# Patient Record
Sex: MALE | Race: OTHER | Employment: OTHER | URBAN - METROPOLITAN AREA
[De-identification: names, ages, dates, MRNs, and addresses within clinical notes are randomized per-mention and may not be internally consistent; named-entity substitution may affect disease eponyms.]

---

## 2017-01-20 DIAGNOSIS — E11.621 TYPE 2 DIABETES MELLITUS WITH FOOT ULCER (HCC): ICD-10-CM

## 2017-01-20 DIAGNOSIS — L97.509 TYPE 2 DIABETES MELLITUS WITH FOOT ULCER (HCC): ICD-10-CM

## 2017-03-04 ENCOUNTER — HOSPITAL ENCOUNTER (EMERGENCY)
Age: 62
Discharge: HOME | End: 2017-03-04
Attending: EMERGENCY MEDICINE
Payer: MEDICARE

## 2017-03-04 VITALS
TEMPERATURE: 97.2 F | DIASTOLIC BLOOD PRESSURE: 74 MMHG | SYSTOLIC BLOOD PRESSURE: 157 MMHG | OXYGEN SATURATION: 100 % | HEART RATE: 73 BPM | RESPIRATION RATE: 17 BRPM

## 2017-03-04 DIAGNOSIS — R04.0 EPISTAXIS: Primary | ICD-10-CM

## 2017-03-04 LAB
ABSOLUTE EOS #: 0.04 K/UL (ref 0–0.4)
ABSOLUTE LYMPH #: 1.07 K/UL (ref 1–4.8)
ABSOLUTE MONO #: 0.45 K/UL (ref 0.1–0.8)
ANION GAP SERPL CALCULATED.3IONS-SCNC: 25 MMOL/L (ref 9–17)
BASOPHILS # BLD: 0 % (ref 0–2)
BASOPHILS ABSOLUTE: 0 K/UL (ref 0–0.2)
BUN BLDV-MCNC: 148 MG/DL (ref 8–23)
BUN/CREAT BLD: ABNORMAL (ref 9–20)
CALCIUM SERPL-MCNC: 8.9 MG/DL (ref 8.6–10.4)
CHLORIDE BLD-SCNC: 94 MMOL/L (ref 98–107)
CO2: 20 MMOL/L (ref 20–31)
CREAT SERPL-MCNC: 11.8 MG/DL (ref 0.7–1.2)
DIFFERENTIAL TYPE: ABNORMAL
EOSINOPHILS RELATIVE PERCENT: 1 % (ref 1–4)
GFR AFRICAN AMERICAN: 5 ML/MIN
GFR NON-AFRICAN AMERICAN: 4 ML/MIN
GFR SERPL CREATININE-BSD FRML MDRD: ABNORMAL ML/MIN/{1.73_M2}
GFR SERPL CREATININE-BSD FRML MDRD: ABNORMAL ML/MIN/{1.73_M2}
GLUCOSE BLD-MCNC: 129 MG/DL (ref 70–99)
HCT VFR BLD CALC: 32.8 % (ref 41–53)
HEMOGLOBIN: 10.8 G/DL (ref 13.5–17.5)
INR BLD: 1.1
LYMPHOCYTES # BLD: 26 % (ref 24–44)
MCH RBC QN AUTO: 28.3 PG (ref 26–34)
MCHC RBC AUTO-ENTMCNC: 33 G/DL (ref 31–37)
MCV RBC AUTO: 85.7 FL (ref 80–100)
MONOCYTES # BLD: 11 % (ref 1–7)
MORPHOLOGY: NORMAL
PARTIAL THROMBOPLASTIN TIME: 29.3 SEC (ref 21.3–31.3)
PDW BLD-RTO: 19 % (ref 12.5–15.4)
PLATELET # BLD: ABNORMAL K/UL (ref 140–450)
PLATELET ESTIMATE: ABNORMAL
PMV BLD AUTO: ABNORMAL FL (ref 6–12)
POTASSIUM SERPL-SCNC: 5.4 MMOL/L (ref 3.7–5.3)
PROTHROMBIN TIME: 11.5 SEC (ref 9.4–12.6)
RBC # BLD: 3.83 M/UL (ref 4.5–5.9)
RBC # BLD: ABNORMAL 10*6/UL
SEG NEUTROPHILS: 62 % (ref 36–66)
SEGMENTED NEUTROPHILS ABSOLUTE COUNT: 2.54 K/UL (ref 1.8–7.7)
SODIUM BLD-SCNC: 139 MMOL/L (ref 135–144)
WBC # BLD: 4.1 K/UL (ref 3.5–11)
WBC # BLD: ABNORMAL 10*3/UL

## 2017-03-04 PROCEDURE — 85025 COMPLETE CBC W/AUTO DIFF WBC: CPT

## 2017-03-04 PROCEDURE — 85610 PROTHROMBIN TIME: CPT

## 2017-03-04 PROCEDURE — 99284 EMERGENCY DEPT VISIT MOD MDM: CPT

## 2017-03-04 PROCEDURE — 85730 THROMBOPLASTIN TIME PARTIAL: CPT

## 2017-03-04 PROCEDURE — 80048 BASIC METABOLIC PNL TOTAL CA: CPT

## 2017-03-04 RX ORDER — LIDOCAINE HYDROCHLORIDE AND EPINEPHRINE 10; 10 MG/ML; UG/ML
20 INJECTION, SOLUTION INFILTRATION; PERINEURAL ONCE
Status: DISCONTINUED | OUTPATIENT
Start: 2017-03-04 | End: 2017-03-05 | Stop reason: HOSPADM

## 2017-03-04 RX ORDER — OXYMETAZOLINE HYDROCHLORIDE 0.05 G/100ML
1 SPRAY NASAL ONCE
Status: DISCONTINUED | OUTPATIENT
Start: 2017-03-04 | End: 2017-03-05 | Stop reason: HOSPADM

## 2017-03-04 ASSESSMENT — ENCOUNTER SYMPTOMS
DIARRHEA: 0
VOMITING: 0
CONSTIPATION: 0
CHEST TIGHTNESS: 0
ABDOMINAL PAIN: 0
SORE THROAT: 0
SHORTNESS OF BREATH: 0
COUGH: 1
NAUSEA: 0

## 2017-03-21 DIAGNOSIS — E11.621 TYPE 2 DIABETES MELLITUS WITH FOOT ULCER (HCC): ICD-10-CM

## 2017-03-21 DIAGNOSIS — L97.509 TYPE 2 DIABETES MELLITUS WITH FOOT ULCER (HCC): ICD-10-CM

## 2018-02-27 ENCOUNTER — TELEPHONE (OUTPATIENT)
Dept: INFUSION THERAPY | Age: 63
End: 2018-02-27

## 2018-10-31 ENCOUNTER — OFFICE VISIT (OUTPATIENT)
Dept: FAMILY MEDICINE CLINIC | Age: 63
End: 2018-10-31
Payer: MEDICARE

## 2018-10-31 VITALS — TEMPERATURE: 97.7 F | SYSTOLIC BLOOD PRESSURE: 118 MMHG | DIASTOLIC BLOOD PRESSURE: 76 MMHG | HEART RATE: 78 BPM

## 2018-10-31 DIAGNOSIS — Z23 NEED FOR PROPHYLACTIC VACCINATION AGAINST STREPTOCOCCUS PNEUMONIAE (PNEUMOCOCCUS): ICD-10-CM

## 2018-10-31 DIAGNOSIS — Z13.220 SCREENING FOR HYPERLIPIDEMIA: ICD-10-CM

## 2018-10-31 DIAGNOSIS — Z79.4 TYPE 2 DIABETES MELLITUS WITH FOOT ULCER, WITH LONG-TERM CURRENT USE OF INSULIN (HCC): Primary | ICD-10-CM

## 2018-10-31 DIAGNOSIS — Z12.11 SCREENING FOR COLON CANCER: ICD-10-CM

## 2018-10-31 DIAGNOSIS — Z99.2 ESRD (END STAGE RENAL DISEASE) ON DIALYSIS (HCC): ICD-10-CM

## 2018-10-31 DIAGNOSIS — E11.621 TYPE 2 DIABETES MELLITUS WITH FOOT ULCER, WITH LONG-TERM CURRENT USE OF INSULIN (HCC): Primary | ICD-10-CM

## 2018-10-31 DIAGNOSIS — L97.509 TYPE 2 DIABETES MELLITUS WITH FOOT ULCER, WITH LONG-TERM CURRENT USE OF INSULIN (HCC): Primary | ICD-10-CM

## 2018-10-31 DIAGNOSIS — N18.6 ESRD (END STAGE RENAL DISEASE) ON DIALYSIS (HCC): ICD-10-CM

## 2018-10-31 PROCEDURE — 99204 OFFICE O/P NEW MOD 45 MIN: CPT | Performed by: STUDENT IN AN ORGANIZED HEALTH CARE EDUCATION/TRAINING PROGRAM

## 2018-10-31 PROCEDURE — 90670 PCV13 VACCINE IM: CPT | Performed by: FAMILY MEDICINE

## 2018-10-31 RX ORDER — BUMETANIDE 1 MG/1
2 TABLET ORAL DAILY
Status: ON HOLD | COMMUNITY
End: 2021-01-01 | Stop reason: HOSPADM

## 2018-10-31 RX ORDER — CINACALCET 90 MG/1
90 TABLET, FILM COATED ORAL DAILY
Status: ON HOLD | COMMUNITY
End: 2021-01-01 | Stop reason: HOSPADM

## 2018-10-31 ASSESSMENT — PATIENT HEALTH QUESTIONNAIRE - PHQ9
2. FEELING DOWN, DEPRESSED OR HOPELESS: 0
1. LITTLE INTEREST OR PLEASURE IN DOING THINGS: 0
SUM OF ALL RESPONSES TO PHQ QUESTIONS 1-9: 0
SUM OF ALL RESPONSES TO PHQ QUESTIONS 1-9: 0
SUM OF ALL RESPONSES TO PHQ9 QUESTIONS 1 & 2: 0

## 2018-10-31 ASSESSMENT — ENCOUNTER SYMPTOMS
TROUBLE SWALLOWING: 0
PHOTOPHOBIA: 0
SHORTNESS OF BREATH: 0
DIARRHEA: 0
ABDOMINAL PAIN: 0
CONSTIPATION: 0
VOMITING: 0
COUGH: 0
CHEST TIGHTNESS: 0
NAUSEA: 0

## 2018-10-31 NOTE — PROGRESS NOTES
Subjective:    Judith Moya is a 59 y.o. male with  has a past medical history of Dialysis care; ESRD (end stage renal disease) on dialysis ([facility] [address] Foot ulcer, left ([facility] [address] GERD (gastroesophageal reflux disease); Hemodialysis patient [facility]); History of sleep apnea; Hyperparathyroidism ([facility] [address] Hypertension; Neuropathy; Pneumonia; and Type II or unspecified type diabetes mellitus without mention of complication, not stated as uncontrolled. Family History   Problem Relation Age of Onset    Heart Disease Father     Diabetes Father     Diabetes Brother        Presented tothe office today for:  Chief Complaint   Patient presents with    Annual Exam       59 y.o. Male, PMHx DM II, CAD, CKD, presents to the office today to establish care. DM II with complications: Checks blood sugars at home: ~110. On insulin. Neuropathy - left foot ulcer resulting in below the knee amputation 5 years ago. Wears prosthetic leg. Sees a podiatrist every 2 months. Does not see an endocrinologist.  Nephropathy leading to CKD. CAD: Bypass with 2 stents 4 years ago. On Lipitor 40mg. CKD: Hemodialysis at [facility] in [address], Sat. On Sevelamer, Calcium, Vitamin D and Vitamin B. Denies headache, visual disturbance, chest pain, SOB, abdo pain, dysuria, constipation/diarrhea, edema. Review of Systems   Constitutional: Negative for chills, diaphoresis and fatigue. HENT: Negative for tinnitus and trouble swallowing. Eyes: Negative for photophobia and visual disturbance. Respiratory: Negative for cough, chest tightness and shortness of breath. Cardiovascular: Negative for chest pain, palpitations and leg swelling. Gastrointestinal: Negative for abdominal pain, constipation, diarrhea, nausea and vomiting. Endocrine: Negative for polydipsia, polyphagia and polyuria. Genitourinary: Negative for difficulty urinating, dysuria, frequency and urgency.    Musculoskeletal: Negative for long-term current use of insulin ([facility] [address]  - Hemoglobin A1C; Future  - Microalbumin, Ur; Future  - [facility] Jaydon Tejeda MD*  -Checks blood sugar at home: ~110s   -On insulin regimen, no refills needed at this time     2. ESRD (end stage renal disease) on dialysis [facility])  - Comprehensive Metabolic Panel; Future  -Dialysis Tues, Thurs, Sat    3. Screening for hyperlipidemia  - Lipid Panel; Future  -On Atorvastatin 40mg. Did not need refill     4. Screening for colon cancer  - POCT FECAL IMMUNOCHEMICAL TEST (FIT); Future    5. Need for prophylactic vaccination against Streptococcus pneumoniae (pneumococcus)  - Pneumococcal conjugate vaccine 13-valent PCV13          Requested Prescriptions      No prescriptions requested or ordered in this encounter       Medications Discontinued During This Encounter   Medication Reason    Heparin Sodium, Porcine, (HEPARIN, PORCINE,) 5000 UNIT/ML injection Therapy completed    insulin lispro (HUMALOG) 100 UNIT/ML injection vial Therapy completed    insulin lispro (HUMALOG) 100 UNIT/ML injection vial Therapy completed    lisinopril (PRINIVIL;ZESTRIL) 2.5 MG tablet Therapy completed    midodrine (PROAMATINE) 5 MG tablet DOSE ADJUSTMENT    carvedilol (COREG) 6.25 MG tablet LIST CLEANUP    docusate sodium (COLACE) 100 MG capsule LIST CLEANUP       Carson received counseling on the following healthy behaviors:nutrition, exercise and medication adherence    Discussed use, benefit, and side effects of prescribed medications. Barriers to medication compliance addressed. All patient questions answered. Pt voicedunderstanding. Return in about 6 weeks (around 12/12/2018) for f/u labs: lipids, A1c, cmp .

## 2018-12-17 ENCOUNTER — CARE COORDINATION (OUTPATIENT)
Dept: CARE COORDINATION | Age: 63
End: 2018-12-17

## 2019-06-02 ENCOUNTER — APPOINTMENT (OUTPATIENT)
Dept: GENERAL RADIOLOGY | Age: 64
DRG: 175 | End: 2019-06-02
Payer: MEDICARE

## 2019-06-02 ENCOUNTER — HOSPITAL ENCOUNTER (INPATIENT)
Age: 64
LOS: 4 days | Discharge: HOME | DRG: 175 | End: 2019-06-06
Attending: EMERGENCY MEDICINE | Admitting: FAMILY MEDICINE
Payer: MEDICARE

## 2019-06-02 DIAGNOSIS — I50.9 ACUTE ON CHRONIC CONGESTIVE HEART FAILURE, UNSPECIFIED HEART FAILURE TYPE (HCC): Primary | ICD-10-CM

## 2019-06-02 DIAGNOSIS — Z99.2 ENCOUNTER FOR DIALYSIS (HCC): ICD-10-CM

## 2019-06-02 DIAGNOSIS — D64.9 ANEMIA, UNSPECIFIED TYPE: ICD-10-CM

## 2019-06-02 DIAGNOSIS — E87.5 HYPERKALEMIA: ICD-10-CM

## 2019-06-02 DIAGNOSIS — N18.9 CHRONIC KIDNEY DISEASE, UNSPECIFIED CKD STAGE: ICD-10-CM

## 2019-06-02 PROBLEM — Z91.158 DIALYSIS PATIENT, NONCOMPLIANT: Status: ACTIVE | Noted: 2019-06-02

## 2019-06-02 PROBLEM — Z91.15 DIALYSIS PATIENT, NONCOMPLIANT (HCC): Status: ACTIVE | Noted: 2019-06-02

## 2019-06-02 LAB
ABSOLUTE EOS #: 0.08 K/UL (ref 0–0.44)
ABSOLUTE IMMATURE GRANULOCYTE: 0.05 K/UL (ref 0–0.3)
ABSOLUTE LYMPH #: 0.73 K/UL (ref 1.1–3.7)
ABSOLUTE MONO #: 0.63 K/UL (ref 0.1–1.2)
ALLEN TEST: ABNORMAL
ANION GAP SERPL CALCULATED.3IONS-SCNC: 22 MMOL/L (ref 9–17)
ANION GAP: 4 MMOL/L (ref 7–16)
BASOPHILS # BLD: 1 % (ref 0–2)
BASOPHILS ABSOLUTE: 0.06 K/UL (ref 0–0.2)
BNP INTERPRETATION: ABNORMAL
BUN BLDV-MCNC: 95 MG/DL (ref 8–23)
BUN/CREAT BLD: ABNORMAL (ref 9–20)
CALCIUM SERPL-MCNC: 8.6 MG/DL (ref 8.6–10.4)
CHLORIDE BLD-SCNC: 99 MMOL/L (ref 98–107)
CO2: 19 MMOL/L (ref 20–31)
CREAT SERPL-MCNC: 9.28 MG/DL (ref 0.7–1.2)
DIFFERENTIAL TYPE: ABNORMAL
EOSINOPHILS RELATIVE PERCENT: 1 % (ref 1–4)
FIO2: ABNORMAL
GFR AFRICAN AMERICAN: 7 ML/MIN
GFR NON-AFRICAN AMERICAN: 6 ML/MIN
GFR NON-AFRICAN AMERICAN: 6 ML/MIN
GFR SERPL CREATININE-BSD FRML MDRD: 7 ML/MIN
GFR SERPL CREATININE-BSD FRML MDRD: ABNORMAL ML/MIN/{1.73_M2}
GLUCOSE BLD-MCNC: 139 MG/DL (ref 70–99)
GLUCOSE BLD-MCNC: 145 MG/DL (ref 74–100)
HCO3 VENOUS: 22.9 MMOL/L (ref 22–29)
HCT VFR BLD CALC: 35 % (ref 40.7–50.3)
HEMOGLOBIN: 10.7 G/DL (ref 13–17)
IMMATURE GRANULOCYTES: 1 %
LYMPHOCYTES # BLD: 7 % (ref 24–43)
MCH RBC QN AUTO: 29.7 PG (ref 25.2–33.5)
MCHC RBC AUTO-ENTMCNC: 30.6 G/DL (ref 28.4–34.8)
MCV RBC AUTO: 97.2 FL (ref 82.6–102.9)
MODE: ABNORMAL
MONOCYTES # BLD: 6 % (ref 3–12)
NEGATIVE BASE EXCESS, VEN: 2 (ref 0–2)
NRBC AUTOMATED: 0 PER 100 WBC
O2 DEVICE/FLOW/%: ABNORMAL
O2 SAT, VEN: 82 % (ref 60–85)
PATIENT TEMP: ABNORMAL
PCO2, VEN: 37.5 MM HG (ref 41–51)
PDW BLD-RTO: 15.5 % (ref 11.8–14.4)
PH VENOUS: 7.39 (ref 7.32–7.43)
PLATELET # BLD: 100 K/UL (ref 138–453)
PLATELET ESTIMATE: ABNORMAL
PMV BLD AUTO: 12.9 FL (ref 8.1–13.5)
PO2, VEN: 46 MM HG (ref 30–50)
POC CHLORIDE: 115 MMOL/L (ref 98–107)
POC CREATININE: 8.84 MG/DL (ref 0.51–1.19)
POC HEMATOCRIT: 37 % (ref 41–53)
POC HEMOGLOBIN: 12.4 G/DL (ref 13.5–17.5)
POC IONIZED CALCIUM: 0.9 MMOL/L (ref 1.15–1.33)
POC LACTIC ACID: 1.44 MMOL/L (ref 0.56–1.39)
POC PCO2 TEMP: ABNORMAL MM HG
POC PH TEMP: ABNORMAL
POC PO2 TEMP: ABNORMAL MM HG
POC POTASSIUM: 7.3 MMOL/L (ref 3.5–4.5)
POC SODIUM: 142 MMOL/L (ref 138–146)
POSITIVE BASE EXCESS, VEN: ABNORMAL (ref 0–3)
POTASSIUM SERPL-SCNC: 7.2 MMOL/L (ref 3.7–5.3)
PRO-BNP: ABNORMAL PG/ML
RBC # BLD: 3.6 M/UL (ref 4.21–5.77)
RBC # BLD: ABNORMAL 10*6/UL
SAMPLE SITE: ABNORMAL
SEG NEUTROPHILS: 84 % (ref 36–65)
SEGMENTED NEUTROPHILS ABSOLUTE COUNT: 9.3 K/UL (ref 1.5–8.1)
SODIUM BLD-SCNC: 140 MMOL/L (ref 135–144)
TOTAL CO2, VENOUS: 24 MMOL/L (ref 23–30)
TROPONIN INTERP: ABNORMAL
TROPONIN INTERP: ABNORMAL
TROPONIN T: ABNORMAL NG/ML
TROPONIN T: ABNORMAL NG/ML
TROPONIN, HIGH SENSITIVITY: 214 NG/L (ref 0–22)
TROPONIN, HIGH SENSITIVITY: 216 NG/L (ref 0–22)
WBC # BLD: 10.9 K/UL (ref 3.5–11.3)
WBC # BLD: ABNORMAL 10*3/UL

## 2019-06-02 PROCEDURE — 93005 ELECTROCARDIOGRAM TRACING: CPT | Performed by: EMERGENCY MEDICINE

## 2019-06-02 PROCEDURE — 96374 THER/PROPH/DIAG INJ IV PUSH: CPT

## 2019-06-02 PROCEDURE — 84132 ASSAY OF SERUM POTASSIUM: CPT

## 2019-06-02 PROCEDURE — 2060000000 HC ICU INTERMEDIATE R&B

## 2019-06-02 PROCEDURE — 84484 ASSAY OF TROPONIN QUANT: CPT

## 2019-06-02 PROCEDURE — 83880 ASSAY OF NATRIURETIC PEPTIDE: CPT

## 2019-06-02 PROCEDURE — 71045 X-RAY EXAM CHEST 1 VIEW: CPT

## 2019-06-02 PROCEDURE — 99285 EMERGENCY DEPT VISIT HI MDM: CPT

## 2019-06-02 PROCEDURE — 84295 ASSAY OF SERUM SODIUM: CPT

## 2019-06-02 PROCEDURE — 6360000002 HC RX W HCPCS: Performed by: EMERGENCY MEDICINE

## 2019-06-02 PROCEDURE — 85014 HEMATOCRIT: CPT

## 2019-06-02 PROCEDURE — 83605 ASSAY OF LACTIC ACID: CPT

## 2019-06-02 PROCEDURE — 82803 BLOOD GASES ANY COMBINATION: CPT

## 2019-06-02 PROCEDURE — 93005 ELECTROCARDIOGRAM TRACING: CPT

## 2019-06-02 PROCEDURE — 6370000000 HC RX 637 (ALT 250 FOR IP): Performed by: STUDENT IN AN ORGANIZED HEALTH CARE EDUCATION/TRAINING PROGRAM

## 2019-06-02 PROCEDURE — 6360000002 HC RX W HCPCS: Performed by: STUDENT IN AN ORGANIZED HEALTH CARE EDUCATION/TRAINING PROGRAM

## 2019-06-02 PROCEDURE — 94660 CPAP INITIATION&MGMT: CPT

## 2019-06-02 PROCEDURE — 82947 ASSAY GLUCOSE BLOOD QUANT: CPT

## 2019-06-02 PROCEDURE — 6370000000 HC RX 637 (ALT 250 FOR IP): Performed by: EMERGENCY MEDICINE

## 2019-06-02 PROCEDURE — 85025 COMPLETE CBC W/AUTO DIFF WBC: CPT

## 2019-06-02 PROCEDURE — 82330 ASSAY OF CALCIUM: CPT

## 2019-06-02 PROCEDURE — 82435 ASSAY OF BLOOD CHLORIDE: CPT

## 2019-06-02 PROCEDURE — 2580000003 HC RX 258: Performed by: STUDENT IN AN ORGANIZED HEALTH CARE EDUCATION/TRAINING PROGRAM

## 2019-06-02 PROCEDURE — 80048 BASIC METABOLIC PNL TOTAL CA: CPT

## 2019-06-02 PROCEDURE — 5A1D70Z PERFORMANCE OF URINARY FILTRATION, INTERMITTENT, LESS THAN 6 HOURS PER DAY: ICD-10-PCS | Performed by: INTERNAL MEDICINE

## 2019-06-02 PROCEDURE — 82565 ASSAY OF CREATININE: CPT

## 2019-06-02 PROCEDURE — 2580000003 HC RX 258: Performed by: EMERGENCY MEDICINE

## 2019-06-02 RX ORDER — DEXTROSE MONOHYDRATE 50 MG/ML
100 INJECTION, SOLUTION INTRAVENOUS PRN
Status: DISCONTINUED | OUTPATIENT
Start: 2019-06-02 | End: 2019-06-06 | Stop reason: HOSPADM

## 2019-06-02 RX ORDER — SODIUM CHLORIDE 0.9 % (FLUSH) 0.9 %
10 SYRINGE (ML) INJECTION EVERY 12 HOURS SCHEDULED
Status: CANCELLED | OUTPATIENT
Start: 2019-06-02

## 2019-06-02 RX ORDER — SODIUM CHLORIDE 0.9 % (FLUSH) 0.9 %
10 SYRINGE (ML) INJECTION EVERY 12 HOURS SCHEDULED
Status: DISCONTINUED | OUTPATIENT
Start: 2019-06-02 | End: 2019-06-06 | Stop reason: HOSPADM

## 2019-06-02 RX ORDER — ACETAMINOPHEN 325 MG/1
650 TABLET ORAL EVERY 4 HOURS PRN
Status: DISCONTINUED | OUTPATIENT
Start: 2019-06-02 | End: 2019-06-06 | Stop reason: HOSPADM

## 2019-06-02 RX ORDER — ATORVASTATIN CALCIUM 40 MG/1
40 TABLET, FILM COATED ORAL NIGHTLY
Status: DISCONTINUED | OUTPATIENT
Start: 2019-06-02 | End: 2019-06-05

## 2019-06-02 RX ORDER — B COMPLEX, C NO.20/FOLIC ACID 1 MG
15 CAPSULE ORAL DAILY
Status: DISCONTINUED | OUTPATIENT
Start: 2019-06-03 | End: 2019-06-02

## 2019-06-02 RX ORDER — NICOTINE POLACRILEX 4 MG
15 LOZENGE BUCCAL PRN
Status: DISCONTINUED | OUTPATIENT
Start: 2019-06-02 | End: 2019-06-06 | Stop reason: HOSPADM

## 2019-06-02 RX ORDER — MIDODRINE HYDROCHLORIDE 5 MG/1
10 TABLET ORAL
Status: DISCONTINUED | OUTPATIENT
Start: 2019-06-03 | End: 2019-06-06 | Stop reason: HOSPADM

## 2019-06-02 RX ORDER — CINACALCET 30 MG/1
90 TABLET, FILM COATED ORAL DAILY
Status: DISCONTINUED | OUTPATIENT
Start: 2019-06-03 | End: 2019-06-06 | Stop reason: HOSPADM

## 2019-06-02 RX ORDER — NITROGLYCERIN 0.4 MG/1
0.4 TABLET SUBLINGUAL EVERY 5 MIN PRN
Status: DISCONTINUED | OUTPATIENT
Start: 2019-06-02 | End: 2019-06-06 | Stop reason: HOSPADM

## 2019-06-02 RX ORDER — ACETAMINOPHEN 325 MG/1
650 TABLET ORAL EVERY 4 HOURS PRN
Status: CANCELLED | OUTPATIENT
Start: 2019-06-02

## 2019-06-02 RX ORDER — ASPIRIN 81 MG/1
324 TABLET, CHEWABLE ORAL ONCE
Status: COMPLETED | OUTPATIENT
Start: 2019-06-02 | End: 2019-06-02

## 2019-06-02 RX ORDER — 0.9 % SODIUM CHLORIDE 0.9 %
150 INTRAVENOUS SOLUTION INTRAVENOUS PRN
Status: DISCONTINUED | OUTPATIENT
Start: 2019-06-02 | End: 2019-06-06 | Stop reason: HOSPADM

## 2019-06-02 RX ORDER — M-VIT,TX,IRON,MINS/CALC/FOLIC 27MG-0.4MG
1 TABLET ORAL DAILY
Status: DISCONTINUED | OUTPATIENT
Start: 2019-06-03 | End: 2019-06-06 | Stop reason: HOSPADM

## 2019-06-02 RX ORDER — ASPIRIN 81 MG/1
81 TABLET ORAL DAILY
Status: DISCONTINUED | OUTPATIENT
Start: 2019-06-03 | End: 2019-06-06 | Stop reason: HOSPADM

## 2019-06-02 RX ORDER — DEXTROSE MONOHYDRATE 25 G/50ML
25 INJECTION, SOLUTION INTRAVENOUS ONCE
Status: COMPLETED | OUTPATIENT
Start: 2019-06-02 | End: 2019-06-02

## 2019-06-02 RX ORDER — HEPARIN SODIUM 5000 [USP'U]/ML
5000 INJECTION, SOLUTION INTRAVENOUS; SUBCUTANEOUS EVERY 8 HOURS SCHEDULED
Status: DISCONTINUED | OUTPATIENT
Start: 2019-06-02 | End: 2019-06-06 | Stop reason: HOSPADM

## 2019-06-02 RX ORDER — BUMETANIDE 1 MG/1
2 TABLET ORAL DAILY
Status: DISCONTINUED | OUTPATIENT
Start: 2019-06-03 | End: 2019-06-06 | Stop reason: HOSPADM

## 2019-06-02 RX ORDER — 0.9 % SODIUM CHLORIDE 0.9 %
250 INTRAVENOUS SOLUTION INTRAVENOUS PRN
Status: DISCONTINUED | OUTPATIENT
Start: 2019-06-02 | End: 2019-06-06 | Stop reason: HOSPADM

## 2019-06-02 RX ORDER — SODIUM CHLORIDE 0.9 % (FLUSH) 0.9 %
10 SYRINGE (ML) INJECTION PRN
Status: CANCELLED | OUTPATIENT
Start: 2019-06-02

## 2019-06-02 RX ORDER — SODIUM CHLORIDE 0.9 % (FLUSH) 0.9 %
10 SYRINGE (ML) INJECTION PRN
Status: DISCONTINUED | OUTPATIENT
Start: 2019-06-02 | End: 2019-06-06 | Stop reason: HOSPADM

## 2019-06-02 RX ORDER — CALCIUM GLUCONATE 94 MG/ML
2 INJECTION, SOLUTION INTRAVENOUS ONCE
Status: COMPLETED | OUTPATIENT
Start: 2019-06-02 | End: 2019-06-02

## 2019-06-02 RX ORDER — DEXTROSE MONOHYDRATE 25 G/50ML
12.5 INJECTION, SOLUTION INTRAVENOUS PRN
Status: DISCONTINUED | OUTPATIENT
Start: 2019-06-02 | End: 2019-06-06 | Stop reason: HOSPADM

## 2019-06-02 RX ORDER — ONDANSETRON 2 MG/ML
4 INJECTION INTRAMUSCULAR; INTRAVENOUS EVERY 6 HOURS PRN
Status: DISCONTINUED | OUTPATIENT
Start: 2019-06-02 | End: 2019-06-06 | Stop reason: HOSPADM

## 2019-06-02 RX ORDER — SEVELAMER CARBONATE 800 MG/1
4000 TABLET, FILM COATED ORAL 3 TIMES DAILY
Status: DISCONTINUED | OUTPATIENT
Start: 2019-06-02 | End: 2019-06-06 | Stop reason: HOSPADM

## 2019-06-02 RX ADMIN — Medication 10 ML: at 23:03

## 2019-06-02 RX ADMIN — INSULIN HUMAN 10 UNITS: 100 INJECTION, SOLUTION PARENTERAL at 19:47

## 2019-06-02 RX ADMIN — NITROGLYCERIN 0.4 MG: 0.4 TABLET SUBLINGUAL at 19:37

## 2019-06-02 RX ADMIN — SEVELAMER CARBONATE 4000 MG: 800 TABLET, FILM COATED ORAL at 23:10

## 2019-06-02 RX ADMIN — DEXTROSE MONOHYDRATE 25 G: 500 INJECTION PARENTERAL at 19:47

## 2019-06-02 RX ADMIN — HEPARIN SODIUM 5000 UNITS: 5000 INJECTION INTRAVENOUS; SUBCUTANEOUS at 23:10

## 2019-06-02 RX ADMIN — DESMOPRESSIN ACETATE 40 MG: 0.2 TABLET ORAL at 23:11

## 2019-06-02 RX ADMIN — ASPIRIN 81 MG 324 MG: 81 TABLET ORAL at 19:36

## 2019-06-02 RX ADMIN — CALCIUM GLUCONATE 2 G: 98 INJECTION, SOLUTION INTRAVENOUS at 19:42

## 2019-06-02 ASSESSMENT — ENCOUNTER SYMPTOMS
VOMITING: 0
ABDOMINAL PAIN: 0
SHORTNESS OF BREATH: 1
COUGH: 0
WHEEZING: 0
RHINORRHEA: 0
SORE THROAT: 0
PHOTOPHOBIA: 0
NAUSEA: 0
EYE PAIN: 0

## 2019-06-02 ASSESSMENT — PAIN DESCRIPTION - LOCATION: LOCATION: BACK

## 2019-06-02 ASSESSMENT — PAIN SCALES - GENERAL
PAINLEVEL_OUTOF10: 0
PAINLEVEL_OUTOF10: 0
PAINLEVEL_OUTOF10: 7

## 2019-06-02 ASSESSMENT — PAIN DESCRIPTION - DESCRIPTORS: DESCRIPTORS: SHOOTING

## 2019-06-02 ASSESSMENT — PAIN DESCRIPTION - PAIN TYPE: TYPE: ACUTE PAIN

## 2019-06-02 NOTE — ED TRIAGE NOTES
Pt presents to ED via alyson, pt is a&o x4. Pt missed dialysis yesterday and is now having sob. Pt was desating on NC at 86%. Pt presents on NRB at 10L. Pt placed on full cardiac monitor.  IV established EkG done

## 2019-06-02 NOTE — ED PROVIDER NOTES
[facility] ED  Emergency Department Encounter  Emergency Medicine Resident     Patient Name: Jb Cerda  MRN: [id number]  [place]: 1955  Date of evaluation: 6/2/19  PCP:  Gloria Madera MD    [address]       Chief Complaint   Patient presents with    Shortness of Breath       HISTORY OF PRESENT ILLNESS  (Location/Symptom, Timing/Onset, Context/Setting, Quality, Duration, Modifying Factors, Severity.)      Jb Cerda is a 62 y.o. male who presents with a chief complaint of SOB that began earlier today, duration is constant since onset, severity is moderate, context is CKD on dialysis. Patient missed 1 dialysis appointment yesterday. Patient has history of coronary bypass surgery. Patient does not know who his nephrologist is. PAST MEDICAL / SURGICAL / SOCIAL / FAMILY HISTORY      has a past medical history of Dialysis care, ESRD (end stage renal disease) on dialysis ([facility] [address] Foot ulcer, left ([facility] [address] GERD (gastroesophageal reflux disease), Hemodialysis patient ([facility] [address] History of sleep apnea, Hyperparathyroidism ([facility] [address] Hypertension, Neuropathy, Pneumonia, and Type II or unspecified type diabetes mellitus without mention of complication, not stated as uncontrolled. has a past surgical history that includes Cataract removal; Dialysis fistula creation (Left, 2014); and Leg amputation, lower tibia/fibula.     Social History     Socioeconomic History    Marital status:      Spouse name: Not on file    Number of children: Not on file    Years of education: Not on file    Highest education level: Not on file   Occupational History    Not on file   Social Needs    Financial resource strain: Not on file    Food insecurity:     Worry: Not on file     Inability: Not on file    Transportation needs:     Medical: Not on file     Non-medical: Not on file   Tobacco Use    Smoking status: Never Smoker    Smokeless tobacco: Never Used   Substance and Sexual Activity    Alcohol use: No    Drug use: No    Sexual activity: Not on file   Lifestyle    Physical activity:     Days per week: Not on file     Minutes per session: Not on file    Stress: Not on file   Relationships    Social connections:     Talks on phone: Not on file     Gets together: Not on file     Attends Yazidi service: Not on file     Active member of club or organization: Not on file     Attends meetings of clubs or organizations: Not on file     Relationship status: Not on file    Intimate partner violence:     Fear of current or ex partner: Not on file     Emotionally abused: Not on file     Physically abused: Not on file     Forced sexual activity: Not on file   Other Topics Concern    Not on file   Social History Narrative    Not on file       Family History   Problem Relation Age of Onset    Heart Disease Father     Diabetes Father     Diabetes Brother      Allergies:  Pcn [penicillins]    Home Medications:  Prior to Admission medications    Medication Sig Start Date End Date Taking? Authorizing Provider   bumetanide (BUMEX) 1 MG tablet Take 2 mg by mouth daily    Historical Provider, MD   cinacalcet (SENSIPAR) 90 MG tablet Take 90 mg by mouth daily    Historical Provider, MD   ONE TOUCH ULTRA TEST strip USE AS DIRECTED DAILY AS NEEDED 3/22/17   Orest Blend, DO   ONE TOUCH ULTRA TEST strip USE AS DIRECTED DAILY AS NEEDED 1/24/17   Orest Blend, DO   Blood Glucose Monitoring Suppl (ONE TOUCH ULTRA 2) W/DEVICE KIT TEST 3 TIMES DAILY 8/26/16   Orest Blend, DO   glucose monitoring kit (FREESTYLE) monitoring kit 1 kit by Does not apply route daily as needed 4/21/16   Orest Blend, DO   Lancets 30G MISC 1 each by Does not apply route 3 times daily 4/21/16   Orest Blend, DO   Misc.  Devices (WALKER GLIDE WHEELS) MISC 1 Device by Does not apply route continuous 4/21/16   Orest Blend, DO   atorvastatin (LIPITOR) 40 MG tablet Take 1 tablet by mouth nightly 3/14/16   Kade Barbosa Candice, DO   midodrine (PROAMATINE) 10 MG tablet Take 1 tablet by mouth 3 times daily (with meals) 3/14/16   Freddy Overall, DO   aspirin 81 MG EC tablet Take 1 tablet by mouth daily 3/14/16   Freddy Overall, DO   Multiple Vitamins-Minerals (THERAPEUTIC MULTIVITAMIN-MINERALS) tablet Take 1 tablet by mouth daily 3/14/16   Freddy Overall, DO   sevelamer (RENVELA) 800 MG tablet Take 5 tablets by mouth 3 times daily     Historical Provider, MD   B Complex-C-Folic Acid (TRIPHROCAPS PO) Take 15 mg by mouth daily    Historical Provider, MD   calcium acetate (PHOSLO) 667 MG capsule Take 1,334 mg by mouth 3 times daily (with meals)     Historical Provider, MD   vitamin D (CHOLECALCIFEROL) 1000 UNIT TABS tablet Take 1,000 Units by mouth daily. Historical Provider, MD       REVIEW OF SYSTEMS    (2-9 systems for level 4, 10 or more for level 5)      Review of Systems   Constitutional: Negative for chills. HENT: Negative for sore throat. Respiratory: Positive for shortness of breath. Cardiovascular: Negative for chest pain. Gastrointestinal: Negative for abdominal pain, nausea and vomiting. Genitourinary: Negative for difficulty urinating. Musculoskeletal: Negative for myalgias. Skin: Negative for wound. Neurological: Negative for syncope. Hematological: Does not bruise/bleed easily. PHYSICAL EXAM   (up to 7 for level 4, 8 or more for level 5)      INITIAL VITALS:   BP (!) 159/93   Pulse 87   Temp 98.1 °F (36.7 °C)   Resp 22   Ht 6' (1.829 m)   Wt 210 lb (95.3 kg)   SpO2 100%   BMI 28.48 kg/m²     Physical Exam   Constitutional: He is oriented to person, place, and time. He appears well-developed and well-nourished. Patient is speaking in full sentences but not paragraphs. Increased work of breathing. HENT:   Head: Normocephalic and atraumatic. Eyes: Right eye exhibits no discharge. Left eye exhibits no discharge. Neck: No tracheal deviation present.    Cardiovascular: Regular rhythm, normal heart sounds and intact distal pulses. Tachycardic. Thrill to fistula in left arm. Well healed midline surgical chest scar   Pulmonary/Chest: No stridor. He is in respiratory distress (mild). He has no wheezes. He has no rales. Diminished symmetrically throughout   Abdominal: Soft. Bowel sounds are normal. He exhibits no distension. There is no tenderness. There is no guarding. Obese abdomen   Musculoskeletal: Normal range of motion. He exhibits no deformity. Neurological: He is alert and oriented to person, place, and time. Skin: Skin is warm and dry. No rash noted. He is not diaphoretic.    Psychiatric: His speech is normal.       WORK-UP     PLAN (LABS / IMAGING /EKG):  Orders Placed This Encounter   Procedures    XR CHEST PORTABLE    CBC Auto Differential    Basic Metabolic Panel w/ Reflex to MG    Troponin    Brain Natriuretic Peptide    Hemoglobin and hematocrit, blood    SODIUM (POC)    POTASSIUM (POC)    CHLORIDE (POC)    CALCIUM, IONIC (POC)    Inpatient consult to Nephrology    Inpatient consult to Internal Medicine    Initiate Oxygen Therapy Protocol    POC Blood Gas    Venous Blood Gas, POC    Creatinine W/GFR Point of Care    Lactic Acid, POC    POCT Glucose    Anion Gap (Calc) POC    EKG 12 Lead    EKG 12 Lead    Saline lock IV    PATIENT STATUS (FROM ED OR OR/PROCEDURAL) Inpatient       MEDICATIONS ORDERED:  Orders Placed This Encounter   Medications    aspirin chewable tablet 324 mg    nitroGLYCERIN (NITROSTAT) SL tablet 0.4 mg    calcium gluconate 10 % injection 2 g    dextrose 50 % solution 25 g    insulin regular (HUMULIN R;NOVOLIN R) injection 10 Units       DIAGNOSTIC RESULTS / EMERGENCY DEPARTMENT COURSE /MDM / DIFFERENTIAL DIAGNOSIS     LABS:  Results for orders placed or performed during the hospital encounter of 06/02/19   CBC Auto Differential   Result Value Ref Range    WBC 10.9 3.5 - 11.3 k/uL    RBC 3.60 (L) 4.21 - 5.77 m/uL - 17.5 g/dL    POC Hematocrit 37 (L) 41 - 53 %   SODIUM (POC)   Result Value Ref Range    POC Sodium 142 138 - 146 mmol/L   POTASSIUM (POC)   Result Value Ref Range    POC Potassium 7.3 (HH) 3.5 - 4.5 mmol/L   CHLORIDE (POC)   Result Value Ref Range    POC Chloride 115 (H) 98 - 107 mmol/L   CALCIUM, IONIC (POC)   Result Value Ref Range    POC Ionized Calcium 0.90 (L) 1.15 - 1.33 mmol/L   Venous Blood Gas, POC   Result Value Ref Range    pH, Poli 7.394 7.320 - 7.430    pCO2, Poli 37.5 (L) 41.0 - 51.0 mm Hg    pO2, Poli 46.0 30.0 - 50.0 mm Hg    HCO3, Venous 22.9 22.0 - 29.0 mmol/L    Total CO2, Venous 24 23.0 - 30.0 mmol/L    Negative Base Excess, Poli 2 0.0 - 2.0    Positive Base Excess, Poli NOT REPORTED 0.0 - 3.0    O2 Sat, Poli 82 60.0 - 85.0 %    O2 Device/Flow/% NOT REPORTED     Nicanor Test NOT REPORTED     Sample Site NOT REPORTED     Mode NOT REPORTED     FIO2 NOT REPORTED     Pt Temp NOT REPORTED     POC pH Temp NOT REPORTED     POC pCO2 Temp NOT REPORTED mm Hg    POC pO2 Temp NOT REPORTED mm Hg   Creatinine W/GFR Point of Care   Result Value Ref Range    POC Creatinine 8.84 (HH) 0.51 - 1.19 mg/dL    GFR Comment 7 (L) >60 mL/min    GFR Non-African American 6 (L) >60 mL/min    GFR Comment         Lactic Acid, POC   Result Value Ref Range    POC Lactic Acid 1.44 (H) 0.56 - 1.39 mmol/L   POCT Glucose   Result Value Ref Range    POC Glucose 145 (H) 74 - 100 mg/dL   Anion Gap (Calc) POC   Result Value Ref Range    Anion Gap 4 (L) 7 - 16 mmol/L       RADIOLOGY:  Xr Chest Portable    Result Date: 6/2/2019  EXAMINATION: ONE XRAY VIEW OF THE CHEST 6/2/2019 7:29 pm COMPARISON: None. HISTORY: ORDERING SYSTEM PROVIDED HISTORY: SOB TECHNOLOGIST PROVIDED HISTORY: SOB Ordering Physician Provided Reason for Exam: sob Acuity: Unknown Type of Exam: Unknown FINDINGS: There is moderate pulmonary vascular congestion with moderate cardiomegaly. No effusions are visualized on the single frontal view.   The patient is status post median sternotomy     Moderate CHF and moderate cardiomegaly. EKG  Rhythm: sinus tachycardia  Rate: tachycardia  Axis: left  Ectopy: none  Conduction: nonspecific interventricular conduction block  ST Segments: elevation in  v1, v2 and v3  T Waves: peaked in  v1, v2, v3, v4, v5, v6, I and aVl  Q Waves: none    Clinical Impression: ischemic vs hyperkalmeia changes compared to prior dated 25-FEB-2016    hospitals,      All EKG's are interpreted by the Emergency Department Physician who either signs or Co-signs this chart in the absence of a cardiologist.    HEART Risk Score for Chest Pain Patients   History and Physical Exam Suspicion Level  (Nausea, Vomiting, Diaphoresis, Radiation, Exertion)   Slightly Suspicious (0 pts)   Moderately Suspicious (1 pt)   Highly Suspicious (2 pts)   EKG Interpretation   Normal (0 pts)   Non-Specific Repolarization Disturbance (1 pt)   Significant ST-Depression (2 pts)   Age of Patient (in years)   = 39 (0 pts)   46-64 (1 pt)   = 65 (2 pts)   Risk Factors   No Risk Factors (0 pts)   1-2 Risk Factors (1 pt)   = 3 Risk Factors (2 pts)   Risk Factors Include:   Hypercholesterolemia   Hypertension   Diabetes Mellitus   Cigarette smoking   Positive family history   Obesity   CAD   (SLE, CKDz, HIV, Cocaine abuse)   Troponin Levels   = Normal Limit (0 pts)   1-3 Times Normal Limit (1 pt)   > 3 Times Normal Limit (2 pts)  TOTAL: 7    Percent Risk for Major Adverse Cardiac Event (MACE)  0-3 pts indicates low risk for MACE   2.5% (DISCHARGE)   4-7 pts indicates moderate risk for MACE  20.3% (OBS)  8-10 pts indicates high risk for MACE  72.7% (EARLY INVASIVE TX)     DIFFERENTIAL DIAGNOSIS:  pulmonary embolism, pneumothorax, ACS/MI,  pneumonia, CHF exacerbation, electrolyte abnormality, pulmonary edema    EMERGENCY DEPARTMENT COURSE & MDM:  62 y.o. male presents with a chief complaint of shortness of breath. Vitals notable for tachycardia and hypertension.   We'll get point of care labs and that portions ofthis note were completed with a voice recognition program.  Efforts were made to edit the dictations but occasionally words are mis-transcribed.)        Martir Mariee,   Resident  06/02/19 2067

## 2019-06-03 ENCOUNTER — APPOINTMENT (OUTPATIENT)
Dept: GENERAL RADIOLOGY | Age: 64
DRG: 175 | End: 2019-06-03
Payer: MEDICARE

## 2019-06-03 LAB
-: NORMAL
ANION GAP SERPL CALCULATED.3IONS-SCNC: 20 MMOL/L (ref 9–17)
BNP INTERPRETATION: ABNORMAL
BUN BLDV-MCNC: 50 MG/DL (ref 8–23)
BUN/CREAT BLD: ABNORMAL (ref 9–20)
CALCIUM SERPL-MCNC: 8.8 MG/DL (ref 8.6–10.4)
CHLORIDE BLD-SCNC: 97 MMOL/L (ref 98–107)
CHOLESTEROL/HDL RATIO: 3.4
CHOLESTEROL: 142 MG/DL
CO2: 28 MMOL/L (ref 20–31)
CREAT SERPL-MCNC: 6.32 MG/DL (ref 0.7–1.2)
EKG ATRIAL RATE: 104 BPM
EKG ATRIAL RATE: 87 BPM
EKG P AXIS: 34 DEGREES
EKG P AXIS: 35 DEGREES
EKG P-R INTERVAL: 176 MS
EKG P-R INTERVAL: 180 MS
EKG Q-T INTERVAL: 378 MS
EKG Q-T INTERVAL: 398 MS
EKG QRS DURATION: 110 MS
EKG QRS DURATION: 134 MS
EKG QTC CALCULATION (BAZETT): 478 MS
EKG QTC CALCULATION (BAZETT): 497 MS
EKG R AXIS: -33 DEGREES
EKG R AXIS: -42 DEGREES
EKG T AXIS: 109 DEGREES
EKG T AXIS: 115 DEGREES
EKG VENTRICULAR RATE: 104 BPM
EKG VENTRICULAR RATE: 87 BPM
ESTIMATED AVERAGE GLUCOSE: 140 MG/DL
GFR AFRICAN AMERICAN: 11 ML/MIN
GFR NON-AFRICAN AMERICAN: 9 ML/MIN
GFR SERPL CREATININE-BSD FRML MDRD: ABNORMAL ML/MIN/{1.73_M2}
GFR SERPL CREATININE-BSD FRML MDRD: ABNORMAL ML/MIN/{1.73_M2}
GLUCOSE BLD-MCNC: 121 MG/DL (ref 75–110)
GLUCOSE BLD-MCNC: 123 MG/DL (ref 75–110)
GLUCOSE BLD-MCNC: 130 MG/DL (ref 75–110)
GLUCOSE BLD-MCNC: 138 MG/DL (ref 75–110)
GLUCOSE BLD-MCNC: 144 MG/DL (ref 75–110)
GLUCOSE BLD-MCNC: 145 MG/DL (ref 70–99)
HBA1C MFR BLD: 6.5 % (ref 4–6)
HCT VFR BLD CALC: 27.1 % (ref 40.7–50.3)
HDLC SERPL-MCNC: 42 MG/DL
HEMOGLOBIN: 8.3 G/DL (ref 13–17)
LDL CHOLESTEROL: 73 MG/DL (ref 0–130)
LV EF: 33 %
LVEF MODALITY: NORMAL
MAGNESIUM: 1.8 MG/DL (ref 1.6–2.6)
MCH RBC QN AUTO: 30 PG (ref 25.2–33.5)
MCHC RBC AUTO-ENTMCNC: 30.6 G/DL (ref 28.4–34.8)
MCV RBC AUTO: 97.8 FL (ref 82.6–102.9)
MYOGLOBIN: 293 NG/ML (ref 28–72)
MYOGLOBIN: 319 NG/ML (ref 28–72)
MYOGLOBIN: 342 NG/ML (ref 28–72)
MYOGLOBIN: 350 NG/ML (ref 28–72)
NRBC AUTOMATED: 4.3 PER 100 WBC
PDW BLD-RTO: 14.5 % (ref 11.8–14.4)
PLATELET # BLD: 218 K/UL (ref 138–453)
PMV BLD AUTO: 9.8 FL (ref 8.1–13.5)
POTASSIUM SERPL-SCNC: 4.6 MMOL/L (ref 3.7–5.3)
PRO-BNP: ABNORMAL PG/ML
RBC # BLD: 2.77 M/UL (ref 4.21–5.77)
REASON FOR REJECTION: NORMAL
SODIUM BLD-SCNC: 145 MMOL/L (ref 135–144)
TRIGL SERPL-MCNC: 136 MG/DL
TROPONIN INTERP: ABNORMAL
TROPONIN T: ABNORMAL NG/ML
TROPONIN, HIGH SENSITIVITY: 241 NG/L (ref 0–22)
TROPONIN, HIGH SENSITIVITY: 259 NG/L (ref 0–22)
TROPONIN, HIGH SENSITIVITY: 283 NG/L (ref 0–22)
TROPONIN, HIGH SENSITIVITY: 292 NG/L (ref 0–22)
TSH SERPL DL<=0.05 MIU/L-ACNC: 1.01 MIU/L (ref 0.3–5)
VLDLC SERPL CALC-MCNC: NORMAL MG/DL (ref 1–30)
WBC # BLD: 13.8 K/UL (ref 3.5–11.3)
ZZ NTE CLEAN UP: ORDERED TEST: NORMAL
ZZ NTE WITH NAME CLEAN UP: SPECIMEN SOURCE: NORMAL

## 2019-06-03 PROCEDURE — 2700000000 HC OXYGEN THERAPY PER DAY

## 2019-06-03 PROCEDURE — 99223 1ST HOSP IP/OBS HIGH 75: CPT | Performed by: FAMILY MEDICINE

## 2019-06-03 PROCEDURE — 83874 ASSAY OF MYOGLOBIN: CPT

## 2019-06-03 PROCEDURE — 71046 X-RAY EXAM CHEST 2 VIEWS: CPT

## 2019-06-03 PROCEDURE — 93306 TTE W/DOPPLER COMPLETE: CPT

## 2019-06-03 PROCEDURE — 83735 ASSAY OF MAGNESIUM: CPT

## 2019-06-03 PROCEDURE — 97530 THERAPEUTIC ACTIVITIES: CPT

## 2019-06-03 PROCEDURE — 85027 COMPLETE CBC AUTOMATED: CPT

## 2019-06-03 PROCEDURE — 80061 LIPID PANEL: CPT

## 2019-06-03 PROCEDURE — 84484 ASSAY OF TROPONIN QUANT: CPT

## 2019-06-03 PROCEDURE — 6360000002 HC RX W HCPCS: Performed by: STUDENT IN AN ORGANIZED HEALTH CARE EDUCATION/TRAINING PROGRAM

## 2019-06-03 PROCEDURE — 2060000000 HC ICU INTERMEDIATE R&B

## 2019-06-03 PROCEDURE — 6370000000 HC RX 637 (ALT 250 FOR IP): Performed by: STUDENT IN AN ORGANIZED HEALTH CARE EDUCATION/TRAINING PROGRAM

## 2019-06-03 PROCEDURE — 2580000003 HC RX 258: Performed by: STUDENT IN AN ORGANIZED HEALTH CARE EDUCATION/TRAINING PROGRAM

## 2019-06-03 PROCEDURE — 97162 PT EVAL MOD COMPLEX 30 MIN: CPT

## 2019-06-03 PROCEDURE — 97165 OT EVAL LOW COMPLEX 30 MIN: CPT

## 2019-06-03 PROCEDURE — 97535 SELF CARE MNGMENT TRAINING: CPT

## 2019-06-03 PROCEDURE — 83036 HEMOGLOBIN GLYCOSYLATED A1C: CPT

## 2019-06-03 PROCEDURE — 82947 ASSAY GLUCOSE BLOOD QUANT: CPT

## 2019-06-03 PROCEDURE — 83880 ASSAY OF NATRIURETIC PEPTIDE: CPT

## 2019-06-03 PROCEDURE — 84443 ASSAY THYROID STIM HORMONE: CPT

## 2019-06-03 PROCEDURE — 80048 BASIC METABOLIC PNL TOTAL CA: CPT

## 2019-06-03 PROCEDURE — 36415 COLL VENOUS BLD VENIPUNCTURE: CPT

## 2019-06-03 PROCEDURE — 94761 N-INVAS EAR/PLS OXIMETRY MLT: CPT

## 2019-06-03 RX ADMIN — SEVELAMER CARBONATE 4000 MG: 800 TABLET, FILM COATED ORAL at 14:00

## 2019-06-03 RX ADMIN — CALCIUM ACETATE 1334 MG: 667 CAPSULE ORAL at 11:56

## 2019-06-03 RX ADMIN — MIDODRINE HYDROCHLORIDE 10 MG: 5 TABLET ORAL at 18:09

## 2019-06-03 RX ADMIN — BUMETANIDE 2 MG: 1 TABLET ORAL at 09:20

## 2019-06-03 RX ADMIN — DESMOPRESSIN ACETATE 40 MG: 0.2 TABLET ORAL at 20:54

## 2019-06-03 RX ADMIN — CALCIUM ACETATE 1334 MG: 667 CAPSULE ORAL at 07:55

## 2019-06-03 RX ADMIN — SEVELAMER CARBONATE 4000 MG: 800 TABLET, FILM COATED ORAL at 20:54

## 2019-06-03 RX ADMIN — Medication 10 ML: at 09:20

## 2019-06-03 RX ADMIN — HEPARIN SODIUM 5000 UNITS: 5000 INJECTION INTRAVENOUS; SUBCUTANEOUS at 05:40

## 2019-06-03 RX ADMIN — HEPARIN SODIUM 5000 UNITS: 5000 INJECTION INTRAVENOUS; SUBCUTANEOUS at 20:54

## 2019-06-03 RX ADMIN — SEVELAMER CARBONATE 4000 MG: 800 TABLET, FILM COATED ORAL at 09:19

## 2019-06-03 RX ADMIN — MIDODRINE HYDROCHLORIDE 10 MG: 5 TABLET ORAL at 11:56

## 2019-06-03 RX ADMIN — HEPARIN SODIUM 5000 UNITS: 5000 INJECTION INTRAVENOUS; SUBCUTANEOUS at 14:00

## 2019-06-03 RX ADMIN — Medication 10 ML: at 21:30

## 2019-06-03 RX ADMIN — CINACALCET HYDROCHLORIDE 90 MG: 30 TABLET, COATED ORAL at 09:20

## 2019-06-03 RX ADMIN — MIDODRINE HYDROCHLORIDE 10 MG: 5 TABLET ORAL at 08:30

## 2019-06-03 RX ADMIN — MULTIPLE VITAMINS W/ MINERALS TAB 1 TABLET: TAB at 09:20

## 2019-06-03 RX ADMIN — CALCIUM ACETATE 1334 MG: 667 CAPSULE ORAL at 18:09

## 2019-06-03 RX ADMIN — ASPIRIN 81 MG: 81 TABLET ORAL at 09:19

## 2019-06-03 RX ADMIN — VITAMIN D, TAB 1000IU (100/BT) 1000 UNITS: 25 TAB at 09:20

## 2019-06-03 ASSESSMENT — PAIN SCALES - GENERAL
PAINLEVEL_OUTOF10: 0

## 2019-06-03 ASSESSMENT — ENCOUNTER SYMPTOMS
VOMITING: 0
NAUSEA: 0
CONSTIPATION: 0
CHEST TIGHTNESS: 0
SHORTNESS OF BREATH: 0
SORE THROAT: 0
ABDOMINAL PAIN: 0
DIARRHEA: 0
COUGH: 0

## 2019-06-03 NOTE — PROGRESS NOTES
Nutrition Education    Type and Reason for Visit: Initial, Consult(CHF ed)    Patient alert upon visit. Writer talked to pt about CHF, including fluid and sodium restriction. Pt actively participated and showed understanding of the materials provided. Pt was thanked writer for the education. · Verbally reviewed following information with patient for CHF education. · Written educational materials provided. · Contact name and number provided. · Refer to Patient Education activity for more details.     Roderick Palomares, Dietetic Intern  Electronically signed by Cecilia Scott RD, LD on 6/3/19 at [facility] Number: [phone]

## 2019-06-03 NOTE — ED NOTES
Critical BUN, Cr, and k+ taken from lab. Dr. Soledad Walker notified.       Sofía Vasquez, RN  06/02/19 2005

## 2019-06-03 NOTE — DISCHARGE INSTR - COC
Z91.15    Acute on chronic congestive heart failure (HCC) I50.9       Isolation/Infection:   Isolation          No Isolation            Nurse Assessment:  Last Vital Signs: /74   Pulse 84   Temp 98.6 °F (37 °C) (Temporal)   Resp 21   Ht 6' (1.829 m)   Wt 238 lb 15.7 oz (108.4 kg)   SpO2 97%   BMI 32.41 kg/m²     Last documented pain score (0-10 scale): Pain Level: 0  Last Weight:   Wt Readings from Last 1 Encounters:   06/03/19 238 lb 15.7 oz (108.4 kg)     Mental Status:  oriented    IV Access:  - None    Nursing Mobility/ADLs:  Walking   Independent  Transfer  Independent  Bathing  Independent  Dressing  Independent  Toileting  Independent  Feeding  Independent  Med Admin  Independent  Med Delivery   none    Wound Care Documentation and Therapy:        Elimination:  Continence:   · Bowel: No  · Bladder: No  Urinary Catheter: None   Colostomy/Ileostomy/Ileal Conduit: {YES / BB:73919}       Date of Last BM: ***    Intake/Output Summary (Last 24 hours) at 6/3/2019 1236  Last data filed at 6/3/2019 0919  Gross per 24 hour   Intake 10 ml   Output --   Net 10 ml     No intake/output data recorded. Safety Concerns:     None    Impairments/Disabilities:      None    Nutrition Therapy:  Current Nutrition Therapy:   - Oral Diet:  General    Routes of Feeding: Oral  Liquids: No Restrictions  Daily Fluid Restriction: no  Last Modified Barium Swallow with Video (Video Swallowing Test): not done    Treatments at the Time of Hospital Discharge:   Respiratory Treatments:     Oxygen Therapy:  is not on home oxygen therapy.   Ventilator:    - No ventilator support    Rehab Therapies:   Weight Bearing Status/Restrictions: No weight bearing restirctions  Other Medical Equipment (for information only, NOT a DME order):  walker  Other Treatments:     Patient's personal belongings (please select all that are sent with patient):  clothes and shoes    RN SIGNATURE:  {Esignature:[id number]}    CASE MANAGEMENT/SOCIAL WORK SECTION    Inpatient Status Date:     Readmission Risk Assessment Score:  Readmission Risk              Risk of Unplanned Readmission:        18           Discharging to Facility/ Agency   · Name:   · Address:  · Phone:  · Fax:    Dialysis Facility (if applicable)   · Name:  · Address:  · Dialysis Schedule:  · Phone:  · Fax:    / signature: {Esignature:[id number]}    PHYSICIAN SECTION    Prognosis: {Prognosis:1628772324}    Condition at Discharge: [address] [place] [address] Patient Condition:[id number]}    Rehab Potential (if transferring to Rehab): {Prognosis:8832498772}    Recommended Labs or Other Treatments After Discharge: ***    Physician Certification: I certify the above information and transfer of Adi Ovalle  is necessary for the continuing treatment of the diagnosis listed and that he requires {Admit to Appropriate Level of Care:41084} for {GREATER/LESS:846636044} 30 days.      Update Admission H&P: {CHP DME Changes in [facility]:202417750}    PHYSICIAN SIGNATURE:  Electronically signed by Vladimir Cohen MD on 6/3/19 at 12:36 PM

## 2019-06-03 NOTE — PROGRESS NOTES
Dialysis Post Treatment Note  Patient tolerated treatment well. Denies complaints at time of discharge. Vitals:    06/03/19 0215   BP: 101/60   Pulse: 84   Resp:    Temp: 98.2 °F (36.8 °C)   SpO2:      Pre-Weight = 110.4kg  Post-weight = Weight: 236 lb 15.9 oz (107.5 kg)  Total Liters Processed = Total Liters Processed (l/min): 69.8 l/min  Rinseback Volume (mL) = Rinseback Volume (ml): 270 ml  Net Removal (mL) =3000 @FLOW([id number]  Length of treatment=180 minutes tolerated well.

## 2019-06-03 NOTE — CONSULTS
Nephrology ESRD Consult Note    Reason for Consult:  Missed hemodialysis and hyperkalemia for patient with ESRD  Requesting Physician:  Dr. Cassidy Damon    History Obtained From:  patient, electronic medical record    HD Unit:       [facility] inpatient    Dry Weight:       not known    Chief Complaint:  Shortness of breath    History of Present Illness: This is a 62 y.o. male with end stage renal disease on hemodialysis [facility] who presents to the hospital for evaluation of shortness of breath. Patient has a history of ESRD on hemodialysis, obstructive sleep apnea, hypertension, congestive heart failure moderately reduced systolic function, grade 1 diastolic dysfunction LVEF 35%, presented to the emergency department with shortness of breath of one days duration. He stated that he missed dialysis on the day prior to admission due to back pain which prevented him from making his appointment. 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 Patient denies any chest pain, headache, lightheadedness, palpitations, fevers/chills or any sick contacts. Upon presentation to the emergency department creatinine was noted to be 9.28 and potassium at 7.2. Patient was given 2 g of calcium gluconate, insulin and dextrose. Nephrology was consulted for dialysis and patient received 1 round of dialysis on the night of 6/2/19 prior to admission on the floor. Per imaging chest x-ray showed moderate cardiomegaly, per labs BNP was elevated at 90 3/5/52, troponins at 216. EKG showed sinus tachycardia with legs axis deviation, peak T waves likely due to hyperkalemia. Patient was placed on BiPAP and shortness of breath improved. Patient was admitted due to fluid overload secondary to missed hemodialysis and congestive heart failure. His outpatient history and dialysis orders, usual dry wt  and out pt dialysis run sheets were reviewed.      Past Medical History: Diagnosis Date    Anemia     CAD (coronary artery disease)     [facility]     Dialysis care     ESRD (end stage renal disease) on dialysis ([facility]     Foot ulcer, left ([facility] 2015    GERD (gastroesophageal reflux disease)     Hemodialysis patient ([facility] 2011    MOM-WED-FRI-ON [address]     History of sleep apnea     none since significant weight loss (was 400#)    Hyperlipidemia     Hyperparathyroidism ([facility]     Hypertension     Neuropathy     Pneumonia     resolved    S/P CABG (coronary artery bypass graft) 06/2016    Type II or unspecified type diabetes mellitus without mention of complication, not stated as uncontrolled     dx-1980       Access:  previous  Left AV graft    Past Surgical History:        Procedure Laterality Date    CATARACT REMOVAL      DIALYSIS FISTULA CREATION Left 2014    LEG AMPUTATION THROUGH LOWER TIBIA AND FIBULA         Outpatient Medications:     Medications Prior to Admission: bumetanide (BUMEX) 1 MG tablet, Take 2 mg by mouth daily  cinacalcet (SENSIPAR) 90 MG tablet, Take 90 mg by mouth daily  ONE TOUCH ULTRA TEST strip, USE AS DIRECTED DAILY AS NEEDED  ONE TOUCH ULTRA TEST strip, USE AS DIRECTED DAILY AS NEEDED  Blood Glucose Monitoring Suppl (ONE TOUCH ULTRA 2) W/DEVICE KIT, TEST 3 TIMES DAILY  glucose monitoring kit (FREESTYLE) monitoring kit, 1 kit by Does not apply route daily as needed  Lancets 30G MISC, 1 each by Does not apply route 3 times daily  Misc.  Devices ([address] [place] [address]) MISC, 1 Device by Does not apply route continuous  atorvastatin (LIPITOR) 40 MG tablet, Take 1 tablet by mouth nightly  midodrine (PROAMATINE) 10 MG tablet, Take 1 tablet by mouth 3 times daily (with meals)  aspirin 81 MG EC tablet, Take 1 tablet by mouth daily  Multiple Vitamins-Minerals (THERAPEUTIC MULTIVITAMIN-MINERALS) tablet, Take 1 tablet by mouth daily  sevelamer (RENVELA) 800 MG tablet, Take 5 tablets by mouth 3 times daily   B Complex-C-Folic Acid (TRIPHROCAPS PO), Take 15 mg by mouth daily  calcium acetate (PHOSLO) 667 MG capsule, Take 1,334 mg by mouth 3 times daily (with meals)   vitamin D (CHOLECALCIFEROL) 1000 UNIT TABS tablet, Take 1,000 Units by mouth daily.     Current Medications:      nitroGLYCERIN (NITROSTAT) SL tablet 0.4 mg Q5 Min PRN   acetaminophen (TYLENOL) tablet 650 mg Q4H PRN   aspirin EC tablet 81 mg Daily   atorvastatin (LIPITOR) tablet 40 mg Nightly   bumetanide (BUMEX) tablet 2 mg Daily   calcium acetate (PHOSLO) capsule 1,334 mg TID WC   cinacalcet (SENSIPAR) tablet 90 mg Daily   midodrine (PROAMATINE) tablet 10 mg TID WC   therapeutic multivitamin-minerals 1 tablet Daily   sevelamer (RENVELA) tablet 4,000 mg TID   vitamin D (CHOLECALCIFEROL) tablet 1,000 Units Daily   sodium chloride flush 0.9 % injection 10 mL 2 times per day   sodium chloride flush 0.9 % injection 10 mL PRN   magnesium hydroxide (MILK OF MAGNESIA) 400 MG/5ML suspension 30 mL Daily PRN   ondansetron (ZOFRAN) injection 4 mg Q6H PRN   heparin (porcine) injection 5,000 Units 3 times per day   0.9 % sodium chloride bolus PRN   0.9 % sodium chloride bolus PRN   glucose (GLUTOSE) 40 % oral gel 15 g PRN   dextrose 50 % solution 12.5 g PRN   glucagon (rDNA) injection 1 mg PRN   dextrose 5 % solution PRN       Allergies:  Pcn [penicillins]    Social History:    Social History     Socioeconomic History    Marital status:      Spouse name: Not on file    Number of children: Not on file    Years of education: Not on file    Highest education level: Not on file   Occupational History    Not on file   Social Needs    Financial resource strain: Not on file    Food insecurity:     Worry: Not on file     Inability: Not on file    Transportation needs:     Medical: Not on file     Non-medical: Not on file   Tobacco Use    Smoking status: Never Smoker    Smokeless tobacco: Never Used   Substance and Sexual Activity    Alcohol use: No    Drug use: No    Sexual activity: Not on file   Lifestyle    Physical activity:     Days per week: Not on file     Minutes per session: Not on file    Stress: Not on file   Relationships    Social connections:     Talks on phone: Not on file     Gets together: Not on file     Attends [employer] service: Not on file     Active member of club or organization: Not on file     Attends meetings of clubs or organizations: Not on file     Relationship status: Not on file    Intimate partner violence:     Fear of current or ex partner: Not on file     Emotionally abused: Not on file     Physically abused: Not on file     Forced sexual activity: Not on file   Other Topics Concern    Not on file   Social History Narrative    Not on file       Family History:   Family History   Problem Relation Age of Onset    Heart Disease Father     Diabetes Father     Diabetes Brother        Review of Systems:    Constitutional: No fever, no chills, no lethargy, no weakness. HEENT:  No headache, otalgia, itchy eyes, nasal discharge or sore throat. Cardiac:  No chest pain, dyspnea, orthopnea or PND. Chest:              No cough, phlegm or wheezing. Abdomen:  No abdominal pain, nausea or vomiting. Neuro:  No focal weakness, abnormal movements orseizure like activity. Skin:   No rashes, no itching. :   No hematuria, no pyuria, no dysuria, no flank pain. Extremities:  No swelling or joint pains. ROS was otherwise negative except as mentioned in the [address]      Objective:  Constitutional:    CURRENT TEMPERATURE:  Temp: 98.2 °F (36.8 °C)  MAXIMUM TEMPERATURE OVER 24HRS:  Temp (24hrs), Av.8 °F (36.6 °C), Min:97.2 °F (36.2 °C), Max:98.2 °F (36.8 °C)    CURRENT RESPIRATORY RATE:  Resp: 23  CURRENT PULSE:  Pulse: 86  CURRENT BLOOD PRESSURE:  BP: 118/68  24HR BLOOD PRESSURE RANGE:  Systolic (82GZL), LPX:393 , Min:99 , NTI:663   ; Diastolic (63NDQ), HST:39, Min:60, Max:99    24HR INTAKE/OUTPUT:  No intake or output data in the 24 hours ending 19 0833      Physical Exam:  AAO x 3,          speaking in full sentences, no accessory muscle use. HEENT: Atraumatic, normocephalic, no throat congestion, moist mucosa. Eyes:   Pupils equal, round and reactive to light, EOMI. Neck:   No JVD, no thyromegaly, no lymphadenopathy. Chest:              Bilateral vesicular breath sounds, no rales or wheezes. Cardiac:  S1 S2 RR, no murmurs, gallops or rubs . Abdomen: Soft, non-tender, no masses or organomegaly, BS audible. :   No suprapubic or flank tenderness. Neuro:  AAO x 3, No FND. SKIN:  No rashes, good skin turgor. Extremities:  No edema, palpable peripheral pulses, no calf tenderness    Labs:  PTH:   Lab Results   Component Value Date    IPTH 516 01/09/2012     CBC: Recent Labs     06/02/19 1923 06/03/19  0041   WBC 10.9 13.8*   RBC 3.60* 2.77*   HGB 10.7* 8.3*   HCT 35.0* 27.1*   MCV 97.2 97.8   MCH 29.7 30.0   MCHC 30.6 30.6   RDW 15.5* 14.5*   * 218   MPV 12.9 9.8      BMP: Recent Labs     06/02/19 1922 06/02/19 1923 06/03/19  0530   NA  --  140 145*   K  --  7.2* 4.6   CL  --  99 97*   CO2  --  19* 28   BUN  --  95* 50*   CREATININE 8.84* 9.28* 6.32*   GLUCOSE  --  139* 145*   CALCIUM  --  8.6 8.8      IRON :IRON:    Lab Results   Component Value Date    IRON 23 03/05/2016     Iron Saturation:  No components found for: PERCENTFE  TIBC:    Lab Results   Component Value Date    TIBC 137 03/05/2016     FERRITIN:    Lab Results   Component Value Date    FERRITIN 2,516 03/05/2016       Phosphorus:  No results for input(s): PHOS in the last 72 hours. Magnesium:   Recent Labs     06/03/19  0530   MG 1.8     Albumin: No results for input(s): LABALBU in the last 72 hours. Assessment:  1. ESRD: On  Hemodialysis. patient's regular HD days are Sapwhko-Dzmnflsb-Ervazypj , Admitted with shortness of breath, hyperkalemia secondary to missed hemodialysis. 2. Hyperkalemia. Resolved. At 7.2 on presentation. Trending down at 4.6 today  3.  Elevated troponin levels in the setting of ESRD and congestive heart failure. Trending down. 4. Congestive heart failure. LVEF 35%, moderately reduced systolic function, grade 1 diastolic dysfunction. ProBNP at 109, 206  5. HTN  6. DM: Last HbA1c at X.1 in 12/2015. HbA1c pending this admission. 7. Secondary hyperparathyroidism  8. Anemia of chronic disease : Likely due to ESRD       Plan:  1. HD as per schedule. Follow renal labs   2. Strict Input and Output, Daily weight   3. Low Potassium, Low phosphorus and low salt diet. Fluids  restricted to 1500ml/day. 4. IV Aranesp/Epogen for anemia of chronic disease with HD weekly. 5. IV Zemplar per protocol for secondary hyperparathyroidism   6. HD am        Thank you for the consultation. Please do not hesitate to call with questions. Sveta Snowden M.D. Internal Medicine Resident , PGY-1  [address] [place] [address]  06/03/19 12:02 PM    Attending Physician Statement  I have discussed the care of Ebb Kosta, including pertinent history and exam findings,  with the Fellow/Residentt. I have reviewed the key elements of all parts of the encounter with the Fellow/ Resident. I agree with the assessment, plan and orders as documented by the resident. Presented with shortness of breath. Investigations revealed hyperkalemia and imaging studies consistent with CHF. Underwent emergent hemodialysis. Marked symptomatically improvement noted. Currently not in volume overload.     Estelita Vanegas MD, MRCP [place]), FACP   6/3/2019 12:08 PM    Nephrology [address] [place] [address]

## 2019-06-03 NOTE — PROGRESS NOTES
Physical Therapy    Facility/Department: [address] STEPDOWN  Initial Assessment    NAME: Joana Lim  : 1955  MRN: [id number]  The patient is a 62 y.o.  male with history of ESRD on dialysis, [place], HTN, CHF and DM2  who presented to the ED with SOB that started earlier today. Pt states that he missed his normal dialysis session yesterday due to back pain which prevented him from making his appointment. Pt denies any CP, HA, lightheadedness, palpitations, n/v, fever or chills.    In the ED, Cr and K were noted to be elevated to 9.28 and 7.2 respectively. Pt was given 2 g Calcium gluconate, Insulin and dextrose. Nephrology was consulted for dialysis and will be completed tonight before the patient gets to the floor. CXR showed moderate CHF and moderate cardiomegaly. BNP was elevated to 93,552. Trop 216 and 214 which are likely due to his ESRD and CHF. EKG showed sinus tachycardia with left axis deviation, interventricular black and peak T waves, likely 2/2 his hyperkalemia. Pt was placed on BiPAP and has noticed improvement in his SOB. Pt is admitted to the hospital for Fluid Overload 2/2 missed dialysis in the setting of ESRD and CHF      Date of Service: 6/3/2019    Discharge Recommendations: No further therapy required at discharge. PT Equipment Recommendations  Equipment Needed: No    Assessment   Body structures, Functions, Activity limitations: Decreased functional mobility ; Decreased endurance;Decreased balance;Decreased strength;Decreased ADL status  Assessment: The pt ambulated 200ft with RW and CGA and would benefit from continued acute PT to progress toward prior level of independence.   Prognosis: Good  Decision Making: Medium Complexity  Patient Education: POC, importance of mobility, safety  REQUIRES PT FOLLOW UP: Yes  Activity Tolerance  Activity Tolerance: Patient limited by endurance       Patient Diagnosis(es): The primary encounter diagnosis was Acute on chronic congestive heart failure, unspecified heart failure type ([facility] Diagnoses of Chronic kidney disease, unspecified CKD stage, Hyperkalemia, Anemia, unspecified type, and Encounter for dialysis [facility]) were also pertinent to this visit. has a past medical history of Acute on chronic congestive heart failure ([facility] Anemia, CAD (coronary artery disease), Cardiomyopathy ([facility] Dialysis care, ESRD (end stage renal disease) on dialysis ([facility] Foot ulcer, left ([facility] GERD (gastroesophageal reflux disease), Hemodialysis patient ([facility] History of sleep apnea, Hyperlipidemia, Hyperparathyroidism ([facility] Hypertension, Neuropathy, Pneumonia, S/P CABG (coronary artery bypass graft), and Type II or unspecified type diabetes mellitus without mention of complication, not stated as uncontrolled. has a past surgical history that includes Cataract removal; Dialysis fistula creation (Left, 2014); and Leg amputation, lower tibia/fibula. Restrictions  Restrictions/Precautions  Restrictions/Precautions: Fall Risk  Required Braces or Orthoses?: No  Position Activity Restriction  Other position/activity restrictions: up with assist, LLE prosthetic  Vision/Hearing  Vision: Within Functional Limits  Hearing: Within functional limits     Subjective  General  Patient assessed for rehabilitation services?: Yes  Response To Previous Treatment: Not applicable  Family / Caregiver Present: No  Follows Commands: Within Functional Limits  Subjective  Subjective: RN and pt agreeable to PT. Pt supine in bed upon arrival, pleasant and cooperative throughout.   Pain Screening  Patient Currently in Pain: Denies  Vital Signs  Patient Currently in Pain: Denies       Orientation  Orientation  Overall Orientation Status: Within Normal Limits  Social/Functional History  Social/Functional History  Lives With: Alone  Type of Home: Apartment(6th floor)  Home Layout: One level  Home Access: Level entry  Bathroom Shower/Tub: Tub/Shower unit  Bathroom Toilet: Standard  Bathroom Equipment: Shower chair, Grab bars in shower  Home Equipment: Delrae Cola, Wheelchair-electric, Rolling walker  ADL Assistance: Independent  Homemaking Assistance: Independent  Homemaking Responsibilities: Yes  Ambulation Assistance: Independent(with AD and prosthetic)  Transfer Assistance: Independent  Active : No  Mode of Transportation: Cab  Occupation: On disability  Leisure & Hobbies: Musician- plays drums  Cognition   Cognition  Overall Cognitive Status: WNL    Objective          Joint Mobility  Spine: WFL  ROM RLE: WFL  ROM LLE: WFL- BKA  ROM RUE: WFL  ROM LUE: WFL  Strength RLE  Strength RLE: WFL  Strength LLE  Strength LLE: WFL  Comment: BKA  Strength RUE  Strength RUE: WFL  Strength LUE  Strength LUE: WFL  Tone RLE  RLE Tone: Normotonic  Tone LLE  LLE Tone: Normotonic  Motor Control  Gross Motor?: WFL  Sensation  Overall Sensation Status: Impaired(pt complains of chronic numbness/tingling to L foot)  Bed mobility  Supine to Sit: Minimal assistance  Sit to Supine: Stand by assistance  Scooting: Stand by assistance  Transfers  Sit to Stand: Contact guard assistance  Stand to sit: Contact guard assistance  Bed to Chair: Contact guard assistance  Ambulation  Ambulation?: Yes  Ambulation 1  Surface: level tile  Device: Rolling Walker  Assistance: Contact guard assistance  Gait Deviations: Decreased step length;Decreased head and trunk rotation; Slow Orin; Shuffles  Distance: 200ft  Stairs/Curb  Stairs?: No     Balance  Posture: Fair  Sitting - Static: Good  Sitting - Dynamic: Good  Standing - Static: Good;-  Standing - Dynamic: Fair;+  Comments: Standing balance assessed with RW        Plan   Plan  Times per week: 5x/wk  Current Treatment Recommendations: Strengthening, Balance Training, Functional Mobility Training, Transfer Training, Endurance Training, Patient/Caregiver Education & Training, Safety Education & Training, Gait Training, ROM  Safety Devices  Type of devices: Left in bed, Gait belt, Call light within reach, Nurse notified      AM-PAC Score  AM-PAC Inpatient Mobility Raw Score : 19  AM-PAC Inpatient T-Scale Score : 45.44  Mobility Inpatient CMS 0-100% Score: 41.77  Mobility Inpatient CMS G-Code Modifier : CK          Goals  Short term goals  Time Frame for Short term goals: 14 visits  Short term goal 1: Mod I for bed mobility and functional transfers  Short term goal 2: Mod I to ambulate 300ft with RW  Short term goal 3: Demo Good dynamic standing balance with AD  Short term goal 4: Participate in 30 minutes of therapy to demo increased endurance       Therapy Time   Individual Concurrent Group Co-treatment   Time In 1410         Time Out 1435         Minutes 25         Timed Code Treatment Minutes: 8 Minutes       Alessandra Liang, PT

## 2019-06-03 NOTE — DISCHARGE SUMMARY
[facility]  Discharge Summary      NAME:  Adi Ovalle  :  1955  MRN:  [id number]    Admit date:  2019  Discharge date:   6/3/2019    Admitting Physician:  Vladimir Cohen MD    Primary Diagnosis on Admission:   Present on Admission:   Dialysis patient, noncompliant ([address]   ESRD (end stage renal disease) on dialysis ([address]   Benign essential HTN   Type 2 diabetes mellitus with foot ulcer ([address]   Acute on chronic congestive heart failure ([address]      Secondary Diagnoses:  does not have any pertinent problems on file. Admission Condition:  poor     Discharged Condition: stable    Hospital Course: The patient was admitted for the management of shortness of breath secondary to fluid overload secondary to missed dialysis treatment. Pt states that he missed his normal dialysis session due to back pain which prevented him from making his appointment. Pt denied any CP, HA, lightheadedness, palpitations, n/v, fever or chills. During hospital stay, patients troponin levels trended upwards concerning for further coronary artery disease. Patient denied having any chest pain or pressure. Cardiology was consulted and recommended a cath procedure resulting stent placement and left circumflex artery. Cardiology further recommends brillinta and beta blocker treatment as outpatient. Nephrology also consulted for end-stage renal disease and recommends continuation of dialysis treatment Tuesday, Thursday and Saturday. Today on day of discharge pt feels better with no further complaints. Vitals and Labs are at pts baseline. All consultants involved during this admission are agreeable to d/c.     Consults:  Nephrology and cardiology    Significant Diagnostic/theraputic interventions: Dialysis      Lab Results   Component Value Date    WBC 7.3 2019    HGB 8.5 (L) 2019    HCT 29.4 (L) 2019    PLT 95 (L) 2019    CHOL 142 06/03/2019    TRIG 136 06/03/2019    HDL 42 06/03/2019    ALT 19 01/09/2012    AST 21 01/09/2012     06/06/2019    K 5.2 06/06/2019    CL 97 (L) 06/06/2019    CREATININE 8.19 (HH) 06/06/2019    BUN 57 (H) 06/06/2019    CO2 25 06/06/2019    TSH 1.01 06/03/2019    INR 1.1 03/04/2017    LABA1C 6.5 (H) 06/03/2019         Disposition:   home    Instructions to Patient: To be compliant with dialysis schedule. To follow-up with PCP within 3 days. Follow up with Eduin Alexandre MD in  3 days    Eduin Alexandre MD  [address]  [phone]    Go on 6/10/2019  Follow up @ 8:30 AM    [facility]  [address] [place] [address]  [phone]  On 6/21/2019  at 3:15 pm       Discharge Medications:     Jo Goldberg   Home Medication Instructions [id number]    Printed on:06/06/19 1215   Medication Information                      aspirin 81 MG EC tablet  Take 1 tablet by mouth daily             atorvastatin (LIPITOR) 40 MG tablet  Take 1 tablet by mouth nightly             B Complex-C-Folic Acid (TRIPHROCAPS PO)  Take 15 mg by mouth daily             Blood Glucose Monitoring Suppl (ONE TOUCH ULTRA 2) W/DEVICE KIT  TEST 3 TIMES DAILY             bumetanide (BUMEX) 1 MG tablet  Take 2 mg by mouth daily             calcium acetate (PHOSLO) 667 MG capsule  Take 1,334 mg by mouth 3 times daily (with meals)              carvedilol (COREG) 3.125 MG tablet  Take 1 tablet by mouth 2 times daily (with meals)             cinacalcet (SENSIPAR) 90 MG tablet  Take 90 mg by mouth daily             glucose monitoring kit (FREESTYLE) monitoring kit  1 kit by Does not apply route daily as needed             Lancets 30G MISC  1 each by Does not apply route 3 times daily             midodrine (PROAMATINE) 10 MG tablet  Take 1 tablet by mouth 3 times daily (with meals)             Misc.  Devices (WALKER GLIDE WHEELS) MISC  1 Device by Does not apply route continuous Multiple Vitamins-Minerals (THERAPEUTIC MULTIVITAMIN-MINERALS) tablet  Take 1 tablet by mouth daily             ONE TOUCH ULTRA TEST strip  USE AS DIRECTED DAILY AS NEEDED             sevelamer (RENVELA) 800 MG tablet  Take 5 tablets by mouth 3 times daily              ticagrelor (BRILINTA) 90 MG TABS tablet  Take 1 tablet by mouth 2 times daily             vitamin D (CHOLECALCIFEROL) 1000 UNIT TABS tablet  Take 1,000 Units by mouth daily. Send Copies to: Jhonny Preston MD, [facility] Level      Note that over 30 minutes was spent in preparing discharge papers, discussing discharge with patient and family, medication review, etc.    Signed:  Dave Boyd MD  Family medicine Resident  6/6/2019 12:15 PM     Attending Physician Statement  I have discussed the care of [facility], including pertinent history and exam findings,  with the resident. I have seen and examined the patient and the key elements of all parts of the encounter have been performed by me. I agree with the assessment, plan and orders as documented by the resident. (Antoine Antoine) - Elton Mendoza M.D  Vitals:    06/06/19 1455   BP: 109/64   Pulse:    Resp:    Temp:    SpO2:      1. Acute on chronic congestive heart failure, unspecified heart failure type ([address]    2. Chronic kidney disease, unspecified CKD stage    3. Hyperkalemia    4. Anemia, unspecified type    5.  Encounter for dialysis [facility])

## 2019-06-03 NOTE — ED NOTES
Pt tolerating NC. Report called to Rn on 4B.  Pt being [facility]     [facility]  06/02/19 0950

## 2019-06-03 NOTE — PROGRESS NOTES
Congestive Heart Failure Education completed and charted. CHF booklet given. Patient was receptive to education. Discussed 2000 mg sodium restricted daily in diet. Patient instructed to limit fluid intake to  1.5 to 2 liters per day. Patient instructed to weigh self at the same time of each day each morning, reinforced teaching to monitor for 3-5 lb weight increase over 1-2 days notify physician if change noted. Signs and symptoms of CHF discussed with patient, such as feeling more tired than normal, feeling short of breath, coughing that increases when you lie down, sudden weight gain, swelling of your feet, legs or belly. Patient verbalized understanding to notify physician office if these symptoms occur. Pt missed HD and became fluid overloaded. Pt has fluid restriction per nephro. Counseling on Na+ intake. Pt actively monitoring this and using only salt substitute.

## 2019-06-03 NOTE — PROGRESS NOTES
[facility]  Progress Note    Date:   6/3/2019  Patient name:  Yesi Pride  Date of admission:  6/2/2019  7:10 PM  MRN:   [id number]  YOB: 1955    SUBJECTIVE/Last 24 hours update:     Day 1 Hospitalization:     Patient seen and examined at bedside this morning. Patient resting comfortably and eating breakfast.  Patient states that shortness of breath has resolved. Patient currently using 2 L of oxygen nasal cannula. Patient denies any chest pain, shortness of breath, abdominal pain, fever or chills. Repeat echo scheduled for today. Probable discharge if repeat echo unchanged. Will continue current treatment and plan. REVIEW OF SYSTEMS:      Review of Systems   Constitutional: Negative for chills, fatigue, fever and unexpected weight change. HENT: Negative for congestion, mouth sores and sore throat. Eyes: Negative for visual disturbance. Respiratory: Negative for cough, chest tightness and shortness of breath. Cardiovascular: Negative for chest pain and leg swelling. Gastrointestinal: Negative for abdominal pain, constipation, diarrhea, nausea and vomiting. Genitourinary: Negative for difficulty urinating. Musculoskeletal: Negative for joint swelling. Skin: Negative for rash. Neurological: Negative for dizziness, weakness and headaches. PAST MEDICAL HISTORY:      has a past medical history of Anemia, CAD (coronary artery disease), Cardiomyopathy ([address] Dialysis care, ESRD (end stage renal disease) on dialysis ([facility] [address] Foot ulcer, left ([address] GERD (gastroesophageal reflux disease), Hemodialysis patient ([address] History of sleep apnea, Hyperlipidemia, Hyperparathyroidism ([address] Hypertension, Neuropathy, Pneumonia, S/P CABG (coronary artery bypass graft), and Type II or unspecified type diabetes mellitus without mention of complication, not stated as uncontrolled.     PAST SURGICAL HISTORY:      has a past Historical Provider, MD   calcium acetate (PHOSLO) 667 MG capsule Take 1,334 mg by mouth 3 times daily (with meals)     Historical Provider, MD   vitamin D (CHOLECALCIFEROL) 1000 UNIT TABS tablet Take 1,000 Units by mouth daily. Historical Provider, MD       ALLERGIES:     Pcn [penicillins]    OBJECTIVE:       Vitals:    06/03/19 0411 06/03/19 0558 06/03/19 0740 06/03/19 0813   BP: 118/68  135/74    Pulse: 86  84    Resp: 23  21    Temp: 98.2 °F (36.8 °C)  98.6 °F (37 °C)    TempSrc: Temporal  Temporal    SpO2: 100%  99% 97%   Weight:  238 lb 15.7 oz (108.4 kg)     Height:           No intake or output data in the 24 hours ending 06/03/19 0857    PHYSICAL EXAM:    Physical Exam   Constitutional: He is oriented to person, place, and time. He appears well-developed. Cardiovascular: Normal rate, regular rhythm, normal heart sounds and intact distal pulses. Exam reveals no gallop and no friction rub. No murmur heard. Pulmonary/Chest: Effort normal and breath sounds normal. No respiratory distress. He has no wheezes. He has no rales. He exhibits no tenderness. Abdominal: Soft. Bowel sounds are normal. He exhibits no distension and no mass. There is no tenderness. There is no guarding. Musculoskeletal: He exhibits no edema. Left BKA   Neurological: He is alert and oriented to person, place, and time. Skin: Capillary refill takes less than 2 seconds. Linear sternal scar   Psychiatric: He has a normal mood and affect. Nursing note and vitals reviewed. ASSESSMENT:      Principal Problem:    Dialysis patient, noncompliant ([address]  Active Problems:    ESRD (end stage renal disease) on dialysis ([address]    Benign essential HTN    Type 2 diabetes mellitus with foot ulcer ([address]  Resolved Problems:    * No resolved hospital problems. *      PLAN:     1.  Fluid Overload 2/2 missed dialysis in the setting of ESRD and CHF  - Dialysis complete   - Dialysis schedule tues/thurs/sat  - Nephrology consulted for

## 2019-06-03 NOTE — PROGRESS NOTES
Cardiac Testing:    TTE 06/02/19: Mild LVH. EF 30-35%. Grade II DD. LA is mildly dilated. Trivial valve disease.

## 2019-06-03 NOTE — PROGRESS NOTES
Occupational Therapy   Occupational Therapy Initial Assessment  Date: 6/3/2019   Patient Name: Yusra Lowe  MRN: [id number]     : 1955    Date of Service: 6/3/2019    Discharge Recommendations:    No therapy recommended at discharge. Assessment   Performance deficits / Impairments: Decreased endurance;Decreased high-level IADLs;Decreased ADL status; Decreased functional mobility   Treatment Diagnosis: CHF   Prognosis: Good  Decision Making: Low Complexity  Patient Education: pt ed on POC, purpose of eval, importance of continued movement, and safety during functional transfers/functional mobility. good return   REQUIRES OT FOLLOW UP: Yes  Activity Tolerance  Activity Tolerance: Patient Tolerated treatment well  Safety Devices  Safety Devices in place: Yes  Type of devices: Gait belt;Patient at risk for falls;Call light within reach; Left in bed  Restraints  Initially in place: No         Patient Diagnosis(es): The primary encounter diagnosis was Acute on chronic congestive heart failure, unspecified heart failure type ([facility] [address] Diagnoses of Chronic kidney disease, unspecified CKD stage, Hyperkalemia, Anemia, unspecified type, and Encounter for dialysis [facility]) were also pertinent to this visit. has a past medical history of Acute on chronic congestive heart failure ([address] Anemia, CAD (coronary artery disease), Cardiomyopathy ([address] Dialysis care, ESRD (end stage renal disease) on dialysis ([address] Foot ulcer, left ([address] GERD (gastroesophageal reflux disease), Hemodialysis patient ([address] History of sleep apnea, Hyperlipidemia, Hyperparathyroidism ([address] Hypertension, Neuropathy, Pneumonia, S/P CABG (coronary artery bypass graft), and Type II or unspecified type diabetes mellitus without mention of complication, not stated as uncontrolled. has a past surgical history that includes Cataract removal; Dialysis fistula creation (Left, ); and Leg amputation, lower tibia/fibula.     Treatment Diagnosis: CHF Restrictions  Restrictions/Precautions  Restrictions/Precautions: Fall Risk  Required Braces or Orthoses?: No  Position Activity Restriction  Other position/activity restrictions: up with assist, LLE prosthetic    Subjective   General  Chart Reviewed: No  Patient assessed for rehabilitation services?: Yes  Family / Caregiver Present: No  Diagnosis: CHF   Subjective  Subjective: co-eval with PT  General Comment  Comments: RN ok'd for therapy this afternoon.  Pt agreeable to participate in session and cooperative/pleasant throughout   Pain Assessment  Pain Assessment: 0-10  Pain Level: 0    Oxygen Therapy  O2 Device: Nasal cannula  O2 Flow Rate (L/min): 1 L/min(O2 removed during mobility and returned at end of session)    Social/Functional History  Social/Functional History  Lives With: Alone  Type of Home: Apartment(6th floor)  Home Layout: One level  Home Access: Level entry  Bathroom Shower/Tub: Tub/Shower unit  Bathroom Toilet: Standard  Bathroom Equipment: Shower chair, Grab bars in shower  Home Equipment: Roderick Furbish, Wheelchair-electric, Rolling walker  ADL Assistance: Independent  Homemaking Assistance: Independent  Homemaking Responsibilities: Yes  Ambulation Assistance: Independent(with AD and prosthetic)  Transfer Assistance: Independent  Active : No  Mode of Transportation: Cab  Occupation: On disability  Leisure & Hobbies: Musician- plays drums     Objective   Vision: Within Functional Limits  Hearing: Within functional limits    Orientation  Overall Orientation Status: Within Functional Limits     Balance  Sitting Balance: Modified independent (~10 minutes on eOB )  Standing Balance: Contact guard assistance(with RW and prosthetic )  Standing Balance  Time: ~6 minutes   Activity: pt completed functional mobility in hallway with PT and within hospital room  Comment: pt with no LOB     ADL  Feeding: Modified independent   Grooming: Modified independent   UE Bathing: Supervision  AMA Bathing: Minimal assistance  UE Dressing: Supervision  LE Dressing: Minimal assistance(pt required min A to donning shoe to R foot, but able to independently don L LE prosthetic while sitting on EOB )  Toileting: Contact guard assistance  Tone RUE  RUE Tone: Normotonic  Tone LUE  LUE Tone: Normotonic  Coordination  Movements Are Fluid And Coordinated: Yes     Bed mobility  Supine to Sit: Minimal assistance(hand held assist )  Sit to Supine: Stand by assistance  Scooting: Stand by assistance  Transfers  Sit to stand: Contact guard assistance  Stand to sit: Contact guard assistance  Transfer Comments: with RW      Cognition  Overall Cognitive Status: WFL     Sensation  Overall Sensation Status: Impaired(pt complains of chronic numbness/tingling to L foot)      LUE AROM : WFL  Left Hand AROM: WFL  RUE AROM : WFL  Right Hand AROM: WFL    LUE Strength  Gross LUE Strength: WFL  L Hand Grasp: 4+/5  LUE Strength Comment: overall muscle strength 4+/5   RUE Strength  Gross RUE Strength: WFL  R Hand Grasp: 4+/5  RUE Strength Comment: overall muscle strength 4+/5       Plan   Plan  Times per week: 3x/wk    AM-PAC Score   AM-[facility] Inpatient Daily Activity Raw Score: 20  AM-PAC Inpatient ADL T-Scale Score : 42.03  ADL Inpatient CMS 0-100% Score: 38.32  ADL Inpatient CMS G-Code Modifier : CJ    Goals  Short term goals  Time Frame for Short term goals: pt will, by discharge  Short term goal 1: dem supervision during functional transfers/functional mobility with LRD  Short term goal 2: complete LB ADLs with SBA, set up and AE,as needed  Short term goal 3: complete UB ADLs and grooming tasks with mod I and set up  Short term goal 4: increase activity tolerance to 25+ minutes in order to participate in daily tasks  Short term goal 5: dem understanding and ind initiate use of 2-3 energy conservation tech      Therapy Time   Individual Concurrent Group Co-treatment   Time In 1411         Time Out 1435         Minutes 24

## 2019-06-03 NOTE — H&P
[facility]  History & Physical Examination Note              Date:   6/2/2019  Patient name:  Toy Acevedo  Date of admission:  6/2/2019  7:10 PM  MRN:   [id number]  YOB: 1955    CHIEF COMPLAINT:     Chief Complaint   Patient presents with    Shortness of Breath     History Obtained From:  Patient and chart review. HPI:     The patient is a 62 y.o.  male with history of ESRD on dialysis, [place], HTN, CHF and DM2  who presented to the ED with SOB that started earlier today. Pt states that he missed his normal dialysis session yesterday due to back pain which prevented him from making his appointment. Pt denies any CP, HA, lightheadedness, palpitations, n/v, fever or chills. In the ED, Cr and K were noted to be elevated to 9.28 and 7.2 respectively. Pt was given 2 g Calcium gluconate, Insulin and dextrose. Nephrology was consulted for dialysis and will be completed tonight before the patient gets to the floor. CXR showed moderate CHF and moderate cardiomegaly. BNP was elevated to 93,552. Trop 216 and 214 which are likely due to his ESRD and CHF. EKG showed sinus tachycardia with left axis deviation, interventricular black and peak T waves, likely 2/2 his hyperkalemia. Pt was placed on BiPAP and has noticed improvement in his SOB. Pt is admitted to the hospital for Fluid Overload 2/2 missed dialysis in the setting of ESRD and CHF    PAST MEDICAL HISTORY:        has a past medical history of Dialysis care, ESRD (end stage renal disease) on dialysis ([address] Foot ulcer, left ([address] GERD (gastroesophageal reflux disease), Hemodialysis patient ([address] History of sleep apnea, Hyperparathyroidism ([address] Hypertension, Neuropathy, Pneumonia, and Type II or unspecified type diabetes mellitus without mention of complication, not stated as uncontrolled.     PAST SURGICAL HISTORY:      has a past surgical history that includes Cataract removal; Dialysis fistula creation (Left, 2014); and Leg amputation, lower tibia/fibula. FAMILY HISTORY:     family history includes Diabetes in his brother and father; Heart Disease in his father. HOME MEDICATIONS:     Prior to Admission medications    Medication Sig Start Date End Date Taking? Authorizing Provider   bumetanide (BUMEX) 1 MG tablet Take 2 mg by mouth daily    Historical Provider, MD   cinacalcet (SENSIPAR) 90 MG tablet Take 90 mg by mouth daily    Historical Provider, MD   ONE TOUCH ULTRA TEST strip USE AS DIRECTED DAILY AS NEEDED 3/22/17   Estella Wong DO   ONE TOUCH ULTRA TEST strip USE AS DIRECTED DAILY AS NEEDED 1/24/17   Estella Wong DO   Blood Glucose Monitoring Suppl (ONE TOUCH ULTRA 2) W/DEVICE KIT TEST 3 TIMES DAILY 8/26/16   Estella Wong DO   glucose monitoring kit (FREESTYLE) monitoring kit 1 kit by Does not apply route daily as needed 4/21/16   Estella Wong DO   Lancets 30G MISC 1 each by Does not apply route 3 times daily 4/21/16   Estella Wong DO   Misc.  Devices (WALKER GLIDE WHEELS) MISC 1 Device by Does not apply route continuous 4/21/16   Estella Wong DO   atorvastatin (LIPITOR) 40 MG tablet Take 1 tablet by mouth nightly 3/14/16   [facility]   midodrine (PROAMATINE) 10 MG tablet Take 1 tablet by mouth 3 times daily (with meals) 3/14/16   [facility] DO   aspirin 81 MG EC tablet Take 1 tablet by mouth daily 3/14/16   [facility] DO   Multiple Vitamins-Minerals (THERAPEUTIC MULTIVITAMIN-MINERALS) tablet Take 1 tablet by mouth daily 3/14/16   [facility]   sevelamer (RENVELA) 800 MG tablet Take 5 tablets by mouth 3 times daily     Historical Provider, MD   B Complex-C-Folic Acid (TRIPHROCAPS PO) Take 15 mg by mouth daily    Historical Provider, MD   calcium acetate (PHOSLO) 667 MG capsule Take 1,334 mg by mouth 3 times daily (with meals)     Historical Provider, MD   vitamin D (CHOLECALCIFEROL) 1000 UNIT TABS tablet Take 1,000 Units by mouth daily. Historical Provider, MD       ALLERGIES:      Pcn [penicillins]    SOCIAL HISTORY:      reports that he has never smoked. He has never used smokeless tobacco. He reports that he does not drink alcohol or use drugs. REVIEW OF SYSTEMS:     Review of Systems   Constitutional: Negative for chills and fever. HENT: Negative for congestion, rhinorrhea and sore throat. Eyes: Negative for photophobia and pain. Respiratory: Positive for shortness of breath. Negative for cough and wheezing. Cardiovascular: Negative for chest pain and palpitations. Gastrointestinal: Negative for abdominal pain, nausea and vomiting. Genitourinary: Negative for frequency and urgency. Musculoskeletal: Negative for arthralgias and myalgias. Neurological: Negative for dizziness, light-headedness and headaches. PHYSICAL EXAM:     Vitals:    06/02/19 2132 06/02/19 2146 06/02/19 2201 06/02/19 2216   BP: (!) 162/93 (!) 162/93 (!) 165/93 (!) 156/95   Pulse: 89 87 87 86   Resp: 29 (!) 34 22 22   Temp:       SpO2: 100% 100% 100% 100%   Weight:       Height:           No intake or output data in the 24 hours ending 06/02/19 2225    Physical Exam   Constitutional: He is oriented to person, place, and time. He appears well-developed. He is cooperative. No distress. Obese. Resting comfortably on BiPAP   HENT:   Head: Normocephalic and atraumatic. Eyes: Pupils are equal, round, and reactive to light. EOM are normal.   Neck: Neck supple. No tracheal deviation present. Cardiovascular: Normal rate and regular rhythm. Exam reveals no gallop and no friction rub. No murmur heard. Well healed midline surgical chest scar    Pulmonary/Chest: He has no wheezes. He has no rales. Diminished breath sounds bilaterally. Resting on BiPAP   Abdominal: Soft. He exhibits no distension and no mass. There is no tenderness. Musculoskeletal: Normal range of motion. He exhibits no tenderness or deformity. Fistula on left arm   Neurological: He is alert and oriented to person, place, and time. Skin: Skin is warm and dry. No rash noted. No erythema.      DIAGNOSTICS:      Laboratory Testing:    Recent Results (from the past 24 hour(s))   Venous Blood Gas, POC    Collection Time: 06/02/19  7:22 PM   Result Value Ref Range    pH, Poli 7.394 7.320 - 7.430    pCO2, Poli 37.5 (L) 41.0 - 51.0 mm Hg    pO2, Poli 46.0 30.0 - 50.0 mm Hg    HCO3, Venous 22.9 22.0 - 29.0 mmol/L    Total CO2, Venous 24 23.0 - 30.0 mmol/L    Negative Base Excess, Poli 2 0.0 - 2.0    Positive Base Excess, Poli NOT REPORTED 0.0 - 3.0    O2 Sat, Poli 82 60.0 - 85.0 %    O2 Device/Flow/% NOT REPORTED     Nicanor Test NOT REPORTED     Sample Site NOT REPORTED     Mode NOT REPORTED     FIO2 NOT REPORTED     Pt Temp NOT REPORTED     POC pH Temp NOT REPORTED     POC pCO2 Temp NOT REPORTED mm Hg    POC pO2 Temp NOT REPORTED mm Hg   Hemoglobin and hematocrit, blood    Collection Time: 06/02/19  7:22 PM   Result Value Ref Range    POC Hemoglobin 12.4 (L) 13.5 - 17.5 g/dL    POC Hematocrit 37 (L) 41 - 53 %   Creatinine W/GFR Point of Care    Collection Time: 06/02/19  7:22 PM   Result Value Ref Range    POC Creatinine 8.84 (HH) 0.51 - 1.19 mg/dL    GFR Comment 7 (L) >60 mL/min    GFR Non-African American 6 (L) >60 mL/min    GFR Comment         SODIUM (POC)    Collection Time: 06/02/19  7:22 PM   Result Value Ref Range    POC Sodium 142 138 - 146 mmol/L   POTASSIUM (POC)    Collection Time: 06/02/19  7:22 PM   Result Value Ref Range    POC Potassium 7.3 (HH) 3.5 - 4.5 mmol/L   CHLORIDE (POC)    Collection Time: 06/02/19  7:22 PM   Result Value Ref Range    POC Chloride 115 (H) 98 - 107 mmol/L   CALCIUM, IONIC (POC)    Collection Time: 06/02/19  7:22 PM   Result Value Ref Range    POC Ionized Calcium 0.90 (L) 1.15 - 1.33 mmol/L   Lactic Acid, POC    Collection Time: 06/02/19  7:22 PM   Result Value Ref Range    POC Lactic Acid 1.44 (H) 0.56 - 1.39 mmol/L   POCT Glucose    Collection Time: 06/02/19  7:22 PM   Result Value Ref Range    POC Glucose 145 (H) 74 - 100 mg/dL   Anion Gap (Calc) POC    Collection Time: 06/02/19  7:22 PM   Result Value Ref Range    Anion Gap 4 (L) 7 - 16 mmol/L   CBC Auto Differential    Collection Time: 06/02/19  7:23 PM   Result Value Ref Range    WBC 10.9 3.5 - 11.3 k/uL    RBC 3.60 (L) 4.21 - 5.77 m/uL    Hemoglobin 10.7 (L) 13.0 - 17.0 g/dL    Hematocrit 35.0 (L) 40.7 - 50.3 %    MCV 97.2 82.6 - 102.9 fL    MCH 29.7 25.2 - 33.5 pg    MCHC 30.6 28.4 - 34.8 g/dL    RDW 15.5 (H) 11.8 - 14.4 %    Platelets 023 (L) 000 - 453 k/uL    MPV 12.9 8.1 - 13.5 fL    NRBC Automated 0.0 0.0 per 100 WBC    Differential Type NOT REPORTED     Seg Neutrophils 84 (H) 36 - 65 %    Lymphocytes 7 (L) 24 - 43 %    Monocytes 6 3 - 12 %    Eosinophils % 1 1 - 4 %    Basophils 1 0 - 2 %    Immature Granulocytes 1 (H) 0 %    Segs Absolute 9.30 (H) 1.50 - 8.10 k/uL    Absolute Lymph # 0.73 (L) 1.10 - 3.70 k/uL    Absolute Mono # 0.63 0.10 - 1.20 k/uL    Absolute Eos # 0.08 0.00 - 0.44 k/uL    Basophils # 0.06 0.00 - 0.20 k/uL    Absolute Immature Granulocyte 0.05 0.00 - 0.30 k/uL    WBC Morphology NOT REPORTED     RBC Morphology ANISOCYTOSIS PRESENT     Platelet Estimate NOT REPORTED    Basic Metabolic Panel w/ Reflex to MG    Collection Time: 06/02/19  7:23 PM   Result Value Ref Range    Glucose 139 (H) 70 - 99 mg/dL    BUN 95 (HH) 8 - 23 mg/dL    CREATININE 9.28 (HH) 0.70 - 1.20 mg/dL    Bun/Cre Ratio NOT REPORTED 9 - 20    Calcium 8.6 8.6 - 10.4 mg/dL    Sodium 140 135 - 144 mmol/L    Potassium 7.2 (HH) 3.7 - 5.3 mmol/L    Chloride 99 98 - 107 mmol/L    CO2 19 (L) 20 - 31 mmol/L    Anion Gap 22 (H) 9 - 17 mmol/L    GFR Non-African American 6 (L) >60 mL/min    GFR  7 (L) >60 mL/min    GFR Comment          GFR Staging NOT REPORTED    Troponin    Collection Time: 06/02/19  7:23 PM   Result Value Ref Range    Troponin, High Sensitivity 216 (HH) 0 - 22 ng/L Troponin T NOT REPORTED <0.03 ng/mL    Troponin Interp NOT REPORTED    Brain Natriuretic Peptide    Collection Time: 06/02/19  7:23 PM   Result Value Ref Range    Pro-BNP 93,552 (H) <300 pg/mL    BNP Interpretation Pro-BNP Reference Range:    Troponin    Collection Time: 06/02/19  9:18 PM   Result Value Ref Range    Troponin, High Sensitivity 214 (HH) 0 - 22 ng/L    Troponin T NOT REPORTED <0.03 ng/mL    Troponin Interp NOT REPORTED      Imaging/Diagonstics:  Xr Chest Portable    Result Date: 6/2/2019  EXAMINATION: ONE XRAY VIEW OF THE CHEST 6/2/2019 7:29 pm COMPARISON: None. HISTORY: ORDERING SYSTEM PROVIDED HISTORY: SOB TECHNOLOGIST PROVIDED HISTORY: SOB Ordering Physician Provided Reason for Exam: sob Acuity: Unknown Type of Exam: Unknown FINDINGS: There is moderate pulmonary vascular congestion with moderate cardiomegaly. No effusions are visualized on the single frontal view. The patient is status post median sternotomy     Moderate CHF and moderate cardiomegaly. ASSESSMENT:       Principal Problem:    Dialysis patient, noncompliant ([address]  Active Problems:    ESRD (end stage renal disease) on dialysis ([address]    Benign essential HTN    Type 2 diabetes mellitus with foot ulcer ([address]  Resolved Problems:    * No resolved hospital problems.  *    PLAN:     Patient status: Admit the patient as Inpatient in the Progressive Unit    Fluid Overload 2/2 missed dialysis in the setting of ESRD and CHF  Nephrology consulted for dialysis tonight, recs appreciated  Continue Home Bumex on the floor  Repeat CXR in the am  Strict I&O    CHF  Last ECHO: LVEF 35%, moderately reduced systolic function, Grade I diastolic dysfunction  Repeat ECHO ordered  F/U TSH, Lipid profile  BNP every other day  Consult to heart failure nurse  Cardiac Diet    Elevated Troponin in the setting of ESRD and CHF  216 ->214  Continue to trend for 2 more occurrences    Hyperkalemia  For dialysis tonight  S/P Calcium gluconate, insulin and dextrose  BMP daily  Telemetry monitoring     DM2  Inpatient consult to dietician  F/u HbA1C    DVT prophylaxis: heparin (porcine) injection 5,000 TID    Consultations:   Consults: IP CONSULT TO NEPHROLOGY  IP CONSULT TO INTERNAL MEDICINE  IP CONSULT TO HEART FAILURE NURSE/COORDINATOR  IP CONSULT TO DIETITIAN  PT/OT       The severity of this patient's signs and symptoms (specify ESRD missed dialysis, CHF) indicate the need for an inpatient admission. Above plan discussed with the patient and family in room, who agreed to the above plan   Plan will be discussed with the attending, Dr. Rhea Melgar MD  Family Medicine Resident  6/2/2019 10:25 PM   Attending Physician Statement  I have discussed the care of [facility], including pertinent history and exam findings,  with the resident. I have seen and examined the patient and the key elements of all parts of the encounter have been performed by me. I agree with the assessment, plan and orders as documented by the resident. (Adolfo Ely) - Domitila Dooley M.D  Vitals:    06/03/19 0813   BP:    Pulse:    Resp:    Temp:    SpO2: 97%     1. Acute on chronic congestive heart failure, unspecified heart failure type ([address]    2. Chronic kidney disease, unspecified CKD stage    3. Hyperkalemia    4. Anemia, unspecified type    5.  Encounter for dialysis [facility])

## 2019-06-03 NOTE — PROGRESS NOTES
Perfect served by RN regarding blood glucose checks. Chart opened. Patient noted to be of family medicine. Informed the nurse about the same.     DR Santy Angelo  INTERNAL MEDICINE RESIDENT, PGY-1  [address] [place], [place]  6/2/2019,11:35 PM

## 2019-06-03 NOTE — PROGRESS NOTES
Pt vss during this shift, no c/o pain or discomfort pt worked with PT/OT. He is to remain in the hospital until cardiology is able to see him.

## 2019-06-03 NOTE — CARE COORDINATION
Case Management Initial Discharge Plan  Dione Le,             Met with:patient to discuss discharge plans. Information verified: address, contacts, phone number, , insurance Yes  PCP: Eric Hernandez MD  Date of last visit: 10/31/19 per [employer] Provider: [facility]    Discharge Planning    Living Arrangements:  Alone   Support Systems:  Family Members, Zora Moreno has 1 stories  0 stairs to climb to get into front door, n/a stairs to climb to reach second floor  Location of bedroom/bathroom in home 6th floor apartment, building has elevator    Patient able to perform ADL's:Independent    Current Services (outpatient & in home) DME  DME equipment: wheelchair, scooter, RW, prosthetics, cane, glucometer, shower chair   DME provider:      Pharmacy: [facility] Medications:  No  Does patient want to participate in local refill/ meds to beds program?  No    Potential Assistance Needed:  N/A    Patient agreeable to home care: No  [place] of choice provided:  n/a    Prior SNF/Rehab Placement and Facility: Yes - [address] [place] [address] to SNF/Rehab: No  [place] of choice provided: n/a   Evaluation: n/a    Expected Discharge date:  19  Patient expects to be discharged to:  home  Follow Up Appointment: Best Day/ Time:      Transportation provider: [facility] transport  Transportation arrangements needed for discharge: No     Readmission Risk              Risk of Unplanned Readmission:        17             Does patient have a readmission risk score greater than 14?: Yes  If yes, follow-up appointment must be made within 7 days of discharge. Discharge Plan: Home independently, needs PCP appt @ [facility]  Patient lives home independently, has needed DME. Denies need for home health. States he goes to dialysis T-Th-Sat, start time about 9:30 AM, at [facility] on [address] [address]. Transportation via [email or web address] transport.    He is agreeable to PCP follow up -

## 2019-06-04 ENCOUNTER — APPOINTMENT (OUTPATIENT)
Dept: CARDIAC CATH/INVASIVE PROCEDURES | Age: 64
DRG: 175 | End: 2019-06-04
Payer: MEDICARE

## 2019-06-04 ENCOUNTER — APPOINTMENT (OUTPATIENT)
Dept: DIALYSIS | Age: 64
DRG: 175 | End: 2019-06-04
Payer: MEDICARE

## 2019-06-04 LAB
ANION GAP SERPL CALCULATED.3IONS-SCNC: 19 MMOL/L (ref 9–17)
BUN BLDV-MCNC: 72 MG/DL (ref 8–23)
BUN/CREAT BLD: ABNORMAL (ref 9–20)
CALCIUM SERPL-MCNC: 8.5 MG/DL (ref 8.6–10.4)
CHLORIDE BLD-SCNC: 97 MMOL/L (ref 98–107)
CO2: 29 MMOL/L (ref 20–31)
CREAT SERPL-MCNC: 8.48 MG/DL (ref 0.7–1.2)
GFR AFRICAN AMERICAN: 8 ML/MIN
GFR NON-AFRICAN AMERICAN: 6 ML/MIN
GFR SERPL CREATININE-BSD FRML MDRD: ABNORMAL ML/MIN/{1.73_M2}
GFR SERPL CREATININE-BSD FRML MDRD: ABNORMAL ML/MIN/{1.73_M2}
GLUCOSE BLD-MCNC: 142 MG/DL (ref 75–110)
GLUCOSE BLD-MCNC: 149 MG/DL (ref 75–110)
GLUCOSE BLD-MCNC: 93 MG/DL (ref 75–110)
GLUCOSE BLD-MCNC: 94 MG/DL (ref 70–99)
HCT VFR BLD CALC: 33 % (ref 40.7–50.3)
HEMOGLOBIN: 9.8 G/DL (ref 13–17)
MCH RBC QN AUTO: 29.5 PG (ref 25.2–33.5)
MCHC RBC AUTO-ENTMCNC: 29.7 G/DL (ref 28.4–34.8)
MCV RBC AUTO: 99.4 FL (ref 82.6–102.9)
MYOGLOBIN: 324 NG/ML (ref 28–72)
MYOGLOBIN: 333 NG/ML (ref 28–72)
MYOGLOBIN: 347 NG/ML (ref 28–72)
MYOGLOBIN: 347 NG/ML (ref 28–72)
NRBC AUTOMATED: 0 PER 100 WBC
PDW BLD-RTO: 15.3 % (ref 11.8–14.4)
PLATELET # BLD: 111 K/UL (ref 138–453)
PMV BLD AUTO: 13 FL (ref 8.1–13.5)
POTASSIUM SERPL-SCNC: 4.1 MMOL/L (ref 3.7–5.3)
POTASSIUM SERPL-SCNC: 5.5 MMOL/L (ref 3.7–5.3)
RBC # BLD: 3.32 M/UL (ref 4.21–5.77)
SODIUM BLD-SCNC: 145 MMOL/L (ref 135–144)
TROPONIN INTERP: ABNORMAL
TROPONIN T: ABNORMAL NG/ML
TROPONIN, HIGH SENSITIVITY: 272 NG/L (ref 0–22)
TROPONIN, HIGH SENSITIVITY: 272 NG/L (ref 0–22)
TROPONIN, HIGH SENSITIVITY: 275 NG/L (ref 0–22)
TROPONIN, HIGH SENSITIVITY: 282 NG/L (ref 0–22)
WBC # BLD: 7.7 K/UL (ref 3.5–11.3)

## 2019-06-04 PROCEDURE — 85027 COMPLETE CBC AUTOMATED: CPT

## 2019-06-04 PROCEDURE — 84484 ASSAY OF TROPONIN QUANT: CPT

## 2019-06-04 PROCEDURE — 6360000002 HC RX W HCPCS: Performed by: INTERNAL MEDICINE

## 2019-06-04 PROCEDURE — 6370000000 HC RX 637 (ALT 250 FOR IP): Performed by: STUDENT IN AN ORGANIZED HEALTH CARE EDUCATION/TRAINING PROGRAM

## 2019-06-04 PROCEDURE — 99232 SBSQ HOSP IP/OBS MODERATE 35: CPT | Performed by: FAMILY MEDICINE

## 2019-06-04 PROCEDURE — 83874 ASSAY OF MYOGLOBIN: CPT

## 2019-06-04 PROCEDURE — 82947 ASSAY GLUCOSE BLOOD QUANT: CPT

## 2019-06-04 PROCEDURE — 80048 BASIC METABOLIC PNL TOTAL CA: CPT

## 2019-06-04 PROCEDURE — 6370000000 HC RX 637 (ALT 250 FOR IP): Performed by: FAMILY MEDICINE

## 2019-06-04 PROCEDURE — 2580000003 HC RX 258: Performed by: STUDENT IN AN ORGANIZED HEALTH CARE EDUCATION/TRAINING PROGRAM

## 2019-06-04 PROCEDURE — 84132 ASSAY OF SERUM POTASSIUM: CPT

## 2019-06-04 PROCEDURE — 90935 HEMODIALYSIS ONE EVALUATION: CPT

## 2019-06-04 PROCEDURE — 2060000000 HC ICU INTERMEDIATE R&B

## 2019-06-04 PROCEDURE — 94760 N-INVAS EAR/PLS OXIMETRY 1: CPT

## 2019-06-04 PROCEDURE — 6360000002 HC RX W HCPCS: Performed by: STUDENT IN AN ORGANIZED HEALTH CARE EDUCATION/TRAINING PROGRAM

## 2019-06-04 PROCEDURE — 36415 COLL VENOUS BLD VENIPUNCTURE: CPT

## 2019-06-04 RX ORDER — DEXTROSE MONOHYDRATE 25 G/50ML
25 INJECTION, SOLUTION INTRAVENOUS ONCE
Status: DISCONTINUED | OUTPATIENT
Start: 2019-06-04 | End: 2019-06-04

## 2019-06-04 RX ADMIN — SEVELAMER CARBONATE 4000 MG: 800 TABLET, FILM COATED ORAL at 20:55

## 2019-06-04 RX ADMIN — CINACALCET HYDROCHLORIDE 90 MG: 30 TABLET, COATED ORAL at 16:39

## 2019-06-04 RX ADMIN — DESMOPRESSIN ACETATE 40 MG: 0.2 TABLET ORAL at 20:56

## 2019-06-04 RX ADMIN — VITAMIN D, TAB 1000IU (100/BT) 1000 UNITS: 25 TAB at 16:39

## 2019-06-04 RX ADMIN — HEPARIN SODIUM 5000 UNITS: 5000 INJECTION INTRAVENOUS; SUBCUTANEOUS at 05:59

## 2019-06-04 RX ADMIN — ASPIRIN 81 MG: 81 TABLET ORAL at 16:40

## 2019-06-04 RX ADMIN — MULTIPLE VITAMINS W/ MINERALS TAB 1 TABLET: TAB at 16:38

## 2019-06-04 RX ADMIN — MIDODRINE HYDROCHLORIDE 10 MG: 5 TABLET ORAL at 07:40

## 2019-06-04 RX ADMIN — CALCIUM ACETATE 1334 MG: 667 CAPSULE ORAL at 07:38

## 2019-06-04 RX ADMIN — HEPARIN SODIUM 5000 UNITS: 5000 INJECTION INTRAVENOUS; SUBCUTANEOUS at 20:55

## 2019-06-04 RX ADMIN — SEVELAMER CARBONATE 4000 MG: 800 TABLET, FILM COATED ORAL at 16:39

## 2019-06-04 RX ADMIN — Medication 10 ML: at 20:55

## 2019-06-04 RX ADMIN — BUMETANIDE 2 MG: 1 TABLET ORAL at 16:38

## 2019-06-04 RX ADMIN — DOXERCALCIFEROL 1 MCG: 2 INJECTION, SOLUTION INTRAVENOUS at 12:07

## 2019-06-04 RX ADMIN — ACETAMINOPHEN 650 MG: 325 TABLET ORAL at 20:59

## 2019-06-04 ASSESSMENT — PAIN DESCRIPTION - PROGRESSION
CLINICAL_PROGRESSION: NOT CHANGED

## 2019-06-04 ASSESSMENT — PAIN SCALES - GENERAL
PAINLEVEL_OUTOF10: 0
PAINLEVEL_OUTOF10: 0
PAINLEVEL_OUTOF10: 5

## 2019-06-04 ASSESSMENT — ENCOUNTER SYMPTOMS
DIARRHEA: 0
ABDOMINAL PAIN: 0
SHORTNESS OF BREATH: 0
SORE THROAT: 0
CHEST TIGHTNESS: 0
NAUSEA: 0
CONSTIPATION: 0
COUGH: 0
VOMITING: 0

## 2019-06-04 NOTE — FLOWSHEET NOTE
DATE: 2019    NAME: Shayla Dey  MRN: [id number]   : 1955    Patient not seen this date for Physical Therapy due to:  [] Blood transfusion in progress  [] Hemodialysis  []  Patient Declined  [] Spine Precautions   [] Strict Bedrest  [x] Surgery/ Procedure; off unit for cardiac cath. Will check back tomorrow. [] Testing      [] Other        [] PT being discontinued at this time. Patient independent. No further needs. [] PT being discontinued at this time as the patient has been transferred to palliative care. No further needs.     Keya Kapoor, PTA

## 2019-06-04 NOTE — CARE COORDINATION
Transitional Planning  Per AM report, patient to go for cardiac cath and then to dialysis today.    PCP follow up scheduled Monday [address] 10 @ 8:30 AM.

## 2019-06-04 NOTE — PROGRESS NOTES
NEPHROLOGY DIALYSIS NOTE    PROCEDURE    Patient seen on Hemodialysis  6/4/2019 at 12:20 PM  Access cannulated without problems      TREATMENT ORDERS   See dialysis flowsheet for specifics on access, blood flow rate, dialysate baths, duration of dialysis, anticoagulation and other technical information. Dialysis Bath  K+ (Potassium): 2  Ca+ (Calcium): 2.25  Na+ (Sodium): 140  HCO3 (Bicarb): 32       INVESTIGATIONS     Last 3 CMP:    Recent Labs     06/02/19 1923 06/03/19  0530 06/04/19  0420    145* 145*   K 7.2* 4.6 5.5*   CL 99 97* 97*   CO2 19* 28 29   BUN 95* 50* 72*   CREATININE 9.28* 6.32* 8.48*   CALCIUM 8.6 8.8 8.5*       Last 3 CBC:  Recent Labs     06/02/19 1923 06/03/19  0041 06/04/19  0420   WBC 10.9 13.8* 7.7   RBC 3.60* 2.77* 3.32*   HGB 10.7* 8.3* 9.8*   HCT 35.0* 27.1* 33.0*   MCV 97.2 97.8 99.4   MCH 29.7 30.0 29.5   MCHC 30.6 30.6 29.7   RDW 15.5* 14.5* 15.3*   * 218 111*   MPV 12.9 9.8 13.0         GFR: Estimated Creatinine Clearance: 11 mL/min (A) (based on SCr of 8.48 mg/dL (HH)). Phosphorus:  No results for input(s): PHOS in the last 72 hours. Magnesium:   Recent Labs     06/03/19  0530   MG 1.8     Albumin: No results for input(s): LABALBU in the last 72 hours.   PTH                :No results found for: PTH           MEDICATIONS     Scheduled Meds:    insulin regular  8 Units Intravenous Once    dextrose  25 g Intravenous Once    aspirin  81 mg Oral Daily    atorvastatin  40 mg Oral Nightly    bumetanide  2 mg Oral Daily    calcium acetate  1,334 mg Oral TID WC    cinacalcet  90 mg Oral Daily    midodrine  10 mg Oral TID WC    therapeutic multivitamin-minerals  1 tablet Oral Daily    sevelamer  4,000 mg Oral TID    vitamin D  1,000 Units Oral Daily    sodium chloride flush  10 mL Intravenous 2 times per day    heparin (porcine)  5,000 Units Subcutaneous 3 times per day     Continuous Infusions:    dextrose       PRN Meds:  doxercalciferol, nitroGLYCERIN, acetaminophen, sodium chloride flush, magnesium hydroxide, ondansetron, sodium chloride, sodium chloride, glucose, dextrose, glucagon (rDNA), dextrose  Home Meds:                Medications Prior to Admission: bumetanide (BUMEX) 1 MG tablet, Take 2 mg by mouth daily  cinacalcet (SENSIPAR) 90 MG tablet, Take 90 mg by mouth daily  [DISCONTINUED] ONE TOUCH ULTRA TEST strip, USE AS DIRECTED DAILY AS NEEDED  ONE TOUCH ULTRA TEST strip, USE AS DIRECTED DAILY AS NEEDED  Blood Glucose Monitoring Suppl (ONE TOUCH ULTRA 2) W/DEVICE KIT, TEST 3 TIMES DAILY  glucose monitoring kit (FREESTYLE) monitoring kit, 1 kit by Does not apply route daily as needed  Lancets 30G MISC, 1 each by Does not apply route 3 times daily  Misc. Devices ([address] [place] [address]) MISC, 1 Device by Does not apply route continuous  atorvastatin (LIPITOR) 40 MG tablet, Take 1 tablet by mouth nightly  midodrine (PROAMATINE) 10 MG tablet, Take 1 tablet by mouth 3 times daily (with meals)  aspirin 81 MG EC tablet, Take 1 tablet by mouth daily  Multiple Vitamins-Minerals (THERAPEUTIC MULTIVITAMIN-MINERALS) tablet, Take 1 tablet by mouth daily  sevelamer (RENVELA) 800 MG tablet, Take 5 tablets by mouth 3 times daily   B Complex-C-Folic Acid (TRIPHROCAPS PO), Take 15 mg by mouth daily  calcium acetate (PHOSLO) 667 MG capsule, Take 1,334 mg by mouth 3 times daily (with meals)   vitamin D (CHOLECALCIFEROL) 1000 UNIT TABS tablet, Take 1,000 Units by mouth daily. EXAMINATION     Vitals BP (!) 157/79   Pulse 72   Temp 97.2 °F (36.2 °C)   Resp 18   Ht 6' (1.829 m)   Wt 232 lb 12.9 oz (105.6 kg)   SpO2 93%   BMI 31.57 kg/m²   LE edema  Absent, no icterus,clubbing,JVP not elevated  CVS :S1 S2 normal,no gallop or organic murmur  RS:Vesicular breath sounds,no crackles/wheeze  CNS:awake,alert  PA: non tender,non distended,Bowel sounds present    ASSESSMENT AND PLAN       1. ESRD: On  Hemodialysis.  patient's regular HD days are [id number] , Admitted

## 2019-06-04 NOTE — CONSULTS
Attestation signed by      Attending Physician Statement:    I have discussed the care of  Alex Tineo , including pertinent history and exam findings, with the Cardiology fellow/resident. I have seen and examined the patient and the key elements of all parts of the encounter have been performed by me. I agree with the assessment, plan and orders as documented by the fellow/resident, after I modified exam findings and plan of treatments, and the final version is my approved version of the assessment. Additional Comments:   NSTEMI type II - due to ESRD on HD  NSVT  New onset LV dysfunction- LVEF 30-35%  H/o CAD s/p PCI  - needs cardiac cath  - if no significant CAD then plan for repeat echo in 3 months and then decide on AICD for primary prevention. - start ACEi if ok with nephro  - start BB prior to d/c   - I have recommend left heart catheterization with possible PCI. I have discussed risks (including but not limited to vascular injury, infection, hematoma, contrast induced kidney dysfunction, CVA and MI), benefits, alternatives in detail. All questions answered. Patient agrees to proceed. D/w patient and nurse. Thank you for allowing me to participate in the care of this patient, please do not hesitate to call if you have any questions. Guerrero Lucero DO, [facility] [place], [address] [facility]  [facility]. [facility]  [phone]     [facility]    Consult / H&P               Today's Date: 6/4/2019  Patient Name: Alex Tineo  Date of admission: 6/2/2019  7:10 PM  Patient's age: 62 y.o., 1955  Admission Dx: Dialysis patient, noncompliant ([facility] [address] [Z91.15]    Reason for Consult:  Cardiac evaluation    Requesting Physician: Ino Garcia MD    CHIEF COMPLAINT:  Shortness of breath, cardiology consulted for elevated troponin    History Obtained From:  patient, electronic medical record    HISTORY OF PRESENT ILLNESS:      The patient is a 62 y.o.   male who is admitted to the hospital for shortness of breath. Patient has a history of ESRD on hemodialysis. Normal dialysis days are Tues-Thurs-Sat. Patient missed his session on Saturday due to lower back pain. Patient presented to ED on 6/2. Creatinine was noted to be 9.28 and Potassium was noted to be 7.2. EKG showed sinus tachycardia with peaked T waves. Patient received dialysis treatment and had an improvement in symptoms. Cardiology was consulted for evaluation of elevated troponin. Troponin was 214 on admission and has ranged from 214-292. Today it is 272. Patient denies chest pain or other symptoms at this time. Patient also had a documented run of non-sustained ventricular tachycardia late last night that lasted for 9 beats. Past Medical History:   has a past medical history of Acute on chronic congestive heart failure ([address] Anemia, CAD (coronary artery disease), Cardiomyopathy ([address] Dialysis care, ESRD (end stage renal disease) on dialysis ([address] Foot ulcer, left ([address] GERD (gastroesophageal reflux disease), Hemodialysis patient ([address] History of sleep apnea, Hyperlipidemia, Hyperparathyroidism ([address] Hypertension, Neuropathy, Pneumonia, S/P CABG (coronary artery bypass graft), and Type II or unspecified type diabetes mellitus without mention of complication, not stated as uncontrolled. Past Surgical History:   has a past surgical history that includes Cataract removal; Dialysis fistula creation (Left, 2014); and Leg amputation, lower tibia/fibula. Home Medications:    Prior to Admission medications    Medication Sig Start Date End Date Taking?  Authorizing Provider   bumetanide (BUMEX) 1 MG tablet Take 2 mg by mouth daily    Historical Provider, MD   cinacalcet (SENSIPAR) 90 MG tablet Take 90 mg by mouth daily    Historical Provider, MD   ONE TOUCH ULTRA TEST strip USE AS DIRECTED DAILY AS NEEDED 1/24/17   Juan Gotti, DO   Blood Glucose Monitoring Suppl (ONE TOUCH ULTRA 2) W/DEVICE KIT TEST 3 TIMES DAILY 8/26/16   Monica Yoo DO   glucose monitoring kit (FREESTYLE) monitoring kit 1 kit by Does not apply route daily as needed 4/21/16   Monica Yoo DO   Lancets 30G MISC 1 each by Does not apply route 3 times daily 4/21/16   Monica Yoo, DO   Misc. Devices (WALKER GLIDE WHEELS) MISC 1 Device by Does not apply route continuous 4/21/16   Monica Yoo DO   atorvastatin (LIPITOR) 40 MG tablet Take 1 tablet by mouth nightly 3/14/16   Sheron Milan, DO   midodrine (PROAMATINE) 10 MG tablet Take 1 tablet by mouth 3 times daily (with meals) 3/14/16   Sheron Milan, DO   aspirin 81 MG EC tablet Take 1 tablet by mouth daily 3/14/16   Sheron Luis A, DO   Multiple Vitamins-Minerals (THERAPEUTIC MULTIVITAMIN-MINERALS) tablet Take 1 tablet by mouth daily 3/14/16   Sheron Gunn, DO   sevelamer (RENVELA) 800 MG tablet Take 5 tablets by mouth 3 times daily     Historical Provider, MD   B Complex-C-Folic Acid (TRIPHROCAPS PO) Take 15 mg by mouth daily    Historical Provider, MD   calcium acetate (PHOSLO) 667 MG capsule Take 1,334 mg by mouth 3 times daily (with meals)     Historical Provider, MD   vitamin D (CHOLECALCIFEROL) 1000 UNIT TABS tablet Take 1,000 Units by mouth daily.     Historical Provider, MD      Current Facility-Administered Medications: insulin regular (HUMULIN R;NOVOLIN R) injection 8 Units, 8 Units, Intravenous, Once  dextrose 50 % IV solution, 25 g, Intravenous, Once  nitroGLYCERIN (NITROSTAT) SL tablet 0.4 mg, 0.4 mg, Sublingual, Q5 Min PRN  acetaminophen (TYLENOL) tablet 650 mg, 650 mg, Oral, Q4H PRN  aspirin EC tablet 81 mg, 81 mg, Oral, Daily  atorvastatin (LIPITOR) tablet 40 mg, 40 mg, Oral, Nightly  bumetanide (BUMEX) tablet 2 mg, 2 mg, Oral, Daily  calcium acetate (PHOSLO) capsule 1,334 mg, 1,334 mg, Oral, TID WC  cinacalcet (SENSIPAR) tablet 90 mg, 90 mg, Oral, Daily  midodrine (PROAMATINE) tablet 10 mg, 10 mg, Oral, TID   therapeutic weakness or joint complaints. · Integumentary: No rash or pruritis. · Neurological: No headache, diplopia, change in muscle strength, numbness or tingling. No change in gait, balance, coordination, mood, affect, memory, mentation, behavior. · Psychiatric: No anxiety, or depression. · Endocrine: No temperature intolerance. No excessive thirst, fluid intake, or urination. No tremor. · Hematologic/Lymphatic: No abnormal bruising or bleeding, blood clots or swollen lymph nodes. · Allergic/Immunologic: No nasal congestion or hives. PHYSICAL EXAM:      /78   Pulse 85   Temp 98.5 °F (36.9 °C) (Oral)   Resp 20   Ht 6' (1.829 m)   Wt 235 lb 0.2 oz (106.6 kg)   SpO2 93%   BMI 31.87 kg/m²    Constitutional and General Appearance: alert, cooperative, no distress and appears stated age  HEENT: PERRL, no cervical lymphadenopathy. No masses palpable. Normal oral mucosa  Respiratory:  · Normal excursion and expansion without use of accessory muscles  · Resp Auscultation: Good respiratory effort. No for increased work of breathing. On auscultation: clear to auscultation bilaterally  Cardiovascular:  · The apical impulse is not displaced  · Heart tones are crisp and normal. regular S1 and S2.  · Jugular venous pulsation Normal  · The carotid upstroke is normal in amplitude and contour without delay or bruit  · Peripheral pulses are symmetrical and full   Abdomen:   · No masses or tenderness  · Bowel sounds present  Extremities:  ·  No Cyanosis or Clubbing  ·  Lower extremity edema: No  ·  Skin: Warm and dry  Neurological:  · Alert and oriented. · Moves all extremities well  · No abnormalities of mood, affect, memory, mentation, or behavior are noted    DATA:    Diagnostics:      EK19  normal sinus rhythm, nonspecific ST and T waves changes, left axis deviation. Peaked T waves present in leads V2-V5    ECHO: 19  Summary  Technically difficult study due to patient habitus.   Left ventricle is normal in size. Mild left ventricular hypertrophy. Severely reduced left ventricular systolic function. Estimated LV EF 30-35 %. Grade II (moderate) left ventricular diastolic dysfunction. Left atrium is mildly dilated. Trivial valve disease as below. Stress Test:   not obtained. Cardiac Angiography:   ordered, but not yet obtained. Labs:     CBC:   Recent Labs     06/03/19  0041 06/04/19  0420   WBC 13.8* 7.7   HGB 8.3* 9.8*   HCT 27.1* 33.0*    111*     BMP:   Recent Labs     06/03/19  0530 06/04/19  0420   * 145*   K 4.6 5.5*   CO2 28 29   BUN 50* 72*   CREATININE 6.32* 8.48*   LABGLOM 9* 6*   GLUCOSE 145* 94     BNP: No results for input(s): BNP in the last 72 hours. PT/INR: No results for input(s): PROTIME, INR in the last 72 hours. APTT:No results for input(s): APTT in the last 72 hours. CARDIAC ENZYMES:No results for input(s): CKTOTAL, CKMB, CKMBINDEX, TROPONINI in the last 72 hours. FASTING LIPID PANEL:  Lab Results   Component Value Date    HDL 42 06/03/2019    TRIG 136 06/03/2019     LIVER PROFILE:No results for input(s): AST, ALT, LABALBU in the last 72 hours. IMPRESSION:    1. Hyper-troponinemia   2. Non-sustained Ventricular tachycardia  3. Cardiomyopathy, Echo on 6/2 showed EF of 30-35%  4. ESRD on hemodialysis    Patient Active Problem List   Diagnosis    SDH (subdural hematoma) (HCC)    ESRD (end stage renal disease) on dialysis ([address]    DM (diabetes mellitus) ([address]    Anemia of chronic disease    Charcot foot due to diabetes mellitus ([address]    Diabetic ulcer of left foot ([address]    Benign essential HTN    Type 2 diabetes mellitus with foot ulcer ([address]    Dialysis patient, noncompliant ([address]    Acute on chronic congestive heart failure ([address]       RECOMMENDATIONS:  1. Recommend Cardiac cath    Will discuss with rounding attending  for final recommendations.     Ger Vincent MS III  6/4/19  0924 AM

## 2019-06-04 NOTE — PROGRESS NOTES
fistula creation (Left, 2014); and Leg amputation, lower tibia/fibula. SOCIALHISTORY:      reports that he has never smoked. He has never used smokeless tobacco. He reports that he does not drink alcohol or use drugs. FAMILY HISTORY:      family history includes Diabetes in his brother and father; Heart Disease in his father. HOME MEDICATIONS:      Prior to Admission medications    Medication Sig Start Date End Date Taking? Authorizing Provider   bumetanide (BUMEX) 1 MG tablet Take 2 mg by mouth daily    Historical Provider, MD   cinacalcet (SENSIPAR) 90 MG tablet Take 90 mg by mouth daily    Historical Provider, MD   ONE TOUCH ULTRA TEST strip USE AS DIRECTED DAILY AS NEEDED 1/24/17   Hafsa Hill DO   Blood Glucose Monitoring Suppl (ONE TOUCH ULTRA 2) W/DEVICE KIT TEST 3 TIMES DAILY 8/26/16   Hafsa Hill DO   glucose monitoring kit (FREESTYLE) monitoring kit 1 kit by Does not apply route daily as needed 4/21/16   Hafsa Hill DO   Lancets 30G MISC 1 each by Does not apply route 3 times daily 4/21/16   Hafsa Hill DO   Misc.  Devices (WALKER GLIDE WHEELS) MISC 1 Device by Does not apply route continuous 4/21/16   Hafsa Hill DO   atorvastatin (LIPITOR) 40 MG tablet Take 1 tablet by mouth nightly 3/14/16   Elisa Brianda, DO   midodrine (PROAMATINE) 10 MG tablet Take 1 tablet by mouth 3 times daily (with meals) 3/14/16   Elisa Brianda, DO   aspirin 81 MG EC tablet Take 1 tablet by mouth daily 3/14/16   Elisa Brianda, DO   Multiple Vitamins-Minerals (THERAPEUTIC MULTIVITAMIN-MINERALS) tablet Take 1 tablet by mouth daily 3/14/16   Elisa Brianda, DO   sevelamer (RENVELA) 800 MG tablet Take 5 tablets by mouth 3 times daily     Historical Provider, MD   B Complex-C-Folic Acid (TRIPHROCAPS PO) Take 15 mg by mouth daily    Historical Provider, MD   calcium acetate (PHOSLO) 667 MG capsule Take 1,334 mg by mouth 3 times daily (with meals)     Historical Provider, MD vitamin D (CHOLECALCIFEROL) 1000 UNIT TABS tablet Take 1,000 Units by mouth daily. Historical Provider, MD       ALLERGIES:     Pcn [penicillins]    OBJECTIVE:       Vitals:    06/04/19 0356 06/04/19 0558 06/04/19 0741 06/04/19 0839   BP: 120/68  135/78    Pulse: 70  85    Resp: 20  20    Temp: 98.2 °F (36.8 °C)  98.5 °F (36.9 °C)    TempSrc: Oral  Oral    SpO2: 97%  94% 93%   Weight:  235 lb 0.2 oz (106.6 kg)     Height:             Intake/Output Summary (Last 24 hours) at 6/4/2019 8623  Last data filed at 6/3/2019 1919  Gross per 24 hour   Intake 562 ml   Output 125 ml   Net 437 ml       PHYSICAL EXAM:    Physical Exam   Constitutional: He is oriented to person, place, and time. He appears well-developed. Cardiovascular: Normal rate, regular rhythm, normal heart sounds and intact distal pulses. Exam reveals no gallop and no friction rub. No murmur heard. Pulmonary/Chest: Effort normal and breath sounds normal. No respiratory distress. He has no wheezes. He has no rales. He exhibits no tenderness. Abdominal: Soft. Bowel sounds are normal. He exhibits no distension and no mass. There is no tenderness. There is no guarding. Musculoskeletal: He exhibits no edema. Left BKA   Neurological: He is alert and oriented to person, place, and time. Skin: Capillary refill takes less than 2 seconds. Linear sternal scar   Psychiatric: He has a normal mood and affect. Nursing note and vitals reviewed. ASSESSMENT:      Principal Problem:    Dialysis patient, noncompliant ([address]  Active Problems:    ESRD (end stage renal disease) on dialysis ([address]    Benign essential HTN    Type 2 diabetes mellitus with foot ulcer (HCC)    Acute on chronic congestive heart failure ([address]  Resolved Problems:    * No resolved hospital problems. *      PLAN:     1.  Elevated Troponin in the setting of ESRD and CHF  - Cath procedure today per cardiology  - Awaiting cardiology recommendations  - 272 from 259 yest. ; Cr at 8.48 from 6.32 yest    2. Hyperkalemia    - K at 5.5 from 4.6 yesterday  - Dialysis today  -  insulin and dextrose  - BMP daily  - Telemetry monitoring     3. Fluid Overload 2/2 missed dialysis in the setting of ESRD and CHF - RESOLVED  - Dialysis complete   - Dialysis schedule tues/thurs/sat  - Nephrology consulted for dialysis tonight, recs appreciated  - Continue Home Bumex on the floor  - Repeat CXR - partially improved aeration of the lungs  - Strict I&O  - 1500 ml fluid restriction      4. Chronic systolic CHF  - Last ECHO: LVEF 35%, moderately reduced systolic function, Grade I diastolic dysfunction  - Repeat ECHO - LVEF at 30-35%  - F/U   - Lipid profile at 142  - Pro-BNP at 109,206   - Consult to heart failure nurse  - Cardiac Diet     5. DM2  - Inpatient consult to dietician  - F/u HbA1C      Plan will be discussed with the attending, Dr. Mickey Griffiths MD  Family Medicine Resident  6/4/2019 9:04 AM   Attending Physician Statement  I have discussed the care of [facility], including pertinent history and exam findings,  with the resident. I have seen and examined the patient and the key elements of all parts of the encounter have been performed by me. I agree with the assessment, plan and orders as documented by the resident. (Edmund Webster) - Fanta Almazan M.D  Vitals:    06/04/19 1310   BP: (!) 164/85   Pulse: 72   Resp:    Temp: 97 °F (36.1 °C)   SpO2:      1. Acute on chronic congestive heart failure, unspecified heart failure type ([address]    2. Chronic kidney disease, unspecified CKD stage    3. Hyperkalemia    4. Anemia, unspecified type    5.  Encounter for dialysis [facility])      Pt is in procedure

## 2019-06-04 NOTE — FLOWSHEET NOTE
Dialysis Post Treatment Note  Patient tolerated treatment well. Denies complaints at time of discharge. Vitals:    06/04/19 1310   BP: (!) 164/85   Pulse: 72   Resp:    Temp: 97 °F (36.1 °C)   SpO2:      Pre-Weight = 105.6 kg  Post-weight = Weight: 228 lb 6.3 oz (103.6 kg)  Total Liters Processed = Total Liters Processed (l/min): 79.2 l/min  Rinseback Volume (mL) = Rinseback Volume (ml): 340 ml  Net Removal (mL) = @FLOW([id number]  Length of treatment=180      Post treatment pt to cardiac cath. Removed 2 kg.  Pt post assessment Nela Lanier R.N.

## 2019-06-04 NOTE — PROGRESS NOTES
Contacted team questioned if pt was to be seen by [facility] cardiology or the cardiology team from this facility.   Pt is to be seen by this facility cardiology team.

## 2019-06-04 NOTE — PROGRESS NOTES
Occupational Therapy    Occupational Therapy Not Seen Note    DATE: 2019  Name: Freddy Oseguera  : 1955  MRN: [id number]    Patient not available for Occupational Therapy due to:    Hemodialysis then card cath per RN        Electronically signed by ARPITA Paredes on 2019 at 11:39 AM

## 2019-06-04 NOTE — PROGRESS NOTES
Pt went to HD then was transferred to get cardiac cath procedure. Procedure was postponed until 6/5/19. No c/o pain or discomfort will continue to monitor.

## 2019-06-05 ENCOUNTER — APPOINTMENT (OUTPATIENT)
Dept: CARDIAC CATH/INVASIVE PROCEDURES | Age: 64
DRG: 175 | End: 2019-06-05
Payer: MEDICARE

## 2019-06-05 LAB
ANION GAP SERPL CALCULATED.3IONS-SCNC: 14 MMOL/L (ref 9–17)
BUN BLDV-MCNC: 45 MG/DL (ref 8–23)
BUN/CREAT BLD: ABNORMAL (ref 9–20)
CALCIUM SERPL-MCNC: 8.2 MG/DL (ref 8.6–10.4)
CHLORIDE BLD-SCNC: 97 MMOL/L (ref 98–107)
CO2: 28 MMOL/L (ref 20–31)
CREAT SERPL-MCNC: 6.49 MG/DL (ref 0.7–1.2)
FERRITIN: 1806 UG/L (ref 30–400)
GFR AFRICAN AMERICAN: 11 ML/MIN
GFR NON-AFRICAN AMERICAN: 9 ML/MIN
GFR SERPL CREATININE-BSD FRML MDRD: ABNORMAL ML/MIN/{1.73_M2}
GFR SERPL CREATININE-BSD FRML MDRD: ABNORMAL ML/MIN/{1.73_M2}
GLUCOSE BLD-MCNC: 117 MG/DL (ref 75–110)
GLUCOSE BLD-MCNC: 118 MG/DL (ref 70–99)
GLUCOSE BLD-MCNC: 209 MG/DL (ref 75–110)
HAPTOGLOBIN: 140 MG/DL (ref 30–200)
HCT VFR BLD CALC: 32.5 % (ref 40.7–50.3)
HEMOGLOBIN: 9.6 G/DL (ref 13–17)
IRON SATURATION: 28 % (ref 20–55)
IRON: 62 UG/DL (ref 59–158)
LACTATE DEHYDROGENASE: 271 U/L (ref 135–225)
MCH RBC QN AUTO: 29.1 PG (ref 25.2–33.5)
MCHC RBC AUTO-ENTMCNC: 29.5 G/DL (ref 28.4–34.8)
MCV RBC AUTO: 98.5 FL (ref 82.6–102.9)
NRBC AUTOMATED: 0 PER 100 WBC
PDW BLD-RTO: 14.9 % (ref 11.8–14.4)
PLATELET # BLD: 101 K/UL (ref 138–453)
PMV BLD AUTO: 12.4 FL (ref 8.1–13.5)
POTASSIUM SERPL-SCNC: 4.8 MMOL/L (ref 3.7–5.3)
RBC # BLD: 3.3 M/UL (ref 4.21–5.77)
SODIUM BLD-SCNC: 139 MMOL/L (ref 135–144)
TOTAL IRON BINDING CAPACITY: 223 UG/DL (ref 250–450)
TROPONIN INTERP: ABNORMAL
TROPONIN T: ABNORMAL NG/ML
TROPONIN, HIGH SENSITIVITY: 295 NG/L (ref 0–22)
UNSATURATED IRON BINDING CAPACITY: 161 UG/DL (ref 112–347)
WBC # BLD: 6.6 K/UL (ref 3.5–11.3)

## 2019-06-05 PROCEDURE — 6360000004 HC RX CONTRAST MEDICATION

## 2019-06-05 PROCEDURE — 6370000000 HC RX 637 (ALT 250 FOR IP): Performed by: FAMILY MEDICINE

## 2019-06-05 PROCEDURE — 80048 BASIC METABOLIC PNL TOTAL CA: CPT

## 2019-06-05 PROCEDURE — 97116 GAIT TRAINING THERAPY: CPT

## 2019-06-05 PROCEDURE — 6370000000 HC RX 637 (ALT 250 FOR IP): Performed by: STUDENT IN AN ORGANIZED HEALTH CARE EDUCATION/TRAINING PROGRAM

## 2019-06-05 PROCEDURE — C1769 GUIDE WIRE: HCPCS

## 2019-06-05 PROCEDURE — 83550 IRON BINDING TEST: CPT

## 2019-06-05 PROCEDURE — 97110 THERAPEUTIC EXERCISES: CPT

## 2019-06-05 PROCEDURE — 84484 ASSAY OF TROPONIN QUANT: CPT

## 2019-06-05 PROCEDURE — 6360000002 HC RX W HCPCS

## 2019-06-05 PROCEDURE — 36415 COLL VENOUS BLD VENIPUNCTURE: CPT

## 2019-06-05 PROCEDURE — 85027 COMPLETE CBC AUTOMATED: CPT

## 2019-06-05 PROCEDURE — 83010 ASSAY OF HAPTOGLOBIN QUANT: CPT

## 2019-06-05 PROCEDURE — 2580000003 HC RX 258: Performed by: INTERNAL MEDICINE

## 2019-06-05 PROCEDURE — 6360000002 HC RX W HCPCS: Performed by: INTERNAL MEDICINE

## 2019-06-05 PROCEDURE — 99232 SBSQ HOSP IP/OBS MODERATE 35: CPT | Performed by: FAMILY MEDICINE

## 2019-06-05 PROCEDURE — 97530 THERAPEUTIC ACTIVITIES: CPT

## 2019-06-05 PROCEDURE — 6370000000 HC RX 637 (ALT 250 FOR IP): Performed by: INTERNAL MEDICINE

## 2019-06-05 PROCEDURE — 83615 LACTATE (LD) (LDH) ENZYME: CPT

## 2019-06-05 PROCEDURE — C1874 STENT, COATED/COV W/DEL SYS: HCPCS

## 2019-06-05 PROCEDURE — 6360000002 HC RX W HCPCS: Performed by: STUDENT IN AN ORGANIZED HEALTH CARE EDUCATION/TRAINING PROGRAM

## 2019-06-05 PROCEDURE — C1894 INTRO/SHEATH, NON-LASER: HCPCS

## 2019-06-05 PROCEDURE — 6370000000 HC RX 637 (ALT 250 FOR IP)

## 2019-06-05 PROCEDURE — C1725 CATH, TRANSLUMIN NON-LASER: HCPCS

## 2019-06-05 PROCEDURE — 2709999900 HC NON-CHARGEABLE SUPPLY

## 2019-06-05 PROCEDURE — B2131ZZ FLUOROSCOPY OF MULTIPLE CORONARY ARTERY BYPASS GRAFTS USING LOW OSMOLAR CONTRAST: ICD-10-PCS | Performed by: INTERNAL MEDICINE

## 2019-06-05 PROCEDURE — 027034Z DILATION OF CORONARY ARTERY, ONE ARTERY WITH DRUG-ELUTING INTRALUMINAL DEVICE, PERCUTANEOUS APPROACH: ICD-10-PCS | Performed by: INTERNAL MEDICINE

## 2019-06-05 PROCEDURE — 92928 PRQ TCAT PLMT NTRAC ST 1 LES: CPT | Performed by: INTERNAL MEDICINE

## 2019-06-05 PROCEDURE — 2580000003 HC RX 258: Performed by: STUDENT IN AN ORGANIZED HEALTH CARE EDUCATION/TRAINING PROGRAM

## 2019-06-05 PROCEDURE — 83540 ASSAY OF IRON: CPT

## 2019-06-05 PROCEDURE — 4A023N8 MEASUREMENT OF CARDIAC SAMPLING AND PRESSURE, BILATERAL, PERCUTANEOUS APPROACH: ICD-10-PCS | Performed by: INTERNAL MEDICINE

## 2019-06-05 PROCEDURE — 82947 ASSAY GLUCOSE BLOOD QUANT: CPT

## 2019-06-05 PROCEDURE — 2500000003 HC RX 250 WO HCPCS

## 2019-06-05 PROCEDURE — 93455 CORONARY ART/GRFT ANGIO S&I: CPT | Performed by: INTERNAL MEDICINE

## 2019-06-05 PROCEDURE — C1887 CATHETER, GUIDING: HCPCS

## 2019-06-05 PROCEDURE — 1200000000 HC SEMI PRIVATE

## 2019-06-05 PROCEDURE — B2111ZZ FLUOROSCOPY OF MULTIPLE CORONARY ARTERIES USING LOW OSMOLAR CONTRAST: ICD-10-PCS | Performed by: INTERNAL MEDICINE

## 2019-06-05 PROCEDURE — 93926 LOWER EXTREMITY STUDY: CPT

## 2019-06-05 PROCEDURE — 82728 ASSAY OF FERRITIN: CPT

## 2019-06-05 RX ORDER — SODIUM CHLORIDE 0.9 % (FLUSH) 0.9 %
10 SYRINGE (ML) INJECTION EVERY 12 HOURS SCHEDULED
Status: DISCONTINUED | OUTPATIENT
Start: 2019-06-05 | End: 2019-06-06 | Stop reason: HOSPADM

## 2019-06-05 RX ORDER — SODIUM CHLORIDE 0.9 % (FLUSH) 0.9 %
10 SYRINGE (ML) INJECTION PRN
Status: DISCONTINUED | OUTPATIENT
Start: 2019-06-05 | End: 2019-06-06 | Stop reason: HOSPADM

## 2019-06-05 RX ORDER — FENTANYL CITRATE 50 UG/ML
25 INJECTION, SOLUTION INTRAMUSCULAR; INTRAVENOUS ONCE
Status: COMPLETED | OUTPATIENT
Start: 2019-06-05 | End: 2019-06-05

## 2019-06-05 RX ORDER — ATORVASTATIN CALCIUM 80 MG/1
80 TABLET, FILM COATED ORAL NIGHTLY
Status: DISCONTINUED | OUTPATIENT
Start: 2019-06-05 | End: 2019-06-06 | Stop reason: HOSPADM

## 2019-06-05 RX ORDER — ACETAMINOPHEN 325 MG/1
650 TABLET ORAL EVERY 4 HOURS PRN
Status: DISCONTINUED | OUTPATIENT
Start: 2019-06-05 | End: 2019-06-06 | Stop reason: HOSPADM

## 2019-06-05 RX ORDER — ONDANSETRON 2 MG/ML
4 INJECTION INTRAMUSCULAR; INTRAVENOUS EVERY 6 HOURS PRN
Status: DISCONTINUED | OUTPATIENT
Start: 2019-06-05 | End: 2019-06-06 | Stop reason: HOSPADM

## 2019-06-05 RX ADMIN — ACETAMINOPHEN 650 MG: 325 TABLET ORAL at 04:19

## 2019-06-05 RX ADMIN — BUMETANIDE 2 MG: 1 TABLET ORAL at 09:02

## 2019-06-05 RX ADMIN — HEPARIN SODIUM 5000 UNITS: 5000 INJECTION INTRAVENOUS; SUBCUTANEOUS at 23:53

## 2019-06-05 RX ADMIN — MULTIPLE VITAMINS W/ MINERALS TAB 1 TABLET: TAB at 09:03

## 2019-06-05 RX ADMIN — SEVELAMER CARBONATE 4000 MG: 800 TABLET, FILM COATED ORAL at 13:27

## 2019-06-05 RX ADMIN — SEVELAMER CARBONATE 4000 MG: 800 TABLET, FILM COATED ORAL at 09:01

## 2019-06-05 RX ADMIN — CALCIUM ACETATE 1334 MG: 667 CAPSULE ORAL at 09:02

## 2019-06-05 RX ADMIN — CINACALCET HYDROCHLORIDE 90 MG: 30 TABLET, COATED ORAL at 09:03

## 2019-06-05 RX ADMIN — FENTANYL CITRATE 25 MCG: 50 INJECTION, SOLUTION INTRAMUSCULAR; INTRAVENOUS at 15:44

## 2019-06-05 RX ADMIN — SEVELAMER CARBONATE 4000 MG: 800 TABLET, FILM COATED ORAL at 20:42

## 2019-06-05 RX ADMIN — MIDODRINE HYDROCHLORIDE 10 MG: 5 TABLET ORAL at 09:02

## 2019-06-05 RX ADMIN — Medication 10 ML: at 09:06

## 2019-06-05 RX ADMIN — CALCIUM ACETATE 1334 MG: 667 CAPSULE ORAL at 17:56

## 2019-06-05 RX ADMIN — Medication 10 ML: at 20:44

## 2019-06-05 RX ADMIN — MAGNESIUM HYDROXIDE 30 ML: 400 SUSPENSION ORAL at 09:12

## 2019-06-05 RX ADMIN — ATORVASTATIN CALCIUM 80 MG: 80 TABLET, FILM COATED ORAL at 23:51

## 2019-06-05 RX ADMIN — TICAGRELOR 90 MG: 90 TABLET ORAL at 23:52

## 2019-06-05 RX ADMIN — VITAMIN D, TAB 1000IU (100/BT) 1000 UNITS: 25 TAB at 09:01

## 2019-06-05 ASSESSMENT — ENCOUNTER SYMPTOMS
ABDOMINAL PAIN: 0
VOMITING: 0
SORE THROAT: 0
CHEST TIGHTNESS: 0
SHORTNESS OF BREATH: 0
DIARRHEA: 0
COUGH: 0
NAUSEA: 0
CONSTIPATION: 0

## 2019-06-05 ASSESSMENT — PAIN SCALES - GENERAL
PAINLEVEL_OUTOF10: 10
PAINLEVEL_OUTOF10: 0
PAINLEVEL_OUTOF10: 6
PAINLEVEL_OUTOF10: 0
PAINLEVEL_OUTOF10: 5

## 2019-06-05 ASSESSMENT — PAIN DESCRIPTION - PROGRESSION
CLINICAL_PROGRESSION: NOT CHANGED

## 2019-06-05 NOTE — PROGRESS NOTES
Received post cardiac cath procedure to [facility] room 1. Assessment obtained. Restrictions reviewed with patient. Post procedure pathway initiated. Right groin site with artline suture in place, site soft , badressing dry and intact. No hematoma noted Patient without complaints. Head of bed flat with Right leg straight.

## 2019-06-05 NOTE — PLAN OF CARE
Pt transferred to unit from Cath lab. Post cath assessment completed. VSS. Patient sat up to eat 1845 still with no complaints and no signs of bleeding and no hematoma noted, pulses to right leg audible with doppler.

## 2019-06-05 NOTE — OP NOTE
smoker. [] Cardiac Arrest outside of healthcare facility. Witnessed     [] Yes   [x] No     Arrest after arrival of EMS  [] Yes   [x] No   [] Cardiac Arrest at other Facility. [] Yes   [x] No    Pre-Procedure Information. Heart Failure       [] Yes    [x] No    Class  [] I      [] II  [] III    [] IV. New Diagnosis    [] Yes  [] No    HF Type      [] Systolic   [] Diastolic          [] Unknown. Diagnostic Test:   EKG       [x] Normal   [] Abnormal    New antiarrhythmia medications:    [] Yes   [] No   New onset atrial fibrillation / Flutter     [] Yes   [] No   ECG Abnormalities:      [] V. Fib   [] Mili V. Tach           [] NS V. T   [] New LBBB           [] T. Inv  []  ST dev > 0.5 mm         [] PVC's freq  [] PVC's infrequent  Stress Test:   Type:    [] Stress Echo   [] Exercise Stress Test (no imaging)      [] Stress Nuclear  [] Stress Imaging   Results  [] Negative   [] Positive        [] Indeterminate  [] Unavailable     If Positive/ Risk / Extent of Ischemia:       [] Low  [] Intermediate         [] High  [] Unavailable      Cardiac CTA Performed:   Results   [] CAD   [] Non obstructive CAD      [] No CAD   [] Uncertain      [] Unknown   [] Structural Disease. Pre Procedure Medications:      [x] ASA [x] Beta Blockers      [] Nitrate [] Ca Channel Blockers      [] Ranolazine [x] Statin       [] Plavix/Others antiplatelets        Electronically signed by Adelia Ricks MD on 6/5/2019 at 12:48 PM  Cardiology Fellow  Carlito Gaviria MD, Attending physician  [facility]

## 2019-06-05 NOTE — PROGRESS NOTES
Report called to [address] 2 RN. Patient transported to room 2002, Right groin assessed by CAR 2 RN and writer. Groin puffy but soft. Pedal pulse audible with doppler.

## 2019-06-05 NOTE — PROGRESS NOTES
Occupational Therapy    Occupational Therapy Not Seen Note    DATE: 2019  Name: Mendoza Ahuja  : 1955  MRN: [id number]    Patient not available for Occupational Therapy due to: Other: Pt at cath lab per RN.         Electronically signed by ARPITA Morin on 2019 at 11:59 AM

## 2019-06-05 NOTE — PROGRESS NOTES
Right femoral artline removed at 15:10. Manual pressure held for 15 minutes. Hematoma noted. An additional of pressure held by writer for 10 minutes. Em Mcgrath RN from cath lab held for an additioanl 15. Dr Jayde Paiz notified. Order received for 25mcg of fentanyl and VL scan. Groin soft. Bandaid applied.

## 2019-06-05 NOTE — PROGRESS NOTES
Physical Therapy  Facility/Department: [address] STEPDOWN  Daily Treatment Note  NAME: Srinivas Abreu  : 1955  MRN: [id number]    Date of Service: 2019    Discharge Recommendations:    No further therapy required at discharge. Assessment   Body structures, Functions, Activity limitations: Decreased functional mobility ; Decreased balance;Decreased endurance  Assessment: Pt displays no LOB ambulating with RW and LLE prosthetic. Focused on safety with functional mobility to facilitate safe return home. Would benefit from continued PT during hospital stay to improve overall strength and endurance. Prognosis: Good  Patient Education: HEP  REQUIRES PT FOLLOW UP: Yes  Activity Tolerance  Activity Tolerance: Patient Tolerated treatment well     Patient Diagnosis(es): The primary encounter diagnosis was Acute on chronic congestive heart failure, unspecified heart failure type ([facility] [address] [address] Diagnoses of Chronic kidney disease, unspecified CKD stage, Hyperkalemia, Anemia, unspecified type, and Encounter for dialysis [facility]) were also pertinent to this visit. has a past medical history of Acute on chronic congestive heart failure ([address] Anemia, CAD (coronary artery disease), Cardiomyopathy ([address] Dialysis care, ESRD (end stage renal disease) on dialysis ([address] Foot ulcer, left ([address] GERD (gastroesophageal reflux disease), Hemodialysis patient ([address] History of sleep apnea, Hyperlipidemia, Hyperparathyroidism ([address] Hypertension, Neuropathy, Pneumonia, S/P CABG (coronary artery bypass graft), and Type II or unspecified type diabetes mellitus without mention of complication, not stated as uncontrolled. has a past surgical history that includes Cataract removal; Dialysis fistula creation (Left, ); and Leg amputation, lower tibia/fibula.     Restrictions  Restrictions/Precautions  Restrictions/Precautions: Fall Risk  Required Braces or Orthoses?: No  Position Activity Restriction  Other position/activity restrictions: up with assist, LLE prosthetic  Subjective   General  Chart Reviewed: Yes  Response To Previous Treatment: Patient with no complaints from previous session. Family / Caregiver Present: No  Subjective  Subjective: Resting in bed with no c/o pain. Agreeable to PT. General Comment  Comments: Left in chair with call light in reach; pt instructed to call for assistance with mobility  Pain Screening  Patient Currently in Pain: Denies  Vital Signs  Patient Currently in Pain: Denies       Orientation  Orientation  Overall Orientation Status: Within Normal Limits  Cognition      Objective   Bed mobility  Supine to Sit: Stand by assistance(pt given cues to utilize hand rail, HOB partially elevated)  Scooting: Supervision  Transfers  Sit to Stand: Stand by assistance(pt completed 3x from low surface, required cues to push up from bed with fair follow thru)  Stand to sit: Contact guard assistance;Stand by assistance  Bed to Chair: Contact guard assistance  Ambulation  Ambulation?: Yes  Ambulation 1  Surface: level tile  Device: Rolling Walker  Assistance: Contact guard assistance  Quality of Gait: decreased pace, slightly flexed posture  Distance: 240ft  Comments: no LOB  Stairs/Curb  Stairs?: No     Balance  Posture: Fair  Sitting - Static: Good;+  Sitting - Dynamic: Good(pt able to lean forward and don LLE prosthetic with no LOB)  Standing - Static: Good;-(pt stood twice without UE support for 1 minute intervals prior to ambulation; able to adjust pants with SBA)  Standing - Dynamic: Good;-       Exercises  Seated LE exercise program: Long Arc Quads, hip abduction/adduction, heel/toe raises (RLE only), and marches. Reps: 15x each  Upper extremity exercises: Bicep curl, shoulder flexion/extension, punches, tricep curl, shoulder abduction/adduction.  Reps: 10x each with yellow tband, tolerated at a slow pace     Goals  Short term goals  Time Frame for Short term goals: 14 visits  Short term goal 1: Mod I for bed mobility and functional transfers  Short term goal 2: Mod I to ambulate 300ft with RW  Short term goal 3: Demo Good dynamic standing balance with AD  Short term goal 4: Participate in 30 minutes of therapy to demo increased endurance    Plan    Plan  Times per week: 5x/wk  Current Treatment Recommendations: Strengthening, Balance Training, Functional Mobility Training, Transfer Training, Endurance Training, Patient/Caregiver Education & Training, Safety Education & Training, Gait Training, ROM  Safety Devices  Safety Devices in place: Yes  Type of devices:  All fall risk precautions in place, Gait belt, Left in chair, Call light within reach, Nurse notified  Restraints  Initially in place: No     Therapy Time   Individual Concurrent Group Co-treatment   Time In 0922         Time Out 1001         Minutes Ronnell Tomlinson, PTA

## 2019-06-05 NOTE — PROGRESS NOTES
[address] [facility]  Progress Note    Date:   6/5/2019  Patient name:  Rob Brambila  Date of admission:  6/2/2019  7:10 PM  MRN:   [id number]  YOB: 1955    SUBJECTIVE/Last 24 hours update:     Day 2 Hospitalization:     Patient seen and examined at bedside this morning. Pt currently stable and in no acute distress. Cath procedure scheduled for today. Pt is currently NPO. Pt denies any chest pain, SOB, abd pain, nausea or vomiting. Will continue current treatment and plan and await cath procedure. REVIEW OF SYSTEMS:      Review of Systems   Constitutional: Negative for chills, fatigue, fever and unexpected weight change. HENT: Negative for congestion, mouth sores and sore throat. Eyes: Negative for visual disturbance. Respiratory: Negative for cough, chest tightness and shortness of breath. Cardiovascular: Negative for chest pain and leg swelling. Gastrointestinal: Negative for abdominal pain, constipation, diarrhea, nausea and vomiting. Genitourinary: Negative for difficulty urinating. Musculoskeletal: Negative for joint swelling. Skin: Negative for rash. Neurological: Negative for dizziness, weakness and headaches. PAST MEDICAL HISTORY:      has a past medical history of Acute on chronic congestive heart failure ([address] Anemia, CAD (coronary artery disease), Cardiomyopathy ([address] Dialysis care, ESRD (end stage renal disease) on dialysis ([address] Foot ulcer, left ([address] GERD (gastroesophageal reflux disease), Hemodialysis patient ([address] History of sleep apnea, Hyperlipidemia, Hyperparathyroidism ([address] Hypertension, Neuropathy, Pneumonia, S/P CABG (coronary artery bypass graft), and Type II or unspecified type diabetes mellitus without mention of complication, not stated as uncontrolled.     PAST SURGICAL HISTORY:      has a past surgical history that includes Cataract removal; Dialysis fistula creation (Left, 2014); and Leg amputation, lower tibia/fibula. SOCIALHISTORY:      reports that he has never smoked. He has never used smokeless tobacco. He reports that he does not drink alcohol or use drugs. FAMILY HISTORY:      family history includes Diabetes in his brother and father; Heart Disease in his father. HOME MEDICATIONS:      Prior to Admission medications    Medication Sig Start Date End Date Taking? Authorizing Provider   bumetanide (BUMEX) 1 MG tablet Take 2 mg by mouth daily    Historical Provider, MD   cinacalcet (SENSIPAR) 90 MG tablet Take 90 mg by mouth daily    Historical Provider, MD   ONE TOUCH ULTRA TEST strip USE AS DIRECTED DAILY AS NEEDED 1/24/17   German Thorne, DO   Blood Glucose Monitoring Suppl (ONE TOUCH ULTRA 2) W/DEVICE KIT TEST 3 TIMES DAILY 8/26/16   German Thorne, DO   glucose monitoring kit (FREESTYLE) monitoring kit 1 kit by Does not apply route daily as needed 4/21/16   German Thorne, DO   Lancets 30G MISC 1 each by Does not apply route 3 times daily 4/21/16   German Thorne, DO   Misc.  Devices (WALKER GLIDE WHEELS) MISC 1 Device by Does not apply route continuous 4/21/16   German Thorne, DO   atorvastatin (LIPITOR) 40 MG tablet Take 1 tablet by mouth nightly 3/14/16   Monik Fisher DO   midodrine (PROAMATINE) 10 MG tablet Take 1 tablet by mouth 3 times daily (with meals) 3/14/16   Monik iFsher DO   aspirin 81 MG EC tablet Take 1 tablet by mouth daily 3/14/16   Monik Fisher DO   Multiple Vitamins-Minerals (THERAPEUTIC MULTIVITAMIN-MINERALS) tablet Take 1 tablet by mouth daily 3/14/16   Monik Fisher DO   sevelamer (RENVELA) 800 MG tablet Take 5 tablets by mouth 3 times daily     Historical Provider, MD   B Complex-C-Folic Acid (TRIPHROCAPS PO) Take 15 mg by mouth daily    Historical Provider, MD   calcium acetate (PHOSLO) 667 MG capsule Take 1,334 mg by mouth 3 times daily (with meals)     Historical Provider, MD   vitamin D (CHOLECALCIFEROL) yesterday  - Dialysis today  - insulin and dextrose  - BMP daily  - Telemetry monitoring     3. ESRD pm HD  - Nephrology - [address] [address] for coronary angiogram   - Cr at 6.49  - Dialysis tomorrow  - Dialysis schedule tues/thurs/sat  - Continue Home Bumex on the floor  - Repeat CXR - partially improved aeration of the lungs  - Strict I&O  - 1500 ml fluid restriction      4. Chronic systolic CHF  - Last ECHO: LVEF 35%, moderately reduced systolic function, Grade I diastolic dysfunction  - Repeat ECHO - LVEF at 30-35%  - F/U   - Lipid profile at 142  - Pro-BNP at 109,206   - Consult to heart failure nurse  - Cardiac Diet     5. DM2  -   - Inpatient consult to dietician  - HbA1c at 6.5    6. Anemia of chronic dz  - Hgb at 9.6  - Anemia work up      Plan will be discussed with the attending, Dr. Constanza Garibay MD  Family Medicine Resident  6/5/2019 8:02 AM   Attending Physician Statement  I have discussed the care of [facility], including pertinent history and exam findings,  with the resident. I have seen and examined the patient and the key elements of all parts of the encounter have been performed by me. I agree with the assessment, plan and orders as documented by the resident. (Wade Zafar) - Erlinda Gil M.D  Vitals:    06/06/19 1015   BP: 101/63   Pulse: 85   Resp:    Temp:    SpO2:      1. Acute on chronic congestive heart failure, unspecified heart failure type ([address]    2. Chronic kidney disease, unspecified CKD stage    3. Hyperkalemia    4. Anemia, unspecified type    5.  Encounter for dialysis [facility])

## 2019-06-06 ENCOUNTER — APPOINTMENT (OUTPATIENT)
Dept: DIALYSIS | Age: 64
DRG: 175 | End: 2019-06-06
Payer: MEDICARE

## 2019-06-06 VITALS
HEIGHT: 72 IN | DIASTOLIC BLOOD PRESSURE: 64 MMHG | RESPIRATION RATE: 18 BRPM | BODY MASS INDEX: 31.14 KG/M2 | WEIGHT: 229.94 LBS | OXYGEN SATURATION: 100 % | TEMPERATURE: 98.6 F | HEART RATE: 85 BPM | SYSTOLIC BLOOD PRESSURE: 109 MMHG

## 2019-06-06 LAB
ANION GAP SERPL CALCULATED.3IONS-SCNC: 16 MMOL/L (ref 9–17)
BUN BLDV-MCNC: 57 MG/DL (ref 8–23)
BUN/CREAT BLD: ABNORMAL (ref 9–20)
CALCIUM SERPL-MCNC: 8.1 MG/DL (ref 8.6–10.4)
CHLORIDE BLD-SCNC: 97 MMOL/L (ref 98–107)
CO2: 25 MMOL/L (ref 20–31)
CREAT SERPL-MCNC: 8.19 MG/DL (ref 0.7–1.2)
GFR AFRICAN AMERICAN: 8 ML/MIN
GFR NON-AFRICAN AMERICAN: 7 ML/MIN
GFR SERPL CREATININE-BSD FRML MDRD: ABNORMAL ML/MIN/{1.73_M2}
GFR SERPL CREATININE-BSD FRML MDRD: ABNORMAL ML/MIN/{1.73_M2}
GLUCOSE BLD-MCNC: 122 MG/DL (ref 75–110)
GLUCOSE BLD-MCNC: 148 MG/DL (ref 75–110)
GLUCOSE BLD-MCNC: 160 MG/DL (ref 75–110)
GLUCOSE BLD-MCNC: 164 MG/DL (ref 75–110)
GLUCOSE BLD-MCNC: 89 MG/DL (ref 70–99)
HCT VFR BLD CALC: 29.4 % (ref 40.7–50.3)
HEMOGLOBIN: 8.5 G/DL (ref 13–17)
MCH RBC QN AUTO: 28.4 PG (ref 25.2–33.5)
MCHC RBC AUTO-ENTMCNC: 28.9 G/DL (ref 28.4–34.8)
MCV RBC AUTO: 98.3 FL (ref 82.6–102.9)
NRBC AUTOMATED: 0 PER 100 WBC
PDW BLD-RTO: 15 % (ref 11.8–14.4)
PLATELET # BLD: 95 K/UL (ref 138–453)
PMV BLD AUTO: 12.7 FL (ref 8.1–13.5)
POTASSIUM SERPL-SCNC: 5.2 MMOL/L (ref 3.7–5.3)
RBC # BLD: 2.99 M/UL (ref 4.21–5.77)
SODIUM BLD-SCNC: 138 MMOL/L (ref 135–144)
WBC # BLD: 7.3 K/UL (ref 3.5–11.3)

## 2019-06-06 PROCEDURE — 80048 BASIC METABOLIC PNL TOTAL CA: CPT

## 2019-06-06 PROCEDURE — 7100000011 HC PHASE II RECOVERY - ADDTL 15 MIN

## 2019-06-06 PROCEDURE — 94760 N-INVAS EAR/PLS OXIMETRY 1: CPT

## 2019-06-06 PROCEDURE — 6360000002 HC RX W HCPCS: Performed by: INTERNAL MEDICINE

## 2019-06-06 PROCEDURE — 6370000000 HC RX 637 (ALT 250 FOR IP): Performed by: STUDENT IN AN ORGANIZED HEALTH CARE EDUCATION/TRAINING PROGRAM

## 2019-06-06 PROCEDURE — 6360000002 HC RX W HCPCS: Performed by: STUDENT IN AN ORGANIZED HEALTH CARE EDUCATION/TRAINING PROGRAM

## 2019-06-06 PROCEDURE — 2580000003 HC RX 258: Performed by: STUDENT IN AN ORGANIZED HEALTH CARE EDUCATION/TRAINING PROGRAM

## 2019-06-06 PROCEDURE — 7100000010 HC PHASE II RECOVERY - FIRST 15 MIN

## 2019-06-06 PROCEDURE — 85027 COMPLETE CBC AUTOMATED: CPT

## 2019-06-06 PROCEDURE — 6370000000 HC RX 637 (ALT 250 FOR IP): Performed by: INTERNAL MEDICINE

## 2019-06-06 PROCEDURE — 36415 COLL VENOUS BLD VENIPUNCTURE: CPT

## 2019-06-06 PROCEDURE — 2700000000 HC OXYGEN THERAPY PER DAY

## 2019-06-06 PROCEDURE — 90935 HEMODIALYSIS ONE EVALUATION: CPT

## 2019-06-06 PROCEDURE — 2580000003 HC RX 258: Performed by: INTERNAL MEDICINE

## 2019-06-06 PROCEDURE — 82947 ASSAY GLUCOSE BLOOD QUANT: CPT

## 2019-06-06 PROCEDURE — P9047 ALBUMIN (HUMAN), 25%, 50ML: HCPCS | Performed by: INTERNAL MEDICINE

## 2019-06-06 PROCEDURE — 99239 HOSP IP/OBS DSCHRG MGMT >30: CPT | Performed by: FAMILY MEDICINE

## 2019-06-06 PROCEDURE — 6370000000 HC RX 637 (ALT 250 FOR IP): Performed by: NURSE PRACTITIONER

## 2019-06-06 RX ORDER — ALBUMIN (HUMAN) 12.5 G/50ML
25 SOLUTION INTRAVENOUS ONCE
Status: COMPLETED | OUTPATIENT
Start: 2019-06-06 | End: 2019-06-06

## 2019-06-06 RX ORDER — CARVEDILOL 3.12 MG/1
3.12 TABLET ORAL 2 TIMES DAILY WITH MEALS
Qty: 60 TABLET | Refills: 3 | Status: ON HOLD | OUTPATIENT
Start: 2019-06-06 | End: 2021-01-01 | Stop reason: HOSPADM

## 2019-06-06 RX ORDER — HEPARIN SODIUM 1000 [USP'U]/ML
4000 INJECTION INTRAVENOUS; SUBCUTANEOUS ONCE
Status: COMPLETED | OUTPATIENT
Start: 2019-06-06 | End: 2019-06-06

## 2019-06-06 RX ORDER — CARVEDILOL 3.12 MG/1
3.12 TABLET ORAL 2 TIMES DAILY WITH MEALS
Status: DISCONTINUED | OUTPATIENT
Start: 2019-06-06 | End: 2019-06-06 | Stop reason: HOSPADM

## 2019-06-06 RX ORDER — 0.9 % SODIUM CHLORIDE 0.9 %
150 INTRAVENOUS SOLUTION INTRAVENOUS PRN
Status: DISCONTINUED | OUTPATIENT
Start: 2019-06-06 | End: 2019-06-06 | Stop reason: HOSPADM

## 2019-06-06 RX ORDER — 0.9 % SODIUM CHLORIDE 0.9 %
250 INTRAVENOUS SOLUTION INTRAVENOUS PRN
Status: DISCONTINUED | OUTPATIENT
Start: 2019-06-06 | End: 2019-06-06 | Stop reason: HOSPADM

## 2019-06-06 RX ADMIN — DOXERCALCIFEROL 1 MCG: 2 INJECTION, SOLUTION INTRAVENOUS at 10:07

## 2019-06-06 RX ADMIN — HEPARIN SODIUM 5000 UNITS: 5000 INJECTION INTRAVENOUS; SUBCUTANEOUS at 05:49

## 2019-06-06 RX ADMIN — CINACALCET HYDROCHLORIDE 90 MG: 30 TABLET, COATED ORAL at 14:45

## 2019-06-06 RX ADMIN — BUMETANIDE 2 MG: 1 TABLET ORAL at 14:47

## 2019-06-06 RX ADMIN — Medication 10 ML: at 14:49

## 2019-06-06 RX ADMIN — ALBUMIN (HUMAN) 25 G: 0.25 INJECTION, SOLUTION INTRAVENOUS at 11:51

## 2019-06-06 RX ADMIN — TICAGRELOR 90 MG: 90 TABLET ORAL at 14:47

## 2019-06-06 RX ADMIN — VITAMIN D, TAB 1000IU (100/BT) 1000 UNITS: 25 TAB at 14:47

## 2019-06-06 RX ADMIN — CARVEDILOL 3.12 MG: 3.12 TABLET, FILM COATED ORAL at 18:15

## 2019-06-06 RX ADMIN — CALCIUM ACETATE 1334 MG: 667 CAPSULE ORAL at 07:57

## 2019-06-06 RX ADMIN — CALCIUM ACETATE 1334 MG: 667 CAPSULE ORAL at 18:12

## 2019-06-06 RX ADMIN — ASPIRIN 81 MG: 81 TABLET ORAL at 14:48

## 2019-06-06 RX ADMIN — SEVELAMER CARBONATE 4000 MG: 800 TABLET, FILM COATED ORAL at 14:44

## 2019-06-06 RX ADMIN — MIDODRINE HYDROCHLORIDE 10 MG: 5 TABLET ORAL at 14:58

## 2019-06-06 RX ADMIN — Medication 10 ML: at 14:55

## 2019-06-06 RX ADMIN — CALCIUM ACETATE 1334 MG: 667 CAPSULE ORAL at 14:44

## 2019-06-06 RX ADMIN — HEPARIN SODIUM 5000 UNITS: 5000 INJECTION INTRAVENOUS; SUBCUTANEOUS at 14:49

## 2019-06-06 RX ADMIN — MULTIPLE VITAMINS W/ MINERALS TAB 1 TABLET: TAB at 14:45

## 2019-06-06 RX ADMIN — HEPARIN SODIUM 4000 UNITS: 1000 INJECTION, SOLUTION INTRAVENOUS; SUBCUTANEOUS at 10:07

## 2019-06-06 ASSESSMENT — ENCOUNTER SYMPTOMS
SHORTNESS OF BREATH: 0
NAUSEA: 0
COUGH: 0
VOMITING: 0
CHEST TIGHTNESS: 0
ABDOMINAL PAIN: 0
CONSTIPATION: 0
SORE THROAT: 0
DIARRHEA: 0

## 2019-06-06 ASSESSMENT — PAIN SCALES - GENERAL
PAINLEVEL_OUTOF10: 0

## 2019-06-06 NOTE — PROGRESS NOTES
Occupational Therapy Not Seen Note    DATE: 2019  Name: Mendoza Ahuja  : 1955  MRN: [id number]    Patient not available for Occupational Therapy due to:    Hemodialysis    Next Scheduled Treatment: Re-check 2019    Electronically signed by WANG Leggett on 2019 at 10:10 AM

## 2019-06-06 NOTE — PLAN OF CARE
Pt resting quietly in bed. Pt assisted by CNA earlier in shift to bathe. Right groin site cath site with Bandaid C/D/I. No hematoma observed at site. Bruising observed at site. Denies pain or discomfort. Pt has left BKA. Prothesis at bedside. Bed alarm on. Call light witin reach. VSS. SR on monitor. No distress noted.

## 2019-06-06 NOTE — PROGRESS NOTES
Renal Progress Note    Patient :  Sha Hurd; 62 y.o. MRN# [id number]  Location:  2002/2002-01  Attending:  Lesa Carvajal MD  Admit Date:  6/2/2019   Hospital Day: 4      Subjective:     Patient was seen and examined on HD. No new issues overnight. Tolerating the procedure well. Patient is status post cardiac cath 6/5/2019 conclusion severe multivessel disease and DENY PCI to LCx. Patient reports that he feels better today, no chest pain, no shortness of breath, no nausea vomiting diarrhea reported. Outpatient Medications:     Medications Prior to Admission: bumetanide (BUMEX) 1 MG tablet, Take 2 mg by mouth daily  cinacalcet (SENSIPAR) 90 MG tablet, Take 90 mg by mouth daily  [DISCONTINUED] ONE TOUCH ULTRA TEST strip, USE AS DIRECTED DAILY AS NEEDED  ONE TOUCH ULTRA TEST strip, USE AS DIRECTED DAILY AS NEEDED  Blood Glucose Monitoring Suppl (ONE TOUCH ULTRA 2) W/DEVICE KIT, TEST 3 TIMES DAILY  glucose monitoring kit (FREESTYLE) monitoring kit, 1 kit by Does not apply route daily as needed  Lancets 30G MISC, 1 each by Does not apply route 3 times daily  Misc. Devices ([address] [place] [address]) MISC, 1 Device by Does not apply route continuous  atorvastatin (LIPITOR) 40 MG tablet, Take 1 tablet by mouth nightly  midodrine (PROAMATINE) 10 MG tablet, Take 1 tablet by mouth 3 times daily (with meals)  aspirin 81 MG EC tablet, Take 1 tablet by mouth daily  Multiple Vitamins-Minerals (THERAPEUTIC MULTIVITAMIN-MINERALS) tablet, Take 1 tablet by mouth daily  sevelamer (RENVELA) 800 MG tablet, Take 5 tablets by mouth 3 times daily   B Complex-C-Folic Acid (TRIPHROCAPS PO), Take 15 mg by mouth daily  calcium acetate (PHOSLO) 667 MG capsule, Take 1,334 mg by mouth 3 times daily (with meals)   vitamin D (CHOLECALCIFEROL) 1000 UNIT TABS tablet, Take 1,000 Units by mouth daily.     Current Medications:     Scheduled Meds:    carvedilol  3.125 mg Oral BID WC    atorvastatin  80 mg Oral Nightly    ticagrelor  90 mg Oral BID    sodium chloride flush  10 mL Intravenous 2 times per day    aspirin  81 mg Oral Daily    bumetanide  2 mg Oral Daily    calcium acetate  1,334 mg Oral TID WC    cinacalcet  90 mg Oral Daily    midodrine  10 mg Oral TID     therapeutic multivitamin-minerals  1 tablet Oral Daily    sevelamer  4,000 mg Oral TID    vitamin D  1,000 Units Oral Daily    sodium chloride flush  10 mL Intravenous 2 times per day    heparin (porcine)  5,000 Units Subcutaneous 3 times per day     Continuous Infusions:    dextrose       PRN Meds:  sodium chloride, sodium chloride, sodium chloride flush, acetaminophen, magnesium hydroxide, ondansetron, doxercalciferol, nitroGLYCERIN, acetaminophen, sodium chloride flush, magnesium hydroxide, ondansetron, sodium chloride, sodium chloride, glucose, dextrose, glucagon (rDNA), dextrose    Input/Output:       I/O last 3 completed shifts: In: 720 [P.O.:720]  Out: - .      Patient Vitals for the past 96 hrs (Last 3 readings):   Weight   19 0835 236 lb 5.3 oz (107.2 kg)   19 0538 228 lb 14.4 oz (103.8 kg)   19 1310 228 lb 6.3 oz (103.6 kg)       Vital Signs:   Temperature:  Temp: 98.4 °F (36.9 °C)  TMax:   Temp (24hrs), Av.3 °F (36.8 °C), Min:97.9 °F (36.6 °C), Max:98.8 °F (37.1 °C)    Respirations:  Resp: 20  Pulse:   Pulse: 85  BP:    BP: (!) 91/45  BP Range: Systolic (54RTR), IFT:356 , Min:91 , EXO:001       Diastolic (54ADH), KGM:84, Min:45, Max:97      Physical Examination:     General:  AAO x 3, speaking in full sentences, no accessory muscle use. HEENT: Atraumatic, normocephalic, no throat congestion, moist mucosa. Eyes:   Pupils equal, round and reactive to light, EOMI. Neck:   Supple  Chest:   Bilateral vesicular breath sounds, no rales or wheezes. Cardiac:  S1 S2 RR, no murmurs, gallops or rubs. Abdomen: Soft, non-tender, no masses or organomegaly, BS audible. :   No suprapubic or flank tenderness. Neuro:  AAO x 3, No FND.   SKIN:  No rashes, good skin turgor. Extremities:  No edema, left LE amputation     Labs:       Recent Labs     06/04/19  0420 06/05/19  0503 06/06/19  0446   WBC 7.7 6.6 7.3   RBC 3.32* 3.30* 2.99*   HGB 9.8* 9.6* 8.5*   HCT 33.0* 32.5* 29.4*   MCV 99.4 98.5 98.3   MCH 29.5 29.1 28.4   MCHC 29.7 29.5 28.9   RDW 15.3* 14.9* 15.0*   * 101* 95*   MPV 13.0 12.4 12.7      BMP:   Recent Labs     06/04/19  0420 06/04/19  1734 06/05/19  0503 06/06/19  0446   *  --  139 138   K 5.5* 4.1 4.8 5.2   CL 97*  --  97* 97*   CO2 29  --  28 25   BUN 72*  --  45* 57*   CREATININE 8.48*  --  6.49* 8.19*   GLUCOSE 94  --  118* 89   CALCIUM 8.5*  --  8.2* 8.1*      Hep BsAg:         Lab Results   Component Value Date    HEPBSAG NONREACTIVE 01/09/2012     Urinalysis/Chemistries:      Lab Results   Component Value Date    NITRU NEGATIVE 06/22/2013    COLORU YELLOW 06/22/2013    PHUR 7.0 06/22/2013    WBCUA 2 TO 5 06/22/2013    RBCUA 0 TO 2 06/22/2013    MUCUS NOT REPORTED 06/22/2013    TRICHOMONAS NOT REPORTED 06/22/2013    YEAST NOT REPORTED 06/22/2013    BACTERIA NOT REPORTED 06/22/2013    SPECGRAV 1.014 06/22/2013    LEUKOCYTESUR NEGATIVE 06/22/2013    UROBILINOGEN Normal 06/22/2013    BILIRUBINUR NEGATIVE 06/22/2013    BILIRUBINUR NEGATIVE  Verified by ictotest. 10/20/2011    GLUCOSEU 1+ 06/22/2013    GLUCOSEU TRACE 10/20/2011    [address] NEGATIVE 06/22/2013    AMORPHOUS NOT REPORTED 06/22/2013       Radiology:     Reviewed. Assessment:     1. ESRD: On  Hemodialysis. patient's regular HD days are [facility] at [facility] unit under Dr. Naima Ritchie using Left AVF. Admitted with shortness of breath, hyperkalemia secondary to missed hemodialysis. 2. Hyperkalemia.  Resolved.  At 7.2 on presentation. S/p emergent HD  3. NSTEMI - status post cardiac cath 6/5/2019 conclusion severe multivessel disease and DENY PCI to LCx. 4. Congestive heart failure.  LVEF 35%, moderately reduced systolic function, grade 1 diastolic dysfunction.  ProBNP at 109, 206  5. HTN  6. DM: Last HbA1c 6.5 6/3/19  7. Secondary hyperparathyroidism  8. Anemia of chronic disease : Likely due to [facility]    Plan:   1. Patient was seen on HD at bedside. Orders were confirmed with the HD nurse. 2.  Post cath management per cardiology. 3.  Okay to discharge from nephrology standpoint after dialysis today. Nutrition   Please ensure that patient is on a renal diet/TF. Avoid nephrotoxic drugs/contrast exposure. We will continue to follow along with you. Kyle Farr MD  [facility]     This note is created with the assistance of a speech-recognition program. While intending to generate a document that actually reflects the content of the visit, no guarantees can be provided that every mistake has been identified and corrected by editing.

## 2019-06-06 NOTE — PROGRESS NOTES
without mention of complication, not stated as uncontrolled. PAST SURGICAL HISTORY:      has a past surgical history that includes Cataract removal; Dialysis fistula creation (Left, 2014); and Leg amputation, lower tibia/fibula. SOCIALHISTORY:      reports that he has never smoked. He has never used smokeless tobacco. He reports that he does not drink alcohol or use drugs. FAMILY HISTORY:      family history includes Diabetes in his brother and father; Heart Disease in his father. HOME MEDICATIONS:      Prior to Admission medications    Medication Sig Start Date End Date Taking? Authorizing Provider   bumetanide (BUMEX) 1 MG tablet Take 2 mg by mouth daily    Historical Provider, MD   cinacalcet (SENSIPAR) 90 MG tablet Take 90 mg by mouth daily    Historical Provider, MD   ONE TOUCH ULTRA TEST strip USE AS DIRECTED DAILY AS NEEDED 1/24/17   Lorena Rowell, DO   Blood Glucose Monitoring Suppl (ONE TOUCH ULTRA 2) W/DEVICE KIT TEST 3 TIMES DAILY 8/26/16   Lorena Rowell DO   glucose monitoring kit (FREESTYLE) monitoring kit 1 kit by Does not apply route daily as needed 4/21/16   Lorena Rowell DO   Lancets 30G MISC 1 each by Does not apply route 3 times daily 4/21/16   Lorena Rowell, DO   Misc.  Devices (WALKER GLIDE WHEELS) MISC 1 Device by Does not apply route continuous 4/21/16   Lorena Rowell, DO   atorvastatin (LIPITOR) 40 MG tablet Take 1 tablet by mouth nightly 3/14/16   Leanor Sat, DO   midodrine (PROAMATINE) 10 MG tablet Take 1 tablet by mouth 3 times daily (with meals) 3/14/16   Leanor Sat, DO   aspirin 81 MG EC tablet Take 1 tablet by mouth daily 3/14/16   Leanor Sat, DO   Multiple Vitamins-Minerals (THERAPEUTIC MULTIVITAMIN-MINERALS) tablet Take 1 tablet by mouth daily 3/14/16   Leanor Sat, DO   sevelamer (RENVELA) 800 MG tablet Take 5 tablets by mouth 3 times daily     Historical Provider, MD   B Complex-C-Folic Acid (TRIPHROCAPS PO) Take 15 mg by Elevated Troponin 2/2 to ESRD and CAD s/p stent placement  - Cath procedure complete - Stent in left cx  - Cardiology - Start ACEi if ok with nephro; start BB prior to D/C  - Will appreciate further recommendations    2. ESRD pm HD  - Nephrology - Francia Castillo for coronary angiogram    - Cr at 8.19  - Dialysis today  - Dialysis schedule tues/thurs/sat  - Continue Home Bumex on the floor  - Repeat CXR - partially improved aeration of the lungs  - Strict I&O  - 1500 ml fluid restriction     3. Hyperkalemia - Resolved  - K at 5.2 from 5.5 yesterday  - insulin and dextrose   - BMP daily  - Telemetry monitoring     4. Chronic systolic CHF  - Last ECHO: LVEF 35%, moderately reduced systolic function, Grade I diastolic dysfunction  - Repeat ECHO - LVEF at 30-35%  - F/U   - Lipid profile at 142  - Pro-BNP at 109,206   - Consult to heart failure nurse  - Cardiac Diet     5. DM2  -   - Inpatient consult to dietician  - HbA1c at 6.5    6. Anemia of chronic dz 2/2 ESRD  - Hgb at 8.5  - iron at 62, ferritin 1806, sat 28 and capacity 223  - Haptoglobin 140      Plan will be discussed with the attending, Dr. Dione Stephen MD  Family Medicine Resident  6/6/2019 8:15 AM   Attending Physician Statement  I have discussed the care of [facility], including pertinent history and exam findings,  with the resident. I have seen and examined the patient and the key elements of all parts of the encounter have been performed by me. I agree with the assessment, plan and orders as documented by the resident. (Nathaniel Meter) - Phoebe Hsu M.D  Vitals:    06/06/19 1015   BP: 101/63   Pulse: 85   Resp:    Temp:    SpO2:      1. Acute on chronic congestive heart failure, unspecified heart failure type ([address]    2. Chronic kidney disease, unspecified CKD stage    3. Hyperkalemia    4. Anemia, unspecified type    5.  Encounter for dialysis [facility])

## 2019-06-06 NOTE — PLAN OF CARE
MSG Jeffry TOURE TO MAKE SURE IT WAS OK TO GIVE PT HEPARIN 5000 UNITS SUBQ AS ORDERED SINCE PT IS POST HEART CATH WITH STENT PLACEMENT. DR. Lisandra Lopes STATED IT'S OK TO ADMINISTER HEPARIN SUBQ AS ORDERED AS IT IS FOR DVT PROPHYLAXIS.

## 2019-06-06 NOTE — PROGRESS NOTES
hours.    EK19  normal sinus rhythm, nonspecific ST and T waves changes, left axis deviation. Peaked T waves present in leads V2-V5     ECHO: 19  Summary  Technically difficult study due to patient habitus. Left ventricle is normal in size. Mild left ventricular hypertrophy. Severely reduced left ventricular systolic function. Estimated LV EF 30-35 %. Grade II (moderate) left ventricular diastolic dysfunction. Left atrium is mildly dilated. Trivial valve disease as below. CARDIAC CATH 19  Findings:  Left main: Mild disease  LAD: Ostial 90% stenosis, Mid 100% stenosis  LIMA - LAD is patent, with diffuse LAD disease distal to touchdown  LCx: Mid 80% long segment stenosis reduced to 0% with Xience 3 x 38 DENY  RCA: Ostial 90% stenosis, Distal 100% stenosis  The LV gram was not performed.      Conclusions:  1. Severe multivessel disease  2. PCI to LCx as above     Recommendation:  1. Medical treatments  2. Risk factor modification  3. DAPT for a minimum of 1 year      Objective:   Vitals: BP 98/63   Pulse 85   Temp 98.4 °F (36.9 °C)   Resp 20   Ht 6' (1.829 m)   Wt 236 lb 5.3 oz (107.2 kg)   SpO2 100%   BMI 32.05 kg/m²   General appearance: alert and cooperative with exam  HEENT: Head: Normocephalic, no lesions, without obvious abnormality. Neck: no JVD, trachea midline, no adenopathy  Lungs: Clear to auscultation, diminished throughout  Heart: Regular rate and rhythm, s1/s2 auscultated, no murmurs  Abdomen: soft, non-tender, bowel sounds active  Extremities: no edema. Right groin post cath site tender but soft. No hematoma or drainage. Neurologic: not done        Assessment / Acute Cardiac Problems:   1. NSTEMI  2. ESRD  3. NSVT   4. New onset LV dysfunction - LVEF 30-35% on echo  5.  Prior ischemic CMP 2016 with improvement of LVEF on echo 2017 to normal    Patient Active Problem List:     SDH (subdural hematoma) (HCC)     ESRD (end stage renal disease) on dialysis ([facility]     DM (diabetes mellitus) ([facility]     Anemia of chronic disease     Charcot foot due to diabetes mellitus ([facility]     Diabetic ulcer of left foot ([facility] [address] [facility]     Benign essential HTN     Type 2 diabetes mellitus with foot ulcer ([facility]     Dialysis patient, noncompliant ([facility] [address]     Acute on chronic congestive heart failure [facility])    Coronary Discharge Core Measure: Please indicate the medication given by X, and if not the reasons not given:    Not Given Reason  Given      Beta Blockers Starting coreg 3.125mg      ACE-I No d/t hyperkalemia      Statins x      ASA x      OAP (Plavix/Effient/Brilinta) Brilinta - copay card, RX, & samples provided    SL Nitro x         Plan of Treatment:   1. Cardiac cath as above. Stable and doing well presently on HD. 2. Discussed in detail with patient post cath POC including but not limited to medications, diet, exercise, right radial artery site care, and follow-up. Questions and concerns addressed. OK for discharge home today. 3. Discussed with patient recommendations for lifevest given ischemic CMP with LVEF 30-35% on echo. Pt. Agreeable. Discussed in detail, questions/concerns addressed. Will fit for lifevest and plan for f/u with patient's cardiologist [facility]) in 2-3 weeks.     Electronically signed by ABEBA Wilson CNP on 6/6/2019 at 11:43 AM  [address]  [phone]

## 2019-06-06 NOTE — PROGRESS NOTES
Dialysis Post Treatment Note  Patient tolerated treatment well. Denies complaints at time of discharge.    Vitals:    06/06/19 1240   BP: (!) 110/54   Pulse: 85   Resp: 18   Temp: 98.6 °F (37 °C)   SpO2:      Pre-Weight = 107.2 kg  Post-weight = Weight: 229 lb 15 oz (104.3 kg)  Total Liters Processed = Total Liters Processed (l/min): 91.8 l/min  Rinseback Volume (mL) = Rinseback Volume (ml): 370 ml  Net Removal (mL) = @FLOW([id number]  Length of treatment= 210

## 2019-06-06 NOTE — PLAN OF CARE
Dr. Flora Eugene made aware of pt's Troponin high sensitivity level of 295. Pt asymptomatic. Denies chest pain or discomfort. VSS. No distress noted.

## 2019-06-06 NOTE — PROGRESS NOTES
Physical Therapy  DATE: 2019    NAME: Jb Cerda  MRN: [id number]   : 1955    Patient not seen this date for Physical Therapy due to:  [] Blood transfusion in progress  [x] Hemodialysis  -  Will check on pt in the PM if time allows. []  Patient Declined  [] Spine Precautions   [] Strict Bedrest  [] Surgery/ Procedure  [] Testing      [] Other        [] PT being discontinued at this time. Patient independent. No further needs. [] PT being discontinued at this time as the patient has been transferred to palliative care. No further needs.     Tuyet Zuñiga, PTA

## 2019-06-07 ENCOUNTER — TELEPHONE (OUTPATIENT)
Dept: FAMILY MEDICINE CLINIC | Age: 64
End: 2019-06-07

## 2019-06-07 NOTE — TELEPHONE ENCOUNTER
[facility] Transitions Initial Follow Up Call    Call within 2 business days of discharge: Yes     Patient: Srinivas Abreu Patient : 1955 MRN: [id number]    [unfilled]    RARS: Readmission Risk Score: 24       Spoke with:  Pt states he is doing better. Going to  his pills today instructed to bring to his medications to appointment. Pt has a TC appointment on 6-10-19 @ 8:30    Discharge department/facility:  [facility]    Non-face-to-face services provided:        Follow Up  Future Appointments   Date Time Provider Charlie Medina   6/10/2019  8:30 AM MD Samara Tovar 157

## 2019-06-10 ENCOUNTER — OFFICE VISIT (OUTPATIENT)
Dept: FAMILY MEDICINE CLINIC | Age: 64
End: 2019-06-10
Payer: MEDICARE

## 2019-06-10 VITALS
HEART RATE: 88 BPM | HEIGHT: 72 IN | WEIGHT: 241.6 LBS | TEMPERATURE: 98 F | BODY MASS INDEX: 32.72 KG/M2 | DIASTOLIC BLOOD PRESSURE: 80 MMHG | SYSTOLIC BLOOD PRESSURE: 140 MMHG

## 2019-06-10 DIAGNOSIS — I25.119 CORONARY ARTERY DISEASE INVOLVING NATIVE HEART WITH ANGINA PECTORIS, UNSPECIFIED VESSEL OR LESION TYPE (HCC): Primary | ICD-10-CM

## 2019-06-10 PROCEDURE — 99214 OFFICE O/P EST MOD 30 MIN: CPT | Performed by: FAMILY MEDICINE

## 2019-06-10 PROCEDURE — 99211 OFF/OP EST MAY X REQ PHY/QHP: CPT | Performed by: FAMILY MEDICINE

## 2019-06-10 ASSESSMENT — ENCOUNTER SYMPTOMS
SHORTNESS OF BREATH: 0
COUGH: 0
CHEST TIGHTNESS: 0

## 2019-06-10 NOTE — PROGRESS NOTES
Visit Information    Have you changed or started any medications since your last visit including any over-the-counter medicines, vitamins, or herbal medicines? no   Have you stopped taking any of your medications? Is so, why? -  no  Are you having any side effects from any of your medications? - no    Have you seen any other physician or provider since your last visit? yes - [facility]    Have you had any other diagnostic tests since your last visit? yes - labs Vascular duplex chest xray   Have you been seen in the emergency room and/or had an admission in a hospital since we last saw you?  yes - [facility]    Have you had your routine dental cleaning in the past 6 months?  no     Do you have an active MyChart account? If no, what is the barrier?   No:     Patient Care Team:  Jessica Domingo MD as PCP - General (Family Medicine)    Medical History Review  Past Medical, Family, and Social History reviewed and does contribute to the patient presenting condition    Health Maintenance   Topic Date Due    Hepatitis C screen  1955    Diabetic foot exam  05/29/1966    Diabetic retinal exam  05/29/1966    HIV screen  05/29/1971    Hepatitis B Vaccine (1 of 3 - Risk Recombivax 3-dose series) 05/29/1975    Shingles Vaccine (1 of 2) 05/29/2006    Colon cancer screen colonoscopy  05/29/2006    Pneumococcal 0-64 years Vaccine (2 of 3 - PPSV23) 12/26/2018    A1C test (Diabetic or Prediabetic)  06/03/2020    Lipid screen  06/03/2020    Potassium monitoring  06/06/2020    Creatinine monitoring  06/06/2020    DTaP/Tdap/Td vaccine (2 - Td) 06/22/2023    Flu vaccine  Completed    HPV vaccine  Aged Out

## 2019-06-10 NOTE — PROGRESS NOTES
Post-Discharge Transitional Care Management Services or Hospital Follow Up      Liu Lees   YOB: 1955    Date of Office Visit:  6/10/2019  Date of Hospital Admission: 6/2/19  Date of Hospital Discharge: 6/6/19  Risk of hospital readmission (high >=14%.  Medium >=10%) :Readmission Risk Score: 24      Care management risk score Rising risk (score 2-5) and Complex Care (Scores >=6): 6     Non face to face  following discharge, date last encounter closed (first attempt may have been earlier): 6/7/2019  9:30 AM    Call initiated 2 business days of discharge: Yes    Patient Active Problem List   Diagnosis    SDH (subdural hematoma) ([address]    ESRD (end stage renal disease) on dialysis ([address]    DM (diabetes mellitus) ([address]    Anemia of chronic disease    Charcot foot due to diabetes mellitus ([address]    Diabetic ulcer of left foot ([address]    Benign essential HTN    Type 2 diabetes mellitus with foot ulcer ([address]    Dialysis patient, noncompliant ([address]    Acute on chronic congestive heart failure ([address]       Allergies   Allergen Reactions    Pcn [Penicillins] Other (See Comments)     Trouble walking       Medications listed as ordered at the time of discharge from hospital   Génesis Hargrove LIZ   Home Medication Instructions [id number]    Printed on:06/10/19 8024   Medication Information                      aspirin 81 MG EC tablet  Take 1 tablet by mouth daily             atorvastatin (LIPITOR) 40 MG tablet  Take 1 tablet by mouth nightly             B Complex-C-Folic Acid (TRIPHROCAPS PO)  Take 15 mg by mouth daily             Blood Glucose Monitoring Suppl (ONE TOUCH ULTRA 2) W/DEVICE KIT  TEST 3 TIMES DAILY             bumetanide (BUMEX) 1 MG tablet  Take 2 mg by mouth daily             calcium acetate (PHOSLO) 667 MG capsule  Take 1,334 mg by mouth 3 times daily (with meals)              carvedilol (COREG) 3.125 MG tablet  Take 1 tablet by mouth 2 times daily (with meals) cinacalcet (SENSIPAR) 90 MG tablet  Take 90 mg by mouth daily             glucose monitoring kit (FREESTYLE) monitoring kit  1 kit by Does not apply route daily as needed             Lancets 30G MISC  1 each by Does not apply route 3 times daily             midodrine (PROAMATINE) 10 MG tablet  Take 1 tablet by mouth 3 times daily (with meals)             Misc. Devices (WALKER GLIDE WHEELS) MISC  1 Device by Does not apply route continuous             Multiple Vitamins-Minerals (THERAPEUTIC MULTIVITAMIN-MINERALS) tablet  Take 1 tablet by mouth daily             ONE TOUCH ULTRA TEST strip  USE AS DIRECTED DAILY AS NEEDED             sevelamer (RENVELA) 800 MG tablet  Take 5 tablets by mouth 3 times daily              ticagrelor (BRILINTA) 90 MG TABS tablet  Take 1 tablet by mouth 2 times daily             vitamin D (CHOLECALCIFEROL) 1000 UNIT TABS tablet  Take 1,000 Units by mouth daily. Medications marked \"taking\" at this time  Outpatient Medications Marked as Taking for the 6/10/19 encounter (Office Visit) with Eitan Orozco MD   Medication Sig Dispense Refill    carvedilol (COREG) 3.125 MG tablet Take 1 tablet by mouth 2 times daily (with meals) 60 tablet 3    ticagrelor (BRILINTA) 90 MG TABS tablet Take 1 tablet by mouth 2 times daily 60 tablet 12    bumetanide (BUMEX) 1 MG tablet Take 2 mg by mouth daily      cinacalcet (SENSIPAR) 90 MG tablet Take 90 mg by mouth daily      ONE TOUCH ULTRA TEST strip USE AS DIRECTED DAILY AS NEEDED 100 each 0    Blood Glucose Monitoring Suppl (ONE TOUCH ULTRA 2) W/DEVICE KIT TEST 3 TIMES DAILY 1 kit 0    glucose monitoring kit (FREESTYLE) monitoring kit 1 kit by Does not apply route daily as needed 1 kit 0    Lancets 30G MISC 1 each by Does not apply route 3 times daily 300 each 3    Misc.  Devices (WALKER GLIDE WHEELS) MISC 1 Device by Does not apply route continuous 1 each 0    atorvastatin (LIPITOR) 40 MG tablet Take 1 tablet by mouth nightly 30 tablet 3    midodrine (PROAMATINE) 10 MG tablet Take 1 tablet by mouth 3 times daily (with meals) 90 tablet 3    aspirin 81 MG EC tablet Take 1 tablet by mouth daily 30 tablet 3    Multiple Vitamins-Minerals (THERAPEUTIC MULTIVITAMIN-MINERALS) tablet Take 1 tablet by mouth daily 30 tablet 0    sevelamer (RENVELA) 800 MG tablet Take 5 tablets by mouth 3 times daily       B Complex-C-Folic Acid (TRIPHROCAPS PO) Take 15 mg by mouth daily      calcium acetate (PHOSLO) 667 MG capsule Take 1,334 mg by mouth 3 times daily (with meals)       vitamin D (CHOLECALCIFEROL) 1000 UNIT TABS tablet Take 1,000 Units by mouth daily. Medications patient taking as of now reconciled against medications ordered at time of hospital discharge: Yes    Chief Complaint   Patient presents with    Follow-Up from Hospital     here for hospital follow up CHF       History of Present illness - Follow up of Hospital diagnosis(es): Artery disease, chronic renal failure on dialysis    Inpatient course: Discharge summary reviewed- see chart. Interval history/Current status: Stable        Vitals:    06/10/19 0803 06/10/19 0809   BP: (!) 157/81 (!) 140/80   Site: Right Upper Arm Right Upper Arm   Position: Sitting Sitting   Cuff Size: Medium Adult Medium Adult   Pulse: 88    Temp: 98 °F (36.7 °C)    TempSrc: Oral    Weight: 241 lb 9.6 oz (109.6 kg)    Height: 6' 0.01\" (1.829 m)      Body mass index is 32.76 kg/m². Wt Readings from Last 3 Encounters:   06/10/19 241 lb 9.6 oz (109.6 kg)   06/06/19 229 lb 15 oz (104.3 kg)   12/27/16 230 lb (104.3 kg)     BP Readings from Last 3 Encounters:   06/10/19 (!) 140/80   06/06/19 109/64   10/31/18 118/76        Physical Exam:      Assessment/Plan:  1.  Coronary artery disease involving native heart with angina pectoris, unspecified vessel or lesion type ([facility]    - [place] DISCHARGE MEDS RECONCILED W/ CURRENT OUTPATIENT MED LIST        Medical Decision Making: low complexity         Post-Discharge Transitional Care Management Services or Hospital Follow Up      Carolina Gonzalez   YOB: 1955    Date of Office Visit:  6/10/2019  Date of Hospital Admission: 6/2/19  Date of Hospital Discharge: 6/6/19  Readmission Risk Score(high >=14%.  Medium >=10%):Readmission Risk Score: 24      Care management risk score Rising risk (score 2-5) and Complex Care (Scores >=6): 6     Non face to face  following discharge, date last encounter closed (first attempt may have been earlier): 6/7/2019  9:30 AM 6/7/2019  9:30 AM    Call initiated 2 business days of discharge: Yes     Patient Active Problem List   Diagnosis    SDH (subdural hematoma) ([address]    ESRD (end stage renal disease) on dialysis ([address]    DM (diabetes mellitus) ([address]    Anemia of chronic disease    Charcot foot due to diabetes mellitus ([address]    Diabetic ulcer of left foot ([address]    Benign essential HTN    Type 2 diabetes mellitus with foot ulcer ([address]    Dialysis patient, noncompliant ([address]    Acute on chronic congestive heart failure ([address]       Allergies   Allergen Reactions    Pcn [Penicillins] Other (See Comments)     Trouble walking       Medications listed as ordered at the time of discharge from [facility]   [place] Medication Instructions [facility]:[id number]    Printed on:06/10/19 1043   Medication Information                      aspirin 81 MG EC tablet  Take 1 tablet by mouth daily             atorvastatin (LIPITOR) 40 MG tablet  Take 1 tablet by mouth nightly             B Complex-C-Folic Acid (TRIPHROCAPS PO)  Take 15 mg by mouth daily             Blood Glucose Monitoring Suppl (ONE TOUCH ULTRA 2) W/DEVICE KIT  TEST 3 TIMES DAILY             bumetanide (BUMEX) 1 MG tablet  Take 2 mg by mouth daily             calcium acetate (PHOSLO) 667 MG capsule  Take 1,334 mg by mouth 3 times daily (with meals)              carvedilol (COREG) 3.125 MG tablet  Take 1 tablet by mouth 2 times daily (with meals)             cinacalcet (SENSIPAR) 90 MG tablet  Take 90 mg by mouth daily             glucose monitoring kit (FREESTYLE) monitoring kit  1 kit by Does not apply route daily as needed             Lancets 30G MISC  1 each by Does not apply route 3 times daily             midodrine (PROAMATINE) 10 MG tablet  Take 1 tablet by mouth 3 times daily (with meals)             Misc. Devices (WALKER GLIDE WHEELS) MISC  1 Device by Does not apply route continuous             Multiple Vitamins-Minerals (THERAPEUTIC MULTIVITAMIN-MINERALS) tablet  Take 1 tablet by mouth daily             ONE TOUCH ULTRA TEST strip  USE AS DIRECTED DAILY AS NEEDED             sevelamer (RENVELA) 800 MG tablet  Take 5 tablets by mouth 3 times daily              ticagrelor (BRILINTA) 90 MG TABS tablet  Take 1 tablet by mouth 2 times daily             vitamin D (CHOLECALCIFEROL) 1000 UNIT TABS tablet  Take 1,000 Units by mouth daily. Medications marked \"taking\" at this time  Outpatient Medications Marked as Taking for the 6/10/19 encounter (Office Visit) with Henry Barnett MD   Medication Sig Dispense Refill    carvedilol (COREG) 3.125 MG tablet Take 1 tablet by mouth 2 times daily (with meals) 60 tablet 3    ticagrelor (BRILINTA) 90 MG TABS tablet Take 1 tablet by mouth 2 times daily 60 tablet 12    bumetanide (BUMEX) 1 MG tablet Take 2 mg by mouth daily      cinacalcet (SENSIPAR) 90 MG tablet Take 90 mg by mouth daily      ONE TOUCH ULTRA TEST strip USE AS DIRECTED DAILY AS NEEDED 100 each 0    Blood Glucose Monitoring Suppl (ONE TOUCH ULTRA 2) W/DEVICE KIT TEST 3 TIMES DAILY 1 kit 0    glucose monitoring kit (FREESTYLE) monitoring kit 1 kit by Does not apply route daily as needed 1 kit 0    Lancets 30G MISC 1 each by Does not apply route 3 times daily 300 each 3    Misc.  Devices (WALKER GLIDE WHEELS) MISC 1 Device by Does not apply route continuous 1 each 0    atorvastatin (LIPITOR) 40 MG tablet Take 1 tablet by mouth nightly 30 tablet 3    midodrine (PROAMATINE) 10 MG tablet Take 1 tablet by mouth 3 times daily (with meals) 90 tablet 3    aspirin 81 MG EC tablet Take 1 tablet by mouth daily 30 tablet 3    Multiple Vitamins-Minerals (THERAPEUTIC MULTIVITAMIN-MINERALS) tablet Take 1 tablet by mouth daily 30 tablet 0    sevelamer (RENVELA) 800 MG tablet Take 5 tablets by mouth 3 times daily       B Complex-C-Folic Acid (TRIPHROCAPS PO) Take 15 mg by mouth daily      calcium acetate (PHOSLO) 667 MG capsule Take 1,334 mg by mouth 3 times daily (with meals)       vitamin D (CHOLECALCIFEROL) 1000 UNIT TABS tablet Take 1,000 Units by mouth daily. Medications patient taking as of now reconciled against medications ordered at time of hospital discharge: Yes    Chief Complaint   Patient presents with    Follow-Up from Hospital     here for hospital follow up CHF       HPI    Inpatient course: Discharge summary reviewed- see chart. Interval history/Current status: Has been doing good since discharge no problems. No chest  pain shortness of breath or any other cardiac symptoms. Continuing with his dialysis as scheduled    Review of Systems   Respiratory: Negative for cough, chest tightness and shortness of breath. Cardiovascular: Negative for chest pain and palpitations. Vitals:    06/10/19 0803 06/10/19 0809   BP: (!) 157/81 (!) 140/80   Site: Right Upper Arm Right Upper Arm   Position: Sitting Sitting   Cuff Size: Medium Adult Medium Adult   Pulse: 88    Temp: 98 °F (36.7 °C)    TempSrc: Oral    Weight: 241 lb 9.6 oz (109.6 kg)    Height: 6' 0.01\" (1.829 m)      Body mass index is 32.76 kg/m². Wt Readings from Last 3 Encounters:   06/10/19 241 lb 9.6 oz (109.6 kg)   06/06/19 229 lb 15 oz (104.3 kg)   12/27/16 230 lb (104.3 kg)     BP Readings from Last 3 Encounters:   06/10/19 (!) 140/80   06/06/19 109/64   10/31/18 118/76       Physical Exam   Constitutional: He is oriented to person, place, and time.  He appears well-developed and well-nourished. HENT:   Head: Normocephalic and atraumatic. Eyes: EOM are normal.   Cardiovascular: Normal rate, regular rhythm and normal heart sounds. Pulmonary/Chest: Effort normal and breath sounds normal.   Neurological: He is alert and oriented to person, place, and time. Assessment/Plan:  1.  Coronary artery disease involving native heart with angina pectoris, unspecified vessel or lesion type [facility])  We will continue to see his cardiologist.  - [place] DISCHARGE MEDS RECONCILED W/ CURRENT OUTPATIENT MED LIST        Medical Decision Making: low complexity

## 2019-06-27 ENCOUNTER — TELEPHONE (OUTPATIENT)
Dept: FAMILY MEDICINE CLINIC | Age: 64
End: 2019-06-27

## 2019-06-27 DIAGNOSIS — E11.621 TYPE 2 DIABETES MELLITUS WITH FOOT ULCER, WITH LONG-TERM CURRENT USE OF INSULIN (HCC): Primary | ICD-10-CM

## 2019-06-27 DIAGNOSIS — L97.509 TYPE 2 DIABETES MELLITUS WITH FOOT ULCER, WITH LONG-TERM CURRENT USE OF INSULIN (HCC): Primary | ICD-10-CM

## 2019-06-27 DIAGNOSIS — Z79.4 TYPE 2 DIABETES MELLITUS WITH FOOT ULCER, WITH LONG-TERM CURRENT USE OF INSULIN (HCC): Primary | ICD-10-CM

## 2019-09-25 ENCOUNTER — HOSPITAL ENCOUNTER (OUTPATIENT)
Dept: PHYSICAL THERAPY | Age: 64
Setting detail: THERAPIES SERIES
Discharge: HOME | End: 2019-09-25
Payer: MEDICARE

## 2019-09-25 PROCEDURE — 97161 PT EVAL LOW COMPLEX 20 MIN: CPT

## 2019-09-25 NOTE — PROGRESS NOTES
Physical Therapy  Initial Assessment  Date: 2019  Patient Name: Scott Trejo  MRN: [id number]  : 1955     Subjective   General  Referral Date : 19  Diagnosis: ESRD,L BK amputation  Follows Commands: Within Functional Limits  General Comment  Comments: wheelchair eval completed  PT Visit Information  PT Insurance Information: [employer]  Total # of Visits to Date: 1    Treatment Charges: Minutes Units   []  Ultrasound     []  Electrical-Stim     []  Iontophoresis     []  Traction     []  Massage       [x]  Eval 20 1   []  Gait     []  Ther Exercise       []  Manual Therapy       []  Ther Activities       []  Aquatics     []  Vasopneumatic Device     []  Neuro Re-Ed       []  Other       Total Treatment Time: 20 1        Therapy Time   Individual Concurrent Group Co-treatment   Time In 1330         Time Out 1350         Minutes 1282 [facility]

## 2019-12-06 NOTE — PROGRESS NOTES
Reviewed record in preparation for visit and have obtained necessary documentation. Identified pt with two pt identifiers(name and ). Chief Complaint   Patient presents with    Annual Exam       Health Maintenance Due   Topic Date Due    Shingrix Vaccine Age 50> (2 of 2) 01/15/2019    Influenza Age 5 to Adult  2019    PAP AKA CERVICAL CYTOLOGY  11/10/2019       Ms. Rose Mary Kingsley has a reminder for a \"due or due soon\" health maintenance. I have asked that she discuss health maintenance topic(s) due with Her  primary care provider. Coordination of Care Questionnaire:  :     1) Have you been to an emergency room, urgent care clinic since your last visit? no   Hospitalized since your last visit? no             2) Have you seen or consulted any other health care providers outside of [address] [place] [address] since your last visit? no  (Include any pap smears or colon screenings in this section.)    3) Do you have an Advance Directive on file? no    4) Are you interested in receiving information on Advance Directives? NO    Patient is accompanied by self I have received verbal consent from Brandon Siegel to discuss any/all medical information while they are present in the room. Smoking Cessation - topics covered   []  Health Risks  []  Benefits of Quitting   []  Smoking Cessation  [x]  Patient has no history of tobacco use  []  Patient is former smoker. [x]  No need for tobacco cessation education. []  Booklet given  []  Patient verbalizes understanding. []  Patient denies need for tobacco cessation education. []  Unable to meet with patient today. Will follow up as able.   Molly Phoenix  7:58 AM

## 2020-01-01 ENCOUNTER — TELEPHONE (OUTPATIENT)
Dept: FAMILY MEDICINE CLINIC | Age: 65
End: 2020-01-01

## 2020-02-19 ENCOUNTER — TELEPHONE (OUTPATIENT)
Dept: FAMILY MEDICINE CLINIC | Age: 65
End: 2020-02-19

## 2020-02-19 NOTE — TELEPHONE ENCOUNTER
Gastro called needing info about patient's cardiologist there isn't one listed. Pt was in the hospital last year in June and a NP sent 12 refills for patient so he has never established care with cardiology.  Pt seen Shahriar Munoz NP at [facility] they are wanting to do a colonoscopy but need ike spoke with Janet at [facility] ph number [phone]

## 2020-02-21 ENCOUNTER — OFFICE VISIT (OUTPATIENT)
Dept: FAMILY MEDICINE CLINIC | Age: 65
End: 2020-02-21
Payer: MEDICARE

## 2020-02-21 VITALS
WEIGHT: 223 LBS | SYSTOLIC BLOOD PRESSURE: 120 MMHG | DIASTOLIC BLOOD PRESSURE: 72 MMHG | HEART RATE: 74 BPM | BODY MASS INDEX: 30.24 KG/M2

## 2020-02-21 LAB — HBA1C MFR BLD: 5.8 %

## 2020-02-21 PROCEDURE — G8417 CALC BMI ABV UP PARAM F/U: HCPCS | Performed by: STUDENT IN AN ORGANIZED HEALTH CARE EDUCATION/TRAINING PROGRAM

## 2020-02-21 PROCEDURE — G8427 DOCREV CUR MEDS BY ELIG CLIN: HCPCS | Performed by: STUDENT IN AN ORGANIZED HEALTH CARE EDUCATION/TRAINING PROGRAM

## 2020-02-21 PROCEDURE — 2022F DILAT RTA XM EVC RTNOPTHY: CPT | Performed by: STUDENT IN AN ORGANIZED HEALTH CARE EDUCATION/TRAINING PROGRAM

## 2020-02-21 PROCEDURE — 99213 OFFICE O/P EST LOW 20 MIN: CPT | Performed by: STUDENT IN AN ORGANIZED HEALTH CARE EDUCATION/TRAINING PROGRAM

## 2020-02-21 PROCEDURE — 99211 OFF/OP EST MAY X REQ PHY/QHP: CPT | Performed by: FAMILY MEDICINE

## 2020-02-21 PROCEDURE — 3044F HG A1C LEVEL LT 7.0%: CPT | Performed by: STUDENT IN AN ORGANIZED HEALTH CARE EDUCATION/TRAINING PROGRAM

## 2020-02-21 PROCEDURE — 1036F TOBACCO NON-USER: CPT | Performed by: STUDENT IN AN ORGANIZED HEALTH CARE EDUCATION/TRAINING PROGRAM

## 2020-02-21 PROCEDURE — 3017F COLORECTAL CA SCREEN DOC REV: CPT | Performed by: STUDENT IN AN ORGANIZED HEALTH CARE EDUCATION/TRAINING PROGRAM

## 2020-02-21 PROCEDURE — 83036 HEMOGLOBIN GLYCOSYLATED A1C: CPT | Performed by: STUDENT IN AN ORGANIZED HEALTH CARE EDUCATION/TRAINING PROGRAM

## 2020-02-21 PROCEDURE — G8484 FLU IMMUNIZE NO ADMIN: HCPCS | Performed by: STUDENT IN AN ORGANIZED HEALTH CARE EDUCATION/TRAINING PROGRAM

## 2020-02-21 RX ORDER — LISINOPRIL 2.5 MG/1
2.5 TABLET ORAL DAILY
Status: ON HOLD | COMMUNITY
End: 2021-01-01 | Stop reason: HOSPADM

## 2020-02-21 RX ORDER — NITROGLYCERIN 0.4 MG/1
0.4 TABLET SUBLINGUAL EVERY 5 MIN PRN
COMMUNITY

## 2020-02-21 RX ORDER — CLOPIDOGREL BISULFATE 75 MG/1
75 TABLET ORAL DAILY
Status: ON HOLD | COMMUNITY
End: 2021-01-01 | Stop reason: HOSPADM

## 2020-02-21 RX ORDER — OXYCODONE HYDROCHLORIDE AND ACETAMINOPHEN 5; 325 MG/1; MG/1
1 TABLET ORAL EVERY 4 HOURS PRN
Status: ON HOLD | COMMUNITY
End: 2021-01-01 | Stop reason: HOSPADM

## 2020-02-21 RX ORDER — AMLODIPINE BESYLATE 10 MG/1
10 TABLET ORAL DAILY
Status: ON HOLD | COMMUNITY
End: 2021-01-01 | Stop reason: HOSPADM

## 2020-02-21 RX ORDER — PANTOPRAZOLE SODIUM 40 MG/1
40 TABLET, DELAYED RELEASE ORAL DAILY
Status: ON HOLD | COMMUNITY
End: 2021-01-01 | Stop reason: HOSPADM

## 2020-02-21 RX ORDER — NIFEDIPINE 30 MG/1
30 TABLET, FILM COATED, EXTENDED RELEASE ORAL DAILY
Status: ON HOLD | COMMUNITY
End: 2021-01-01 | Stop reason: HOSPADM

## 2020-02-21 ASSESSMENT — PATIENT HEALTH QUESTIONNAIRE - PHQ9
SUM OF ALL RESPONSES TO PHQ QUESTIONS 1-9: 0
1. LITTLE INTEREST OR PLEASURE IN DOING THINGS: 0
SUM OF ALL RESPONSES TO PHQ QUESTIONS 1-9: 0
2. FEELING DOWN, DEPRESSED OR HOPELESS: 0
SUM OF ALL RESPONSES TO PHQ9 QUESTIONS 1 & 2: 0

## 2020-02-21 ASSESSMENT — ENCOUNTER SYMPTOMS
SHORTNESS OF BREATH: 0
CHEST TIGHTNESS: 0
PHOTOPHOBIA: 0

## 2020-03-24 ENCOUNTER — TELEPHONE (OUTPATIENT)
Dept: FAMILY MEDICINE CLINIC | Age: 65
End: 2020-03-24

## 2020-12-03 NOTE — TELEPHONE ENCOUNTER
Received fax from [employer] to have physican sign order for patients wheelchair to be  Repaired. Order placed in physicians box to be signed. Please sign and give back to writer to fax.  Thank you

## 2020-12-03 NOTE — TELEPHONE ENCOUNTER
Spoke with Rosalia Mejia at [facility] to verify if they received the signed order for repair of patients wheel chair states they have not advised to re-fax to [phone] Kale Colbert.

## 2021-01-01 ENCOUNTER — HOSPITAL ENCOUNTER (OUTPATIENT)
Age: 66
Setting detail: SPECIMEN
Discharge: HOME | End: 2021-05-25
Payer: MEDICARE

## 2021-01-01 ENCOUNTER — APPOINTMENT (OUTPATIENT)
Dept: GENERAL RADIOLOGY | Age: 66
DRG: 720 | End: 2021-01-01
Attending: INTERNAL MEDICINE
Payer: MEDICARE

## 2021-01-01 ENCOUNTER — HOSPITAL ENCOUNTER (INPATIENT)
Age: 66
LOS: 1 days | Discharge: ANOTHER HOSPITAL | DRG: 720 | End: 2021-06-30
Attending: EMERGENCY MEDICINE | Admitting: INTERNAL MEDICINE
Payer: MEDICARE

## 2021-01-01 ENCOUNTER — ANESTHESIA EVENT (OUTPATIENT)
Dept: OPERATING ROOM | Age: 66
DRG: 720 | End: 2021-01-01
Payer: MEDICARE

## 2021-01-01 ENCOUNTER — ANESTHESIA (OUTPATIENT)
Dept: OPERATING ROOM | Age: 66
DRG: 720 | End: 2021-01-01
Payer: MEDICARE

## 2021-01-01 ENCOUNTER — APPOINTMENT (OUTPATIENT)
Dept: GENERAL RADIOLOGY | Age: 66
DRG: 720 | End: 2021-01-01
Payer: MEDICARE

## 2021-01-01 ENCOUNTER — APPOINTMENT (OUTPATIENT)
Dept: ULTRASOUND IMAGING | Age: 66
DRG: 720 | End: 2021-01-01
Attending: INTERNAL MEDICINE
Payer: MEDICARE

## 2021-01-01 ENCOUNTER — HOSPITAL ENCOUNTER (OUTPATIENT)
Age: 66
Setting detail: SPECIMEN
Discharge: HOME | End: 2021-06-02

## 2021-01-01 ENCOUNTER — APPOINTMENT (OUTPATIENT)
Dept: GENERAL RADIOLOGY | Age: 66
DRG: 425 | End: 2021-01-01
Payer: MEDICARE

## 2021-01-01 ENCOUNTER — HOSPITAL ENCOUNTER (INPATIENT)
Age: 66
LOS: 17 days | Discharge: NURSING, REHAB OR LONG-TERM CARE | DRG: 720 | End: 2021-05-15
Attending: INTERNAL MEDICINE | Admitting: INTERNAL MEDICINE
Payer: MEDICARE

## 2021-01-01 ENCOUNTER — HOSPITAL ENCOUNTER (OUTPATIENT)
Age: 66
Setting detail: SPECIMEN
Discharge: HOME | End: 2021-06-04

## 2021-01-01 ENCOUNTER — APPOINTMENT (OUTPATIENT)
Dept: NON INVASIVE DIAGNOSTICS | Age: 66
DRG: 720 | End: 2021-01-01
Payer: MEDICARE

## 2021-01-01 ENCOUNTER — HOSPITAL ENCOUNTER (OUTPATIENT)
Age: 66
Setting detail: SPECIMEN
Discharge: HOME | End: 2021-05-27
Payer: MEDICARE

## 2021-01-01 ENCOUNTER — HOSPITAL ENCOUNTER (OUTPATIENT)
Age: 66
Setting detail: SPECIMEN
Discharge: HOME | End: 2021-05-21
Payer: MEDICARE

## 2021-01-01 ENCOUNTER — APPOINTMENT (OUTPATIENT)
Dept: CT IMAGING | Age: 66
DRG: 720 | End: 2021-01-01
Payer: MEDICARE

## 2021-01-01 ENCOUNTER — APPOINTMENT (OUTPATIENT)
Dept: NUCLEAR MEDICINE | Age: 66
DRG: 720 | End: 2021-01-01
Attending: INTERNAL MEDICINE
Payer: MEDICARE

## 2021-01-01 ENCOUNTER — HOSPITAL ENCOUNTER (OUTPATIENT)
Age: 66
Setting detail: SPECIMEN
Discharge: HOME | End: 2021-06-18
Payer: MEDICARE

## 2021-01-01 ENCOUNTER — HOSPITAL ENCOUNTER (OUTPATIENT)
Age: 66
Setting detail: SPECIMEN
Discharge: HOME | End: 2021-06-07

## 2021-01-01 ENCOUNTER — APPOINTMENT (OUTPATIENT)
Dept: DIALYSIS | Age: 66
DRG: 720 | End: 2021-01-01
Payer: MEDICARE

## 2021-01-01 ENCOUNTER — APPOINTMENT (OUTPATIENT)
Dept: INTERVENTIONAL RADIOLOGY/VASCULAR | Age: 66
DRG: 720 | End: 2021-01-01
Attending: INTERNAL MEDICINE
Payer: MEDICARE

## 2021-01-01 ENCOUNTER — HOSPITAL ENCOUNTER (OUTPATIENT)
Age: 66
Setting detail: SPECIMEN
Discharge: HOME | End: 2021-05-24
Payer: MEDICARE

## 2021-01-01 ENCOUNTER — HOSPITAL ENCOUNTER (INPATIENT)
Age: 66
LOS: 4 days | Discharge: NURSING, REHAB OR LONG-TERM CARE | DRG: 425 | End: 2021-06-11
Attending: EMERGENCY MEDICINE | Admitting: INTERNAL MEDICINE
Payer: MEDICARE

## 2021-01-01 ENCOUNTER — APPOINTMENT (OUTPATIENT)
Dept: CT IMAGING | Age: 66
DRG: 720 | End: 2021-01-01
Attending: INTERNAL MEDICINE
Payer: MEDICARE

## 2021-01-01 ENCOUNTER — HOSPITAL ENCOUNTER (INPATIENT)
Age: 66
LOS: 1 days | Discharge: ANOTHER HOSPITAL | DRG: 720 | End: 2021-04-28
Attending: EMERGENCY MEDICINE
Payer: MEDICARE

## 2021-01-01 ENCOUNTER — HOSPITAL ENCOUNTER (INPATIENT)
Age: 66
LOS: 7 days | DRG: 720 | End: 2021-07-07
Attending: INTERNAL MEDICINE | Admitting: INTERNAL MEDICINE
Payer: MEDICARE

## 2021-01-01 ENCOUNTER — HOSPITAL ENCOUNTER (OUTPATIENT)
Age: 66
Setting detail: SPECIMEN
Discharge: HOME | End: 2021-05-18
Payer: MEDICARE

## 2021-01-01 VITALS
TEMPERATURE: 97.4 F | BODY MASS INDEX: 22.9 KG/M2 | HEART RATE: 63 BPM | DIASTOLIC BLOOD PRESSURE: 66 MMHG | SYSTOLIC BLOOD PRESSURE: 117 MMHG | WEIGHT: 169.09 LBS | RESPIRATION RATE: 19 BRPM | OXYGEN SATURATION: 100 % | HEIGHT: 72 IN

## 2021-01-01 VITALS
RESPIRATION RATE: 15 BRPM | DIASTOLIC BLOOD PRESSURE: 75 MMHG | BODY MASS INDEX: 28.52 KG/M2 | HEIGHT: 72 IN | SYSTOLIC BLOOD PRESSURE: 173 MMHG | TEMPERATURE: 97.8 F | HEART RATE: 128 BPM | OXYGEN SATURATION: 98 % | WEIGHT: 210.54 LBS

## 2021-01-01 VITALS
OXYGEN SATURATION: 100 % | RESPIRATION RATE: 10 BRPM | DIASTOLIC BLOOD PRESSURE: 70 MMHG | TEMPERATURE: 97.5 F | SYSTOLIC BLOOD PRESSURE: 118 MMHG

## 2021-01-01 VITALS
DIASTOLIC BLOOD PRESSURE: 67 MMHG | SYSTOLIC BLOOD PRESSURE: 125 MMHG | TEMPERATURE: 97.5 F | OXYGEN SATURATION: 95 % | HEART RATE: 67 BPM | BODY MASS INDEX: 29.02 KG/M2 | RESPIRATION RATE: 20 BRPM | HEIGHT: 72 IN | WEIGHT: 214.29 LBS

## 2021-01-01 VITALS
OXYGEN SATURATION: 94 % | DIASTOLIC BLOOD PRESSURE: 53 MMHG | SYSTOLIC BLOOD PRESSURE: 91 MMHG | TEMPERATURE: 98.1 F | WEIGHT: 175.71 LBS | BODY MASS INDEX: 23.8 KG/M2 | HEIGHT: 72 IN

## 2021-01-01 VITALS
DIASTOLIC BLOOD PRESSURE: 64 MMHG | RESPIRATION RATE: 12 BRPM | SYSTOLIC BLOOD PRESSURE: 127 MMHG | OXYGEN SATURATION: 100 %

## 2021-01-01 VITALS
HEIGHT: 72 IN | OXYGEN SATURATION: 99 % | DIASTOLIC BLOOD PRESSURE: 47 MMHG | TEMPERATURE: 97.7 F | BODY MASS INDEX: 23.14 KG/M2 | HEART RATE: 44 BPM | SYSTOLIC BLOOD PRESSURE: 96 MMHG | RESPIRATION RATE: 21 BRPM | WEIGHT: 170.86 LBS

## 2021-01-01 DIAGNOSIS — S91.301A OPEN WOUND OF RIGHT FOOT, INITIAL ENCOUNTER: ICD-10-CM

## 2021-01-01 DIAGNOSIS — E87.5 HYPERKALEMIA: ICD-10-CM

## 2021-01-01 DIAGNOSIS — R19.7 DIARRHEA, UNSPECIFIED TYPE: ICD-10-CM

## 2021-01-01 DIAGNOSIS — Z99.2 ESRD ON DIALYSIS (HCC): ICD-10-CM

## 2021-01-01 DIAGNOSIS — L08.9 WOUND INFECTION: Primary | ICD-10-CM

## 2021-01-01 DIAGNOSIS — J81.0 ACUTE PULMONARY EDEMA (HCC): Primary | ICD-10-CM

## 2021-01-01 DIAGNOSIS — N18.6 ESRD ON DIALYSIS (HCC): ICD-10-CM

## 2021-01-01 DIAGNOSIS — R19.7 DIARRHEA, UNSPECIFIED TYPE: Primary | ICD-10-CM

## 2021-01-01 DIAGNOSIS — R41.82 ALTERED MENTAL STATUS, UNSPECIFIED ALTERED MENTAL STATUS TYPE: ICD-10-CM

## 2021-01-01 DIAGNOSIS — T14.8XXA WOUND INFECTION: Primary | ICD-10-CM

## 2021-01-01 LAB
-: NORMAL
ABO/RH: NORMAL
ABSOLUTE BANDS #: 0.09 K/UL (ref 0–1)
ABSOLUTE BANDS #: 0.16 K/UL (ref 0–1)
ABSOLUTE BANDS #: 0.29 K/UL (ref 0–1)
ABSOLUTE BANDS #: 0.41 K/UL (ref 0–1)
ABSOLUTE BANDS #: 0.43 K/UL (ref 0–1)
ABSOLUTE BANDS #: 0.55 K/UL (ref 0–1)
ABSOLUTE BANDS #: 0.65 K/UL (ref 0–1)
ABSOLUTE BANDS #: 0.75 K/UL (ref 0–1)
ABSOLUTE BANDS #: 1.07 K/UL (ref 0–1)
ABSOLUTE EOS #: 0 K/UL (ref 0–0.4)
ABSOLUTE EOS #: 0 K/UL (ref 0–0.44)
ABSOLUTE EOS #: 0.06 K/UL (ref 0–0.44)
ABSOLUTE EOS #: 0.08 K/UL (ref 0–0.4)
ABSOLUTE EOS #: 0.09 K/UL (ref 0–0.4)
ABSOLUTE EOS #: 0.09 K/UL (ref 0–0.4)
ABSOLUTE EOS #: 0.1 K/UL (ref 0–0.44)
ABSOLUTE EOS #: 0.11 K/UL (ref 0–0.44)
ABSOLUTE EOS #: 0.14 K/UL (ref 0–0.4)
ABSOLUTE EOS #: 0.15 K/UL (ref 0–0.4)
ABSOLUTE EOS #: 0.15 K/UL (ref 0–0.4)
ABSOLUTE EOS #: 0.22 K/UL (ref 0–0.4)
ABSOLUTE EOS #: 0.26 K/UL (ref 0–0.4)
ABSOLUTE EOS #: 0.36 K/UL (ref 0–0.4)
ABSOLUTE EOS #: 0.39 K/UL (ref 0–0.4)
ABSOLUTE IMMATURE GRANULOCYTE: 0.16 K/UL (ref 0–0.3)
ABSOLUTE IMMATURE GRANULOCYTE: 0.17 K/UL (ref 0–0.3)
ABSOLUTE IMMATURE GRANULOCYTE: 0.18 K/UL (ref 0–0.3)
ABSOLUTE IMMATURE GRANULOCYTE: 0.2 K/UL (ref 0–0.3)
ABSOLUTE IMMATURE GRANULOCYTE: 0.21 K/UL (ref 0–0.3)
ABSOLUTE IMMATURE GRANULOCYTE: 0.21 K/UL (ref 0–0.3)
ABSOLUTE IMMATURE GRANULOCYTE: 0.22 K/UL (ref 0–0.3)
ABSOLUTE IMMATURE GRANULOCYTE: 0.3 K/UL (ref 0–0.3)
ABSOLUTE IMMATURE GRANULOCYTE: ABNORMAL K/UL (ref 0–0.3)
ABSOLUTE LYMPH #: 0 K/UL (ref 1–4.8)
ABSOLUTE LYMPH #: 0.18 K/UL (ref 1–4.8)
ABSOLUTE LYMPH #: 0.21 K/UL (ref 1–4.8)
ABSOLUTE LYMPH #: 0.25 K/UL (ref 1–4.8)
ABSOLUTE LYMPH #: 0.26 K/UL (ref 1–4.8)
ABSOLUTE LYMPH #: 0.26 K/UL (ref 1–4.8)
ABSOLUTE LYMPH #: 0.27 K/UL (ref 1–4.8)
ABSOLUTE LYMPH #: 0.27 K/UL (ref 1–4.8)
ABSOLUTE LYMPH #: 0.3 K/UL (ref 1–4.8)
ABSOLUTE LYMPH #: 0.36 K/UL (ref 1–4.8)
ABSOLUTE LYMPH #: 0.39 K/UL (ref 1–4.8)
ABSOLUTE LYMPH #: 0.41 K/UL (ref 1–4.8)
ABSOLUTE LYMPH #: 0.41 K/UL (ref 1–4.8)
ABSOLUTE LYMPH #: 0.44 K/UL (ref 1.1–3.7)
ABSOLUTE LYMPH #: 0.46 K/UL (ref 1–4.8)
ABSOLUTE LYMPH #: 0.5 K/UL (ref 1–4.8)
ABSOLUTE LYMPH #: 0.5 K/UL (ref 1–4.8)
ABSOLUTE LYMPH #: 0.53 K/UL (ref 1–4.8)
ABSOLUTE LYMPH #: 0.58 K/UL (ref 1–4.8)
ABSOLUTE LYMPH #: 0.66 K/UL (ref 1–4.8)
ABSOLUTE LYMPH #: 0.66 K/UL (ref 1–4.8)
ABSOLUTE LYMPH #: 0.68 K/UL (ref 1–4.8)
ABSOLUTE LYMPH #: 0.79 K/UL (ref 1.1–3.7)
ABSOLUTE LYMPH #: 0.8 K/UL (ref 1.1–3.7)
ABSOLUTE LYMPH #: 0.82 K/UL (ref 1.1–3.7)
ABSOLUTE LYMPH #: 0.94 K/UL (ref 1.1–3.7)
ABSOLUTE LYMPH #: 1.08 K/UL (ref 1.1–3.7)
ABSOLUTE MONO #: 0.22 K/UL (ref 0.1–1.3)
ABSOLUTE MONO #: 0.43 K/UL (ref 0.1–1.3)
ABSOLUTE MONO #: 0.43 K/UL (ref 0.1–1.3)
ABSOLUTE MONO #: 0.44 K/UL (ref 0.1–1.3)
ABSOLUTE MONO #: 0.46 K/UL (ref 0.1–1.3)
ABSOLUTE MONO #: 0.49 K/UL (ref 0.1–1.3)
ABSOLUTE MONO #: 0.57 K/UL (ref 0.1–1.3)
ABSOLUTE MONO #: 0.6 K/UL (ref 0.1–1.3)
ABSOLUTE MONO #: 0.6 K/UL (ref 0.1–1.3)
ABSOLUTE MONO #: 0.62 K/UL (ref 0.1–1.2)
ABSOLUTE MONO #: 0.62 K/UL (ref 0.1–1.3)
ABSOLUTE MONO #: 0.67 K/UL (ref 0.1–1.3)
ABSOLUTE MONO #: 0.69 K/UL (ref 0.1–1.3)
ABSOLUTE MONO #: 0.73 K/UL (ref 0.1–1.3)
ABSOLUTE MONO #: 0.77 K/UL (ref 0.1–1.3)
ABSOLUTE MONO #: 0.78 K/UL (ref 0.1–1.3)
ABSOLUTE MONO #: 0.83 K/UL (ref 0.1–1.3)
ABSOLUTE MONO #: 0.86 K/UL (ref 0.1–1.3)
ABSOLUTE MONO #: 0.88 K/UL (ref 0.1–1.2)
ABSOLUTE MONO #: 0.89 K/UL (ref 0.1–0.8)
ABSOLUTE MONO #: 0.9 K/UL (ref 0.1–0.8)
ABSOLUTE MONO #: 0.96 K/UL (ref 0.1–1.2)
ABSOLUTE MONO #: 1.05 K/UL (ref 0.1–1.2)
ABSOLUTE MONO #: 1.1 K/UL (ref 0.1–1.3)
ABSOLUTE MONO #: 1.31 K/UL (ref 0.1–1.2)
ABSOLUTE MONO #: 1.41 K/UL (ref 0.1–1.2)
ABSOLUTE MONO #: 1.51 K/UL (ref 0.1–1.3)
ALBUMIN SERPL-MCNC: 2.9 G/DL (ref 3.5–5.2)
ALBUMIN SERPL-MCNC: 3 G/DL (ref 3.5–5.2)
ALBUMIN SERPL-MCNC: 3.2 G/DL (ref 3.5–5.2)
ALBUMIN SERPL-MCNC: 3.4 G/DL (ref 3.5–5.2)
ALBUMIN SERPL-MCNC: 3.6 G/DL (ref 3.5–5.2)
ALBUMIN/GLOBULIN RATIO: 0.8 (ref 1–2.5)
ALBUMIN/GLOBULIN RATIO: ABNORMAL (ref 1–2.5)
ALLEN TEST: ABNORMAL
ALLEN TEST: POSITIVE
ALP BLD-CCNC: 124 U/L (ref 40–129)
ALP BLD-CCNC: 132 U/L (ref 40–129)
ALP BLD-CCNC: 137 U/L (ref 40–129)
ALP BLD-CCNC: 148 U/L (ref 40–129)
ALP BLD-CCNC: 185 U/L (ref 40–129)
ALT SERPL-CCNC: 11 U/L (ref 5–41)
ALT SERPL-CCNC: 11 U/L (ref 5–41)
ALT SERPL-CCNC: 12 U/L (ref 5–41)
ALT SERPL-CCNC: 12 U/L (ref 5–41)
ALT SERPL-CCNC: 18 U/L (ref 5–41)
AMMONIA: 22 UMOL/L (ref 16–60)
AMMONIA: 34 UMOL/L (ref 16–60)
AMYLASE: 20 U/L (ref 28–100)
ANION GAP SERPL CALCULATED.3IONS-SCNC: 10 MMOL/L (ref 9–17)
ANION GAP SERPL CALCULATED.3IONS-SCNC: 10 MMOL/L (ref 9–17)
ANION GAP SERPL CALCULATED.3IONS-SCNC: 11 MMOL/L (ref 9–17)
ANION GAP SERPL CALCULATED.3IONS-SCNC: 12 MMOL/L (ref 9–17)
ANION GAP SERPL CALCULATED.3IONS-SCNC: 13 MMOL/L (ref 9–17)
ANION GAP SERPL CALCULATED.3IONS-SCNC: 13 MMOL/L (ref 9–17)
ANION GAP SERPL CALCULATED.3IONS-SCNC: 14 MMOL/L (ref 9–17)
ANION GAP SERPL CALCULATED.3IONS-SCNC: 14 MMOL/L (ref 9–17)
ANION GAP SERPL CALCULATED.3IONS-SCNC: 15 MMOL/L (ref 9–17)
ANION GAP SERPL CALCULATED.3IONS-SCNC: 15 MMOL/L (ref 9–17)
ANION GAP SERPL CALCULATED.3IONS-SCNC: 16 MMOL/L (ref 9–17)
ANION GAP SERPL CALCULATED.3IONS-SCNC: 17 MMOL/L (ref 9–17)
ANION GAP SERPL CALCULATED.3IONS-SCNC: 19 MMOL/L (ref 9–17)
ANION GAP SERPL CALCULATED.3IONS-SCNC: 20 MMOL/L (ref 9–17)
ANION GAP SERPL CALCULATED.3IONS-SCNC: 20 MMOL/L (ref 9–17)
ANION GAP SERPL CALCULATED.3IONS-SCNC: 22 MMOL/L (ref 9–17)
ANION GAP SERPL CALCULATED.3IONS-SCNC: 23 MMOL/L (ref 9–17)
ANION GAP SERPL CALCULATED.3IONS-SCNC: 23 MMOL/L (ref 9–17)
ANION GAP SERPL CALCULATED.3IONS-SCNC: 24 MMOL/L (ref 9–17)
ANION GAP SERPL CALCULATED.3IONS-SCNC: 30 MMOL/L (ref 9–17)
ANION GAP SERPL CALCULATED.3IONS-SCNC: 30 MMOL/L (ref 9–17)
ANION GAP SERPL CALCULATED.3IONS-SCNC: 39 MMOL/L (ref 9–17)
ANION GAP SERPL CALCULATED.3IONS-SCNC: 8 MMOL/L (ref 9–17)
ANION GAP SERPL CALCULATED.3IONS-SCNC: 9 MMOL/L (ref 9–17)
ANTIBODY IDENTIFICATION: NORMAL
ANTIBODY SCREEN: POSITIVE
APPEARANCE FLUID: NORMAL
APPEARANCE FLUID: NORMAL
ARM BAND NUMBER: NORMAL
AST SERPL-CCNC: 13 U/L
AST SERPL-CCNC: 14 U/L
AST SERPL-CCNC: 14 U/L
AST SERPL-CCNC: 17 U/L
AST SERPL-CCNC: 23 U/L
ATYPICAL LYMPHOCYTE ABSOLUTE COUNT: 0.44 K/UL
ATYPICAL LYMPHOCYTES: 3 %
AVERAGE GLUCOSE: NORMAL
BANDS: 2 % (ref 0–10)
BANDS: 3 % (ref 0–10)
BANDS: 4 % (ref 0–10)
BANDS: 5 % (ref 0–10)
BANDS: 5 % (ref 0–10)
BANDS: 8 % (ref 0–10)
BASO FLUID: 0 %
BASO FLUID: ABNORMAL %
BASOPHILS # BLD: 0 % (ref 0–2)
BASOPHILS # BLD: 1 % (ref 0–2)
BASOPHILS # BLD: 1 % (ref 0–2)
BASOPHILS # BLD: 2 % (ref 0–2)
BASOPHILS # BLD: 3 % (ref 0–2)
BASOPHILS # BLD: 4 % (ref 0–2)
BASOPHILS ABSOLUTE: 0 K/UL (ref 0–0.2)
BASOPHILS ABSOLUTE: 0.03 K/UL (ref 0–0.2)
BASOPHILS ABSOLUTE: 0.04 K/UL (ref 0–0.2)
BASOPHILS ABSOLUTE: 0.04 K/UL (ref 0–0.2)
BASOPHILS ABSOLUTE: 0.08 K/UL (ref 0–0.2)
BASOPHILS ABSOLUTE: 0.09 K/UL (ref 0–0.2)
BASOPHILS ABSOLUTE: 0.11 K/UL (ref 0–0.2)
BASOPHILS ABSOLUTE: 0.11 K/UL (ref 0–0.2)
BASOPHILS ABSOLUTE: 0.14 K/UL (ref 0–0.2)
BASOPHILS ABSOLUTE: 0.15 K/UL (ref 0–0.2)
BASOPHILS ABSOLUTE: 0.2 K/UL (ref 0–0.2)
BASOPHILS ABSOLUTE: 0.3 K/UL (ref 0–0.2)
BILIRUB SERPL-MCNC: 0.53 MG/DL (ref 0.3–1.2)
BILIRUB SERPL-MCNC: 0.59 MG/DL (ref 0.3–1.2)
BILIRUB SERPL-MCNC: 0.64 MG/DL (ref 0.3–1.2)
BILIRUB SERPL-MCNC: 0.74 MG/DL (ref 0.3–1.2)
BILIRUB SERPL-MCNC: 1.17 MG/DL (ref 0.3–1.2)
BLD PROD TYP BPU: NORMAL
BLD PROD TYP BPU: NORMAL
BLOOD BANK SPECIMEN: NORMAL
BNP INTERPRETATION: ABNORMAL
BUN BLDV-MCNC: 103 MG/DL (ref 8–23)
BUN BLDV-MCNC: 154 MG/DL (ref 8–23)
BUN BLDV-MCNC: 157 MG/DL (ref 8–23)
BUN BLDV-MCNC: 18 MG/DL (ref 8–23)
BUN BLDV-MCNC: 20 MG/DL (ref 8–23)
BUN BLDV-MCNC: 241 MG/DL (ref 8–23)
BUN BLDV-MCNC: 25 MG/DL (ref 8–23)
BUN BLDV-MCNC: 28 MG/DL (ref 8–23)
BUN BLDV-MCNC: 28 MG/DL (ref 8–23)
BUN BLDV-MCNC: 29 MG/DL (ref 8–23)
BUN BLDV-MCNC: 30 MG/DL (ref 8–23)
BUN BLDV-MCNC: 32 MG/DL (ref 8–23)
BUN BLDV-MCNC: 34 MG/DL (ref 8–23)
BUN BLDV-MCNC: 34 MG/DL (ref 8–23)
BUN BLDV-MCNC: 36 MG/DL (ref 8–23)
BUN BLDV-MCNC: 37 MG/DL (ref 8–23)
BUN BLDV-MCNC: 39 MG/DL (ref 8–23)
BUN BLDV-MCNC: 44 MG/DL (ref 8–23)
BUN BLDV-MCNC: 45 MG/DL (ref 8–23)
BUN BLDV-MCNC: 48 MG/DL (ref 8–23)
BUN BLDV-MCNC: 49 MG/DL (ref 8–23)
BUN BLDV-MCNC: 50 MG/DL (ref 8–23)
BUN BLDV-MCNC: 51 MG/DL (ref 8–23)
BUN BLDV-MCNC: 52 MG/DL (ref 8–23)
BUN BLDV-MCNC: 54 MG/DL (ref 8–23)
BUN BLDV-MCNC: 58 MG/DL (ref 8–23)
BUN BLDV-MCNC: 59 MG/DL (ref 8–23)
BUN BLDV-MCNC: 62 MG/DL (ref 8–23)
BUN BLDV-MCNC: 70 MG/DL (ref 8–23)
BUN BLDV-MCNC: 75 MG/DL (ref 8–23)
BUN BLDV-MCNC: 77 MG/DL (ref 8–23)
BUN BLDV-MCNC: 83 MG/DL (ref 8–23)
BUN BLDV-MCNC: 95 MG/DL (ref 8–23)
BUN BLDV-MCNC: 97 MG/DL (ref 8–23)
BUN BLDV-MCNC: 97 MG/DL (ref 8–23)
BUN/CREAT BLD: 11 (ref 9–20)
BUN/CREAT BLD: 6 (ref 9–20)
BUN/CREAT BLD: 8 (ref 9–20)
BUN/CREAT BLD: 8 (ref 9–20)
BUN/CREAT BLD: ABNORMAL (ref 9–20)
C DIFF AG + TOXIN: ABNORMAL
C DIFFICILE TOXINS, PCR: ABNORMAL
C DIFFICILE TOXINS, PCR: NORMAL
C-REACTIVE PROTEIN: 242.8 MG/L (ref 0–5)
C-REACTIVE PROTEIN: 251.2 MG/L (ref 0–5)
C-REACTIVE PROTEIN: 322.7 MG/L (ref 0–5)
CALCIUM SERPL-MCNC: 10 MG/DL (ref 8.6–10.4)
CALCIUM SERPL-MCNC: 10.1 MG/DL (ref 8.6–10.4)
CALCIUM SERPL-MCNC: 10.2 MG/DL (ref 8.6–10.4)
CALCIUM SERPL-MCNC: 10.3 MG/DL (ref 8.6–10.4)
CALCIUM SERPL-MCNC: 10.4 MG/DL (ref 8.6–10.4)
CALCIUM SERPL-MCNC: 8.4 MG/DL (ref 8.6–10.4)
CALCIUM SERPL-MCNC: 8.8 MG/DL (ref 8.6–10.4)
CALCIUM SERPL-MCNC: 8.9 MG/DL (ref 8.6–10.4)
CALCIUM SERPL-MCNC: 9 MG/DL (ref 8.6–10.4)
CALCIUM SERPL-MCNC: 9.1 MG/DL (ref 8.6–10.4)
CALCIUM SERPL-MCNC: 9.2 MG/DL (ref 8.6–10.4)
CALCIUM SERPL-MCNC: 9.2 MG/DL (ref 8.6–10.4)
CALCIUM SERPL-MCNC: 9.3 MG/DL (ref 8.6–10.4)
CALCIUM SERPL-MCNC: 9.4 MG/DL (ref 8.6–10.4)
CALCIUM SERPL-MCNC: 9.6 MG/DL (ref 8.6–10.4)
CALCIUM SERPL-MCNC: 9.7 MG/DL (ref 8.6–10.4)
CALCIUM SERPL-MCNC: 9.8 MG/DL (ref 8.6–10.4)
CALCIUM SERPL-MCNC: 9.8 MG/DL (ref 8.6–10.4)
CALCIUM SERPL-MCNC: 9.9 MG/DL (ref 8.6–10.4)
CALCIUM SERPL-MCNC: 9.9 MG/DL (ref 8.6–10.4)
CHLORIDE BLD-SCNC: 100 MMOL/L (ref 98–107)
CHLORIDE BLD-SCNC: 101 MMOL/L (ref 98–107)
CHLORIDE BLD-SCNC: 102 MMOL/L (ref 98–107)
CHLORIDE BLD-SCNC: 102 MMOL/L (ref 98–107)
CHLORIDE BLD-SCNC: 103 MMOL/L (ref 98–107)
CHLORIDE BLD-SCNC: 104 MMOL/L (ref 98–107)
CHLORIDE BLD-SCNC: 92 MMOL/L (ref 98–107)
CHLORIDE BLD-SCNC: 93 MMOL/L (ref 98–107)
CHLORIDE BLD-SCNC: 93 MMOL/L (ref 98–107)
CHLORIDE BLD-SCNC: 94 MMOL/L (ref 98–107)
CHLORIDE BLD-SCNC: 94 MMOL/L (ref 98–107)
CHLORIDE BLD-SCNC: 95 MMOL/L (ref 98–107)
CHLORIDE BLD-SCNC: 96 MMOL/L (ref 98–107)
CHLORIDE BLD-SCNC: 97 MMOL/L (ref 98–107)
CHLORIDE BLD-SCNC: 98 MMOL/L (ref 98–107)
CHLORIDE BLD-SCNC: 99 MMOL/L (ref 98–107)
CHLORIDE BLD-SCNC: 99 MMOL/L (ref 98–107)
CHP ED QC CHECK: NORMAL
CO2: 15 MMOL/L (ref 20–31)
CO2: 16 MMOL/L (ref 20–31)
CO2: 17 MMOL/L (ref 20–31)
CO2: 19 MMOL/L (ref 20–31)
CO2: 19 MMOL/L (ref 20–31)
CO2: 20 MMOL/L (ref 20–31)
CO2: 21 MMOL/L (ref 20–31)
CO2: 21 MMOL/L (ref 20–31)
CO2: 22 MMOL/L (ref 20–31)
CO2: 23 MMOL/L (ref 20–31)
CO2: 23 MMOL/L (ref 20–31)
CO2: 24 MMOL/L (ref 20–31)
CO2: 25 MMOL/L (ref 20–31)
CO2: 26 MMOL/L (ref 20–31)
CO2: 27 MMOL/L (ref 20–31)
CO2: 27 MMOL/L (ref 20–31)
CO2: 28 MMOL/L (ref 20–31)
CO2: 28 MMOL/L (ref 20–31)
CO2: 29 MMOL/L (ref 20–31)
CO2: 31 MMOL/L (ref 20–31)
CO2: 31 MMOL/L (ref 20–31)
CO2: 9 MMOL/L (ref 20–31)
COLOR FLUID: NORMAL
COLOR FLUID: NORMAL
CORTISOL COLLECTION INFO: ABNORMAL
CORTISOL: 38.9 UG/DL (ref 2.7–18.4)
CREAT SERPL-MCNC: 13.19 MG/DL (ref 0.7–1.2)
CREAT SERPL-MCNC: 13.22 MG/DL (ref 0.7–1.2)
CREAT SERPL-MCNC: 18.47 MG/DL (ref 0.7–1.2)
CREAT SERPL-MCNC: 2.1 MG/DL (ref 0.7–1.2)
CREAT SERPL-MCNC: 2.24 MG/DL (ref 0.7–1.2)
CREAT SERPL-MCNC: 2.25 MG/DL (ref 0.7–1.2)
CREAT SERPL-MCNC: 2.54 MG/DL (ref 0.7–1.2)
CREAT SERPL-MCNC: 3.03 MG/DL (ref 0.7–1.2)
CREAT SERPL-MCNC: 3.06 MG/DL (ref 0.7–1.2)
CREAT SERPL-MCNC: 3.16 MG/DL (ref 0.7–1.2)
CREAT SERPL-MCNC: 3.33 MG/DL (ref 0.7–1.2)
CREAT SERPL-MCNC: 3.38 MG/DL (ref 0.7–1.2)
CREAT SERPL-MCNC: 3.67 MG/DL (ref 0.7–1.2)
CREAT SERPL-MCNC: 3.7 MG/DL (ref 0.7–1.2)
CREAT SERPL-MCNC: 3.76 MG/DL (ref 0.7–1.2)
CREAT SERPL-MCNC: 3.88 MG/DL (ref 0.7–1.2)
CREAT SERPL-MCNC: 3.89 MG/DL (ref 0.7–1.2)
CREAT SERPL-MCNC: 3.93 MG/DL (ref 0.7–1.2)
CREAT SERPL-MCNC: 3.96 MG/DL (ref 0.7–1.2)
CREAT SERPL-MCNC: 4.47 MG/DL (ref 0.7–1.2)
CREAT SERPL-MCNC: 4.64 MG/DL (ref 0.7–1.2)
CREAT SERPL-MCNC: 4.82 MG/DL (ref 0.7–1.2)
CREAT SERPL-MCNC: 4.95 MG/DL (ref 0.7–1.2)
CREAT SERPL-MCNC: 5.08 MG/DL (ref 0.7–1.2)
CREAT SERPL-MCNC: 5.33 MG/DL (ref 0.7–1.2)
CREAT SERPL-MCNC: 5.44 MG/DL (ref 0.7–1.2)
CREAT SERPL-MCNC: 5.61 MG/DL (ref 0.7–1.2)
CREAT SERPL-MCNC: 5.66 MG/DL (ref 0.7–1.2)
CREAT SERPL-MCNC: 5.79 MG/DL (ref 0.7–1.2)
CREAT SERPL-MCNC: 6.34 MG/DL (ref 0.7–1.2)
CREAT SERPL-MCNC: 6.4 MG/DL (ref 0.7–1.2)
CREAT SERPL-MCNC: 6.4 MG/DL (ref 0.7–1.2)
CREAT SERPL-MCNC: 7.08 MG/DL (ref 0.7–1.2)
CREAT SERPL-MCNC: 8.25 MG/DL (ref 0.7–1.2)
CREAT SERPL-MCNC: 8.42 MG/DL (ref 0.7–1.2)
CREAT SERPL-MCNC: 9 MG/DL (ref 0.7–1.2)
CREAT SERPL-MCNC: 9.17 MG/DL (ref 0.7–1.2)
CREAT SERPL-MCNC: 9.19 MG/DL (ref 0.7–1.2)
CROSSMATCH RESULT: NORMAL
CROSSMATCH RESULT: NORMAL
CULTURE: ABNORMAL
CULTURE: NORMAL
DAT IGG: POSITIVE
DATE, STOOL #1: NORMAL
DATE, STOOL #2: NORMAL
DATE, STOOL #3: NORMAL
DIFFERENTIAL TYPE: ABNORMAL
DIGOXIN DATE LAST DOSE: ABNORMAL
DIGOXIN DATE LAST DOSE: NORMAL
DIGOXIN DOSE AMOUNT: ABNORMAL
DIGOXIN DOSE AMOUNT: NORMAL
DIGOXIN DOSE TIME: ABNORMAL
DIGOXIN DOSE TIME: NORMAL
DIGOXIN LEVEL: 0.9 NG/ML (ref 0.5–2)
DIGOXIN LEVEL: 1.1 NG/ML (ref 0.5–2)
DIGOXIN LEVEL: 1.2 NG/ML (ref 0.5–2)
DIGOXIN LEVEL: 1.6 NG/ML (ref 0.5–2)
DIGOXIN LEVEL: 1.6 NG/ML (ref 0.5–2)
DIGOXIN LEVEL: 2.6 NG/ML (ref 0.5–2)
DIGOXIN LEVEL: 2.8 NG/ML (ref 0.5–2)
DIGOXIN LEVEL: 2.9 NG/ML (ref 0.5–2)
DIGOXIN LEVEL: 2.9 NG/ML (ref 0.5–2)
DIGOXIN LEVEL: 3.1 NG/ML (ref 0.5–2)
DIGOXIN LEVEL: 3.4 NG/ML (ref 0.5–2)
DIGOXIN LEVEL: <0.3 NG/ML (ref 0.5–2)
DIRECT EXAM: ABNORMAL
DIRECT EXAM: NORMAL
DISPENSE STATUS BLOOD BANK: NORMAL
DISPENSE STATUS BLOOD BANK: NORMAL
EKG ATRIAL RATE: 109 BPM
EKG ATRIAL RATE: 109 BPM
EKG ATRIAL RATE: 111 BPM
EKG ATRIAL RATE: 113 BPM
EKG ATRIAL RATE: 119 BPM
EKG ATRIAL RATE: 133 BPM
EKG ATRIAL RATE: 33 BPM
EKG ATRIAL RATE: 67 BPM
EKG ATRIAL RATE: 97 BPM
EKG P AXIS: -37 DEGREES
EKG P AXIS: 24 DEGREES
EKG P-R INTERVAL: 104 MS
EKG P-R INTERVAL: 150 MS
EKG P-R INTERVAL: 200 MS
EKG P-R INTERVAL: 228 MS
EKG Q-T INTERVAL: 334 MS
EKG Q-T INTERVAL: 348 MS
EKG Q-T INTERVAL: 368 MS
EKG Q-T INTERVAL: 370 MS
EKG Q-T INTERVAL: 370 MS
EKG Q-T INTERVAL: 374 MS
EKG Q-T INTERVAL: 396 MS
EKG Q-T INTERVAL: 438 MS
EKG Q-T INTERVAL: 442 MS
EKG QRS DURATION: 118 MS
EKG QRS DURATION: 124 MS
EKG QRS DURATION: 128 MS
EKG QRS DURATION: 130 MS
EKG QRS DURATION: 130 MS
EKG QRS DURATION: 132 MS
EKG QRS DURATION: 132 MS
EKG QRS DURATION: 146 MS
EKG QRS DURATION: 162 MS
EKG QTC CALCULATION (BAZETT): 324 MS
EKG QTC CALCULATION (BAZETT): 464 MS
EKG QTC CALCULATION (BAZETT): 467 MS
EKG QTC CALCULATION (BAZETT): 495 MS
EKG QTC CALCULATION (BAZETT): 498 MS
EKG QTC CALCULATION (BAZETT): 502 MS
EKG QTC CALCULATION (BAZETT): 507 MS
EKG QTC CALCULATION (BAZETT): 508 MS
EKG QTC CALCULATION (BAZETT): 515 MS
EKG R AXIS: -34 DEGREES
EKG R AXIS: -39 DEGREES
EKG R AXIS: -40 DEGREES
EKG R AXIS: -43 DEGREES
EKG R AXIS: -52 DEGREES
EKG R AXIS: -52 DEGREES
EKG R AXIS: -54 DEGREES
EKG R AXIS: -58 DEGREES
EKG R AXIS: -65 DEGREES
EKG T AXIS: 106 DEGREES
EKG T AXIS: 126 DEGREES
EKG T AXIS: 134 DEGREES
EKG T AXIS: 135 DEGREES
EKG T AXIS: 138 DEGREES
EKG T AXIS: 138 DEGREES
EKG T AXIS: 139 DEGREES
EKG T AXIS: 140 DEGREES
EKG T AXIS: 180 DEGREES
EKG VENTRICULAR RATE: 109 BPM
EKG VENTRICULAR RATE: 109 BPM
EKG VENTRICULAR RATE: 111 BPM
EKG VENTRICULAR RATE: 113 BPM
EKG VENTRICULAR RATE: 116 BPM
EKG VENTRICULAR RATE: 132 BPM
EKG VENTRICULAR RATE: 33 BPM
EKG VENTRICULAR RATE: 67 BPM
EKG VENTRICULAR RATE: 97 BPM
EOSINOPHIL FLUID: 2 %
EOSINOPHIL FLUID: ABNORMAL %
EOSINOPHILS RELATIVE PERCENT: 0 % (ref 0–4)
EOSINOPHILS RELATIVE PERCENT: 0 % (ref 1–4)
EOSINOPHILS RELATIVE PERCENT: 1 % (ref 0–4)
EOSINOPHILS RELATIVE PERCENT: 1 % (ref 1–4)
EOSINOPHILS RELATIVE PERCENT: 1 % (ref 1–4)
EOSINOPHILS RELATIVE PERCENT: 2 % (ref 0–4)
EOSINOPHILS RELATIVE PERCENT: 2 % (ref 1–4)
EOSINOPHILS RELATIVE PERCENT: 3 % (ref 0–4)
EOSINOPHILS RELATIVE PERCENT: 4 % (ref 0–4)
EOSINOPHILS RELATIVE PERCENT: 7 % (ref 0–4)
EXPIRATION DATE: NORMAL
FERRITIN: 2624 UG/L (ref 30–400)
FIBRINOGEN: 371 MG/DL (ref 210–530)
FIO2: 30
FIO2: 45
FLUID DIFF COMMENT: ABNORMAL
FLUID DIFF COMMENT: ABNORMAL
FOLATE: 6.4 NG/ML
GFR AFRICAN AMERICAN: 11 ML/MIN
GFR AFRICAN AMERICAN: 12 ML/MIN
GFR AFRICAN AMERICAN: 13 ML/MIN
GFR AFRICAN AMERICAN: 13 ML/MIN
GFR AFRICAN AMERICAN: 14 ML/MIN
GFR AFRICAN AMERICAN: 14 ML/MIN
GFR AFRICAN AMERICAN: 15 ML/MIN
GFR AFRICAN AMERICAN: 16 ML/MIN
GFR AFRICAN AMERICAN: 16 ML/MIN
GFR AFRICAN AMERICAN: 19 ML/MIN
GFR AFRICAN AMERICAN: 20 ML/MIN
GFR AFRICAN AMERICAN: 22 ML/MIN
GFR AFRICAN AMERICAN: 23 ML/MIN
GFR AFRICAN AMERICAN: 24 ML/MIN
GFR AFRICAN AMERICAN: 25 ML/MIN
GFR AFRICAN AMERICAN: 25 ML/MIN
GFR AFRICAN AMERICAN: 3 ML/MIN
GFR AFRICAN AMERICAN: 31 ML/MIN
GFR AFRICAN AMERICAN: 36 ML/MIN
GFR AFRICAN AMERICAN: 36 ML/MIN
GFR AFRICAN AMERICAN: 39 ML/MIN
GFR AFRICAN AMERICAN: 5 ML/MIN
GFR AFRICAN AMERICAN: 5 ML/MIN
GFR AFRICAN AMERICAN: 7 ML/MIN
GFR AFRICAN AMERICAN: 8 ML/MIN
GFR AFRICAN AMERICAN: 8 ML/MIN
GFR AFRICAN AMERICAN: 9 ML/MIN
GFR NON-AFRICAN AMERICAN: 10 ML/MIN
GFR NON-AFRICAN AMERICAN: 11 ML/MIN
GFR NON-AFRICAN AMERICAN: 11 ML/MIN
GFR NON-AFRICAN AMERICAN: 12 ML/MIN
GFR NON-AFRICAN AMERICAN: 13 ML/MIN
GFR NON-AFRICAN AMERICAN: 13 ML/MIN
GFR NON-AFRICAN AMERICAN: 15 ML/MIN
GFR NON-AFRICAN AMERICAN: 16 ML/MIN
GFR NON-AFRICAN AMERICAN: 17 ML/MIN
GFR NON-AFRICAN AMERICAN: 17 ML/MIN
GFR NON-AFRICAN AMERICAN: 18 ML/MIN
GFR NON-AFRICAN AMERICAN: 19 ML/MIN
GFR NON-AFRICAN AMERICAN: 20 ML/MIN
GFR NON-AFRICAN AMERICAN: 21 ML/MIN
GFR NON-AFRICAN AMERICAN: 21 ML/MIN
GFR NON-AFRICAN AMERICAN: 26 ML/MIN
GFR NON-AFRICAN AMERICAN: 29 ML/MIN
GFR NON-AFRICAN AMERICAN: 3 ML/MIN
GFR NON-AFRICAN AMERICAN: 30 ML/MIN
GFR NON-AFRICAN AMERICAN: 32 ML/MIN
GFR NON-AFRICAN AMERICAN: 4 ML/MIN
GFR NON-AFRICAN AMERICAN: 4 ML/MIN
GFR NON-AFRICAN AMERICAN: 6 ML/MIN
GFR NON-AFRICAN AMERICAN: 7 ML/MIN
GFR NON-AFRICAN AMERICAN: 8 ML/MIN
GFR NON-AFRICAN AMERICAN: 9 ML/MIN
GFR SERPL CREATININE-BSD FRML MDRD: ABNORMAL ML/MIN/{1.73_M2}
GLUCOSE BLD-MCNC: 100 MG/DL (ref 75–110)
GLUCOSE BLD-MCNC: 101 MG/DL (ref 75–110)
GLUCOSE BLD-MCNC: 104 MG/DL (ref 70–99)
GLUCOSE BLD-MCNC: 104 MG/DL (ref 75–110)
GLUCOSE BLD-MCNC: 104 MG/DL (ref 75–110)
GLUCOSE BLD-MCNC: 105 MG/DL (ref 70–99)
GLUCOSE BLD-MCNC: 105 MG/DL (ref 75–110)
GLUCOSE BLD-MCNC: 106 MG/DL (ref 75–110)
GLUCOSE BLD-MCNC: 107 MG/DL (ref 70–99)
GLUCOSE BLD-MCNC: 107 MG/DL (ref 75–110)
GLUCOSE BLD-MCNC: 107 MG/DL (ref 75–110)
GLUCOSE BLD-MCNC: 108 MG/DL (ref 75–110)
GLUCOSE BLD-MCNC: 109 MG/DL (ref 75–110)
GLUCOSE BLD-MCNC: 109 MG/DL (ref 75–110)
GLUCOSE BLD-MCNC: 110 MG/DL (ref 70–99)
GLUCOSE BLD-MCNC: 110 MG/DL (ref 70–99)
GLUCOSE BLD-MCNC: 111 MG/DL (ref 75–110)
GLUCOSE BLD-MCNC: 111 MG/DL (ref 75–110)
GLUCOSE BLD-MCNC: 112 MG/DL (ref 70–99)
GLUCOSE BLD-MCNC: 112 MG/DL (ref 70–99)
GLUCOSE BLD-MCNC: 112 MG/DL (ref 75–110)
GLUCOSE BLD-MCNC: 113 MG/DL (ref 74–100)
GLUCOSE BLD-MCNC: 114 MG/DL (ref 75–110)
GLUCOSE BLD-MCNC: 115 MG/DL (ref 70–99)
GLUCOSE BLD-MCNC: 115 MG/DL (ref 75–110)
GLUCOSE BLD-MCNC: 116 MG/DL (ref 70–99)
GLUCOSE BLD-MCNC: 116 MG/DL (ref 75–110)
GLUCOSE BLD-MCNC: 116 MG/DL (ref 75–110)
GLUCOSE BLD-MCNC: 117 MG/DL (ref 75–110)
GLUCOSE BLD-MCNC: 118 MG/DL (ref 75–110)
GLUCOSE BLD-MCNC: 119 MG/DL (ref 70–99)
GLUCOSE BLD-MCNC: 119 MG/DL (ref 75–110)
GLUCOSE BLD-MCNC: 120 MG/DL (ref 75–110)
GLUCOSE BLD-MCNC: 121 MG/DL (ref 75–110)
GLUCOSE BLD-MCNC: 122 MG/DL
GLUCOSE BLD-MCNC: 122 MG/DL (ref 70–99)
GLUCOSE BLD-MCNC: 122 MG/DL (ref 70–99)
GLUCOSE BLD-MCNC: 122 MG/DL (ref 75–110)
GLUCOSE BLD-MCNC: 122 MG/DL (ref 75–110)
GLUCOSE BLD-MCNC: 123 MG/DL (ref 75–110)
GLUCOSE BLD-MCNC: 124 MG/DL (ref 74–100)
GLUCOSE BLD-MCNC: 124 MG/DL (ref 75–110)
GLUCOSE BLD-MCNC: 125 MG/DL (ref 75–110)
GLUCOSE BLD-MCNC: 125 MG/DL (ref 75–110)
GLUCOSE BLD-MCNC: 126 MG/DL (ref 70–99)
GLUCOSE BLD-MCNC: 126 MG/DL (ref 75–110)
GLUCOSE BLD-MCNC: 127 MG/DL (ref 75–110)
GLUCOSE BLD-MCNC: 129 MG/DL (ref 75–110)
GLUCOSE BLD-MCNC: 129 MG/DL (ref 75–110)
GLUCOSE BLD-MCNC: 130 MG/DL (ref 75–110)
GLUCOSE BLD-MCNC: 130 MG/DL (ref 75–110)
GLUCOSE BLD-MCNC: 131 MG/DL (ref 70–99)
GLUCOSE BLD-MCNC: 132 MG/DL (ref 75–110)
GLUCOSE BLD-MCNC: 133 MG/DL (ref 75–110)
GLUCOSE BLD-MCNC: 133 MG/DL (ref 75–110)
GLUCOSE BLD-MCNC: 134 MG/DL (ref 75–110)
GLUCOSE BLD-MCNC: 135 MG/DL (ref 70–99)
GLUCOSE BLD-MCNC: 135 MG/DL (ref 75–110)
GLUCOSE BLD-MCNC: 135 MG/DL (ref 75–110)
GLUCOSE BLD-MCNC: 136 MG/DL (ref 70–99)
GLUCOSE BLD-MCNC: 137 MG/DL (ref 75–110)
GLUCOSE BLD-MCNC: 137 MG/DL (ref 75–110)
GLUCOSE BLD-MCNC: 138 MG/DL (ref 74–100)
GLUCOSE BLD-MCNC: 138 MG/DL (ref 75–110)
GLUCOSE BLD-MCNC: 139 MG/DL (ref 70–99)
GLUCOSE BLD-MCNC: 141 MG/DL (ref 70–99)
GLUCOSE BLD-MCNC: 142 MG/DL (ref 70–99)
GLUCOSE BLD-MCNC: 142 MG/DL (ref 75–110)
GLUCOSE BLD-MCNC: 142 MG/DL (ref 75–110)
GLUCOSE BLD-MCNC: 143 MG/DL (ref 75–110)
GLUCOSE BLD-MCNC: 143 MG/DL (ref 75–110)
GLUCOSE BLD-MCNC: 144 MG/DL (ref 75–110)
GLUCOSE BLD-MCNC: 145 MG/DL (ref 70–99)
GLUCOSE BLD-MCNC: 145 MG/DL (ref 70–99)
GLUCOSE BLD-MCNC: 146 MG/DL (ref 75–110)
GLUCOSE BLD-MCNC: 147 MG/DL (ref 75–110)
GLUCOSE BLD-MCNC: 147 MG/DL (ref 75–110)
GLUCOSE BLD-MCNC: 148 MG/DL (ref 70–99)
GLUCOSE BLD-MCNC: 148 MG/DL (ref 75–110)
GLUCOSE BLD-MCNC: 149 MG/DL (ref 70–99)
GLUCOSE BLD-MCNC: 149 MG/DL (ref 75–110)
GLUCOSE BLD-MCNC: 149 MG/DL (ref 75–110)
GLUCOSE BLD-MCNC: 150 MG/DL (ref 75–110)
GLUCOSE BLD-MCNC: 151 MG/DL (ref 70–99)
GLUCOSE BLD-MCNC: 151 MG/DL (ref 75–110)
GLUCOSE BLD-MCNC: 151 MG/DL (ref 75–110)
GLUCOSE BLD-MCNC: 152 MG/DL (ref 70–99)
GLUCOSE BLD-MCNC: 152 MG/DL (ref 74–100)
GLUCOSE BLD-MCNC: 152 MG/DL (ref 75–110)
GLUCOSE BLD-MCNC: 153 MG/DL (ref 70–99)
GLUCOSE BLD-MCNC: 153 MG/DL (ref 75–110)
GLUCOSE BLD-MCNC: 153 MG/DL (ref 75–110)
GLUCOSE BLD-MCNC: 155 MG/DL (ref 70–99)
GLUCOSE BLD-MCNC: 156 MG/DL (ref 75–110)
GLUCOSE BLD-MCNC: 157 MG/DL (ref 75–110)
GLUCOSE BLD-MCNC: 162 MG/DL (ref 70–99)
GLUCOSE BLD-MCNC: 163 MG/DL (ref 75–110)
GLUCOSE BLD-MCNC: 166 MG/DL (ref 75–110)
GLUCOSE BLD-MCNC: 172 MG/DL (ref 75–110)
GLUCOSE BLD-MCNC: 175 MG/DL (ref 70–99)
GLUCOSE BLD-MCNC: 175 MG/DL (ref 75–110)
GLUCOSE BLD-MCNC: 175 MG/DL (ref 75–110)
GLUCOSE BLD-MCNC: 176 MG/DL (ref 70–99)
GLUCOSE BLD-MCNC: 176 MG/DL (ref 75–110)
GLUCOSE BLD-MCNC: 177 MG/DL (ref 70–99)
GLUCOSE BLD-MCNC: 191 MG/DL (ref 70–99)
GLUCOSE BLD-MCNC: 192 MG/DL (ref 75–110)
GLUCOSE BLD-MCNC: 196 MG/DL (ref 70–99)
GLUCOSE BLD-MCNC: 212 MG/DL (ref 75–110)
GLUCOSE BLD-MCNC: 231 MG/DL (ref 70–99)
GLUCOSE BLD-MCNC: 71 MG/DL (ref 75–110)
GLUCOSE BLD-MCNC: 77 MG/DL (ref 75–110)
GLUCOSE BLD-MCNC: 79 MG/DL (ref 75–110)
GLUCOSE BLD-MCNC: 82 MG/DL (ref 75–110)
GLUCOSE BLD-MCNC: 84 MG/DL (ref 75–110)
GLUCOSE BLD-MCNC: 85 MG/DL (ref 75–110)
GLUCOSE BLD-MCNC: 87 MG/DL (ref 75–110)
GLUCOSE BLD-MCNC: 89 MG/DL (ref 70–99)
GLUCOSE BLD-MCNC: 90 MG/DL (ref 75–110)
GLUCOSE BLD-MCNC: 90 MG/DL (ref 75–110)
GLUCOSE BLD-MCNC: 92 MG/DL (ref 75–110)
GLUCOSE BLD-MCNC: 93 MG/DL (ref 75–110)
GLUCOSE BLD-MCNC: 96 MG/DL (ref 75–110)
GLUCOSE BLD-MCNC: 97 MG/DL (ref 70–99)
GLUCOSE BLD-MCNC: 97 MG/DL (ref 75–110)
GLUCOSE BLD-MCNC: 98 MG/DL (ref 75–110)
GLUCOSE BLD-MCNC: 99 MG/DL (ref 75–110)
GLUCOSE, FLUID: 107 MG/DL
GLUCOSE, FLUID: 135 MG/DL
HAPTOGLOBIN: 84 MG/DL (ref 30–200)
HBA1C MFR BLD: 6.6 %
HCT VFR BLD CALC: 23.7 % (ref 40.7–50.3)
HCT VFR BLD CALC: 24.8 % (ref 40.7–50.3)
HCT VFR BLD CALC: 24.9 % (ref 40.7–50.3)
HCT VFR BLD CALC: 25.1 % (ref 40.7–50.3)
HCT VFR BLD CALC: 27.2 % (ref 40.7–50.3)
HCT VFR BLD CALC: 27.4 % (ref 41–53)
HCT VFR BLD CALC: 27.5 % (ref 41–53)
HCT VFR BLD CALC: 27.6 % (ref 40.7–50.3)
HCT VFR BLD CALC: 27.9 % (ref 41–53)
HCT VFR BLD CALC: 28.5 % (ref 41–53)
HCT VFR BLD CALC: 28.5 % (ref 41–53)
HCT VFR BLD CALC: 28.6 % (ref 40.7–50.3)
HCT VFR BLD CALC: 28.8 % (ref 40.7–50.3)
HCT VFR BLD CALC: 28.9 % (ref 41–53)
HCT VFR BLD CALC: 29 % (ref 41–53)
HCT VFR BLD CALC: 29.5 % (ref 41–53)
HCT VFR BLD CALC: 29.8 % (ref 41–53)
HCT VFR BLD CALC: 29.9 % (ref 41–53)
HCT VFR BLD CALC: 30.2 % (ref 41–53)
HCT VFR BLD CALC: 30.4 % (ref 41–53)
HCT VFR BLD CALC: 30.5 % (ref 41–53)
HCT VFR BLD CALC: 30.7 % (ref 41–53)
HCT VFR BLD CALC: 30.8 % (ref 41–53)
HCT VFR BLD CALC: 30.9 % (ref 41–53)
HCT VFR BLD CALC: 31.1 % (ref 40.7–50.3)
HCT VFR BLD CALC: 31.2 % (ref 41–53)
HCT VFR BLD CALC: 31.4 % (ref 41–53)
HCT VFR BLD CALC: 31.6 % (ref 41–53)
HCT VFR BLD CALC: 31.8 % (ref 41–53)
HCT VFR BLD CALC: 32.1 % (ref 40.7–50.3)
HCT VFR BLD CALC: 32.6 % (ref 41–53)
HCT VFR BLD CALC: 32.8 % (ref 40.7–50.3)
HCT VFR BLD CALC: 33.5 % (ref 40.7–50.3)
HCT VFR BLD CALC: 33.5 % (ref 41–53)
HCT VFR BLD CALC: 33.6 % (ref 41–53)
HCT VFR BLD CALC: 33.8 % (ref 41–53)
HCT VFR BLD CALC: 33.9 % (ref 40.7–50.3)
HCT VFR BLD CALC: 33.9 % (ref 40.7–50.3)
HCT VFR BLD CALC: 34 % (ref 41–53)
HCT VFR BLD CALC: 35.4 % (ref 40.7–50.3)
HCT VFR BLD CALC: 39.2 % (ref 41–53)
HCT VFR BLD CALC: 41.9 % (ref 40.7–50.3)
HEMOCCULT SP1 STL QL: NEGATIVE
HEMOCCULT SP2 STL QL: NORMAL
HEMOCCULT SP3 STL QL: NORMAL
HEMOGLOBIN: 10 G/DL (ref 13.5–17.5)
HEMOGLOBIN: 10.1 G/DL (ref 13.5–17.5)
HEMOGLOBIN: 10.4 G/DL (ref 13.5–17.5)
HEMOGLOBIN: 10.5 G/DL (ref 13.5–17.5)
HEMOGLOBIN: 10.6 G/DL (ref 13.5–17.5)
HEMOGLOBIN: 10.7 G/DL (ref 13.5–17.5)
HEMOGLOBIN: 10.8 G/DL (ref 13.5–17.5)
HEMOGLOBIN: 11.1 G/DL (ref 13.5–17.5)
HEMOGLOBIN: 12.7 G/DL (ref 13.5–17.5)
HEMOGLOBIN: 12.9 G/DL (ref 13–17)
HEMOGLOBIN: 6.8 G/DL (ref 13–17)
HEMOGLOBIN: 7.2 G/DL (ref 13–17)
HEMOGLOBIN: 7.2 G/DL (ref 13–17)
HEMOGLOBIN: 7.3 G/DL (ref 13–17)
HEMOGLOBIN: 7.6 G/DL (ref 13–17)
HEMOGLOBIN: 8.2 G/DL (ref 13.5–17.5)
HEMOGLOBIN: 8.2 G/DL (ref 13–17)
HEMOGLOBIN: 8.2 G/DL (ref 13–17)
HEMOGLOBIN: 8.4 G/DL (ref 13.5–17.5)
HEMOGLOBIN: 8.4 G/DL (ref 13–17)
HEMOGLOBIN: 8.4 G/DL (ref 13–17)
HEMOGLOBIN: 8.6 G/DL (ref 13.5–17.5)
HEMOGLOBIN: 8.7 G/DL (ref 13.5–17.5)
HEMOGLOBIN: 8.8 G/DL (ref 13.5–17.5)
HEMOGLOBIN: 8.9 G/DL (ref 13.5–17.5)
HEMOGLOBIN: 8.9 G/DL (ref 13–17)
HEMOGLOBIN: 9 G/DL (ref 13.5–17.5)
HEMOGLOBIN: 9 G/DL (ref 13.5–17.5)
HEMOGLOBIN: 9.1 G/DL (ref 13.5–17.5)
HEMOGLOBIN: 9.4 G/DL (ref 13–17)
HEMOGLOBIN: 9.5 G/DL (ref 13.5–17.5)
HEMOGLOBIN: 9.5 G/DL (ref 13–17)
HEMOGLOBIN: 9.6 G/DL (ref 13.5–17.5)
HEMOGLOBIN: 9.6 G/DL (ref 13.5–17.5)
HEMOGLOBIN: 9.7 G/DL (ref 13.5–17.5)
HEMOGLOBIN: 9.8 G/DL (ref 13.5–17.5)
HEMOGLOBIN: 9.9 G/DL (ref 13.5–17.5)
IMMATURE GRANULOCYTES: 1 %
IMMATURE GRANULOCYTES: ABNORMAL %
INR BLD: 2
INR BLD: 2.1
IRON SATURATION: 34 % (ref 20–55)
IRON: 36 UG/DL (ref 59–158)
LACTATE DEHYDROGENASE, FLUID: 193 U/L
LACTATE DEHYDROGENASE, FLUID: 202 U/L
LACTATE DEHYDROGENASE: 167 U/L (ref 135–225)
LACTATE DEHYDROGENASE: 180 U/L (ref 135–225)
LACTATE DEHYDROGENASE: 181 U/L (ref 135–225)
LACTATE DEHYDROGENASE: 242 U/L (ref 135–225)
LACTIC ACID, SEPSIS WHOLE BLOOD: 1.5 MMOL/L (ref 0.5–1.9)
LACTIC ACID, SEPSIS WHOLE BLOOD: 2.1 MMOL/L (ref 0.5–1.9)
LACTIC ACID, SEPSIS WHOLE BLOOD: 2.1 MMOL/L (ref 0.5–1.9)
LACTIC ACID, SEPSIS WHOLE BLOOD: ABNORMAL MMOL/L (ref 0.5–1.9)
LACTIC ACID, SEPSIS: 3.1 MMOL/L (ref 0.5–1.9)
LACTIC ACID, SEPSIS: ABNORMAL MMOL/L (ref 0.5–1.9)
LACTIC ACID, SEPSIS: ABNORMAL MMOL/L (ref 0.5–1.9)
LACTIC ACID, SEPSIS: NORMAL MMOL/L (ref 0.5–1.9)
LACTIC ACID, WHOLE BLOOD: 2.3 MMOL/L (ref 0.7–2.1)
LACTIC ACID: 1.6 MMOL/L (ref 0.5–2.2)
LACTOFERRIN, QUAL: ABNORMAL
LIPASE: 10 U/L (ref 13–60)
LV EF: 25 %
LVEF MODALITY: NORMAL
LYMPHOCYTES # BLD: 0 % (ref 24–44)
LYMPHOCYTES # BLD: 1 % (ref 24–44)
LYMPHOCYTES # BLD: 12 % (ref 24–44)
LYMPHOCYTES # BLD: 13 % (ref 24–44)
LYMPHOCYTES # BLD: 14 % (ref 24–44)
LYMPHOCYTES # BLD: 17 % (ref 24–44)
LYMPHOCYTES # BLD: 2 % (ref 24–43)
LYMPHOCYTES # BLD: 2 % (ref 24–44)
LYMPHOCYTES # BLD: 3 % (ref 24–44)
LYMPHOCYTES # BLD: 4 % (ref 24–43)
LYMPHOCYTES # BLD: 4 % (ref 24–44)
LYMPHOCYTES # BLD: 4 % (ref 24–44)
LYMPHOCYTES # BLD: 5 % (ref 24–44)
LYMPHOCYTES # BLD: 5 % (ref 24–44)
LYMPHOCYTES # BLD: 6 % (ref 24–43)
LYMPHOCYTES # BLD: 6 % (ref 24–43)
LYMPHOCYTES # BLD: 6 % (ref 24–44)
LYMPHOCYTES # BLD: 7 % (ref 24–44)
LYMPHOCYTES # BLD: 8 % (ref 24–44)
LYMPHOCYTES # BLD: 9 % (ref 24–44)
LYMPHOCYTES, BODY FLUID: 28 %
LYMPHOCYTES, BODY FLUID: 32 %
Lab: ABNORMAL
Lab: NORMAL
MAGNESIUM: 1.5 MG/DL (ref 1.6–2.6)
MAGNESIUM: 1.6 MG/DL (ref 1.6–2.6)
MAGNESIUM: 1.7 MG/DL (ref 1.6–2.6)
MAGNESIUM: 1.8 MG/DL (ref 1.6–2.6)
MAGNESIUM: 1.8 MG/DL (ref 1.6–2.6)
MAGNESIUM: 2.1 MG/DL (ref 1.6–2.6)
MAGNESIUM: 2.1 MG/DL (ref 1.6–2.6)
MAGNESIUM: 2.3 MG/DL (ref 1.6–2.6)
MAGNESIUM: 2.5 MG/DL (ref 1.6–2.6)
MAGNESIUM: 2.6 MG/DL (ref 1.6–2.6)
MCH RBC QN AUTO: 25.8 PG (ref 26–34)
MCH RBC QN AUTO: 26.1 PG (ref 26–34)
MCH RBC QN AUTO: 26.3 PG (ref 26–34)
MCH RBC QN AUTO: 26.3 PG (ref 26–34)
MCH RBC QN AUTO: 26.5 PG (ref 26–34)
MCH RBC QN AUTO: 26.6 PG (ref 26–34)
MCH RBC QN AUTO: 26.8 PG (ref 25.2–33.5)
MCH RBC QN AUTO: 26.8 PG (ref 26–34)
MCH RBC QN AUTO: 26.9 PG (ref 26–34)
MCH RBC QN AUTO: 27 PG (ref 26–34)
MCH RBC QN AUTO: 27.1 PG (ref 26–34)
MCH RBC QN AUTO: 27.2 PG (ref 26–34)
MCH RBC QN AUTO: 27.3 PG (ref 26–34)
MCH RBC QN AUTO: 27.4 PG (ref 25.2–33.5)
MCH RBC QN AUTO: 27.5 PG (ref 25.2–33.5)
MCH RBC QN AUTO: 27.6 PG (ref 25.2–33.5)
MCH RBC QN AUTO: 27.9 PG (ref 25.2–33.5)
MCH RBC QN AUTO: 28 PG (ref 25.2–33.5)
MCH RBC QN AUTO: 28.1 PG (ref 25.2–33.5)
MCH RBC QN AUTO: 28.2 PG (ref 25.2–33.5)
MCH RBC QN AUTO: 28.3 PG (ref 25.2–33.5)
MCH RBC QN AUTO: 28.4 PG (ref 25.2–33.5)
MCH RBC QN AUTO: 28.4 PG (ref 26–34)
MCH RBC QN AUTO: 28.5 PG (ref 25.2–33.5)
MCH RBC QN AUTO: 28.7 PG (ref 25.2–33.5)
MCH RBC QN AUTO: 28.7 PG (ref 26–34)
MCH RBC QN AUTO: 28.9 PG (ref 26–34)
MCH RBC QN AUTO: 28.9 PG (ref 26–34)
MCH RBC QN AUTO: 29 PG (ref 25.2–33.5)
MCHC RBC AUTO-ENTMCNC: 26.8 G/DL (ref 28.4–34.8)
MCHC RBC AUTO-ENTMCNC: 27 G/DL (ref 28.4–34.8)
MCHC RBC AUTO-ENTMCNC: 27.7 G/DL (ref 28–38)
MCHC RBC AUTO-ENTMCNC: 27.9 G/DL (ref 28.4–34.8)
MCHC RBC AUTO-ENTMCNC: 28.1 G/DL (ref 28.4–34.8)
MCHC RBC AUTO-ENTMCNC: 28.5 G/DL (ref 28.4–34.8)
MCHC RBC AUTO-ENTMCNC: 28.5 G/DL (ref 28.4–34.8)
MCHC RBC AUTO-ENTMCNC: 28.7 G/DL (ref 28.4–34.8)
MCHC RBC AUTO-ENTMCNC: 28.7 G/DL (ref 28.4–34.8)
MCHC RBC AUTO-ENTMCNC: 28.9 G/DL (ref 28.4–34.8)
MCHC RBC AUTO-ENTMCNC: 29 G/DL (ref 28.4–34.8)
MCHC RBC AUTO-ENTMCNC: 29.1 G/DL (ref 28.4–34.8)
MCHC RBC AUTO-ENTMCNC: 29.4 G/DL (ref 28.4–34.8)
MCHC RBC AUTO-ENTMCNC: 29.7 G/DL (ref 28.4–34.8)
MCHC RBC AUTO-ENTMCNC: 29.9 G/DL (ref 31–37)
MCHC RBC AUTO-ENTMCNC: 29.9 G/DL (ref 31–37)
MCHC RBC AUTO-ENTMCNC: 30.1 G/DL (ref 31–37)
MCHC RBC AUTO-ENTMCNC: 30.2 G/DL (ref 31–37)
MCHC RBC AUTO-ENTMCNC: 30.3 G/DL (ref 31–37)
MCHC RBC AUTO-ENTMCNC: 30.6 G/DL (ref 31–37)
MCHC RBC AUTO-ENTMCNC: 30.7 G/DL (ref 31–37)
MCHC RBC AUTO-ENTMCNC: 30.8 G/DL (ref 28.4–34.8)
MCHC RBC AUTO-ENTMCNC: 30.8 G/DL (ref 31–37)
MCHC RBC AUTO-ENTMCNC: 31.1 G/DL (ref 31–37)
MCHC RBC AUTO-ENTMCNC: 31.1 G/DL (ref 31–37)
MCHC RBC AUTO-ENTMCNC: 31.3 G/DL (ref 31–37)
MCHC RBC AUTO-ENTMCNC: 31.3 G/DL (ref 31–37)
MCHC RBC AUTO-ENTMCNC: 31.5 G/DL (ref 31–37)
MCHC RBC AUTO-ENTMCNC: 31.5 G/DL (ref 31–37)
MCHC RBC AUTO-ENTMCNC: 31.8 G/DL (ref 31–37)
MCHC RBC AUTO-ENTMCNC: 31.8 G/DL (ref 31–37)
MCHC RBC AUTO-ENTMCNC: 31.9 G/DL (ref 31–37)
MCHC RBC AUTO-ENTMCNC: 32.1 G/DL (ref 31–37)
MCHC RBC AUTO-ENTMCNC: 32.1 G/DL (ref 31–37)
MCHC RBC AUTO-ENTMCNC: 33 G/DL (ref 31–37)
MCV RBC AUTO: 100.3 FL (ref 82.6–102.9)
MCV RBC AUTO: 101.1 FL (ref 82.6–102.9)
MCV RBC AUTO: 101.5 FL (ref 82.6–102.9)
MCV RBC AUTO: 102.6 FL (ref 82.6–102.9)
MCV RBC AUTO: 103.3 FL (ref 82.6–102.9)
MCV RBC AUTO: 104.6 FL (ref 82.6–102.9)
MCV RBC AUTO: 82.6 FL (ref 80–100)
MCV RBC AUTO: 84.1 FL (ref 80–100)
MCV RBC AUTO: 84.1 FL (ref 80–100)
MCV RBC AUTO: 84.2 FL (ref 80–100)
MCV RBC AUTO: 84.7 FL (ref 80–100)
MCV RBC AUTO: 85 FL (ref 80–100)
MCV RBC AUTO: 85 FL (ref 80–100)
MCV RBC AUTO: 85.3 FL (ref 80–100)
MCV RBC AUTO: 85.4 FL (ref 80–100)
MCV RBC AUTO: 85.5 FL (ref 80–100)
MCV RBC AUTO: 85.9 FL (ref 80–100)
MCV RBC AUTO: 86.2 FL (ref 80–100)
MCV RBC AUTO: 86.5 FL (ref 80–100)
MCV RBC AUTO: 86.8 FL (ref 80–100)
MCV RBC AUTO: 89.5 FL (ref 82.6–102.9)
MCV RBC AUTO: 90.2 FL (ref 82.6–102.9)
MCV RBC AUTO: 93.8 FL (ref 80–100)
MCV RBC AUTO: 94 FL (ref 80–100)
MCV RBC AUTO: 94.1 FL (ref 80–100)
MCV RBC AUTO: 95 FL (ref 80–100)
MCV RBC AUTO: 95.2 FL (ref 80–100)
MCV RBC AUTO: 95.3 FL (ref 82.6–102.9)
MCV RBC AUTO: 96.3 FL (ref 82.6–102.9)
MCV RBC AUTO: 96.5 FL (ref 82.6–102.9)
MCV RBC AUTO: 96.7 FL (ref 80–100)
MCV RBC AUTO: 96.9 FL (ref 82.6–102.9)
MCV RBC AUTO: 97.3 FL (ref 82.6–102.9)
MCV RBC AUTO: 97.5 FL (ref 82.6–102.9)
MCV RBC AUTO: 99.7 FL (ref 82.6–102.9)
METAMYELOCYTES ABSOLUTE COUNT: 0.5 K/UL
METAMYELOCYTES: 2 %
MODE: ABNORMAL
MODE: NORMAL
MONOCYTE, FLUID: 15 %
MONOCYTE, FLUID: 6 %
MONOCYTES # BLD: 12 % (ref 1–7)
MONOCYTES # BLD: 12 % (ref 1–7)
MONOCYTES # BLD: 13 % (ref 1–7)
MONOCYTES # BLD: 15 % (ref 1–7)
MONOCYTES # BLD: 16 % (ref 1–7)
MONOCYTES # BLD: 2 % (ref 1–7)
MONOCYTES # BLD: 3 % (ref 1–7)
MONOCYTES # BLD: 3 % (ref 1–7)
MONOCYTES # BLD: 3 % (ref 3–12)
MONOCYTES # BLD: 4 % (ref 1–7)
MONOCYTES # BLD: 4 % (ref 3–12)
MONOCYTES # BLD: 5 % (ref 1–7)
MONOCYTES # BLD: 5 % (ref 1–7)
MONOCYTES # BLD: 5 % (ref 3–12)
MONOCYTES # BLD: 6 % (ref 1–7)
MONOCYTES # BLD: 6 % (ref 3–12)
MONOCYTES # BLD: 7 % (ref 1–7)
MONOCYTES # BLD: 7 % (ref 3–12)
MONOCYTES # BLD: 7 % (ref 3–12)
MONOCYTES # BLD: 8 % (ref 1–7)
MONOCYTES # BLD: 9 % (ref 1–7)
MORPHOLOGY: ABNORMAL
MRSA, DNA, NASAL: NORMAL
MRSA, DNA, NASAL: NORMAL
NEGATIVE BASE EXCESS, ART: ABNORMAL (ref 0–2)
NEGATIVE BASE EXCESS, ART: NORMAL (ref 0–2)
NEUTROPHIL, FLUID: 55 %
NEUTROPHIL, FLUID: 62 %
NRBC AUTOMATED: 0 PER 100 WBC
NRBC AUTOMATED: 0.1 PER 100 WBC
NRBC AUTOMATED: 0.3 PER 100 WBC
NRBC AUTOMATED: 2.2 PER 100 WBC
NRBC AUTOMATED: 3 PER 100 WBC
NRBC AUTOMATED: ABNORMAL PER 100 WBC
O2 DEVICE/FLOW/%: ABNORMAL
O2 DEVICE/FLOW/%: NORMAL
OTHER CELLS FLUID: ABNORMAL %
OTHER CELLS FLUID: ABNORMAL %
PARTIAL THROMBOPLASTIN TIME: 37 SEC (ref 20.5–30.5)
PARTIAL THROMBOPLASTIN TIME: 49.1 SEC (ref 24–36)
PATHOLOGIST REVIEW: NORMAL
PATIENT TEMP: ABNORMAL
PATIENT TEMP: NORMAL
PDW BLD-RTO: 18.7 % (ref 11.8–14.4)
PDW BLD-RTO: 18.8 % (ref 11.8–14.4)
PDW BLD-RTO: 18.9 % (ref 11.8–14.4)
PDW BLD-RTO: 18.9 % (ref 11.8–14.4)
PDW BLD-RTO: 19.1 % (ref 11.8–14.4)
PDW BLD-RTO: 19.2 % (ref 11.8–14.4)
PDW BLD-RTO: 19.4 % (ref 11.8–14.4)
PDW BLD-RTO: 20.1 % (ref 11.8–14.4)
PDW BLD-RTO: 20.3 % (ref 11.5–14.9)
PDW BLD-RTO: 20.3 % (ref 11.5–14.9)
PDW BLD-RTO: 20.3 % (ref 11.8–14.4)
PDW BLD-RTO: 20.6 % (ref 11.5–14.9)
PDW BLD-RTO: 20.7 % (ref 11.5–14.9)
PDW BLD-RTO: 20.7 % (ref 11.8–14.4)
PDW BLD-RTO: 20.8 % (ref 11.5–14.9)
PDW BLD-RTO: 20.9 % (ref 11.5–14.9)
PDW BLD-RTO: 20.9 % (ref 11.5–14.9)
PDW BLD-RTO: 21 % (ref 11.5–14.9)
PDW BLD-RTO: 21 % (ref 11.8–14.4)
PDW BLD-RTO: 21.1 % (ref 11.5–14.9)
PDW BLD-RTO: 21.1 % (ref 11.5–14.9)
PDW BLD-RTO: 21.5 % (ref 11.5–14.9)
PDW BLD-RTO: 21.6 % (ref 11.5–14.9)
PDW BLD-RTO: 22 % (ref 11.5–14.9)
PDW BLD-RTO: 23 % (ref 11.8–14.4)
PDW BLD-RTO: 23.9 % (ref 11.8–14.4)
PDW BLD-RTO: 23.9 % (ref 11.8–14.4)
PDW BLD-RTO: 25.6 % (ref 11.5–14.9)
PDW BLD-RTO: 25.6 % (ref 11.5–14.9)
PDW BLD-RTO: 26.1 % (ref 11.5–14.9)
PDW BLD-RTO: 26.3 % (ref 11.5–14.9)
PDW BLD-RTO: 26.7 % (ref 11.8–14.4)
PH FLUID: 8
PH FLUID: 8
PHOSPHORUS: 3.5 MG/DL (ref 2.5–4.5)
PHOSPHORUS: 5.1 MG/DL (ref 2.5–4.5)
PHOSPHORUS: 5.4 MG/DL (ref 2.5–4.5)
PHOSPHORUS: 5.6 MG/DL (ref 2.5–4.5)
PHOSPHORUS: 5.7 MG/DL (ref 2.5–4.5)
PHOSPHORUS: 5.8 MG/DL (ref 2.5–4.5)
PHOSPHORUS: 6.7 MG/DL (ref 2.5–4.5)
PHOSPHORUS: 6.8 MG/DL (ref 2.5–4.5)
PLATELET # BLD: 114 K/UL (ref 150–450)
PLATELET # BLD: 122 K/UL (ref 138–453)
PLATELET # BLD: 128 K/UL (ref 138–453)
PLATELET # BLD: 135 K/UL (ref 150–450)
PLATELET # BLD: 146 K/UL (ref 138–453)
PLATELET # BLD: 158 K/UL (ref 138–453)
PLATELET # BLD: 167 K/UL (ref 138–453)
PLATELET # BLD: 184 K/UL (ref 138–453)
PLATELET # BLD: 40 K/UL (ref 150–450)
PLATELET # BLD: 41 K/UL (ref 150–450)
PLATELET # BLD: 41 K/UL (ref 150–450)
PLATELET # BLD: 44 K/UL (ref 150–450)
PLATELET # BLD: 45 K/UL (ref 150–450)
PLATELET # BLD: 45 K/UL (ref 150–450)
PLATELET # BLD: 46 K/UL (ref 150–450)
PLATELET # BLD: 51 K/UL (ref 150–450)
PLATELET # BLD: 52 K/UL (ref 150–450)
PLATELET # BLD: 53 K/UL (ref 150–450)
PLATELET # BLD: 53 K/UL (ref 150–450)
PLATELET # BLD: 55 K/UL (ref 150–450)
PLATELET # BLD: 60 K/UL (ref 150–450)
PLATELET # BLD: 63 K/UL (ref 150–450)
PLATELET # BLD: 66 K/UL (ref 150–450)
PLATELET # BLD: 69 K/UL (ref 138–453)
PLATELET # BLD: 72 K/UL (ref 150–450)
PLATELET # BLD: 74 K/UL (ref 150–450)
PLATELET # BLD: 82 K/UL (ref 138–453)
PLATELET # BLD: 84 K/UL (ref 150–450)
PLATELET # BLD: 94 K/UL (ref 150–450)
PLATELET # BLD: 96 K/UL (ref 150–450)
PLATELET # BLD: ABNORMAL K/UL (ref 138–453)
PLATELET ESTIMATE: ABNORMAL
PLATELET, FLUORESCENCE: 121 K/UL (ref 138–453)
PLATELET, FLUORESCENCE: NORMAL K/UL (ref 138–453)
PLATELET, IMMATURE FRACTION: 4.9 % (ref 1.1–10.3)
PLATELET, IMMATURE FRACTION: NORMAL % (ref 1.1–10.3)
PMV BLD AUTO: 10 FL (ref 6–12)
PMV BLD AUTO: 10.2 FL (ref 6–12)
PMV BLD AUTO: 10.2 FL (ref 6–12)
PMV BLD AUTO: 10.3 FL (ref 6–12)
PMV BLD AUTO: 10.6 FL (ref 6–12)
PMV BLD AUTO: 10.7 FL (ref 6–12)
PMV BLD AUTO: 10.7 FL (ref 6–12)
PMV BLD AUTO: 10.8 FL (ref 6–12)
PMV BLD AUTO: 10.8 FL (ref 6–12)
PMV BLD AUTO: 10.9 FL (ref 6–12)
PMV BLD AUTO: 11.2 FL (ref 8.1–13.5)
PMV BLD AUTO: 11.3 FL (ref 8.1–13.5)
PMV BLD AUTO: 11.4 FL (ref 8.1–13.5)
PMV BLD AUTO: 11.5 FL (ref 6–12)
PMV BLD AUTO: 11.8 FL (ref 6–12)
PMV BLD AUTO: 11.8 FL (ref 8.1–13.5)
PMV BLD AUTO: 12 FL (ref 8.1–13.5)
PMV BLD AUTO: 12.1 FL (ref 8.1–13.5)
PMV BLD AUTO: 12.2 FL (ref 8.1–13.5)
PMV BLD AUTO: 12.3 FL (ref 6–12)
PMV BLD AUTO: 13 FL (ref 8.1–13.5)
PMV BLD AUTO: 8.8 FL (ref 6–12)
PMV BLD AUTO: 9.3 FL (ref 6–12)
PMV BLD AUTO: 9.6 FL (ref 6–12)
PMV BLD AUTO: 9.7 FL (ref 6–12)
PMV BLD AUTO: 9.9 FL (ref 6–12)
PMV BLD AUTO: ABNORMAL FL (ref 8.1–13.5)
POC HCO3: 26.2 MMOL/L (ref 21–28)
POC HCO3: 26.5 MMOL/L (ref 21–28)
POC HCO3: 27.3 MMOL/L (ref 21–28)
POC HCO3: 28 MMOL/L (ref 21–28)
POC LACTIC ACID: 1.45 MMOL/L (ref 0.56–1.39)
POC O2 SATURATION: 96 % (ref 94–98)
POC O2 SATURATION: 96 % (ref 94–98)
POC O2 SATURATION: 98 % (ref 94–98)
POC O2 SATURATION: 99 % (ref 94–98)
POC PCO2 TEMP: ABNORMAL MM HG
POC PCO2 TEMP: NORMAL MM HG
POC PCO2: 40.3 MM HG (ref 35–48)
POC PCO2: 40.9 MM HG (ref 35–48)
POC PCO2: 43.3 MM HG (ref 35–48)
POC PCO2: 52.9 MM HG (ref 35–48)
POC PH TEMP: ABNORMAL
POC PH TEMP: NORMAL
POC PH: 7.31 (ref 7.35–7.45)
POC PH: 7.39 (ref 7.35–7.45)
POC PH: 7.43 (ref 7.35–7.45)
POC PH: 7.45 (ref 7.35–7.45)
POC PO2 TEMP: ABNORMAL MM HG
POC PO2 TEMP: NORMAL MM HG
POC PO2: 108.9 MM HG (ref 83–108)
POC PO2: 116.9 MM HG (ref 83–108)
POC PO2: 85.6 MM HG (ref 83–108)
POC PO2: 87.4 MM HG (ref 83–108)
POSITIVE BASE EXCESS, ART: 0 (ref 0–3)
POSITIVE BASE EXCESS, ART: 1 (ref 0–3)
POSITIVE BASE EXCESS, ART: 3 (ref 0–3)
POSITIVE BASE EXCESS, ART: 4 (ref 0–3)
POTASSIUM SERPL-SCNC: 3.5 MMOL/L (ref 3.7–5.3)
POTASSIUM SERPL-SCNC: 3.5 MMOL/L (ref 3.7–5.3)
POTASSIUM SERPL-SCNC: 3.6 MMOL/L (ref 3.7–5.3)
POTASSIUM SERPL-SCNC: 3.7 MMOL/L (ref 3.7–5.3)
POTASSIUM SERPL-SCNC: 3.7 MMOL/L (ref 3.7–5.3)
POTASSIUM SERPL-SCNC: 3.8 MMOL/L (ref 3.7–5.3)
POTASSIUM SERPL-SCNC: 3.9 MMOL/L (ref 3.7–5.3)
POTASSIUM SERPL-SCNC: 4 MMOL/L (ref 3.7–5.3)
POTASSIUM SERPL-SCNC: 4.1 MMOL/L (ref 3.7–5.3)
POTASSIUM SERPL-SCNC: 4.2 MMOL/L (ref 3.7–5.3)
POTASSIUM SERPL-SCNC: 4.3 MMOL/L (ref 3.7–5.3)
POTASSIUM SERPL-SCNC: 4.4 MMOL/L (ref 3.7–5.3)
POTASSIUM SERPL-SCNC: 4.5 MMOL/L (ref 3.7–5.3)
POTASSIUM SERPL-SCNC: 4.6 MMOL/L (ref 3.7–5.3)
POTASSIUM SERPL-SCNC: 4.6 MMOL/L (ref 3.7–5.3)
POTASSIUM SERPL-SCNC: 4.7 MMOL/L (ref 3.7–5.3)
POTASSIUM SERPL-SCNC: 4.7 MMOL/L (ref 3.7–5.3)
POTASSIUM SERPL-SCNC: 4.8 MMOL/L (ref 3.7–5.3)
POTASSIUM SERPL-SCNC: 4.8 MMOL/L (ref 3.7–5.3)
POTASSIUM SERPL-SCNC: 4.9 MMOL/L (ref 3.7–5.3)
POTASSIUM SERPL-SCNC: 4.9 MMOL/L (ref 3.7–5.3)
POTASSIUM SERPL-SCNC: 5 MMOL/L (ref 3.7–5.3)
POTASSIUM SERPL-SCNC: 5 MMOL/L (ref 3.7–5.3)
POTASSIUM SERPL-SCNC: 5.2 MMOL/L (ref 3.7–5.3)
POTASSIUM SERPL-SCNC: 5.2 MMOL/L (ref 3.7–5.3)
POTASSIUM SERPL-SCNC: 5.7 MMOL/L (ref 3.7–5.3)
POTASSIUM SERPL-SCNC: 5.8 MMOL/L (ref 3.7–5.3)
POTASSIUM SERPL-SCNC: 6.1 MMOL/L (ref 3.7–5.3)
POTASSIUM SERPL-SCNC: 6.9 MMOL/L (ref 3.7–5.3)
POTASSIUM SERPL-SCNC: ABNORMAL MMOL/L (ref 3.7–5.3)
PRO-BNP: ABNORMAL PG/ML
PROCALCITONIN: 25.7 NG/ML
PROCALCITONIN: 4.55 NG/ML
PROTHROMBIN TIME: 20.4 SEC (ref 9.1–12.3)
PROTHROMBIN TIME: 23.8 SEC (ref 11.8–14.6)
RBC # BLD: 2.43 M/UL (ref 4.21–5.77)
RBC # BLD: 2.55 M/UL (ref 4.21–5.77)
RBC # BLD: 2.57 M/UL (ref 4.21–5.77)
RBC # BLD: 2.6 M/UL (ref 4.21–5.77)
RBC # BLD: 2.69 M/UL (ref 4.21–5.77)
RBC # BLD: 2.89 M/UL (ref 4.21–5.77)
RBC # BLD: 2.89 M/UL (ref 4.5–5.9)
RBC # BLD: 2.99 M/UL (ref 4.5–5.9)
RBC # BLD: 3 M/UL (ref 4.21–5.77)
RBC # BLD: 3.01 M/UL (ref 4.21–5.77)
RBC # BLD: 3.06 M/UL (ref 4.21–5.77)
RBC # BLD: 3.07 M/UL (ref 4.21–5.77)
RBC # BLD: 3.12 M/UL (ref 4.5–5.9)
RBC # BLD: 3.14 M/UL (ref 4.5–5.9)
RBC # BLD: 3.17 M/UL (ref 4.5–5.9)
RBC # BLD: 3.18 M/UL (ref 4.5–5.9)
RBC # BLD: 3.22 M/UL (ref 4.5–5.9)
RBC # BLD: 3.27 M/UL (ref 4.21–5.77)
RBC # BLD: 3.3 M/UL (ref 4.21–5.77)
RBC # BLD: 3.34 M/UL (ref 4.5–5.9)
RBC # BLD: 3.38 M/UL (ref 4.5–5.9)
RBC # BLD: 3.39 M/UL (ref 4.5–5.9)
RBC # BLD: 3.39 M/UL (ref 4.5–5.9)
RBC # BLD: 3.45 M/UL (ref 4.21–5.77)
RBC # BLD: 3.47 M/UL (ref 4.5–5.9)
RBC # BLD: 3.5 M/UL (ref 4.21–5.77)
RBC # BLD: 3.57 M/UL (ref 4.5–5.9)
RBC # BLD: 3.6 M/UL (ref 4.5–5.9)
RBC # BLD: 3.63 M/UL (ref 4.5–5.9)
RBC # BLD: 3.65 M/UL (ref 4.5–5.9)
RBC # BLD: 3.65 M/UL (ref 4.5–5.9)
RBC # BLD: 3.71 M/UL (ref 4.5–5.9)
RBC # BLD: 3.82 M/UL (ref 4.5–5.9)
RBC # BLD: 3.94 M/UL (ref 4.5–5.9)
RBC # BLD: 3.96 M/UL (ref 4.5–5.9)
RBC # BLD: 3.99 M/UL (ref 4.5–5.9)
RBC # BLD: 4.68 M/UL (ref 4.21–5.77)
RBC # BLD: ABNORMAL 10*6/UL
RBC FLUID: NORMAL /MM3
RBC FLUID: NORMAL /MM3
REASON FOR REJECTION: NORMAL
SAMPLE SITE: ABNORMAL
SAMPLE SITE: NORMAL
SARS-COV-2, RAPID: NOT DETECTED
SARS-COV-2, RAPID: NOT DETECTED
SEDIMENTATION RATE, ERYTHROCYTE: 33 MM (ref 0–20)
SEG NEUTROPHILS: 62 % (ref 36–66)
SEG NEUTROPHILS: 65 % (ref 36–66)
SEG NEUTROPHILS: 69 % (ref 36–66)
SEG NEUTROPHILS: 70 % (ref 36–66)
SEG NEUTROPHILS: 73 % (ref 36–66)
SEG NEUTROPHILS: 75 % (ref 36–66)
SEG NEUTROPHILS: 77 % (ref 36–66)
SEG NEUTROPHILS: 77 % (ref 36–66)
SEG NEUTROPHILS: 80 % (ref 36–66)
SEG NEUTROPHILS: 85 % (ref 36–66)
SEG NEUTROPHILS: 86 % (ref 36–65)
SEG NEUTROPHILS: 86 % (ref 36–65)
SEG NEUTROPHILS: 86 % (ref 36–66)
SEG NEUTROPHILS: 87 % (ref 36–66)
SEG NEUTROPHILS: 87 % (ref 36–66)
SEG NEUTROPHILS: 88 % (ref 36–65)
SEG NEUTROPHILS: 88 % (ref 36–66)
SEG NEUTROPHILS: 88 % (ref 36–66)
SEG NEUTROPHILS: 89 % (ref 36–65)
SEG NEUTROPHILS: 89 % (ref 36–66)
SEG NEUTROPHILS: 90 % (ref 36–66)
SEG NEUTROPHILS: 92 % (ref 36–65)
SEG NEUTROPHILS: 92 % (ref 36–66)
SEG NEUTROPHILS: 93 % (ref 36–65)
SEG NEUTROPHILS: 93 % (ref 36–66)
SEG NEUTROPHILS: 94 % (ref 36–66)
SEG NEUTROPHILS: 94 % (ref 36–66)
SEGMENTED NEUTROPHILS ABSOLUTE COUNT: 12.85 K/UL (ref 1.3–9.1)
SEGMENTED NEUTROPHILS ABSOLUTE COUNT: 14.43 K/UL (ref 1.5–8.1)
SEGMENTED NEUTROPHILS ABSOLUTE COUNT: 15.89 K/UL (ref 1.5–8.1)
SEGMENTED NEUTROPHILS ABSOLUTE COUNT: 16.2 K/UL (ref 1.8–7.7)
SEGMENTED NEUTROPHILS ABSOLUTE COUNT: 16.93 K/UL (ref 1.5–8.1)
SEGMENTED NEUTROPHILS ABSOLUTE COUNT: 17.69 K/UL (ref 1.5–8.1)
SEGMENTED NEUTROPHILS ABSOLUTE COUNT: 18.85 K/UL (ref 1.5–8.1)
SEGMENTED NEUTROPHILS ABSOLUTE COUNT: 19.46 K/UL (ref 1.3–9.1)
SEGMENTED NEUTROPHILS ABSOLUTE COUNT: 19.69 K/UL (ref 1.3–9.1)
SEGMENTED NEUTROPHILS ABSOLUTE COUNT: 2.48 K/UL (ref 1.3–9.1)
SEGMENTED NEUTROPHILS ABSOLUTE COUNT: 2.62 K/UL (ref 1.3–9.1)
SEGMENTED NEUTROPHILS ABSOLUTE COUNT: 20.36 K/UL (ref 1.5–8.1)
SEGMENTED NEUTROPHILS ABSOLUTE COUNT: 22.09 K/UL (ref 1.3–9.1)
SEGMENTED NEUTROPHILS ABSOLUTE COUNT: 25.48 K/UL (ref 1.3–9.1)
SEGMENTED NEUTROPHILS ABSOLUTE COUNT: 27.81 K/UL (ref 1.8–7.7)
SEGMENTED NEUTROPHILS ABSOLUTE COUNT: 3.32 K/UL (ref 1.3–9.1)
SEGMENTED NEUTROPHILS ABSOLUTE COUNT: 3.43 K/UL (ref 1.3–9.1)
SEGMENTED NEUTROPHILS ABSOLUTE COUNT: 3.55 K/UL (ref 1.3–9.1)
SEGMENTED NEUTROPHILS ABSOLUTE COUNT: 4.1 K/UL (ref 1.3–9.1)
SEGMENTED NEUTROPHILS ABSOLUTE COUNT: 4.12 K/UL (ref 1.3–9.1)
SEGMENTED NEUTROPHILS ABSOLUTE COUNT: 5.56 K/UL (ref 1.3–9.1)
SEGMENTED NEUTROPHILS ABSOLUTE COUNT: 6.47 K/UL (ref 1.3–9.1)
SEGMENTED NEUTROPHILS ABSOLUTE COUNT: 6.7 K/UL (ref 1.3–9.1)
SEGMENTED NEUTROPHILS ABSOLUTE COUNT: 6.88 K/UL (ref 1.3–9.1)
SEGMENTED NEUTROPHILS ABSOLUTE COUNT: 7.39 K/UL (ref 1.3–9.1)
SEGMENTED NEUTROPHILS ABSOLUTE COUNT: 7.48 K/UL (ref 1.3–9.1)
SEGMENTED NEUTROPHILS ABSOLUTE COUNT: 7.92 K/UL (ref 1.3–9.1)
SODIUM BLD-SCNC: 132 MMOL/L (ref 135–144)
SODIUM BLD-SCNC: 134 MMOL/L (ref 135–144)
SODIUM BLD-SCNC: 134 MMOL/L (ref 135–144)
SODIUM BLD-SCNC: 135 MMOL/L (ref 135–144)
SODIUM BLD-SCNC: 136 MMOL/L (ref 135–144)
SODIUM BLD-SCNC: 137 MMOL/L (ref 135–144)
SODIUM BLD-SCNC: 138 MMOL/L (ref 135–144)
SODIUM BLD-SCNC: 139 MMOL/L (ref 135–144)
SODIUM BLD-SCNC: 140 MMOL/L (ref 135–144)
SODIUM BLD-SCNC: 141 MMOL/L (ref 135–144)
SODIUM BLD-SCNC: 142 MMOL/L (ref 135–144)
SODIUM BLD-SCNC: 144 MMOL/L (ref 135–144)
SODIUM BLD-SCNC: 145 MMOL/L (ref 135–144)
SODIUM BLD-SCNC: 145 MMOL/L (ref 135–144)
SPECIMEN DESCRIPTION: ABNORMAL
SPECIMEN DESCRIPTION: NORMAL
SPECIMEN TYPE: NORMAL
SURGICAL PATHOLOGY REPORT: NORMAL
TCO2 (CALC), ART: ABNORMAL MMOL/L (ref 22–29)
TCO2 (CALC), ART: NORMAL MMOL/L (ref 22–29)
TIME, STOOL #1: 900
TIME, STOOL #2: NORMAL
TIME, STOOL #3: NORMAL
TOTAL IRON BINDING CAPACITY: 106 UG/DL (ref 250–450)
TOTAL PROTEIN, BODY FLUID: 2.5 G/DL
TOTAL PROTEIN, BODY FLUID: 2.7 G/DL
TOTAL PROTEIN: 6 G/DL (ref 6.4–8.3)
TOTAL PROTEIN: 6 G/DL (ref 6.4–8.3)
TOTAL PROTEIN: 6.2 G/DL (ref 6.4–8.3)
TOTAL PROTEIN: 6.8 G/DL (ref 6.4–8.3)
TOTAL PROTEIN: 7.1 G/DL (ref 6.4–8.3)
TOTAL PROTEIN: 7.7 G/DL (ref 6.4–8.3)
TOTAL PROTEIN: 7.8 G/DL (ref 6.4–8.3)
TRANSFUSION STATUS: NORMAL
TRANSFUSION STATUS: NORMAL
TROPONIN INTERP: ABNORMAL
TROPONIN T: ABNORMAL NG/ML
TROPONIN, HIGH SENSITIVITY: 190 NG/L (ref 0–22)
TROPONIN, HIGH SENSITIVITY: 306 NG/L (ref 0–22)
TROPONIN, HIGH SENSITIVITY: 337 NG/L (ref 0–22)
TSH SERPL DL<=0.05 MIU/L-ACNC: 1.51 MIU/L (ref 0.3–5)
UNIT DIVISION: 0
UNIT DIVISION: 0
UNIT NUMBER: NORMAL
UNIT NUMBER: NORMAL
UNSATURATED IRON BINDING CAPACITY: 70 UG/DL (ref 112–347)
VANCOMYCIN RANDOM DATE LAST DOSE: NORMAL
VANCOMYCIN RANDOM DATE LAST DOSE: NORMAL
VANCOMYCIN RANDOM DOSE AMOUNT: 1000
VANCOMYCIN RANDOM DOSE AMOUNT: 1250
VANCOMYCIN RANDOM TIME LAST DOSE: 1308
VANCOMYCIN RANDOM TIME LAST DOSE: 1353
VANCOMYCIN RANDOM: 16.7 UG/ML
VANCOMYCIN RANDOM: 17.6 UG/ML
VITAMIN B-12: 1702 PG/ML (ref 232–1245)
WBC # BLD: 14.6 K/UL (ref 3.5–11)
WBC # BLD: 16.7 K/UL (ref 3.5–11.3)
WBC # BLD: 17.8 K/UL (ref 3.5–11.3)
WBC # BLD: 17.9 K/UL (ref 3.5–11.3)
WBC # BLD: 18 K/UL (ref 3.5–11.3)
WBC # BLD: 19.7 K/UL (ref 3.5–11.3)
WBC # BLD: 20.1 K/UL (ref 3.5–11.3)
WBC # BLD: 20.5 K/UL (ref 3.5–11.3)
WBC # BLD: 20.7 K/UL (ref 3.5–11)
WBC # BLD: 21.4 K/UL (ref 3.5–11)
WBC # BLD: 21.9 K/UL (ref 3.5–11.3)
WBC # BLD: 25.1 K/UL (ref 3.5–11)
WBC # BLD: 27.4 K/UL (ref 3.5–11)
WBC # BLD: 29.6 K/UL (ref 3.5–11.3)
WBC # BLD: 3.8 K/UL (ref 3.5–11)
WBC # BLD: 4 K/UL (ref 3.5–11)
WBC # BLD: 4.6 K/UL (ref 3.5–11)
WBC # BLD: 4.8 K/UL (ref 3.5–11)
WBC # BLD: 4.9 K/UL (ref 3.5–11)
WBC # BLD: 5.1 K/UL (ref 3.5–11)
WBC # BLD: 5.2 K/UL (ref 3.5–11)
WBC # BLD: 5.5 K/UL (ref 3.5–11)
WBC # BLD: 5.6 K/UL (ref 3.5–11)
WBC # BLD: 5.6 K/UL (ref 3.5–11)
WBC # BLD: 6.1 K/UL (ref 3.5–11.3)
WBC # BLD: 6.5 K/UL (ref 3.5–11.3)
WBC # BLD: 7.2 K/UL (ref 3.5–11)
WBC # BLD: 7.3 K/UL (ref 3.5–11.3)
WBC # BLD: 7.7 K/UL (ref 3.5–11)
WBC # BLD: 7.8 K/UL (ref 3.5–11.3)
WBC # BLD: 8.1 K/UL (ref 3.5–11)
WBC # BLD: 8.1 K/UL (ref 3.5–11)
WBC # BLD: 8.2 K/UL (ref 3.5–11.3)
WBC # BLD: 8.6 K/UL (ref 3.5–11)
WBC # BLD: 8.6 K/UL (ref 3.5–11)
WBC # BLD: 8.9 K/UL (ref 3.5–11)
WBC # BLD: 9.1 K/UL (ref 3.5–11.3)
WBC # BLD: ABNORMAL 10*3/UL
WBC FLUID: 807 /MM3
WBC FLUID: 872 /MM3
ZZ NTE CLEAN UP: ORDERED TEST: NORMAL
ZZ NTE WITH NAME CLEAN UP: SPECIMEN SOURCE: NORMAL

## 2021-01-01 PROCEDURE — 85014 HEMATOCRIT: CPT

## 2021-01-01 PROCEDURE — 86870 RBC ANTIBODY IDENTIFICATION: CPT

## 2021-01-01 PROCEDURE — 6370000000 HC RX 637 (ALT 250 FOR IP)

## 2021-01-01 PROCEDURE — 87506 IADNA-DNA/RNA PROBE TQ 6-11: CPT

## 2021-01-01 PROCEDURE — 6370000000 HC RX 637 (ALT 250 FOR IP): Performed by: INTERNAL MEDICINE

## 2021-01-01 PROCEDURE — 86920 COMPATIBILITY TEST SPIN: CPT

## 2021-01-01 PROCEDURE — 6370000000 HC RX 637 (ALT 250 FOR IP): Performed by: STUDENT IN AN ORGANIZED HEALTH CARE EDUCATION/TRAINING PROGRAM

## 2021-01-01 PROCEDURE — 02HV33Z INSERTION OF INFUSION DEVICE INTO SUPERIOR VENA CAVA, PERCUTANEOUS APPROACH: ICD-10-PCS | Performed by: RADIOLOGY

## 2021-01-01 PROCEDURE — 82140 ASSAY OF AMMONIA: CPT

## 2021-01-01 PROCEDURE — 99285 EMERGENCY DEPT VISIT HI MDM: CPT

## 2021-01-01 PROCEDURE — 94761 N-INVAS EAR/PLS OXIMETRY MLT: CPT

## 2021-01-01 PROCEDURE — 2000000000 HC ICU R&B

## 2021-01-01 PROCEDURE — 90935 HEMODIALYSIS ONE EVALUATION: CPT

## 2021-01-01 PROCEDURE — 2580000003 HC RX 258: Performed by: INTERNAL MEDICINE

## 2021-01-01 PROCEDURE — 99231 SBSQ HOSP IP/OBS SF/LOW 25: CPT | Performed by: INTERNAL MEDICINE

## 2021-01-01 PROCEDURE — 36415 COLL VENOUS BLD VENIPUNCTURE: CPT

## 2021-01-01 PROCEDURE — 2580000003 HC RX 258: Performed by: EMERGENCY MEDICINE

## 2021-01-01 PROCEDURE — 82803 BLOOD GASES ANY COMBINATION: CPT

## 2021-01-01 PROCEDURE — 88112 CYTOPATH CELL ENHANCE TECH: CPT

## 2021-01-01 PROCEDURE — 83010 ASSAY OF HAPTOGLOBIN QUANT: CPT

## 2021-01-01 PROCEDURE — 85025 COMPLETE CBC W/AUTO DIFF WBC: CPT

## 2021-01-01 PROCEDURE — 87186 SC STD MICRODIL/AGAR DIL: CPT

## 2021-01-01 PROCEDURE — 82150 ASSAY OF AMYLASE: CPT

## 2021-01-01 PROCEDURE — 83986 ASSAY PH BODY FLUID NOS: CPT

## 2021-01-01 PROCEDURE — 2500000003 HC RX 250 WO HCPCS: Performed by: STUDENT IN AN ORGANIZED HEALTH CARE EDUCATION/TRAINING PROGRAM

## 2021-01-01 PROCEDURE — 84145 PROCALCITONIN (PCT): CPT

## 2021-01-01 PROCEDURE — 6370000000 HC RX 637 (ALT 250 FOR IP): Performed by: NURSE PRACTITIONER

## 2021-01-01 PROCEDURE — 85018 HEMOGLOBIN: CPT

## 2021-01-01 PROCEDURE — 80048 BASIC METABOLIC PNL TOTAL CA: CPT

## 2021-01-01 PROCEDURE — 82947 ASSAY GLUCOSE BLOOD QUANT: CPT

## 2021-01-01 PROCEDURE — 87070 CULTURE OTHR SPECIMN AEROBIC: CPT

## 2021-01-01 PROCEDURE — 97166 OT EVAL MOD COMPLEX 45 MIN: CPT

## 2021-01-01 PROCEDURE — 6360000002 HC RX W HCPCS: Performed by: STUDENT IN AN ORGANIZED HEALTH CARE EDUCATION/TRAINING PROGRAM

## 2021-01-01 PROCEDURE — 80162 ASSAY OF DIGOXIN TOTAL: CPT

## 2021-01-01 PROCEDURE — 99291 CRITICAL CARE FIRST HOUR: CPT | Performed by: INTERNAL MEDICINE

## 2021-01-01 PROCEDURE — 85027 COMPLETE CBC AUTOMATED: CPT

## 2021-01-01 PROCEDURE — 6360000002 HC RX W HCPCS: Performed by: INTERNAL MEDICINE

## 2021-01-01 PROCEDURE — 99232 SBSQ HOSP IP/OBS MODERATE 35: CPT | Performed by: INTERNAL MEDICINE

## 2021-01-01 PROCEDURE — P9047 ALBUMIN (HUMAN), 25%, 50ML: HCPCS | Performed by: INTERNAL MEDICINE

## 2021-01-01 PROCEDURE — 2060000000 HC ICU INTERMEDIATE R&B

## 2021-01-01 PROCEDURE — 87205 SMEAR GRAM STAIN: CPT

## 2021-01-01 PROCEDURE — P9603 ONE-WAY ALLOW PRORATED MILES: HCPCS

## 2021-01-01 PROCEDURE — 99233 SBSQ HOSP IP/OBS HIGH 50: CPT | Performed by: INTERNAL MEDICINE

## 2021-01-01 PROCEDURE — 84100 ASSAY OF PHOSPHORUS: CPT

## 2021-01-01 PROCEDURE — 86140 C-REACTIVE PROTEIN: CPT

## 2021-01-01 PROCEDURE — 77001 FLUOROGUIDE FOR VEIN DEVICE: CPT

## 2021-01-01 PROCEDURE — 76937 US GUIDE VASCULAR ACCESS: CPT

## 2021-01-01 PROCEDURE — 2580000003 HC RX 258: Performed by: STUDENT IN AN ORGANIZED HEALTH CARE EDUCATION/TRAINING PROGRAM

## 2021-01-01 PROCEDURE — 78580 LUNG PERFUSION IMAGING: CPT

## 2021-01-01 PROCEDURE — 83735 ASSAY OF MAGNESIUM: CPT

## 2021-01-01 PROCEDURE — 71045 X-RAY EXAM CHEST 1 VIEW: CPT

## 2021-01-01 PROCEDURE — 97165 OT EVAL LOW COMPLEX 30 MIN: CPT

## 2021-01-01 PROCEDURE — 87077 CULTURE AEROBIC IDENTIFY: CPT

## 2021-01-01 PROCEDURE — 99232 SBSQ HOSP IP/OBS MODERATE 35: CPT | Performed by: NURSE PRACTITIONER

## 2021-01-01 PROCEDURE — 83605 ASSAY OF LACTIC ACID: CPT

## 2021-01-01 PROCEDURE — 84155 ASSAY OF PROTEIN SERUM: CPT

## 2021-01-01 PROCEDURE — 0W993ZZ DRAINAGE OF RIGHT PLEURAL CAVITY, PERCUTANEOUS APPROACH: ICD-10-PCS | Performed by: INTERNAL MEDICINE

## 2021-01-01 PROCEDURE — 96368 THER/DIAG CONCURRENT INF: CPT

## 2021-01-01 PROCEDURE — 36556 INSERT NON-TUNNEL CV CATH: CPT

## 2021-01-01 PROCEDURE — 5A1D70Z PERFORMANCE OF URINARY FILTRATION, INTERMITTENT, LESS THAN 6 HOURS PER DAY: ICD-10-PCS | Performed by: INTERNAL MEDICINE

## 2021-01-01 PROCEDURE — 87075 CULTR BACTERIA EXCEPT BLOOD: CPT

## 2021-01-01 PROCEDURE — 32557 INSERT CATH PLEURA W/ IMAGE: CPT | Performed by: RADIOLOGY

## 2021-01-01 PROCEDURE — 83880 ASSAY OF NATRIURETIC PEPTIDE: CPT

## 2021-01-01 PROCEDURE — 93010 ELECTROCARDIOGRAM REPORT: CPT | Performed by: INTERNAL MEDICINE

## 2021-01-01 PROCEDURE — 84132 ASSAY OF SERUM POTASSIUM: CPT

## 2021-01-01 PROCEDURE — 93005 ELECTROCARDIOGRAM TRACING: CPT | Performed by: EMERGENCY MEDICINE

## 2021-01-01 PROCEDURE — 85055 RETICULATED PLATELET ASSAY: CPT

## 2021-01-01 PROCEDURE — 97110 THERAPEUTIC EXERCISES: CPT

## 2021-01-01 PROCEDURE — 6360000004 HC RX CONTRAST MEDICATION: Performed by: STUDENT IN AN ORGANIZED HEALTH CARE EDUCATION/TRAINING PROGRAM

## 2021-01-01 PROCEDURE — 73630 X-RAY EXAM OF FOOT: CPT

## 2021-01-01 PROCEDURE — 37799 UNLISTED PX VASCULAR SURGERY: CPT

## 2021-01-01 PROCEDURE — 36620 INSERTION CATHETER ARTERY: CPT

## 2021-01-01 PROCEDURE — 36430 TRANSFUSION BLD/BLD COMPNT: CPT

## 2021-01-01 PROCEDURE — 2500000003 HC RX 250 WO HCPCS: Performed by: NURSE ANESTHETIST, CERTIFIED REGISTERED

## 2021-01-01 PROCEDURE — 87641 MR-STAPH DNA AMP PROBE: CPT

## 2021-01-01 PROCEDURE — 74177 CT ABD & PELVIS W/CONTRAST: CPT

## 2021-01-01 PROCEDURE — 3430000000 HC RX DIAGNOSTIC RADIOPHARMACEUTICAL: Performed by: STUDENT IN AN ORGANIZED HEALTH CARE EDUCATION/TRAINING PROGRAM

## 2021-01-01 PROCEDURE — 87493 C DIFF AMPLIFIED PROBE: CPT

## 2021-01-01 PROCEDURE — A9540 TC99M MAA: HCPCS | Performed by: STUDENT IN AN ORGANIZED HEALTH CARE EDUCATION/TRAINING PROGRAM

## 2021-01-01 PROCEDURE — 86403 PARTICLE AGGLUT ANTBDY SCRN: CPT

## 2021-01-01 PROCEDURE — 5A2204Z RESTORATION OF CARDIAC RHYTHM, SINGLE: ICD-10-PCS | Performed by: INTERNAL MEDICINE

## 2021-01-01 PROCEDURE — 0HDMXZZ EXTRACTION OF RIGHT FOOT SKIN, EXTERNAL APPROACH: ICD-10-PCS | Performed by: PODIATRIST

## 2021-01-01 PROCEDURE — C1751 CATH, INF, PER/CENT/MIDLINE: HCPCS

## 2021-01-01 PROCEDURE — 84157 ASSAY OF PROTEIN OTHER: CPT

## 2021-01-01 PROCEDURE — 2700000000 HC OXYGEN THERAPY PER DAY

## 2021-01-01 PROCEDURE — 86900 BLOOD TYPING SEROLOGIC ABO: CPT

## 2021-01-01 PROCEDURE — 87040 BLOOD CULTURE FOR BACTERIA: CPT

## 2021-01-01 PROCEDURE — 97530 THERAPEUTIC ACTIVITIES: CPT

## 2021-01-01 PROCEDURE — 85730 THROMBOPLASTIN TIME PARTIAL: CPT

## 2021-01-01 PROCEDURE — 2500000003 HC RX 250 WO HCPCS

## 2021-01-01 PROCEDURE — 3600000003 HC SURGERY LEVEL 3 BASE: Performed by: SURGERY

## 2021-01-01 PROCEDURE — 93005 ELECTROCARDIOGRAM TRACING: CPT | Performed by: INTERNAL MEDICINE

## 2021-01-01 PROCEDURE — 80053 COMPREHEN METABOLIC PANEL: CPT

## 2021-01-01 PROCEDURE — 83690 ASSAY OF LIPASE: CPT

## 2021-01-01 PROCEDURE — 6360000002 HC RX W HCPCS: Performed by: NURSE PRACTITIONER

## 2021-01-01 PROCEDURE — 99223 1ST HOSP IP/OBS HIGH 75: CPT | Performed by: INTERNAL MEDICINE

## 2021-01-01 PROCEDURE — 2500000003 HC RX 250 WO HCPCS: Performed by: INTERNAL MEDICINE

## 2021-01-01 PROCEDURE — 74018 RADEX ABDOMEN 1 VIEW: CPT

## 2021-01-01 PROCEDURE — 84484 ASSAY OF TROPONIN QUANT: CPT

## 2021-01-01 PROCEDURE — 88341 IMHCHEM/IMCYTCHM EA ADD ANTB: CPT

## 2021-01-01 PROCEDURE — 96365 THER/PROPH/DIAG IV INF INIT: CPT

## 2021-01-01 PROCEDURE — 0JB90ZZ EXCISION OF BUTTOCK SUBCUTANEOUS TISSUE AND FASCIA, OPEN APPROACH: ICD-10-PCS | Performed by: SURGERY

## 2021-01-01 PROCEDURE — 99255 IP/OBS CONSLTJ NEW/EST HI 80: CPT | Performed by: INTERNAL MEDICINE

## 2021-01-01 PROCEDURE — 83520 IMMUNOASSAY QUANT NOS NONAB: CPT

## 2021-01-01 PROCEDURE — 87076 CULTURE ANAEROBE IDENT EACH: CPT

## 2021-01-01 PROCEDURE — 85384 FIBRINOGEN ACTIVITY: CPT

## 2021-01-01 PROCEDURE — 96376 TX/PRO/DX INJ SAME DRUG ADON: CPT

## 2021-01-01 PROCEDURE — 6360000002 HC RX W HCPCS: Performed by: NURSE ANESTHETIST, CERTIFIED REGISTERED

## 2021-01-01 PROCEDURE — 97162 PT EVAL MOD COMPLEX 30 MIN: CPT

## 2021-01-01 PROCEDURE — 83550 IRON BINDING TEST: CPT

## 2021-01-01 PROCEDURE — 83615 LACTATE (LD) (LDH) ENZYME: CPT

## 2021-01-01 PROCEDURE — P9016 RBC LEUKOCYTES REDUCED: HCPCS

## 2021-01-01 PROCEDURE — 6360000002 HC RX W HCPCS

## 2021-01-01 PROCEDURE — 80202 ASSAY OF VANCOMYCIN: CPT

## 2021-01-01 PROCEDURE — 94002 VENT MGMT INPAT INIT DAY: CPT

## 2021-01-01 PROCEDURE — 88305 TISSUE EXAM BY PATHOLOGIST: CPT

## 2021-01-01 PROCEDURE — 71250 CT THORAX DX C-: CPT

## 2021-01-01 PROCEDURE — 2580000003 HC RX 258

## 2021-01-01 PROCEDURE — 93005 ELECTROCARDIOGRAM TRACING: CPT | Performed by: STUDENT IN AN ORGANIZED HEALTH CARE EDUCATION/TRAINING PROGRAM

## 2021-01-01 PROCEDURE — 3700000000 HC ANESTHESIA ATTENDED CARE: Performed by: SURGERY

## 2021-01-01 PROCEDURE — 83630 LACTOFERRIN FECAL (QUAL): CPT

## 2021-01-01 PROCEDURE — 83540 ASSAY OF IRON: CPT

## 2021-01-01 PROCEDURE — 87491 CHLMYD TRACH DNA AMP PROBE: CPT

## 2021-01-01 PROCEDURE — 87635 SARS-COV-2 COVID-19 AMP PRB: CPT

## 2021-01-01 PROCEDURE — 2580000003 HC RX 258: Performed by: NURSE ANESTHETIST, CERTIFIED REGISTERED

## 2021-01-01 PROCEDURE — 99211 OFF/OP EST MAY X REQ PHY/QHP: CPT

## 2021-01-01 PROCEDURE — C8929 TTE W OR WO FOL WCON,DOPPLER: HCPCS

## 2021-01-01 PROCEDURE — 96375 TX/PRO/DX INJ NEW DRUG ADDON: CPT

## 2021-01-01 PROCEDURE — 82607 VITAMIN B-12: CPT

## 2021-01-01 PROCEDURE — 85652 RBC SED RATE AUTOMATED: CPT

## 2021-01-01 PROCEDURE — 88342 IMHCHEM/IMCYTCHM 1ST ANTB: CPT

## 2021-01-01 PROCEDURE — 88108 CYTOPATH CONCENTRATE TECH: CPT

## 2021-01-01 PROCEDURE — 0JBM0ZZ EXCISION OF LEFT UPPER LEG SUBCUTANEOUS TISSUE AND FASCIA, OPEN APPROACH: ICD-10-PCS | Performed by: SURGERY

## 2021-01-01 PROCEDURE — 86880 COOMBS TEST DIRECT: CPT

## 2021-01-01 PROCEDURE — 2709999900 IR GUIDED THORACENTESIS PLEURAL

## 2021-01-01 PROCEDURE — 36600 WITHDRAWAL OF ARTERIAL BLOOD: CPT

## 2021-01-01 PROCEDURE — 6370000000 HC RX 637 (ALT 250 FOR IP): Performed by: PHYSICIAN ASSISTANT

## 2021-01-01 PROCEDURE — B548ZZA ULTRASONOGRAPHY OF SUPERIOR VENA CAVA, GUIDANCE: ICD-10-PCS | Performed by: SURGERY

## 2021-01-01 PROCEDURE — 94003 VENT MGMT INPAT SUBQ DAY: CPT

## 2021-01-01 PROCEDURE — 88304 TISSUE EXAM BY PATHOLOGIST: CPT

## 2021-01-01 PROCEDURE — 99254 IP/OBS CNSLTJ NEW/EST MOD 60: CPT | Performed by: INTERNAL MEDICINE

## 2021-01-01 PROCEDURE — 86850 RBC ANTIBODY SCREEN: CPT

## 2021-01-01 PROCEDURE — 2580000003 HC RX 258: Performed by: SURGERY

## 2021-01-01 PROCEDURE — 32555 ASPIRATE PLEURA W/ IMAGING: CPT | Performed by: RADIOLOGY

## 2021-01-01 PROCEDURE — 99222 1ST HOSP IP/OBS MODERATE 55: CPT | Performed by: INTERNAL MEDICINE

## 2021-01-01 PROCEDURE — 82945 GLUCOSE OTHER FLUID: CPT

## 2021-01-01 PROCEDURE — 86901 BLOOD TYPING SEROLOGIC RH(D): CPT

## 2021-01-01 PROCEDURE — 99283 EMERGENCY DEPT VISIT LOW MDM: CPT | Performed by: PODIATRIST

## 2021-01-01 PROCEDURE — 2709999900 IR CHEST TUBE INSERTION

## 2021-01-01 PROCEDURE — 5A1945Z RESPIRATORY VENTILATION, 24-96 CONSECUTIVE HOURS: ICD-10-PCS | Performed by: INTERNAL MEDICINE

## 2021-01-01 PROCEDURE — 0JBL0ZZ EXCISION OF RIGHT UPPER LEG SUBCUTANEOUS TISSUE AND FASCIA, OPEN APPROACH: ICD-10-PCS | Performed by: SURGERY

## 2021-01-01 PROCEDURE — 3600000013 HC SURGERY LEVEL 3 ADDTL 15MIN: Performed by: SURGERY

## 2021-01-01 PROCEDURE — 86902 BLOOD TYPE ANTIGEN DONOR EA: CPT

## 2021-01-01 PROCEDURE — 97535 SELF CARE MNGMENT TRAINING: CPT

## 2021-01-01 PROCEDURE — 74230 X-RAY XM SWLNG FUNCJ C+: CPT

## 2021-01-01 PROCEDURE — 87899 AGENT NOS ASSAY W/OPTIC: CPT

## 2021-01-01 PROCEDURE — 0W9930Z DRAINAGE OF RIGHT PLEURAL CAVITY WITH DRAINAGE DEVICE, PERCUTANEOUS APPROACH: ICD-10-PCS | Performed by: INTERNAL MEDICINE

## 2021-01-01 PROCEDURE — 5A1D70Z PERFORMANCE OF URINARY FILTRATION, INTERMITTENT, LESS THAN 6 HOURS PER DAY: ICD-10-PCS | Performed by: SURGERY

## 2021-01-01 PROCEDURE — 2720000010 HC SURG SUPPLY STERILE: Performed by: SURGERY

## 2021-01-01 PROCEDURE — 92611 MOTION FLUOROSCOPY/SWALLOW: CPT

## 2021-01-01 PROCEDURE — 87328 CRYPTOSPORIDIUM AG IA: CPT

## 2021-01-01 PROCEDURE — 2580000003 HC RX 258: Performed by: NURSE PRACTITIONER

## 2021-01-01 PROCEDURE — 87329 GIARDIA AG IA: CPT

## 2021-01-01 PROCEDURE — 82533 TOTAL CORTISOL: CPT

## 2021-01-01 PROCEDURE — 84443 ASSAY THYROID STIM HORMONE: CPT

## 2021-01-01 PROCEDURE — 85610 PROTHROMBIN TIME: CPT

## 2021-01-01 PROCEDURE — 1200000000 HC SEMI PRIVATE

## 2021-01-01 PROCEDURE — 90935 HEMODIALYSIS ONE EVALUATION: CPT | Performed by: INTERNAL MEDICINE

## 2021-01-01 PROCEDURE — 2709999900 HC NON-CHARGEABLE SUPPLY: Performed by: SURGERY

## 2021-01-01 PROCEDURE — 6360000002 HC RX W HCPCS: Performed by: EMERGENCY MEDICINE

## 2021-01-01 PROCEDURE — 87449 NOS EACH ORGANISM AG IA: CPT

## 2021-01-01 PROCEDURE — 82272 OCCULT BLD FECES 1-3 TESTS: CPT

## 2021-01-01 PROCEDURE — 3700000001 HC ADD 15 MINUTES (ANESTHESIA): Performed by: SURGERY

## 2021-01-01 PROCEDURE — 82746 ASSAY OF FOLIC ACID SERUM: CPT

## 2021-01-01 PROCEDURE — 87324 CLOSTRIDIUM AG IA: CPT

## 2021-01-01 PROCEDURE — 76705 ECHO EXAM OF ABDOMEN: CPT

## 2021-01-01 PROCEDURE — 87184 SC STD DISK METHOD PER PLATE: CPT

## 2021-01-01 PROCEDURE — 82728 ASSAY OF FERRITIN: CPT

## 2021-01-01 PROCEDURE — 6370000000 HC RX 637 (ALT 250 FOR IP): Performed by: SURGERY

## 2021-01-01 PROCEDURE — 02HV33Z INSERTION OF INFUSION DEVICE INTO SUPERIOR VENA CAVA, PERCUTANEOUS APPROACH: ICD-10-PCS | Performed by: SURGERY

## 2021-01-01 RX ORDER — DIGOXIN 125 MCG
125 TABLET ORAL DAILY
Status: DISCONTINUED | OUTPATIENT
Start: 2021-01-01 | End: 2021-01-01 | Stop reason: HOSPADM

## 2021-01-01 RX ORDER — FENTANYL CITRATE 50 UG/ML
INJECTION, SOLUTION INTRAMUSCULAR; INTRAVENOUS
Status: COMPLETED
Start: 2021-01-01 | End: 2021-01-01

## 2021-01-01 RX ORDER — SEVELAMER CARBONATE 800 MG/1
4000 TABLET, FILM COATED ORAL
Status: DISCONTINUED | OUTPATIENT
Start: 2021-01-01 | End: 2021-01-01 | Stop reason: HOSPADM

## 2021-01-01 RX ORDER — POLYETHYLENE GLYCOL 3350 17 G/17G
17 POWDER, FOR SOLUTION ORAL DAILY PRN
Status: DISCONTINUED | OUTPATIENT
Start: 2021-01-01 | End: 2021-01-01 | Stop reason: HOSPADM

## 2021-01-01 RX ORDER — SODIUM CHLORIDE 0.9 % (FLUSH) 0.9 %
5-40 SYRINGE (ML) INJECTION PRN
Status: DISCONTINUED | OUTPATIENT
Start: 2021-01-01 | End: 2021-01-01 | Stop reason: HOSPADM

## 2021-01-01 RX ORDER — ACETAMINOPHEN 650 MG/1
650 SUPPOSITORY RECTAL EVERY 6 HOURS PRN
Status: DISCONTINUED | OUTPATIENT
Start: 2021-01-01 | End: 2021-01-01

## 2021-01-01 RX ORDER — PANTOPRAZOLE SODIUM 40 MG/1
40 TABLET, DELAYED RELEASE ORAL DAILY
COMMUNITY

## 2021-01-01 RX ORDER — ONDANSETRON 4 MG/1
4 TABLET, ORALLY DISINTEGRATING ORAL EVERY 8 HOURS PRN
Status: DISCONTINUED | OUTPATIENT
Start: 2021-01-01 | End: 2021-01-01 | Stop reason: HOSPADM

## 2021-01-01 RX ORDER — NOREPINEPHRINE BIT/0.9 % NACL 16MG/250ML
2 INFUSION BOTTLE (ML) INTRAVENOUS CONTINUOUS
Status: DISCONTINUED | OUTPATIENT
Start: 2021-01-01 | End: 2021-01-01

## 2021-01-01 RX ORDER — METOLAZONE 10 MG/1
10 TABLET ORAL DAILY
Status: ON HOLD | COMMUNITY
End: 2021-01-01 | Stop reason: HOSPADM

## 2021-01-01 RX ORDER — AMIODARONE HYDROCHLORIDE 200 MG/1
200 TABLET ORAL DAILY
Status: DISCONTINUED | OUTPATIENT
Start: 2021-01-01 | End: 2021-01-01 | Stop reason: HOSPADM

## 2021-01-01 RX ORDER — NICOTINE POLACRILEX 4 MG
15 LOZENGE BUCCAL PRN
Status: DISCONTINUED | OUTPATIENT
Start: 2021-01-01 | End: 2021-01-01 | Stop reason: HOSPADM

## 2021-01-01 RX ORDER — 0.9 % SODIUM CHLORIDE 0.9 %
500 INTRAVENOUS SOLUTION INTRAVENOUS
Status: DISCONTINUED | OUTPATIENT
Start: 2021-01-01 | End: 2021-01-01

## 2021-01-01 RX ORDER — AMIODARONE HYDROCHLORIDE 200 MG/1
200 TABLET ORAL DAILY
Qty: 30 TABLET | Refills: 0 | Status: SHIPPED | OUTPATIENT
Start: 2021-01-01 | End: 2021-01-01

## 2021-01-01 RX ORDER — ALBUMIN (HUMAN) 12.5 G/50ML
25 SOLUTION INTRAVENOUS PRN
Status: DISCONTINUED | OUTPATIENT
Start: 2021-01-01 | End: 2021-01-01 | Stop reason: HOSPADM

## 2021-01-01 RX ORDER — DEXTROSE MONOHYDRATE 25 G/50ML
12.5 INJECTION, SOLUTION INTRAVENOUS PRN
Status: DISCONTINUED | OUTPATIENT
Start: 2021-01-01 | End: 2021-01-01 | Stop reason: HOSPADM

## 2021-01-01 RX ORDER — LIDOCAINE 50 MG/G
1 PATCH TOPICAL DAILY
Status: DISCONTINUED | OUTPATIENT
Start: 2021-01-01 | End: 2021-01-01

## 2021-01-01 RX ORDER — SEVELAMER CARBONATE 800 MG/1
4000 TABLET, FILM COATED ORAL 3 TIMES DAILY
Status: DISCONTINUED | OUTPATIENT
Start: 2021-01-01 | End: 2021-01-01

## 2021-01-01 RX ORDER — OXYCODONE HYDROCHLORIDE AND ACETAMINOPHEN 5; 325 MG/1; MG/1
1 TABLET ORAL ONCE
Status: COMPLETED | OUTPATIENT
Start: 2021-01-01 | End: 2021-01-01

## 2021-01-01 RX ORDER — VANCOMYCIN HYDROCHLORIDE 125 MG/1
125 CAPSULE ORAL 2 TIMES DAILY
Status: ON HOLD | COMMUNITY
Start: 2021-01-01 | End: 2021-01-01 | Stop reason: ALTCHOICE

## 2021-01-01 RX ORDER — ALBUMIN (HUMAN) 12.5 G/50ML
25 SOLUTION INTRAVENOUS ONCE
Status: COMPLETED | OUTPATIENT
Start: 2021-01-01 | End: 2021-01-01

## 2021-01-01 RX ORDER — AMIODARONE HYDROCHLORIDE 200 MG/1
200 TABLET ORAL DAILY
Qty: 28 TABLET | Refills: 0 | Status: ON HOLD | OUTPATIENT
Start: 2021-01-01 | End: 2021-01-01

## 2021-01-01 RX ORDER — METOPROLOL SUCCINATE 100 MG/1
100 TABLET, EXTENDED RELEASE ORAL DAILY
Status: ON HOLD | COMMUNITY
End: 2021-01-01 | Stop reason: HOSPADM

## 2021-01-01 RX ORDER — PANTOPRAZOLE SODIUM 40 MG/1
40 TABLET, DELAYED RELEASE ORAL DAILY
Status: DISCONTINUED | OUTPATIENT
Start: 2021-01-01 | End: 2021-01-01 | Stop reason: HOSPADM

## 2021-01-01 RX ORDER — DOPAMINE HYDROCHLORIDE 160 MG/100ML
2-20 INJECTION, SOLUTION INTRAVENOUS CONTINUOUS
Status: DISPENSED | OUTPATIENT
Start: 2021-01-01 | End: 2021-01-01

## 2021-01-01 RX ORDER — METOPROLOL SUCCINATE 100 MG/1
100 TABLET, EXTENDED RELEASE ORAL DAILY
Status: DISCONTINUED | OUTPATIENT
Start: 2021-01-01 | End: 2021-01-01

## 2021-01-01 RX ORDER — ASPIRIN 81 MG/1
81 TABLET ORAL DAILY
Status: DISCONTINUED | OUTPATIENT
Start: 2021-01-01 | End: 2021-01-01 | Stop reason: HOSPADM

## 2021-01-01 RX ORDER — DEXTROSE MONOHYDRATE 25 G/50ML
25 INJECTION, SOLUTION INTRAVENOUS ONCE
Status: COMPLETED | OUTPATIENT
Start: 2021-01-01 | End: 2021-01-01

## 2021-01-01 RX ORDER — NOREPINEPHRINE BIT/0.9 % NACL 16MG/250ML
2-100 INFUSION BOTTLE (ML) INTRAVENOUS CONTINUOUS
Status: DISCONTINUED | OUTPATIENT
Start: 2021-01-01 | End: 2021-01-01 | Stop reason: HOSPADM

## 2021-01-01 RX ORDER — MIDAZOLAM HYDROCHLORIDE 1 MG/ML
2 INJECTION INTRAMUSCULAR; INTRAVENOUS ONCE
Status: COMPLETED | OUTPATIENT
Start: 2021-01-01 | End: 2021-01-01

## 2021-01-01 RX ORDER — DEXAMETHASONE SODIUM PHOSPHATE 4 MG/ML
4 INJECTION, SOLUTION INTRA-ARTICULAR; INTRALESIONAL; INTRAMUSCULAR; INTRAVENOUS; SOFT TISSUE
Status: DISCONTINUED | OUTPATIENT
Start: 2021-01-01 | End: 2021-01-01

## 2021-01-01 RX ORDER — SODIUM CHLORIDE 9 MG/ML
25 INJECTION, SOLUTION INTRAVENOUS PRN
Status: DISCONTINUED | OUTPATIENT
Start: 2021-01-01 | End: 2021-01-01 | Stop reason: HOSPADM

## 2021-01-01 RX ORDER — PANTOPRAZOLE SODIUM 20 MG/1
20 TABLET, DELAYED RELEASE ORAL
Status: DISCONTINUED | OUTPATIENT
Start: 2021-01-01 | End: 2021-01-01

## 2021-01-01 RX ORDER — FENTANYL CITRATE 50 UG/ML
50 INJECTION, SOLUTION INTRAMUSCULAR; INTRAVENOUS ONCE
Status: COMPLETED | OUTPATIENT
Start: 2021-01-01 | End: 2021-01-01

## 2021-01-01 RX ORDER — ACETAMINOPHEN 325 MG/1
650 TABLET ORAL EVERY 6 HOURS PRN
Status: DISCONTINUED | OUTPATIENT
Start: 2021-01-01 | End: 2021-01-01 | Stop reason: HOSPADM

## 2021-01-01 RX ORDER — SODIUM CHLORIDE 9 MG/ML
INJECTION, SOLUTION INTRAVENOUS CONTINUOUS PRN
Status: DISCONTINUED | OUTPATIENT
Start: 2021-01-01 | End: 2021-01-01 | Stop reason: SDUPTHER

## 2021-01-01 RX ORDER — SODIUM CHLORIDE 9 MG/ML
INJECTION, SOLUTION INTRAVENOUS CONTINUOUS
Status: DISCONTINUED | OUTPATIENT
Start: 2021-01-01 | End: 2021-01-01 | Stop reason: HOSPADM

## 2021-01-01 RX ORDER — MIDODRINE HYDROCHLORIDE 5 MG/1
10 TABLET ORAL PRN
Status: DISCONTINUED | OUTPATIENT
Start: 2021-01-01 | End: 2021-01-01 | Stop reason: HOSPADM

## 2021-01-01 RX ORDER — DIGOXIN 0.25 MG/ML
250 INJECTION INTRAMUSCULAR; INTRAVENOUS ONCE
Status: COMPLETED | OUTPATIENT
Start: 2021-01-01 | End: 2021-01-01

## 2021-01-01 RX ORDER — DEXTROSE MONOHYDRATE 50 MG/ML
100 INJECTION, SOLUTION INTRAVENOUS PRN
Status: DISCONTINUED | OUTPATIENT
Start: 2021-01-01 | End: 2021-01-01 | Stop reason: HOSPADM

## 2021-01-01 RX ORDER — MAGNESIUM HYDROXIDE 1200 MG/15ML
LIQUID ORAL CONTINUOUS PRN
Status: COMPLETED | OUTPATIENT
Start: 2021-01-01 | End: 2021-01-01

## 2021-01-01 RX ORDER — ETOMIDATE 2 MG/ML
INJECTION INTRAVENOUS PRN
Status: DISCONTINUED | OUTPATIENT
Start: 2021-01-01 | End: 2021-01-01 | Stop reason: SDUPTHER

## 2021-01-01 RX ORDER — CALCIUM ACETATE 667 MG/1
2 TABLET ORAL
Qty: 180 TABLET | Refills: 0 | Status: SHIPPED | OUTPATIENT
Start: 2021-01-01

## 2021-01-01 RX ORDER — CHLORHEXIDINE GLUCONATE 0.12 MG/ML
15 RINSE ORAL 2 TIMES DAILY
Status: DISCONTINUED | OUTPATIENT
Start: 2021-01-01 | End: 2021-01-01

## 2021-01-01 RX ORDER — ACETAMINOPHEN 650 MG/1
650 SUPPOSITORY RECTAL EVERY 6 HOURS PRN
Status: DISCONTINUED | OUTPATIENT
Start: 2021-01-01 | End: 2021-01-01 | Stop reason: HOSPADM

## 2021-01-01 RX ORDER — MIDODRINE HYDROCHLORIDE 5 MG/1
5 TABLET ORAL
Status: DISCONTINUED | OUTPATIENT
Start: 2021-01-01 | End: 2021-01-01

## 2021-01-01 RX ORDER — HEPARIN SODIUM 5000 [USP'U]/ML
5000 INJECTION, SOLUTION INTRAVENOUS; SUBCUTANEOUS EVERY 8 HOURS SCHEDULED
Status: DISCONTINUED | OUTPATIENT
Start: 2021-01-01 | End: 2021-01-01 | Stop reason: HOSPADM

## 2021-01-01 RX ORDER — SODIUM CHLORIDE 9 MG/ML
INJECTION, SOLUTION INTRAVENOUS PRN
Status: DISCONTINUED | OUTPATIENT
Start: 2021-01-01 | End: 2021-01-01 | Stop reason: HOSPADM

## 2021-01-01 RX ORDER — SODIUM CHLORIDE 0.9 % (FLUSH) 0.9 %
5-40 SYRINGE (ML) INJECTION EVERY 12 HOURS SCHEDULED
Status: DISCONTINUED | OUTPATIENT
Start: 2021-01-01 | End: 2021-01-01 | Stop reason: HOSPADM

## 2021-01-01 RX ORDER — SODIUM CHLORIDE 0.9 % (FLUSH) 0.9 %
10 SYRINGE (ML) INJECTION PRN
Status: DISCONTINUED | OUTPATIENT
Start: 2021-01-01 | End: 2021-01-01

## 2021-01-01 RX ORDER — CIPROFLOXACIN HYDROCHLORIDE 3.5 MG/ML
1 SOLUTION/ DROPS TOPICAL
Status: DISCONTINUED | OUTPATIENT
Start: 2021-01-01 | End: 2021-01-01

## 2021-01-01 RX ORDER — SENNA PLUS 8.6 MG/1
1 TABLET ORAL NIGHTLY
Status: DISCONTINUED | OUTPATIENT
Start: 2021-01-01 | End: 2021-01-01 | Stop reason: HOSPADM

## 2021-01-01 RX ORDER — ADENOSINE 3 MG/ML
INJECTION, SOLUTION INTRAVENOUS
Status: COMPLETED
Start: 2021-01-01 | End: 2021-01-01

## 2021-01-01 RX ORDER — VERAPAMIL HYDROCHLORIDE 2.5 MG/ML
5 INJECTION, SOLUTION INTRAVENOUS ONCE
Status: COMPLETED | OUTPATIENT
Start: 2021-01-01 | End: 2021-01-01

## 2021-01-01 RX ORDER — OXYCODONE HYDROCHLORIDE 5 MG/1
5 TABLET ORAL
Status: DISCONTINUED | OUTPATIENT
Start: 2021-01-01 | End: 2021-01-01 | Stop reason: HOSPADM

## 2021-01-01 RX ORDER — ALBUMIN (HUMAN) 12.5 G/50ML
25 SOLUTION INTRAVENOUS
Status: DISCONTINUED | OUTPATIENT
Start: 2021-01-01 | End: 2021-01-01 | Stop reason: CLARIF

## 2021-01-01 RX ORDER — FENTANYL CITRATE 50 UG/ML
INJECTION, SOLUTION INTRAMUSCULAR; INTRAVENOUS PRN
Status: DISCONTINUED | OUTPATIENT
Start: 2021-01-01 | End: 2021-01-01 | Stop reason: SDUPTHER

## 2021-01-01 RX ORDER — ATORVASTATIN CALCIUM 40 MG/1
40 TABLET, FILM COATED ORAL NIGHTLY
Status: DISCONTINUED | OUTPATIENT
Start: 2021-01-01 | End: 2021-01-01 | Stop reason: HOSPADM

## 2021-01-01 RX ORDER — EPHEDRINE SULFATE/0.9% NACL/PF 50 MG/5 ML
SYRINGE (ML) INTRAVENOUS PRN
Status: DISCONTINUED | OUTPATIENT
Start: 2021-01-01 | End: 2021-01-01 | Stop reason: SDUPTHER

## 2021-01-01 RX ORDER — VERAPAMIL HYDROCHLORIDE 2.5 MG/ML
5 INJECTION, SOLUTION INTRAVENOUS
Status: COMPLETED | OUTPATIENT
Start: 2021-01-01 | End: 2021-01-01

## 2021-01-01 RX ORDER — MIDODRINE HYDROCHLORIDE 5 MG/1
15 TABLET ORAL
Status: DISCONTINUED | OUTPATIENT
Start: 2021-01-01 | End: 2021-01-01 | Stop reason: HOSPADM

## 2021-01-01 RX ORDER — ATROPINE SULFATE 0.1 MG/ML
0.5 INJECTION INTRAVENOUS ONCE
Status: COMPLETED | OUTPATIENT
Start: 2021-01-01 | End: 2021-01-01

## 2021-01-01 RX ORDER — METOPROLOL SUCCINATE 50 MG/1
100 TABLET, EXTENDED RELEASE ORAL DAILY
Status: ON HOLD | COMMUNITY
End: 2021-01-01 | Stop reason: HOSPADM

## 2021-01-01 RX ORDER — OXYCODONE HYDROCHLORIDE AND ACETAMINOPHEN 5; 325 MG/1; MG/1
1 TABLET ORAL EVERY 8 HOURS PRN
Status: DISCONTINUED | OUTPATIENT
Start: 2021-01-01 | End: 2021-01-01

## 2021-01-01 RX ORDER — ROCURONIUM BROMIDE 10 MG/ML
INJECTION, SOLUTION INTRAVENOUS PRN
Status: DISCONTINUED | OUTPATIENT
Start: 2021-01-01 | End: 2021-01-01 | Stop reason: SDUPTHER

## 2021-01-01 RX ORDER — METOCLOPRAMIDE HYDROCHLORIDE 5 MG/ML
5 INJECTION INTRAMUSCULAR; INTRAVENOUS EVERY 6 HOURS
Status: COMPLETED | OUTPATIENT
Start: 2021-01-01 | End: 2021-01-01

## 2021-01-01 RX ORDER — METOPROLOL SUCCINATE 25 MG/1
25 TABLET, EXTENDED RELEASE ORAL ONCE
Status: DISCONTINUED | OUTPATIENT
Start: 2021-01-01 | End: 2021-01-01

## 2021-01-01 RX ORDER — ONDANSETRON 2 MG/ML
4 INJECTION INTRAMUSCULAR; INTRAVENOUS EVERY 6 HOURS PRN
Status: DISCONTINUED | OUTPATIENT
Start: 2021-01-01 | End: 2021-01-01 | Stop reason: HOSPADM

## 2021-01-01 RX ORDER — ATROPINE SULFATE 0.1 MG/ML
INJECTION INTRAVENOUS
Status: COMPLETED | OUTPATIENT
Start: 2021-01-01 | End: 2021-01-01

## 2021-01-01 RX ORDER — 0.9 % SODIUM CHLORIDE 0.9 %
1000 INTRAVENOUS SOLUTION INTRAVENOUS ONCE
Status: COMPLETED | OUTPATIENT
Start: 2021-01-01 | End: 2021-01-01

## 2021-01-01 RX ORDER — ESMOLOL HYDROCHLORIDE 10 MG/ML
50-300 INJECTION, SOLUTION INTRAVENOUS CONTINUOUS
Status: DISCONTINUED | OUTPATIENT
Start: 2021-01-01 | End: 2021-01-01

## 2021-01-01 RX ORDER — MIDODRINE HYDROCHLORIDE 5 MG/1
5 TABLET ORAL
Status: DISPENSED | OUTPATIENT
Start: 2021-01-01 | End: 2021-01-01

## 2021-01-01 RX ORDER — FENTANYL CITRATE 50 UG/ML
50 INJECTION, SOLUTION INTRAMUSCULAR; INTRAVENOUS ONCE
Status: DISCONTINUED | OUTPATIENT
Start: 2021-01-01 | End: 2021-01-01

## 2021-01-01 RX ORDER — BUMETANIDE 2 MG/1
4 TABLET ORAL 2 TIMES DAILY
Status: ON HOLD | COMMUNITY
End: 2021-01-01 | Stop reason: HOSPADM

## 2021-01-01 RX ORDER — SEVELAMER CARBONATE 800 MG/1
4000 TABLET, FILM COATED ORAL
Qty: 90 TABLET | Refills: 3 | Status: SHIPPED | OUTPATIENT
Start: 2021-01-01

## 2021-01-01 RX ORDER — MIDODRINE HYDROCHLORIDE 10 MG/1
10 TABLET ORAL
Status: DISCONTINUED | OUTPATIENT
Start: 2021-01-01 | End: 2021-01-01

## 2021-01-01 RX ORDER — FENTANYL CITRATE 50 UG/ML
25 INJECTION, SOLUTION INTRAMUSCULAR; INTRAVENOUS EVERY 5 MIN PRN
Status: DISCONTINUED | OUTPATIENT
Start: 2021-01-01 | End: 2021-01-01

## 2021-01-01 RX ORDER — MORPHINE SULFATE 4 MG/ML
4 INJECTION, SOLUTION INTRAMUSCULAR; INTRAVENOUS EVERY 4 HOURS PRN
Status: DISCONTINUED | OUTPATIENT
Start: 2021-01-01 | End: 2021-01-01 | Stop reason: HOSPADM

## 2021-01-01 RX ORDER — CALCIUM ACETATE 667 MG/1
1334 CAPSULE ORAL
Status: DISCONTINUED | OUTPATIENT
Start: 2021-01-01 | End: 2021-01-01 | Stop reason: CLARIF

## 2021-01-01 RX ORDER — FENTANYL CITRATE 50 UG/ML
25 INJECTION, SOLUTION INTRAMUSCULAR; INTRAVENOUS
Status: DISCONTINUED | OUTPATIENT
Start: 2021-01-01 | End: 2021-01-01

## 2021-01-01 RX ORDER — AMIODARONE HYDROCHLORIDE 200 MG/1
200 TABLET ORAL 2 TIMES DAILY
Status: DISCONTINUED | OUTPATIENT
Start: 2021-01-01 | End: 2021-01-01

## 2021-01-01 RX ORDER — SODIUM CHLORIDE 9 MG/ML
INJECTION, SOLUTION INTRAVENOUS CONTINUOUS
Status: DISCONTINUED | OUTPATIENT
Start: 2021-01-01 | End: 2021-01-01

## 2021-01-01 RX ORDER — MAGNESIUM SULFATE IN WATER 40 MG/ML
2000 INJECTION, SOLUTION INTRAVENOUS ONCE
Status: COMPLETED | OUTPATIENT
Start: 2021-01-01 | End: 2021-01-01

## 2021-01-01 RX ORDER — MIDODRINE HYDROCHLORIDE 2.5 MG/1
2.5 TABLET ORAL 3 TIMES DAILY
Status: DISCONTINUED | OUTPATIENT
Start: 2021-01-01 | End: 2021-01-01 | Stop reason: HOSPADM

## 2021-01-01 RX ORDER — DEXTROMETHORPHAN POLISTIREX 30 MG/5ML
60 SUSPENSION ORAL EVERY 12 HOURS SCHEDULED
Status: DISCONTINUED | OUTPATIENT
Start: 2021-01-01 | End: 2021-01-01 | Stop reason: HOSPADM

## 2021-01-01 RX ORDER — OXYCODONE HYDROCHLORIDE AND ACETAMINOPHEN 5; 325 MG/1; MG/1
1 TABLET ORAL EVERY 8 HOURS PRN
Status: DISCONTINUED | OUTPATIENT
Start: 2021-01-01 | End: 2021-01-01 | Stop reason: HOSPADM

## 2021-01-01 RX ORDER — PANTOPRAZOLE SODIUM 40 MG/1
40 TABLET, DELAYED RELEASE ORAL DAILY
Status: DISCONTINUED | OUTPATIENT
Start: 2021-01-01 | End: 2021-01-01

## 2021-01-01 RX ORDER — DOPAMINE HYDROCHLORIDE 160 MG/100ML
2-20 INJECTION, SOLUTION INTRAVENOUS CONTINUOUS
Status: DISCONTINUED | OUTPATIENT
Start: 2021-01-01 | End: 2021-01-01

## 2021-01-01 RX ORDER — MIDODRINE HYDROCHLORIDE 2.5 MG/1
2.5 TABLET ORAL 3 TIMES DAILY
Qty: 90 TABLET | Refills: 0 | DISCHARGE
Start: 2021-01-01

## 2021-01-01 RX ORDER — DIGOXIN 125 MCG
125 TABLET ORAL DAILY
Status: ON HOLD | COMMUNITY
End: 2021-01-01 | Stop reason: HOSPADM

## 2021-01-01 RX ORDER — DIGOXIN 125 MCG
125 TABLET ORAL DAILY
Status: DISCONTINUED | OUTPATIENT
Start: 2021-01-01 | End: 2021-01-01

## 2021-01-01 RX ORDER — VANCOMYCIN HYDROCHLORIDE 125 MG/1
125 CAPSULE ORAL 2 TIMES DAILY
Status: DISCONTINUED | OUTPATIENT
Start: 2021-01-01 | End: 2021-01-01 | Stop reason: RX

## 2021-01-01 RX ORDER — HYDRALAZINE HYDROCHLORIDE 20 MG/ML
5 INJECTION INTRAMUSCULAR; INTRAVENOUS EVERY 10 MIN PRN
Status: DISCONTINUED | OUTPATIENT
Start: 2021-01-01 | End: 2021-01-01

## 2021-01-01 RX ORDER — MIDODRINE HYDROCHLORIDE 10 MG/1
10 TABLET ORAL 3 TIMES DAILY
Status: DISCONTINUED | OUTPATIENT
Start: 2021-01-01 | End: 2021-01-01 | Stop reason: HOSPADM

## 2021-01-01 RX ORDER — CALCIUM GLUCONATE 20 MG/ML
1000 INJECTION, SOLUTION INTRAVENOUS ONCE
Status: COMPLETED | OUTPATIENT
Start: 2021-01-01 | End: 2021-01-01

## 2021-01-01 RX ORDER — CALCIUM ACETATE 667 MG/1
2 TABLET ORAL
Status: DISCONTINUED | OUTPATIENT
Start: 2021-01-01 | End: 2021-01-01 | Stop reason: HOSPADM

## 2021-01-01 RX ORDER — SODIUM CHLORIDE 9 MG/ML
INJECTION, SOLUTION INTRAVENOUS ONCE
Status: COMPLETED | OUTPATIENT
Start: 2021-01-01 | End: 2021-01-01

## 2021-01-01 RX ORDER — POTASSIUM CHLORIDE 29.8 MG/ML
20 INJECTION INTRAVENOUS ONCE
Status: COMPLETED | OUTPATIENT
Start: 2021-01-01 | End: 2021-01-01

## 2021-01-01 RX ORDER — DIGOXIN 125 MCG
125 TABLET ORAL DAILY
Qty: 30 TABLET | Refills: 0 | Status: ON HOLD | OUTPATIENT
Start: 2021-01-01 | End: 2021-01-01

## 2021-01-01 RX ORDER — CINACALCET 30 MG/1
90 TABLET, FILM COATED ORAL DAILY
Status: DISCONTINUED | OUTPATIENT
Start: 2021-01-01 | End: 2021-01-01 | Stop reason: HOSPADM

## 2021-01-01 RX ORDER — PATIROMER 8.4 G/1
8.4 POWDER, FOR SUSPENSION ORAL WEEKLY
Status: ON HOLD | COMMUNITY
End: 2021-01-01

## 2021-01-01 RX ORDER — VIT B COMP NO.3/FOLIC/C/BIOTIN 1 MG-60 MG
1 TABLET ORAL DAILY
Status: DISCONTINUED | OUTPATIENT
Start: 2021-01-01 | End: 2021-01-01 | Stop reason: HOSPADM

## 2021-01-01 RX ORDER — ACETAMINOPHEN 500 MG
1000 TABLET ORAL EVERY 6 HOURS PRN
COMMUNITY

## 2021-01-01 RX ORDER — SODIUM HYPOCHLORITE 5 MG/ML
SOLUTION TOPICAL 2 TIMES DAILY
Status: DISCONTINUED | OUTPATIENT
Start: 2021-01-01 | End: 2021-01-01 | Stop reason: HOSPADM

## 2021-01-01 RX ORDER — GINSENG 100 MG
CAPSULE ORAL 3 TIMES DAILY
Status: DISCONTINUED | OUTPATIENT
Start: 2021-01-01 | End: 2021-01-01 | Stop reason: HOSPADM

## 2021-01-01 RX ORDER — VIT B COMP NO.3/FOLIC/C/BIOTIN 1 MG-60 MG
1 TABLET ORAL DAILY
COMMUNITY

## 2021-01-01 RX ORDER — ONDANSETRON 2 MG/ML
INJECTION INTRAMUSCULAR; INTRAVENOUS PRN
Status: DISCONTINUED | OUTPATIENT
Start: 2021-01-01 | End: 2021-01-01 | Stop reason: SDUPTHER

## 2021-01-01 RX ORDER — NIFEDIPINE 30 MG/1
30 TABLET, FILM COATED, EXTENDED RELEASE ORAL DAILY
Status: DISCONTINUED | OUTPATIENT
Start: 2021-01-01 | End: 2021-01-01

## 2021-01-01 RX ORDER — 0.9 % SODIUM CHLORIDE 0.9 %
30 INTRAVENOUS SOLUTION INTRAVENOUS ONCE
Status: COMPLETED | OUTPATIENT
Start: 2021-01-01 | End: 2021-01-01

## 2021-01-01 RX ORDER — ACETAMINOPHEN 325 MG/1
650 TABLET ORAL EVERY 6 HOURS PRN
Status: DISCONTINUED | OUTPATIENT
Start: 2021-01-01 | End: 2021-01-01

## 2021-01-01 RX ORDER — NOREPINEPHRINE BIT/0.9 % NACL 16MG/250ML
2-100 INFUSION BOTTLE (ML) INTRAVENOUS CONTINUOUS
Status: DISCONTINUED | OUTPATIENT
Start: 2021-01-01 | End: 2021-01-01

## 2021-01-01 RX ORDER — MIDODRINE HYDROCHLORIDE 5 MG/1
10 TABLET ORAL 2 TIMES DAILY PRN
Status: DISCONTINUED | OUTPATIENT
Start: 2021-01-01 | End: 2021-01-01 | Stop reason: HOSPADM

## 2021-01-01 RX ORDER — SEVELAMER CARBONATE 800 MG/1
4000 TABLET, FILM COATED ORAL
Status: DISCONTINUED | OUTPATIENT
Start: 2021-01-01 | End: 2021-01-01

## 2021-01-01 RX ORDER — POTASSIUM CHLORIDE 20 MEQ/1
40 TABLET, EXTENDED RELEASE ORAL ONCE
Status: DISCONTINUED | OUTPATIENT
Start: 2021-01-01 | End: 2021-01-01

## 2021-01-01 RX ORDER — METOPROLOL TARTRATE 5 MG/5ML
2.5 INJECTION INTRAVENOUS ONCE
Status: COMPLETED | OUTPATIENT
Start: 2021-01-01 | End: 2021-01-01

## 2021-01-01 RX ORDER — DIGOXIN 0.25 MG/ML
0.25 INJECTION INTRAMUSCULAR; INTRAVENOUS ONCE
Status: DISCONTINUED | OUTPATIENT
Start: 2021-01-01 | End: 2021-01-01

## 2021-01-01 RX ORDER — ATORVASTATIN CALCIUM 40 MG/1
40 TABLET, FILM COATED ORAL NIGHTLY
Qty: 30 TABLET | Refills: 3 | DISCHARGE
Start: 2021-01-01

## 2021-01-01 RX ORDER — LIDOCAINE 4 G/G
1 PATCH TOPICAL DAILY
Status: DISCONTINUED | OUTPATIENT
Start: 2021-01-01 | End: 2021-01-01 | Stop reason: HOSPADM

## 2021-01-01 RX ORDER — FENTANYL CITRATE 50 UG/ML
50 INJECTION, SOLUTION INTRAMUSCULAR; INTRAVENOUS
Status: DISCONTINUED | OUTPATIENT
Start: 2021-01-01 | End: 2021-01-01 | Stop reason: HOSPADM

## 2021-01-01 RX ORDER — SODIUM CHLORIDE, SODIUM LACTATE, POTASSIUM CHLORIDE, CALCIUM CHLORIDE 600; 310; 30; 20 MG/100ML; MG/100ML; MG/100ML; MG/100ML
INJECTION, SOLUTION INTRAVENOUS CONTINUOUS PRN
Status: DISCONTINUED | OUTPATIENT
Start: 2021-01-01 | End: 2021-01-01

## 2021-01-01 RX ORDER — FAMOTIDINE 10 MG
10 TABLET ORAL DAILY
Status: DISCONTINUED | OUTPATIENT
Start: 2021-01-01 | End: 2021-01-01

## 2021-01-01 RX ORDER — AMIODARONE HYDROCHLORIDE 200 MG/1
200 TABLET ORAL DAILY
Qty: 28 TABLET | Refills: 0 | DISCHARGE
Start: 2021-01-01

## 2021-01-01 RX ORDER — BACITRACIN, NEOMYCIN, POLYMYXIN B 400; 3.5; 5 [USP'U]/G; MG/G; [USP'U]/G
OINTMENT TOPICAL 2 TIMES DAILY
Status: DISCONTINUED | OUTPATIENT
Start: 2021-01-01 | End: 2021-01-01 | Stop reason: HOSPADM

## 2021-01-01 RX ORDER — MIDODRINE HYDROCHLORIDE 2.5 MG/1
7.5 TABLET ORAL EVERY 8 HOURS
Qty: 270 TABLET | Refills: 1 | Status: SHIPPED | OUTPATIENT
Start: 2021-01-01 | End: 2021-01-01 | Stop reason: DRUGHIGH

## 2021-01-01 RX ORDER — BENZONATATE 100 MG/1
200 CAPSULE ORAL 3 TIMES DAILY
Status: DISCONTINUED | OUTPATIENT
Start: 2021-01-01 | End: 2021-01-01 | Stop reason: HOSPADM

## 2021-01-01 RX ORDER — DOPAMINE HYDROCHLORIDE 160 MG/100ML
INJECTION, SOLUTION INTRAVENOUS CONTINUOUS PRN
Status: DISCONTINUED | OUTPATIENT
Start: 2021-01-01 | End: 2021-01-01 | Stop reason: SDUPTHER

## 2021-01-01 RX ORDER — MIDODRINE HYDROCHLORIDE 5 MG/1
10 TABLET ORAL
Status: DISCONTINUED | OUTPATIENT
Start: 2021-01-01 | End: 2021-01-01

## 2021-01-01 RX ORDER — MIDODRINE HYDROCHLORIDE 2.5 MG/1
2.5 TABLET ORAL ONCE
Status: COMPLETED | OUTPATIENT
Start: 2021-01-01 | End: 2021-01-01

## 2021-01-01 RX ORDER — MIDODRINE HYDROCHLORIDE 10 MG/1
10 TABLET ORAL 3 TIMES DAILY
Status: ON HOLD | COMMUNITY
End: 2021-01-01

## 2021-01-01 RX ORDER — PROMETHAZINE HYDROCHLORIDE 25 MG/1
12.5 TABLET ORAL EVERY 6 HOURS PRN
Status: DISCONTINUED | OUTPATIENT
Start: 2021-01-01 | End: 2021-01-01 | Stop reason: HOSPADM

## 2021-01-01 RX ORDER — SODIUM CHLORIDE 0.9 % (FLUSH) 0.9 %
10 SYRINGE (ML) INJECTION PRN
Status: DISCONTINUED | OUTPATIENT
Start: 2021-01-01 | End: 2021-01-01 | Stop reason: HOSPADM

## 2021-01-01 RX ADMIN — Medication 10 MG: at 02:28

## 2021-01-01 RX ADMIN — MIDODRINE HYDROCHLORIDE 7.5 MG: 5 TABLET ORAL at 19:16

## 2021-01-01 RX ADMIN — VANCOMYCIN HYDROCHLORIDE 1250 MG: 1.25 INJECTION, POWDER, LYOPHILIZED, FOR SOLUTION INTRAVENOUS at 13:53

## 2021-01-01 RX ADMIN — VANCOMYCIN HYDROCHLORIDE 125 MG: KIT at 14:45

## 2021-01-01 RX ADMIN — BENZONATATE 200 MG: 100 CAPSULE ORAL at 08:44

## 2021-01-01 RX ADMIN — METRONIDAZOLE 500 MG: 500 INJECTION, SOLUTION INTRAVENOUS at 16:09

## 2021-01-01 RX ADMIN — METOCLOPRAMIDE 5 MG: 5 INJECTION, SOLUTION INTRAMUSCULAR; INTRAVENOUS at 21:40

## 2021-01-01 RX ADMIN — Medication 500 MG: at 12:54

## 2021-01-01 RX ADMIN — CEFTRIAXONE SODIUM 2000 MG: 2 INJECTION, POWDER, FOR SOLUTION INTRAMUSCULAR; INTRAVENOUS at 19:45

## 2021-01-01 RX ADMIN — POLYMYXIN B SULFATE, BACITRACIN ZINC, NEOMYCIN SULFATE: 5000; 3.5; 4 OINTMENT TOPICAL at 20:34

## 2021-01-01 RX ADMIN — ATORVASTATIN CALCIUM 40 MG: 40 TABLET, FILM COATED ORAL at 22:53

## 2021-01-01 RX ADMIN — SODIUM CHLORIDE: 9 INJECTION, SOLUTION INTRAVENOUS at 06:21

## 2021-01-01 RX ADMIN — SEVELAMER CARBONATE 4000 MG: 800 TABLET, FILM COATED ORAL at 07:56

## 2021-01-01 RX ADMIN — Medication 125 MCG/HR: at 16:14

## 2021-01-01 RX ADMIN — Medication 500 MG: at 05:41

## 2021-01-01 RX ADMIN — POLYMYXIN B SULFATE, BACITRACIN ZINC, NEOMYCIN SULFATE: 5000; 3.5; 4 OINTMENT TOPICAL at 21:30

## 2021-01-01 RX ADMIN — ATORVASTATIN CALCIUM 40 MG: 40 TABLET, FILM COATED ORAL at 14:06

## 2021-01-01 RX ADMIN — FENTANYL CITRATE 50 MCG: 50 INJECTION, SOLUTION INTRAMUSCULAR; INTRAVENOUS at 22:10

## 2021-01-01 RX ADMIN — HEPARIN SODIUM 5000 UNITS: 5000 INJECTION INTRAVENOUS; SUBCUTANEOUS at 14:07

## 2021-01-01 RX ADMIN — Medication 500 MG: at 13:45

## 2021-01-01 RX ADMIN — MIDODRINE HYDROCHLORIDE 7.5 MG: 5 TABLET ORAL at 01:05

## 2021-01-01 RX ADMIN — DIGOXIN 125 MCG: 125 TABLET ORAL at 14:17

## 2021-01-01 RX ADMIN — INSULIN LISPRO 1 UNITS: 100 INJECTION, SOLUTION INTRAVENOUS; SUBCUTANEOUS at 22:52

## 2021-01-01 RX ADMIN — CIPROFLOXACIN 1 DROP: 3 SOLUTION OPHTHALMIC at 05:33

## 2021-01-01 RX ADMIN — ATORVASTATIN CALCIUM 40 MG: 40 TABLET, FILM COATED ORAL at 20:50

## 2021-01-01 RX ADMIN — MIDODRINE HYDROCHLORIDE 2.5 MG: 2.5 TABLET ORAL at 14:08

## 2021-01-01 RX ADMIN — Medication 500 MG: at 00:30

## 2021-01-01 RX ADMIN — BENZONATATE 200 MG: 100 CAPSULE ORAL at 08:15

## 2021-01-01 RX ADMIN — Medication 500 MG: at 01:05

## 2021-01-01 RX ADMIN — OVINE DIGOXIN IMMUNE FAB 80 MG: 40 INJECTION, POWDER, FOR SOLUTION INTRAVENOUS at 10:45

## 2021-01-01 RX ADMIN — DIGOXIN 125 MCG: 125 TABLET ORAL at 07:19

## 2021-01-01 RX ADMIN — CIPROFLOXACIN 1 DROP: 3 SOLUTION OPHTHALMIC at 10:58

## 2021-01-01 RX ADMIN — Medication 500 MG: at 06:40

## 2021-01-01 RX ADMIN — Medication 60 MG: at 20:59

## 2021-01-01 RX ADMIN — CIPROFLOXACIN 1 DROP: 3 SOLUTION OPHTHALMIC at 10:19

## 2021-01-01 RX ADMIN — CEFEPIME HYDROCHLORIDE 1000 MG: 1 INJECTION, POWDER, FOR SOLUTION INTRAMUSCULAR; INTRAVENOUS at 00:57

## 2021-01-01 RX ADMIN — MORPHINE SULFATE 4 MG: 4 INJECTION, SOLUTION INTRAMUSCULAR; INTRAVENOUS at 16:07

## 2021-01-01 RX ADMIN — ATORVASTATIN CALCIUM 40 MG: 40 TABLET, FILM COATED ORAL at 21:34

## 2021-01-01 RX ADMIN — MIDODRINE HYDROCHLORIDE 10 MG: 10 TABLET ORAL at 14:45

## 2021-01-01 RX ADMIN — OXYCODONE HYDROCHLORIDE AND ACETAMINOPHEN 1 TABLET: 5; 325 TABLET ORAL at 22:12

## 2021-01-01 RX ADMIN — SODIUM CHLORIDE, PRESERVATIVE FREE 10 ML: 5 INJECTION INTRAVENOUS at 12:54

## 2021-01-01 RX ADMIN — ESMOLOL HYDROCHLORIDE 100 MCG/KG/MIN: 10 INJECTION INTRAVENOUS at 05:44

## 2021-01-01 RX ADMIN — VANCOMYCIN HYDROCHLORIDE 125 MG: KIT at 01:08

## 2021-01-01 RX ADMIN — ESMOLOL HYDROCHLORIDE 50 MCG/KG/MIN: 10 INJECTION INTRAVENOUS at 15:46

## 2021-01-01 RX ADMIN — ATORVASTATIN CALCIUM 40 MG: 40 TABLET, FILM COATED ORAL at 21:00

## 2021-01-01 RX ADMIN — VANCOMYCIN HYDROCHLORIDE 750 MG: 10 INJECTION, POWDER, LYOPHILIZED, FOR SOLUTION INTRAVENOUS at 06:16

## 2021-01-01 RX ADMIN — AMIODARONE HYDROCHLORIDE 200 MG: 200 TABLET ORAL at 20:40

## 2021-01-01 RX ADMIN — ONDANSETRON 4 MG: 2 INJECTION, SOLUTION INTRAMUSCULAR; INTRAVENOUS at 14:39

## 2021-01-01 RX ADMIN — HYDROCORTISONE SODIUM SUCCINATE 50 MG: 100 INJECTION, POWDER, FOR SOLUTION INTRAMUSCULAR; INTRAVENOUS at 00:12

## 2021-01-01 RX ADMIN — SODIUM CHLORIDE: 9 INJECTION, SOLUTION INTRAVENOUS at 08:54

## 2021-01-01 RX ADMIN — OXYCODONE HYDROCHLORIDE AND ACETAMINOPHEN 1 TABLET: 5; 325 TABLET ORAL at 23:28

## 2021-01-01 RX ADMIN — SEVELAMER CARBONATE 4000 MG: 800 TABLET, FILM COATED ORAL at 08:38

## 2021-01-01 RX ADMIN — SENNOSIDES 8.6 MG: 8.6 TABLET, FILM COATED ORAL at 20:59

## 2021-01-01 RX ADMIN — SENNOSIDES 8.6 MG: 8.6 TABLET, FILM COATED ORAL at 20:21

## 2021-01-01 RX ADMIN — AMIODARONE HYDROCHLORIDE 200 MG: 200 TABLET ORAL at 20:59

## 2021-01-01 RX ADMIN — METOPROLOL TARTRATE 25 MG: 25 TABLET, FILM COATED ORAL at 08:44

## 2021-01-01 RX ADMIN — HEPARIN SODIUM 5000 UNITS: 5000 INJECTION INTRAVENOUS; SUBCUTANEOUS at 21:05

## 2021-01-01 RX ADMIN — SODIUM CHLORIDE, PRESERVATIVE FREE 10 ML: 5 INJECTION INTRAVENOUS at 15:02

## 2021-01-01 RX ADMIN — SODIUM CHLORIDE, PRESERVATIVE FREE 10 ML: 5 INJECTION INTRAVENOUS at 21:00

## 2021-01-01 RX ADMIN — Medication 500 MG: at 17:10

## 2021-01-01 RX ADMIN — INSULIN LISPRO 1 UNITS: 100 INJECTION, SOLUTION INTRAVENOUS; SUBCUTANEOUS at 12:00

## 2021-01-01 RX ADMIN — ATORVASTATIN CALCIUM 40 MG: 40 TABLET, FILM COATED ORAL at 20:44

## 2021-01-01 RX ADMIN — SODIUM CHLORIDE, PRESERVATIVE FREE 10 ML: 5 INJECTION INTRAVENOUS at 20:01

## 2021-01-01 RX ADMIN — ATORVASTATIN CALCIUM 40 MG: 40 TABLET, FILM COATED ORAL at 20:40

## 2021-01-01 RX ADMIN — ASPIRIN 81 MG: 81 TABLET, COATED ORAL at 09:17

## 2021-01-01 RX ADMIN — Medication 500 MG: at 17:16

## 2021-01-01 RX ADMIN — CIPROFLOXACIN 1 DROP: 3 SOLUTION OPHTHALMIC at 08:12

## 2021-01-01 RX ADMIN — SEVELAMER CARBONATE 4000 MG: 800 TABLET, FILM COATED ORAL at 16:07

## 2021-01-01 RX ADMIN — CEFEPIME HYDROCHLORIDE 1000 MG: 1 INJECTION, POWDER, FOR SOLUTION INTRAMUSCULAR; INTRAVENOUS at 00:50

## 2021-01-01 RX ADMIN — ADENOSINE 6 MG: 3 INJECTION INTRAVENOUS at 09:00

## 2021-01-01 RX ADMIN — DIGOXIN 125 MCG: 125 TABLET ORAL at 09:51

## 2021-01-01 RX ADMIN — SENNOSIDES 8.6 MG: 8.6 TABLET, FILM COATED ORAL at 20:33

## 2021-01-01 RX ADMIN — SEVELAMER CARBONATE 4000 MG: 800 TABLET, FILM COATED ORAL at 08:43

## 2021-01-01 RX ADMIN — SODIUM CHLORIDE, PRESERVATIVE FREE 10 ML: 5 INJECTION INTRAVENOUS at 08:27

## 2021-01-01 RX ADMIN — MEROPENEM 500 MG: 500 INJECTION, POWDER, FOR SOLUTION INTRAVENOUS at 13:00

## 2021-01-01 RX ADMIN — Medication 500 MG: at 18:10

## 2021-01-01 RX ADMIN — SEVELAMER CARBONATE 4000 MG: 800 TABLET, FILM COATED ORAL at 22:21

## 2021-01-01 RX ADMIN — HEPARIN SODIUM 5000 UNITS: 5000 INJECTION INTRAVENOUS; SUBCUTANEOUS at 22:25

## 2021-01-01 RX ADMIN — MEROPENEM 500 MG: 500 INJECTION, POWDER, FOR SOLUTION INTRAVENOUS at 21:18

## 2021-01-01 RX ADMIN — SODIUM CHLORIDE, PRESERVATIVE FREE 10 ML: 5 INJECTION INTRAVENOUS at 08:52

## 2021-01-01 RX ADMIN — METOPROLOL TARTRATE 25 MG: 25 TABLET, FILM COATED ORAL at 22:53

## 2021-01-01 RX ADMIN — POLYMYXIN B SULFATE, BACITRACIN ZINC, NEOMYCIN SULFATE: 5000; 3.5; 4 OINTMENT TOPICAL at 09:58

## 2021-01-01 RX ADMIN — SODIUM CHLORIDE, PRESERVATIVE FREE 10 ML: 5 INJECTION INTRAVENOUS at 08:46

## 2021-01-01 RX ADMIN — Medication 500 MG: at 17:14

## 2021-01-01 RX ADMIN — Medication 1334 MG: at 18:00

## 2021-01-01 RX ADMIN — Medication 500 MG: at 00:10

## 2021-01-01 RX ADMIN — SODIUM CHLORIDE, PRESERVATIVE FREE 10 ML: 5 INJECTION INTRAVENOUS at 20:21

## 2021-01-01 RX ADMIN — METOPROLOL TARTRATE 25 MG: 25 TABLET, FILM COATED ORAL at 07:57

## 2021-01-01 RX ADMIN — METRONIDAZOLE 500 MG: 500 INJECTION, SOLUTION INTRAVENOUS at 22:17

## 2021-01-01 RX ADMIN — POLYMYXIN B SULFATE, BACITRACIN ZINC, NEOMYCIN SULFATE: 5000; 3.5; 4 OINTMENT TOPICAL at 09:19

## 2021-01-01 RX ADMIN — Medication 500 MG: at 05:56

## 2021-01-01 RX ADMIN — METRONIDAZOLE 500 MG: 500 INJECTION, SOLUTION INTRAVENOUS at 22:39

## 2021-01-01 RX ADMIN — FENTANYL CITRATE 25 MCG: 50 INJECTION, SOLUTION INTRAMUSCULAR; INTRAVENOUS at 05:17

## 2021-01-01 RX ADMIN — ALBUMIN (HUMAN) 25 G: 0.25 INJECTION, SOLUTION INTRAVENOUS at 15:10

## 2021-01-01 RX ADMIN — VANCOMYCIN HYDROCHLORIDE 125 MG: KIT at 22:53

## 2021-01-01 RX ADMIN — AMIODARONE HYDROCHLORIDE 0.5 MG/MIN: 1.8 INJECTION, SOLUTION INTRAVENOUS at 05:10

## 2021-01-01 RX ADMIN — INSULIN LISPRO 1 UNITS: 100 INJECTION, SOLUTION INTRAVENOUS; SUBCUTANEOUS at 20:34

## 2021-01-01 RX ADMIN — OXYCODONE HYDROCHLORIDE 5 MG: 5 TABLET ORAL at 16:18

## 2021-01-01 RX ADMIN — SEVELAMER CARBONATE 4000 MG: 800 TABLET, FILM COATED ORAL at 17:11

## 2021-01-01 RX ADMIN — PIPERACILLIN AND TAZOBACTAM 4500 MG: 4; .5 INJECTION, POWDER, LYOPHILIZED, FOR SOLUTION INTRAVENOUS; PARENTERAL at 04:47

## 2021-01-01 RX ADMIN — SODIUM CHLORIDE, PRESERVATIVE FREE 10 ML: 5 INJECTION INTRAVENOUS at 20:55

## 2021-01-01 RX ADMIN — SODIUM CHLORIDE: 9 INJECTION, SOLUTION INTRAVENOUS at 21:29

## 2021-01-01 RX ADMIN — BENZONATATE 200 MG: 100 CAPSULE ORAL at 07:57

## 2021-01-01 RX ADMIN — BENZONATATE 200 MG: 100 CAPSULE ORAL at 20:58

## 2021-01-01 RX ADMIN — ATORVASTATIN CALCIUM 40 MG: 40 TABLET, FILM COATED ORAL at 21:25

## 2021-01-01 RX ADMIN — ASPIRIN 81 MG: 81 TABLET, COATED ORAL at 08:16

## 2021-01-01 RX ADMIN — MIDODRINE HYDROCHLORIDE 7.5 MG: 5 TABLET ORAL at 17:10

## 2021-01-01 RX ADMIN — AMIODARONE HYDROCHLORIDE 200 MG: 200 TABLET ORAL at 21:16

## 2021-01-01 RX ADMIN — MIDODRINE HYDROCHLORIDE 10 MG: 5 TABLET ORAL at 12:01

## 2021-01-01 RX ADMIN — MIDODRINE HYDROCHLORIDE 7.5 MG: 5 TABLET ORAL at 17:14

## 2021-01-01 RX ADMIN — SODIUM CHLORIDE 2313 ML: 9 INJECTION, SOLUTION INTRAVENOUS at 16:27

## 2021-01-01 RX ADMIN — AMIODARONE HYDROCHLORIDE 200 MG: 200 TABLET ORAL at 07:20

## 2021-01-01 RX ADMIN — CIPROFLOXACIN 1 DROP: 3 SOLUTION OPHTHALMIC at 05:46

## 2021-01-01 RX ADMIN — METOPROLOL TARTRATE 12.5 MG: 25 TABLET, FILM COATED ORAL at 11:49

## 2021-01-01 RX ADMIN — DIGOXIN 125 MCG: 125 TABLET ORAL at 08:15

## 2021-01-01 RX ADMIN — ATORVASTATIN CALCIUM 40 MG: 40 TABLET, FILM COATED ORAL at 20:59

## 2021-01-01 RX ADMIN — ASPIRIN 81 MG: 81 TABLET, COATED ORAL at 07:56

## 2021-01-01 RX ADMIN — MIDODRINE HYDROCHLORIDE 7.5 MG: 5 TABLET ORAL at 08:38

## 2021-01-01 RX ADMIN — POLYMYXIN B SULFATE, BACITRACIN ZINC, NEOMYCIN SULFATE: 5000; 3.5; 4 OINTMENT TOPICAL at 08:50

## 2021-01-01 RX ADMIN — CEFTRIAXONE SODIUM 2000 MG: 2 INJECTION, POWDER, FOR SOLUTION INTRAMUSCULAR; INTRAVENOUS at 20:12

## 2021-01-01 RX ADMIN — SODIUM CHLORIDE, PRESERVATIVE FREE 10 ML: 5 INJECTION INTRAVENOUS at 09:53

## 2021-01-01 RX ADMIN — MIDODRINE HYDROCHLORIDE 15 MG: 5 TABLET ORAL at 11:45

## 2021-01-01 RX ADMIN — SODIUM CHLORIDE, PRESERVATIVE FREE 10 ML: 5 INJECTION INTRAVENOUS at 08:31

## 2021-01-01 RX ADMIN — AMIODARONE HYDROCHLORIDE 150 MG: 1.5 INJECTION, SOLUTION INTRAVENOUS at 13:41

## 2021-01-01 RX ADMIN — SODIUM CHLORIDE: 9 INJECTION, SOLUTION INTRAVENOUS at 08:21

## 2021-01-01 RX ADMIN — ATORVASTATIN CALCIUM 40 MG: 40 TABLET, FILM COATED ORAL at 20:18

## 2021-01-01 RX ADMIN — SODIUM CHLORIDE: 9 INJECTION, SOLUTION INTRAVENOUS at 18:56

## 2021-01-01 RX ADMIN — ASPIRIN 81 MG: 81 TABLET, COATED ORAL at 09:16

## 2021-01-01 RX ADMIN — INSULIN LISPRO 1 UNITS: 100 INJECTION, SOLUTION INTRAVENOUS; SUBCUTANEOUS at 17:10

## 2021-01-01 RX ADMIN — DEXTROSE MONOHYDRATE 25 G: 25 INJECTION, SOLUTION INTRAVENOUS at 04:39

## 2021-01-01 RX ADMIN — MIDODRINE HYDROCHLORIDE 7.5 MG: 5 TABLET ORAL at 07:56

## 2021-01-01 RX ADMIN — FENTANYL CITRATE 25 MCG: 50 INJECTION, SOLUTION INTRAMUSCULAR; INTRAVENOUS at 17:30

## 2021-01-01 RX ADMIN — POLYMYXIN B SULFATE, BACITRACIN ZINC, NEOMYCIN SULFATE: 5000; 3.5; 4 OINTMENT TOPICAL at 09:50

## 2021-01-01 RX ADMIN — ASPIRIN 81 MG: 81 TABLET, COATED ORAL at 14:07

## 2021-01-01 RX ADMIN — METOPROLOL TARTRATE 25 MG: 25 TABLET, FILM COATED ORAL at 20:50

## 2021-01-01 RX ADMIN — CEFEPIME HYDROCHLORIDE 1000 MG: 1 INJECTION, POWDER, FOR SOLUTION INTRAMUSCULAR; INTRAVENOUS at 00:33

## 2021-01-01 RX ADMIN — PHENYLEPHRINE HYDROCHLORIDE 50 MCG/MIN: 10 INJECTION INTRAVENOUS at 13:01

## 2021-01-01 RX ADMIN — CIPROFLOXACIN 1 DROP: 3 SOLUTION OPHTHALMIC at 12:42

## 2021-01-01 RX ADMIN — ONDANSETRON 4 MG: 2 INJECTION INTRAMUSCULAR; INTRAVENOUS at 20:34

## 2021-01-01 RX ADMIN — METRONIDAZOLE 500 MG: 500 INJECTION, SOLUTION INTRAVENOUS at 12:57

## 2021-01-01 RX ADMIN — AMIODARONE HYDROCHLORIDE 200 MG: 200 TABLET ORAL at 08:11

## 2021-01-01 RX ADMIN — ASPIRIN 81 MG: 81 TABLET, COATED ORAL at 14:17

## 2021-01-01 RX ADMIN — ACETAMINOPHEN 650 MG: 325 TABLET ORAL at 04:07

## 2021-01-01 RX ADMIN — METOPROLOL TARTRATE 12.5 MG: 25 TABLET, FILM COATED ORAL at 08:13

## 2021-01-01 RX ADMIN — MIDODRINE HYDROCHLORIDE 7.5 MG: 5 TABLET ORAL at 08:44

## 2021-01-01 RX ADMIN — BENZONATATE 200 MG: 100 CAPSULE ORAL at 09:16

## 2021-01-01 RX ADMIN — SENNOSIDES 8.6 MG: 8.6 TABLET, FILM COATED ORAL at 20:48

## 2021-01-01 RX ADMIN — MIDODRINE HYDROCHLORIDE 7.5 MG: 5 TABLET ORAL at 00:29

## 2021-01-01 RX ADMIN — INSULIN LISPRO 1 UNITS: 100 INJECTION, SOLUTION INTRAVENOUS; SUBCUTANEOUS at 16:50

## 2021-01-01 RX ADMIN — ESMOLOL HYDROCHLORIDE 150 MCG/KG/MIN: 10 INJECTION INTRAVENOUS at 09:48

## 2021-01-01 RX ADMIN — METRONIDAZOLE 500 MG: 500 INJECTION, SOLUTION INTRAVENOUS at 21:40

## 2021-01-01 RX ADMIN — SEVELAMER CARBONATE 4000 MG: 800 TABLET, FILM COATED ORAL at 08:12

## 2021-01-01 RX ADMIN — METOPROLOL TARTRATE 25 MG: 25 TABLET, FILM COATED ORAL at 08:16

## 2021-01-01 RX ADMIN — VANCOMYCIN HYDROCHLORIDE 125 MG: KIT at 06:20

## 2021-01-01 RX ADMIN — POLYMYXIN B SULFATE, BACITRACIN ZINC, NEOMYCIN SULFATE: 5000; 3.5; 4 OINTMENT TOPICAL at 22:36

## 2021-01-01 RX ADMIN — CEFEPIME 2000 MG: 2 INJECTION, POWDER, FOR SOLUTION INTRAVENOUS at 18:02

## 2021-01-01 RX ADMIN — METRONIDAZOLE 500 MG: 500 INJECTION, SOLUTION INTRAVENOUS at 00:15

## 2021-01-01 RX ADMIN — ACETAMINOPHEN 650 MG: 325 TABLET, FILM COATED ORAL at 22:16

## 2021-01-01 RX ADMIN — SODIUM CHLORIDE: 9 INJECTION, SOLUTION INTRAVENOUS at 01:28

## 2021-01-01 RX ADMIN — CIPROFLOXACIN 1 DROP: 3 SOLUTION OPHTHALMIC at 20:57

## 2021-01-01 RX ADMIN — CEFEPIME HYDROCHLORIDE 1000 MG: 1 INJECTION, POWDER, FOR SOLUTION INTRAMUSCULAR; INTRAVENOUS at 01:29

## 2021-01-01 RX ADMIN — AMIODARONE HYDROCHLORIDE 200 MG: 200 TABLET ORAL at 20:58

## 2021-01-01 RX ADMIN — HEPARIN SODIUM 5000 UNITS: 5000 INJECTION INTRAVENOUS; SUBCUTANEOUS at 06:38

## 2021-01-01 RX ADMIN — SODIUM CHLORIDE, PRESERVATIVE FREE 10 ML: 5 INJECTION INTRAVENOUS at 20:37

## 2021-01-01 RX ADMIN — MEROPENEM 1000 MG: 1 INJECTION, POWDER, FOR SOLUTION INTRAVENOUS at 12:41

## 2021-01-01 RX ADMIN — INSULIN LISPRO 1 UNITS: 100 INJECTION, SOLUTION INTRAVENOUS; SUBCUTANEOUS at 13:07

## 2021-01-01 RX ADMIN — SODIUM CHLORIDE, PRESERVATIVE FREE 10 ML: 5 INJECTION INTRAVENOUS at 21:09

## 2021-01-01 RX ADMIN — AMIODARONE HYDROCHLORIDE 200 MG: 200 TABLET ORAL at 14:08

## 2021-01-01 RX ADMIN — AMIODARONE HYDROCHLORIDE 200 MG: 200 TABLET ORAL at 08:15

## 2021-01-01 RX ADMIN — Medication 60 MG: at 07:58

## 2021-01-01 RX ADMIN — CIPROFLOXACIN 1 DROP: 3 SOLUTION OPHTHALMIC at 10:00

## 2021-01-01 RX ADMIN — ATORVASTATIN CALCIUM 40 MG: 40 TABLET, FILM COATED ORAL at 20:33

## 2021-01-01 RX ADMIN — Medication 100 MCG/HR: at 15:36

## 2021-01-01 RX ADMIN — PHENYLEPHRINE HYDROCHLORIDE 30 MCG/MIN: 10 INJECTION INTRAVENOUS at 17:14

## 2021-01-01 RX ADMIN — HEPARIN SODIUM 5000 UNITS: 5000 INJECTION INTRAVENOUS; SUBCUTANEOUS at 20:48

## 2021-01-01 RX ADMIN — CIPROFLOXACIN 1 DROP: 3 SOLUTION OPHTHALMIC at 11:50

## 2021-01-01 RX ADMIN — HEPARIN SODIUM 5000 UNITS: 5000 INJECTION INTRAVENOUS; SUBCUTANEOUS at 15:02

## 2021-01-01 RX ADMIN — METOPROLOL TARTRATE 25 MG: 25 TABLET, FILM COATED ORAL at 09:50

## 2021-01-01 RX ADMIN — CHLORHEXIDINE GLUCONATE 0.12% ORAL RINSE 15 ML: 1.2 LIQUID ORAL at 20:43

## 2021-01-01 RX ADMIN — ACETAMINOPHEN 650 MG: 325 TABLET, FILM COATED ORAL at 06:20

## 2021-01-01 RX ADMIN — CIPROFLOXACIN 1 DROP: 3 SOLUTION OPHTHALMIC at 16:38

## 2021-01-01 RX ADMIN — METOCLOPRAMIDE 5 MG: 5 INJECTION, SOLUTION INTRAMUSCULAR; INTRAVENOUS at 03:58

## 2021-01-01 RX ADMIN — Medication 1334 MG: at 17:04

## 2021-01-01 RX ADMIN — MIDODRINE HYDROCHLORIDE 15 MG: 5 TABLET ORAL at 17:10

## 2021-01-01 RX ADMIN — HEPARIN SODIUM 5000 UNITS: 5000 INJECTION INTRAVENOUS; SUBCUTANEOUS at 12:54

## 2021-01-01 RX ADMIN — MIDODRINE HYDROCHLORIDE 2.5 MG: 2.5 TABLET ORAL at 08:35

## 2021-01-01 RX ADMIN — BENZONATATE 200 MG: 100 CAPSULE ORAL at 12:41

## 2021-01-01 RX ADMIN — CIPROFLOXACIN 1 DROP: 3 SOLUTION OPHTHALMIC at 12:44

## 2021-01-01 RX ADMIN — CIPROFLOXACIN 1 DROP: 3 SOLUTION OPHTHALMIC at 06:01

## 2021-01-01 RX ADMIN — METOPROLOL TARTRATE 25 MG: 25 TABLET, FILM COATED ORAL at 14:18

## 2021-01-01 RX ADMIN — INSULIN HUMAN 10 UNITS: 100 INJECTION, SOLUTION PARENTERAL at 04:39

## 2021-01-01 RX ADMIN — SODIUM CHLORIDE, PRESERVATIVE FREE 10 ML: 5 INJECTION INTRAVENOUS at 19:44

## 2021-01-01 RX ADMIN — SODIUM CHLORIDE, PRESERVATIVE FREE 10 ML: 5 INJECTION INTRAVENOUS at 20:34

## 2021-01-01 RX ADMIN — INSULIN LISPRO 2 UNITS: 100 INJECTION, SOLUTION INTRAVENOUS; SUBCUTANEOUS at 20:39

## 2021-01-01 RX ADMIN — Medication 2 MCG/MIN: at 08:20

## 2021-01-01 RX ADMIN — SENNOSIDES 8.6 MG: 8.6 TABLET, FILM COATED ORAL at 20:40

## 2021-01-01 RX ADMIN — HEPARIN SODIUM 5000 UNITS: 5000 INJECTION INTRAVENOUS; SUBCUTANEOUS at 22:39

## 2021-01-01 RX ADMIN — AMIODARONE HYDROCHLORIDE 200 MG: 200 TABLET ORAL at 08:53

## 2021-01-01 RX ADMIN — INSULIN LISPRO 1 UNITS: 100 INJECTION, SOLUTION INTRAVENOUS; SUBCUTANEOUS at 20:59

## 2021-01-01 RX ADMIN — Medication 500 MG: at 19:01

## 2021-01-01 RX ADMIN — VANCOMYCIN HYDROCHLORIDE 125 MG: KIT at 07:08

## 2021-01-01 RX ADMIN — Medication 10 MG: at 02:18

## 2021-01-01 RX ADMIN — ALBUMIN (HUMAN) 25 G: 0.25 INJECTION, SOLUTION INTRAVENOUS at 09:27

## 2021-01-01 RX ADMIN — Medication 125 MCG/HR: at 08:03

## 2021-01-01 RX ADMIN — MIDODRINE HYDROCHLORIDE 10 MG: 5 TABLET ORAL at 17:13

## 2021-01-01 RX ADMIN — SODIUM BICARBONATE: 84 INJECTION, SOLUTION INTRAVENOUS at 05:37

## 2021-01-01 RX ADMIN — INSULIN LISPRO 1 UNITS: 100 INJECTION, SOLUTION INTRAVENOUS; SUBCUTANEOUS at 20:58

## 2021-01-01 RX ADMIN — MIDODRINE HYDROCHLORIDE 7.5 MG: 5 TABLET ORAL at 16:04

## 2021-01-01 RX ADMIN — MIDODRINE HYDROCHLORIDE 5 MG: 5 TABLET ORAL at 22:21

## 2021-01-01 RX ADMIN — SENNOSIDES 8.6 MG: 8.6 TABLET, FILM COATED ORAL at 20:58

## 2021-01-01 RX ADMIN — FENTANYL CITRATE 50 MCG: 50 INJECTION, SOLUTION INTRAMUSCULAR; INTRAVENOUS at 01:58

## 2021-01-01 RX ADMIN — SEVELAMER CARBONATE 4000 MG: 800 TABLET, FILM COATED ORAL at 18:07

## 2021-01-01 RX ADMIN — SODIUM CHLORIDE, PRESERVATIVE FREE 10 ML: 5 INJECTION INTRAVENOUS at 20:44

## 2021-01-01 RX ADMIN — POLYMYXIN B SULFATE, BACITRACIN ZINC, NEOMYCIN SULFATE: 5000; 3.5; 4 OINTMENT TOPICAL at 10:55

## 2021-01-01 RX ADMIN — SODIUM CHLORIDE, PRESERVATIVE FREE 10 ML: 5 INJECTION INTRAVENOUS at 10:15

## 2021-01-01 RX ADMIN — AMIODARONE HYDROCHLORIDE 0.5 MG/MIN: 1.8 INJECTION, SOLUTION INTRAVENOUS at 04:41

## 2021-01-01 RX ADMIN — ASPIRIN 81 MG: 81 TABLET, COATED ORAL at 09:50

## 2021-01-01 RX ADMIN — HEPARIN SODIUM 5000 UNITS: 5000 INJECTION INTRAVENOUS; SUBCUTANEOUS at 14:45

## 2021-01-01 RX ADMIN — OXYCODONE HYDROCHLORIDE AND ACETAMINOPHEN 1 TABLET: 5; 325 TABLET ORAL at 02:49

## 2021-01-01 RX ADMIN — CIPROFLOXACIN 1 DROP: 3 SOLUTION OPHTHALMIC at 16:42

## 2021-01-01 RX ADMIN — ACETAMINOPHEN 650 MG: 325 TABLET ORAL at 15:00

## 2021-01-01 RX ADMIN — MIDODRINE HYDROCHLORIDE 5 MG: 5 TABLET ORAL at 16:16

## 2021-01-01 RX ADMIN — CIPROFLOXACIN 1 DROP: 3 SOLUTION OPHTHALMIC at 19:35

## 2021-01-01 RX ADMIN — FAMOTIDINE 10 MG: 10 TABLET ORAL at 10:58

## 2021-01-01 RX ADMIN — SEVELAMER CARBONATE 4000 MG: 800 TABLET, FILM COATED ORAL at 08:33

## 2021-01-01 RX ADMIN — POLYMYXIN B SULFATE, BACITRACIN ZINC, NEOMYCIN SULFATE: 5000; 3.5; 4 OINTMENT TOPICAL at 08:39

## 2021-01-01 RX ADMIN — BENZONATATE 200 MG: 100 CAPSULE ORAL at 14:30

## 2021-01-01 RX ADMIN — Medication 60 MG: at 20:58

## 2021-01-01 RX ADMIN — MIDODRINE HYDROCHLORIDE 7.5 MG: 5 TABLET ORAL at 11:49

## 2021-01-01 RX ADMIN — Medication 60 MG: at 08:39

## 2021-01-01 RX ADMIN — HEPARIN SODIUM 5000 UNITS: 5000 INJECTION INTRAVENOUS; SUBCUTANEOUS at 05:13

## 2021-01-01 RX ADMIN — HEPARIN SODIUM 5000 UNITS: 5000 INJECTION INTRAVENOUS; SUBCUTANEOUS at 16:04

## 2021-01-01 RX ADMIN — METOPROLOL SUCCINATE 100 MG: 100 TABLET, EXTENDED RELEASE ORAL at 18:02

## 2021-01-01 RX ADMIN — SODIUM CHLORIDE, PRESERVATIVE FREE 10 ML: 5 INJECTION INTRAVENOUS at 20:42

## 2021-01-01 RX ADMIN — OXYCODONE HYDROCHLORIDE 5 MG: 5 TABLET ORAL at 12:25

## 2021-01-01 RX ADMIN — METOPROLOL TARTRATE 25 MG: 25 TABLET, FILM COATED ORAL at 08:53

## 2021-01-01 RX ADMIN — DEXTROSE MONOHYDRATE 25 G: 25 INJECTION, SOLUTION INTRAVENOUS at 06:05

## 2021-01-01 RX ADMIN — BENZONATATE 200 MG: 100 CAPSULE ORAL at 21:59

## 2021-01-01 RX ADMIN — CIPROFLOXACIN 1 DROP: 3 SOLUTION OPHTHALMIC at 08:43

## 2021-01-01 RX ADMIN — HEPARIN SODIUM 5000 UNITS: 5000 INJECTION INTRAVENOUS; SUBCUTANEOUS at 13:01

## 2021-01-01 RX ADMIN — ASPIRIN 81 MG: 81 TABLET, COATED ORAL at 08:44

## 2021-01-01 RX ADMIN — Medication 500 MG: at 12:51

## 2021-01-01 RX ADMIN — CEFEPIME HYDROCHLORIDE 1000 MG: 1 INJECTION, POWDER, FOR SOLUTION INTRAMUSCULAR; INTRAVENOUS at 01:41

## 2021-01-01 RX ADMIN — Medication 60 MG: at 21:59

## 2021-01-01 RX ADMIN — MIDODRINE HYDROCHLORIDE 7.5 MG: 5 TABLET ORAL at 13:08

## 2021-01-01 RX ADMIN — POLYMYXIN B SULFATE, BACITRACIN ZINC, NEOMYCIN SULFATE: 5000; 3.5; 4 OINTMENT TOPICAL at 08:17

## 2021-01-01 RX ADMIN — Medication 500 MG: at 23:28

## 2021-01-01 RX ADMIN — HEPARIN SODIUM 5000 UNITS: 5000 INJECTION INTRAVENOUS; SUBCUTANEOUS at 05:53

## 2021-01-01 RX ADMIN — Medication 10 MG: at 02:25

## 2021-01-01 RX ADMIN — Medication 60 MG: at 20:57

## 2021-01-01 RX ADMIN — BENZONATATE 200 MG: 100 CAPSULE ORAL at 20:59

## 2021-01-01 RX ADMIN — METRONIDAZOLE 500 MG: 500 INJECTION, SOLUTION INTRAVENOUS at 13:39

## 2021-01-01 RX ADMIN — SODIUM CHLORIDE: 9 INJECTION, SOLUTION INTRAVENOUS at 06:36

## 2021-01-01 RX ADMIN — CIPROFLOXACIN 1 DROP: 3 SOLUTION OPHTHALMIC at 20:34

## 2021-01-01 RX ADMIN — METOPROLOL TARTRATE 25 MG: 25 TABLET, FILM COATED ORAL at 20:33

## 2021-01-01 RX ADMIN — SODIUM CHLORIDE, PRESERVATIVE FREE 10 ML: 5 INJECTION INTRAVENOUS at 00:05

## 2021-01-01 RX ADMIN — POLYMYXIN B SULFATE, BACITRACIN ZINC, NEOMYCIN SULFATE: 5000; 3.5; 4 OINTMENT TOPICAL at 20:56

## 2021-01-01 RX ADMIN — ASPIRIN 81 MG: 81 TABLET, COATED ORAL at 08:53

## 2021-01-01 RX ADMIN — Medication 125 MCG/HR: at 00:24

## 2021-01-01 RX ADMIN — Medication 3.7 MILLICURIE: at 08:28

## 2021-01-01 RX ADMIN — MIDODRINE HYDROCHLORIDE 10 MG: 10 TABLET ORAL at 23:01

## 2021-01-01 RX ADMIN — MIDODRINE HYDROCHLORIDE 7.5 MG: 5 TABLET ORAL at 17:33

## 2021-01-01 RX ADMIN — MIDODRINE HYDROCHLORIDE 7.5 MG: 5 TABLET ORAL at 23:28

## 2021-01-01 RX ADMIN — FAMOTIDINE 10 MG: 10 TABLET ORAL at 08:43

## 2021-01-01 RX ADMIN — CIPROFLOXACIN 1 DROP: 3 SOLUTION OPHTHALMIC at 20:18

## 2021-01-01 RX ADMIN — MIDODRINE HYDROCHLORIDE 10 MG: 10 TABLET ORAL at 14:07

## 2021-01-01 RX ADMIN — DIGOXIN 125 MCG: 125 TABLET ORAL at 08:41

## 2021-01-01 RX ADMIN — Medication 500 MG: at 01:01

## 2021-01-01 RX ADMIN — VANCOMYCIN HYDROCHLORIDE 125 MG: KIT at 09:00

## 2021-01-01 RX ADMIN — METOPROLOL TARTRATE 25 MG: 25 TABLET, FILM COATED ORAL at 20:58

## 2021-01-01 RX ADMIN — HEPARIN SODIUM 5000 UNITS: 5000 INJECTION INTRAVENOUS; SUBCUTANEOUS at 07:32

## 2021-01-01 RX ADMIN — Medication 50 MCG/HR: at 03:58

## 2021-01-01 RX ADMIN — MIDODRINE HYDROCHLORIDE 7.5 MG: 5 TABLET ORAL at 09:51

## 2021-01-01 RX ADMIN — HYDROCORTISONE SODIUM SUCCINATE 50 MG: 100 INJECTION, POWDER, FOR SOLUTION INTRAMUSCULAR; INTRAVENOUS at 10:43

## 2021-01-01 RX ADMIN — OXYCODONE HYDROCHLORIDE 5 MG: 5 TABLET ORAL at 23:33

## 2021-01-01 RX ADMIN — VANCOMYCIN HYDROCHLORIDE 125 MG: KIT at 00:02

## 2021-01-01 RX ADMIN — AMIODARONE HYDROCHLORIDE 1 MG/MIN: 1.8 INJECTION, SOLUTION INTRAVENOUS at 20:02

## 2021-01-01 RX ADMIN — CEFEPIME HYDROCHLORIDE 1000 MG: 1 INJECTION, POWDER, FOR SOLUTION INTRAMUSCULAR; INTRAVENOUS at 01:20

## 2021-01-01 RX ADMIN — Medication 1334 MG: at 14:45

## 2021-01-01 RX ADMIN — ETOMIDATE 16 MG: 2 INJECTION, SOLUTION INTRAVENOUS at 01:36

## 2021-01-01 RX ADMIN — Medication 500 MG: at 00:56

## 2021-01-01 RX ADMIN — Medication 1334 MG: at 08:53

## 2021-01-01 RX ADMIN — MIDODRINE HYDROCHLORIDE 10 MG: 10 TABLET ORAL at 15:02

## 2021-01-01 RX ADMIN — SODIUM CHLORIDE, PRESERVATIVE FREE 10 ML: 5 INJECTION INTRAVENOUS at 21:29

## 2021-01-01 RX ADMIN — CIPROFLOXACIN 1 DROP: 3 SOLUTION OPHTHALMIC at 06:10

## 2021-01-01 RX ADMIN — FENTANYL CITRATE 50 MCG: 50 INJECTION, SOLUTION INTRAMUSCULAR; INTRAVENOUS at 12:27

## 2021-01-01 RX ADMIN — AMIODARONE HYDROCHLORIDE 200 MG: 200 TABLET ORAL at 21:25

## 2021-01-01 RX ADMIN — SODIUM CHLORIDE, PRESERVATIVE FREE 10 ML: 5 INJECTION INTRAVENOUS at 08:05

## 2021-01-01 RX ADMIN — PANTOPRAZOLE SODIUM 40 MG: 40 TABLET, DELAYED RELEASE ORAL at 09:18

## 2021-01-01 RX ADMIN — CIPROFLOXACIN 1 DROP: 3 SOLUTION OPHTHALMIC at 12:18

## 2021-01-01 RX ADMIN — POLYMYXIN B SULFATE, BACITRACIN ZINC, NEOMYCIN SULFATE: 5000; 3.5; 4 OINTMENT TOPICAL at 21:09

## 2021-01-01 RX ADMIN — FENTANYL CITRATE 50 MCG: 50 INJECTION, SOLUTION INTRAMUSCULAR; INTRAVENOUS at 17:45

## 2021-01-01 RX ADMIN — VERAPAMIL HYDROCHLORIDE 5 MG: 2.5 INJECTION INTRAVENOUS at 08:49

## 2021-01-01 RX ADMIN — SEVELAMER CARBONATE 4000 MG: 800 TABLET, FILM COATED ORAL at 09:50

## 2021-01-01 RX ADMIN — CEFEPIME HYDROCHLORIDE 1000 MG: 1 INJECTION, POWDER, FOR SOLUTION INTRAMUSCULAR; INTRAVENOUS at 20:40

## 2021-01-01 RX ADMIN — METRONIDAZOLE 500 MG: 500 INJECTION, SOLUTION INTRAVENOUS at 06:06

## 2021-01-01 RX ADMIN — SODIUM CHLORIDE: 9 INJECTION, SOLUTION INTRAVENOUS at 15:25

## 2021-01-01 RX ADMIN — INSULIN LISPRO 1 UNITS: 100 INJECTION, SOLUTION INTRAVENOUS; SUBCUTANEOUS at 08:21

## 2021-01-01 RX ADMIN — METOPROLOL TARTRATE 25 MG: 25 TABLET, FILM COATED ORAL at 20:48

## 2021-01-01 RX ADMIN — SODIUM CHLORIDE, PRESERVATIVE FREE 10 ML: 5 INJECTION INTRAVENOUS at 21:41

## 2021-01-01 RX ADMIN — SEVELAMER CARBONATE 4000 MG: 800 TABLET, FILM COATED ORAL at 09:18

## 2021-01-01 RX ADMIN — ALBUMIN (HUMAN) 25 G: 0.25 INJECTION, SOLUTION INTRAVENOUS at 08:52

## 2021-01-01 RX ADMIN — BENZONATATE 200 MG: 100 CAPSULE ORAL at 08:11

## 2021-01-01 RX ADMIN — CIPROFLOXACIN 1 DROP: 3 SOLUTION OPHTHALMIC at 14:20

## 2021-01-01 RX ADMIN — BENZONATATE 200 MG: 100 CAPSULE ORAL at 13:01

## 2021-01-01 RX ADMIN — VANCOMYCIN HYDROCHLORIDE 125 MG: KIT at 22:25

## 2021-01-01 RX ADMIN — CIPROFLOXACIN 1 DROP: 3 SOLUTION OPHTHALMIC at 16:03

## 2021-01-01 RX ADMIN — CIPROFLOXACIN 1 DROP: 3 SOLUTION OPHTHALMIC at 20:55

## 2021-01-01 RX ADMIN — ASPIRIN 81 MG: 81 TABLET, COATED ORAL at 10:58

## 2021-01-01 RX ADMIN — ASPIRIN 81 MG: 81 TABLET, COATED ORAL at 08:39

## 2021-01-01 RX ADMIN — ATORVASTATIN CALCIUM 40 MG: 40 TABLET, FILM COATED ORAL at 20:48

## 2021-01-01 RX ADMIN — MIDODRINE HYDROCHLORIDE 10 MG: 10 TABLET ORAL at 07:19

## 2021-01-01 RX ADMIN — ATORVASTATIN CALCIUM 40 MG: 40 TABLET, FILM COATED ORAL at 19:17

## 2021-01-01 RX ADMIN — ATORVASTATIN CALCIUM 40 MG: 40 TABLET, FILM COATED ORAL at 20:58

## 2021-01-01 RX ADMIN — MIDODRINE HYDROCHLORIDE 7.5 MG: 5 TABLET ORAL at 16:02

## 2021-01-01 RX ADMIN — ASPIRIN 81 MG: 81 TABLET, COATED ORAL at 08:12

## 2021-01-01 RX ADMIN — ASPIRIN 81 MG: 81 TABLET, COATED ORAL at 16:10

## 2021-01-01 RX ADMIN — AMIODARONE HYDROCHLORIDE 200 MG: 200 TABLET ORAL at 09:50

## 2021-01-01 RX ADMIN — POLYMYXIN B SULFATE, BACITRACIN ZINC, NEOMYCIN SULFATE: 5000; 3.5; 4 OINTMENT TOPICAL at 08:14

## 2021-01-01 RX ADMIN — Medication 500 MG: at 05:36

## 2021-01-01 RX ADMIN — Medication 1 TABLET: at 14:07

## 2021-01-01 RX ADMIN — SODIUM CHLORIDE, PRESERVATIVE FREE 5 ML: 5 INJECTION INTRAVENOUS at 14:09

## 2021-01-01 RX ADMIN — MIDODRINE HYDROCHLORIDE 7.5 MG: 5 TABLET ORAL at 10:56

## 2021-01-01 RX ADMIN — EPOETIN ALFA-EPBX 2000 UNITS: 2000 INJECTION, SOLUTION INTRAVENOUS; SUBCUTANEOUS at 12:52

## 2021-01-01 RX ADMIN — EPOETIN ALFA-EPBX 3000 UNITS: 3000 INJECTION, SOLUTION INTRAVENOUS; SUBCUTANEOUS at 13:00

## 2021-01-01 RX ADMIN — SODIUM CHLORIDE 1000 ML: 9 INJECTION, SOLUTION INTRAVENOUS at 22:22

## 2021-01-01 RX ADMIN — METRONIDAZOLE 500 MG: 500 INJECTION, SOLUTION INTRAVENOUS at 05:53

## 2021-01-01 RX ADMIN — HEPARIN SODIUM 5000 UNITS: 5000 INJECTION INTRAVENOUS; SUBCUTANEOUS at 22:53

## 2021-01-01 RX ADMIN — POTASSIUM CHLORIDE 20 MEQ: 29.8 INJECTION, SOLUTION INTRAVENOUS at 07:56

## 2021-01-01 RX ADMIN — METRONIDAZOLE 500 MG: 500 INJECTION, SOLUTION INTRAVENOUS at 22:30

## 2021-01-01 RX ADMIN — MIDODRINE HYDROCHLORIDE 7.5 MG: 5 TABLET ORAL at 01:01

## 2021-01-01 RX ADMIN — ATROPINE SULFATE 1 MG: 0.1 INJECTION PARENTERAL at 17:33

## 2021-01-01 RX ADMIN — SEVELAMER CARBONATE 4000 MG: 800 TABLET, FILM COATED ORAL at 18:00

## 2021-01-01 RX ADMIN — MIDODRINE HYDROCHLORIDE 10 MG: 5 TABLET ORAL at 08:27

## 2021-01-01 RX ADMIN — Medication 500 MG: at 21:24

## 2021-01-01 RX ADMIN — SEVELAMER CARBONATE 4000 MG: 800 TABLET, FILM COATED ORAL at 18:10

## 2021-01-01 RX ADMIN — MIDODRINE HYDROCHLORIDE 10 MG: 5 TABLET ORAL at 13:22

## 2021-01-01 RX ADMIN — CHLORHEXIDINE GLUCONATE 0.12% ORAL RINSE 15 ML: 1.2 LIQUID ORAL at 21:01

## 2021-01-01 RX ADMIN — INSULIN HUMAN 10 UNITS: 100 INJECTION, SOLUTION PARENTERAL at 06:03

## 2021-01-01 RX ADMIN — MIDODRINE HYDROCHLORIDE 10 MG: 5 TABLET ORAL at 09:55

## 2021-01-01 RX ADMIN — ALBUMIN (HUMAN) 25 G: 0.25 INJECTION, SOLUTION INTRAVENOUS at 19:18

## 2021-01-01 RX ADMIN — SENNOSIDES 8.6 MG: 8.6 TABLET, FILM COATED ORAL at 21:59

## 2021-01-01 RX ADMIN — POLYMYXIN B SULFATE, BACITRACIN ZINC, NEOMYCIN SULFATE: 5000; 3.5; 4 OINTMENT TOPICAL at 08:16

## 2021-01-01 RX ADMIN — DIGOXIN 125 MCG: 125 TABLET ORAL at 08:55

## 2021-01-01 RX ADMIN — Medication 500 MG: at 00:29

## 2021-01-01 RX ADMIN — POLYMYXIN B SULFATE, BACITRACIN ZINC, NEOMYCIN SULFATE: 5000; 3.5; 4 OINTMENT TOPICAL at 17:24

## 2021-01-01 RX ADMIN — SODIUM CHLORIDE, PRESERVATIVE FREE 10 ML: 5 INJECTION INTRAVENOUS at 19:18

## 2021-01-01 RX ADMIN — CIPROFLOXACIN 1 DROP: 3 SOLUTION OPHTHALMIC at 08:39

## 2021-01-01 RX ADMIN — FENTANYL CITRATE 50 MCG: 50 INJECTION, SOLUTION INTRAMUSCULAR; INTRAVENOUS at 19:17

## 2021-01-01 RX ADMIN — CIPROFLOXACIN 1 DROP: 3 SOLUTION OPHTHALMIC at 19:25

## 2021-01-01 RX ADMIN — CIPROFLOXACIN 1 DROP: 3 SOLUTION OPHTHALMIC at 05:10

## 2021-01-01 RX ADMIN — SEVELAMER CARBONATE 4000 MG: 800 TABLET, FILM COATED ORAL at 17:10

## 2021-01-01 RX ADMIN — METOPROLOL TARTRATE 25 MG: 25 TABLET, FILM COATED ORAL at 21:00

## 2021-01-01 RX ADMIN — Medication 500 MG: at 19:43

## 2021-01-01 RX ADMIN — AMIODARONE HYDROCHLORIDE 200 MG: 200 TABLET ORAL at 14:17

## 2021-01-01 RX ADMIN — MIDODRINE HYDROCHLORIDE 7.5 MG: 5 TABLET ORAL at 09:58

## 2021-01-01 RX ADMIN — GLUCAGON HYDROCHLORIDE 1 MG: KIT at 10:23

## 2021-01-01 RX ADMIN — Medication 500 MG: at 05:32

## 2021-01-01 RX ADMIN — POLYMYXIN B SULFATE, BACITRACIN ZINC, NEOMYCIN SULFATE: 5000; 3.5; 4 OINTMENT TOPICAL at 21:19

## 2021-01-01 RX ADMIN — BENZONATATE 200 MG: 100 CAPSULE ORAL at 10:55

## 2021-01-01 RX ADMIN — Medication 60 MG: at 08:13

## 2021-01-01 RX ADMIN — CALCIUM GLUCONATE 1000 MG: 20 INJECTION, SOLUTION INTRAVENOUS at 04:39

## 2021-01-01 RX ADMIN — PANTOPRAZOLE SODIUM 40 MG: 40 TABLET, DELAYED RELEASE ORAL at 06:21

## 2021-01-01 RX ADMIN — AMIODARONE HYDROCHLORIDE 200 MG: 200 TABLET ORAL at 15:03

## 2021-01-01 RX ADMIN — METOPROLOL TARTRATE 25 MG: 25 TABLET, FILM COATED ORAL at 21:16

## 2021-01-01 RX ADMIN — Medication 500 MG: at 13:03

## 2021-01-01 RX ADMIN — SODIUM CHLORIDE: 9 INJECTION, SOLUTION INTRAVENOUS at 13:57

## 2021-01-01 RX ADMIN — Medication 500 MG: at 11:54

## 2021-01-01 RX ADMIN — Medication 1500 MG: at 06:05

## 2021-01-01 RX ADMIN — Medication 500 MG: at 16:12

## 2021-01-01 RX ADMIN — MIDODRINE HYDROCHLORIDE 10 MG: 5 TABLET ORAL at 17:33

## 2021-01-01 RX ADMIN — MIDODRINE HYDROCHLORIDE 7.5 MG: 5 TABLET ORAL at 09:18

## 2021-01-01 RX ADMIN — ESMOLOL HYDROCHLORIDE 100 MCG/KG/MIN: 10 INJECTION INTRAVENOUS at 20:28

## 2021-01-01 RX ADMIN — SEVELAMER CARBONATE 4000 MG: 800 TABLET, FILM COATED ORAL at 08:30

## 2021-01-01 RX ADMIN — ALBUMIN (HUMAN) 25 G: 0.25 INJECTION, SOLUTION INTRAVENOUS at 08:19

## 2021-01-01 RX ADMIN — Medication 500 MG: at 05:15

## 2021-01-01 RX ADMIN — CIPROFLOXACIN 1 DROP: 3 SOLUTION OPHTHALMIC at 17:15

## 2021-01-01 RX ADMIN — HEPARIN SODIUM 5000 UNITS: 5000 INJECTION INTRAVENOUS; SUBCUTANEOUS at 20:59

## 2021-01-01 RX ADMIN — SEVELAMER CARBONATE 4000 MG: 800 TABLET, FILM COATED ORAL at 16:40

## 2021-01-01 RX ADMIN — Medication 125 MCG/HR: at 08:26

## 2021-01-01 RX ADMIN — MIDODRINE HYDROCHLORIDE 5 MG: 5 TABLET ORAL at 07:03

## 2021-01-01 RX ADMIN — DOPAMINE HYDROCHLORIDE 5 MCG/KG/MIN: 160 INJECTION, SOLUTION INTRAVENOUS at 02:26

## 2021-01-01 RX ADMIN — CEFTRIAXONE SODIUM 2000 MG: 2 INJECTION, POWDER, FOR SOLUTION INTRAMUSCULAR; INTRAVENOUS at 21:40

## 2021-01-01 RX ADMIN — CIPROFLOXACIN 1 DROP: 3 SOLUTION OPHTHALMIC at 22:37

## 2021-01-01 RX ADMIN — BENZONATATE 200 MG: 100 CAPSULE ORAL at 20:57

## 2021-01-01 RX ADMIN — SEVELAMER CARBONATE 4000 MG: 800 TABLET, FILM COATED ORAL at 11:48

## 2021-01-01 RX ADMIN — OXYCODONE HYDROCHLORIDE AND ACETAMINOPHEN 1 TABLET: 5; 325 TABLET ORAL at 20:06

## 2021-01-01 RX ADMIN — BACITRACIN: 500 OINTMENT TOPICAL at 13:45

## 2021-01-01 RX ADMIN — Medication 500 MG: at 11:02

## 2021-01-01 RX ADMIN — POLYMYXIN B SULFATE, BACITRACIN ZINC, NEOMYCIN SULFATE: 5000; 3.5; 4 OINTMENT TOPICAL at 19:39

## 2021-01-01 RX ADMIN — HEPARIN SODIUM 5000 UNITS: 5000 INJECTION INTRAVENOUS; SUBCUTANEOUS at 14:13

## 2021-01-01 RX ADMIN — Medication 500 MG: at 00:28

## 2021-01-01 RX ADMIN — METOPROLOL TARTRATE 25 MG: 25 TABLET, FILM COATED ORAL at 14:07

## 2021-01-01 RX ADMIN — HEPARIN SODIUM 5000 UNITS: 5000 INJECTION INTRAVENOUS; SUBCUTANEOUS at 22:20

## 2021-01-01 RX ADMIN — FENTANYL CITRATE 50 MCG: 50 INJECTION, SOLUTION INTRAMUSCULAR; INTRAVENOUS at 02:33

## 2021-01-01 RX ADMIN — EPOETIN ALFA-EPBX 2000 UNITS: 2000 INJECTION, SOLUTION INTRAVENOUS; SUBCUTANEOUS at 13:00

## 2021-01-01 RX ADMIN — DIGOXIN 250 MCG: 0.25 INJECTION INTRAMUSCULAR; INTRAVENOUS at 17:46

## 2021-01-01 RX ADMIN — METOPROLOL TARTRATE 25 MG: 25 TABLET, FILM COATED ORAL at 20:59

## 2021-01-01 RX ADMIN — MIDODRINE HYDROCHLORIDE 7.5 MG: 5 TABLET ORAL at 00:10

## 2021-01-01 RX ADMIN — MIDODRINE HYDROCHLORIDE 7.5 MG: 5 TABLET ORAL at 08:11

## 2021-01-01 RX ADMIN — EPOETIN ALFA-EPBX 3000 UNITS: 3000 INJECTION, SOLUTION INTRAVENOUS; SUBCUTANEOUS at 12:52

## 2021-01-01 RX ADMIN — METRONIDAZOLE 500 MG: 500 INJECTION, SOLUTION INTRAVENOUS at 13:43

## 2021-01-01 RX ADMIN — SODIUM CHLORIDE, PRESERVATIVE FREE 10 ML: 5 INJECTION INTRAVENOUS at 09:34

## 2021-01-01 RX ADMIN — Medication 1 TABLET: at 07:20

## 2021-01-01 RX ADMIN — METRONIDAZOLE 500 MG: 500 INJECTION, SOLUTION INTRAVENOUS at 14:00

## 2021-01-01 RX ADMIN — Medication 60 MG: at 10:54

## 2021-01-01 RX ADMIN — Medication 10 ML: at 08:29

## 2021-01-01 RX ADMIN — ASPIRIN 81 MG: 81 TABLET, COATED ORAL at 08:11

## 2021-01-01 RX ADMIN — MIDODRINE HYDROCHLORIDE 7.5 MG: 5 TABLET ORAL at 15:10

## 2021-01-01 RX ADMIN — ATORVASTATIN CALCIUM 40 MG: 40 TABLET, FILM COATED ORAL at 21:16

## 2021-01-01 RX ADMIN — INSULIN LISPRO 2 UNITS: 100 INJECTION, SOLUTION INTRAVENOUS; SUBCUTANEOUS at 17:59

## 2021-01-01 RX ADMIN — ASPIRIN 81 MG: 81 TABLET, COATED ORAL at 08:35

## 2021-01-01 RX ADMIN — SEVELAMER CARBONATE 4000 MG: 800 TABLET, FILM COATED ORAL at 11:58

## 2021-01-01 RX ADMIN — ATORVASTATIN CALCIUM 40 MG: 40 TABLET, FILM COATED ORAL at 20:57

## 2021-01-01 RX ADMIN — FENTANYL CITRATE 50 MCG: 50 INJECTION, SOLUTION INTRAMUSCULAR; INTRAVENOUS at 14:18

## 2021-01-01 RX ADMIN — INSULIN LISPRO 1 UNITS: 100 INJECTION, SOLUTION INTRAVENOUS; SUBCUTANEOUS at 17:19

## 2021-01-01 RX ADMIN — VANCOMYCIN HYDROCHLORIDE 125 MG: KIT at 05:06

## 2021-01-01 RX ADMIN — Medication 500 MG: at 01:23

## 2021-01-01 RX ADMIN — AMIODARONE HYDROCHLORIDE 200 MG: 200 TABLET ORAL at 09:16

## 2021-01-01 RX ADMIN — SODIUM CHLORIDE, PRESERVATIVE FREE 10 ML: 5 INJECTION INTRAVENOUS at 08:04

## 2021-01-01 RX ADMIN — AMIODARONE HYDROCHLORIDE 200 MG: 200 TABLET ORAL at 08:27

## 2021-01-01 RX ADMIN — POLYMYXIN B SULFATE, BACITRACIN ZINC, NEOMYCIN SULFATE: 5000; 3.5; 4 OINTMENT TOPICAL at 20:35

## 2021-01-01 RX ADMIN — SENNOSIDES 8.6 MG: 8.6 TABLET, FILM COATED ORAL at 20:57

## 2021-01-01 RX ADMIN — ATORVASTATIN CALCIUM 40 MG: 40 TABLET, FILM COATED ORAL at 20:32

## 2021-01-01 RX ADMIN — DIGOXIN 125 MCG: 125 TABLET ORAL at 14:06

## 2021-01-01 RX ADMIN — SEVELAMER CARBONATE 4000 MG: 800 TABLET, FILM COATED ORAL at 13:13

## 2021-01-01 RX ADMIN — Medication 60 MG: at 08:44

## 2021-01-01 RX ADMIN — INSULIN LISPRO 1 UNITS: 100 INJECTION, SOLUTION INTRAVENOUS; SUBCUTANEOUS at 21:16

## 2021-01-01 RX ADMIN — MIDODRINE HYDROCHLORIDE 10 MG: 5 TABLET ORAL at 08:44

## 2021-01-01 RX ADMIN — PANTOPRAZOLE SODIUM 40 MG: 40 TABLET, DELAYED RELEASE ORAL at 05:56

## 2021-01-01 RX ADMIN — BENZONATATE 200 MG: 100 CAPSULE ORAL at 20:18

## 2021-01-01 RX ADMIN — MIDODRINE HYDROCHLORIDE 7.5 MG: 5 TABLET ORAL at 08:51

## 2021-01-01 RX ADMIN — ATORVASTATIN CALCIUM 40 MG: 40 TABLET, FILM COATED ORAL at 21:59

## 2021-01-01 RX ADMIN — SODIUM CHLORIDE, PRESERVATIVE FREE 10 ML: 5 INJECTION INTRAVENOUS at 21:18

## 2021-01-01 RX ADMIN — PANTOPRAZOLE SODIUM 40 MG: 40 TABLET, DELAYED RELEASE ORAL at 06:23

## 2021-01-01 RX ADMIN — Medication 500 MG: at 12:18

## 2021-01-01 RX ADMIN — SODIUM CHLORIDE: 9 INJECTION, SOLUTION INTRAVENOUS at 04:42

## 2021-01-01 RX ADMIN — CIPROFLOXACIN 1 DROP: 3 SOLUTION OPHTHALMIC at 16:11

## 2021-01-01 RX ADMIN — PANTOPRAZOLE SODIUM 40 MG: 40 TABLET, DELAYED RELEASE ORAL at 05:37

## 2021-01-01 RX ADMIN — MIDODRINE HYDROCHLORIDE 7.5 MG: 5 TABLET ORAL at 18:10

## 2021-01-01 RX ADMIN — MIDODRINE HYDROCHLORIDE 7.5 MG: 5 TABLET ORAL at 18:09

## 2021-01-01 RX ADMIN — BENZONATATE 200 MG: 100 CAPSULE ORAL at 16:04

## 2021-01-01 RX ADMIN — SODIUM CHLORIDE: 9 INJECTION, SOLUTION INTRAVENOUS at 09:21

## 2021-01-01 RX ADMIN — POLYMYXIN B SULFATE, BACITRACIN ZINC, NEOMYCIN SULFATE: 5000; 3.5; 4 OINTMENT TOPICAL at 22:18

## 2021-01-01 RX ADMIN — SEVELAMER CARBONATE 4000 MG: 800 TABLET, FILM COATED ORAL at 15:03

## 2021-01-01 RX ADMIN — FENTANYL CITRATE 50 MCG: 50 INJECTION, SOLUTION INTRAMUSCULAR; INTRAVENOUS at 12:48

## 2021-01-01 RX ADMIN — Medication 500 MG: at 06:23

## 2021-01-01 RX ADMIN — SODIUM CHLORIDE: 9 INJECTION, SOLUTION INTRAVENOUS at 19:15

## 2021-01-01 RX ADMIN — CIPROFLOXACIN 1 DROP: 3 SOLUTION OPHTHALMIC at 20:30

## 2021-01-01 RX ADMIN — METRONIDAZOLE 500 MG: 500 INJECTION, SOLUTION INTRAVENOUS at 06:01

## 2021-01-01 RX ADMIN — OXYCODONE HYDROCHLORIDE 5 MG: 5 TABLET ORAL at 03:23

## 2021-01-01 RX ADMIN — ASPIRIN 81 MG: 81 TABLET, COATED ORAL at 08:52

## 2021-01-01 RX ADMIN — VANCOMYCIN HYDROCHLORIDE 125 MG: KIT at 00:40

## 2021-01-01 RX ADMIN — CIPROFLOXACIN 1 DROP: 3 SOLUTION OPHTHALMIC at 07:58

## 2021-01-01 RX ADMIN — VANCOMYCIN HYDROCHLORIDE 1000 MG: 1 INJECTION, POWDER, LYOPHILIZED, FOR SOLUTION INTRAVENOUS at 15:10

## 2021-01-01 RX ADMIN — CIPROFLOXACIN 1 DROP: 3 SOLUTION OPHTHALMIC at 15:17

## 2021-01-01 RX ADMIN — METOPROLOL TARTRATE 25 MG: 25 TABLET, FILM COATED ORAL at 10:55

## 2021-01-01 RX ADMIN — FENTANYL CITRATE 25 MCG: 50 INJECTION, SOLUTION INTRAMUSCULAR; INTRAVENOUS at 00:04

## 2021-01-01 RX ADMIN — POLYMYXIN B SULFATE, BACITRACIN ZINC, NEOMYCIN SULFATE: 5000; 3.5; 4 OINTMENT TOPICAL at 10:14

## 2021-01-01 RX ADMIN — FAMOTIDINE 10 MG: 10 TABLET ORAL at 11:59

## 2021-01-01 RX ADMIN — ACETAMINOPHEN 650 MG: 325 TABLET, FILM COATED ORAL at 10:38

## 2021-01-01 RX ADMIN — Medication 500 MG: at 06:21

## 2021-01-01 RX ADMIN — FAMOTIDINE 10 MG: 10 TABLET ORAL at 12:42

## 2021-01-01 RX ADMIN — MEROPENEM 500 MG: 500 INJECTION, POWDER, FOR SOLUTION INTRAVENOUS at 12:58

## 2021-01-01 RX ADMIN — Medication 2 MCG/MIN: at 21:34

## 2021-01-01 RX ADMIN — ATORVASTATIN CALCIUM 40 MG: 40 TABLET, FILM COATED ORAL at 20:54

## 2021-01-01 RX ADMIN — ASPIRIN 81 MG: 81 TABLET, COATED ORAL at 07:20

## 2021-01-01 RX ADMIN — Medication 500 MG: at 19:25

## 2021-01-01 RX ADMIN — ATORVASTATIN CALCIUM 40 MG: 40 TABLET, FILM COATED ORAL at 22:16

## 2021-01-01 RX ADMIN — SODIUM CHLORIDE, PRESERVATIVE FREE 10 ML: 5 INJECTION INTRAVENOUS at 22:02

## 2021-01-01 RX ADMIN — MIDODRINE HYDROCHLORIDE 7.5 MG: 5 TABLET ORAL at 17:59

## 2021-01-01 RX ADMIN — MIDODRINE HYDROCHLORIDE 7.5 MG: 5 TABLET ORAL at 17:00

## 2021-01-01 RX ADMIN — Medication 500 MG: at 00:13

## 2021-01-01 RX ADMIN — MIDODRINE HYDROCHLORIDE 10 MG: 10 TABLET ORAL at 20:48

## 2021-01-01 RX ADMIN — DIGOXIN 125 MCG: 125 TABLET ORAL at 09:17

## 2021-01-01 RX ADMIN — IOPAMIDOL 75 ML: 755 INJECTION, SOLUTION INTRAVENOUS at 04:59

## 2021-01-01 RX ADMIN — POLYMYXIN B SULFATE, BACITRACIN ZINC, NEOMYCIN SULFATE: 5000; 3.5; 4 OINTMENT TOPICAL at 20:54

## 2021-01-01 RX ADMIN — Medication 500 MG: at 00:53

## 2021-01-01 RX ADMIN — METOPROLOL TARTRATE 25 MG: 25 TABLET, FILM COATED ORAL at 09:16

## 2021-01-01 RX ADMIN — METOPROLOL TARTRATE 25 MG: 25 TABLET, FILM COATED ORAL at 20:40

## 2021-01-01 RX ADMIN — AMIODARONE HYDROCHLORIDE 200 MG: 200 TABLET ORAL at 08:45

## 2021-01-01 RX ADMIN — MIDODRINE HYDROCHLORIDE 7.5 MG: 5 TABLET ORAL at 16:40

## 2021-01-01 RX ADMIN — Medication 60 MG: at 08:14

## 2021-01-01 RX ADMIN — MIDODRINE HYDROCHLORIDE 2.5 MG: 2.5 TABLET ORAL at 00:40

## 2021-01-01 RX ADMIN — Medication 60 MG: at 20:18

## 2021-01-01 RX ADMIN — ATORVASTATIN CALCIUM 40 MG: 40 TABLET, FILM COATED ORAL at 22:22

## 2021-01-01 RX ADMIN — HEPARIN SODIUM 5000 UNITS: 5000 INJECTION INTRAVENOUS; SUBCUTANEOUS at 07:35

## 2021-01-01 RX ADMIN — CIPROFLOXACIN 1 DROP: 3 SOLUTION OPHTHALMIC at 14:30

## 2021-01-01 RX ADMIN — Medication 500 MG: at 06:01

## 2021-01-01 RX ADMIN — FENTANYL CITRATE 25 MCG: 50 INJECTION, SOLUTION INTRAMUSCULAR; INTRAVENOUS at 02:31

## 2021-01-01 RX ADMIN — Medication 500 MG: at 06:22

## 2021-01-01 RX ADMIN — ADENOSINE 6 MG: 3 INJECTION, SOLUTION INTRAVENOUS at 09:00

## 2021-01-01 RX ADMIN — INSULIN LISPRO 1 UNITS: 100 INJECTION, SOLUTION INTRAVENOUS; SUBCUTANEOUS at 12:42

## 2021-01-01 RX ADMIN — MAGNESIUM SULFATE 2000 MG: 2 INJECTION INTRAVENOUS at 13:41

## 2021-01-01 RX ADMIN — MIDODRINE HYDROCHLORIDE 7.5 MG: 5 TABLET ORAL at 08:15

## 2021-01-01 RX ADMIN — FAMOTIDINE 10 MG: 10 TABLET ORAL at 08:46

## 2021-01-01 RX ADMIN — MIDODRINE HYDROCHLORIDE 10 MG: 10 TABLET ORAL at 13:00

## 2021-01-01 RX ADMIN — MIDODRINE HYDROCHLORIDE 2.5 MG: 2.5 TABLET ORAL at 22:24

## 2021-01-01 RX ADMIN — SODIUM CHLORIDE, PRESERVATIVE FREE 10 ML: 5 INJECTION INTRAVENOUS at 08:11

## 2021-01-01 RX ADMIN — FAMOTIDINE 10 MG: 10 TABLET ORAL at 10:14

## 2021-01-01 RX ADMIN — DIGOXIN 125 MCG: 125 TABLET ORAL at 08:11

## 2021-01-01 RX ADMIN — DIGOXIN 125 MCG: 125 TABLET ORAL at 08:43

## 2021-01-01 RX ADMIN — VANCOMYCIN HYDROCHLORIDE 125 MG: KIT at 15:10

## 2021-01-01 RX ADMIN — CEFTRIAXONE SODIUM 2000 MG: 2 INJECTION, POWDER, FOR SOLUTION INTRAMUSCULAR; INTRAVENOUS at 19:17

## 2021-01-01 RX ADMIN — SODIUM CHLORIDE, PRESERVATIVE FREE 10 ML: 5 INJECTION INTRAVENOUS at 20:50

## 2021-01-01 RX ADMIN — Medication 500 MG: at 18:34

## 2021-01-01 RX ADMIN — PANTOPRAZOLE SODIUM 40 MG: 40 TABLET, DELAYED RELEASE ORAL at 18:02

## 2021-01-01 RX ADMIN — Medication 500 MG: at 17:58

## 2021-01-01 RX ADMIN — CEFEPIME HYDROCHLORIDE 2000 MG: 2 INJECTION, POWDER, FOR SOLUTION INTRAVENOUS at 12:15

## 2021-01-01 RX ADMIN — HEPARIN SODIUM 5000 UNITS: 5000 INJECTION INTRAVENOUS; SUBCUTANEOUS at 06:01

## 2021-01-01 RX ADMIN — Medication 1 TABLET: at 08:53

## 2021-01-01 RX ADMIN — SEVELAMER CARBONATE 4000 MG: 800 TABLET, FILM COATED ORAL at 17:34

## 2021-01-01 RX ADMIN — INSULIN LISPRO 1 UNITS: 100 INJECTION, SOLUTION INTRAVENOUS; SUBCUTANEOUS at 09:00

## 2021-01-01 RX ADMIN — SEVELAMER CARBONATE 4000 MG: 800 TABLET, FILM COATED ORAL at 22:15

## 2021-01-01 RX ADMIN — BENZONATATE 200 MG: 100 CAPSULE ORAL at 09:50

## 2021-01-01 RX ADMIN — SODIUM CHLORIDE, PRESERVATIVE FREE 10 ML: 5 INJECTION INTRAVENOUS at 08:48

## 2021-01-01 RX ADMIN — Medication 500 MG: at 18:13

## 2021-01-01 RX ADMIN — SODIUM CHLORIDE, PRESERVATIVE FREE 10 ML: 5 INJECTION INTRAVENOUS at 08:19

## 2021-01-01 RX ADMIN — DIGOXIN 125 MCG: 125 TABLET ORAL at 08:12

## 2021-01-01 RX ADMIN — Medication 10 MG: at 02:07

## 2021-01-01 RX ADMIN — AMIODARONE HYDROCHLORIDE 200 MG: 200 TABLET ORAL at 13:41

## 2021-01-01 RX ADMIN — METRONIDAZOLE 500 MG: 500 INJECTION, SOLUTION INTRAVENOUS at 08:33

## 2021-01-01 RX ADMIN — SODIUM CHLORIDE: 9 INJECTION, SOLUTION INTRAVENOUS at 01:56

## 2021-01-01 RX ADMIN — CIPROFLOXACIN 1 DROP: 3 SOLUTION OPHTHALMIC at 14:46

## 2021-01-01 RX ADMIN — DOPAMINE HYDROCHLORIDE 4 MCG/KG/MIN: 160 INJECTION, SOLUTION INTRAVENOUS at 02:20

## 2021-01-01 RX ADMIN — SODIUM CHLORIDE, PRESERVATIVE FREE 10 ML: 5 INJECTION INTRAVENOUS at 21:59

## 2021-01-01 RX ADMIN — CHLORHEXIDINE GLUCONATE 0.12% ORAL RINSE 15 ML: 1.2 LIQUID ORAL at 21:35

## 2021-01-01 RX ADMIN — SODIUM CHLORIDE, PRESERVATIVE FREE 10 ML: 5 INJECTION INTRAVENOUS at 20:57

## 2021-01-01 RX ADMIN — BENZONATATE 200 MG: 100 CAPSULE ORAL at 20:48

## 2021-01-01 RX ADMIN — CIPROFLOXACIN 1 DROP: 3 SOLUTION OPHTHALMIC at 22:53

## 2021-01-01 RX ADMIN — MIDODRINE HYDROCHLORIDE 7.5 MG: 5 TABLET ORAL at 00:26

## 2021-01-01 RX ADMIN — BENZONATATE 200 MG: 100 CAPSULE ORAL at 20:54

## 2021-01-01 RX ADMIN — INSULIN LISPRO 1 UNITS: 100 INJECTION, SOLUTION INTRAVENOUS; SUBCUTANEOUS at 21:55

## 2021-01-01 RX ADMIN — SEVELAMER CARBONATE 4000 MG: 800 TABLET, FILM COATED ORAL at 18:05

## 2021-01-01 RX ADMIN — OXYCODONE HYDROCHLORIDE AND ACETAMINOPHEN 1 TABLET: 5; 325 TABLET ORAL at 17:36

## 2021-01-01 RX ADMIN — Medication 125 MCG/HR: at 23:56

## 2021-01-01 RX ADMIN — ASPIRIN 81 MG: 81 TABLET, COATED ORAL at 15:00

## 2021-01-01 RX ADMIN — SEVELAMER CARBONATE 4000 MG: 800 TABLET, FILM COATED ORAL at 08:14

## 2021-01-01 RX ADMIN — PANTOPRAZOLE SODIUM 40 MG: 40 TABLET, DELAYED RELEASE ORAL at 07:33

## 2021-01-01 RX ADMIN — HEPARIN SODIUM 5000 UNITS: 5000 INJECTION INTRAVENOUS; SUBCUTANEOUS at 06:03

## 2021-01-01 RX ADMIN — EPOETIN ALFA-EPBX 2000 UNITS: 2000 INJECTION, SOLUTION INTRAVENOUS; SUBCUTANEOUS at 23:05

## 2021-01-01 RX ADMIN — CIPROFLOXACIN 1 DROP: 3 SOLUTION OPHTHALMIC at 11:41

## 2021-01-01 RX ADMIN — Medication 60 MG: at 20:48

## 2021-01-01 RX ADMIN — OXYCODONE HYDROCHLORIDE AND ACETAMINOPHEN 1 TABLET: 5; 325 TABLET ORAL at 15:58

## 2021-01-01 RX ADMIN — MIDODRINE HYDROCHLORIDE 10 MG: 5 TABLET ORAL at 08:52

## 2021-01-01 RX ADMIN — PANTOPRAZOLE SODIUM 40 MG: 40 TABLET, DELAYED RELEASE ORAL at 08:35

## 2021-01-01 RX ADMIN — MIDODRINE HYDROCHLORIDE 7.5 MG: 5 TABLET ORAL at 00:51

## 2021-01-01 RX ADMIN — VANCOMYCIN HYDROCHLORIDE 1000 MG: 1 INJECTION, POWDER, LYOPHILIZED, FOR SOLUTION INTRAVENOUS at 13:08

## 2021-01-01 RX ADMIN — MIDODRINE HYDROCHLORIDE 7.5 MG: 5 TABLET ORAL at 00:28

## 2021-01-01 RX ADMIN — SODIUM CHLORIDE: 9 INJECTION, SOLUTION INTRAVENOUS at 17:34

## 2021-01-01 RX ADMIN — METOPROLOL TARTRATE 25 MG: 25 TABLET, FILM COATED ORAL at 15:03

## 2021-01-01 RX ADMIN — VANCOMYCIN HYDROCHLORIDE 750 MG: 750 INJECTION, POWDER, LYOPHILIZED, FOR SOLUTION INTRAVENOUS at 18:30

## 2021-01-01 RX ADMIN — SODIUM CHLORIDE, PRESERVATIVE FREE 10 ML: 5 INJECTION INTRAVENOUS at 10:49

## 2021-01-01 RX ADMIN — CIPROFLOXACIN 1 DROP: 3 SOLUTION OPHTHALMIC at 22:36

## 2021-01-01 RX ADMIN — CIPROFLOXACIN 1 DROP: 3 SOLUTION OPHTHALMIC at 19:42

## 2021-01-01 RX ADMIN — BENZONATATE 200 MG: 100 CAPSULE ORAL at 16:08

## 2021-01-01 RX ADMIN — METOPROLOL TARTRATE 25 MG: 25 TABLET, FILM COATED ORAL at 08:11

## 2021-01-01 RX ADMIN — Medication 500 MG: at 05:09

## 2021-01-01 RX ADMIN — Medication 500 MG: at 06:09

## 2021-01-01 RX ADMIN — ALBUMIN (HUMAN) 25 G: 0.25 INJECTION, SOLUTION INTRAVENOUS at 12:38

## 2021-01-01 RX ADMIN — INSULIN LISPRO 1 UNITS: 100 INJECTION, SOLUTION INTRAVENOUS; SUBCUTANEOUS at 17:09

## 2021-01-01 RX ADMIN — HEPARIN SODIUM 5000 UNITS: 5000 INJECTION INTRAVENOUS; SUBCUTANEOUS at 20:50

## 2021-01-01 RX ADMIN — METRONIDAZOLE 500 MG: 500 INJECTION, SOLUTION INTRAVENOUS at 06:00

## 2021-01-01 RX ADMIN — ATORVASTATIN CALCIUM 40 MG: 40 TABLET, FILM COATED ORAL at 22:00

## 2021-01-01 RX ADMIN — CINACALCET 90 MG: 30 TABLET ORAL at 13:42

## 2021-01-01 RX ADMIN — Medication 10 MG: at 01:51

## 2021-01-01 RX ADMIN — MIDODRINE HYDROCHLORIDE 10 MG: 5 TABLET ORAL at 16:26

## 2021-01-01 RX ADMIN — Medication 500 MG: at 14:10

## 2021-01-01 RX ADMIN — POLYMYXIN B SULFATE, BACITRACIN ZINC, NEOMYCIN SULFATE: 5000; 3.5; 4 OINTMENT TOPICAL at 22:05

## 2021-01-01 RX ADMIN — AMIODARONE HYDROCHLORIDE 1 MG/MIN: 1.8 INJECTION, SOLUTION INTRAVENOUS at 14:10

## 2021-01-01 RX ADMIN — METOPROLOL TARTRATE 2.5 MG: 1 INJECTION, SOLUTION INTRAVENOUS at 08:04

## 2021-01-01 RX ADMIN — MIDAZOLAM 2 MG: 1 INJECTION INTRAMUSCULAR; INTRAVENOUS at 12:27

## 2021-01-01 RX ADMIN — CEFEPIME HYDROCHLORIDE 1000 MG: 1 INJECTION, POWDER, FOR SOLUTION INTRAMUSCULAR; INTRAVENOUS at 21:39

## 2021-01-01 RX ADMIN — CIPROFLOXACIN 1 DROP: 3 SOLUTION OPHTHALMIC at 18:07

## 2021-01-01 RX ADMIN — INSULIN LISPRO 1 UNITS: 100 INJECTION, SOLUTION INTRAVENOUS; SUBCUTANEOUS at 14:14

## 2021-01-01 RX ADMIN — ROCURONIUM BROMIDE 50 MG: 10 INJECTION INTRAVENOUS at 02:49

## 2021-01-01 RX ADMIN — SEVELAMER CARBONATE 4000 MG: 800 TABLET, FILM COATED ORAL at 13:01

## 2021-01-01 RX ADMIN — SEVELAMER CARBONATE 4000 MG: 800 TABLET, FILM COATED ORAL at 12:59

## 2021-01-01 RX ADMIN — INSULIN LISPRO 1 UNITS: 100 INJECTION, SOLUTION INTRAVENOUS; SUBCUTANEOUS at 20:04

## 2021-01-01 RX ADMIN — SEVELAMER CARBONATE 4000 MG: 800 TABLET, FILM COATED ORAL at 07:20

## 2021-01-01 RX ADMIN — CEFEPIME HYDROCHLORIDE 1000 MG: 1 INJECTION, POWDER, FOR SOLUTION INTRAMUSCULAR; INTRAVENOUS at 00:55

## 2021-01-01 RX ADMIN — BENZONATATE 200 MG: 100 CAPSULE ORAL at 08:38

## 2021-01-01 RX ADMIN — MIDODRINE HYDROCHLORIDE 5 MG: 5 TABLET ORAL at 16:49

## 2021-01-01 RX ADMIN — CIPROFLOXACIN 1 DROP: 3 SOLUTION OPHTHALMIC at 10:14

## 2021-01-01 RX ADMIN — INSULIN LISPRO 1 UNITS: 100 INJECTION, SOLUTION INTRAVENOUS; SUBCUTANEOUS at 18:06

## 2021-01-01 RX ADMIN — ALTEPLASE 1 MG: 2.2 INJECTION, POWDER, LYOPHILIZED, FOR SOLUTION INTRAVENOUS at 06:39

## 2021-01-01 RX ADMIN — CIPROFLOXACIN 1 DROP: 3 SOLUTION OPHTHALMIC at 17:11

## 2021-01-01 RX ADMIN — METOCLOPRAMIDE 5 MG: 5 INJECTION, SOLUTION INTRAMUSCULAR; INTRAVENOUS at 13:52

## 2021-01-01 RX ADMIN — Medication 50 MCG/HR: at 05:30

## 2021-01-01 RX ADMIN — MIDODRINE HYDROCHLORIDE 7.5 MG: 5 TABLET ORAL at 15:20

## 2021-01-01 RX ADMIN — DEXTROSE: 50 INJECTION, SOLUTION INTRAVENOUS at 10:43

## 2021-01-01 RX ADMIN — Medication 500 MG: at 19:07

## 2021-01-01 RX ADMIN — INSULIN LISPRO 1 UNITS: 100 INJECTION, SOLUTION INTRAVENOUS; SUBCUTANEOUS at 14:03

## 2021-01-01 RX ADMIN — MIDODRINE HYDROCHLORIDE 5 MG: 5 TABLET ORAL at 12:00

## 2021-01-01 RX ADMIN — POLYMYXIN B SULFATE, BACITRACIN ZINC, NEOMYCIN SULFATE: 5000; 3.5; 4 OINTMENT TOPICAL at 21:06

## 2021-01-01 RX ADMIN — ROCURONIUM BROMIDE 50 MG: 10 INJECTION INTRAVENOUS at 01:36

## 2021-01-01 RX ADMIN — SODIUM CHLORIDE, PRESERVATIVE FREE 10 ML: 5 INJECTION INTRAVENOUS at 21:44

## 2021-01-01 RX ADMIN — METRONIDAZOLE 500 MG: 500 INJECTION, SOLUTION INTRAVENOUS at 05:44

## 2021-01-01 RX ADMIN — VERAPAMIL HYDROCHLORIDE 5 MG: 2.5 INJECTION INTRAVENOUS at 08:36

## 2021-01-01 RX ADMIN — Medication 10 MCG/MIN: at 17:40

## 2021-01-01 RX ADMIN — CIPROFLOXACIN 1 DROP: 3 SOLUTION OPHTHALMIC at 05:13

## 2021-01-01 RX ADMIN — Medication 500 MG: at 05:46

## 2021-01-01 RX ADMIN — Medication 1334 MG: at 13:00

## 2021-01-01 RX ADMIN — MIDODRINE HYDROCHLORIDE 7.5 MG: 5 TABLET ORAL at 00:45

## 2021-01-01 RX ADMIN — CIPROFLOXACIN 1 DROP: 3 SOLUTION OPHTHALMIC at 05:32

## 2021-01-01 RX ADMIN — METOPROLOL TARTRATE 12.5 MG: 25 TABLET, FILM COATED ORAL at 21:25

## 2021-01-01 RX ADMIN — ASPIRIN 81 MG: 81 TABLET, COATED ORAL at 15:02

## 2021-01-01 RX ADMIN — PANTOPRAZOLE SODIUM 40 MG: 40 TABLET, DELAYED RELEASE ORAL at 06:41

## 2021-01-01 RX ADMIN — INSULIN LISPRO 1 UNITS: 100 INJECTION, SOLUTION INTRAVENOUS; SUBCUTANEOUS at 20:29

## 2021-01-01 RX ADMIN — VANCOMYCIN HYDROCHLORIDE 750 MG: 10 INJECTION, POWDER, LYOPHILIZED, FOR SOLUTION INTRAVENOUS at 11:13

## 2021-01-01 RX ADMIN — Medication 10 ML: at 08:28

## 2021-01-01 RX ADMIN — HEPARIN SODIUM 5000 UNITS: 5000 INJECTION INTRAVENOUS; SUBCUTANEOUS at 06:13

## 2021-01-01 RX ADMIN — AMIODARONE HYDROCHLORIDE 200 MG: 200 TABLET ORAL at 08:52

## 2021-01-01 RX ADMIN — HEPARIN SODIUM 5000 UNITS: 5000 INJECTION INTRAVENOUS; SUBCUTANEOUS at 13:42

## 2021-01-01 RX ADMIN — MIDODRINE HYDROCHLORIDE 10 MG: 10 TABLET ORAL at 08:53

## 2021-01-01 RX ADMIN — AMIODARONE HYDROCHLORIDE 200 MG: 200 TABLET ORAL at 20:48

## 2021-01-01 RX ADMIN — SODIUM CHLORIDE: 9 INJECTION, SOLUTION INTRAVENOUS at 20:59

## 2021-01-01 RX ADMIN — MIDODRINE HYDROCHLORIDE 10 MG: 5 TABLET ORAL at 11:30

## 2021-01-01 RX ADMIN — AMIODARONE HYDROCHLORIDE 200 MG: 200 TABLET ORAL at 08:44

## 2021-01-01 RX ADMIN — METRONIDAZOLE 500 MG: 500 INJECTION, SOLUTION INTRAVENOUS at 17:52

## 2021-01-01 RX ADMIN — DIGOXIN 125 MCG: 125 TABLET ORAL at 10:56

## 2021-01-01 RX ADMIN — METOCLOPRAMIDE 5 MG: 5 INJECTION, SOLUTION INTRAMUSCULAR; INTRAVENOUS at 09:41

## 2021-01-01 RX ADMIN — METOPROLOL TARTRATE 25 MG: 25 TABLET, FILM COATED ORAL at 21:59

## 2021-01-01 RX ADMIN — DOPAMINE HYDROCHLORIDE 2 MCG/KG/MIN: 160 INJECTION, SOLUTION INTRAVENOUS at 11:44

## 2021-01-01 RX ADMIN — VANCOMYCIN HYDROCHLORIDE 750 MG: 10 INJECTION, POWDER, LYOPHILIZED, FOR SOLUTION INTRAVENOUS at 08:45

## 2021-01-01 RX ADMIN — BENZONATATE 200 MG: 100 CAPSULE ORAL at 12:54

## 2021-01-01 RX ADMIN — ATROPINE SULFATE 0.5 MG: 0.1 INJECTION INTRAVENOUS at 00:18

## 2021-01-01 RX ADMIN — ROCURONIUM BROMIDE 50 MG: 10 INJECTION INTRAVENOUS at 13:57

## 2021-01-01 RX ADMIN — Medication 60 MG: at 09:17

## 2021-01-01 RX ADMIN — INSULIN LISPRO 2 UNITS: 100 INJECTION, SOLUTION INTRAVENOUS; SUBCUTANEOUS at 17:04

## 2021-01-01 RX ADMIN — Medication 60 MG: at 20:54

## 2021-01-01 RX ADMIN — CIPROFLOXACIN 1 DROP: 3 SOLUTION OPHTHALMIC at 12:50

## 2021-01-01 RX ADMIN — SEVELAMER CARBONATE 4000 MG: 800 TABLET, FILM COATED ORAL at 09:21

## 2021-01-01 RX ADMIN — AMIODARONE HYDROCHLORIDE 200 MG: 200 TABLET ORAL at 20:54

## 2021-01-01 RX ADMIN — MIDODRINE HYDROCHLORIDE 5 MG: 5 TABLET ORAL at 07:56

## 2021-01-01 RX ADMIN — OXYCODONE HYDROCHLORIDE AND ACETAMINOPHEN 1 TABLET: 5; 325 TABLET ORAL at 22:36

## 2021-01-01 RX ADMIN — AMIODARONE HYDROCHLORIDE 200 MG: 200 TABLET ORAL at 10:55

## 2021-01-01 RX ADMIN — EPOETIN ALFA-EPBX 3000 UNITS: 3000 INJECTION, SOLUTION INTRAVENOUS; SUBCUTANEOUS at 23:05

## 2021-01-01 RX ADMIN — ATORVASTATIN CALCIUM 40 MG: 40 TABLET, FILM COATED ORAL at 22:25

## 2021-01-01 RX ADMIN — MIDODRINE HYDROCHLORIDE 5 MG: 5 TABLET ORAL at 11:13

## 2021-01-01 RX ADMIN — POLYMYXIN B SULFATE, BACITRACIN ZINC, NEOMYCIN SULFATE: 5000; 3.5; 4 OINTMENT TOPICAL at 20:18

## 2021-01-01 RX ADMIN — MIDODRINE HYDROCHLORIDE 10 MG: 10 TABLET ORAL at 20:50

## 2021-01-01 RX ADMIN — SEVELAMER CARBONATE 4000 MG: 800 TABLET, FILM COATED ORAL at 14:06

## 2021-01-01 RX ADMIN — DIGOXIN 125 MCG: 125 TABLET ORAL at 07:57

## 2021-01-01 RX ADMIN — ALBUMIN (HUMAN) 25 G: 0.25 INJECTION, SOLUTION INTRAVENOUS at 15:08

## 2021-01-01 RX ADMIN — ESMOLOL HYDROCHLORIDE 100 MCG/KG/MIN: 10 INJECTION INTRAVENOUS at 00:48

## 2021-01-01 RX ADMIN — AMIODARONE HYDROCHLORIDE 200 MG: 200 TABLET ORAL at 07:57

## 2021-01-01 RX ADMIN — MIDODRINE HYDROCHLORIDE 10 MG: 10 TABLET ORAL at 18:02

## 2021-01-01 RX ADMIN — MIDODRINE HYDROCHLORIDE 7.5 MG: 5 TABLET ORAL at 19:07

## 2021-01-01 RX ADMIN — Medication 1 TABLET: at 15:02

## 2021-01-01 RX ADMIN — Medication 60 MG: at 20:33

## 2021-01-01 RX ADMIN — MIDODRINE HYDROCHLORIDE 7.5 MG: 5 TABLET ORAL at 09:16

## 2021-01-01 RX ADMIN — AMIODARONE HYDROCHLORIDE 200 MG: 200 TABLET ORAL at 11:49

## 2021-01-01 RX ADMIN — Medication 500 MG: at 00:26

## 2021-01-01 RX ADMIN — BENZONATATE 200 MG: 100 CAPSULE ORAL at 20:33

## 2021-01-01 RX ADMIN — DIGOXIN 250 MCG: 0.25 INJECTION INTRAMUSCULAR; INTRAVENOUS at 20:34

## 2021-01-01 RX ADMIN — MIDODRINE HYDROCHLORIDE 10 MG: 10 TABLET ORAL at 21:00

## 2021-01-01 RX ADMIN — SODIUM CHLORIDE, PRESERVATIVE FREE 10 ML: 5 INJECTION INTRAVENOUS at 08:56

## 2021-01-01 RX ADMIN — MIDODRINE HYDROCHLORIDE 7.5 MG: 5 TABLET ORAL at 00:56

## 2021-01-01 RX ADMIN — FENTANYL CITRATE 50 MCG: 0.05 INJECTION, SOLUTION INTRAMUSCULAR; INTRAVENOUS at 19:17

## 2021-01-01 RX ADMIN — POLYMYXIN B SULFATE, BACITRACIN ZINC, NEOMYCIN SULFATE: 5000; 3.5; 4 OINTMENT TOPICAL at 11:00

## 2021-01-01 RX ADMIN — INSULIN LISPRO 1 UNITS: 100 INJECTION, SOLUTION INTRAVENOUS; SUBCUTANEOUS at 12:39

## 2021-01-01 RX ADMIN — SODIUM CHLORIDE: 9 INJECTION, SOLUTION INTRAVENOUS at 00:15

## 2021-01-01 RX ADMIN — SODIUM CHLORIDE: 9 INJECTION, SOLUTION INTRAVENOUS at 11:00

## 2021-01-01 RX ADMIN — Medication: at 21:56

## 2021-01-01 RX ADMIN — Medication 60 MG: at 16:07

## 2021-01-01 RX ADMIN — CIPROFLOXACIN 1 DROP: 3 SOLUTION OPHTHALMIC at 09:00

## 2021-01-01 RX ADMIN — SODIUM CHLORIDE, PRESERVATIVE FREE 10 ML: 5 INJECTION INTRAVENOUS at 08:53

## 2021-01-01 RX ADMIN — INSULIN LISPRO 1 UNITS: 100 INJECTION, SOLUTION INTRAVENOUS; SUBCUTANEOUS at 08:43

## 2021-01-01 RX ADMIN — CEFTRIAXONE SODIUM 2000 MG: 2 INJECTION, POWDER, FOR SOLUTION INTRAMUSCULAR; INTRAVENOUS at 21:28

## 2021-01-01 RX ADMIN — FENTANYL CITRATE 25 MCG: 50 INJECTION, SOLUTION INTRAMUSCULAR; INTRAVENOUS at 02:50

## 2021-01-01 RX ADMIN — Medication 500 MG: at 01:13

## 2021-01-01 RX ADMIN — FENTANYL CITRATE 50 MCG: 50 INJECTION, SOLUTION INTRAMUSCULAR; INTRAVENOUS at 14:09

## 2021-01-01 ASSESSMENT — ENCOUNTER SYMPTOMS
VOMITING: 0
COUGH: 0
APNEA: 0
CHEST TIGHTNESS: 0
SHORTNESS OF BREATH: 1
PHOTOPHOBIA: 0
PHOTOPHOBIA: 0
COUGH: 0
DIARRHEA: 0
PHOTOPHOBIA: 0
DIARRHEA: 0
CONSTIPATION: 0
NAUSEA: 0
ABDOMINAL DISTENTION: 0
VOMITING: 0
VOMITING: 0
COLOR CHANGE: 1
RESPIRATORY NEGATIVE: 1
CONSTIPATION: 0
WHEEZING: 0
COLOR CHANGE: 0
COUGH: 0
COLOR CHANGE: 0
WHEEZING: 0
ABDOMINAL PAIN: 0
PHOTOPHOBIA: 0
CONSTIPATION: 0
VOMITING: 1
SHORTNESS OF BREATH: 0
COLOR CHANGE: 0
CONSTIPATION: 0
CONSTIPATION: 0
SHORTNESS OF BREATH: 1
COUGH: 0
APNEA: 0
COLOR CHANGE: 0
ABDOMINAL PAIN: 0
COUGH: 0
WHEEZING: 0
EYE DISCHARGE: 0
CONSTIPATION: 1
GASTROINTESTINAL NEGATIVE: 1
ABDOMINAL PAIN: 0
WHEEZING: 0
BLOOD IN STOOL: 0
APNEA: 0
RESPIRATORY NEGATIVE: 1
DIARRHEA: 0
NAUSEA: 0
VOMITING: 0
COLOR CHANGE: 0
TROUBLE SWALLOWING: 0
WHEEZING: 0
BACK PAIN: 0
WHEEZING: 0
WHEEZING: 0
ABDOMINAL PAIN: 0
WHEEZING: 0
ABDOMINAL PAIN: 0
ABDOMINAL DISTENTION: 0
BACK PAIN: 0
DIARRHEA: 0
BACK PAIN: 0
ABDOMINAL PAIN: 0
BACK PAIN: 0
CONSTIPATION: 0
GASTROINTESTINAL NEGATIVE: 1
DIARRHEA: 0
SORE THROAT: 0
DIARRHEA: 1
EYE REDNESS: 0
COUGH: 0
ABDOMINAL DISTENTION: 0
COLOR CHANGE: 0
BACK PAIN: 0
WHEEZING: 0
NAUSEA: 0
TACHYPNEA: 1
CONSTIPATION: 0
BACK PAIN: 0
COUGH: 0
BACK PAIN: 0
ABDOMINAL DISTENTION: 0
WHEEZING: 0
PHOTOPHOBIA: 0
SHORTNESS OF BREATH: 1
BACK PAIN: 0
VOMITING: 0
BACK PAIN: 0
NAUSEA: 0
ABDOMINAL PAIN: 0
BACK PAIN: 0
CONSTIPATION: 0
BACK PAIN: 1
ALLERGIC/IMMUNOLOGIC NEGATIVE: 1
VOMITING: 0
DIARRHEA: 0
BLOOD IN STOOL: 0
SHORTNESS OF BREATH: 1
PHOTOPHOBIA: 0
COLOR CHANGE: 0
VOMITING: 0
CONSTIPATION: 1
SHORTNESS OF BREATH: 1
ABDOMINAL DISTENTION: 0
SHORTNESS OF BREATH: 1
WHEEZING: 0
DIARRHEA: 0
EYE DISCHARGE: 1
ABDOMINAL DISTENTION: 0
NAUSEA: 0
COUGH: 0
ABDOMINAL PAIN: 0
FACIAL SWELLING: 0
CONSTIPATION: 0
ABDOMINAL PAIN: 0
ABDOMINAL PAIN: 0
CHEST TIGHTNESS: 0
APNEA: 0
NAUSEA: 1
SHORTNESS OF BREATH: 0
ABDOMINAL PAIN: 0
BACK PAIN: 0
SHORTNESS OF BREATH: 1
BACK PAIN: 0
PHOTOPHOBIA: 0
NAUSEA: 0
ABDOMINAL DISTENTION: 0
COUGH: 0
VOMITING: 0
CONSTIPATION: 0
APNEA: 0
APNEA: 0
CONSTIPATION: 0
DIARRHEA: 0
SHORTNESS OF BREATH: 0
DIARRHEA: 0
ABDOMINAL PAIN: 0
DIARRHEA: 0
NAUSEA: 0
ABDOMINAL DISTENTION: 0
SORE THROAT: 0
EYE PAIN: 0
COLOR CHANGE: 0
VOMITING: 0
COLOR CHANGE: 0
ABDOMINAL PAIN: 1
COLOR CHANGE: 0
VOMITING: 0
PHOTOPHOBIA: 0
BACK PAIN: 0
ABDOMINAL PAIN: 0
WHEEZING: 0
DIARRHEA: 1
APNEA: 0
NAUSEA: 0
NAUSEA: 0
ABDOMINAL DISTENTION: 0
VOMITING: 0
ABDOMINAL PAIN: 0
BACK PAIN: 0
APNEA: 0
COUGH: 0
COUGH: 0
COUGH: 1
ABDOMINAL PAIN: 0
NAUSEA: 0
PHOTOPHOBIA: 0
ABDOMINAL DISTENTION: 0
VOMITING: 0
COLOR CHANGE: 0
RHINORRHEA: 0
DIARRHEA: 1
WHEEZING: 0
NAUSEA: 1
ABDOMINAL PAIN: 0
COUGH: 1
BLOOD IN STOOL: 0
APNEA: 0
VOMITING: 0
DIARRHEA: 1
APNEA: 0
SHORTNESS OF BREATH: 0
DIARRHEA: 0
NAUSEA: 0
SHORTNESS OF BREATH: 0
SHORTNESS OF BREATH: 0
RHINORRHEA: 0
ABDOMINAL DISTENTION: 0
SINUS PRESSURE: 0
NAUSEA: 0
ABDOMINAL DISTENTION: 1
SHORTNESS OF BREATH: 1
COUGH: 0
COLOR CHANGE: 0
COLOR CHANGE: 0
COUGH: 0
VOMITING: 0
SORE THROAT: 0
DIARRHEA: 0
SHORTNESS OF BREATH: 0
PHOTOPHOBIA: 0
VOMITING: 0
PHOTOPHOBIA: 0
WHEEZING: 0
PHOTOPHOBIA: 0
GASTROINTESTINAL NEGATIVE: 1
COUGH: 0
NAUSEA: 0
SHORTNESS OF BREATH: 1
NAUSEA: 0
SHORTNESS OF BREATH: 1
VOMITING: 0
COUGH: 0
PHOTOPHOBIA: 0
NAUSEA: 0
CONSTIPATION: 1
APNEA: 0
DIARRHEA: 0

## 2021-01-01 ASSESSMENT — PULMONARY FUNCTION TESTS
PIF_VALUE: 20
PIF_VALUE: 21
PIF_VALUE: 20
PIF_VALUE: 11
PIF_VALUE: 19
PIF_VALUE: 20
PIF_VALUE: 13
PIF_VALUE: 19
PIF_VALUE: 19
PIF_VALUE: 18
PIF_VALUE: 21
PIF_VALUE: 20
PIF_VALUE: 17
PIF_VALUE: 17
PIF_VALUE: 20
PIF_VALUE: 17
PIF_VALUE: 19
PIF_VALUE: 17
PIF_VALUE: 20
PIF_VALUE: 4
PIF_VALUE: 18
PIF_VALUE: 20
PIF_VALUE: 13
PIF_VALUE: 22
PIF_VALUE: 19
PIF_VALUE: 6
PIF_VALUE: 19
PIF_VALUE: 34
PIF_VALUE: 14
PIF_VALUE: 19
PIF_VALUE: 20
PIF_VALUE: 21
PIF_VALUE: 18
PIF_VALUE: 21
PIF_VALUE: 19
PIF_VALUE: 19
PIF_VALUE: 17
PIF_VALUE: 20
PIF_VALUE: 20
PIF_VALUE: 11
PIF_VALUE: 18
PIF_VALUE: 18
PIF_VALUE: 19
PIF_VALUE: 20
PIF_VALUE: 19
PIF_VALUE: 18
PIF_VALUE: 17
PIF_VALUE: 17
PIF_VALUE: 21
PIF_VALUE: 21
PIF_VALUE: 4
PIF_VALUE: 1
PIF_VALUE: 17
PIF_VALUE: 18
PIF_VALUE: 19
PIF_VALUE: 20
PIF_VALUE: 16
PIF_VALUE: 16
PIF_VALUE: 18
PIF_VALUE: 16
PIF_VALUE: 16
PIF_VALUE: 21
PIF_VALUE: 20
PIF_VALUE: 17
PIF_VALUE: 18
PIF_VALUE: 21
PIF_VALUE: 19
PIF_VALUE: 1
PIF_VALUE: 20
PIF_VALUE: 20
PIF_VALUE: 17
PIF_VALUE: 19
PIF_VALUE: 19
PIF_VALUE: 24
PIF_VALUE: 20
PIF_VALUE: 20
PIF_VALUE: 16
PIF_VALUE: 21
PIF_VALUE: 13
PIF_VALUE: 20
PIF_VALUE: 20
PIF_VALUE: 17
PIF_VALUE: 20
PIF_VALUE: 16
PIF_VALUE: 19
PIF_VALUE: 18
PIF_VALUE: 19
PIF_VALUE: 20
PIF_VALUE: 19
PIF_VALUE: 20
PIF_VALUE: 19
PIF_VALUE: 20
PIF_VALUE: 18
PIF_VALUE: 17
PIF_VALUE: 20
PIF_VALUE: 16
PIF_VALUE: 36
PIF_VALUE: 19
PIF_VALUE: 2
PIF_VALUE: 20
PIF_VALUE: 19
PIF_VALUE: 17
PIF_VALUE: 19
PIF_VALUE: 16
PIF_VALUE: 19
PIF_VALUE: 20
PIF_VALUE: 17
PIF_VALUE: 20
PIF_VALUE: 19
PIF_VALUE: 21
PIF_VALUE: 20
PIF_VALUE: 19
PIF_VALUE: 12
PIF_VALUE: 17
PIF_VALUE: 20
PIF_VALUE: 19
PIF_VALUE: 21
PIF_VALUE: 16
PIF_VALUE: 20
PIF_VALUE: 20
PIF_VALUE: 17
PIF_VALUE: 19
PIF_VALUE: 19
PIF_VALUE: 33
PIF_VALUE: 19
PIF_VALUE: 23
PIF_VALUE: 18
PIF_VALUE: 19
PIF_VALUE: 18
PIF_VALUE: 20
PIF_VALUE: 19
PIF_VALUE: 21
PIF_VALUE: 17
PIF_VALUE: 17
PIF_VALUE: 20
PIF_VALUE: 17
PIF_VALUE: 18
PIF_VALUE: 20
PIF_VALUE: 21
PIF_VALUE: 21
PIF_VALUE: 18
PIF_VALUE: 18
PIF_VALUE: 20
PIF_VALUE: 18
PIF_VALUE: 18
PIF_VALUE: 20
PIF_VALUE: 21
PIF_VALUE: 18
PIF_VALUE: 18
PIF_VALUE: 19
PIF_VALUE: 20
PIF_VALUE: 19
PIF_VALUE: 18
PIF_VALUE: 20
PIF_VALUE: 18
PIF_VALUE: 21
PIF_VALUE: 20
PIF_VALUE: 20
PIF_VALUE: 18
PIF_VALUE: 20
PIF_VALUE: 17
PIF_VALUE: 20
PIF_VALUE: 17
PIF_VALUE: 2
PIF_VALUE: 2
PIF_VALUE: 21
PIF_VALUE: 19
PIF_VALUE: 19
PIF_VALUE: 16
PIF_VALUE: 21
PIF_VALUE: 16
PIF_VALUE: 20
PIF_VALUE: 20
PIF_VALUE: 21
PIF_VALUE: 19
PIF_VALUE: 21
PIF_VALUE: 13
PIF_VALUE: 19
PIF_VALUE: 12
PIF_VALUE: 19
PIF_VALUE: 20
PIF_VALUE: 21
PIF_VALUE: 17
PIF_VALUE: 21
PIF_VALUE: 19
PIF_VALUE: 17
PIF_VALUE: 20
PIF_VALUE: 13
PIF_VALUE: 17
PIF_VALUE: 19
PIF_VALUE: 17
PIF_VALUE: 18
PIF_VALUE: 20
PIF_VALUE: 21
PIF_VALUE: 20
PIF_VALUE: 17
PIF_VALUE: 19
PIF_VALUE: 20
PIF_VALUE: 20
PIF_VALUE: 13
PIF_VALUE: 19
PIF_VALUE: 16
PIF_VALUE: 19
PIF_VALUE: 17
PIF_VALUE: 19
PIF_VALUE: 19
PIF_VALUE: 20
PIF_VALUE: 20
PIF_VALUE: 16
PIF_VALUE: 19
PIF_VALUE: 19
PIF_VALUE: 21
PIF_VALUE: 17
PIF_VALUE: 20
PIF_VALUE: 20
PIF_VALUE: 21
PIF_VALUE: 20
PIF_VALUE: 18
PIF_VALUE: 17
PIF_VALUE: 26
PIF_VALUE: 18
PIF_VALUE: 19
PIF_VALUE: 1
PIF_VALUE: 17
PIF_VALUE: 19
PIF_VALUE: 19
PIF_VALUE: 20
PIF_VALUE: 17
PIF_VALUE: 17
PIF_VALUE: 18
PIF_VALUE: 19
PIF_VALUE: 19
PIF_VALUE: 17
PIF_VALUE: 17
PIF_VALUE: 19
PIF_VALUE: 20
PIF_VALUE: 19
PIF_VALUE: 19
PIF_VALUE: 20
PIF_VALUE: 17
PIF_VALUE: 20
PIF_VALUE: 17
PIF_VALUE: 20
PIF_VALUE: 20
PIF_VALUE: 21
PIF_VALUE: 17
PIF_VALUE: 13
PIF_VALUE: 18
PIF_VALUE: 16
PIF_VALUE: 22
PIF_VALUE: 20
PIF_VALUE: 22
PIF_VALUE: 1
PIF_VALUE: 20
PIF_VALUE: 16
PIF_VALUE: 17
PIF_VALUE: 20
PIF_VALUE: 17
PIF_VALUE: 19
PIF_VALUE: 20
PIF_VALUE: 18
PIF_VALUE: 18
PIF_VALUE: 19
PIF_VALUE: 12
PIF_VALUE: 20
PIF_VALUE: 17
PIF_VALUE: 20
PIF_VALUE: 19
PIF_VALUE: 11
PIF_VALUE: 20
PIF_VALUE: 20
PIF_VALUE: 18
PIF_VALUE: 17
PIF_VALUE: 19
PIF_VALUE: 20
PIF_VALUE: 18
PIF_VALUE: 20
PIF_VALUE: 20

## 2021-01-01 ASSESSMENT — PAIN DESCRIPTION - PROGRESSION
CLINICAL_PROGRESSION: NOT CHANGED

## 2021-01-01 ASSESSMENT — PAIN SCALES - GENERAL
PAINLEVEL_OUTOF10: 0
PAINLEVEL_OUTOF10: 4
PAINLEVEL_OUTOF10: 0
PAINLEVEL_OUTOF10: 8
PAINLEVEL_OUTOF10: 0
PAINLEVEL_OUTOF10: 10
PAINLEVEL_OUTOF10: 6
PAINLEVEL_OUTOF10: 9
PAINLEVEL_OUTOF10: 10
PAINLEVEL_OUTOF10: 0
PAINLEVEL_OUTOF10: 10
PAINLEVEL_OUTOF10: 8
PAINLEVEL_OUTOF10: 0
PAINLEVEL_OUTOF10: 10
PAINLEVEL_OUTOF10: 8
PAINLEVEL_OUTOF10: 9
PAINLEVEL_OUTOF10: 7
PAINLEVEL_OUTOF10: 0
PAINLEVEL_OUTOF10: 6
PAINLEVEL_OUTOF10: 0
PAINLEVEL_OUTOF10: 0
PAINLEVEL_OUTOF10: 7
PAINLEVEL_OUTOF10: 0
PAINLEVEL_OUTOF10: 3
PAINLEVEL_OUTOF10: 0
PAINLEVEL_OUTOF10: 9
PAINLEVEL_OUTOF10: 9
PAINLEVEL_OUTOF10: 0
PAINLEVEL_OUTOF10: 7
PAINLEVEL_OUTOF10: 0
PAINLEVEL_OUTOF10: 0
PAINLEVEL_OUTOF10: 10
PAINLEVEL_OUTOF10: 10
PAINLEVEL_OUTOF10: 8
PAINLEVEL_OUTOF10: 0
PAINLEVEL_OUTOF10: 8
PAINLEVEL_OUTOF10: 10
PAINLEVEL_OUTOF10: 10
PAINLEVEL_OUTOF10: 5
PAINLEVEL_OUTOF10: 0
PAINLEVEL_OUTOF10: 10
PAINLEVEL_OUTOF10: 5
PAINLEVEL_OUTOF10: 5
PAINLEVEL_OUTOF10: 0
PAINLEVEL_OUTOF10: 7
PAINLEVEL_OUTOF10: 8
PAINLEVEL_OUTOF10: 9
PAINLEVEL_OUTOF10: 5
PAINLEVEL_OUTOF10: 0
PAINLEVEL_OUTOF10: 9
PAINLEVEL_OUTOF10: 10
PAINLEVEL_OUTOF10: 0
PAINLEVEL_OUTOF10: 10
PAINLEVEL_OUTOF10: 0
PAINLEVEL_OUTOF10: 0
PAINLEVEL_OUTOF10: 9
PAINLEVEL_OUTOF10: 9
PAINLEVEL_OUTOF10: 7
PAINLEVEL_OUTOF10: 6
PAINLEVEL_OUTOF10: 0
PAINLEVEL_OUTOF10: 10
PAINLEVEL_OUTOF10: 0
PAINLEVEL_OUTOF10: 9
PAINLEVEL_OUTOF10: 3
PAINLEVEL_OUTOF10: 7
PAINLEVEL_OUTOF10: 0
PAINLEVEL_OUTOF10: 4
PAINLEVEL_OUTOF10: 9
PAINLEVEL_OUTOF10: 9
PAINLEVEL_OUTOF10: 5
PAINLEVEL_OUTOF10: 8
PAINLEVEL_OUTOF10: 10

## 2021-01-01 ASSESSMENT — PAIN DESCRIPTION - FREQUENCY
FREQUENCY: CONTINUOUS

## 2021-01-01 ASSESSMENT — PAIN DESCRIPTION - LOCATION
LOCATION: BACK
LOCATION: BACK
LOCATION: GENERALIZED
LOCATION: BACK
LOCATION: GENERALIZED
LOCATION: ABDOMEN;BACK
LOCATION: HIP
LOCATION: GENERALIZED
LOCATION: ABDOMEN;LEG
LOCATION: BACK
LOCATION: BACK

## 2021-01-01 ASSESSMENT — PAIN DESCRIPTION - PAIN TYPE
TYPE: CHRONIC PAIN
TYPE: CHRONIC PAIN
TYPE: ACUTE PAIN
TYPE: CHRONIC PAIN
TYPE: CHRONIC PAIN
TYPE: ACUTE PAIN
TYPE: CHRONIC PAIN
TYPE: ACUTE PAIN
TYPE: CHRONIC PAIN
TYPE: ACUTE PAIN
TYPE: CHRONIC PAIN
TYPE: ACUTE PAIN;CHRONIC PAIN
TYPE: CHRONIC PAIN
TYPE: ACUTE PAIN

## 2021-01-01 ASSESSMENT — PAIN DESCRIPTION - ONSET
ONSET: ON-GOING

## 2021-01-01 ASSESSMENT — PAIN DESCRIPTION - DESCRIPTORS
DESCRIPTORS: HEADACHE
DESCRIPTORS: ACHING

## 2021-01-01 ASSESSMENT — PAIN DESCRIPTION - ORIENTATION
ORIENTATION: LEFT
ORIENTATION: LOWER;MID
ORIENTATION: RIGHT;LEFT
ORIENTATION: LOWER;MID

## 2021-04-28 PROBLEM — J18.9 SEPSIS DUE TO PNEUMONIA (HCC): Status: ACTIVE | Noted: 2021-01-01

## 2021-04-28 PROBLEM — A41.9 SEPSIS DUE TO PNEUMONIA (HCC): Status: ACTIVE | Noted: 2021-01-01

## 2021-04-28 PROBLEM — A41.9 SEPSIS (HCC): Status: ACTIVE | Noted: 2021-01-01

## 2021-04-28 PROBLEM — Z91.199 NONCOMPLIANCE: Status: ACTIVE | Noted: 2019-06-02

## 2021-04-28 PROBLEM — J18.9 PNEUMONIA: Status: ACTIVE | Noted: 2021-01-01

## 2021-04-28 NOTE — PROGRESS NOTES
Resident in room to assess pt. RN notified her that pt is spitting up bloody sputum and is tachy in the 120-130's.

## 2021-04-28 NOTE — PROGRESS NOTES
RN called [facility] to go over pts home medications with them.  Stated they will fax over medication list.

## 2021-04-28 NOTE — H&P
History:   Diagnosis Date    Acute on chronic congestive heart failure (HCC)     Anemia     CAD (coronary artery disease)     Cardiomyopathy ([facility]     Dialysis care     ESRD (end stage renal disease) [facility] dialysis ([facility]     Foot ulcer, left ([facility] 2015    GERD (gastroesophageal reflux disease)     Hemodialysis patient ([facility] 2011    MOM-WED-FRI-ON [address]     History of sleep apnea     none since significant weight loss (was 400#)    Hyperlipidemia     [facility]athyroidism ([facility]     Hypertension     Neuropathy     Peripheral vascular disease ([facility]     Pneumonia     resolved    S/P CABG (coronary artery bypass graft) 06/2016    Type II or unspecified type diabetes mellitus without mention of complication, not stated as uncontrolled     dx-1980        Past Surgical History:     Past Surgical History:   Procedure Laterality Date    CATARACT REMOVAL      DIALYSIS FISTULA CREATION Left 2014    LEG AMPUTATION THROUGH LOWER TIBIA AND FIBULA          Medications Prior to Admission:     Prior to Admission medications    Medication Sig Start Date End Date Taking? Authorizing Provider   clopidogrel (PLAVIX) 75 MG tablet Take 75 mg by mouth daily    Historical Provider, MD   insulin lispro (HUMALOG) 100 UNIT/ML injection vial Inject into the skin 3 times daily (before meals)    Historical Provider, MD   lisinopril (PRINIVIL;ZESTRIL) 2.5 MG tablet Take 2.5 mg by mouth daily    Historical Provider, MD   NIFEdipine (ADALAT CC) 30 MG extended release tablet Take 30 mg by mouth daily    Historical Provider, MD   nitroGLYCERIN (NITROSTAT) 0.4 MG SL tablet Place 0.4 mg under the tongue every 5 minutes as needed for Chest pain up to max of 3 total doses. If no relief after 1 dose, call 911. Historical Provider, MD   amLODIPine (NORVASC) 10 MG tablet Take 10 mg by mouth daily    Historical Provider, MD   oxyCODONE-acetaminophen (PERCOCET) 5-325 MG per tablet Take 1 tablet by mouth every 4 hours as needed for Pain. Historical Provider, MD   pantoprazole (PROTONIX) 40 MG tablet Take 40 mg by mouth daily    Historical Provider, MD   patiromer sorbitex calcium (VELTASSA) 8.4 g PACK packet Take 8.4 g by mouth daily    Historical Provider, MD   carvedilol (COREG) 3.125 MG tablet Take 1 tablet by mouth 2 times daily (with meals) 6/6/19   ABEBA Vidal CNP   ticagrelor (BRILINTA) 90 MG TABS tablet Take 1 tablet by mouth 2 times daily 6/6/19   ABEBA Vidal - CNP   bumetanide (BUMEX) 1 MG tablet Take 2 mg by mouth daily    Historical Provider, MD   cinacalcet (SENSIPAR) 90 MG tablet Take 90 mg by mouth daily    Historical Provider, MD   ONE TOUCH ULTRA TEST strip USE AS DIRECTED DAILY AS NEEDED 1/24/17   Meg Recinos, DO   Blood Glucose Monitoring Suppl (ONE TOUCH ULTRA 2) W/DEVICE KIT TEST 3 TIMES DAILY 8/26/16   Meg Recinos, DO   glucose monitoring kit (FREESTYLE) monitoring kit 1 kit by Does not apply route daily as needed 4/21/16   Meg Recinos, DO   Lancets 30G MISC 1 each by Does not apply route 3 times daily 4/21/16   Meg Recinos, DO   Misc.  Devices (WALKER GLIDE WHEELS) MISC 1 Device by Does not apply route continuous 4/21/16   Meg Recinos, DO   atorvastatin (LIPITOR) 40 MG tablet Take 1 tablet by mouth nightly 3/14/16   Malone Cogan, DO   midodrine (PROAMATINE) 10 MG tablet Take 1 tablet by mouth 3 times daily (with meals) 3/14/16   Malone Cogan, DO   aspirin 81 MG EC tablet Take 1 tablet by mouth daily 3/14/16   Malone Cogan, DO   Multiple Vitamins-Minerals (THERAPEUTIC MULTIVITAMIN-MINERALS) tablet Take 1 tablet by mouth daily 3/14/16   Malone Cogan, DO   sevelamer (RENVELA) 800 MG tablet Take 5 tablets by mouth 3 times daily     Historical Provider, MD BOCANEGRA Complex-C-Folic Acid (TRIPHROCAPS PO) Take 15 mg by mouth daily    Historical Provider, MD   calcium acetate (PHOSLO) 667 MG capsule Take 1,334 mg by mouth 3 times daily (with meals)     Historical Provider, MD   vitamin D (CHOLECALCIFEROL) 1000 UNIT TABS tablet Take 1,000 Units by mouth daily. Historical Provider, MD        Allergies:     Pcn [penicillins]    Social History:     Tobacco:    reports that he has never smoked. He has never used smokeless tobacco.  Alcohol:      reports no history of alcohol use. Drug Use:  reports no history of drug use. Family History:     Family History   Problem Relation Age of Onset    Heart Disease Father     Diabetes Father     Diabetes Brother        Review of Systems:     Positive and Negative as described in HPI.     CONSTITUTIONAL:  negative for fevers, chills, sweats, fatigue, weight loss  HEENT:  negative for vision, hearing changes, runny nose, throat pain  RESPIRATORY:  negative for shortness of breath, cough, congestion, wheezing  CARDIOVASCULAR:  negative for chest pain, palpitations  GASTROINTESTINAL:  negative for nausea, vomiting, diarrhea, constipation, change in bowel habits, abdominal pain   GENITOURINARY:  negative for difficulty of urination, burning with urination, frequency   INTEGUMENT:  negative for rash, skin lesions, easy bruising   HEMATOLOGIC/LYMPHATIC:  negative for swelling/edema   ALLERGIC/IMMUNOLOGIC:  negative for urticaria , itching  ENDOCRINE:  negative increase in drinking, increase in urination, hot or cold intolerance  MUSCULOSKELETAL:  negative joint pains, muscle aches, swelling of joints  NEUROLOGICAL:  negative for headaches, dizziness, lightheadedness, numbness, pain, tingling extremities  BEHAVIOR/PSYCH:  negative for depression, anxiety    Physical Exam:   BP (!) 173/75   Pulse 129   Temp 97.8 °F (36.6 °C)   Resp 15   Ht 6' (1.829 m)   Wt 210 lb 8.6 oz (95.5 kg)   SpO2 98%   BMI 28.55 kg/m²   Temp (24hrs), Av.8 °F (36.6 °C), Min:97.6 °F (36.4 °C), Max:97.9 °F (36.6 °C)    Recent Labs     21  0553   POCGLU 122*       Intake/Output Summary (Last 24 hours) at 2021 1250  Last data filed at 2021 1030  Gross per 24 hour   Intake 300 ml   Output 1040 ml   Net -740 ml       General Appearance: alert, well appearing, and in no acute distress  Mental status: oriented to person, place, and time  Head: normocephalic, atraumatic  Eye: no icterus, redness, pupils equal and reactive, extraocular eye movements intact, conjunctiva clear  Ear: normal external ear, no discharge, hearing intact  Nose: no drainage noted  Mouth: mucous membranes moist  Neck: supple, no carotid bruits, thyroid not palpable  Lungs: Bilateral equal air entry, clear to ausculation, no wheezing, rales or rhonchi, normal effort  Cardiovascular: normal rate, regular rhythm, no murmur, gallop, rub  Abdomen: Soft, nontender, nondistended, normal bowel sounds, no hepatomegaly or splenomegaly  Neurologic: There are no new focal motor or sensory deficits, normal muscle tone and bulk, no abnormal sensation, normal speech, cranial nerves II through XII grossly intact  Skin: No gross lesions, rashes, bruising or bleeding on exposed skin area  Extremities: peripheral pulses palpable, no pedal edema or calf pain with palpation  Psych: normal affect    Investigations:      Laboratory Testing:  Recent Results (from the past 24 hour(s))   EKG 12 Lead    Collection Time: 04/28/21  2:40 AM   Result Value Ref Range    Ventricular Rate 111 BPM    Atrial Rate 111 BPM    QRS Duration 132 ms    Q-T Interval 374 ms    QTc Calculation (Bazett) 508 ms    R Axis -52 degrees    T Axis 135 degrees   CBC Auto Differential    Collection Time: 04/28/21  2:59 AM   Result Value Ref Range    WBC 29.6 (H) 3.5 - 11.3 k/uL    RBC 4.68 4.21 - 5.77 m/uL    Hemoglobin 12.9 (L) 13.0 - 17.0 g/dL    Hematocrit 41.9 40.7 - 50.3 %    MCV 89.5 82.6 - 102.9 fL    MCH 27.6 25.2 - 33.5 pg    MCHC 30.8 28.4 - 34.8 g/dL    RDW 20.1 (H) 11.8 - 14.4 %    Platelets See Reflexed IPF Result 138 - 453 k/uL    MPV NOT REPORTED 8.1 - 13.5 fL    NRBC Automated 0.0 0.0 per 100 WBC    Differential Type NOT REPORTED     WBC Morphology NOT REPORTED     RBC Morphology NOT REPORTED     Platelet Estimate NOT REPORTED     Immature Granulocytes 1 (H) 0 %    Seg Neutrophils 94 (H) 36 - 66 %    Lymphocytes 1 (L) 24 - 44 %    Monocytes 3 1 - 7 %    Eosinophils % 0 (L) 1 - 4 %    Basophils 1 0 - 2 %    Absolute Immature Granulocyte 0.30 0.00 - 0.30 k/uL    Segs Absolute 27.81 (H) 1.8 - 7.7 k/uL    Absolute Lymph # 0.30 (L) 1.0 - 4.8 k/uL    Absolute Mono # 0.89 (H) 0.1 - 0.8 k/uL    Absolute Eos # 0.00 0.0 - 0.4 k/uL    Basophils Absolute 0.30 (H) 0.0 - 0.2 k/uL    Morphology ANISOCYTOSIS PRESENT    Comprehensive Metabolic Panel w/ Reflex to MG    Collection Time: 04/28/21  2:59 AM   Result Value Ref Range    Glucose 105 (H) 70 - 99 mg/dL     (HH) 8 - 23 mg/dL    CREATININE 18.47 (HH) 0.70 - 1.20 mg/dL    Bun/Cre Ratio NOT REPORTED 9 - 20    Calcium 8.8 8.6 - 10.4 mg/dL    Sodium 141 135 - 144 mmol/L    Potassium 6.9 (HH) 3.7 - 5.3 mmol/L    Chloride 93 (L) 98 - 107 mmol/L    CO2 9 (LL) 20 - 31 mmol/L    Anion Gap 39 (H) 9 - 17 mmol/L    Alkaline Phosphatase 148 (H) 40 - 129 U/L    ALT 18 5 - 41 U/L    AST 13 <40 U/L    Total Bilirubin 0.64 0.3 - 1.2 mg/dL    Total Protein 7.7 6.4 - 8.3 g/dL    Albumin 3.4 (L) 3.5 - 5.2 g/dL    Albumin/Globulin Ratio 0.8 (L) 1.0 - 2.5    GFR Non-African American 3 (L) >60 mL/min    GFR  3 (L) >60 mL/min    GFR Comment          GFR Staging NOT REPORTED    Brain Natriuretic Peptide    Collection Time: 04/28/21  2:59 AM   Result Value Ref Range    Pro-,340 (H) <300 pg/mL    BNP Interpretation Pro-BNP Reference Range:    Immature Platelet Fraction    Collection Time: 04/28/21  2:59 AM   Result Value Ref Range    Platelet, Immature Fraction 4.9 1.1 - 10.3 %    Platelet, Fluorescence 121 (L) 138 - 453 k/uL   Amylase    Collection Time: 04/28/21  2:59 AM   Result Value Ref Range    Amylase 20 (L) 28 - 100 U/L   C-Reactive Protein    Collection Time: 04/28/21  2:59 AM Result Value Ref Range    .7 (H) 0.0 - 5.0 mg/L   Lipase    Collection Time: 04/28/21  2:59 AM   Result Value Ref Range    Lipase 10 (L) 13 - 60 U/L   Sedimentation Rate    Collection Time: 04/28/21  2:59 AM   Result Value Ref Range    Sed Rate 33 (H) 0 - 20 mm   Lactate, Sepsis    Collection Time: 04/28/21  3:48 AM   Result Value Ref Range    Lactic Acid, Sepsis NOT REPORTED 0.5 - 1.9 mmol/L    Lactic Acid, Sepsis, Whole Blood 2.1 (H) 0.5 - 1.9 mmol/L   Culture, Blood 2    Collection Time: 04/28/21  4:13 AM    Specimen: Blood   Result Value Ref Range    Specimen Description . BLOOD     Special Requests RT AC 20ML     Culture NO GROWTH 1 HOUR    Protime-INR    Collection Time: 04/28/21  4:30 AM   Result Value Ref Range    Protime 20.4 (H) 9.1 - 12.3 sec    INR 2.0    APTT    Collection Time: 04/28/21  4:30 AM   Result Value Ref Range    PTT 37.0 (H) 20.5 - 30.5 sec   Lactate, Sepsis    Collection Time: 04/28/21  5:10 AM   Result Value Ref Range    Lactic Acid, Sepsis NOT REPORTED 0.5 - 1.9 mmol/L    Lactic Acid, Sepsis, Whole Blood 1.5 0.5 - 1.9 mmol/L   POTASSIUM    Collection Time: 04/28/21  5:10 AM   Result Value Ref Range    Potassium 5.7 (H) 3.7 - 5.3 mmol/L   POC Glucose Fingerstick    Collection Time: 04/28/21  5:53 AM   Result Value Ref Range    POC Glucose 122 (H) 75 - 110 mg/dL   POCT Glucose    Collection Time: 04/28/21  5:59 AM   Result Value Ref Range    Glucose 122 mg/dL    QC OK? OK        Imaging/Diagnostics:  Xr Foot Right (min 3 Views)    Result Date: 4/28/2021  1. Redemonstration of severe neuropathic arthropathy of the midfoot. 2. Soft tissue ulceration along the plantar surface of the midfoot without radiographic findings of acute osteomyelitis. RECOMMENDATION: MRI of the right foot could be performed for further evaluation if clinically warranted. Ct Abdomen Pelvis W Iv Contrast Additional Contrast? None    Result Date: 4/28/2021  1. Right lower lobe pneumonia.   Follow-up PA and lateral chest radiographs are recommended after treatment to ensure resolution. 2. Small volume ascites. 3. Normal appendix. 4. Severe bilateral neural foraminal narrowing at L5-S1 secondary to bilateral spondylolysis at L5, grade 1 spondylolisthesis of L5 relative to S1, a disc bulge and facet arthropathy. Xr Chest Portable    Result Date: 4/28/2021  1. Stable cardiomegaly. 2. No acute cardiopulmonary process identified.        Assessment :      Hospital Problems           Last Modified POA    ESRD on dialysis ([facility] 4/28/2021 Yes    Hypertension, essential 4/28/2021 Yes    Noncompliance 4/28/2021 Yes    Sepsis ([facility] 4/28/2021 Yes          Plan:     Patient status inpatient in the Progressive Unit/Step down    Sepsis  -Likely secondary to pneumonia  -Continue Zosyn    End-stage renal disease  -Nephrology consulted  -Continue dialysis    Nausea, vomiting, diarrhea  -Continue antiemetics    Hyperkalemia  -Potassium elevated at 6.9  -Improving with dialysis    Severe metabolic acidosis  -Serum bicarb low at 9  -Continue sodium bicarb drip    Anion gap metabolic acidosis  -Anion gap elevated at 39  -Management as above    Lactic acidosis  -Lactic acid elevated 2.1  -Resolved    Leukocytosis  -WBC count elevated at 29.6  -Continue antibiotics    Country acquired pneumonia  -Continue antibiotics    Bilateral neural foraminal narrowing at L5-S1  -Monitor    Severe neuropathic arthropathy of the midfoot  Soft tissue ulceration along the plantar surface of the midfoot  -Podiatry on board    CHF with ejection fraction of 30 to 87% and diastolic dysfunction  -Continue current treatment    Consultations:   IP CONSULT TO NEPHROLOGY  IP CONSULT TO PODIATRY  IP CONSULT TO HOSPITALIST  IP CONSULT TO PODIATRY  IP CONSULT TO FAMILY MEDICINE  IP CONSULT TO NEPHROLOGY  IP CONSULT TO PODIATRY  PHARMACY TO DOSE VANCOMYCIN     Patient is admitted as inpatient status because of co-morbidities listed above, severity of signs and symptoms as outlined, requirement for current medical therapies and most importantly because of direct risk to patient if care not provided in a hospital setting. Expected length of stay > 48 hours.     Derald Jeans, MD  4/28/2021  12:50 PM    Copy sent to Dr. Aaron Dallas MD

## 2021-04-28 NOTE — PROGRESS NOTES
Pt admitted to room 2111 from [address] [facility] via [id number] [facility]. Pt transferred to bed and cleaned up. Telemetry applied and vitals done. Pt oriented to call light and room.

## 2021-04-28 NOTE — PROGRESS NOTES
Pharmacy Note  Vancomycin Consult    Nicole Rojas is a 60 y.o. male started on Vancomycin for pneumonia and foot wound; consult received from Dr. Sahra Smith to manage therapy. Also receiving the following antibiotics: cefepime. Patient Active Problem List   Diagnosis    SDH (subdural hematoma) ([facility])    ESRD on dialysis ([facility]    DM (diabetes mellitus) ([facility]    Anemia of chronic disease    Charcot foot due to diabetes mellitus ([facility]    Diabetic ulcer of left foot ([facility]    Hypertension, essential    Type 2 diabetes mellitus with foot ulcer ([address]    Noncompliance    Acute on chronic congestive heart failure (HCC)    Sepsis ([facility])    Metabolic acidosis    Azotemia    Hyperkalemia    Diarrhea    Nausea and vomiting    Peripheral vascular disease ([address]    History of left below knee amputation ([facility])    Hemoptysis    Ulcerated, foot, right, with fat layer exposed ([facility]    Charcot's joint of foot, right    Pneumonia    Sepsis due to pneumonia ([facility]     Allergies:  Pcn [penicillins]     Temp max: 97.4 F    Recent Labs     04/28/21  0259   *   CREATININE 18.47*   WBC 29.6*     No intake or output data in the 24 hours ending 04/28/21 1657  Culture Date      Source                       Results  NA    Ht Readings from Last 1 Encounters:   04/28/21 6' (1.829 m)        Wt Readings from Last 1 Encounters:   04/28/21 210 lb 8.6 oz (95.5 kg)       There is no height or weight on file to calculate BMI. CrCl cannot be calculated (Unknown ideal weight.). Goal Trough Level: 15-20 mcg/mL    Assessment/Plan:  Vancomycin 1500 mg given this morning at another facility along with Zosyn    Patient is ESRD on HD but has not been to HD in several weeks due to diarrhea  No further vancomycin today, will consider level     Further dosing to be determined based on culture results, renal function, and clinical response. Thank you for the consult. Will continue to follow. Ziyad Kenny [facility].  [address] [facility]

## 2021-04-28 NOTE — PROGRESS NOTES
RN attempted to call pts brother Dank Gates to help answer admission questions with no answer. RN left voicemail with callback number.

## 2021-04-28 NOTE — PROGRESS NOTES
Pharmacy Consult      Florencia Hudson is a 60 y.o. male for whom pharmacy has been consulted to dose cefepime. Patient Active Problem List   Diagnosis    SDH (subdural hematoma) ([facility])    ESRD on dialysis ([address]    DM (diabetes mellitus) ([address]    Anemia of chronic disease    Charcot foot due to diabetes mellitus ([address]    Diabetic ulcer of left foot ([address]    Hypertension, essential    Type 2 diabetes mellitus with foot ulcer ([address]    Noncompliance    Acute on chronic congestive heart failure (HCC)    Sepsis (HCC)    Metabolic acidosis    Azotemia    Hyperkalemia    Diarrhea    Nausea and vomiting    Peripheral vascular disease ([address]    History of left below knee amputation ([facility])    Hemoptysis    Ulcerated, foot, right, with fat layer exposed ([address]    Charcot's joint of foot, right    Pneumonia    Sepsis due to pneumonia ([address]       Allergies:  Pcn [penicillins]     Recent Labs     04/28/21  0259   CREATININE 18.47*       Ht/Wt:   Ht Readings from Last 1 Encounters:   04/28/21 6' (1.829 m)        Wt Readings from Last 1 Encounters:   04/28/21 210 lb 8.6 oz (95.5 kg)         CrCl cannot be calculated (Unknown ideal weight. ). Assessment/Plan:  Patient being treated for pneumonia and foot wound. Also receiving vancomycin  Will give LD 2000 mg cefepime today, then 1000  mg every 24 hours    Thank you for the consult. Will continue to follow. Mikhail Asif RPh.  [address] 4/28/2021 5:15 PM

## 2021-04-28 NOTE — PROGRESS NOTES
Patient was seen and examined on HD at bedside. Orders were confirmed with the HD nurse. Tolerating the procedure well. Patient normally gets dialysis at [address] [facility] under Dr. Chris Tamez and has missed multiple sessions of dialysis. Found to have azotemia, hyperkalemia and running dialysis today. Normally runs on TTS schedule and will get repeat dialysis in a.m. Kyle Freire MD  [facility]     This note is created with the assistance of a speech-recognition program. While intending to generate a document that actually reflects the content of the visit, no guarantees can be provided that every mistake has been identified and corrected by editing.

## 2021-04-28 NOTE — Clinical Note
Patient Class: Inpatient [101]   REQUIRED: Diagnosis: Sepsis ([facility] [[id number]]   Estimated Length of Stay: Estimated stay of more than 2 midnights   Telemetry Bed Required?: Yes

## 2021-04-28 NOTE — DISCHARGE INSTR - COC
Ulcerated, foot, right, with fat layer exposed ([facility] L97.512    Charcot's joint of foot, right M14.671    Pneumonia J18.9       Isolation/Infection:   Isolation          No Isolation        Patient Infection Status     None to display          Nurse Assessment:  Last Vital Signs: BP (!) 173/75   Pulse 129   Temp 97.8 °F (36.6 °C)   Resp 15   Ht 6' (1.829 m)   Wt 210 lb 8.6 oz (95.5 kg)   SpO2 98%   BMI 28.55 kg/m²     Last documented pain score (0-10 scale): Pain Level: 0  Last Weight:   Wt Readings from Last 1 Encounters:   21 210 lb 8.6 oz (95.5 kg)     Mental Status:  {IP PT MENTAL STATUS:}    IV Access:  { [facility]AC[facility] IV ACCESS:789969818}    Nursing Mobility/ADLs:  Walking   {[facility] DME ZHCH:863185342}  Transfer  {[facility] DME RRJM:520269342}  Bathing  {[facility] DME XCZZ:311277284}  Dressing  {[facility] DME VYIB:333204674}  Toileting  {[facility] DME BNML:283903358}  Feeding  {[facility] DME YBLB:311729475}  Med Admin  {[facility] DME URS}  Med Delivery   {[facility] MED Delivery:078563772}    Wound Care Documentation and Therapy:        Elimination:  Continence:   · Bowel: {YES / ZY:66348}  · Bladder: {YES / IQ:76044}  Urinary Catheter: {Urinary Catheter:176868751}   Colostomy/Ileostomy/Ileal Conduit: {YES / UI:68472}       Date of Last BM: ***    Intake/Output Summary (Last 24 hours) at 2021 1312  Last data filed at 2021 1030  Gross per 24 hour   Intake 300 ml   Output 1040 ml   Net -740 ml     No intake/output data recorded.     Safety Concerns:     [address] Safety Concerns:408795591}    Impairments/Disabilities:      [address] Impairments/Disabilities:152229524}    Nutrition Therapy:  Current Nutrition Therapy:   [address] Diet List:993019828}    Routes of Feeding: {[facility] DME Other Feedings:476913359}  Liquids: {Slp liquid thickness:63580}  Daily Fluid Restriction: {[facility] DME Yes amt example:744565727}  Last Modified Barium Swallow with Video (Video Swallowing Test): {Done Not Done [facility]:771635577}    Treatments at the Time of Hospital Discharge:   Respiratory Treatments: ***  Oxygen Therapy:  {Therapy; copd oxygen:61153}  Ventilator:    {[facility] CC Vent URJB:961939932}    Rehab Therapies: {THERAPEUTIC INTERVENTION:2619380883}  Weight Bearing Status/Restrictions: [facility] CC Weight Bearin}  Other Medical Equipment (for information only, NOT a DME order):  {EQUIPMENT:789247392}  Other Treatments: ***    Patient's personal belongings (please select all that are sent with patient):  {[facility] DME Belongings:558239262}    RN SIGNATURE:  {Esignature:782094533}    CASE MANAGEMENT/SOCIAL WORK SECTION    Inpatient Status Date: ***    Readmission Risk Assessment Score:  Readmission Risk              Risk of Unplanned Readmission:        19           Discharging to Facility/ Agency   · Name:   · Address:  · Phone:  · Fax:    Dialysis Facility (if applicable)   · Name:  · Address:  · Dialysis Schedule:  · Phone:  · Fax:    / signature: {Esignature:750759177}    PHYSICIAN SECTION    Prognosis: Fair    Condition at Discharge: Stable    Rehab Potential (if transferring to Rehab):  Fair    Recommended Labs or Other Treatments After Discharge: Continue dialysis    Physician Certification: Transferring to [facility]    Update Admission H&P: No change in H&P    PHYSICIAN SIGNATURE:  Electronically signed by Jackelin Putnam MD on 21 at 1:13 PM EDT

## 2021-04-28 NOTE — H&P
[address] [place] [address]     HISTORY AND PHYSICAL EXAMINATION            Date:   4/28/2021  Patient name:  Williemae Alpers  Date of admission:  4/28/2021  2:27 PM  MRN:   [id number]  Account:  [de-identified]  YOB: 1955  PCP:    May Wong MD  Room:   2111/2111-01  Code Status:    Full Code    Chief Complaint:     No chief complaint on file. History Obtained From:     patient, electronic medical record    History of Present Illness: The patient is a 60 y.o. / male with chronic systolic and diastolic CHF, ESRD on HD TTS, left BKA & foot ulcer who was transferred from [facility]. V's for missed hemodialysis and sepsis 2/2 pneumonia. Patient has had diarrhea for the past 2 weeks as well as cough and SOB. He has not gone to dialysis for the past 2 weeks because of his diarrhea. Over the past few days he has felt generalized weakness and fatigue and also started coughing up blood. Came to ED today for worsening respiratory symptoms. Denies any smoking history, alcohol, or illicit drug use. Comes from home where he lives alone. At [facility]. V's Cr was 18.47, , K 6.9. EKG did show widened QRS. Patient received IV calcium gluconate and insulin/dextrose. Nephrology was consulted and patient underwent hemodialysis at [facility]. V's.  CXR unremarkable but on CT abdomen and pelvis there was right lower lobe pneumonia. Patient was septic with tachycardia and leukocytosis of 29.6. Started on IV antibiotocs and blood cultures taken. X-ray R foot was also done for foot wound which did not show findings of acute osteomyelitis. Podiatry was consulted and agreed that the wound was not infected and there was no osteomyelitis. They did debride loose skin and recommend conservative management.   After dialysis patient was transferred to [facility]. [facility]. On my exam he was alert and oriented x 3 and satting on room air. He was tachycardic. He appeared fatigued. He was actively coughing up small amounts of blood. Past Medical History:     Past Medical History:   Diagnosis Date    Acute on chronic congestive heart failure (HCC)     Anemia     CAD (coronary artery disease)     [facility]omy[facility]     Dialysis care     ESRD (end stage renal disease) on dialysis ([facility]     Foot ulcer, left ([facility] 2015    GERD (gastroesophageal reflux disease)     Hemodialysis patient ([facility] [facility] 2011    MOM-WED-FRI-ON [address]     History of sleep apnea     none since significant weight loss (was 400#)    Hyperlipidemia     Hyperparathyroidism ([facility]     Hypertension     Neuropathy     Peripheral vascular disease ([facility]     Pneumonia     resolved    S/P CABG (coronary artery bypass graft) 06/2016    Type II or unspecified type diabetes mellitus without mention of complication, not stated as uncontrolled     dx-1980        Past SurgicalHistory:     Past Surgical History:   Procedure Laterality Date    CATARACT REMOVAL      DIALYSIS FISTULA CREATION Left 2014    LEG AMPUTATION THROUGH LOWER TIBIA AND FIBULA          Medications Prior to Admission:        Prior to Admission medications    Medication Sig Start Date End Date Taking?  Authorizing Provider   metoprolol succinate (TOPROL XL) 50 MG extended release tablet Take 100 mg by mouth daily   Yes Historical Provider, MD   metOLazone (ZAROXOLYN) 10 MG tablet Take 10 mg by mouth daily   Yes Historical Provider, MD   digoxin (LANOXIN) 125 MCG tablet Take 125 mcg by mouth daily Take on Tuesday, Thursday and Saturday in the evening   Yes Historical Provider, MD   NIFEdipine (ADALAT CC) 30 MG extended release tablet Take 30 mg by mouth daily   Yes Historical Provider, MD   pantoprazole (PROTONIX) 40 MG tablet Take 40 mg by mouth daily   Yes Historical Provider, MD   patiromer sorbitex calcium (VELTASSA) 8.4 g PACK packet Take 8.4 g by mouth daily   Yes Historical Provider, MD   carvedilol (COREG) 3.125 MG tablet Take 1 tablet by mouth 2 times daily (with meals)  Patient taking differently: Take 12.5 mg by mouth 2 times daily (with meals)  6/6/19  Yes Melburn Ax, APRN - CNP   bumetanide (BUMEX) 1 MG tablet Take 2 mg by mouth daily   Yes Historical Provider, MD   ONE TOUCH ULTRA TEST strip USE AS DIRECTED DAILY AS NEEDED 1/24/17  Yes Jackson Rocha DO   atorvastatin (LIPITOR) 40 MG tablet Take 1 tablet by mouth nightly 3/14/16  Yes Vonda Johnson DO   midodrine (PROAMATINE) 10 MG tablet Take 1 tablet by mouth 3 times daily (with meals) 3/14/16  Yes Vonda Johnson DO   aspirin 81 MG EC tablet Take 1 tablet by mouth daily 3/14/16  Yes Vonda Johnson DO   sevelamer (RENVELA) 800 MG tablet Take 5 tablets by mouth 3 times daily    Yes Historical Provider, MD   B Complex-C-Folic Acid (TRIPHROCAPS PO) Take 15 mg by mouth daily   Yes Historical Provider, MD   vitamin D (CHOLECALCIFEROL) 1000 UNIT TABS tablet Take 2,000 Units by mouth daily    Yes Historical Provider, MD   clopidogrel (PLAVIX) 75 MG tablet Take 75 mg by mouth daily    Historical Provider, MD   insulin lispro (HUMALOG) 100 UNIT/ML injection vial Inject into the skin 3 times daily (before meals)    Historical Provider, MD   lisinopril (PRINIVIL;ZESTRIL) 2.5 MG tablet Take 2.5 mg by mouth daily    Historical Provider, MD   nitroGLYCERIN (NITROSTAT) 0.4 MG SL tablet Place 0.4 mg under the tongue every 5 minutes as needed for Chest pain up to max of 3 total doses. If no relief after 1 dose, call 911. Historical Provider, MD   amLODIPine (NORVASC) 10 MG tablet Take 10 mg by mouth daily    Historical Provider, MD   oxyCODONE-acetaminophen (PERCOCET) 5-325 MG per tablet Take 1 tablet by mouth every 4 hours as needed for Pain.     Historical Provider, MD   ticagrelor (BRILINTA) 90 MG TABS tablet Take 1 tablet by mouth 2 times daily 6/6/19   Goyo manzo Kathy Arora, APRN - CNP   cinacalcet (SENSIPAR) 90 MG tablet Take 90 mg by mouth daily    Historical Provider, MD   Blood Glucose Monitoring Suppl (ONE TOUCH ULTRA 2) W/DEVICE KIT TEST 3 TIMES DAILY 8/26/16   Teodoro Rack, DO   glucose monitoring kit (FREESTYLE) monitoring kit 1 kit by Does not apply route daily as needed 4/21/16   Teodoro Rack, DO   Lancets 30G MISC 1 each by Does not apply route 3 times daily 4/21/16   Alejandrann Rack, DO   Misc. Devices (WALKER GLIDE WHEELS) MISC 1 Device by Does not apply route continuous 4/21/16   Teodoro Rack, DO   Multiple Vitamins-Minerals (THERAPEUTIC MULTIVITAMIN-MINERALS) tablet Take 1 tablet by mouth daily 3/14/16   Sharath Corey, DO   calcium acetate (PHOSLO) 667 MG capsule Take 1,334 mg by mouth 3 times daily (with meals)     Historical Provider, MD        Allergies:     Pcn [penicillins]    Social History:     Tobacco:    reports that he has never smoked. He has never used smokeless tobacco.  Alcohol:      reports no history of alcohol use. Drug Use:  reports no history of drug use. Family History:     Family History   Problem Relation Age of Onset    Heart Disease Father     Diabetes Father     Diabetes Brother        Review of Systems:     Positive and Negative as described in HPI. Review of Systems   Constitutional: Positive for fatigue. Negative for appetite change, chills and fever. HENT: Negative for congestion and sore throat. Eyes: Negative for visual disturbance. Respiratory: Positive for cough and shortness of breath. Cardiovascular: Negative for chest pain, palpitations and leg swelling. Gastrointestinal: Positive for diarrhea and nausea. Negative for abdominal pain, blood in stool and vomiting. Musculoskeletal: Negative for arthralgias and myalgias. Skin: Negative for rash. Neurological: Positive for weakness (generalized). Negative for light-headedness and headaches.      Physical Exam:   /64   Pulse 110 Temp 97.4 °F (36.3 °C) (Axillary)   Resp 18   SpO2 96%   Temp (24hrs), Av.7 °F (36.5 °C), Min:97.4 °F (36.3 °C), Max:97.9 °F (36.6 °C)    Recent Labs     21  0553 21  1602   POCGLU 122* 101     No intake or output data in the 24 hours ending 21 1645    Physical Exam  Constitutional:       Appearance: Normal appearance. Comments: Actively coughing up small amounts of blood. Is able to converse in full sentences on room air. HENT:      Head: Normocephalic and atraumatic. Eyes:      General: No scleral icterus. Extraocular Movements: Extraocular movements intact. Conjunctiva/sclera: Conjunctivae normal.      Pupils: Pupils are equal, round, and reactive to light. Neck:      Musculoskeletal: Neck supple. No muscular tenderness. Cardiovascular:      Rate and Rhythm: Normal rate and regular rhythm. Pulses: Normal pulses. Heart sounds: Normal heart sounds. Pulmonary:      Effort: Pulmonary effort is normal.      Breath sounds: Normal breath sounds. No wheezing or rales. Abdominal:      General: Bowel sounds are normal. There is no distension. Palpations: Abdomen is soft. Tenderness: There is no abdominal tenderness. There is no guarding or rebound. Musculoskeletal:      Comments: Left BKA. Right foot wound is wrapped. Lymphadenopathy:      Cervical: No cervical adenopathy. Skin:     General: Skin is warm and dry. Capillary Refill: Capillary refill takes less than 2 seconds. Neurological:      General: No focal deficit present. Mental Status: He is alert and oriented to person, place, and time. Mental status is at baseline. Sensory: Sensation is intact. Motor: Motor function is intact.          Investigations:     Laboratory Testing:  Recent Results (from the past 24 hour(s))   EKG 12 Lead    Collection Time: 21  2:40 AM   Result Value Ref Range    Ventricular Rate 111 BPM    Atrial Rate 111 BPM    QRS Duration 132 ms Q-T Interval 374 ms    QTc Calculation (Bazett) 508 ms    R Axis -52 degrees    T Axis 135 degrees   CBC Auto Differential    Collection Time: 04/28/21  2:59 AM   Result Value Ref Range    WBC 29.6 (H) 3.5 - 11.3 k/uL    RBC 4.68 4.21 - 5.77 m/uL    Hemoglobin 12.9 (L) 13.0 - 17.0 g/dL    Hematocrit 41.9 40.7 - 50.3 %    MCV 89.5 82.6 - 102.9 fL    MCH 27.6 25.2 - 33.5 pg    MCHC 30.8 28.4 - 34.8 g/dL    RDW 20.1 (H) 11.8 - 14.4 %    Platelets See Reflexed IPF Result 138 - 453 k/uL    MPV NOT REPORTED 8.1 - 13.5 fL    NRBC Automated 0.0 0.0 per 100 WBC    Differential Type NOT REPORTED     WBC Morphology NOT REPORTED     RBC Morphology NOT REPORTED     Platelet Estimate NOT REPORTED     Immature Granulocytes 1 (H) 0 %    Seg Neutrophils 94 (H) 36 - 66 %    Lymphocytes 1 (L) 24 - 44 %    Monocytes 3 1 - 7 %    Eosinophils % 0 (L) 1 - 4 %    Basophils 1 0 - 2 %    Absolute Immature Granulocyte 0.30 0.00 - 0.30 k/uL    Segs Absolute 27.81 (H) 1.8 - 7.7 k/uL    Absolute Lymph # 0.30 (L) 1.0 - 4.8 k/uL    Absolute Mono # 0.89 (H) 0.1 - 0.8 k/uL    Absolute Eos # 0.00 0.0 - 0.4 k/uL    Basophils Absolute 0.30 (H) 0.0 - 0.2 k/uL    Morphology ANISOCYTOSIS PRESENT    Comprehensive Metabolic Panel w/ Reflex to MG    Collection Time: 04/28/21  2:59 AM   Result Value Ref Range    Glucose 105 (H) 70 - 99 mg/dL     (HH) 8 - 23 mg/dL    CREATININE 18.47 (HH) 0.70 - 1.20 mg/dL    Bun/Cre Ratio NOT REPORTED 9 - 20    Calcium 8.8 8.6 - 10.4 mg/dL    Sodium 141 135 - 144 mmol/L    Potassium 6.9 (HH) 3.7 - 5.3 mmol/L    Chloride 93 (L) 98 - 107 mmol/L    CO2 9 (LL) 20 - 31 mmol/L    Anion Gap 39 (H) 9 - 17 mmol/L    Alkaline Phosphatase 148 (H) 40 - 129 U/L    ALT 18 5 - 41 U/L    AST 13 <40 U/L    Total Bilirubin 0.64 0.3 - 1.2 mg/dL    Total Protein 7.7 6.4 - 8.3 g/dL    Albumin 3.4 (L) 3.5 - 5.2 g/dL    Albumin/Globulin Ratio 0.8 (L) 1.0 - 2.5    GFR Non-African American 3 (L) >60 mL/min    GFR  3 (L) >60 mL/min    GFR Comment          GFR Staging NOT REPORTED    Brain Natriuretic Peptide    Collection Time: 04/28/21  2:59 AM   Result Value Ref Range    Pro-,340 (H) <300 pg/mL    BNP Interpretation Pro-BNP Reference Range:    Immature Platelet Fraction    Collection Time: 04/28/21  2:59 AM   Result Value Ref Range    Platelet, Immature Fraction 4.9 1.1 - 10.3 %    Platelet, Fluorescence 121 (L) 138 - 453 k/uL   Amylase    Collection Time: 04/28/21  2:59 AM   Result Value Ref Range    Amylase 20 (L) 28 - 100 U/L   C-Reactive Protein    Collection Time: 04/28/21  2:59 AM   Result Value Ref Range    .7 (H) 0.0 - 5.0 mg/L   Lipase    Collection Time: 04/28/21  2:59 AM   Result Value Ref Range    Lipase 10 (L) 13 - 60 U/L   Sedimentation Rate    Collection Time: 04/28/21  2:59 AM   Result Value Ref Range    Sed Rate 33 (H) 0 - 20 mm   Lactate, Sepsis    Collection Time: 04/28/21  3:48 AM   Result Value Ref Range    Lactic Acid, Sepsis NOT REPORTED 0.5 - 1.9 mmol/L    Lactic Acid, Sepsis, Whole Blood 2.1 (H) 0.5 - 1.9 mmol/L   Culture, Blood 2    Collection Time: 04/28/21  4:13 AM    Specimen: Blood   Result Value Ref Range    Specimen Description . BLOOD     Special Requests RT AC 20ML     Culture NO GROWTH 10 HOURS    Protime-INR    Collection Time: 04/28/21  4:30 AM   Result Value Ref Range    Protime 20.4 (H) 9.1 - 12.3 sec    INR 2.0    APTT    Collection Time: 04/28/21  4:30 AM   Result Value Ref Range    PTT 37.0 (H) 20.5 - 30.5 sec   Lactate, Sepsis    Collection Time: 04/28/21  5:10 AM   Result Value Ref Range    Lactic Acid, Sepsis NOT REPORTED 0.5 - 1.9 mmol/L    Lactic Acid, Sepsis, Whole Blood 1.5 0.5 - 1.9 mmol/L   POTASSIUM    Collection Time: 04/28/21  5:10 AM   Result Value Ref Range    Potassium 5.7 (H) 3.7 - 5.3 mmol/L   Culture, Blood 1    Collection Time: 04/28/21  5:20 AM    Specimen: Blood   Result Value Ref Range    Specimen Description . BLOOD     Special Requests RT HAND 20 ML Culture NO GROWTH 9 HOURS    POC Glucose Fingerstick    Collection Time: 04/28/21  5:53 AM   Result Value Ref Range    POC Glucose 122 (H) 75 - 110 mg/dL   POCT Glucose    Collection Time: 04/28/21  5:59 AM   Result Value Ref Range    Glucose 122 mg/dL    QC OK? OK    POC Glucose Fingerstick    Collection Time: 04/28/21  4:02 PM   Result Value Ref Range    POC Glucose 101 75 - 110 mg/dL       Imaging/Diagnostics:  Xr Foot Right (min 3 Views)    Result Date: 4/28/2021  EXAMINATION: THREE XRAY VIEWS OF THE RIGHT FOOT 4/28/2021 4:44 am COMPARISON: 12/20/2015 HISTORY: ORDERING SYSTEM PROVIDED HISTORY: extensive R foot wound TECHNOLOGIST PROVIDED HISTORY: extensive R foot wound Reason for Exam: Extensive right foot wound, FINDINGS: Complete collapse of the plantar arch with fragmentation and increased sclerosis of the midfoot is again noted consistent with severe neuropathic arthropathy. There is diffuse soft tissue swelling. There is a soft tissue ulceration along the plantar surface of the midfoot. There is no periosteal new bone formation or osteolysis to suggest acute osteomyelitis. 1. Redemonstration of severe neuropathic arthropathy of the midfoot. 2. Soft tissue ulceration along the plantar surface of the midfoot without radiographic findings of acute osteomyelitis. RECOMMENDATION: MRI of the right foot could be performed for further evaluation if clinically warranted. Ct Abdomen Pelvis W Iv Contrast Additional Contrast? None    Result Date: 4/28/2021  EXAMINATION: CT OF THE ABDOMEN AND PELVIS WITH CONTRAST 4/28/2021 4:16 am TECHNIQUE: CT of the abdomen and pelvis was performed with the administration of intravenous contrast. Multiplanar reformatted images are provided for review. Dose modulation, iterative reconstruction, and/or weight based adjustment of the mA/kV was utilized to reduce the radiation dose to as low as reasonably achievable. COMPARISON: None.  HISTORY: ORDERING SYSTEM PROVIDED HISTORY: spondylolysis at L5, grade 1 spondylolisthesis of L5 relative to S1, a disc bulge and facet arthropathy. Xr Chest Portable    Result Date: 4/28/2021  EXAMINATION: ONE XRAY VIEW OF THE CHEST 4/28/2021 2:48 am COMPARISON: 06/03/2019 HISTORY: ORDERING SYSTEM PROVIDED HISTORY: missed dialysis x2weeks TECHNOLOGIST PROVIDED HISTORY: missed dialysis x2weeks Reason for Exam: Upright port, missed dialysis x2 weeks FINDINGS: There is stable cardiomegaly. There is no focal consolidation, pleural effusion or evidence of edema. There is no pneumothorax identified. 1. Stable cardiomegaly. 2. No acute cardiopulmonary process identified.        Assessment :      Primary Problem  Sepsis due to pneumonia [facility])    Active Hospital Problems    Diagnosis Date Noted    Pneumonia [J18.9] 04/28/2021    Sepsis ([facility] [address] [A41.9] 04/28/2021    Sepsis due to pneumonia ([facility] [address] [J18.9, A41.9] 75/79/1943    Metabolic acidosis [C87.3]     Hyperkalemia [E87.5]     Diarrhea [R19.7]     Nausea and vomiting [R11.2]     History of left below knee amputation (HCC) [Z89.512]     Hemoptysis [R04.2]     Ulcerated, foot, right, with fat layer exposed ([facility] [address] [L97.512]     Hypertension, essential [I10]     Charcot foot due to diabetes mellitus ([facility] [address] [E11.610] 12/28/2015    Anemia of chronic disease [D63.8] 07/03/2013    ESRD on dialysis ([facility] [address] [N18.6, Z99.2] 06/27/2013    DM (diabetes mellitus) ([facility] [address] [E11.9] 06/27/2013       Plan:     Patient status Admit as inpatient in the  Progressive Unit/Step down    Sepsis 2/2 community acquired pneumonia  -Tachycardic in 120s with leukocytosis of 29.6  -CXR unremarkable but CT A&P showed RLL PNA  -UA pending  -Lactic acid 2.1  -Procal pending  -Mycoplasma, legionella, strep pneumo pending  -MRSA swab pending  -Blood cultures x 2  -Resp C&S pending  -IV cefepime and IV vancomycin, pharmacy to dose  -Appreciate nephrology recs regarding fluid resuscitation given setting of ESRD    Hyperkalemia 2/2 ESRD on hemodialysis TTS  -Missed 2 weeks of dialysis  -K 6.9 on admission with EKG changes, received IV calcium gluconate and insulin/dextrose  -Underwent hemodialysis at [facility]. V's  -Nephrology on board, appreciate further recommendations regarding bicarb drip for metabolic acidosis, bicarb was 9 at [facility]. V's  -Renvela 4000 mg PO TID    Hemoptysis likely 2/2 pneumonia  -HgB stable at 12.9  -H&H q8h, keep HgB > 7  -NM lung scan perfusion to r/o PE    Anion gap metabolic acidosis  -Bicarb 9, appreciate nephrology recs regarding bicarb drip    Diarrhea & nausea  -CT abdomen and pelvis showed small volume ascites and normal appendix  -Stool osmol, fecal leukocytes, GI panel, fecal lactoferrin, fecal elastase pending  -C. Diff pending  -Antinausea meds PRN  -Protonix 40 mg PO daily    Right foot ulcer  -X-ray showed no evidence of OM  -Podiatry consulted, appreciate further recs  -Wound care consulted    Dyslipidemia  -Lipitor 40 mg PO nightly    Chronic systolic and diastolic CHF  -BNP > 748,929, likely elevated from missed dialysis for 2 weeks  -Echo 6/2019: LVEF 30-35% with moderate diastolic dysfunction  -Toprol 100 mg PO daily    HTN  -Toprol 100 mg PO daily  -Nifedipine 30 mg PO daily    Type 2 diabetes mellitus  -Low dose ISS  -Hypoglycemia protocol    Bilateral neural foraminal narrowing at L5-S1 with spondylolysis  -Showed up on CT A&P  -Monitor    *Home med has digoxin listed. Unclear why patient is on this as there I could not find mention of a fib in chart review. Will check dig levels to see if patient has been taking.     Diet: renal diet low salt low fluid  DVT prophylaxis: EPCs; avoid chemical due to hemoptysis    Consultations:   IP CONSULT TO NEPHROLOGY  IP CONSULT TO SOCIAL WORK  PHARMACY TO DOSE VANCOMYCIN  PHARMACY TO DOSE MEDICATION  IP CONSULT TO PODIATRY    Patient is admitted as inpatient status because of co-morbiditieslisted above, severity of signs and symptoms as outlined, requirement for current medical therapies and most importantly because of direct risk to patient if care not provided in a hospital setting. Arun Ventura MD  4/28/2021  4:45 PM    Copy sent to Dr. Mendez Macias MD    Attending Physician Statement  I have discussed the care of Alisa Maxwell with the resident team. I have examined the patient myself and taken ros and hpi , including pertinent history and exam findings,  with the resident. I have reviewed the key elements of all parts of the encounter with the resident. I agree with the assessment, plan and orders as documented by the resident. Principal Problem:    Sepsis due to pneumonia [facility])  Active Problems:    ESRD on dialysis ([facility] [address]    DM (diabetes mellitus) ([facility] [address]    Anemia of chronic disease    Charcot foot due to diabetes mellitus ([facility] [address]    Hypertension, essential    Sepsis ([facility] [address]    Metabolic acidosis    Hyperkalemia    Diarrhea    Nausea and vomiting    History of left below knee amputation ([facility] [address]    Hemoptysis    Ulcerated, foot, right, with fat layer exposed ([facility] [address]    Pneumonia  Resolved Problems:    * No resolved hospital problems. *      ESRD on HD sec to DMnephropathy; missed dialysis due to dialysis, hyperkalemia, increased AG acidosis. Associated wit SOB. cough, hemoptysis- noted to have right sided pneumoinia  Sepsis with hypotension, cefepime, vanc iv and oral. pancultures pending. No COVID test done- will order.    C.diff positive  ID consult     Electronically signed by Brit Lopez MD

## 2021-04-28 NOTE — PROGRESS NOTES
Occupational Therapy Not Seen Note    DATE: 2021  Name: Marko Cox  : 1955  MRN: [id number]    Patient not available for Occupational Therapy due to:    Hemodialysis:    Next Scheduled Treatment: Ck in pm as able or      Electronically signed by Heidi Cam OT on 2021 at 8:17 AM

## 2021-04-28 NOTE — ED NOTES
Marine Freshwater arrived to transfer pt to [facility] room 2111.      Ivone Cabezas RN  04/28/21 1645

## 2021-04-28 NOTE — PROGRESS NOTES
RN attempted to call pts girlfriend Celso Jonas for admission questions as pt is unable to answer appropriately but phone number provided is not working.

## 2021-04-28 NOTE — PROGRESS NOTES
Physical Therapy    DATE: 2021    NAME: John Leslie  MRN: [id number]   : 1955      Patient not seen this date for Physical Therapy due to:    Hemodialysis: PT will check back as time allows.       Electronically signed by Rosa Maria Yin PT on 2021 at 8:05 AM

## 2021-04-28 NOTE — ED PROVIDER NOTES
time was 20  minutes. This excludes any time for separately reportable procedures.        [facility] 24, DO  04/28/21 [address], DO  04/28/21 [address], DO  04/28/21 0697

## 2021-04-28 NOTE — ED TRIAGE NOTES
FPt states for the last few wks he hs had diarhea and did not want to make a mess of self at dialysis so he missed dialysis for the last 2 wks. PT states he is supposed to go TRS but has not went at all. Pt denies any pain. States his only complaint is his mouth has been increasingly dry. Vitals completed.

## 2021-04-28 NOTE — ED PROVIDER NOTES
[address] HANDOFF       Handoff taken on the following patient from prior Attending Physician:  Pt Name: Diego Cormier  PCP:  Merissa Nicole MD    Attestation  I was available and discussed any additional care issues that arose and coordinated the management plans with the resident(s) caring for the patient during my duty period. Any areas of disagreement with resident's documentation of care or procedures are noted on the chart. I was personally present for the key portions of any/all procedures during my duty period. I have documented in the chart those procedures where I was not present during the key portions. CHIEF COMPLAINT       Chief Complaint   Patient presents with    Diarrhea     this is why he is missed dialysis     Other     missed dialysis x2 weeks, supposed to go TRS          CURRENT MEDICATIONS     Previous Medications  Discharge Medication List as of 4/28/2021  1:47 PM      CONTINUE these medications which have NOT CHANGED    Details   clopidogrel (PLAVIX) 75 MG tablet Take 75 mg by mouth dailyHistorical Med      insulin lispro (HUMALOG) 100 UNIT/ML injection vial Inject into the skin 3 times daily (before meals)Historical Med      lisinopril (PRINIVIL;ZESTRIL) 2.5 MG tablet Take 2.5 mg by mouth dailyHistorical Med      NIFEdipine (ADALAT CC) 30 MG extended release tablet Take 30 mg by mouth dailyHistorical Med      nitroGLYCERIN (NITROSTAT) 0.4 MG SL tablet Place 0.4 mg under the tongue every 5 minutes as needed for Chest pain up to max of 3 total doses. If no relief after 1 dose, call 911. Historical Med      amLODIPine (NORVASC) 10 MG tablet Take 10 mg by mouth dailyHistorical Med      oxyCODONE-acetaminophen (PERCOCET) 5-325 MG per tablet Take 1 tablet by mouth every 4 hours as needed for Pain. Historical Med      pantoprazole (PROTONIX) 40 MG tablet Take 40 mg by mouth dailyHistorical Med      patiromer sorbitex calcium (VELTASSA) 8.4 g PACK packet Take 8.4 g by mouth dailyHistorical Med      carvedilol (COREG) 3.125 MG tablet Take 1 tablet by mouth 2 times daily (with meals), Disp-60 tablet, R-3Normal      ticagrelor (BRILINTA) 90 MG TABS tablet Take 1 tablet by mouth 2 times daily, Disp-60 tablet, R-12Normal      bumetanide (BUMEX) 1 MG tablet Take 2 mg by mouth dailyHistorical Med      cinacalcet (SENSIPAR) 90 MG tablet Take 90 mg by mouth dailyHistorical Med      ONE TOUCH ULTRA TEST strip USE AS DIRECTED DAILY AS NEEDED, Disp-100 each, R-0Normal      Blood Glucose Monitoring Suppl (ONE TOUCH ULTRA 2) W/DEVICE KIT Disp-1 kit, R-0, Normal      glucose monitoring kit (FREESTYLE) monitoring kit DAILY PRN Starting 4/21/2016, Until Discontinued, Disp-1 kit, R-0, Normal      Lancets 30G MISC 3 TIMES DAILY Starting 4/21/2016, Until Discontinued, Disp-300 each, R-3, Normal      Misc. Devices (WALKER GLIDE WHEELS) MISC CONTINUOUS Starting 4/21/2016, Until Discontinued, Disp-1 each, R-0, Print      atorvastatin (LIPITOR) 40 MG tablet Take 1 tablet by mouth nightly, Disp-30 tablet, R-3      midodrine (PROAMATINE) 10 MG tablet Take 1 tablet by mouth 3 times daily (with meals), Disp-90 tablet, R-3      aspirin 81 MG EC tablet Take 1 tablet by mouth daily, Disp-30 tablet, R-3      Multiple Vitamins-Minerals (THERAPEUTIC MULTIVITAMIN-MINERALS) tablet Take 1 tablet by mouth daily, Disp-30 tablet, R-0      sevelamer (RENVELA) 800 MG tablet Take 5 tablets by mouth 3 times daily       B Complex-C-Folic Acid (TRIPHROCAPS PO) Take 15 mg by mouth daily      calcium acetate (PHOSLO) 667 MG capsule Take 1,334 mg by mouth 3 times daily (with meals)       vitamin D (CHOLECALCIFEROL) 1000 UNIT TABS tablet Take 1,000 Units by mouth daily.              Encounter Medications  Orders Placed This Encounter   Medications    calcium gluconate 1000 mg in sodium chloride 50 mL    insulin regular (HUMULIN R;NOVOLIN R) injection 10 Units    dextrose 50 % IV solution    vancomycin after treatment to ensure resolution. 2. Small volume ascites. 3. Normal appendix. 4. 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 XR FOOT RIGHT (MIN 3 VIEWS)   Final Result   1. Redemonstration of severe neuropathic arthropathy of the midfoot. 2. Soft tissue ulceration along the plantar surface of the midfoot without   radiographic findings of acute osteomyelitis. RECOMMENDATION:   MRI of the right foot could be performed for further evaluation if clinically   warranted. XR CHEST PORTABLE   Final Result   1. Stable cardiomegaly. 2. No acute cardiopulmonary process identified.              LABS:  Labs Reviewed   CBC WITH AUTO DIFFERENTIAL - Abnormal; Notable for the following components:       Result Value    WBC 29.6 (*)     Hemoglobin 12.9 (*)     RDW 20.1 (*)     Immature Granulocytes 1 (*)     Seg Neutrophils 94 (*)     Lymphocytes 1 (*)     Eosinophils % 0 (*)     Segs Absolute 27.81 (*)     Absolute Lymph # 0.30 (*)     Absolute Mono # 0.89 (*)     Basophils Absolute 0.30 (*)     All other components within normal limits   COMPREHENSIVE METABOLIC PANEL W/ REFLEX TO MG FOR LOW K - Abnormal; Notable for the following components:    Glucose 105 (*)      (*)     CREATININE 18.47 (*)     Potassium 6.9 (*)     Chloride 93 (*)     CO2 9 (*)     Anion Gap 39 (*)     Alkaline Phosphatase 148 (*)     Albumin 3.4 (*)     Albumin/Globulin Ratio 0.8 (*)     GFR Non- 3 (*)     GFR  3 (*)     All other components within normal limits   BRAIN NATRIURETIC PEPTIDE - Abnormal; Notable for the following components:    Pro-,340 (*)     All other components within normal limits   IMMATURE PLATELET FRACTION - Abnormal; Notable for the following components:    Platelet, Fluorescence 121 (*)     All other components within normal limits   LACTATE, SEPSIS - Abnormal; Notable for the following components:    Lactic Acid, Sepsis, Whole Blood 2.1 (*)     All other components within normal limits   AMYLASE - Abnormal; Notable for the following components:    Amylase 20 (*)     All other components within normal limits   C-REACTIVE PROTEIN - Abnormal; Notable for the following components:    .7 (*)     All other components within normal limits   LIPASE - Abnormal; Notable for the following components:    Lipase 10 (*)     All other components within normal limits   SEDIMENTATION RATE - Abnormal; Notable for the following components:    Sed Rate 33 (*)     All other components within normal limits   PROTIME-INR - Abnormal; Notable for the following components:    Protime 20.4 (*)     All other components within normal limits   APTT - Abnormal; Notable for the following components:    PTT 37.0 (*)     All other components within normal limits   POTASSIUM - Abnormal; Notable for the following components:    Potassium 5.7 (*)     All other components within normal limits   POC GLUCOSE FINGERSTICK - Abnormal; Notable for the following components:    POC Glucose 122 (*)     All other components within normal limits   POCT GLUCOSE - Normal   CULTURE, BLOOD 1   CULTURE, BLOOD 2   CULTURE, URINE   CULTURE, BLOOD 1   CULTURE, BLOOD 2   GRAM STAIN   CULTURE, RESPIRATORY   LACTATE, SEPSIS   URINALYSIS WITH MICROSCOPIC   LACTATE, SEPSIS   LACTATE, SEPSIS   POTASSIUM   POTASSIUM   POTASSIUM     Antibiotcis for pneumonia and STSI      PLAN/ TASKS OUTSTANDING     Admitted, awaiting placement      (Please note that portions of this note were completed with a voice recognition program.  Efforts were made to edit the dictations but occasionally words are mis-transcribed. )    Sandoval MD, F.A.C.E.P.   Attending Emergency Physician       Elder Avila MD  04/28/21 [id number]

## 2021-04-28 NOTE — PROGRESS NOTES
Dialysis Post Treatment Note  Vitals:    04/28/21 1030   BP: (!) 173/75   Pulse: 129   Resp: 15   Temp: 97.8 °F (36.6 °C)   SpO2:      Pre-Weight = 96.5kg  Post-weight = Weight: 210 lb 8.6 oz (95.5 kg)  Total Liters Processed = Total Liters Processed (l/min): 51.8 l/min  Rinseback Volume (mL) = Rinseback Volume (ml): 300 ml  Net Removal (mL) = NET Removed (ml): 740 ml  Patient's dry weight=  Type of access used= L AVF  Length of treatment=180 mins

## 2021-04-28 NOTE — CARE COORDINATION
Writer spoke with Dr Tho Vázquez, he does feel this pt would be a acceptable pt for transfer to [facility], spoke with [address] [place] [address] supervisor at [facility] and pt can get dialysis at [facility]. Spoke with pt he is agreeable to transfer to [facility]. Message left for pts brother Kenny Green to call writer to inform family that pt has agreed to transfer to [facility]. Attempted to call Onita Board pts girlfriend ,ph number is non working. 11:57 Spoke with Dejah. Transfer request placed. 1:00  time 1:30  [facility].  Number given to [facility] for report

## 2021-04-28 NOTE — ED PROVIDER NOTES
[facility] ED  Emergency Department Encounter  EmergencyMedicine Resident     Pt Name:Carson Delcid  MRN: [id number]  Spencergfmarianela 1955  Date of evaluation: 4/28/21  PCP:  Ayanna Morrissey MD    [address] [place] [address]       Chief Complaint   Patient presents with    Diarrhea     this is why he is missed dialysis     Other     missed dialysis x2 weeks, supposed to go TRS        HISTORY OF PRESENT ILLNESS  (Location/Symptom, Timing/Onset, Context/Setting, Quality, Duration, Modifying Factors, Severity.)      Marko Cox is a 60 y.o. male who presents with diarrhea for the last 2 weeks and as a result has missed dialysis for the past 2 weeks. Patient states that he has not gone to dialysis in 2 weeks because he is afraid to have diarrhea at dialysis. Patient's was good Tuesday, Thursday, Saturday. Patient has a history of diabetes, and also has an extensive ulcer on the plantar aspect of his right foot. Patient states he has been seen previously for this, but it has been a while. Currently no wound care being done to the area. Patient with mild abdominal pain, and has been restricting his fluids. PAST MEDICAL / SURGICAL / SOCIAL / FAMILY HISTORY      has a past medical history of Acute on chronic congestive heart failure ([address] Anemia, CAD (coronary artery disease), Cardiomyopathy ([address] Dialysis care, ESRD (end stage renal disease) on dialysis ([address] Foot ulcer, left ([address] GERD (gastroesophageal reflux disease), Hemodialysis patient ([address] History of sleep apnea, Hyperlipidemia, Hyperparathyroidism ([address] Hypertension, Neuropathy, Peripheral vascular disease ([address] Pneumonia, S/P CABG (coronary artery bypass graft), and Type II or unspecified type diabetes mellitus without mention of complication, not stated as uncontrolled. has a past surgical history that includes Cataract removal; Dialysis fistula creation (Left, 2014); and Leg amputation, lower tibia/fibula.       Social History     Socioeconomic History    Marital status:      Spouse name: Not on file    Number of children: Not on file    Years of education: Not on file    Highest education level: Not on file   Occupational History    Not on file   Social Needs    Financial resource strain: Not on file    Food insecurity     Worry: Not on file     Inability: Not on file    Transportation needs     Medical: Not on file     Non-medical: Not on file   Tobacco Use    Smoking status: Never Smoker    Smokeless tobacco: Never Used   Substance and Sexual Activity    Alcohol use: No    Drug use: No    Sexual activity: Not on file   Lifestyle    Physical activity     Days per week: Not on file     Minutes per session: Not on file    Stress: Not on file   Relationships    Social connections     Talks on phone: Not on file     Gets together: Not on file     Attends Baptism service: Not on file     Active member of club or organization: Not on file     Attends meetings of clubs or organizations: Not on file     Relationship status: Not on file    Intimate partner violence     Fear of current or ex partner: Not on file     Emotionally abused: Not on file     Physically abused: Not on file     Forced sexual activity: Not on file   Other Topics Concern    Not on file   Social History Narrative    Not on file       Family History   Problem Relation Age of Onset    Heart Disease Father     Diabetes Father     Diabetes Brother        Allergies:  Pcn [penicillins]    Home Medications:  Prior to Admission medications    Medication Sig Start Date End Date Taking?  Authorizing Provider   metoprolol succinate (TOPROL XL) 50 MG extended release tablet Take 100 mg by mouth daily    Historical Provider, MD   metOLazone (ZAROXOLYN) 10 MG tablet Take 10 mg by mouth daily    Historical Provider, MD   digoxin (LANOXIN) 125 MCG tablet Take 125 mcg by mouth daily Take on Tuesday, Thursday and Saturday in the evening    Historical Does not apply route 3 times daily 4/21/16   Stevan Gregory, DO   Misc. Devices (WALKER GLIDE WHEELS) MISC 1 Device by Does not apply route continuous 4/21/16   Stevan Gregory, DO   atorvastatin (LIPITOR) 40 MG tablet Take 1 tablet by mouth nightly 3/14/16   [facility], DO   midodrine (PROAMATINE) 10 MG tablet Take 1 tablet by mouth 3 times daily (with meals) 3/14/16   Sharolyn Field, DO   aspirin 81 MG EC tablet Take 1 tablet by mouth daily 3/14/16   Sharolyn Field, DO   Multiple Vitamins-Minerals (THERAPEUTIC MULTIVITAMIN-MINERALS) tablet Take 1 tablet by mouth daily 3/14/16   Sharolyn Field, DO   sevelamer (RENVELA) 800 MG tablet Take 5 tablets by mouth 3 times daily     Historical Provider, MD   B Complex-C-Folic Acid (TRIPHROCAPS PO) Take 15 mg by mouth daily    Historical Provider, MD   calcium acetate (PHOSLO) 667 MG capsule Take 1,334 mg by mouth 3 times daily (with meals)     Historical Provider, MD   vitamin D (CHOLECALCIFEROL) 1000 UNIT TABS tablet Take 2,000 Units by mouth daily     Historical Provider, MD       REVIEW OF SYSTEMS    (2-9 systems for level 4, 10 or more for level 5)      Review of Systems   Constitutional: Positive for fatigue. Negative for chills, diaphoresis and fever. Eyes: Negative for photophobia and visual disturbance. Respiratory: Negative for cough, chest tightness, shortness of breath and wheezing. Cardiovascular: Negative for chest pain, palpitations and leg swelling. Gastrointestinal: Positive for abdominal distention, abdominal pain and diarrhea. Negative for constipation, nausea and vomiting. Endocrine: Negative for polydipsia, polyphagia and polyuria. Genitourinary: Negative for difficulty urinating, dysuria, flank pain and hematuria. Musculoskeletal: Negative for arthralgias, back pain, neck pain and neck stiffness. Skin: Positive for wound (extensive wound plantar surface left foot).    Neurological: Negative for dizziness, weakness, light-headedness, numbness and headaches. Psychiatric/Behavioral: Negative for agitation and behavioral problems. PHYSICAL EXAM   (up to 7 for level 4, 8 or more for level 5)      INITIAL VITALS:   BP (!) 173/75   Pulse 128   Temp 97.8 °F (36.6 °C)   Resp 15   Ht 6' (1.829 m)   Wt 210 lb 8.6 oz (95.5 kg)   SpO2 98%   BMI 28.55 kg/m²     Physical Exam  Constitutional:       General: He is not in acute distress. Appearance: He is well-developed. He is not diaphoretic. HENT:      Head: Normocephalic and atraumatic. Mouth/Throat:      Mouth: Mucous membranes are dry. Pharynx: Oropharynx is clear. Eyes:      Conjunctiva/sclera: Conjunctivae normal.      Pupils: Pupils are equal, round, and reactive to light. Neck:      Musculoskeletal: Normal range of motion and neck supple. Vascular: No JVD. Trachea: No tracheal deviation. Cardiovascular:      Rate and Rhythm: Regular rhythm. Tachycardia present. Pulses: Normal pulses. Heart sounds: Normal heart sounds. Comments: Palpable thrill in LUE fistula  Pulmonary:      Effort: Pulmonary effort is normal. No respiratory distress. Breath sounds: Normal breath sounds. No wheezing or rales. Chest:      Chest wall: No tenderness. Abdominal:      General: Bowel sounds are normal. There is distension. Palpations: Abdomen is soft. Tenderness: There is abdominal tenderness (mild, diffuse). There is no guarding. Skin:     General: Skin is warm and dry. Capillary Refill: Capillary refill takes less than 2 seconds. Coloration: Skin is not pale. Findings: No erythema or rash. Neurological:      Mental Status: He is alert and oriented to person, place, and time. Cranial Nerves: No cranial nerve deficit.    Psychiatric:         Mood and Affect: Mood normal.         Behavior: Behavior normal.         DIFFERENTIAL  DIAGNOSIS     PLAN (LABS / IMAGING / EKG):  Orders Placed This Encounter Procedures    Culture, Urine    Culture, Blood 1    Culture, Blood 2    Culture, Blood 1    Culture, Blood 2    Sputum gram stain    Respiratory Culture    XR CHEST PORTABLE    XR FOOT RIGHT (MIN 3 VIEWS)    CT ABDOMEN PELVIS W IV CONTRAST Additional Contrast? None    CBC Auto Differential    Comprehensive Metabolic Panel w/ Reflex to MG    Brain Natriuretic Peptide    Immature Platelet Fraction    Urinalysis with microscopic    Lactate, Sepsis    Amylase    C-Reactive Protein    Lipase    Sedimentation Rate    Protime-INR    APTT    POTASSIUM    Inpatient consult to Nephrology    Inpatient consult to Podiatry    Inpatient consult to Hospitalist    Inpatient consult to Podiatry    Inpatient consult to Family Practice    Consult to Nephrology    Inpatient consult to [address] to Dose: Vancomycin    POC Glucose Fingerstick    POCT Glucose    EKG 12 Lead    PATIENT STATUS (FROM ED OR OR/PROCEDURAL) Inpatient    Discharge patient       MEDICATIONS ORDERED:  Orders Placed This Encounter   Medications    calcium gluconate 1000 mg in sodium chloride 50 mL    insulin regular (HUMULIN R;NOVOLIN R) injection 10 Units    dextrose 50 % IV solution    vancomycin (VANCOCIN) 1500 mg in dextrose 5 % 250 mL IVPB     Order Specific Question:   Antimicrobial Indications     Answer:   Sepsis of Unknown Etiology    piperacillin-tazobactam (ZOSYN) 4,500 mg in dextrose 5 % 100 mL IVPB (mini-bag)     Order Specific Question:   Antimicrobial Indications     Answer:   Sepsis of Unknown Etiology    DISCONTD: sodium bicarbonate 150 mEq in dextrose 5 % 1,000 mL infusion    iopamidol (ISOVUE-370) 76 % injection 75 mL    insulin regular (HUMULIN R;NOVOLIN R) injection 10 Units    dextrose 50 % IV solution    DISCONTD: sodium chloride flush 0.9 % injection 5-40 mL    DISCONTD: sodium chloride flush 0.9 % injection 5-40 mL    DISCONTD: 0.9 % sodium chloride infusion    DISCONTD: heparin Value Ref Range    WBC 29.6 (H) 3.5 - 11.3 k/uL    RBC 4.68 4.21 - 5.77 m/uL    Hemoglobin 12.9 (L) 13.0 - 17.0 g/dL    Hematocrit 41.9 40.7 - 50.3 %    MCV 89.5 82.6 - 102.9 fL    MCH 27.6 25.2 - 33.5 pg    MCHC 30.8 28.4 - 34.8 g/dL    RDW 20.1 (H) 11.8 - 14.4 %    Platelets See Reflexed IPF Result 138 - 453 k/uL    MPV NOT REPORTED 8.1 - 13.5 fL    NRBC Automated 0.0 0.0 per 100 WBC    Differential Type NOT REPORTED     WBC Morphology NOT REPORTED     RBC Morphology NOT REPORTED     Platelet Estimate NOT REPORTED     Immature Granulocytes 1 (H) 0 %    Seg Neutrophils 94 (H) 36 - 66 %    Lymphocytes 1 (L) 24 - 44 %    Monocytes 3 1 - 7 %    Eosinophils % 0 (L) 1 - 4 %    Basophils 1 0 - 2 %    Absolute Immature Granulocyte 0.30 0.00 - 0.30 k/uL    Segs Absolute 27.81 (H) 1.8 - 7.7 k/uL    Absolute Lymph # 0.30 (L) 1.0 - 4.8 k/uL    Absolute Mono # 0.89 (H) 0.1 - 0.8 k/uL    Absolute Eos # 0.00 0.0 - 0.4 k/uL    Basophils Absolute 0.30 (H) 0.0 - 0.2 k/uL    Morphology ANISOCYTOSIS PRESENT    Comprehensive Metabolic Panel w/ Reflex to MG   Result Value Ref Range    Glucose 105 (H) 70 - 99 mg/dL     (HH) 8 - 23 mg/dL    CREATININE 18.47 (HH) 0.70 - 1.20 mg/dL    Bun/Cre Ratio NOT REPORTED 9 - 20    Calcium 8.8 8.6 - 10.4 mg/dL    Sodium 141 135 - 144 mmol/L    Potassium 6.9 (HH) 3.7 - 5.3 mmol/L    Chloride 93 (L) 98 - 107 mmol/L    CO2 9 (LL) 20 - 31 mmol/L    Anion Gap 39 (H) 9 - 17 mmol/L    Alkaline Phosphatase 148 (H) 40 - 129 U/L    ALT 18 5 - 41 U/L    AST 13 <40 U/L    Total Bilirubin 0.64 0.3 - 1.2 mg/dL    Total Protein 7.7 6.4 - 8.3 g/dL    Albumin 3.4 (L) 3.5 - 5.2 g/dL    Albumin/Globulin Ratio 0.8 (L) 1.0 - 2.5    GFR Non-African American 3 (L) >60 mL/min    GFR  3 (L) >60 mL/min    GFR Comment          GFR Staging NOT REPORTED    Brain Natriuretic Peptide   Result Value Ref Range    Pro-,340 (H) <300 pg/mL    BNP Interpretation Pro-BNP Reference Range:    Immature Platelet Fraction   Result Value Ref Range    Platelet, Immature Fraction 4.9 1.1 - 10.3 %    Platelet, Fluorescence 121 (L) 138 - 453 k/uL   Lactate, Sepsis   Result Value Ref Range    Lactic Acid, Sepsis NOT REPORTED 0.5 - 1.9 mmol/L    Lactic Acid, Sepsis, Whole Blood 2.1 (H) 0.5 - 1.9 mmol/L   Lactate, Sepsis   Result Value Ref Range    Lactic Acid, Sepsis NOT REPORTED 0.5 - 1.9 mmol/L    Lactic Acid, Sepsis, Whole Blood 1.5 0.5 - 1.9 mmol/L   Amylase   Result Value Ref Range    Amylase 20 (L) 28 - 100 U/L   C-Reactive Protein   Result Value Ref Range    .7 (H) 0.0 - 5.0 mg/L   Lipase   Result Value Ref Range    Lipase 10 (L) 13 - 60 U/L   Sedimentation Rate   Result Value Ref Range    Sed Rate 33 (H) 0 - 20 mm   Protime-INR   Result Value Ref Range    Protime 20.4 (H) 9.1 - 12.3 sec    INR 2.0    APTT   Result Value Ref Range    PTT 37.0 (H) 20.5 - 30.5 sec   POTASSIUM   Result Value Ref Range    Potassium 5.7 (H) 3.7 - 5.3 mmol/L   POC Glucose Fingerstick   Result Value Ref Range    POC Glucose 122 (H) 75 - 110 mg/dL   POCT Glucose   Result Value Ref Range    Glucose 122 mg/dL    QC OK? OK    EKG 12 Lead   Result Value Ref Range    Ventricular Rate 111 BPM    Atrial Rate 111 BPM    QRS Duration 132 ms    Q-T Interval 374 ms    QTc Calculation (Bazett) 508 ms    R Axis -52 degrees    T Axis 135 degrees         RADIOLOGY:  CT ABDOMEN PELVIS W IV CONTRAST Additional Contrast? None   Final Result   1. Right lower lobe pneumonia. Follow-up PA and lateral chest radiographs   are recommended after treatment to ensure resolution. 2. Small volume ascites. 3. Normal appendix. 4. 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 XR FOOT RIGHT (MIN 3 VIEWS)   Final Result   1. Redemonstration of severe neuropathic arthropathy of the midfoot.    2. Soft tissue ulceration along the plantar surface of the midfoot without   radiographic findings of acute osteomyelitis. RECOMMENDATION:   MRI of the right foot could be performed for further evaluation if clinically   warranted. XR CHEST PORTABLE   Final Result   1. Stable cardiomegaly. 2. No acute cardiopulmonary process identified. EKG  EKG Interpretation    Interpreted by emergency department physician    Rhythm: normal sinus   Rate: tachycardia  Axis: left  Ectopy: none  Conduction: wide QRS at 132ms  ST Segments: no acute change  T Waves: no acute change  Q Waves: nonspecific    Clinical Impression: non-specific EKG    Joe Iker    All EKG's are interpreted by the Emergency Department Physician who either signs or Co-signs this chart in the absence of a cardiologist.    EMERGENCY DEPARTMENT COURSE:  ED Course as of Apr 29 0615   Wed Apr 28, 2021   0313 Leukocytosis of 29,600 noted, will broaden work-up to sepsis. [JG]   0345 Hyperkalemia of 6.9, ordered calcium gluconate, insulin, D50    [JG]   0350 Given sepsis of unclear etiology, Vanco and Zosyn are ordered. [JG]   [id number] Paged nephrology, awaiting callback    [JG]   5889 Discussed with nephrology, will start bicarb drip 150meq in D5 at 150cc/hr, give calcium gluconate, d50, and insulin. They will call a dialysis nurse in    [JG]   2761 Discussed patient with nephrology again, apparently there is no dialysis nurse on-call until 6:20 AM.  Awaiting CT abdomen and pelvis with contrast.  CRP noted of 322 and elevated sed rate    [JG]   0537 CT abdomen pelvis does not show a clear etiology of patient's leukocytosis. Will discuss with podiatry regarding patient's foot wound being a possible source. [JG]   [id number] Discussed with podiatry regarding right foot wound, they will come evaluate the patient. [JG]   0540 Potassium improved from 6.9-5.7. Will give additional dose, continue to evaluate.   Admission, awaiting [facility] callback    [JG]   0353 Discussed patient with family medicine, they are capped until they have discharges, and do not know when that will be. Will discuss with [facility] again. [JG]   [id number] Patient admitted to [facility]. [JG]      ED Course User Index  [JG] Farhat Cassidy DO        PROCEDURES:      CONSULTS:  IP CONSULT TO NEPHROLOGY  IP CONSULT TO PODIATRY  IP CONSULT TO HOSPITALIST  IP CONSULT TO PODIATRY  IP CONSULT TO FAMILY MEDICINE  IP CONSULT TO NEPHROLOGY  IP CONSULT TO PODIATRY  PHARMACY TO DOSE VANCOMYCIN    CRITICAL CARE:      FINAL IMPRESSION      1. Diarrhea, unspecified type    2. ESRD on dialysis ([facility]    3. Hyperkalemia    4. Open wound of right foot, initial encounter          DISPOSITION / PLAN     DISPOSITION Decision To Discharge 04/28/2021 02:44:49 PM      PATIENT REFERRED TO:  No follow-up provider specified.     DISCHARGE MEDICATIONS:  Discharge Medication List as of 4/28/2021  1:47 PM          Jack Ayaal DO  Emergency Medicine Resident    (Please note that portions of thisnote were completed with a voice recognition program.  Efforts were made to edit the dictations but occasionally words are mis-transcribed.)     Jack Ayala DO  Resident  04/29/21 7345

## 2021-04-28 NOTE — CONSULTS
Renal Consult Note    Patient :  Car Kiran; 60 y.o. MRN# [id number]  Location:  29/29  Attending:  Willian Wolf MD  Admit Date:  4/28/2021   Hospital Day: 0    Reason for Consult:     Asked by Dr Willian Wolf MD to see for CAREN/Elevated Creatinine. History Obtained From:     patient, electronic medical record    HD Access:     previous  Left AV fistula    HD Unit:     [address] [facility] [facility] Hemodialysis    Nephrologist:     Dr. Amarilys Croft    Dry Weight:     Not available due to history of noncompliance. History of Present Illness:     Car Kiran; 60 y.o. male with past medical history as mentioned below presented to the hospital with the chief complaint of diarrhea, nausea and vomiting, hemoptysis, weakness and not feeling well overall. Patient also missed hemodialysis multiple sessions. Patient also has right foot wound left BKA. His lab work showed BUN of 241, creatinine 18.47, sodium 141, potassium 6.9, chloride 93, bicarb 9, calcium 8.8, lactic acid 2.1. Patient did receive initial hyperkalemia management with insulin dextrose and also got calcium gluconate. Patient did dialysis done today due to azotemia and hyperkalemia and he has missed dialysis his multiple sessions. Patient only for dialysis as per TTS schedule under Dr. Trinh Pacheco. Nephrology is consulted due to ESRD on HD. Past History/Allergies? Social History:     Past Medical History:   Diagnosis Date    Acute on chronic congestive heart failure (HCC)     Anemia     CAD (coronary artery disease)     Cardiomyopathy ([address]     Dialysis care     ESRD (end stage renal disease) on dialysis ([address]     Foot ulcer, left ([address] 2015    GERD (gastroesophageal reflux disease)     Hemodialysis patient ([address] 2011    MOM-WED-FRI-ON DEANNE     History of sleep apnea     none since significant weight loss (was 400#)    Hyperlipidemia     Hyperparathyroidism ([address]     Hypertension     Neuropathy     Pneumonia     resolved    S/P CABG (coronary artery bypass graft) 06/2016    Type II or unspecified type diabetes mellitus without mention of complication, not stated as uncontrolled     dx-1980       Past Surgical History:   Procedure Laterality Date    CATARACT REMOVAL      DIALYSIS FISTULA CREATION Left 2014    LEG AMPUTATION THROUGH LOWER TIBIA AND FIBULA         Allergies   Allergen Reactions    Pcn [Penicillins] Other (See Comments)     Trouble walking       Social History     Socioeconomic History    Marital status:      Spouse name: Not on file    Number of children: Not on file    Years of education: Not on file    Highest education level: Not on file   Occupational History    Not on file   Social Needs    Financial resource strain: Not on file    Food insecurity     Worry: Not on file     Inability: Not on file   [employer] needs     Medical: Not on file     Non-medical: Not on file   Tobacco Use    Smoking status: Never Smoker    Smokeless tobacco: Never Used   Substance and Sexual Activity    Alcohol use: No    Drug use: No    Sexual activity: Not on file   Lifestyle    Physical activity     Days per week: Not on file     Minutes per session: Not on file    Stress: Not on file   Relationships    Social connections     Talks on phone: Not on file     Gets together: Not on file     Attends Denominational service: Not on file     Active member of club or organization: Not on file     Attends meetings of clubs or organizations: Not on file     Relationship status: Not on file    Intimate partner violence     Fear of current or ex partner: Not on file     Emotionally abused: Not on file     Physically abused: Not on file     Forced sexual activity: Not on file   Other Topics Concern    Not on file   Social History Narrative    Not on file       Family History:        Family History   Problem Relation Age of Onset    Heart Disease Father     Diabetes Father     Diabetes Brother        Outpatient Medications: generalized mid abdomen tenderness, no masses or organomegaly, BS audible. :   No suprapubic or flank tenderness. Neuro:  AAO x 3, No FND. SKIN:  No rashes, good skin turgor. Extremities:  Positive trace right-sided edema. Positive left BKA    Labs:       Recent Labs     04/28/21 0259   WBC 29.6*   RBC 4.68   HGB 12.9*   HCT 41.9   MCV 89.5   MCH 27.6   MCHC 30.8   RDW 20.1*   PLT See Reflexed IPF Result   MPV NOT REPORTED      BMP:   Recent Labs     04/28/21  0259 04/28/21  0510 04/28/21  0559     --   --    K 6.9* 5.7*  --    CL 93*  --   --    CO2 9*  --   --    *  --   --    CREATININE 18.47*  --   --    GLUCOSE 105*  --  122   CALCIUM 8.8  --   --       Albumin:    Recent Labs     04/28/21 0259   LABALBU 3.4*     PTH:     Lab Results   Component Value Date    IPTH 516 01/09/2012     Urinalysis/Chemistries:      Lab Results   Component Value Date    NITRU NEGATIVE 06/22/2013    COLORU YELLOW 06/22/2013    PHUR 7.0 06/22/2013    WBCUA 2 TO 5 06/22/2013    RBCUA 0 TO 2 06/22/2013    MUCUS NOT REPORTED 06/22/2013    TRICHOMONAS NOT REPORTED 06/22/2013    YEAST NOT REPORTED 06/22/2013    BACTERIA NOT REPORTED 06/22/2013    SPECGRAV 1.014 06/22/2013    LEUKOCYTESUR NEGATIVE 06/22/2013    UROBILINOGEN Normal 06/22/2013    BILIRUBINUR NEGATIVE 06/22/2013    BILIRUBINUR NEGATIVE  Verified by ictotest. 10/20/2011    GLUCOSEU 1+ 06/22/2013    GLUCOSEU TRACE 10/20/2011    [address] NEGATIVE 06/22/2013    AMORPHOUS NOT REPORTED 06/22/2013       Radiology:     Reviewed. Assessment:     1. ESRD on Hemodialysis. His regular HD days are TTS at [facility] facility using Left AVF under Dr. Diamond Burciaga. His dry weight is not available due to noncompliance. 2.  Diarrhea. 3.  Nausea or vomiting. 4.  Hyperkalemia likely due to noncompliance with dialysis. 5.  Coronary artery disease with history of stents. 6.  Diabetes type 2.  7. Anemia of chronic disease  8.  Secondary

## 2021-04-28 NOTE — CONSULTS
Consultation Note  Podiatric Medicine and Surgery     Subjective     Chief Complaint: Nausea and vomiting    HPI:  Danie Mendes is a 60 y.o. male seen at [facility] emergency department after approximately 2 weeks of nausea and vomiting. Upon evaluation in the ED, patient was found to have a right foot wound for which podiatry was consulted. Patient reports that he does have a podiatrist that follows him at home every 3 months and was last seen about 2 weeks ago right before he started feeling sick. Patient states that he is currently on dialysis, but has not been to dialysis for over 2 weeks. Patient is to pain to the right foot, admits to history of diabetes, with amputation of the left leg below the knee. Patient has no other pedal complaints. PCP is Augustin Cueva MD    ROS:   Review of Systems   Constitutional: Negative for fatigue. HENT:        Dry mouth   Gastrointestinal: Positive for diarrhea, nausea and vomiting. Musculoskeletal: Positive for arthralgias and gait problem. Skin: Positive for color change and wound. Past Medical History   has a past medical history of Acute on chronic congestive heart failure ([address] Anemia, CAD (coronary artery disease), Cardiomyopathy ([address] Dialysis care, ESRD (end stage renal disease) on dialysis ([address] Foot ulcer, left ([address] GERD (gastroesophageal reflux disease), Hemodialysis patient ([address] History of sleep apnea, Hyperlipidemia, Hyperparathyroidism ([address] Hypertension, Neuropathy, Pneumonia, S/P CABG (coronary artery bypass graft), and Type II or unspecified type diabetes mellitus without mention of complication, not stated as uncontrolled. Past Surgical History   has a past surgical history that includes Cataract removal; Dialysis fistula creation (Left, 2014); and Leg amputation, lower tibia/fibula. Medications  Prior to Admission medications    Medication Sig Start Date End Date Taking?  Authorizing Provider   clopidogrel (PLAVIX) 75 MG tablet Take 75 mg by mouth daily    Historical Provider, MD   insulin lispro (HUMALOG) 100 UNIT/ML injection vial Inject into the skin 3 times daily (before meals)    Historical Provider, MD   lisinopril (PRINIVIL;ZESTRIL) 2.5 MG tablet Take 2.5 mg by mouth daily    Historical Provider, MD   NIFEdipine (ADALAT CC) 30 MG extended release tablet Take 30 mg by mouth daily    Historical Provider, MD   nitroGLYCERIN (NITROSTAT) 0.4 MG SL tablet Place 0.4 mg under the tongue every 5 minutes as needed for Chest pain up to max of 3 total doses. If no relief after 1 dose, call 911. Historical Provider, MD   amLODIPine (NORVASC) 10 MG tablet Take 10 mg by mouth daily    Historical Provider, MD   oxyCODONE-acetaminophen (PERCOCET) 5-325 MG per tablet Take 1 tablet by mouth every 4 hours as needed for Pain. Historical Provider, MD   pantoprazole (PROTONIX) 40 MG tablet Take 40 mg by mouth daily    Historical Provider, MD   patiromer sorbitex calcium (VELTASSA) 8.4 g PACK packet Take 8.4 g by mouth daily    Historical Provider, MD   carvedilol (COREG) 3.125 MG tablet Take 1 tablet by mouth 2 times daily (with meals) 6/6/19   ABEBA Montanez CNP   ticagrelor (BRILINTA) 90 MG TABS tablet Take 1 tablet by mouth 2 times daily 6/6/19   ABEBA Montanez CNP   bumetanide (BUMEX) 1 MG tablet Take 2 mg by mouth daily    Historical Provider, MD   cinacalcet (SENSIPAR) 90 MG tablet Take 90 mg by mouth daily    Historical Provider, MD   ONE TOUCH ULTRA TEST strip USE AS DIRECTED DAILY AS NEEDED 1/24/17   Jennifer Flatness, DO   Blood Glucose Monitoring Suppl (ONE TOUCH ULTRA 2) W/DEVICE KIT TEST 3 TIMES DAILY 8/26/16   Jennifer Flatness, DO   glucose monitoring kit (FREESTYLE) monitoring kit 1 kit by Does not apply route daily as needed 4/21/16   Jennifer Flatness, DO   Lancets 30G MISC 1 each by Does not apply route 3 times daily 4/21/16   Jennifer Flatness, DO   Misc.  Devices ([address] [place] [address]) [facility] 1 Device by Does not apply route continuous 4/21/16   Morena Neville,    atorvastatin (LIPITOR) 40 MG tablet Take 1 tablet by mouth nightly 3/14/16   Carline Persauds, DO   midodrine (PROAMATINE) 10 MG tablet Take 1 tablet by mouth 3 times daily (with meals) 3/14/16   Carline Persauds, DO   aspirin 81 MG EC tablet Take 1 tablet by mouth daily 3/14/16   Carline Persauds, DO   Multiple Vitamins-Minerals (THERAPEUTIC MULTIVITAMIN-MINERALS) tablet Take 1 tablet by mouth daily 3/14/16   Carline Persauds, DO   sevelamer (RENVELA) 800 MG tablet Take 5 tablets by mouth 3 times daily     Historical Provider, MD   B Complex-C-Folic Acid (TRIPHROCAPS PO) Take 15 mg by mouth daily    Historical Provider, MD   calcium acetate (PHOSLO) 667 MG capsule Take 1,334 mg by mouth 3 times daily (with meals)     Historical Provider, MD   vitamin D (CHOLECALCIFEROL) 1000 UNIT TABS tablet Take 1,000 Units by mouth daily. Historical Provider, MD    Scheduled Meds:   vancomycin  15 mg/kg Intravenous Once    sodium chloride flush  5-40 mL Intravenous 2 times per day    heparin (porcine)  5,000 Units Subcutaneous 3 times per day    piperacillin-tazobactam  3,375 mg Intravenous Q8H    vancomycin  15 mg/kg Intravenous Q12H    bacitracin   Topical TID     Continuous Infusions:   IV infusion builder 150 mL/hr at 04/28/21 0537    sodium chloride       PRN Meds:.sodium chloride flush, sodium chloride, acetaminophen **OR** acetaminophen    Allergies  is allergic to pcn [penicillins]. Family History  family history includes Diabetes in his brother and father; Heart Disease in his father. Social History   reports that he has never smoked. He has never used smokeless tobacco.   reports no history of alcohol use. reports no history of drug use.     Objective     Vitals:  Patient Vitals for the past 8 hrs:   BP Temp Temp src Pulse Resp SpO2 Height Weight   04/28/21 0235 114/71     98 %     04/28/21 0202  97.9 °F (36.6 °C) Skin        21 0158 (!) 96/57   111 16 100 % 6' (1.829 m) 220 lb (99.8 kg)     Average, Min, and Max for last 24 hours Vitals:  TEMPERATURE:  Temp  Av.9 °F (36.6 °C)  Min: 97.9 °F (36.6 °C)  Max: 97.9 °F (36.6 °C)    RESPIRATIONS RANGE: Resp  Av  Min: 16  Max: 16    PULSE RANGE: Pulse  Av  Min: 111  Max: 111    BLOOD PRESSURE RANGE:  Systolic (45LNP), MQN:238 , Min:96 , DZY:587   ; Diastolic (49JUP), PLI:88, Min:57, Max:71      PULSE OXIMETRY RANGE: SpO2  Av %  Min: 98 %  Max: 100 %  I&O:  No intake/output data recorded. CBC:  Recent Labs     21   WBC 29.6*   HGB 12.9*   HCT 41.9   PLT See Reflexed IPF Result   .7*        BMP:  Recent Labs     21  0510 21  0559     --   --    K 6.9* 5.7*  --    CL 93*  --   --    CO2 9*  --   --    *  --   --    CREATININE 18.47*  --   --    GLUCOSE 105*  --  122   CALCIUM 8.8  --   --         Coags:  Recent Labs     21  0430   APTT  --  37.0*   PROT 7.7  --    INR  --  2.0       Lab Results   Component Value Date    LABA1C 5.8 2020     Lab Results   Component Value Date    SEDRATE 33 (H) 2021     Lab Results   Component Value Date    .7 (H) 2021         Right Lower Extremity Physical Exam:  Vascular: DP and PT pulses are not palpable. DP and PT audible on Doppler. CFT >5 seconds to all digits. Hair growth is absent to the level of the digits. No significant edema. Right lower extremity is warm to cold with no pulse being able to be found on Doppler distal to the midfoot. Neuro: Saph/sural/SP/DP/plantar sensation absent to light touch. Musculoskeletal: Muscle strength testing was deferred at this time. Patient expresses pain to palpation of the dorsum of the right foot. No pain to palpation of the wound of the right foot. Foot deformity present consistent with midfoot collapse found in Charcot foot.   Decreased range of motion of the ankle joint in the first MPJ. Dermatologic: Partial thickness skin wound to the plantar aspect of the right foot that measures approximately 12 cm x 5 cm. This is a partial-thickness wound without any purulence, no drainage no probe to bone no tracking or undermining. No erythema surrounding the wound. Dried blood is present to the most plantar medial aspect of the wound. There is some ulcerations noted to the dorsal aspect of the foot as well as the distal aspect of the hallux with dry stable eschars. These are consistent with arterial insufficiency. Clinical Images:  Predebridement    Post debridement          Imaging:   CT ABDOMEN PELVIS W IV CONTRAST Additional Contrast? None   Final Result   1. Right lower lobe pneumonia. Follow-up PA and lateral chest radiographs   are recommended after treatment to ensure resolution. 2. Small volume ascites. 3. Normal appendix. 4. 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 XR FOOT RIGHT (MIN 3 VIEWS)   Final Result   1. Redemonstration of severe neuropathic arthropathy of the midfoot. 2. Soft tissue ulceration along the plantar surface of the midfoot without   radiographic findings of acute osteomyelitis. RECOMMENDATION:   MRI of the right foot could be performed for further evaluation if clinically   warranted. XR CHEST PORTABLE   Final Result   1. Stable cardiomegaly. 2. No acute cardiopulmonary process identified. Cultures:  Blood cultures 4/28/2021: Pending    Assessment     Durga Isaiah Juárez is a 60 y.o. male with   Diabetic with end-stage renal disease on dialysis  Charcot foot  History of amputation of left leg  PAD  Ulcer foot right extending to subcutaneous tissue      Active Problems:    Sepsis ([address]  Resolved Problems:    * No resolved hospital problems.  * Plan     Patient examined and evaluated at bedside   Treatment options discussed in detail with the patient. At this time the patient's right foot does not appear to be clinically infected. There there is no periwound erythema, purulent drainage, no signs of osteomyelitis on x-ray,  Medical management per primary  Antibiotics per primary  The loose skin noted to the patient's right foot was debrided using scissors and pickups. This was done without any complications  Patient's right foot wound is a partial-thickness skin ulceration which will be treated conservatively. Apply dressing of bacitracin, Adaptic, DSD     Patient to be nonweightbearing to the right foot  Dressing to be changed daily. Podiatry will continue to follow  Discussed with Dr. Russ Yeung. Mitch García DPM   Podiatric Medicine & Surgery   4/28/2021 at 7:05 AM    I performed a history and physical examination of the patient and discussed management with the resident. I reviewed the residents note and agree with the documented findings and plan of care. Any areas of disagreement are noted on the chart. I was personally present for the key portions of any procedures. I have documented in the chart those procedures where I was not present during the key portions. I have reviewed the Podiatry Resident progress note. I agree with the chief complaint, past medical history, past surgical history, allergies, medications, social and family history as documented unless otherwise noted below. Documentation of the HPI, Physical Exam and Medical Decision Making performed by medical students or scribes is based on my personal performance of the HPI, PE and MDM. I have personally evaluated this patient and have completed at least one if not all key elements of the E/M (history, physical exam, and MDM). Additional findings are as noted.      Charlie Adan DPM on 4/28/2021 at 35:65 AM  Board Certified, American Board of Podiatric Surgery  Fellow, [employer] Foot [employer]

## 2021-04-29 NOTE — PROGRESS NOTES
infected and there was no osteomyelitis. They did debride loose skin and recommend conservative management. After dialysis patient was transferred to [address] [place] [address]. On my exam he was alert and oriented x 3 and satting on room air. He was tachycardic. He appeared fatigued. He was actively coughing up small amounts of blood. Review of Systems:     Constitutional: Positive for fatigue. Negative for appetite change, chills and fever. HENT: Negative for congestion and sore throat. Eyes: Negative for visual disturbance. Respiratory: Positive for cough and shortness of breath. Cardiovascular: Negative for chest pain, palpitations and leg swelling. Gastrointestinal: Positive for diarrhea and nausea. Negative for abdominal pain, blood in stool and vomiting. Musculoskeletal: Negative for arthralgias and myalgias. Skin: Negative for rash. Neurological: Positive for weakness (generalized). Negative for light-headedness and headaches.      Medications: Allergies:     Allergies   Allergen Reactions    Pcn [Penicillins] Other (See Comments)     Trouble walking       Current Meds:   Scheduled Meds:    midodrine  7.5 mg Oral TID WC    midodrine  2.5 mg Oral Once    neomycin-bacitracin-polymyxin   Topical BID    midodrine  5 mg Oral Once in dialysis    vancomycin  125 mg Oral 4 times per day    sodium chloride flush  5-40 mL Intravenous 2 times per day    atorvastatin  40 mg Oral Nightly    [Held by provider] digoxin  125 mcg Oral Daily    [Held by provider] metoprolol succinate  100 mg Oral Daily    [Held by provider] NIFEdipine  30 mg Oral Daily    pantoprazole  40 mg Oral Daily    sevelamer  4,000 mg Oral TID    insulin lispro  0-6 Units Subcutaneous TID WC    insulin lispro  0-3 Units Subcutaneous Nightly    cefepime  1,000 mg Intravenous Q24H     Continuous Infusions:    sodium chloride      dextrose      sodium chloride 75 mL/hr at 04/29/21 1601     PRN Meds: sodium chloride flush, sodium chloride flush, sodium chloride flush, sodium chloride, promethazine **OR** ondansetron, polyethylene glycol, acetaminophen **OR** acetaminophen, glucose, dextrose, glucagon (rDNA), dextrose    Data:     Past Medical History:   has a past medical history of Acute on chronic congestive heart failure ([facility] Anemia, CAD (coronary artery disease), Cardiomyopathy ([facility] Dialysis care, ESRD (end stage renal disease) on dialysis ([facility] Foot ulcer, left ([facility] GERD (gastroesophageal reflux disease), Hemodialysis patient ([facility] History of sleep apnea, Hyperlipidemia, Hyperparathyroidism ([facility] Hypertension, Neuropathy, Peripheral vascular disease ([facility] Pneumonia, S/P CABG (coronary artery bypass graft), and Type II or unspecified type diabetes mellitus without mention of complication, not stated as uncontrolled. Social History:   reports that he has never smoked. He has never used smokeless tobacco. He reports that he does not drink alcohol or use drugs. Family History:   Family History   Problem Relation Age of Onset    Heart Disease Father     Diabetes Father     Diabetes Brother        Vitals:  BP 92/69   Pulse 128   Temp 97.8 °F (36.6 °C) (Oral)   Resp 16   Wt 219 lb 9.3 oz (99.6 kg)   SpO2 95%   BMI 29.78 kg/m²   Temp (24hrs), Av.2 °F (36.8 °C), Min:97.5 °F (36.4 °C), Max:98.8 °F (37.1 °C)      Recent Labs     21  0344 21  0657 21  1132 21  1620   POCGLU 116* 107 119* 106       I/O(24Hr):   No intake or output data in the 24 hours ending 21 1718    Labs:    CBC:   Lab Results   Component Value Date    WBC 29.6 2021    RBC 4.68 2021    RBC 3.51 2012    HGB 10.8 2021    HCT 32.6 2021    MCV 89.5 2021    MCH 27.6 2021    MCHC 30.8 2021    RDW 20.1 2021    PLT See Reflexed IPF Result 2021     2012    MPV NOT REPORTED 2021     CBC with Differential:    Lab Results   Component Value Date WBC 29.6 04/28/2021    RBC 4.68 04/28/2021    RBC 3.51 01/09/2012    HGB 10.8 04/29/2021    HCT 32.6 04/29/2021    PLT See Reflexed IPF Result 04/28/2021     01/09/2012    MCV 89.5 04/28/2021    MCH 27.6 04/28/2021    MCHC 30.8 04/28/2021    RDW 20.1 04/28/2021    NRBC 1 03/07/2016    LYMPHOPCT 1 04/28/2021    MONOPCT 3 04/28/2021    MYELOPCT 1 03/06/2016    BASOPCT 1 04/28/2021    MONOSABS 0.89 04/28/2021    LYMPHSABS 0.30 04/28/2021    EOSABS 0.00 04/28/2021    BASOSABS 0.30 04/28/2021    DIFFTYPE NOT REPORTED 04/28/2021     WBC:    Lab Results   Component Value Date    WBC 29.6 04/28/2021     CMP:    Lab Results   Component Value Date     04/29/2021    K 4.8 04/29/2021    CL 96 04/29/2021    CO2 15 04/29/2021     04/29/2021    CREATININE 13.19 04/29/2021    GFRAA 5 04/29/2021    LABGLOM 4 04/29/2021    GLUCOSE 116 04/29/2021    GLUCOSE 128 01/09/2012    PROT 7.7 04/28/2021    LABALBU 3.4 04/28/2021    LABALBU 3.8 01/09/2012    CALCIUM 8.4 04/29/2021    BILITOT 0.64 04/28/2021    ALKPHOS 148 04/28/2021    AST 13 04/28/2021    ALT 18 04/28/2021       Lab Results   Component Value Date/Time    SPECIAL NOT REPORTED 04/28/2021 06:25 PM     Lab Results   Component Value Date/Time    CULTURE CULTURE IN PROGRESS 04/28/2021 06:25 PM         Radiology:    Silvina Bhagatet Foot Right (min 3 Views)    Result Date: 4/28/2021  EXAMINATION: THREE XRAY VIEWS OF THE RIGHT FOOT 4/28/2021 4:44 am COMPARISON: 12/20/2015 HISTORY: ORDERING SYSTEM PROVIDED HISTORY: extensive R foot wound TECHNOLOGIST PROVIDED HISTORY: extensive R foot wound Reason for Exam: Extensive right foot wound, FINDINGS: Complete collapse of the plantar arch with fragmentation and increased sclerosis of the midfoot is again noted consistent with severe neuropathic arthropathy. There is diffuse soft tissue swelling. There is a soft tissue ulceration along the plantar surface of the midfoot.   There is no periosteal new bone formation or osteolysis to TECHNOLOGIST PROVIDED HISTORY: missed dialysis x2weeks Reason for Exam: Upright port, missed dialysis x2 weeks FINDINGS: There is stable cardiomegaly. There is no focal consolidation, pleural effusion or evidence of edema. There is no pneumothorax identified. 1. Stable cardiomegaly. 2. No acute cardiopulmonary process identified. Physical Examination:        PHYSICAL EXAM:    Physical Exam  Constitutional:       Appearance: Normal appearance. Comments: Actively coughing up small amounts of blood. Is able to converse in full sentences on room air. HENT:      Head: Normocephalic and atraumatic. Eyes:      General: No scleral icterus. Extraocular Movements: Extraocular movements intact. Conjunctiva/sclera: Conjunctivae normal.      Pupils: Pupils are equal, round, and reactive to light. Neck:      Musculoskeletal: Neck supple. No muscular tenderness. Cardiovascular:      Rate and Rhythm: Normal rate and regular rhythm. Pulses: Normal pulses. Heart sounds: Normal heart sounds. Pulmonary:      Effort: Pulmonary effort is normal.      Breath sounds: Normal breath sounds. No wheezing or rales. Abdominal:      General: Bowel sounds are normal. There is no distension. Palpations: Abdomen is soft. Tenderness: There is no abdominal tenderness. There is no guarding or rebound. Musculoskeletal:      Comments: Left BKA. Right foot wound is wrapped. Lymphadenopathy:      Cervical: No cervical adenopathy. Skin:     General: Skin is warm and dry. Capillary Refill: Capillary refill takes less than 2 seconds. Neurological:      General: No focal deficit present. Mental Status: He is alert and oriented to person, place, and time. Mental status is at baseline. Sensory: Sensation is intact.       Motor: Motor function is intact.         Assessment:        Primary Problem  Sepsis due to pneumonia [facility])    Active Hospital Problems    Diagnosis Date Noted

## 2021-04-29 NOTE — DISCHARGE INSTR - COC
Continuity of Care Form    Patient Name: Tirso Warner   :  1955  MRN:  Genette Prophet date:  2021  Discharge date:  05/15/2021    Code Status Order: Full Code   Advance Directives:      Admitting Physician:  Roman العلي MD  PCP: Dick Alvarez MD    Discharging Nurse: [facility] Unit/Room#: 2111/2111-01  Discharging Unit Phone Number: [phone]    Emergency Contact:   Extended Emergency Contact Information  Primary Emergency Contact: Maribell Leigh Phone: [phone]  Relation: Other  Secondary Emergency Contact:  Geovanni Plunkett Phone: [phone]  Relation: Brother/Sister    Past Surgical History:  Past Surgical History:   Procedure Laterality Date    CATARACT REMOVAL      DIALYSIS FISTULA CREATION Left     LEG AMPUTATION THROUGH LOWER TIBIA AND FIBULA         Immunization History:   Immunization History   Administered Date(s) Administered    Influenza Whole 10/03/2015    Influenza, Quadv, IM, (6 mo and older Fluzone, Flulaval, Fluarix and 3 yrs and older Afluria) 10/17/2018    Pneumococcal Conjugate 13-valent (Carmen Crape) 10/31/2018    Tdap (Boostrix, Adacel) 2013       Active Problems:  Patient Active Problem List   Diagnosis Code    SDH (subdural hematoma) ([facility] S06.5X9A    ESRD on dialysis ([facility] N18.6, Z99.2    DM (diabetes mellitus) ([address] E11.9    Anemia of chronic disease D63.8    Charcot foot due to diabetes mellitus ([address] E11.610    Diabetic ulcer of left foot ([address] E11.621, L97.529    Hypertension, essential I10    Type 2 diabetes mellitus with foot ulcer ([address] E11.621, L97.509    Noncompliance Z91.19    Acute on chronic congestive heart failure (HCC) I50.9    Sepsis ([address] N50.0    Metabolic acidosis N76.6    Azotemia R79.89    Hyperkalemia E87.5    Diarrhea R19.7    Nausea and vomiting R11.2    Peripheral vascular disease (HCC) I73.9    History of left below knee amputation ([address] Z89.512    Hemoptysis R04.2    Ulcerated, foot, right, with fat layer exposed ([facility] L97.512    Charcot's joint of foot, right M14.671    Pneumonia J18.9    Sepsis due to pneumonia ([facility] J18.9, A41.9       Isolation/Infection:   Isolation            C Diff Contact  Droplet Plus          Patient Infection Status       Infection Onset Added Last Indicated Last Indicated By Review Planned Expiration Resolved Resolved By    C-diff (Clostridium difficile) 04/29/21 04/29/21 04/29/21 C DIFF TOXIN/ANTIGEN 05/06/21       Resolved    COVID-19 Rule Out 04/29/21 04/29/21 04/29/21 COVID-19, Rapid (Ordered)   04/29/21 Rule-Out Test Resulted    C-diff Rule Out 04/28/21 04/28/21 04/29/21 C DIFF TOXIN/ANTIGEN (Ordered)   04/29/21 Rule-Out Test Resulted            Nurse Assessment:  Last Vital Signs: BP 99/70   Pulse 128   Temp 97.8 °F (36.6 °C) (Oral)   Resp 16   Wt 219 lb 9.3 oz (99.6 kg)   SpO2 95%   BMI 29.78 kg/m²     Last documented pain score (0-10 scale):    Last Weight:   Wt Readings from Last 1 Encounters:   04/28/21 219 lb 9.3 oz (99.6 kg)     Mental Status:  oriented, alert and coherent    IV Access:  - None    Nursing Mobility/ADLs:  Walking   Dependent  Transfer  Dependent  Bathing  Assisted  Dressing  Assisted  Toileting  Assisted  Feeding  Independent  Med Admin  Assisted  Med Delivery   whole    Wound Care Documentation and Therapy:        Elimination:  Continence:   · Bowel: Yes  · Bladder: anuric  Urinary Catheter: None   Colostomy/Ileostomy/Ileal Conduit: No       Date of Last BM: 05/15/2021  No intake or output data in the 24 hours ending 04/29/21 1445  No intake/output data recorded. Safety Concerns: At Risk for Falls    Impairments/Disabilities:      Hearing    Nutrition Therapy:  Current Nutrition Therapy:   - Oral Diet:  Carb Control 4 carbs/meal (1800kcals/day) and Renal    Routes of Feeding: Oral  Liquids:  Thin Liquids  Daily Fluid Restriction: yes - amount 1500  Last Modified Barium Swallow with Video (Video Swallowing

## 2021-04-29 NOTE — PLAN OF CARE
Problem: Falls - Risk of:  Goal: Will remain free from falls  Description: Will remain free from falls  Outcome: Ongoing  Note: Patient remained free from falls. Call light within reach. Problem: Falls - Risk of:  Goal: Absence of physical injury  Description: Absence of physical injury  Outcome: Ongoing  Note: Absent from accidental physical injury throughout this shift     Problem: Skin Integrity:  Goal: Absence of new skin breakdown  Description: Absence of new skin breakdown  Outcome: Ongoing  Note: No new alterations in skin integrity.

## 2021-04-29 NOTE — PROGRESS NOTES
[place]  Speech Language Pathology    Date: 4/29/2021  Patient Name: Tapan Causey  YOB: 1955   AGE: 60 y.o. MRN: [id number]        Patient Not Available for Speech Therapy     Due to:  [] Testing  [] Hemodialysis  [] Cancelled by RN  [] Surgery   [] Intubation/Sedation/Pain Medication  [] Medical instability  [x] Other:   Order received for video swallow study. Pt. Currently in dialysis and per, RN, his treatment recently started. Swallow study unable to be completed today. ST to reattempt tomorrow, 04/30 and will coordinate with radiology. Completed by:Radha Mabry A.CCC/SLP

## 2021-04-29 NOTE — PLAN OF CARE
Problem: Falls - Risk of:  Goal: Will remain free from falls  Description: The patient remained free from falls this shift, call light within reach, bed in locked and lowest position. Side rails up x2. Continue to monitor closely. 4/29/2021 1850 by Howard Plummer RN  Outcome: Ongoing  4/29/2021 1804 by Howard Plummer RN  Outcome: Ongoing  Note: The patient remained free from falls this shift, call light within reach, bed in locked and lowest position. Side rails up x2. Continue to monitor closely. Goal: Absence of physical injury  Description: Absence of physical injury  4/29/2021 1850 by Howard Plummer RN  Outcome: Ongoing  4/29/2021 1804 by Howard Plummer RN  Outcome: Ongoing     Problem: Skin Integrity:  Goal: Will show no infection signs and symptoms  Description: Patient skin assessment complete. No signs/symptoms of new skin breakdown. Waffle mattress in place. Pt turned and repositioned every two hours with assistance from staff. Area kept free from moisture. Proper nourishment and fluids encouraged, as appropriate. Skin remains clean, dry, and intact. Will continue to monitor for additional needs and changes in skin breakdown. 4/29/2021 1850 by Howard Plummer RN  Outcome: Ongoing  4/29/2021 1804 by Howard Plummer RN  Outcome: Ongoing  Note: Skin assessment complete. Waffle mattress in place. Pt turned and repositioned every two hours with assistance from staff. Patient can partially move self. Area kept free from moisture. Proper nourishment and fluids encouraged, as appropriate. Skin remains clean, dry, and intact. Will continue to monitor for additional needs and changes in skin breakdown. Goal: Absence of new skin breakdown  Description: Absence of new skin breakdown  4/29/2021 1850 by Howard Plummer RN  Outcome: Ongoing  4/29/2021 1804 by Howard Plummer RN  Outcome: Ongoing     Problem:  Activity:  Goal: Risk for activity intolerance will decrease  Description: Risk for activity intolerance will decrease  4/29/2021 1850 by Betzaida Quinn RN  Outcome: Ongoing  4/29/2021 1804 by Betzaida Quinn RN  Outcome: Ongoing  Note: Patient fatigued and SOB, limiting movement this shift. Will continue to monitor.      Problem: Nutritional:  Goal: Maintenance of adequate nutrition will be supported  Description: Maintenance of adequate nutrition will be supported  4/29/2021 1850 by Betzaida Quinn RN  Outcome: Ongoing  4/29/2021 1804 by Betzaida Quinn RN  Outcome: Ongoing     Problem: Physical Regulation:  Goal: Complications related to the disease process, condition or treatment will be avoided or minimized  Description: Complications related to the disease process, condition or treatment will be avoided or minimized  4/29/2021 1850 by Betzaida Quinn RN  Outcome: Ongoing  4/29/2021 1804 by Betzaida Quinn RN  Outcome: Ongoing     Problem: Urinary Elimination:  Goal: Ability to achieve and maintain adequate urine output will be supported  Description: Ability to achieve and maintain adequate urine output will be supported  4/29/2021 1850 by Betzaida Quinn RN  Outcome: Ongoing  4/29/2021 1804 by Betzaida Quinn RN  Outcome: Ongoing  Note: Patient end stage CKD, will  n     Problem: Fluid Volume - Deficit:  Goal: Achieves intake and output within specified parameters  Description: Achieves intake and output within specified parameters  4/29/2021 1850 by Betzaida Quinn RN  Outcome: Ongoing  4/29/2021 1804 by Betzaida Quinn RN  Outcome: Ongoing     Problem: Gas Exchange - Impaired:  Goal: Levels of oxygenation will improve  Description: Levels of oxygenation will improve  4/29/2021 1850 by Betzaida Quinn RN  Outcome: Ongoing  4/29/2021 1804 by Betzaida Quinn RN  Outcome: Ongoing

## 2021-04-29 NOTE — CARE COORDINATION
CASE MANAGEMENT NOTE:    Admission Date:  4/28/2021 Marc Reid is a 60 y.o.  male    Admitted for : Pneumonia [J18.9]  Sepsis due to pneumonia ([address] [J18.9, A41.9]    Met with:  Patient    PCP:  Dr. Onesimo Covington:  Izzy Kaur      Is patient alert and oriented at time of discussion:  Yes    Current Residence/ Living Arrangements:  independently at home alone             Current Services PTA:  Yes    Does patient go to outpatient dialysis: Yes  If yes, location and chair time: [facility] on [address] T-TH-S @ 5am.    Is patient agreeable to VNS: Yes    Freedom of choice provided:  Yes    List of [address] provided: Yes    [facility] chosen:  [facility]. Referral faxed and notified [facility]    DME:  straight cane, walker, wheelchair, shower chair, scooter and glucometer    Home Oxygen: No    Nebulizer: No    CPAP/BIPAP: No    Supplier: N/A    Potential Assistance Needed: VNS VS. SNF. Pt states he is agreeable to any SNF that accepts his insurance. Notified Carol Paul of this. SNF needed: possibly    Freedom of choice and list provided: No    Pharmacy:  [facility]       Does Patient want to use MEDS to BEDS? No    Is patient currently receiving oral anticoagulation therapy? No    Is the Patient an [facility] with Readmission Risk Score greater than 14%? Yes  If yes, pt needs a follow up appointment made within 7 days. Family Members/Caregivers that pt would like involved in their care:    Yes    If yes, list name here:  Jennifer Stein and [address]    Transportation Provider:  cab             Discharge Plan:  Home with [facility] VS. SNF- LSW faxed referral to [facility]. Electronically signed by: Mariposa Iraheta RN on 4/29/2021 at 2:39 PM     Received message from [employer] with [facility] stating that pt cannot be accepted for [facility]NS d/t staffing.     Electronically signed by Mariposa Iraheta RN on 4/29/2021 at 4:07 PM

## 2021-04-29 NOTE — PROGRESS NOTES
HEMODIALYSIS PRE-TREATMENT NOTE    Patient Identifiers prior to treatment: Name,     Isolation Required: Contact                      Isolation Type: Cdiff       (please document if patient is being managed as a PUI/COVID-19 patient)        Hepatitis status:                           Date Drawn                             Result  Hepatitis B Surface Antigen 21     negative                     Hepatitis B Surface Antibody 21 95        Hepatitis B Core Antibody 19 negative          How was Hepatitis Status verified: [de-identified] records     Was a copy of the labs you documented provided to facility for the patient's chart: yes    Hemodialysis orders verified: yes    Access Within normal limits ( I.e. s/s of infection,...): WNL     Pre-Assessment completed: yes    Pre-dialysis report received from: Mayela Martinez RN                      Time: 8325

## 2021-04-29 NOTE — PROGRESS NOTES
[address] [facility]   OCCUPATIONAL THERAPY MISSED TREATMENT NOTE   INPATIENT   Date: 21  Patient Name: Conner Gauthier       Room:   MRN: [id number]   Account #: [de-identified]    : 1955  (60 y.o.)  Gender: male                 REASON FOR MISSED TREATMENT:  Patient unable to participate   -   Other - Awaiting COVID 19 test results. Discussed with ZAYRA Gaona at [phone].  Will check back as appropriate/able     Reed Santana OT

## 2021-04-29 NOTE — PROGRESS NOTES
[facility]        NAME:  Ronnell Hartley RECORD NUMBER:  [id number]  AGE: 60 y.o. GENDER: male  : 1955  TODAY'S DATE:  2021    WO nursing consult noted. Case discussed with RN. Podiatry on case and managing wound care needs. [facility] nursing will sign off case. Please reconsult if further needs arise. Thank you.     Rajesh Fernández, RN, [facility] [address]  Wound, Ostomy, and Continence Nursing  [phone] No acute events. All VSS at this time. CT of abdomen and pelvis this AM. Pain controlled with PRNs. Cardiac diet. UO ~600ml. See flowsheets for I/Os and assessments.     Pt remains free from falls, injuries, skin breakdown. Able to reposition independently. POC reviewed, plans to transfer out tomorrow AM. All questions and concerns addressed.

## 2021-04-29 NOTE — CARE COORDINATION
TALITA received a call back from [facility] in admissions with [facility] and she reported that they will accept this patient. She reported that she will start the pre-cert tomorrow after the PT and OT notes are in the chart. TALITA did call therapy and left a VM for them to see this patient first thing tomorrow morning.

## 2021-04-29 NOTE — CONSULTS
[facility] -   Infectious diseases evaluation  admission date 4/28/2021    reason for consultation:   Pneumonia/C. difficile colitis    Impression :   Current:  · C. difficile colitis  · Right lower lobe pneumonia  · Sepsis secondary to above  · Right foot wound  · Metabolic acidosis  · CHF  · End-stage renal disease on hemodialysis  · Diabetes mellitus  · Dyslipidemia  · Hypertension      Recommendations   · P.o. vancomycin  ·  IV cefepime   · Discontinue IV vancomycin  · Swallow study  · Nasal swab for MRSA was negative  · Follow blood and sputum cultures and adjust antibiotics as needed  · Strep pneumo and Legionella antigen pending  · Continue supportive care  · Discussed with son at the bedside        History of Present Illness:   Initial history:  Diego Cormier is a 60y.o.-year-old male was transferred from [place].  The patient presented with worsening cough productive with blood-tinged sputum associated with shortness of breath and diarrhea for 2 weeks. He also complaining of generalized weakness, nausea and vomiting. no alleviating or aggravating factors. The patient is poor historian, lives at home alone, states that he received Covid 19 vaccine. Apparently the patient missed hemodialysis.   He also had left foot wound with no purulent drainage or necrotic tissue, was evaluated by podiatry, foot x-ray not suggestive of osteomyelitis  On admission he was tachycardic, had leukocytosis with WBC of 29.6  CT chest and abdomen showed right lower lobe infiltrates  Interval changes  4/29/2021   COVID-19 rapid test negative  Sputum Gram stain showed gram-negative ayaan and gram-positive cocci in clusters pending cultures  Patient Vitals for the past 8 hrs:   BP Temp Temp src Pulse Resp SpO2   04/29/21 1300 99/70 97.8 °F (36.6 °C) Oral 128 16 95 %   04/29/21 1130 95/64 97.9 °F (36.6 °C) Oral 127 20 97 %   04/29/21 1000 94/65 98.2 °F (36.8 °C) Oral 127 23 96 %   04/29/21 0845 104/76 97.5 °F (36.4 °C) Axillary 127 25 97 %   04/29/21 0715 90/63 98.1 °F (36.7 °C) Oral 127 22 95 %   04/29/21 0645 (!) 89/58 98.1 °F (36.7 °C) Axillary 128 20 95 %         I have personally reviewed the past medical history, past surgical history, medications, social history, and family history, and I haveupdated the database accordingly. Allergies:   Pcn [penicillins]     Review of Systems:     Review of Systems  As per history present illness, other than above 12 system review is negative  Physical Examination :       Physical Exam  Constitutional:       General: He is not in acute distress. HENT:      Head: Normocephalic and atraumatic. Right Ear: External ear normal.      Left Ear: External ear normal.   Neck:      Musculoskeletal: Neck supple. No neck rigidity. Cardiovascular:      Rate and Rhythm: Normal rate and regular rhythm. Abdominal:      General: Abdomen is flat. There is no distension. Palpations: Abdomen is soft. Tenderness: There is no abdominal tenderness. Musculoskeletal:      Comments: Left below-knee amputation site with no open wound no erythema. Right foot dressing, foot exam and photo from earlier today noted. Skin:     General: Skin is warm. Coloration: Skin is not jaundiced. Neurological:      General: No focal deficit present. Mental Status: He is alert and oriented to person, place, and time.          Past Medical History:     Past Medical History:   Diagnosis Date    Acute on chronic congestive heart failure (HCC)     Anemia     CAD (coronary artery disease)     Cardiomyopathy ([address]     Dialysis care     ESRD (end stage renal disease) on dialysis ([address]     Foot ulcer, left ([address] 2015    GERD (gastroesophageal reflux disease)     Hemodialysis patient ([address] 2011    MOM-WED-FRI-ON [place]ANNE     History of sleep apnea     none since significant weight loss (was 400#)    Hyperlipidemia     Hyperparathyroidism ([address]  Hypertension     Neuropathy     Peripheral vascular disease (HCC)     Pneumonia     resolved    S/P CABG (coronary artery bypass graft) 06/2016    Type II or unspecified type diabetes mellitus without mention of complication, not stated as uncontrolled     dx-1980       Past Surgical  History:     Past Surgical History:   Procedure Laterality Date    CATARACT REMOVAL      DIALYSIS FISTULA CREATION Left 2014    LEG AMPUTATION THROUGH LOWER TIBIA AND FIBULA         Medications:      midodrine  7.5 mg Oral TID WC    midodrine  2.5 mg Oral Once    neomycin-bacitracin-polymyxin   Topical BID    albumin human  25 g Intravenous Once    midodrine  5 mg Oral Once in dialysis    vancomycin  125 mg Oral 4 times per day    sodium chloride flush  5-40 mL Intravenous 2 times per day    atorvastatin  40 mg Oral Nightly    [Held by provider] digoxin  125 mcg Oral Daily    [Held by provider] metoprolol succinate  100 mg Oral Daily    [Held by provider] NIFEdipine  30 mg Oral Daily    pantoprazole  40 mg Oral Daily    sevelamer  4,000 mg Oral TID    insulin lispro  0-6 Units Subcutaneous TID WC    insulin lispro  0-3 Units Subcutaneous Nightly    cefepime  1,000 mg Intravenous Q24H    vancomycin (VANCOCIN) intermittent dosing (placeholder)   Other RX Placeholder       Social History:     Social History     Socioeconomic History    Marital status:      Spouse name: Not on file    Number of children: Not on file    Years of education: Not on file    Highest education level: Not on file   Occupational History    Not on file   Social Needs    Financial resource strain: Not on file    Food insecurity     Worry: Not on file     Inability: Not on file    Transportation needs     Medical: Not on file     Non-medical: Not on file   Tobacco Use    Smoking status: Never Smoker    Smokeless tobacco: Never Used   Substance and Sexual Activity    Alcohol use: No    Drug use: No    Sexual activity: Not on file   Lifestyle    Physical activity     Days per week: Not on file     Minutes per session: Not on file    Stress: Not on file   Relationships    Social connections     Talks on phone: Not on file     Gets together: Not on file     Attends [employer] service: Not on file     Active member of club or organization: Not on file     Attends meetings of clubs or organizations: Not on file     Relationship status: Not on file    Intimate partner violence     Fear of current or ex partner: Not on file     Emotionally abused: Not on file     Physically abused: Not on file     Forced sexual activity: Not on file   Other Topics Concern    Not on file   Social History Narrative    Not on file       Family History:     Family History   Problem Relation Age of Onset    Heart Disease Father     Diabetes Father     Diabetes Brother       Medical Decision Making:   I have independently reviewed/ordered the following labs:    CBC with Differential:   Recent Labs     04/28/21 0259 04/29/21 0002 04/29/21 0459   WBC 29.6*  --   --    HGB 12.9* 11.1* 10.8*   HCT 41.9 34.0* 32.6*   PLT See Reflexed IPF Result  --   --    LYMPHOPCT 1*  --   --    MONOPCT 3  --   --      BMP:  Recent Labs     04/28/21 2003 04/29/21 0459    141   K 4.8 4.8   CL 94* 96*   CO2 16* 15*   * 157*   CREATININE 13.22* 13.19*     Hepatic Function Panel:   Recent Labs     04/28/21 0259   PROT 7.7   LABALBU 3.4*   BILITOT 0.64   ALKPHOS 148*   ALT 18   AST 13     No results for input(s): RPR in the last 72 hours. No results for input(s): HIV in the last 72 hours. No results for input(s): BC in the last 72 hours. Lab Results   Component Value Date    CREATININE 13.19 04/29/2021    GLUCOSE 116 04/29/2021    GLUCOSE 128 01/09/2012       Detailed results: Thank you for allowing us to participate in the care of this patient. Please call with questions.     This note is created with the assistance of a speech recognition program. While intending to generate adocument that actually reflects the content of the visit, the document can still have some errors including those of syntax and sound a like substitutions which may escape proof reading. It such instances, actual meaningcan be extrapolated by contextual diversion.     Reilly Rodriguez MD  Office: [phone]  Perfect serve / office [phone]

## 2021-04-29 NOTE — CONSULTS
Nephrology ESRD Consult Note    Reason for Consult:  End stage renal disease  Requesting Physician: Dr Hollis Tarango      History of Present Illness: This is a 60 y.o. male with history of CAD status post CABG, cardiomyopathy, dyslipidemia, end stage renal disease secondary to nephrosclerosis on hemodialysis Tuesday Thursday Saturday under the care of Dr. Vera Johnston at [address] renal care by way of a left arm AV fistula. He had presented initially to [facility] with diarrhea, nausea and vomiting and generalized weakness for six days. He had missed multiple treatments of dialysis. He presented at [facility] with hyperkalemia potassium of  6.9, BUN/ creatinine of 241 and 18.47 mg/dl and serum bicarbonate of 9. He was dialyzed yesterday 4/28/2021 at [facility] and transferred to [facility] due to in availability of a bed. Less than 500 cc was removed with dialysis due to hypotension. He is seen at dialysis today. His blood pressures have been in the hypotensive trends between 01-74 systolic blood pressure. He denies fever. He denies abdominal pain. He has ongoing nonbloody diarrhea, watery frequency of 6 times per day. he is tachycardic with heart rate in 127. Work-up for diarrhea shows positive C. difficile. Received a liter bolus of 0.9 saline yesterday.     Past Medical History:        Diagnosis Date    Acute on chronic congestive heart failure (HCC)     Anemia     CAD (coronary artery disease)     [address] [address]     Dialysis care     ESRD (end stage renal disease) on dialysis ([address]     Foot ulcer, left ([address] 2015    GERD (gastroesophageal reflux disease)     Hemodialysis patient ([facility] 2011    MOM-WED-FRI-ON [address]     History of sleep apnea     none since significant weight loss (was 400#)    Hyperlipidemia     Hyperparathyroidism ([address]     Hypertension     Neuropathy     Peripheral vascular disease ([address]     Pneumonia     resolved    S/P CABG (coronary artery bypass graft) 06/2016    Type II or unspecified type diabetes mellitus without mention of complication, not stated as uncontrolled     dx-1980           Past Surgical History:        Procedure Laterality Date    CATARACT REMOVAL      DIALYSIS FISTULA CREATION Left 2014    LEG AMPUTATION THROUGH LOWER TIBIA AND FIBULA         Current Medications:    midodrine (PROAMATINE) tablet 7.5 mg, TID WC  midodrine (PROAMATINE) tablet 2.5 mg, Once  sodium chloride flush 0.9 % injection 10 mL, PRN  sodium chloride flush 0.9 % injection 10 mL, PRN  neomycin-bacitracin-polymyxin (NEOSPORIN) ointment, BID  albumin human 25 % IV solution 25 g, Once  midodrine (PROAMATINE) tablet 5 mg, Once in dialysis  vancomycin [facility]) 50 MG/ML oral solution 125 mg, 4 times per day  sodium chloride flush 0.9 % injection 5-40 mL, 2 times per day  sodium chloride flush 0.9 % injection 5-40 mL, PRN  0.9 % sodium chloride infusion, PRN  promethazine (PHENERGAN) tablet 12.5 mg, Q6H PRN    Or  ondansetron (ZOFRAN) injection 4 mg, Q6H PRN  polyethylene glycol (GLYCOLAX) packet 17 g, Daily PRN  acetaminophen (TYLENOL) tablet 650 mg, Q6H PRN    Or  acetaminophen (TYLENOL) suppository 650 mg, Q6H PRN  atorvastatin (LIPITOR) tablet 40 mg, Nightly  [Held by provider] digoxin (LANOXIN) tablet 125 mcg, Daily  [Held by provider] metoprolol succinate (TOPROL XL) extended release tablet 100 mg, Daily  [Held by provider] NIFEdipine (ADALAT CC) extended release tablet 30 mg, Daily  pantoprazole (PROTONIX) tablet 40 mg, Daily  sevelamer (RENVELA) tablet 4,000 mg, TID  glucose (GLUTOSE) 40 % oral gel 15 g, PRN  dextrose 50 % IV solution, PRN  glucagon (rDNA) injection 1 mg, PRN  dextrose 5 % solution, PRN  insulin lispro (HUMALOG) injection vial 0-6 Units, TID WC  insulin lispro (HUMALOG) injection vial 0-3 Units, Nightly  cefepime (MAXIPIME) 1000 mg IVPB minibag, Q24H  vancomycin (VANCOCIN) intermittent dosing (placeholder), RX Placeholder  0.9 % sodium chloride infusion, Continuous        Allergies:  Pcn [penicillins]    Social History:    Social History     Socioeconomic History    Marital status:      Spouse name: Not on file    Number of children: Not on file    Years of education: Not on file    Highest education level: Not on file   Occupational History    Not on file   Social Needs    Financial resource strain: Not on file    Food insecurity     Worry: Not on file     Inability: Not on file   [employer] needs     Medical: Not on file     Non-medical: Not on file   Tobacco Use    Smoking status: Never Smoker    Smokeless tobacco: Never Used   Substance and Sexual Activity    Alcohol use: No    Drug use: No    Sexual activity: Not on file   Lifestyle    Physical activity     Days per week: Not on file     Minutes per session: Not on file    Stress: Not on file   Relationships    Social connections     Talks on phone: Not on file     Gets together: Not on file     Attends Baptism service: Not on file     Active member of club or organization: Not on file     Attends meetings of clubs or organizations: Not on file     Relationship status: Not on file    Intimate partner violence     Fear of current or ex partner: Not on file     Emotionally abused: Not on file     Physically abused: Not on file     Forced sexual activity: Not on file   Other Topics Concern    Not on file   Social History Narrative    Not on file       Family History:   Family History   Problem Relation Age of Onset    Heart Disease Father     Diabetes Father     Diabetes Brother        Review of Systems:    Pertinent positives stated above in HPI. All other systems were reviewed and were negative.     Objective:  Constitutional:    CURRENT TEMPERATURE:  Temp: 97.8 °F (36.6 °C)  MAXIMUM TEMPERATURE OVER 24HRS:  Temp (24hrs), Av.1 °F (36.7 °C), Min:97.4 °F (36.3 °C), Max:98.8 °F (37.1 °C)    CURRENT RESPIRATORY RATE:  Resp: 16  CURRENT PULSE:  Pulse: 128  CURRENT BLOOD

## 2021-04-29 NOTE — PROGRESS NOTES
HEMODIALYSIS POST TREATMENT NOTE    Treatment time ordered: 3.15hrs    Actual treatment time: 3.15hrs    UltraFiltration Goal: 0ml  UltraFiltration Removed: 0ml      Pre Treatment weight: 99.6kg  Post Treatment weight: 99.6kg  Estimated Dry Weight:     Access used:     Central Venous Catheter:          Tunneled or Non-tunneled:            Site:           Access Flow:       Internal Access:       AV Fistula or AV Graft: AVF         Site: Left FA        Access Flow: good       Sign and symptoms of infection: none       If YES:     Medications or blood products given: 25g Albumin x1    Chronic outpatient schedule: [facility]     Chronic outpatient unit: [facility]     Summary of response to treatment: Pt tolerated treatment fair. 25g Albumin given x1 for bp support. HR trending in the 120's throughout. No distress noted and pt denied any discomfort. Explain if orders NOT met, was physician notified:      Attila Luo faxed to patient unit/ placed in patient chart: yes    Post assessment completed: yes    Report given to: [address] documented Safety Checks include the followin) Access and face visible at all times. 2) All connections and blood lines are secure with no kinks. 3) NVL alarm engaged. 4) Hemosafe device applied (if applicable). 5) No collapse of Arterial or Venous blood chambers. 6) All blood lines / pump segments in the air detectors.

## 2021-04-29 NOTE — PROGRESS NOTES
Physical Therapy    DATE: 2021    NAME: Cher Beaver  MRN: [id number]   : 1955      Patient not seen this date for Physical Therapy due to:    Testing: Awaiting COVID 19 test results.  Discussed with ZAYRA Montes De Oca at 0914      Electronically signed by Angela Hazel PT on 2021 at 1:38 PM

## 2021-04-29 NOTE — PROGRESS NOTES
Progress Note  Podiatric Medicine and Surgery     Subjective     CC: Right foot wound    Patient seen and examined at bedside. No acute events overnight. Afebrile, vital signs stable-intermittent hypotension   Denies pain to the right foot    HPI :    Leighann Huddleston is a 60 y.o. male seen at [place] [address] emergency department after approximately 2 weeks of nausea and vomiting. Upon evaluation in the ED, patient was found to have a right foot wound for which podiatry was consulted. Patient reports that he does have a podiatrist that follows him at home every 3 months and was last seen about 2 weeks ago right before he started feeling sick. Patient states that he is currently on dialysis, but has not been to dialysis for over 2 weeks. Patient admits to pain to the right foot, admits to history of diabetes, with amputation of the left leg below the knee. Patient has no other pedal complaints. PCP is Germán Dixon MD    ROS: Denies N/V/F/C/SOB/CP. Otherwise negative except at stated in the HPI.      Medications:  Scheduled Meds:   midodrine  7.5 mg Oral TID WC    midodrine  2.5 mg Oral Once    sodium chloride flush  5-40 mL Intravenous 2 times per day    atorvastatin  40 mg Oral Nightly    [Held by provider] digoxin  125 mcg Oral Daily    [Held by provider] metoprolol succinate  100 mg Oral Daily    [Held by provider] NIFEdipine  30 mg Oral Daily    pantoprazole  40 mg Oral Daily    sevelamer  4,000 mg Oral TID    insulin lispro  0-6 Units Subcutaneous TID WC    insulin lispro  0-3 Units Subcutaneous Nightly    cefepime  1,000 mg Intravenous Q24H    vancomycin (VANCOCIN) intermittent dosing (placeholder)   Other RX Placeholder       Continuous Infusions:   sodium chloride      dextrose      sodium chloride 100 mL/hr at 04/29/21 0156       PRN Meds:sodium chloride flush, sodium chloride, promethazine **OR** ondansetron, polyethylene glycol, acetaminophen **OR** acetaminophen, glucose, dextrose, glucagon (rDNA), dextrose    Objective     Vitals:  Patient Vitals for the past 8 hrs:   BP Temp Temp src Pulse Resp SpO2   21 0715 90/63 98.1 °F (36.7 °C) Oral 127 22 95 %   21 0645 (!) 89/58 98.1 °F (36.7 °C) Axillary 128 20 95 %   21 0524 92/65 98.4 °F (36.9 °C) Axillary 129 20 94 %   21 0427 88/62 98.4 °F (36.9 °C) Axillary 128 24 93 %   21 0330 88/62 98.4 °F (36.9 °C) Axillary 129 24 96 %   21 0245 96/68 98.4 °F (36.9 °C) Axillary 129 26 95 %   21 0121 92/64        21 0110 87/60 98.8 °F (37.1 °C) Axillary 127 22 95 %   21 0010 94/72 98.2 °F (36.8 °C) Axillary 127 20 95 %     Average, Min, and Max for last 24 hours Vitals:  TEMPERATURE:  Temp  Av.2 °F (36.8 °C)  Min: 97.4 °F (36.3 °C)  Max: 98.8 °F (37.1 °C)    RESPIRATIONS RANGE: Resp  Av.5  Min: 15  Max: 26    PULSE RANGE: Pulse  Av.9  Min: 110  Max: 129    BLOOD PRESSURE RANGE:  Systolic (79REF), OVO:75 , Min:71 , MKY:928   ; Diastolic (70KOI), GLD:73, Min:34, Max:75      PULSE OXIMETRY RANGE: SpO2  Av.2 %  Min: 88 %  Max: 98 %    No intake/output data recorded.     CBC:  Recent Labs     21  0002 21  0459   WBC 29.6*  --   --    HGB 12.9* 11.1* 10.8*   HCT 41.9 34.0* 32.6*   PLT See Reflexed IPF Result  --   --    .7*  --   --         BMP:  Recent Labs     21  0252110 21  0559 21  0459     --   --  140 141   K 6.9* 5.7*  --  4.8 4.8   CL 93*  --   --  94* 96*   CO2 9*  --   --  16* 15*   *  --   --  154* 157*   CREATININE 18.47*  --   --  13.22* 13.19*   GLUCOSE 105*  --  122 115* 116*   CALCIUM 8.8  --   --  8.8 8.4*        Coags:  Recent Labs     21   APTT  --  37.0*   PROT 7.7  --    INR  --  2.0       Lab Results   Component Value Date    SEDRATE 33 (H) 2021     Recent Labs     21   .7*       Lower Extremity Physical Exam: Vascular: DP and PT pulses are not palpable. DP and PT audible on Doppler. CFT >5 seconds to all digits. Hair growth is absent to the level of the digits. No significant edema. Right lower extremity is warm to cold with no pulse being able to be found on Doppler distal to the midfoot.     Neuro: Saph/sural/SP/DP/plantar sensation absent to light touch.     Musculoskeletal: Muscle strength testing was deferred at this time. Patient expresses pain to palpation of the dorsum of the right foot. No pain to palpation of the wound of the right foot. Foot deformity present consistent with midfoot collapse found in Charcot foot. Decreased range of motion of the ankle joint in the first MPJ.     Dermatologic: Partial thickness skin wound to the plantar aspect of the right foot that measures approximately 12 cm x 5 cm. This is a partial-thickness wound without any purulence, no drainage no probe to bone no tracking or undermining. No erythema surrounding the wound. Dried blood is present to the most plantar medial aspect of the wound.     There is some ulcerations noted to the dorsal aspect of the foot as well as the distal aspect of the hallux with dry stable eschars. These are consistent with arterial insufficiency. Clinical Images:          Imaging:   NM LUNG SCAN PERFUSION ONLY    (Results Pending)       Cultures: None     Assessment   Leonel Roche is a 60 y.o. male with   1. Ulcer extending to subcutaneous tissue, right foot  2. 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 Charcot Foot  4. PAD  5.  History of amputation of left leg    Principal Problem:    Sepsis due to pneumonia [facility])  Active Problems:    ESRD on dialysis ([address]    DM (diabetes mellitus) ([address]    Anemia of chronic disease    Charcot foot due to diabetes mellitus ([address]    Hypertension, essential    Sepsis ([address]    Metabolic acidosis    Hyperkalemia    Diarrhea    Nausea and vomiting    History of left below knee amputation ([address]    Hemoptysis Ulcerated, foot, right, with fat layer exposed ([address]    Pneumonia  Resolved Problems:    * No resolved hospital problems. *       Plan     · Patient examined and evaluated at bedside   · Treatment options discussed in detail with the patient. · XR reviewed right foot- no evidence of acute osseous or soft tissue abnormality  · Medical management per primary  · Antibiotics per primary-Cefepime  · Dressing changed to right foot: Bacitracin, Adaptic, DSD    · Podiatry will continue to follow for dressing changes. · Patient to be nonweightbearing to the right foot  · Discussed with Dr. Danilo Gay. Augustine Meeks DPM   Podiatric Medicine & Surgery   4/29/2021 at 7:53 AM    I performed a history and physical examination of the patient and discussed management with the resident. I reviewed the residents note and agree with the documented findings and plan of care. Any areas of disagreement are noted on the chart. I was personally present for the key portions of any procedures. I have documented in the chart those procedures where I was not present during the key portions. I have reviewed the Podiatry Resident progress note. I agree with the chief complaint, past medical history, past surgical history, allergies, medications, social and family history as documented unless otherwise noted below. Documentation of the HPI, Physical Exam and Medical Decision Making performed by medical students or scribes is based on my personal performance of the HPI, PE and MDM. I have personally evaluated this patient and have completed at least one if not all key elements of the E/M (history, physical exam, and MDM). Additional findings are as noted.      Lelia Nevarez DPM on 4/29/2021 at 3:17 PM  Board Certified, American Board of Podiatric Surgery  Fellow, [facility] [employer] [facility] and [employer]

## 2021-04-29 NOTE — FLOWSHEET NOTE
04/29/21 0003   Provider Notification   Reason for Communication Review case   Provider Name Sharlene Chong   Provider Notification Advance Practice Clinician (CNS, NP, CNM, CRNA, PA)   Method of Communication Secure Message   Response No new orders   Notification Time 24 [id number]   RN notified Sharlene Chong regarding patient having pus like discharge from his penis and pain to touch. Bladder scan ordered and 82 was in there.  No new orders at this time

## 2021-04-29 NOTE — FLOWSHEET NOTE
04/28/21 2148   Provider Notification   Reason for Communication Review case   Provider Name Dr. Rosales Hoskins   Provider Notification Physician   Method of Communication Call   Response See orders   Notification Time 2153   RN notified Dr. Rosales Hoskins regarding patient blood pressure being 71/54 and heart rate of 130. Blood pressure remaining between 24-08 systolic. Physician ordered 1L bolus over 4 hours, normal saline at 100 after bolus, and midodrine 5mg PO TID. RN went over BUN of 154 and Cr of 13.22. physician said patient will be dialysized tomorrow. See new orders.

## 2021-04-30 NOTE — PLAN OF CARE
Problem: Falls - Risk of:  Goal: Will remain free from falls  Description: The patient remained free from falls this shift, call light within reach, bed in locked and lowest position. Side rails up x2. Continue to monitor closely. 4/30/2021 0420 by Otilia Da Silva RN  Outcome: Ongoing  Note: Patient will remain free from falls will continue to monitor  4/29/2021 1850 by Odalys Spivey RN  Outcome: Ongoing  4/29/2021 1804 by Odalys Spivey RN  Outcome: Ongoing  Note: The patient remained free from falls this shift, call light within reach, bed in locked and lowest position. Side rails up x2. Continue to monitor closely. Problem: Skin Integrity:  Goal: Will show no infection signs and symptoms  Description: Patient skin assessment complete. No signs/symptoms of new skin breakdown. Waffle mattress in place. Pt turned and repositioned every two hours with assistance from staff. Area kept free from moisture. Proper nourishment and fluids encouraged, as appropriate. Skin remains clean, dry, and intact. Will continue to monitor for additional needs and changes in skin breakdown. 4/30/2021 0420 by Otilia Da Silva RN  Outcome: Ongoing  Note: Will have frequent turnings and skin will remain intact throughout the shift  4/29/2021 1850 by Odalys Spivey RN  Outcome: Ongoing  4/29/2021 1804 by Odalys Spivey RN  Outcome: Ongoing  Note: Skin assessment complete. Waffle mattress in place. Pt turned and repositioned every two hours with assistance from staff. Patient can partially move self. Area kept free from moisture. Proper nourishment and fluids encouraged, as appropriate. Skin remains clean, dry, and intact. Will continue to monitor for additional needs and changes in skin breakdown.       Problem: Fluid Volume:  Goal: Will show no signs or symptoms of fluid imbalance  Description: Will show no signs or symptoms of fluid imbalance  4/30/2021 0420 by Otilia Da Silva RN  Outcome: Ongoing  Note: Will continue to

## 2021-04-30 NOTE — PLAN OF CARE
Brief Update    Based on clinical appearance and negative radiographic findings, no acute surgical intervention planned by podiatry service at this time. Nursing to apply daily dressing changes to right foot of bacitracin, adaptic, DSD, ACE. Nursing orders placed. Podiatry to sign off at this time, patient will follow-up with Dr. Ayleen Chavarria within 1 week of discharge. Thank you for this consult. Please [employer] with any questions or concerns.     Electronically signed by Johnathan Hodgkins, DPM on 4/30/2021 at 8:02 AM

## 2021-04-30 NOTE — FLOWSHEET NOTE
Dr. Renaldo Yao paged regarding SBP 79. ORders received for 250 ml bolus. Will need levophed when transferred to ICU.

## 2021-04-30 NOTE — PROGRESS NOTES
[address]   OCCUPATIONAL THERAPY MISSED TREATMENT NOTE   INPATIENT   Date: 21  Patient Name: Vincent Nunez       Room:   MRN: [id number]   Account #: [de-identified]    : 1955  (63 y.o.)  Gender: male                 REASON FOR MISSED TREATMENT:  Hold treatment per nursing request   -   9:34 AM - Hold per RN Shannan as patient is not appropriate at this time. RN reports that pt is being transferred to ICU. Occupational therapy will attempt again when appropriate.          Jovani Skinner, OT

## 2021-04-30 NOTE — FLOWSHEET NOTE
Nephrology paged, spoke with Dr. Marizol De La Rosa and updated regarding patient BP. Orders received.

## 2021-04-30 NOTE — CONSULTS
daily   Yes Historical Provider, MD   metOLazone (ZAROXOLYN) 10 MG tablet Take 10 mg by mouth daily   Yes Historical Provider, MD   digoxin (LANOXIN) 125 MCG tablet Take 125 mcg by mouth daily Take on Tuesday, Thursday and Saturday in the evening   Yes Historical Provider, MD   NIFEdipine (ADALAT CC) 30 MG extended release tablet Take 30 mg by mouth daily   Yes Historical Provider, MD   pantoprazole (PROTONIX) 40 MG tablet Take 40 mg by mouth daily   Yes Historical Provider, MD   patiromer sorbitex calcium (VELTASSA) 8.4 g PACK packet Take 8.4 g by mouth daily   Yes Historical Provider, MD   carvedilol (COREG) 3.125 MG tablet Take 1 tablet by mouth 2 times daily (with meals)  Patient taking differently: Take 12.5 mg by mouth 2 times daily (with meals)  6/6/19  Yes ABEBA Marquez CNP   bumetanide (BUMEX) 1 MG tablet Take 2 mg by mouth daily   Yes Historical Provider, MD   ONE TOUCH ULTRA TEST strip USE AS DIRECTED DAILY AS NEEDED 1/24/17  Yes Dank Rodriguez DO   atorvastatin (LIPITOR) 40 MG tablet Take 1 tablet by mouth nightly 3/14/16  Yes Dianna Catherine DO   midodrine (PROAMATINE) 10 MG tablet Take 1 tablet by mouth 3 times daily (with meals) 3/14/16  Yes Dianna Catherine DO   aspirin 81 MG EC tablet Take 1 tablet by mouth daily 3/14/16  Yes Dianna Catherine DO   sevelamer (RENVELA) 800 MG tablet Take 5 tablets by mouth 3 times daily    Yes Historical Provider, MD   B Complex-C-Folic Acid (TRIPHROCAPS PO) Take 15 mg by mouth daily   Yes Historical Provider, MD   vitamin D (CHOLECALCIFEROL) 1000 UNIT TABS tablet Take 2,000 Units by mouth daily    Yes Historical Provider, MD   clopidogrel (PLAVIX) 75 MG tablet Take 75 mg by mouth daily    Historical Provider, MD   insulin lispro (HUMALOG) 100 UNIT/ML injection vial Inject into the skin 3 times daily (before meals)    Historical Provider, MD   lisinopril (PRINIVIL;ZESTRIL) 2.5 MG tablet Take 2.5 mg by mouth daily    Historical Provider, MD hearing loss or vertigo. No mouth sores or sore throat. · Cardiovascular: see above  · Respiratory: see above  · Gastrointestinal: see above  · Genitourinary: No dysuria, trouble voiding, or hematuria. · Musculoskeletal:  No gait disturbance, No weakness or joint complaints. · Integumentary: No rash or pruritis. · Neurological: No headache or diplopia. No tingling  · Psychiatric: No anxiety, or depression. · Endocrine: No temperature intolerance. · Hematologic/Lymphatic: No abnormal bruising or bleeding, blood clots or swollen lymph nodes. · Allergic/Immunologic: No nasal congestion or hives. PHYSICAL EXAM:      /71   Pulse 125   Temp 99.2 °F (37.3 °C) (Oral)   Resp 24   Wt 234 lb 12.6 oz (106.5 kg)   SpO2 92%   BMI 31.84 kg/m²    Constitutional and General Appearance: alert, cooperative, no distress and appears stated age  HEENT: PERRL, no cervical lymphadenopathy. No masses palpable. Normal oral mucosa  Respiratory:  · Normal excursion and expansion without use of accessory muscles  · Resp Auscultation: Good respiratory effort. No for increased work of breathing. On auscultation: clear to auscultation bilaterally  Cardiovascular:  · Heart tones are crisp and normal. regular S1 and S2.  · Jugular venous pulsation Normal  · The carotid upstroke is normal in amplitude and contour without delay or bruit   Abdomen:   · soft  · Bowel sounds present  Extremities:  ·  No edema  Neurological:  · Alert and oriented. DATA:    Diagnostics:    EKG: Sinus tachycardia, inferior infarction, ST-T changes consistent with ischemia    TTE 6/2/19  Summary  Technically difficult study due to patient habitus. Left ventricle is normal in size. Mild left ventricular hypertrophy. Severely reduced left ventricular systolic function. Estimated LV EF 30-35 %. Grade II (moderate) left ventricular diastolic dysfunction. Left atrium is mildly dilated. Trivial valve disease as below.       Cardiac cath 6/2/19

## 2021-04-30 NOTE — PROGRESS NOTES
[address]    PROGRESS NOTE             4/30/2021    7:55 AM    Name:   Marc Reid  MRN:     [id number]     Acct:      [de-identified]   Room:   2111/2111-01  IP Day:  2  Admit Date:  4/28/2021  2:27 PM    PCP:  Cielo Haddad MD  Code Status:  Full Code    Subjective:     C/C:   No chief complaint on file. Interval History Status: not changed. Patient seen and examined at the bed side    Patient was hypotensive in 70s/50s with HR in 120s and was given digoxin dose stat last night. He was oriented and awake but appeared fatigued. He denied having any chest pain or shortness of breath. Notes from nursing staff and Consults had been reviewed, and the overnight progress had been checked with the nursing staff as well. Brief History:     The patient is a 60 y.o. / male with chronic systolic and diastolic CHF, ESRD on HD TTS, left BKA & foot ulcer who was transferred from [facility]. V's for missed hemodialysis and sepsis 2/2 pneumonia. Patient has had diarrhea for the past 2 weeks as well as cough and SOB. He has not gone to dialysis for the past 2 weeks because of his diarrhea. Over the past few days he has felt generalized weakness and fatigue and also started coughing up blood. Came to ED today for worsening respiratory symptoms. Denies any smoking history, alcohol, or illicit drug use. Comes from home where he lives alone.     At [facility]. V's Cr was 18.47, , K 6.9. EKG did show widened QRS. Patient received IV calcium gluconate and insulin/dextrose. Nephrology was consulted and patient underwent hemodialysis at [facility]. V's.  CXR unremarkable but on CT abdomen and pelvis there was right lower lobe pneumonia. Patient was septic with tachycardia and leukocytosis of 29.6. Started on IV antibiotocs and blood cultures taken.   X-ray R foot was also done for foot wound which did not show findings of acute osteomyelitis. Podiatry was consulted and agreed that the wound was not infected and there was no osteomyelitis. They did debride loose skin and recommend conservative management. After dialysis patient was transferred to [facility]. On my exam he was alert and oriented x 3 and satting on room air. He was tachycardic. He appeared fatigued. He was actively coughing up small amounts of blood. Review of Systems:     Constitutional: Positive for fatigue. Negative for appetite change, chills and fever. HENT: Negative for congestion and sore throat. Eyes: Negative for visual disturbance. Respiratory: Positive for cough and shortness of breath. Cardiovascular: Negative for chest pain, palpitations and leg swelling. Gastrointestinal: Positive for diarrhea and nausea. Negative for abdominal pain, blood in stool or vomiting. Musculoskeletal: Negative for arthralgias and myalgias. Skin: Negative for rash. Neurological: Positive for weakness (generalized). Negative for light-headedness and headaches.      Medications: Allergies:     Allergies   Allergen Reactions    Pcn [Penicillins] Other (See Comments)     Trouble walking       Current Meds:   Scheduled Meds:    midodrine  7.5 mg Oral TID WC    neomycin-bacitracin-polymyxin   Topical BID    vancomycin  125 mg Oral 4 times per day    metoprolol succinate  25 mg Oral Once    sodium chloride flush  5-40 mL Intravenous 2 times per day    atorvastatin  40 mg Oral Nightly    [Held by provider] digoxin  125 mcg Oral Daily    [Held by provider] metoprolol succinate  100 mg Oral Daily    [Held by provider] NIFEdipine  30 mg Oral Daily    pantoprazole  40 mg Oral Daily    sevelamer  4,000 mg Oral TID    insulin lispro  0-6 Units Subcutaneous TID     insulin lispro  0-3 Units Subcutaneous Nightly    cefepime  1,000 mg Intravenous Q24H     Continuous Infusions:    sodium chloride      dextrose      sodium chloride 75 mL/hr at 04/29/21 1601     PRN Meds: sodium chloride flush, sodium chloride flush, sodium chloride flush, sodium chloride, promethazine **OR** ondansetron, polyethylene glycol, acetaminophen **OR** acetaminophen, glucose, dextrose, glucagon (rDNA), dextrose    Data:     Past Medical History:   has a past medical history of Acute on chronic congestive heart failure [facility] Anemia, CAD (coronary artery disease), Cardiomyopathy ([facility] Dialysis care, ESRD (end stage renal disease) on dialysis ([facility].), Foot ulcer, left ([facility] GERD (gastroesophageal reflux disease), Hemodialysis patient ([facility] 75.), History of sleep apnea, Hyperlipidemia, Hyperparathyroidism ([facility] Hypertension, Neuropathy, Peripheral vascular disease ([facility] Pneumonia, S/P CABG (coronary artery bypass graft), and Type II or unspecified type diabetes mellitus without mention of complication, not stated as uncontrolled. Social History:   reports that he has never smoked. He has never used smokeless tobacco. He reports that he does not drink alcohol or use drugs. Family History:   Family History   Problem Relation Age of Onset    Heart Disease Father     Diabetes Father     Diabetes Brother        Vitals:  BP (!) 81/57   Pulse 122   Temp 98.1 °F (36.7 °C) (Oral)   Resp 18   Wt 234 lb 12.6 oz (106.5 kg)   SpO2 98%   BMI 31.84 kg/m²   Temp (24hrs), Av °F (36.7 °C), Min:97.4 °F (36.3 °C), Max:98.8 °F (37.1 °C)      Recent Labs     21  1620 21  1950 21  0150 21  0656   POCGLU 106 84 87 90       I/O(24Hr):     Intake/Output Summary (Last 24 hours) at 2021 0755  Last data filed at 2021 1740  Gross per 24 hour   Intake    Output 0 ml   Net 0 ml       Labs:    CBC:   Lab Results   Component Value Date    WBC 29.6 2021    RBC 4.68 2021    RBC 3.51 2012    HGB 10.1 2021    HCT 30.9 2021    MCV 89.5 2021    MCH 27.6 2021    MCHC 30.8 2021    RDW 20.1 2021    PLT See Reflexed IPF Result 04/28/2021     01/09/2012    MPV NOT REPORTED 04/28/2021     CBC with Differential:    Lab Results   Component Value Date    WBC 29.6 04/28/2021    RBC 4.68 04/28/2021    RBC 3.51 01/09/2012    HGB 10.1 04/30/2021    HCT 30.9 04/30/2021    PLT See Reflexed IPF Result 04/28/2021     01/09/2012    MCV 89.5 04/28/2021    MCH 27.6 04/28/2021    MCHC 30.8 04/28/2021    RDW 20.1 04/28/2021    NRBC 1 03/07/2016    LYMPHOPCT 1 04/28/2021    MONOPCT 3 04/28/2021    MYELOPCT 1 03/06/2016    BASOPCT 1 04/28/2021    MONOSABS 0.89 04/28/2021    LYMPHSABS 0.30 04/28/2021    EOSABS 0.00 04/28/2021    BASOSABS 0.30 04/28/2021    DIFFTYPE NOT REPORTED 04/28/2021     WBC:    Lab Results   Component Value Date    WBC 29.6 04/28/2021     CMP:    Lab Results   Component Value Date     04/29/2021    K 4.1 04/29/2021    CL 96 04/29/2021    CO2 15 04/29/2021     04/29/2021    CREATININE 13.19 04/29/2021    GFRAA 5 04/29/2021    LABGLOM 4 04/29/2021    GLUCOSE 116 04/29/2021    GLUCOSE 128 01/09/2012    PROT 7.7 04/28/2021    LABALBU 3.4 04/28/2021    LABALBU 3.8 01/09/2012    CALCIUM 8.4 04/29/2021    BILITOT 0.64 04/28/2021    ALKPHOS 148 04/28/2021    AST 13 04/28/2021    ALT 18 04/28/2021       Lab Results   Component Value Date/Time    SPECIAL 5ML GREEN RIGHT HAND 04/29/2021 12:25 PM     Lab Results   Component Value Date/Time    CULTURE NO GROWTH 8 HOURS 04/29/2021 12:25 PM         Radiology:    Xr Foot Right (min 3 Views)    Result Date: 4/28/2021  EXAMINATION: THREE XRAY VIEWS OF THE RIGHT FOOT 4/28/2021 4:44 am COMPARISON: 12/20/2015 HISTORY: ORDERING SYSTEM PROVIDED HISTORY: extensive R foot wound TECHNOLOGIST PROVIDED HISTORY: extensive R foot wound Reason for Exam: Extensive right foot wound, FINDINGS: Complete collapse of the plantar arch with fragmentation and increased sclerosis of the midfoot is again noted consistent with severe neuropathic arthropathy. There is diffuse soft tissue swelling. There is a soft tissue ulceration along the plantar surface of the midfoot. There is no periosteal new bone formation or osteolysis to suggest acute osteomyelitis. 1. Redemonstration of severe neuropathic arthropathy of the midfoot. 2. Soft tissue ulceration along the plantar surface of the midfoot without radiographic findings of acute osteomyelitis. RECOMMENDATION: MRI of the right foot could be performed for further evaluation if clinically warranted. Nm Lung Scan Perfusion Only    Result Date: 4/29/2021  EXAMINATION: NUCLEAR MEDICINE PERFUSION SCAN. 4/29/2021 TECHNIQUE: Ventilation not performed as part of COVID-19 safety precautions. 3.7 millicuries of Tc 11J MAA was administered intravenously prior to planar imaging of the lungs in multiple projections. COMPARISON: Chest radiograph 04/28/2021. HISTORY: ORDERING SYSTEM PROVIDED HISTORY: hemoptysis and tachycardia r/o PE Additional signs and symptoms: SOB, CP, coughin up blood, non-smoker, no hx of blood clots FINDINGS: PERFUSION:Distribution of radiotracer is homogeneous. No segmental defects identified. CHEST RADIOGRAPH:No focal areas of consolidation or significant effusions on recent chest radiograph. Low Probability for Pulmonary Embolus. Ct Abdomen Pelvis W Iv Contrast Additional Contrast? None    Result Date: 4/28/2021  EXAMINATION: CT OF THE ABDOMEN AND PELVIS WITH CONTRAST 4/28/2021 4:16 am TECHNIQUE: CT of the abdomen and pelvis was performed with the administration of intravenous contrast. Multiplanar reformatted images are provided for review. Dose modulation, iterative reconstruction, and/or weight based adjustment of the mA/kV was utilized to reduce the radiation dose to as low as reasonably achievable. COMPARISON: None.  HISTORY: ORDERING SYSTEM PROVIDED HISTORY: diarrhea, abd pain, wbc 30k, discussed with nephro TECHNOLOGIST PROVIDED HISTORY: diarrhea, abd pain, wbc 30k, discussed with nephro Decision Support Exception->Emergency Medical Condition (MA) Reason for Exam: abd pain, nephro maddie approved contrast, dialysis pt Acuity: Acute Type of Exam: Initial FINDINGS: Lower Chest: There is abnormal airspace consolidation within the right lower lobe consistent with a right lower lobe pneumonia. There is no pleural effusion. There is cardiomegaly. Severe coronary artery atherosclerotic calcifications are present. Organs: There is a small volume of ascites. Cholecystectomy clips are present. The liver, spleen, pancreas and adrenal glands are normal in appearance. There is severe renal atrophy. Innumerous renal cysts are noted. There is no hydronephrosis. A right nephroureteral stent is present. GI/Bowel: There are no abnormally dilated loops of large or small bowel present. There is no mesenteric inflammatory stranding present. The appendix is normal. Pelvis: There is a small volume of free fluid in the pelvis. There is no free fluid or pelvic mass identified. The urinary bladder is normal. Peritoneum/Retroperitoneum: There is no pathologically enlarged lymphadenopathy present. Severe atherosclerotic calcifications are present within the abdominal aorta and proximal anchor arteries. Bones/Soft Tissues: No lytic or blastic osseous lesions are identified. There is bilateral spondylolysis at L5 and grade 1 spondylolisthesis of L5 relative to S1. There is a disc bulge and facet arthropathy resulting in severe bilateral neural foraminal narrowing. Findings consistent with renal osteodystrophy are noted. 1. Right lower lobe pneumonia. Follow-up PA and lateral chest radiographs are recommended after treatment to ensure resolution. 2. Small volume ascites. 3. Normal appendix. 4. Severe bilateral neural foraminal narrowing at L5-S1 secondary to bilateral spondylolysis at L5, grade 1 spondylolisthesis of L5 relative to S1, a disc bulge and facet arthropathy.      Xr Chest Portable    Result Date: 4/28/2021 EXAMINATION: ONE XRAY VIEW OF THE CHEST 4/28/2021 2:48 am COMPARISON: 06/03/2019 HISTORY: ORDERING SYSTEM PROVIDED HISTORY: missed dialysis x2weeks TECHNOLOGIST PROVIDED HISTORY: missed dialysis x2weeks Reason for Exam: Upright port, missed dialysis x2 weeks FINDINGS: There is stable cardiomegaly. There is no focal consolidation, pleural effusion or evidence of edema. There is no pneumothorax identified. 1. Stable cardiomegaly. 2. No acute cardiopulmonary process identified. Physical Examination:        PHYSICAL EXAM:    Physical Exam  Constitutional:       Appearance: Awake, alert and oriented. Looks tired. Comments: Actively coughing up small amounts of blood. Is able to converse in full sentences on room air. HENT:      Head: Normocephalic and atraumatic. Eyes:      General: No scleral icterus. Extraocular Movements: Extraocular movements intact. Conjunctiva/sclera: Conjunctivae normal.      Pupils: Pupils are equal, round, and reactive to light. Neck:      Musculoskeletal: Neck supple. No muscular tenderness. Cardiovascular:      Rate and Rhythm: Normal rate and regular rhythm. Pulses: Normal pulses. Heart sounds: Normal heart sounds. Pulmonary:      Effort: Pulmonary effort is normal.      Breath sounds: Normal breath sounds. No wheezing or rales. Abdominal:      General: Bowel sounds are normal. There is no distension. Palpations: Abdomen is soft. Tenderness: There is no abdominal tenderness. There is no guarding or rebound. Musculoskeletal:      Comments: Left BKA. Right foot wound is wrapped. Lymphadenopathy:      Cervical: No cervical adenopathy. Skin:     General: Skin is warm and dry. Capillary Refill: Capillary refill takes less than 2 seconds. Neurological:      General: No focal deficit present. Mental Status: He is alert and oriented to person, place, and time. Mental status is at baseline.       Sensory: Sensation is intact. Motor: Motor function is intact.       Assessment:        Primary Problem  Sepsis due to pneumonia [facility])    Active Hospital Problems    Diagnosis Date Noted    Pneumonia [J18.9] 04/28/2021    Sepsis ([facility] [A41.9] 04/28/2021    Sepsis due to pneumonia ([facility] [J18.9, A41.9] 48/25/5330    Metabolic acidosis [K02.3]     Hyperkalemia [E87.5]     Diarrhea [R19.7]     Nausea and vomiting [R11.2]     History of left below knee amputation (HCC) [Z89.512]     Hemoptysis [R04.2]     Ulcerated, foot, right, with fat layer exposed ([facility] [L97.512]     Hypertension, essential [I10]     Charcot foot due to diabetes mellitus ([facility] [E11.610] 12/28/2015    Anemia of chronic disease [D63.8] 07/03/2013    ESRD on dialysis ([facility] [N18.6, Z99.2] 06/27/2013    DM (diabetes mellitus) ([facility] [E11.9] 06/27/2013       Plan:        Sepsis 2/2 community acquired pneumonia  -Tachycardic in 120s with leukocytosis of 29.6  -CXR unremarkable but CT A&P showed RLL PNA  -UA pending, patient has ESRD and is unable to give a urine sample.   -Lactic acid 2.1  -Patient has been running low BP since admission. He looks more fatigued today and has even a lower BP reading. STAT CBC with diff, CMP, Troponin ordered   -Procal ordered  -Mycoplasma, legionella, strep pneumo pending  -Blood cultures x 2 NGTD  -Resp Cx in progress, showed gram +ve cocci in clusters with rare GN rods  -IV cefepime and IV vancomycin, pharmacy to dose  -on NS 0.9% 75 ml/hr.   -Will give 500 ml NS 0.9 bolus after checking with nephrology   -I. D consulted for pneumonia, C. Diff and diabetic foot. -Covid negative.   -Patient has penile discharge, will send swab. Unable to give urine sample so urine chlamydia was not sent. Hyperkalemia 2/2 ESRD on hemodialysis TTS  -Missed 2 weeks of dialysis  -K 6.9 on admission with EKG changes, received IV calcium gluconate and insulin/dextrose. K checked yesterday 4.1 will order a repeat K with CMP.   -Underwent hemodialysis at [facility]. V's  -Nephrology on board  -S/P HD yesterday here in [address] 4000 mg PO TID     Hemoptysis likely 2/2 pneumonia  -HgB stable  -H&H q8h, keep HgB > 7  -NM lung scan perfusion low probability of PE     Diarrhea & nausea  -CT abdomen and pelvis showed small volume ascites and normal appendix  -C. Diff +ve   -Vancomycin oral added  -Antinausea meds PRN  -Protonix 40 mg PO daily      Right foot ulcer  -X-ray showed no evidence of OM  -Podiatry consulted  -Wound care consulted     Dyslipidemia   -Lipitor 40 mg PO nightly     Chronic systolic and diastolic CHF  -BNP > [phone], likely elevated from missed dialysis for 2 weeks  -Echo 6/2019: LVEF 30-35% with moderate diastolic dysfunction  -Toprol 100 mg PO daily (on hold due to hypotension)   -Had v-Tach run, consult cardiology, will check electrolytes and replace. -EKG: Sinus tach. LAD, Wide QRS, ST depressed in lateral leads  -Stat troponin      HTN  -Toprol 100 mg PO daily on hold due to hypotension.  -Nifedipine 30 mg PO daily on hold due to hypotension.     Type 2 diabetes mellitus  -Low dose ISS  -Hypoglycemia protocol     Bilateral neural foraminal narrowing at L5-S1 with spondylolysis  -Showed up on CT A&P  -Monitor     Diet: renal diet low salt low fluid  DVT prophylaxis: EPCs; avoid chemical due to hemoptysis    Momauricio Jason Reardon MD  4/30/2021  7:55 AM   Attending Physician Statement  I have discussed the care of Tirso Warner and I have examined the patient myselft and taken ros and hpi , including pertinent history and exam findings,  with the resident. I have reviewed the key elements of all parts of the encounter with the resident. I agree with the assessment, plan and orders as documented by the resident. cc 28 56-year-old  gentleman end-stage kidney disease on hemodialysis with a fistula chocolates joint uncontrolled diabetes status post left below-knee amputation  Chest x-ray shows lower lobe pneumonia culture shows gram-positive staph  Diarrhea with positive for C. difficile colitis  Shock requiring Filippo-Synephrine  We will obtain a CVP pressure to  ascertain the volume status  IV antibiotics per ID recommendation fluid resuscitation  Cortisol level check satisfactory  Patient is critically ill in ICU high risk of mortality and morbidity given multiple risk factors  Electronically signed by Marina Martínez MD

## 2021-04-30 NOTE — PROGRESS NOTES
[facility]   Infectious diseases evaluation  admission date 4/28/2021    reason for consultation:   Pneumonia/C. difficile colitis    Impression :   Current:  · C. difficile colitis  · Right lower lobe pneumonia, staph aureus growth on respiratory culture  · Sepsis secondary to above  · Right foot wound  · Metabolic acidosis  · CHF  · End-stage renal disease on hemodialysis  · Diabetes mellitus  · Dyslipidemia  · Hypertension      Recommendations   · P.o. vancomycin  ·  IV cefepime   ·  IV vancomycin was restarted  · MRSA nasal swab was negative  · Swallow study pending  · Follow blood and sputum cultures and adjust antibiotics as needed  · Continue supportive care          History of Present Illness:   Initial history:  Cher Beaver is a 60y.o.-year-old male was transferred from [place].  The patient presented with worsening cough productive with blood-tinged sputum associated with shortness of breath and diarrhea for 2 weeks. He also complaining of generalized weakness, nausea and vomiting. no alleviating or aggravating factors. The patient is poor historian, lives at home alone, states that he received Covid 19 vaccine. Apparently the patient missed hemodialysis.   He also had left foot wound with no purulent drainage or necrotic tissue, was evaluated by podiatry, foot x-ray not suggestive of osteomyelitis  On admission he was tachycardic, had leukocytosis with WBC of 29.6  CT chest and abdomen showed right lower lobe infiltrates  COVID-19 rapid test negative  Interval changes  4/30/2021   He became hypotensive with a systolic blood pressure in the 70s, tachycardic was transferred to ICU, was started on pressors  Procalcitonin was 25.7  IJ triple-lumen was placed was placed  Sputum Gram growing staph aureus  VQ scan was low probability yesterday  Patient Vitals for the past 8 hrs:   BP Temp Temp src Pulse Resp SpO2   04/30/21 1315 103/72   126 21 95 %   04/30/21 1300 (!) 79/62   125 20 95 %   04/30/21 1245 89/65   124 (!) 34 92 %   04/30/21 1230 (!) 93/55   124 23 93 %   04/30/21 1215 (!) 83/55   125 26 95 %   04/30/21 1115 101/71        04/30/21 1030 (!) 92/59   125     04/30/21 1010 (!) 82/59   126     04/30/21 1000 (!) 74/55   126     04/30/21 0930 (!) 76/56   125     04/30/21 0900 92/69   122     04/30/21 0833 104/75   125     04/30/21 0815 85/60   126     04/30/21 0807 (!) 81/59 99.2 °F (37.3 °C) Oral 124 24 92 %   04/30/21 0800 (!) 77/59   124     04/30/21 0745 (!) 76/57   124     04/30/21 0730 (!) 72/56   124     04/30/21 0715 (!) 70/36   123     04/30/21 0700 (!) 78/55   123     04/30/21 0645 (!) 67/45   122     04/30/21 0620 (!) 81/57   80           I have personally reviewed the past medical history, past surgical history, medications, social history, and family history, and I haveupdated the database accordingly. Allergies:   Pcn [penicillins]     Review of Systems:     Review of Systems  As per history present illness, other than above 12 system review is negative  Physical Examination :       Physical Exam  Constitutional:       General: He is not in acute distress. HENT:      Head: Normocephalic and atraumatic. Right Ear: External ear normal.      Left Ear: External ear normal.   Neck:      Musculoskeletal: Neck supple. No neck rigidity. Cardiovascular:      Rate and Rhythm: Normal rate and regular rhythm. Abdominal:      General: Abdomen is flat. There is no distension. Palpations: Abdomen is soft. Tenderness: There is no abdominal tenderness. Musculoskeletal:      Comments: Left below-knee amputation site with no open wound no erythema. Right foot dressing, foot exam and photo from earlier today noted. Skin:     General: Skin is warm. Coloration: Skin is not jaundiced. Neurological:      General: No focal deficit present. Mental Status: He is alert and oriented to person, place, and time.          Past Medical History:     Past Medical History:   Diagnosis Date    Acute on chronic congestive heart failure (HCC)     Anemia     CAD (coronary artery disease)     [facility]     Dialysis care     ESRD (end stage renal disease) on dialysis [facility]     Foot ulcer, left ([facility] 2015    GERD (gastroesophageal reflux disease)     [facility]    MOM-WED-FRI-ON [address]     History of sleep apnea     none since significant weight loss (was 400#)    Hyperlipidemia     Hyperparathyroidism [facility]     Hypertension     Neuropathy     Peripheral vascular disease (HCC)     Pneumonia     resolved    S/P CABG (coronary artery bypass graft) 06/2016    Type II or unspecified type diabetes mellitus without mention of complication, not stated as uncontrolled     dx-1980       Past Surgical  History:     Past Surgical History:   Procedure Laterality Date    CATARACT REMOVAL      DIALYSIS FISTULA CREATION Left 2014    LEG AMPUTATION THROUGH LOWER TIBIA AND FIBULA         Medications:      sevelamer  4,000 mg Oral TID WC    vancomycin (VANCOCIN) intermittent dosing (placeholder)   Other RX Placeholder    vancomycin  1,000 mg Intravenous Once    aspirin  81 mg Oral Daily    midodrine  7.5 mg Oral TID WC    neomycin-bacitracin-polymyxin   Topical BID    vancomycin  125 mg Oral 4 times per day    sodium chloride flush  5-40 mL Intravenous 2 times per day    atorvastatin  40 mg Oral Nightly    [Held by provider] digoxin  125 mcg Oral Daily    [Held by provider] metoprolol succinate  100 mg Oral Daily    [Held by provider] NIFEdipine  30 mg Oral Daily    pantoprazole  40 mg Oral Daily    insulin lispro  0-6 Units Subcutaneous TID WC    insulin lispro  0-3 Units Subcutaneous Nightly    cefepime  1,000 mg Intravenous Q24H       Social History:     Social History     Socioeconomic History    Marital status:      Spouse name: Not on file    Number of children: Not on file    Years of education: Not on file    Highest education level: Not on file   Occupational History    Not on file   Social Needs    Financial resource strain: Not on file    Food insecurity     Worry: Not on file     Inability: Not on file    Transportation needs     Medical: Not on file     Non-medical: Not on file   Tobacco Use    Smoking status: Never Smoker    Smokeless tobacco: Never Used   Substance and Sexual Activity    Alcohol use: No    Drug use: No    Sexual activity: Not on file   Lifestyle    Physical activity     Days per week: Not on file     Minutes per session: Not on file    Stress: Not on file   Relationships    Social connections     Talks on phone: Not on file     Gets together: Not on file     Attends Zoroastrianism service: Not on file     Active member of club or organization: Not on file     Attends meetings of clubs or organizations: Not on file     Relationship status: Not on file    Intimate partner violence     Fear of current or ex partner: Not on file     Emotionally abused: Not on file     Physically abused: Not on file     Forced sexual activity: Not on file   Other Topics Concern    Not on file   Social History Narrative    Not on file       Family History:     Family History   Problem Relation Age of Onset    Heart Disease Father     Diabetes Father     Diabetes Brother       Medical Decision Making:   I have independently reviewed/ordered the following labs:    CBC with Differential:   Recent Labs     04/28/21  0259 04/28/21  0259 04/30/21  0002 04/30/21  0822   WBC 29.6*  --   --  27.4*   HGB 12.9*   < > 10.1* 10.4*   HCT 41.9   < > 30.9* 31.6*   PLT See Reflexed IPF Result  --   --  74*   LYMPHOPCT 1*  --   --  1*   MONOPCT 3  --   --  4    < > = values in this interval not displayed.      BMP:  Recent Labs     04/29/21  0459 04/29/21  1840 04/30/21  0822     --  140   K 4.8 4.1 4.4   CL 96*  --  96*   CO2 15*  --  20   *  --  83*   CREATININE 13.19*  --  8.42*   MG  --  1.8  --      Hepatic Function Panel:   Recent Labs     04/28/21  0259 04/30/21  0822   PROT 7.7 6.0*   LABALBU 3.4* 2.9*   BILITOT 0.64 0.74   ALKPHOS 148* 137*   ALT 18 12   AST 13 17     No results for input(s): RPR in the last 72 hours. No results for input(s): HIV in the last 72 hours. No results for input(s): BC in the last 72 hours. Lab Results   Component Value Date    CREATININE 8.42 04/30/2021    GLUCOSE 105 04/30/2021    GLUCOSE 128 01/09/2012       Detailed results: Thank you for allowing us to participate in the care of this patient. Please call with questions. This note is created with the assistance of a speech recognition program.  While intending to generate adocument that actually reflects the content of the visit, the document can still have some errors including those of syntax and sound a like substitutions which may escape proof reading. It such instances, actual meaningcan be extrapolated by contextual diversion.     Patria Chau MD  Office: [phone]  Perfect serve / office [phone]

## 2021-04-30 NOTE — CARE COORDINATION
SW was informed that this patient is being transferred to the ICU due to low blood pressure. The patient is unable to participate with PT on this date. TALITA updated Colby Castrejon in admissions from [facility].

## 2021-04-30 NOTE — PROGRESS NOTES
Pharmacy Note  Vancomycin Consult    Marko Cox is a 60 y.o. male started on Vancomycin for pneumonia; consult received from Dr. Reilly Rodriguez to manage therapy. Also receiving the following antibiotics: Cefepime. Patient Active Problem List   Diagnosis    SDH (subdural hematoma) ([facility])    ESRD on dialysis ([facility]    DM (diabetes mellitus) ([facility]    Anemia of chronic disease    Charcot foot due to diabetes mellitus ([facility]    Diabetic ulcer of left foot ([facility]    Hypertension, essential    Type 2 diabetes mellitus with foot ulcer ([facility]    Noncompliance    Acute on chronic congestive heart failure ([facility])    Sepsis ([facility]    Metabolic acidosis    Azotemia    Hyperkalemia    Diarrhea    Nausea and vomiting    Peripheral vascular disease ([facility]    History of left below knee amputation ([facility])    Hemoptysis    Ulcerated, foot, right, with fat layer exposed ([facility]    Charcot's joint of foot, right    Pneumonia    Sepsis due to pneumonia ([facility]     Allergies:  Pcn [penicillins]     Temp max: 99.2    Recent Labs     04/28/21  0259 04/28/21  0259 04/29/21  0459 04/30/21  0822   *   < > 157* 83*   CREATININE 18.47*   < > 13.19* 8.42*   WBC 29.6*  --   --  27.4*    < > = values in this interval not displayed. Intake/Output Summary (Last 24 hours) at 4/30/2021 1057  Last data filed at 4/29/2021 1740  Gross per 24 hour   Intake    Output 0 ml   Net 0 ml     Culture Date      Source                       Results  See micro    Ht Readings from Last 1 Encounters:   04/28/21 6' (1.829 m)        Wt Readings from Last 1 Encounters:   04/30/21 234 lb 12.6 oz (106.5 kg)       Body mass index is 31.84 kg/m². CrCl cannot be calculated (Unknown ideal weight.). Goal Trough Level: 10-25 mcg/mL pre HD    Assessment/Plan:  Patient received 1500 mg x 1 on 04/28 @ 22 [id number]. Will give 1000 mg x 1 today and order a random level with AM labs tomorrow on 05/01. Pharmacy will repeat dose with dialysis on 05/01 if random level between 10-25. Patient on TTS dialysis schedule. Thank you for the consult. Will continue to follow.     Abhijit ArboledaD, [facility] Center[facility]  4/30/2021   10:58 AM

## 2021-04-30 NOTE — PROGRESS NOTES
[facility]  Speech Language Pathology    Date: 4/30/2021  Patient Name: Katherine Parrish  YOB: 1955   AGE: 60 y.o. MRN: [id number]        Patient Not Available for Speech Therapy     Due to:  [] Testing  [] Hemodialysis  [] Cancelled by RN  [] Surgery   [] Intubation/Sedation/Pain Medication  [] Medical instability  [x] Other:  Pt. Was scheduled for MBS at 1430, per radiology, RN states to cancel MBS for today as pt. c change in medical status and will be transferred to ICU. Next scheduled treatment:   Check with RN before scheduling MBS for Saturday, 05/01  Completed by: Ezekiel Iglesias. CORINNA/SLP

## 2021-04-30 NOTE — FLOWSHEET NOTE
IM paged, notified for need for levophed per nephrology and patient only has 22g in RFA. Per resident, will place order for new line.

## 2021-04-30 NOTE — PROGRESS NOTES
Echo ordered for this patient. PT current heart rate around 120bpm. Per Dr. Aleksandr Henriquez, echo will be performed tomorrow, when HR is hopefully lower. Any questions, please call [phone]. Per Dr. Aleksandr Henriquez, echo is discontinued after review of recent echo in January 2021.

## 2021-04-30 NOTE — FLOWSHEET NOTE
At bedside for report. Patient with SBP's 60-70, 's. Elijah Dee charge RN notified, IM paged and at bedside to assess.

## 2021-04-30 NOTE — PROGRESS NOTES
Nephrology ESRD Consult Note    Reason for Consult:  End stage renal disease  Requesting Physician: Dr Delroy Gomes      History of Present Illness: This is a 60 y.o. male with history of CAD status post CABG, cardiomyopathy, dyslipidemia, end stage renal disease secondary to nephrosclerosis on hemodialysis Tuesday Thursday Saturday under the care of Dr. May Shaw at [address] [facility] renal care by way of a left arm AV fistula. He had presented initially to [facility] with diarrhea, nausea and vomiting and generalized weakness for six days. He had missed multiple treatments of dialysis. He presented at [facility] with hyperkalemia potassium of  6.9, BUN/ creatinine of 241 and 18.47 mg/dl and serum bicarbonate of 9. He was dialyzed yesterday 4/28/2021 at [facility] and transferred to [facility] due to in availability of a bed. Less than 500 cc was removed with dialysis due to hypotension. He is seen at dialysis today. His blood pressures have been in the hypotensive trends between 16-86 systolic blood pressure. He denies fever. He denies abdominal pain. He has ongoing nonbloody diarrhea, watery frequency of 6 times per day. he is tachycardic with heart rate in 127. Work-up for diarrhea shows positive C. difficile. Received a liter bolus of 0.9 saline yesterday. Subjective/internal hx    Patient seen and examined, I was called for low BP SBP was in 60-70's, 500 saline bolus ordered, BP improving. Patient remains tachycardic, sinus Tach 'S  Patient is awake and responsive, but says he uncomfortable  WBC 27.4  Patient had dialysis yesterday.  No fluid removed    Past Medical History:        Diagnosis Date    Acute on chronic congestive heart failure (HCC)     Anemia     CAD (coronary artery disease)     Cardiomyopathy ([facility]     Dialysis care     ESRD (end stage renal disease) on dialysis ([facility]     Foot ulcer, left ([facility] 2015    GERD (gastroesophageal reflux disease)     Hemod[facility] ([facility] 2011    MOM-WED-FRI-ON [address]     History of sleep apnea     none since significant weight loss (was 400#)    Hyperlipidemia     Hyperparathyroidism ([facility]     Hypertension     Neuropathy     Peripheral vascular disease (HCC)     Pneumonia     resolved    S/P CABG (coronary artery bypass graft) 06/2016    Type II or unspecified type diabetes mellitus without mention of complication, not stated as uncontrolled     dx-1980           Past Surgical History:        Procedure Laterality Date    CATARACT REMOVAL      DIALYSIS FISTULA CREATION Left 2014    LEG AMPUTATION THROUGH LOWER TIBIA AND FIBULA         Current Medications:    albumin human 25 % IV solution 25 g, Once  midodrine (PROAMATINE) tablet 7.5 mg, TID WC  sodium chloride flush 0.9 % injection 10 mL, PRN  sodium chloride flush 0.9 % injection 10 mL, PRN  neomycin-bacitracin-polymyxin (NEOSPORIN) ointment, BID  vancomycin (FIRVANQ) 50 MG/ML oral solution 125 mg, 4 times per day  metoprolol succinate (TOPROL XL) extended release tablet 25 mg, Once  sodium chloride flush 0.9 % injection 5-40 mL, 2 times per day  sodium chloride flush 0.9 % injection 5-40 mL, PRN  0.9 % sodium chloride infusion, PRN  promethazine (PHENERGAN) tablet 12.5 mg, Q6H PRN    Or  ondansetron (ZOFRAN) injection 4 mg, Q6H PRN  polyethylene glycol (GLYCOLAX) packet 17 g, Daily PRN  acetaminophen (TYLENOL) tablet 650 mg, Q6H PRN    Or  acetaminophen (TYLENOL) suppository 650 mg, Q6H PRN  atorvastatin (LIPITOR) tablet 40 mg, Nightly  [Held by provider] digoxin (LANOXIN) tablet 125 mcg, Daily  [Held by provider] metoprolol succinate (TOPROL XL) extended release tablet 100 mg, Daily  [Held by provider] NIFEdipine (ADALAT CC) extended release tablet 30 mg, Daily  pantoprazole (PROTONIX) tablet 40 mg, Daily  sevelamer (RENVELA) tablet 4,000 mg, TID  glucose (GLUTOSE) 40 % oral gel 15 g, PRN  dextrose 50 % IV solution, PRN  glucagon (rDNA) injection 1 mg, PRN  dextrose 5 %

## 2021-05-01 NOTE — CARE COORDINATION
Plan remains for the patient to DC to [facility] when he is ready. Once he is able to participate with therapy, [facility] will be able to submit for pre-cert.

## 2021-05-01 NOTE — FLOWSHEET NOTE
05/01/21 1417   Encounter Summary   Services provided to: Patient   Referral/Consult From: Rounding   Complexity of Encounter Low   Length of Encounter 15 minutes   Spiritual/Roman [employer]   Type Spiritual support   Assessment Sleeping   Intervention Prayer

## 2021-05-01 NOTE — PROGRESS NOTES
[address] [facility]    PROGRESS NOTE             5/1/2021    9:18 AM    Name:   Bella Glez  MRN:     [id number]     [place]:      [de-identified]   Room:   2006/2006-01  IP Day:  3  Admit Date:  4/28/2021  2:27 PM    PCP:  Anaya Mitchell MD  Code Status:  Full Code    Subjective:     C/C:   No chief complaint on file. Interval History Status: not changed. Patient seen and examined at the bed side    Patient looked more lethargic today. Transferred to the ICU intermediate status yesterday. Transferred to the ICU today. Has hypotension and tachycardia. Notes from nursing staff and Consults had been reviewed, and the overnight progress had been checked with the nursing staff as well. Brief History:     The patient is a 60 y.o. / male with chronic systolic and diastolic CHF, ESRD on HD TTS, left BKA & foot ulcer who was transferred from [facility]. V's for missed hemodialysis and sepsis 2/2 pneumonia. Patient has had diarrhea for the past 2 weeks as well as cough and SOB. He has not gone to dialysis for the past 2 weeks because of his diarrhea. Over the past few days he has felt generalized weakness and fatigue and also started coughing up blood. Came to ED today for worsening respiratory symptoms. Denies any smoking history, alcohol, or illicit drug use. Comes from home where he lives alone.     At [facility]. V's Cr was 18.47, , K 6.9. EKG did show widened QRS. Patient received IV calcium gluconate and insulin/dextrose. Nephrology was consulted and patient underwent hemodialysis at [facility]. V's.  CXR unremarkable but on CT abdomen and pelvis there was right lower lobe pneumonia. Patient was septic with tachycardia and leukocytosis of 29.6. Started on IV antibiotocs and blood cultures taken. X-ray R foot was also done for foot wound which did not show findings of acute osteomyelitis.   Podiatry was consulted and agreed that the wound was not infected and there was no osteomyelitis. They did debride loose skin and recommend conservative management. After dialysis patient was transferred to [facility]. On my exam he was alert and oriented x 3 and satting on room air. He was tachycardic. He appeared fatigued. He was actively coughing up small amounts of blood. Review of Systems:     Constitutional: Positive for fatigue. Negative for appetite change, chills and fever. HENT: Negative for congestion and sore throat. Eyes: Negative for visual disturbance. Respiratory: Positive for cough and shortness of breath. Hemoptysis    Cardiovascular: Negative for chest pain, palpitations and leg swelling. Gastrointestinal: diarrhea resolved. +ve nausea. Negative for abdominal pain, blood in stool or vomiting. Musculoskeletal: Negative for arthralgias and myalgias. Skin: Negative for rash. Neurological: Positive for weakness (generalized). Negative for light-headedness and headaches.      Medications: Allergies:     Allergies   Allergen Reactions    Pcn [Penicillins] Other (See Comments)     Trouble walking       Current Meds:   Scheduled Meds:    vancomycin (VANCOCIN) 1250 mg in D5W 250 mL IVPB (ADDAVIAL)  1,000 mg Intravenous Once    sevelamer  4,000 mg Oral TID     vancomycin (VANCOCIN) intermittent dosing (placeholder)   Other RX Placeholder    aspirin  81 mg Oral Daily    lidocaine  1 patch Transdermal Daily    midodrine  7.5 mg Oral TID     neomycin-bacitracin-polymyxin   Topical BID    vancomycin  125 mg Oral 4 times per day    sodium chloride flush  5-40 mL Intravenous 2 times per day    atorvastatin  40 mg Oral Nightly    [Held by provider] digoxin  125 mcg Oral Daily    [Held by provider] metoprolol succinate  100 mg Oral Daily    [Held by provider] NIFEdipine  30 mg Oral Daily    pantoprazole  40 mg Oral Daily    insulin lispro  0-6 Units Subcutaneous TID     insulin lispro  0-3 Units Subcutaneous Nightly    cefepime  1,000 mg Intravenous Q24H     Continuous Infusions:    esmolol      sodium chloride Stopped (21 1250)    norepinephrine      vasopressin (Septic Shock) infusion      phenylephrine (JASON-SYNEPHRINE) 50mg/250mL infusion 50 mcg/min (21 0628)    sodium chloride      dextrose      sodium chloride 75 mL/hr at 21 0636     PRN Meds: oxyCODONE-acetaminophen, sodium chloride flush, sodium chloride, promethazine **OR** ondansetron, polyethylene glycol, acetaminophen **OR** acetaminophen, glucose, dextrose, glucagon (rDNA), dextrose    Data:     Past Medical History:   has a past medical history of Acute on chronic congestive heart failure ([facility] [address] Anemia, CAD (coronary artery disease), Cardiomyopathy ([facility] Dialysis care, ESRD (end stage renal disease) on dialysis ([facility] Foot ulcer, left ([facility] GERD (gastroesophageal reflux disease), Hemodialysis patient ([facility] History of sleep apnea, Hyperlipidemia, Hyperparathyroidism ([facility] Hypertension, Neuropathy, Peripheral vascular disease ([facility] Pneumonia, S/P CABG (coronary artery bypass graft), and Type II or unspecified type diabetes mellitus without mention of complication, not stated as uncontrolled. Social History:   reports that he has never smoked. He has never used smokeless tobacco. He reports that he does not drink alcohol or use drugs. Family History:   Family History   Problem Relation Age of Onset    Heart Disease Father     Diabetes Father     Diabetes Brother        Vitals:  /72   Pulse 123   Temp 98.2 °F (36.8 °C) (Axillary)   Resp 17   Ht 6' (1.829 m)   Wt 229 lb 15 oz (104.3 kg)   SpO2 95%   BMI 31.19 kg/m²   Temp (24hrs), Av.2 °F (36.8 °C), Min:97.6 °F (36.4 °C), Max:98.7 °F (37.1 °C)      Recent Labs     21  0656 21  1348 21  1646 21  2129   POCGLU 90 129* 119* 125*       I/O(24Hr):     Intake/Output Summary (Last 24 hours) at 2021 0190  Last data filed at 5/1/2021 0451  Gross per 24 hour   Intake 822.5 ml   Output    Net 822.5 ml       Labs:    CBC:   Lab Results   Component Value Date    WBC 25.1 05/01/2021    RBC 3.99 05/01/2021    RBC 3.51 01/09/2012    HGB 10.7 05/01/2021    HCT 33.6 05/01/2021    MCV 84.2 05/01/2021    MCH 26.9 05/01/2021    MCHC 31.9 05/01/2021    RDW 21.0 05/01/2021    PLT 72 05/01/2021     01/09/2012    MPV 10.7 05/01/2021     CBC with Differential:    Lab Results   Component Value Date    WBC 25.1 05/01/2021    RBC 3.99 05/01/2021    RBC 3.51 01/09/2012    HGB 10.7 05/01/2021    HCT 33.6 05/01/2021    PLT 72 05/01/2021     01/09/2012    MCV 84.2 05/01/2021    MCH 26.9 05/01/2021    MCHC 31.9 05/01/2021    RDW 21.0 05/01/2021    NRBC 1 03/07/2016    LYMPHOPCT PENDING 05/01/2021    MONOPCT PENDING 05/01/2021    MYELOPCT 1 03/06/2016    BASOPCT PENDING 05/01/2021    MONOSABS PENDING 05/01/2021    LYMPHSABS PENDING 05/01/2021    EOSABS PENDING 05/01/2021    BASOSABS PENDING 05/01/2021    DIFFTYPE NOT REPORTED 05/01/2021     WBC:    Lab Results   Component Value Date    WBC 25.1 05/01/2021     CMP:    Lab Results   Component Value Date     05/01/2021    K 4.5 05/01/2021     05/01/2021    CO2 19 05/01/2021    BUN 95 05/01/2021    CREATININE 8.25 05/01/2021    GFRAA 8 05/01/2021    LABGLOM 7 05/01/2021    GLUCOSE 152 05/01/2021    GLUCOSE 128 01/09/2012    PROT 6.0 04/30/2021    LABALBU 2.9 04/30/2021    LABALBU 3.8 01/09/2012    CALCIUM 9.6 05/01/2021    BILITOT 0.74 04/30/2021    ALKPHOS 137 04/30/2021    AST 17 04/30/2021    ALT 12 04/30/2021       Lab Results   Component Value Date/Time    SPECIAL 5ML GREEN RIGHT HAND 04/29/2021 12:25 PM     Lab Results   Component Value Date/Time    CULTURE NO GROWTH 2 DAYS 04/29/2021 12:25 PM         Radiology:    Jeralene Michelle Foot Right (min 3 Views)    Result Date: 4/28/2021  EXAMINATION: THREE XRAY VIEWS OF THE RIGHT FOOT 4/28/2021 4:44 am COMPARISON: 12/20/2015 HISTORY: ORDERING SYSTEM PROVIDED HISTORY: extensive R foot wound TECHNOLOGIST PROVIDED HISTORY: extensive R foot wound Reason for Exam: Extensive right foot wound, FINDINGS: Complete collapse of the plantar arch with fragmentation and increased sclerosis of the midfoot is again noted consistent with severe neuropathic arthropathy. There is diffuse soft tissue swelling. There is a soft tissue ulceration along the plantar surface of the midfoot. There is no periosteal new bone formation or osteolysis to suggest acute osteomyelitis. 1. Redemonstration of severe neuropathic arthropathy of the midfoot. 2. Soft tissue ulceration along the plantar surface of the midfoot without radiographic findings of acute osteomyelitis. RECOMMENDATION: MRI of the right foot could be performed for further evaluation if clinically warranted. Nm Lung Scan Perfusion Only    Result Date: 4/29/2021  EXAMINATION: NUCLEAR MEDICINE PERFUSION SCAN. 4/29/2021 TECHNIQUE: Ventilation not performed as part of COVID-19 safety precautions. 3.7 millicuries of Tc 76Y MAA was administered intravenously prior to planar imaging of the lungs in multiple projections. COMPARISON: Chest radiograph 04/28/2021. HISTORY: ORDERING SYSTEM PROVIDED HISTORY: hemoptysis and tachycardia r/o PE Additional signs and symptoms: SOB, CP, coughin up blood, non-smoker, no hx of blood clots FINDINGS: PERFUSION:Distribution of radiotracer is homogeneous. No segmental defects identified. CHEST RADIOGRAPH:No focal areas of consolidation or significant effusions on recent chest radiograph. Low Probability for Pulmonary Embolus. Ct Abdomen Pelvis W Iv Contrast Additional Contrast? None    Result Date: 4/28/2021  EXAMINATION: CT OF THE ABDOMEN AND PELVIS WITH CONTRAST 4/28/2021 4:16 am TECHNIQUE: CT of the abdomen and pelvis was performed with the administration of intravenous contrast. Multiplanar reformatted images are provided for review.  Dose modulation, iterative reconstruction, and/or weight based adjustment of the mA/kV was utilized to reduce the radiation dose to as low as reasonably achievable. COMPARISON: None. HISTORY: ORDERING SYSTEM PROVIDED HISTORY: diarrhea, abd pain, wbc 30k, discussed with nephro TECHNOLOGIST PROVIDED HISTORY: diarrhea, abd pain, wbc 30k, discussed with nephro Decision Support Exception->Emergency Medical Condition (MA) Reason for Exam: abd pain, nephro maddie approved contrast, dialysis pt Acuity: Acute Type of Exam: Initial FINDINGS: Lower Chest: There is abnormal airspace consolidation within the right lower lobe consistent with a right lower lobe pneumonia. There is no pleural effusion. There is cardiomegaly. Severe coronary artery atherosclerotic calcifications are present. Organs: There is a small volume of ascites. Cholecystectomy clips are present. The liver, spleen, pancreas and adrenal glands are normal in appearance. There is severe renal atrophy. Innumerous renal cysts are noted. There is no hydronephrosis. A right nephroureteral stent is present. GI/Bowel: There are no abnormally dilated loops of large or small bowel present. There is no mesenteric inflammatory stranding present. The appendix is normal. Pelvis: There is a small volume of free fluid in the pelvis. There is no free fluid or pelvic mass identified. The urinary bladder is normal. Peritoneum/Retroperitoneum: There is no pathologically enlarged lymphadenopathy present. Severe atherosclerotic calcifications are present within the abdominal aorta and proximal anchor arteries. Bones/Soft Tissues: No lytic or blastic osseous lesions are identified. There is bilateral spondylolysis at L5 and grade 1 spondylolisthesis of L5 relative to S1. There is a disc bulge and facet arthropathy resulting in severe bilateral neural foraminal narrowing. Findings consistent with renal osteodystrophy are noted. 1. Right lower lobe pneumonia.   Follow-up PA and lateral chest radiographs are recommended after treatment to ensure resolution. 2. Small volume ascites. 3. Normal appendix. 4. Severe bilateral neural foraminal narrowing at L5-S1 secondary to bilateral spondylolysis at L5, grade 1 spondylolisthesis of L5 relative to S1, a disc bulge and facet arthropathy. Xr Chest Portable    Result Date: 4/28/2021  EXAMINATION: ONE XRAY VIEW OF THE CHEST 4/28/2021 2:48 am COMPARISON: 06/03/2019 HISTORY: ORDERING SYSTEM PROVIDED HISTORY: missed dialysis x2weeks TECHNOLOGIST PROVIDED HISTORY: missed dialysis x2weeks Reason for Exam: Upright port, missed dialysis x2 weeks FINDINGS: There is stable cardiomegaly. There is no focal consolidation, pleural effusion or evidence of edema. There is no pneumothorax identified. 1. Stable cardiomegaly. 2. No acute cardiopulmonary process identified. Physical Examination:        PHYSICAL EXAM:    Physical Exam  Constitutional:       Appearance: Awake, alert and oriented. Looks tired. Comments: Actively coughing up small amounts of blood. Is able to converse in full sentences on room air. HENT:      Head: Normocephalic and atraumatic. Eyes:      General: No scleral icterus. Extraocular Movements: Extraocular movements intact. Conjunctiva/sclera: Conjunctivae normal.      Pupils: Pupils are equal, round, and reactive to light. Neck:      Musculoskeletal: Neck supple. No muscular tenderness. Cardiovascular:      Rate and Rhythm: Normal rate and regular rhythm. Pulses: Normal pulses. Heart sounds: Normal heart sounds. Pulmonary:      Effort: Pulmonary effort is normal.      Breath sounds: Normal breath sounds. No wheezing or rales. Abdominal:      General: Bowel sounds are normal. There is no distension. Palpations: Abdomen is soft. Tenderness: There is no abdominal tenderness. There is no guarding or rebound. Genital discharge (whitis)  Musculoskeletal:      Comments: Left BKA. Right foot wound is wrapped. Lymphadenopathy:      Cervical: No cervical adenopathy. Skin:     General: Skin is warm and dry. Capillary Refill: Capillary refill takes less than 2 seconds. Neurological:      General: No focal deficit present. Mental Status: He is alert and oriented to person, place, and time. Mental status is at baseline. Sensory: Sensation is intact. Motor: Motor function is intact.       Assessment:        Primary Problem  Sepsis due to pneumonia [facility])    Active Hospital Problems    Diagnosis Date Noted    Pneumonia [J18.9] 04/28/2021    Sepsis ([facility] [A41.9] 04/28/2021    Sepsis due to pneumonia ([facility] [J18.9, A41.9] 05/42/8189    Metabolic acidosis [C52.9]     Hyperkalemia [E87.5]     Diarrhea [R19.7]     Nausea and vomiting [R11.2]     History of left below knee amputation (HCC) [Z89.512]     Hemoptysis [R04.2]     Ulcerated, foot, right, with fat layer exposed ([facility] [L97.512]     Hypertension, essential [I10]     Charcot foot due to diabetes mellitus ([facility] [E11.610] 12/28/2015    Anemia of chronic disease [D63.8] 07/03/2013    ESRD on dialysis ([facility] [N18.6, Z99.2] 06/27/2013    DM (diabetes mellitus) ([facility] [E11.9] 06/27/2013       -60years old male patient with ESRD, CAD and HF  -Missed HD for 2 weeks, due to diarrhea presented with hyperkalemia   -Had dialysis 2 times so far with resolution of hyperkalemia  -C. Diff (start oral Vanco)  -pneumonia incidentally on CT. has hemoptysis with stable hemoglobin. Not on anticoagulation  -VQ low probability of PE  -Has hypotension and tachycardia, on pressors, likely picture of sepsis pro-Kiran 25 normal cortisol  -On IV cefepime, IV vancomycin and oral vancomycin  -Also has right foot ulcer, podiatry on board, I.D on board   -Urethral swab sent yesterday.  -Plan for HD today   -cardio plan on esmolol drip and keeping gabrielle-synephrine.     Plan:        Sepsis 2/2 community acquired pneumonia  -CXR unremarkable but CT A&P showed RLL PNA  -UA pending, patient has ESRD and is unable to give a urine sample.   -Lactic acid 2.1  -Pro-Kiran 25  -Mycoplasma, legionella, strep pneumo pending  -Blood cultures x 2 NGTD  -Resp Cx  show just above ed gram +ve cocci in clusters with rare GN rods: Growing staph aureus methicillin susceptible  -IV cefepime and IV vancomycin, pharmacy to dose  -on NS 0.9% 75 ml/hr.   -Will give 500 ml NS 0.9 bolus after checking with nephrology   -I. D consulted for pneumonia, C. Diff and diabetic foot  -Patient has penile discharge, swab sent   -Covid negative.      Chronic systolic and diastolic CHF/Hx of HTN. Currently hypotension and tachycardia  -BNP > 300,000, likely elevated from missed dialysis for 2 weeks  -Echo 6/2019: LVEF 30-35% with moderate diastolic dysfunction  -CAD, CABG, stents, last cath 2019.   -Tachycardia 2ry to hypotension 2ry to likely sepsis. CVP 15  -Cardiology consulted, trial of lopressor 2.5 mg Iv and check BP for any dips. Will start Esmolol drip keeping SBP >90  -on Filippo-synephrine   -On midodrine 7.5 mg 3 times daily  -Toprol 100 mg PO daily on hold due to hypotension.  -Nifedipine 30 mg PO daily on hold due to hypotension. Hyperkalemia 2/2 ESRD on hemodialysis TTS  -Missed 2 weeks of dialysis.   -K 6.9 on admission with EKG changes, received IV calcium gluconate and insulin/dextrose.  -Underwent hemodialysis at [facility]. V's  -Nephrology on board  -S/P HD here in [address] [place] [address]. Plan for another HD session today   -Hyperkalemia resolved after dialysis, creatinine 18.47 on presentation. Today 8.25  -Renvela 4000 mg PO TID     Hemoptysis likely 2/2 pneumonia  -HgB stable  -H&H q8h, keep HgB > 7  -NM lung scan perfusion low probability of PE     Diarrhea & nausea  -CT abdomen and pelvis showed small volume ascites and normal appendix  -C.  Diff +ve   -Vancomycin oral added, diarrhea subsided  -Antinausea meds PRN  -Protonix 40 mg PO daily      Right foot ulcer  -X-ray showed no evidence of OM  -Podiatry on board  -Wound care on board      Dyslipidemia   -Lipitor 40 mg PO nightly    Type 2 diabetes mellitus  -Low dose ISS  -Hypoglycemia protocol     Bilateral neural foraminal narrowing at L5-S1 with spondylolysis  -Showed up on CT A&P  -Monitor     Diet: renal diet low salt low fluid  DVT prophylaxis: EPCs; avoid chemical due to hemoptysis, thrombocytopenia 72 stable platelet count    Jennifer Elisa Fleming MD  5/1/2021  9:18 AM     Attending Physician Statement  I have discussed the care of Leann Gonzalez and I have examined the patient myselft and taken ros and hpi , including pertinent history and exam findings,  with the resident. I have reviewed the key elements of all parts of the encounter with the resident. I agree with the assessment, plan and orders as documented by the resident. cc 35    c diff colitis  Po vanoc  Iv cefepime  Septic shock on pressors  Normal cortisl levels  On HD    Electronically signed by Amarilis Brown MD

## 2021-05-01 NOTE — PROGRESS NOTES
[place]  Speech Language Pathology    Date: 5/1/2021  Patient Name: Delroy Pizarro  YOB: 1955   AGE: 60 y.o. MRN: [id number]        Patient Not Available for Speech Therapy     Due to:  [] Testing  [] Hemodialysis  [x] Cancelled by RN  [] Surgery   [] Intubation/Sedation/Pain Medication  [] Medical instability  [] Other: 0809 - MBS cancelled by RN d/t Pt. medical status. ST to continue to follow and plan to complete MBS when Pt. is appropriate. Next scheduled treatment: Check with RN before scheduling MBS for Monday, 05/03    Completed by:  Rufino Garcia M.A., CCC-SLP

## 2021-05-01 NOTE — PROGRESS NOTES
[facility] -   Infectious diseases evaluation  admission date 4/28/2021    reason for consultation:   Pneumonia/C. difficile colitis    Impression :   Current:  · C. difficile colitis  · Right lower lobe pneumonia, staph aureus MSSA growth on respiratory culture  · Sepsis secondary to above  · Right foot wound  · Metabolic acidosis  · CHF  · End-stage renal disease on hemodialysis  · Diabetes mellitus  · Dyslipidemia  · Hypertension      Recommendations   · Continue p.o. vancomycin increased dose to 500 mg every 6 hours  · Discontinue IV cefepime and vancomycin  · IV ceftriaxone  · MRSA nasal swab was negative  · Swallow study pending  · Follow blood and sputum cultures and adjust antibiotics as needed  · Continue supportive care          History of Present Illness:   Initial history:  Clark Gregorio is a 60y.o.-year-old male was transferred from [place].  The patient presented with worsening cough productive with blood-tinged sputum associated with shortness of breath and diarrhea for 2 weeks. He also complaining of generalized weakness, nausea and vomiting. no alleviating or aggravating factors. The patient is poor historian, lives at home alone, states that he received Covid 19 vaccine. Apparently the patient missed hemodialysis.   He also had left foot wound with no purulent drainage or necrotic tissue, was evaluated by podiatry, foot x-ray not suggestive of osteomyelitis  On admission he was tachycardic, had leukocytosis with WBC of 29.6  CT chest and abdomen showed right lower lobe infiltrates  COVID-19 rapid test negative  On 4/30/2021He became hypotensive with a systolic blood pressure in the 70s, tachycardic was transferred to ICU, was started on pressors  Interval changes  5/1/2021   He remains in the ICU, remains tachycardic, hypotensive on Filippo-Synephrine, no new complaints  WBC 25 today  Procalcitonin was 25.7 yesterday  IJ triple-lumen was placed was placed yesterday  Sputum Gram growing staph aureus MSSA  VQ scan was low probability on 4/29/2021  Patient Vitals for the past 8 hrs:   BP Temp Temp src Pulse Resp SpO2 Weight   05/01/21 1730 97/68   123 22 90 %    05/01/21 1715 96/71   124 19 90 %    05/01/21 1702      92 %    05/01/21 1701      91 %    05/01/21 1700 121/70 98 °F (36.7 °C) Axillary 124 24 (!) 89 %    05/01/21 1645 106/67   124 26 90 %    05/01/21 1630 103/68   121 22 (!) 83 %    05/01/21 1615 111/72   124 27 94 %    05/01/21 1600 86/61 98.1 °F (36.7 °C) Axillary 123 23 94 %    05/01/21 1545 89/61   125 21 94 %    05/01/21 1530 (!) 97/56   124 29 93 %    05/01/21 1515 (!) 103/59   125 25 97 %    05/01/21 1500 106/67 98.2 °F (36.8 °C)  124 26 93 % 229 lb 15 oz (104.3 kg)   05/01/21 1452 102/60   125 23     05/01/21 1445 107/62   125 20 94 %    05/01/21 1433 105/65   125      05/01/21 1430 99/61   125 19 96 %    05/01/21 1415 102/66   124 17 96 %    05/01/21 1400 130/62   125 20 96 %    05/01/21 1345 108/70   124 19 95 %    05/01/21 1330 106/72   124 19 95 %    05/01/21 1315 104/70   124 17 95 %    05/01/21 1300 110/76   124 22 95 %    05/01/21 1245 110/73   125 19 95 %    05/01/21 1230 107/70   124 17 94 %    05/01/21 1215 107/75   124 24 94 %    05/01/21 1200 105/72 98.1 °F (36.7 °C) Axillary 125 18 94 %    05/01/21 1153 109/72   123 21     05/01/21 1145 104/71   124 19 93 %    05/01/21 1130      93 %    05/01/21 1115      93 %    05/01/21 1100 98/62   124 18 93 %    05/01/21 1045 109/74   123 25 94 %          I have personally reviewed the past medical history, past surgical history, medications, social history, and family history, and I haveupdated the database accordingly.       Allergies:   Pcn [penicillins]     Review of Systems:     Review of Systems  As per history present illness, other than above 12 system review is negative  Physical Examination :       Physical Exam  Constitutional:       General: He is not in acute distress. HENT:      Head: Normocephalic and atraumatic. Right Ear: External ear normal.      Left Ear: External ear normal.   Neck:      Musculoskeletal: Neck supple. No neck rigidity. Cardiovascular:      Rate and Rhythm: Normal rate and regular rhythm. Abdominal:      General: Abdomen is flat. There is no distension. Palpations: Abdomen is soft. Tenderness: There is no abdominal tenderness. Musculoskeletal:      Comments: Left below-knee amputation site with no open wound no erythema. Right foot dressing   Skin:     General: Skin is warm. Coloration: Skin is not jaundiced. Neurological:      General: No focal deficit present. Mental Status: He is alert and oriented to person, place, and time.          Past Medical History:     Past Medical History:   Diagnosis Date    Acute on chronic congestive heart failure (HCC)     Anemia     CAD (coronary artery disease)     Cardiomyopathy ([address]     Dialysis care     [facility]D [facility]end stage renal disease[facility]     Foot ulcer, left ([facility] 2015    GERD (gastroesophageal reflux disease)     Hemodialysis patient ([address] 2011    MOM-WED-FRI-ON [address]     History of sleep apnea     none since significant weight loss (was 400#)    Hyperlipidemia     Hyperparathyroidism ([address]     Hypertension     Neuropathy     Peripheral vascular disease ([address]     Pneumonia     resolved    S/P CABG (coronary artery bypass graft) 06/2016    Type II or unspecified type diabetes mellitus without mention of complication, not stated as uncontrolled     dx-1980       Past Surgical  History:     Past Surgical History:   Procedure Laterality Date    CATARACT REMOVAL      DIALYSIS FISTULA CREATION Left 2014    LEG AMPUTATION THROUGH LOWER TIBIA AND FIBULA         Medications:      sevelamer  4,000 mg Oral TID WC    vancomycin (VANCOCIN) intermittent dosing (placeholder)   Other RX Placeholder    aspirin  81 mg Oral Daily    lidocaine  1 patch Transdermal Daily    midodrine  7.5 mg Oral TID WC    neomycin-bacitracin-polymyxin   Topical BID    vancomycin  125 mg Oral 4 times per day    sodium chloride flush  5-40 mL Intravenous 2 times per day    atorvastatin  40 mg Oral Nightly    [Held by provider] digoxin  125 mcg Oral Daily    [Held by provider] metoprolol succinate  100 mg Oral Daily    [Held by provider] NIFEdipine  30 mg Oral Daily    pantoprazole  40 mg Oral Daily    insulin lispro  0-6 Units Subcutaneous TID WC    insulin lispro  0-3 Units Subcutaneous Nightly    cefepime  1,000 mg Intravenous Q24H       Social History:     Social History     Socioeconomic History    Marital status:      Spouse name: Not on file    Number of children: Not on file    Years of education: Not on file    Highest education level: Not on file   Occupational History    Not on file   Social Needs    Financial resource strain: Not on file    Food insecurity     Worry: Not on file     Inability: Not on file    Transportation needs     Medical: Not on file     Non-medical: Not on file   Tobacco Use    Smoking status: Never Smoker    Smokeless tobacco: Never Used   Substance and Sexual Activity    Alcohol use: No    Drug use: No    Sexual activity: Not on file   Lifestyle    Physical activity     Days per week: Not on file     Minutes per session: Not on file    Stress: Not on file   Relationships    Social connections     Talks on phone: Not on file     Gets together: Not on file     Attends Sikhism service: Not on file     Active member of club or organization: Not on file     Attends meetings of clubs or organizations: Not on file     Relationship status: Not on file    Intimate partner violence     Fear of current or ex partner: Not on file     Emotionally abused: Not on file     Physically abused: Not on file     Forced sexual activity: Not on file   Other Topics Concern    Not on file   Social History Narrative    Not on file       Family History:     Family History   Problem Relation Age of Onset    Heart Disease Father     Diabetes Father     Diabetes Brother       Medical Decision Making:   I have independently reviewed/ordered the following labs:    CBC with Differential:   Recent Labs     04/30/21  0822 05/01/21  0532   WBC 27.4* 25.1*   HGB 10.4* 10.7*   HCT 31.6* 33.6*   PLT 74* 72*   LYMPHOPCT 1* 1*   MONOPCT 4 6     BMP:  Recent Labs     04/29/21  1840 04/30/21  0822 05/01/21  0532   NA  --  140 142   K 4.1 4.4 4.5   CL  --  96* 100   CO2  --  20 19*   BUN  --  83* 95*   CREATININE  --  8.42* 8.25*   MG 1.8  --   --      Hepatic Function Panel:   Recent Labs     04/30/21  0822   PROT 6.0*   LABALBU 2.9*   BILITOT 0.74   ALKPHOS 137*   ALT 12   AST 17     No results for input(s): RPR in the last 72 hours. No results for input(s): HIV in the last 72 hours. No results for input(s): BC in the last 72 hours. Lab Results   Component Value Date    CREATININE 8.25 05/01/2021    GLUCOSE 152 05/01/2021    GLUCOSE 128 01/09/2012       Detailed results: Thank you for allowing us to participate in the care of this patient. Please call with questions. This note is created with the assistance of a speech recognition program.  While intending to generate adocument that actually reflects the content of the visit, the document can still have some errors including those of syntax and sound a like substitutions which may escape proof reading. It such instances, actual meaningcan be extrapolated by contextual diversion.     Jess Murry MD  Office: [phone]  Perfect serve / office [phone]

## 2021-05-01 NOTE — PROGRESS NOTES
Comprehensive Nutrition Assessment    Type and Reason for Visit:  Initial, Consult    Nutrition Recommendations/Plan: Will continue to provide Renal diet with 1500 ml fluid restriction and add Nepro supplements twice daily    Nutrition Assessment:  Pt was admitted due to Sepsis. He is currently having HD. When pt was asked about his cultural/Yazdanism preferences for food he states, \"I have to spit,\" He is presently unable to state any specific food he avoids. Foot Ulcer noted. Pt with N/V/diarrhea  x 6 days prior to admit. Malnutrition Assessment:  Malnutrition Status: At risk for malnutrition (Comment)    Context:  Acute Illness     Findings of the 6 clinical characteristics of malnutrition:  Energy Intake:  7 - 50% or less of estimated energy requirements for 5 or more days  Weight Loss:  No significant weight loss     Body Fat Loss:  Unable to assess     Muscle Mass Loss:  Unable to assess    Fluid Accumulation:  1 - Mild Extremities   Strength:  Not Performed    Estimated Daily Nutrient Needs:  Energy (kcal):  22 kcal/kg= 2100 kcal; Weight Used for Energy Requirements:  Usual     Protein (g):  1.5g/dq=368 g; Weight Used for Protein Requirements:  Ideal          Nutrition Related Findings:  mild edema RLE, Labs (5/1) BUN/Cr 95/8.25, K 4.5, Glu 152, Meds Reviewed, PMH: ESRD with HD, DM, CHF, BM 4/30      Wounds:  (foot wound)       Current Nutrition Therapies:    DIET RENAL; Low Sodium (2 GM); Daily Fluid Restriction: 1500 ml    Anthropometric Measures:  · Height: 6' (182.9 cm)  · Current Body Weight: 229 lb (103.9 kg)   · Admission Body Weight: 219 lb (99.3 kg)    · Usual Body Weight: 210 lb (95.3 kg)(4/28/21)     · Ideal Body Weight: 178 lbs; BMI: 31.1  · BMI Categories: Obese Class 1 (BMI 30.0-34. 9)       Nutrition Diagnosis:   · Increased nutrient needs related to renal dysfunction(healing) as evidenced by wounds    Nutrition Interventions:   Food and/or Nutrient Delivery:  Continue Current Diet,

## 2021-05-01 NOTE — PLAN OF CARE
Nutrition Problem #1: Increased nutrient needs  Intervention: Food and/or Nutrient Delivery: Continue Current Diet, Start Oral Nutrition Supplement  Nutritional Goals: po intake greater than 50%

## 2021-05-01 NOTE — PLAN OF CARE
Problem: Falls - Risk of:  Goal: Will remain free from falls  Description: The patient remained free from falls this shift, call light within reach, bed in locked and lowest position. Side rails up x2. Continue to monitor closely. Outcome: Ongoing  Note: Bed remains in lowest position, call light within reach. Patient remains free of falls at this time. RN will continue to monitor. Goal: Absence of physical injury  Description: Absence of physical injury  Outcome: Ongoing     Problem: Skin Integrity:  Goal: Will show no infection signs and symptoms  Description: Patient skin assessment complete. No signs/symptoms of new skin breakdown. Waffle mattress in place. Pt turned and repositioned every two hours with assistance from staff. Area kept free from moisture. Proper nourishment and fluids encouraged, as appropriate. Skin remains clean, dry, and intact. Will continue to monitor for additional needs and changes in skin breakdown. Outcome: Ongoing  Note: Patient turned and repositioned every 2 hours and as needed for comfort. Skin remains dry and intact. No new skin breakdown noted. Goal: Absence of new skin breakdown  Description: Absence of new skin breakdown  Outcome: Ongoing     Problem:  Activity:  Goal: Risk for activity intolerance will decrease  Description: Risk for activity intolerance will decrease  Outcome: Ongoing     Problem: Fluid Volume:  Goal: Will show no signs or symptoms of fluid imbalance  Description: Will show no signs or symptoms of fluid imbalance  Outcome: Ongoing     Problem: Nutritional:  Goal: Maintenance of adequate nutrition will be supported  Description: Maintenance of adequate nutrition will be supported  Outcome: Ongoing     Problem: Physical Regulation:  Goal: Complications related to the disease process, condition or treatment will be avoided or minimized  Description: Complications related to the disease process, condition or treatment will be avoided or minimized Outcome: Ongoing     Problem: Urinary Elimination:  Goal: Ability to achieve and maintain adequate urine output will be supported  Description: Ability to achieve and maintain adequate urine output will be supported  Outcome: Ongoing     Problem: Airway Clearance - Ineffective:  Goal: Clear lung sounds  Description: Clear lung sounds  Outcome: Ongoing  Goal: Ability to maintain a clear airway will improve  Description: Ability to maintain a clear airway will improve  Outcome: Ongoing  Note: Patient orally suctioned as needed to remove secretions and maintain patent airway. Will continue to monitor. Problem: Fluid Volume - Deficit:  Goal: Achieves intake and output within specified parameters  Description: Achieves intake and output within specified parameters  Outcome: Ongoing     Problem: Gas Exchange - Impaired:  Goal: Levels of oxygenation will improve  Description: Levels of oxygenation will improve  Outcome: Ongoing     Problem: Confusion - Acute:  Goal: Absence of continued neurological deterioration signs and symptoms  Description: Absence of continued neurological deterioration signs and symptoms  Outcome: Ongoing  Goal: Mental status will be restored to baseline  Description: Mental status will be restored to baseline  Outcome: Ongoing     Problem: Discharge Planning:  Goal: Ability to perform activities of daily living will improve  Description: Ability to perform activities of daily living will improve  Outcome: Ongoing  Goal: Participates in care planning  Description: Participates in care planning  Outcome: Ongoing     Problem: Injury - Risk of, Physical Injury:  Goal: Will remain free from falls  Description: The patient remained free from falls this shift, call light within reach, bed in locked and lowest position. Side rails up x2. Continue to monitor closely. Outcome: Ongoing  Note: Bed remains in lowest position, call light within reach. Patient remains free of falls at this time.   RN will requested for comfort. Pain remains at a tolerable level.    Goal: Control of acute pain  Description: Control of acute pain  Outcome: Ongoing  Goal: Control of chronic pain  Description: Control of chronic pain  Outcome: Ongoing

## 2021-05-01 NOTE — PROGRESS NOTES
HEMODIALYSIS PRE-TREATMENT NOTE    Patient Identifiers prior to treatment: Name and     Isolation Required: yes                      Isolation Type: Contact       (please document if patient is being managed as a PUI/COVID-19 patient)        Hepatitis status:                           Date Drawn                             Result  Hepatitis B Surface Antigen 2021     Negative                     Hepatitis B Surface Antibody 2021 95        Hepatitis B Core Antibody            How was Hepatitis Status verified:  In [facility] [address]     Was a copy of the labs you documented provided to facility for the patient's chart: yes    Hemodialysis orders verified: yes    Access Within normal limits ( I.e. s/s of infection,...): yes     Pre-Assessment completed: yes    Pre-dialysis report received from: Christine                      Time: 2921

## 2021-05-01 NOTE — PROGRESS NOTES
HEMODIALYSIS POST TREATMENT NOTE    Treatment time ordered: 3.25 hours    Actual treatment time: 3.25 hours    UltraFiltration Goal: 500ml as tolerated  UltraFiltration Removed: 0      Pre Treatment weight: 104.3kg  Post Treatment weight: 104.3kg  Estimated Dry Weight:     Access used:     Central Venous Catheter:          Tunneled or Non-tunneled:            Site:           Access Flow:       Internal Access:       AV Fistula or AV Graft: AV Fistula         Site: Left forearm       Access Flow: Good Post treatment bleeding stopped <10 min each site       Sign and symptoms of infection: no       If YES:     Medications or blood products given: none    Chronic outpatient schedule: [facility]    Chronic outpatient unit:  [facility]    Summary of response to treatment: Well no s/s of distress noted during treatment    Explain if orders NOT met, was physician notified:maksim      ACES flowsheet faxed to patient unit/ placed in patient chart: yes    Post assessment completed: yes    Report given to: [address] documented Safety Checks include the followin) Access and face visible at all times. 2) All connections and blood lines are secure with no kinks. 3) NVL alarm engaged. 4) Hemosafe device applied (if applicable). 5) No collapse of Arterial or Venous blood chambers. 6) All blood lines / pump segments in the air detectors.

## 2021-05-01 NOTE — PROGRESS NOTES
[facility]   Progress Note                   Date:   5/1/2021  Patient name: Bella Glez  Date of admission:  4/28/2021  2:27 PM  MRN:   [id number]  YOB: 1955  PCP: Anaya Mitchell MD    Reason for Admission: Diarrhea weakness    Subjective:       Clinical Changes / Abnormalities: Patient sleepy but answers the question denies any chest pain denies any problem with the breathing denies any palpitation. Patient still tachycardic. Patient is on 50 mics of phenylephrine.       Medications:   Scheduled Meds:   metoprolol  2.5 mg Intravenous Once    vancomycin (VANCOCIN) 1250 mg in D5W 250 mL IVPB (ADDAVIAL)  1,000 mg Intravenous Once    sevelamer  4,000 mg Oral TID WC    vancomycin (VANCOCIN) intermittent dosing (placeholder)   Other RX Placeholder    aspirin  81 mg Oral Daily    lidocaine  1 patch Transdermal Daily    midodrine  7.5 mg Oral TID WC    neomycin-bacitracin-polymyxin   Topical BID    vancomycin  125 mg Oral 4 times per day    sodium chloride flush  5-40 mL Intravenous 2 times per day    atorvastatin  40 mg Oral Nightly    [Held by provider] digoxin  125 mcg Oral Daily    [Held by provider] metoprolol succinate  100 mg Oral Daily    [Held by provider] NIFEdipine  30 mg Oral Daily    pantoprazole  40 mg Oral Daily    insulin lispro  0-6 Units Subcutaneous TID WC    insulin lispro  0-3 Units Subcutaneous Nightly    cefepime  1,000 mg Intravenous Q24H     Continuous Infusions:   esmolol      sodium chloride Stopped (04/30/21 1250)    norepinephrine      vasopressin (Septic Shock) infusion      phenylephrine (JASON-SYNEPHRINE) 50mg/250mL infusion 50 mcg/min (05/01/21 0628)    sodium chloride      dextrose      sodium chloride 75 mL/hr at 05/01/21 0636     CBC:   Recent Labs     04/30/21  0002 04/30/21  0822 05/01/21  0532   WBC  --  27.4* 25.1*   HGB 10.1* 10.4* 10.7*   PLT  --  74* 72*     BMP:    Recent Labs     04/29/21  0459 04/29/21  1840 04/30/21  0822 05/01/21  0532     --  140 142   K 4.8 4.1 4.4 4.5   CL 96*  --  96* 100   CO2 15*  --  20 19*   *  --  83* 95*   CREATININE 13.19*  --  8.42* 8.25*   GLUCOSE 116*  --  105* 152*     Hepatic:   Recent Labs     04/30/21  0822   AST 17   ALT 12   BILITOT 0.74   ALKPHOS 137*     Troponin: No results for input(s): TROPONINI in the last 72 hours. BNP: No results for input(s): BNP in the last 72 hours. Lipids: No results for input(s): CHOL, HDL in the last 72 hours. Invalid input(s): LDLCALCU  INR: No results for input(s): INR in the last 72 hours. Objective:   Vitals: /68   Pulse 122   Temp 98 °F (36.7 °C) (Oral)   Resp 25   Ht 6' (1.829 m)   Wt 229 lb 15 oz (104.3 kg)   SpO2 96%   BMI 31.19 kg/m²   I/O last 3 completed shifts: In: 822.5 [P.O.:30; I.V.:742.5; IV Piggyback:50]  Out: -   No intake/output data recorded.   Scheduled Meds:   metoprolol  2.5 mg Intravenous Once    vancomycin (VANCOCIN) 1250 mg in D5W 250 mL IVPB (ADDAVIAL)  1,000 mg Intravenous Once    sevelamer  4,000 mg Oral TID WC    vancomycin (VANCOCIN) intermittent dosing (placeholder)   Other RX Placeholder    aspirin  81 mg Oral Daily    lidocaine  1 patch Transdermal Daily    midodrine  7.5 mg Oral TID WC    neomycin-bacitracin-polymyxin   Topical BID    vancomycin  125 mg Oral 4 times per day    sodium chloride flush  5-40 mL Intravenous 2 times per day    atorvastatin  40 mg Oral Nightly    [Held by provider] digoxin  125 mcg Oral Daily    [Held by provider] metoprolol succinate  100 mg Oral Daily    [Held by provider] NIFEdipine  30 mg Oral Daily    pantoprazole  40 mg Oral Daily    insulin lispro  0-6 Units Subcutaneous TID WC    insulin lispro  0-3 Units Subcutaneous Nightly    cefepime  1,000 mg Intravenous Q24H     Continuous Infusions:   esmolol      sodium chloride Stopped (04/30/21 1250)    norepinephrine      vasopressin (Septic Shock) infusion      phenylephrine (JASON-SYNEPHRINE) 50mg/250mL infusion 50 mcg/min (05/01/21 0192)    sodium chloride      dextrose      sodium chloride 75 mL/hr at 05/01/21 0636     PRN Meds:.oxyCODONE-acetaminophen, sodium chloride flush, sodium chloride, promethazine **OR** ondansetron, polyethylene glycol, acetaminophen **OR** acetaminophen, glucose, dextrose, glucagon (rDNA), dextrose    CONSTITUTIONAL:   HEAD: normocephalic, atraumatic   EYES: PERRLA, EOMI   ENT: moist mucous membranes, uvula midline   NECK: symmetric, no midline tenderness to palpation   LUNGS: Reduced to auscultation bilaterally   CARDIOVASCULAR: regular rate and rhythm, no murmurs, rubs or gallops   ABDOMEN: Soft, non-tender, non-distended with normal active bowel sounds   SKIN: Right foot with dressing with ulcer on the heel, BKA left       EKG: Sinus tachycardia, inferior infarction, ST-T changes consistent with ischemia, QTc 515 msec     TTE 6/2/19  Summary  Technically difficult study due to patient habitus. Left ventricle is normal in size. Mild left ventricular hypertrophy. Severely reduced left ventricular systolic function. Estimated LV EF 30-35 %. Grade II (moderate) left ventricular diastolic dysfunction. Left atrium is mildly dilated. Trivial valve disease as below.        Cardiac cath 6/2/19   Severe native vessel disease.   Patent LIMA-LAD. The LAD distal to touch down is small in caliber with 80%   diffuse disease.  (Not amenable to PCI)   Chronic total occlusion of RCA.   LCX had 80% mid stenosis reduced to 0% using 3x38 mm Xience stent, post   dilated using 3.25 mm NC balloon.             Assessment / Acute Cardiac Problems:       Patient Active Problem List:     SDH (subdural hematoma) (HCC)     ESRD on dialysis ([address]     DM (diabetes mellitus) ([address]     Anemia of chronic disease     Charcot foot due to diabetes mellitus ([address]     Diabetic ulcer of left foot ([address]     Hypertension, essential     Type 2 diabetes mellitus with foot ulcer ([address] Noncompliance     Acute on chronic congestive heart failure (HCC)     Sepsis (HCC)     Metabolic acidosis     Azotemia     Hyperkalemia     Diarrhea     Nausea and vomiting     Peripheral vascular disease ([address]     History of left below knee amputation (HCC)     Hemoptysis     Ulcerated, foot, right, with fat layer exposed ([facility]     Charcot's joint of foot, right     Pneumonia     Sepsis due to pneumonia [facility])      Plan of Treatment:   Extensive history of CAD CABG, Stent , last cath 2019 as above. Sepsis, hypotension metabolic derangement due to noncompliance of his dialysis  Tachycardic secondary to all above. Patient is on pressors patient got IV boluses. Patient is on high dose of metoprolol nifedipine please hold at  I have given 2.5 of Lopressor IV to see the response if maintain the blood pressure as it is then we will start on esmolol drip to keep the systolic blood pressure above 90. CHF HFrEF, at present the volume status will be controlled by dialysis  CKD dialysis as was told patient may be getting dialysis today  Ulcerated foot being followed by podiatry  Pneumonia sepsis being followed by ID  Patient is very critical.  At this time will transfer to ICU.     Anthony Schwarz [facility]  [phone]

## 2021-05-01 NOTE — PROGRESS NOTES
Nephrology ESRD Consult Note    Reason for Consult:  End stage renal disease  Requesting Physician: Dr Andrea Meza      History of Present Illness: This is a 60 y.o. male with history of CAD status post CABG, cardiomyopathy, dyslipidemia, end stage renal disease secondary to nephrosclerosis on hemodialysis Tuesday Thursday Saturday under the care of Dr. Demetrio Harkins at [address] [facility] renal care by way of a left arm AV fistula. He had presented initially to [facility] with diarrhea, nausea and vomiting and generalized weakness for six days. He had missed multiple treatments of dialysis. He presented at [facility]. [facility] with hyperkalemia potassium of  6.9, BUN/ creatinine of 241 and 18.47 mg/dl and serum bicarbonate of 9. He was dialyzed yesterday 4/28/2021 at [facility]. [facility] and transferred to [facility] due to in availability of a bed. Less than 500 cc was removed with dialysis due to hypotension. He is seen at dialysis today. His blood pressures have been in the hypotensive trends between 96-28 systolic blood pressure. He denies fever. He denies abdominal pain. He has ongoing nonbloody diarrhea, watery frequency of 6 times per day. he is tachycardic with heart rate in 127. Work-up for diarrhea shows positive C. difficile. Received a liter bolus of 0.9 saline yesterday. Subjective/internal history    Patient seen and examined,. Patient remains tachycardic in the 120s to 126. He is on Filippo-Synephrine for hypotension.   He is seen on dialysis with no fluid removal  Patient is awake and responsive, but says he is uncomfortable  He is on treatment for oral vancomycin for C. difficile colitis -WBC 25      Past Medical History:        Diagnosis Date    Acute on chronic congestive heart failure (HCC)     Anemia     CAD (coronary artery disease)     [facility]     Dialysis care     ESRD (end stage renal disease) on dialysis [facility]     Foot ulcer, left ([facility] 2015    GERD (gastroesophageal reflux disease)  [facility]od[facility] 2011    MOM-WED-FRI-ON [address]     History of sleep apnea     none since significant weight loss (was 400#)    Hyperlipidemia     Hyperparathyroidism ([facility]     Hypertension     Neuropathy     Peripheral vascular disease (HCC)     Pneumonia     resolved    S/P CABG (coronary artery bypass graft) 06/2016    Type II or unspecified type diabetes mellitus without mention of complication, not stated as uncontrolled     dx-1980           Past Surgical History:        Procedure Laterality Date    CATARACT REMOVAL      DIALYSIS FISTULA CREATION Left 2014    LEG AMPUTATION THROUGH LOWER TIBIA AND FIBULA         Current Medications:    esmolol (BREVIBLOC) 2.5gm/250ml NS infusion, Continuous  vancomycin (VANCOCIN) 1,000 mg in dextrose 5 % 250 mL IVPB (ADDAVIAL), Once  phenylephrine (JASON-SYNEPHRINE) 50 mg in dextrose 5 % 250 mL infusion, Continuous  sevelamer (RENVELA) tablet 4,000 mg, TID WC  0.9 % sodium chloride infusion, Continuous  vancomycin (VANCOCIN) intermittent dosing (placeholder), RX Placeholder  norepinephrine (LEVOPHED) 16 mg in sodium chloride 0.9 % 250 mL infusion, Continuous  aspirin EC tablet 81 mg, Daily  vasopressin 20 Units in dextrose 5 % 100 mL infusion, Continuous  oxyCODONE-acetaminophen (PERCOCET) 5-325 MG per tablet 1 tablet, Q8H PRN  lidocaine 4 % external patch 1 patch, Daily  midodrine (PROAMATINE) tablet 7.5 mg, TID WC  neomycin-bacitracin-polymyxin (NEOSPORIN) ointment, BID  vancomycin (FIRVANQ) 50 MG/ML oral solution 125 mg, 4 times per day  sodium chloride flush 0.9 % injection 5-40 mL, 2 times per day  sodium chloride flush 0.9 % injection 5-40 mL, PRN  0.9 % sodium chloride infusion, PRN  promethazine (PHENERGAN) tablet 12.5 mg, Q6H PRN    Or  ondansetron (ZOFRAN) injection 4 mg, Q6H PRN  polyethylene glycol (GLYCOLAX) packet 17 g, Daily PRN  acetaminophen (TYLENOL) tablet 650 mg, Q6H PRN    Or  acetaminophen (TYLENOL) suppository 650 mg, Q6H PRN atorvastatin (LIPITOR) tablet 40 mg, Nightly  [Held by provider] digoxin (LANOXIN) tablet 125 mcg, Daily  [Held by provider] metoprolol succinate (TOPROL XL) extended release tablet 100 mg, Daily  [Held by provider] NIFEdipine (ADALAT CC) extended release tablet 30 mg, Daily  pantoprazole (PROTONIX) tablet 40 mg, Daily  glucose (GLUTOSE) 40 % oral gel 15 g, PRN  dextrose 50 % IV solution, PRN  glucagon (rDNA) injection 1 mg, PRN  dextrose 5 % solution, PRN  insulin lispro (HUMALOG) injection vial 0-6 Units, TID WC  insulin lispro (HUMALOG) injection vial 0-3 Units, Nightly  cefepime (MAXIPIME) 1000 mg IVPB minibag, Q24H  0.9 % sodium chloride infusion, Continuous        Allergies:  Pcn [penicillins]      Objective:  Constitutional:    CURRENT TEMPERATURE:  Temp: 98.2 °F (36.8 °C)  MAXIMUM TEMPERATURE OVER 24HRS:  Temp (24hrs), Av.2 °F (36.8 °C), Min:97.6 °F (36.4 °C), Max:98.7 °F (37.1 °C)    CURRENT RESPIRATORY RATE:  Resp: 21  CURRENT PULSE:  Pulse: 124  CURRENT BLOOD PRESSURE:  BP: 106/72  24HR BLOOD PRESSURE RANGE:  Systolic (08OIG), IJE:853 , Min:90 , WIA:906   ; Diastolic (98GEQ), LOQ:91, Min:56, Max:83    24HR INTAKE/OUTPUT:      Intake/Output Summary (Last 24 hours) at 2021 1338  Last data filed at 2021 0451  Gross per 24 hour   Intake 822.5 ml   Output    Net 822.5 ml           Physical Exam:  GENERAL APPEARANCE: Alert and cooperative, and appears to be in no acute distress. HEAD: normocephalic  EYES: PERRL, EOMI. Not pale, anicteric   NOSE:  No nasal discharge. THROAT:  Oral cavity and pharynx normal. Moist  NECK: Neck supple, non-tender without lymphadenopathy, masses or thyromegaly. JVD-neg  CARDIAC: Normal S1 and S2. No S3, S4 or murmurs. Rhythm is regular. LUNGS: Clear to auscultation and percussion without rales, rhonchi, wheezing or diminished breath sounds.   ABD-Soft non distended, BS+ Non tender no organomegally  BACK: Examination of the spine reveals  no spinal deformity, without tenderness,   MUSKULOSKELETAL: Adequately aligned spine. No joint erythema or tenderness. EXTREMITIES: No edema. Peripheral pulses intact. NEURO:Alert oriented x 3 ,power 5/5 in all extremities      Labs:  PTH:  No results found for: PTH  abs:   CBC:   Recent Labs     04/30/21  0002 04/30/21  0822 05/01/21  0532   WBC  --  27.4* 25.1*   RBC  --  3.82* 3.99*   HGB 10.1* 10.4* 10.7*   HCT 30.9* 31.6* 33.6*   MCV  --  82.6 84.2   MCH  --  27.3 26.9   MCHC  --  33.0 31.9   RDW  --  20.7* 21.0*   PLT  --  74* 72*   MPV  --  9.3 10.7      BMP:   Recent Labs     04/29/21  0459 04/29/21  1840 04/30/21  0822 05/01/21  0532     --  140 142   K 4.8 4.1 4.4 4.5   CL 96*  --  96* 100   CO2 15*  --  20 19*   *  --  83* 95*   CREATININE 13.19*  --  8.42* 8.25*   GLUCOSE 116*  --  105* 152*   CALCIUM 8.4*  --  9.6 9.6        Phosphorus:    Recent Labs     04/29/21  1840   PHOS 5.8*     Magnesium:   Recent Labs     04/29/21  1840   MG 1.8     Albumin:   Recent Labs     04/30/21  0822   LABALBU 2.9*     Assessment:  1. ESRD on Hemodialysis. His regular HD days are TTS at [facility] using Left AVF under Dr. Arminda Dunaway. His dry weight is not available patient is likely under his dry weight due to history of diarrhea. Renal diet with 1500 mils fluid restriction     2. Anemia of chronic kidney disease-Retacrit once iron studies are adequate    3. Pneumonia and acute diarrhea secondary to C. difficile colitis-ID following    4. Hypotension tachycardia secondary to GI fluid losses    5. Secondary hyperparathyroidism    6. Anion gap metabolic acidosis from missed dialysis    7. Coronary artery disease status post CABG    8.    Peripheral artery disease with history of left BKA and right lower extremity chronic wound    Plan  Continue gentle fluids 0.9 saline at 75 cc/h  Continue Filippo-Synephrine to mean arterial blood pressure of 65  IV albumin and midodrine   Continue oral vancomycin  Check thyroid function test due to tachycardia  Patient on digoxin-monitor electrolytes potassium    Samara Krishna-Afbooneu    Nephrologist

## 2021-05-02 NOTE — PROGRESS NOTES
[facility] -   Infectious diseases evaluation  admission date 4/28/2021    reason for consultation:   Pneumonia/C. difficile colitis    Impression :   Current:  · C. difficile colitis  · Right lower lobe pneumonia, staph aureus MSSA growth on respiratory culture  · Sepsis secondary to above  · Right foot wound  · Metabolic acidosis  · CHF  · End-stage renal disease on hemodialysis  · Diabetes mellitus  · Dyslipidemia  · Hypertension      Recommendations   · Continue p.o. vancomycin 500 mg every 6 hours  · IV ceftriaxone  · MRSA nasal swab was negative  · Swallow study pending  · Follow blood and sputum cultures and adjust antibiotics as needed  · Continue supportive care          History of Present Illness:   Initial history:  Tirso Warner is a 60y.o.-year-old male was transferred from [place].  The patient presented with worsening cough productive with blood-tinged sputum associated with shortness of breath and diarrhea for 2 weeks. He also complaining of generalized weakness, nausea and vomiting. no alleviating or aggravating factors. The patient is poor historian, lives at home alone, states that he received Covid 19 vaccine. Apparently the patient missed hemodialysis.   He also had left foot wound with no purulent drainage or necrotic tissue, was evaluated by podiatry, foot x-ray not suggestive of osteomyelitis  On admission he was tachycardic, had leukocytosis with WBC of 29.6  CT chest and abdomen showed right lower lobe infiltrates  COVID-19 rapid test negative  VQ scan was low probability on 4/29/2021  On 4/30/2021He became hypotensive with a systolic blood pressure in the 70s, tachycardic was transferred to ICU, was started on pressors  Procalcitonin was 25.7 on 4/30/2021  IJ triple-lumen was placed was placed 4/30/2021    Interval changes  5/2/2021   He remains in the ICU, remains tachycardic, hypotensive on Filippo-Synephrine, was started on amiodarone and esmolol for tachycardia, no reported fever, feeling overall better, no increased shortness of breath or pain. Sputum Gram growing staph aureus MSSA  WBC improved down to 14 today from 25 yesterday    Patient Vitals for the past 8 hrs:   BP Temp Temp src Pulse Resp SpO2   05/02/21 1630 116/75   117 25 94 %   05/02/21 1615 108/80   116 21 95 %   05/02/21 1600 121/82   115 26 92 %   05/02/21 1545 115/84   117 23 93 %   05/02/21 1530 117/75   117 22 93 %   05/02/21 1515 116/79   117 19 91 %   05/02/21 1500 96/73   117 21 94 %   05/02/21 1445 108/70   117 25 96 %   05/02/21 1430 108/76   116 18 95 %   05/02/21 1415 105/74   115 21 92 %   05/02/21 1400 104/74   114 16 94 %   05/02/21 1345 101/75   118 22 (!) 89 %   05/02/21 1330 104/80   115 21 (!) 86 %   05/02/21 1315 (!) 125/100   117 24 94 %   05/02/21 1300 104/74   115 19 96 %   05/02/21 1245 112/78   117 23 95 %   05/02/21 1230 107/76 97.7 °F (36.5 °C) Oral 116 19 95 %   05/02/21 1215 97/68   116 16 95 %   05/02/21 1200 108/77   117 26 93 %   05/02/21 1145 95/67   115 26 94 %   05/02/21 1130 97/74   115  96 %   05/02/21 1115 105/78   115 20 96 %   05/02/21 1045 104/74   114 16 90 %   05/02/21 1030 83/68   114 16 92 %   05/02/21 1015 95/70   113 30    05/02/21 1000 97/78   114 21 92 %   05/02/21 0952    116 22 95 %   05/02/21 0945 105/71   117 24 100 %   05/02/21 0930 110/73   117 17 95 %         I have personally reviewed the past medical history, past surgical history, medications, social history, and family history, and I haveupdated the database accordingly. Allergies:   Pcn [penicillins]     Review of Systems:     Review of Systems  As per history present illness, other than above 12 system review is negative  Physical Examination :       Physical Exam  Constitutional:       General: He is not in acute distress. HENT:      Head: Normocephalic and atraumatic.       Right Ear: External ear normal.      Left Ear: External ear normal.   Neck:      Musculoskeletal: Neck supple. No neck rigidity. Cardiovascular:      Rate and Rhythm: Normal rate and regular rhythm. Abdominal:      General: Abdomen is flat. There is no distension. Palpations: Abdomen is soft. Tenderness: There is no abdominal tenderness. Musculoskeletal:      Comments: Left below-knee amputation site with no open wound no erythema. Right foot dressing   Skin:     General: Skin is warm. Coloration: Skin is not jaundiced. Neurological:      General: No focal deficit present. Mental Status: He is alert and oriented to person, place, and time.          Past Medical History:     Past Medical History:   Diagnosis Date    Acute on chronic congestive heart failure (HCC)     Anemia     CAD (coronary artery disease)     [facility]     Dialysis care     ESRD (end stage renal disease) on dialysis ([facility]     Foot ulcer, left ([facility] 2015    GERD (gastroesophageal reflux disease)     Hemodialysis patient ([facility] 2011    MOM-WED-FRI-ON [address]     History of sleep apnea     none since significant weight loss (was 400#)    Hyperlipidemia     Hyperparathyroidism ([address]     Hypertension     Neuropathy     Peripheral vascular disease ([facility]     Pneumonia     resolved    S/P CABG (coronary artery bypass graft) 06/2016    Type II or unspecified type diabetes mellitus without mention of complication, not stated as uncontrolled     dx-1980       Past Surgical  History:     Past Surgical History:   Procedure Laterality Date    CATARACT REMOVAL      DIALYSIS FISTULA CREATION Left 2014    LEG AMPUTATION THROUGH LOWER TIBIA AND FIBULA         Medications:      vancomycin  500 mg Oral 4 times per day    cefTRIAXone (ROCEPHIN) IV  2,000 mg Intravenous Q24H    sevelamer  4,000 mg Oral TID WC    aspirin  81 mg Oral Daily    lidocaine  1 patch Transdermal Daily    midodrine  7.5 mg Oral TID WC    Medical Decision Making:   I have independently reviewed/ordered the following labs:    CBC with Differential:   Recent Labs     05/01/21  0532 05/02/21  0457   WBC 25.1* 14.6*   HGB 10.7* 10.5*   HCT 33.6* 33.8*   PLT 72* 41*   LYMPHOPCT 1* 4*   MONOPCT 6 3     BMP:  Recent Labs     04/29/21  1840 04/29/21  1840 05/01/21  0532 05/02/21  0457   NA  --    < > 142 138   K 4.1   < > 4.5 3.8   CL  --    < > 100 97*   CO2  --    < > 19* 22   BUN  --    < > 95* 59*   CREATININE  --    < > 8.25* 5.66*   MG 1.8  --   --  1.7    < > = values in this interval not displayed. Hepatic Function Panel:   Recent Labs     04/30/21  0822   PROT 6.0*   LABALBU 2.9*   BILITOT 0.74   ALKPHOS 137*   ALT 12   AST 17     No results for input(s): RPR in the last 72 hours. No results for input(s): HIV in the last 72 hours. No results for input(s): BC in the last 72 hours. Lab Results   Component Value Date    CREATININE 5.66 05/02/2021    GLUCOSE 141 05/02/2021    GLUCOSE 128 01/09/2012       Detailed results: Thank you for allowing us to participate in the care of this patient. Please call with questions. This note is created with the assistance of a speech recognition program.  While intending to generate adocument that actually reflects the content of the visit, the document can still have some errors including those of syntax and sound a like substitutions which may escape proof reading. It such instances, actual meaningcan be extrapolated by contextual diversion.     Justina Neal MD  Office: [phone]  Perfect serve / office [phone]

## 2021-05-02 NOTE — CARE COORDINATION
ONGOING DISCHARGE PLANNING NOTE:    Writer reviewed LSW notes, and discharge plan is [facility]. Requires a precert and one will be started once PT/OT are in the chart. In ICU on esmolol gtt, gabrielle gtt, IV rocephin, PO vanco.   Dialysis treatment yesterday.     Electronically signed by Analia Clements RN on 5/2/2021 at 10:27 AM

## 2021-05-02 NOTE — PROGRESS NOTES
was no osteomyelitis. They did debride loose skin and recommend conservative management. After dialysis patient was transferred to [facility]. On my exam he was alert and oriented x 3 and satting on room air. He was tachycardic. He appeared fatigued. He was actively coughing up small amounts of blood. Review of Systems:     Constitutional: Positive for fatigue-improving. Negative for appetite change, chills and fever. HENT: Negative for congestion and sore throat. Eyes: Negative for visual disturbance. Respiratory: negative cough and shortness of breath. Hemoptysis resolved. Cardiovascular: Negative for chest pain, palpitations and leg swelling. Gastrointestinal: diarrhea resolved. No nausea. Negative for abdominal pain, blood in stool or vomiting. Musculoskeletal: Negative for arthralgias and myalgias. Skin: Negative for rash. Neurological: Positive for weakness (generalized). Negative for light-headedness and headaches.      Medications: Allergies:     Allergies   Allergen Reactions    Pcn [Penicillins] Other (See Comments)     Trouble walking       Current Meds:   Scheduled Meds:    vancomycin  500 mg Oral 4 times per day    cefTRIAXone (ROCEPHIN) IV  2,000 mg Intravenous Q24H    sevelamer  4,000 mg Oral TID WC    aspirin  81 mg Oral Daily    lidocaine  1 patch Transdermal Daily    midodrine  7.5 mg Oral TID WC    neomycin-bacitracin-polymyxin   Topical BID    sodium chloride flush  5-40 mL Intravenous 2 times per day    atorvastatin  40 mg Oral Nightly    [Held by provider] digoxin  125 mcg Oral Daily    [Held by provider] metoprolol succinate  100 mg Oral Daily    [Held by provider] NIFEdipine  30 mg Oral Daily    pantoprazole  40 mg Oral Daily    insulin lispro  0-6 Units Subcutaneous TID     insulin lispro  0-3 Units Subcutaneous Nightly     Continuous Infusions:    esmolol 150 mcg/kg/min (05/02/21 0786)    phenylephrine (JASON-SYNEPHRINE) 50mg/250mL infusion 55 mcg/min (21 0857)    sodium chloride Stopped (21 1250)    norepinephrine      vasopressin (Septic Shock) infusion      sodium chloride      dextrose      sodium chloride 75 mL/hr at 21 0854     PRN Meds: oxyCODONE-acetaminophen, sodium chloride flush, sodium chloride, promethazine **OR** ondansetron, polyethylene glycol, acetaminophen **OR** acetaminophen, glucose, dextrose, glucagon (rDNA), dextrose    Data:     Past Medical History:   has a past medical history of Acute on chronic congestive heart failure ([facility] Anemia, CAD (coronary artery disease), Cardiomyopathy ([facility] Dialysis care, ESRD (end stage renal disease) on dialysis ([facility] Foot ulcer, left ([facility] GERD (gastroesophageal reflux disease), Hemodialysis patient ([facility] History of sleep apnea, Hyperlipidemia, Hyperparathyroidism ([facility] Hypertension, Neuropathy, Peripheral vascular disease ([facility] Pneumonia, S/P CABG (coronary artery bypass graft), and Type II or unspecified type diabetes mellitus without mention of complication, not stated as uncontrolled. Social History:   reports that he has never smoked. He has never used smokeless tobacco. He reports that he does not drink alcohol or use drugs. Family History:   Family History   Problem Relation Age of Onset    Heart Disease Father     Diabetes Father     Diabetes Brother        Vitals:  BP 97/68   Pulse 117   Temp 97.6 °F (36.4 °C) (Oral)   Resp 22   Ht 6' (1.829 m)   Wt 229 lb 15 oz (104.3 kg)   SpO2 96%   BMI 31.19 kg/m²   Temp (24hrs), Av.1 °F (36.7 °C), Min:97.6 °F (36.4 °C), Max:98.3 °F (36.8 °C)      Recent Labs     21  1151 21  1841 21  0850   POCGLU 126* 114* 116* 124*       I/O(24Hr):     Intake/Output Summary (Last 24 hours) at 2021 0947  Last data filed at 2021 0500  Gross per 24 hour   Intake 2893.35 ml   Output    Net 2893.35 ml       Labs:    CBC:   Lab Results   Component Value Date    WBC 14.6 2021 RBC 3.96 05/02/2021    RBC 3.51 01/09/2012    HGB 10.5 05/02/2021    HCT 33.8 05/02/2021    MCV 85.4 05/02/2021    MCH 26.5 05/02/2021    MCHC 31.1 05/02/2021    RDW 20.7 05/02/2021    PLT 41 05/02/2021     01/09/2012    MPV 9.9 05/02/2021     CBC with Differential:    Lab Results   Component Value Date    WBC 14.6 05/02/2021    RBC 3.96 05/02/2021    RBC 3.51 01/09/2012    HGB 10.5 05/02/2021    HCT 33.8 05/02/2021    PLT 41 05/02/2021     01/09/2012    MCV 85.4 05/02/2021    MCH 26.5 05/02/2021    MCHC 31.1 05/02/2021    RDW 20.7 05/02/2021    NRBC 1 03/07/2016    METASPCT 2 05/01/2021    LYMPHOPCT 4 05/02/2021    MONOPCT 3 05/02/2021    MYELOPCT 1 03/06/2016    BASOPCT 0 05/02/2021    MONOSABS 0.44 05/02/2021    LYMPHSABS 0.58 05/02/2021    EOSABS 0.00 05/02/2021    BASOSABS 0.00 05/02/2021    DIFFTYPE NOT REPORTED 05/02/2021     WBC:    Lab Results   Component Value Date    WBC 14.6 05/02/2021     CMP:    Lab Results   Component Value Date     05/02/2021    K 3.8 05/02/2021    CL 97 05/02/2021    CO2 22 05/02/2021    BUN 59 05/02/2021    CREATININE 5.66 05/02/2021    GFRAA 12 05/02/2021    LABGLOM 10 05/02/2021    GLUCOSE 141 05/02/2021    GLUCOSE 128 01/09/2012    PROT 6.0 04/30/2021    LABALBU 2.9 04/30/2021    LABALBU 3.8 01/09/2012    CALCIUM 9.3 05/02/2021    BILITOT 0.74 04/30/2021    ALKPHOS 137 04/30/2021    AST 17 04/30/2021    ALT 12 04/30/2021       Lab Results   Component Value Date/Time    SPECIAL NOT REPORTED 04/30/2021 11:31 PM     Lab Results   Component Value Date/Time    CULTURE NORMAL SKIN MALKA (A) 04/30/2021 11:31 PM         Radiology:    Mary Ann Polo Foot Right (min 3 Views)    Result Date: 4/28/2021  EXAMINATION: THREE XRAY VIEWS OF THE RIGHT FOOT 4/28/2021 4:44 am COMPARISON: 12/20/2015 HISTORY: ORDERING SYSTEM PROVIDED HISTORY: extensive R foot wound TECHNOLOGIST PROVIDED HISTORY: extensive R foot wound Reason for Exam: Extensive right foot wound, FINDINGS: Complete collapse PROVIDED HISTORY: diarrhea, abd pain, wbc 30k, discussed with nephro TECHNOLOGIST PROVIDED HISTORY: diarrhea, abd pain, wbc 30k, discussed with nephro Decision Support Exception->Emergency Medical Condition (MA) Reason for Exam: abd pain, nephro maddie approved contrast, dialysis pt Acuity: Acute Type of Exam: Initial FINDINGS: Lower Chest: There is abnormal airspace consolidation within the right lower lobe consistent with a right lower lobe pneumonia. There is no pleural effusion. There is cardiomegaly. Severe coronary artery atherosclerotic calcifications are present. Organs: There is a small volume of ascites. Cholecystectomy clips are present. The liver, spleen, pancreas and adrenal glands are normal in appearance. There is severe renal atrophy. Innumerous renal cysts are noted. There is no hydronephrosis. A right nephroureteral stent is present. GI/Bowel: There are no abnormally dilated loops of large or small bowel present. There is no mesenteric inflammatory stranding present. The appendix is normal. Pelvis: There is a small volume of free fluid in the pelvis. There is no free fluid or pelvic mass identified. The urinary bladder is normal. Peritoneum/Retroperitoneum: There is no pathologically enlarged lymphadenopathy present. Severe atherosclerotic calcifications are present within the abdominal aorta and proximal anchor arteries. Bones/Soft Tissues: No lytic or blastic osseous lesions are identified. There is bilateral spondylolysis at L5 and grade 1 spondylolisthesis of L5 relative to S1. There is a disc bulge and facet arthropathy resulting in severe bilateral neural foraminal narrowing. Findings consistent with renal osteodystrophy are noted. 1. Right lower lobe pneumonia. Follow-up PA and lateral chest radiographs are recommended after treatment to ensure resolution. 2. Small volume ascites. 3. Normal appendix.  4. Severe bilateral neural foraminal narrowing at L5-S1 secondary to bilateral spondylolysis at L5, grade 1 spondylolisthesis of L5 relative to S1, a disc bulge and facet arthropathy. Xr Chest Portable    Result Date: 4/28/2021  EXAMINATION: ONE XRAY VIEW OF THE CHEST 4/28/2021 2:48 am COMPARISON: 06/03/2019 HISTORY: ORDERING SYSTEM PROVIDED HISTORY: missed dialysis x2weeks TECHNOLOGIST PROVIDED HISTORY: missed dialysis x2weeks Reason for Exam: Upright port, missed dialysis x2 weeks FINDINGS: There is stable cardiomegaly. There is no focal consolidation, pleural effusion or evidence of edema. There is no pneumothorax identified. 1. Stable cardiomegaly. 2. No acute cardiopulmonary process identified. Physical Examination:        PHYSICAL EXAM:    Physical Exam  Constitutional:       Appearance: Awake, alert and oriented. Looks tired. Comments: Actively coughing up small amounts of blood. Is able to converse in full sentences on room air. HENT:      Head: Normocephalic and atraumatic. Eyes:      General: No scleral icterus. Extraocular Movements: Extraocular movements intact. Conjunctiva/sclera: Conjunctivae normal.      Pupils: Pupils are equal, round, and reactive to light. Neck:      Musculoskeletal: Neck supple. No muscular tenderness. Cardiovascular:      Rate and Rhythm: Normal rate and regular rhythm. Pulses: Normal pulses. Heart sounds: Normal heart sounds. Pulmonary:      Effort: Pulmonary effort is normal.      Breath sounds: Normal breath sounds. No wheezing or rales. Abdominal:      General: Bowel sounds are normal. There is no distension. Palpations: Abdomen is soft. Tenderness: There is no abdominal tenderness. There is no guarding or rebound. Genital discharge (whitis)  Musculoskeletal:      Comments: Left BKA. Right foot wound is wrapped. Lymphadenopathy:      Cervical: No cervical adenopathy. Skin:     General: Skin is warm and dry. Capillary Refill: Capillary refill takes less than 2 seconds. pneumonia, C. Diff and diabetic foot  -Patient has penile discharge, swab sent   -Covid negative.      Chronic systolic and diastolic CHF/Hx of HTN. Currently hypotension and tachycardia  -BNP > 300,000, likely elevated from missed dialysis for 2 weeks  -Echo 6/2019: LVEF 30-35% with moderate diastolic dysfunction  -CAD, CABG, stents, last cath 2019.   -Tachycardia 2ry to hypotension 2ry to likely sepsis. CVP 12  -DC esmolol drip. Start amiodarone protocol 150 followed by drip 1 mg/h for 8 hours then 0.5/h  -Daily EKG for QTC    -Toprol 100 mg PO daily on hold due to hypotension.  -Nifedipine 30 mg PO daily on hold due to hypotension. Hyperkalemia 2/2 ESRD on hemodialysis TTS  -Missed 2 weeks of dialysis.   -K 6.9 on admission with EKG changes, received IV calcium gluconate and insulin/dextrose.  -Underwent hemodialysis at [facility]. V's  -Nephrology on board  -S/P HD here in [facility]. Had 2 sessions so far, last one yesterday.      -Hyperkalemia resolved after dialysis, creatinine 18.47 on presentation.   -Renvela 4000 mg PO TID     Hemoptysis likely 2/2 pneumonia  -HgB stable  -H&H q8h, keep HgB > 7  -NM lung scan perfusion low probability of PE     Diarrhea & nausea  -CT abdomen and pelvis showed small volume ascites and normal appendix  -C.  Diff +ve   -Vancomycin oral added, diarrhea subsided, dose increased to 500 mg every 6 hours  -Antinausea meds PRN  -Protonix 40 mg PO daily      Right foot ulcer  -X-ray showed no evidence of OM  -Podiatry on board  -Wound care on board      Dyslipidemia   -Lipitor 40 mg PO nightly    Type 2 diabetes mellitus  -Low dose ISS  -Hypoglycemia protocol     Bilateral neural foraminal narrowing at L5-S1 with spondylolysis  -Showed up on CT A&P  -Monitor     Diet: renal diet low salt low fluid  DVT prophylaxis: EPCs; avoid chemical due to hemoptysis, thrombocytopenia 42     Mo'men Fer Villegas MD  5/2/2021  9:47 AM     Attending Physician Statement  I have discussed the care of Tapan Causey and I have examined the patient myselft and taken ros and hpi , including pertinent history and exam findings,  with the resident. I have reviewed the key elements of all parts of the encounter with the resident. I agree with the assessment, plan and orders as documented by the resident. cc 35  cdiff  Check cvp  On pressors ,shock  Cortisol levels ok      Electronically signed by Avery Pennington MD

## 2021-05-02 NOTE — PLAN OF CARE
Problem: Falls - Risk of:  Goal: Will remain free from falls  Description: The patient remained free from falls this shift, call light within reach, bed in locked and lowest position. Side rails up x2. Continue to monitor closely. 5/2/2021 0453 by Michael Glover RN  Outcome: Ongoing  5/1/2021 1735 by Marylen Fusi, RN  Outcome: Ongoing  Goal: Absence of physical injury  Description: Absence of physical injury  5/2/2021 0453 by Michael Glover RN  Outcome: Ongoing  5/1/2021 1735 by Marylen Fusi, RN  Outcome: Ongoing     Problem: Skin Integrity:  Goal: Will show no infection signs and symptoms  Description: Patient skin assessment complete. No signs/symptoms of new skin breakdown. Waffle mattress in place. Pt turned and repositioned every two hours with assistance from staff. Area kept free from moisture. Proper nourishment and fluids encouraged, as appropriate. Skin remains clean, dry, and intact. Will continue to monitor for additional needs and changes in skin breakdown. Outcome: Ongoing  Goal: Absence of new skin breakdown  Description: Absence of new skin breakdown  5/2/2021 0453 by Michael Glover RN  Outcome: Ongoing  5/1/2021 1735 by Marylen Fusi, RN  Outcome: Ongoing     Problem:  Activity:  Goal: Risk for activity intolerance will decrease  Description: Risk for activity intolerance will decrease  Outcome: Ongoing     Problem: Fluid Volume:  Goal: Will show no signs or symptoms of fluid imbalance  Description: Will show no signs or symptoms of fluid imbalance  Outcome: Ongoing     Problem: Nutritional:  Goal: Maintenance of adequate nutrition will be supported  Description: Maintenance of adequate nutrition will be supported  Outcome: Ongoing     Problem: Physical Regulation:  Goal: Complications related to the disease process, condition or treatment will be avoided or minimized  Description: Complications related to the disease process, condition or treatment will be avoided or minimized Outcome: Ongoing     Problem: Urinary Elimination:  Goal: Ability to achieve and maintain adequate urine output will be supported  Description: Ability to achieve and maintain adequate urine output will be supported  Outcome: Ongoing     Problem: Airway Clearance - Ineffective:  Goal: Clear lung sounds  Description: Clear lung sounds  Outcome: Ongoing  Goal: Ability to maintain a clear airway will improve  Description: Ability to maintain a clear airway will improve  Outcome: Ongoing     Problem: Fluid Volume - Deficit:  Goal: Achieves intake and output within specified parameters  Description: Achieves intake and output within specified parameters  Outcome: Ongoing     Problem: Gas Exchange - Impaired:  Goal: Levels of oxygenation will improve  Description: Levels of oxygenation will improve  Outcome: Ongoing     Problem: Confusion - Acute:  Goal: Absence of continued neurological deterioration signs and symptoms  Description: Absence of continued neurological deterioration signs and symptoms  Outcome: Ongoing  Goal: Mental status will be restored to baseline  Description: Mental status will be restored to baseline  5/2/2021 0453 by Durwood Merlin, RN  Outcome: Ongoing  5/1/2021 1735 by Tia Pino RN  Outcome: Ongoing     Problem: Discharge Planning:  Goal: Ability to perform activities of daily living will improve  Description: Ability to perform activities of daily living will improve  5/2/2021 0453 by Durwood Merlin, RN  Outcome: Ongoing  5/1/2021 1735 by Tia Pino RN  Outcome: Ongoing  Goal: Participates in care planning  Description: Participates in care planning  Outcome: Ongoing     Problem: Injury - Risk of, Physical Injury:  Goal: Will remain free from falls  Description: The patient remained free from falls this shift, call light within reach, bed in locked and lowest position. Side rails up x2. Continue to monitor closely.    5/2/2021 0453 by Durwood Merlin, RN  Outcome: Ongoing  5/1/2021 1735 by Luis A Howe RN  Outcome: Ongoing  Goal: Absence of physical injury  Description: Absence of physical injury  5/2/2021 0453 by Norma Strong RN  Outcome: Ongoing  5/1/2021 1735 by Luis A Howe RN  Outcome: Ongoing     Problem: Mood - Altered:  Goal: Mood stable  Description: Mood stable  Outcome: Ongoing  Goal: Absence of abusive behavior  Description: Absence of abusive behavior  5/2/2021 0453 by Norma Strong RN  Outcome: Ongoing  5/1/2021 1735 by Luis A Howe RN  Outcome: Ongoing  Goal: Verbalizations of feeling emotionally comfortable while being cared for will increase  Description: Verbalizations of feeling emotionally comfortable while being cared for will increase  Outcome: Ongoing     Problem: Psychomotor Activity - Altered:  Goal: Absence of psychomotor disturbance signs and symptoms  Description: Absence of psychomotor disturbance signs and symptoms  Outcome: Ongoing     Problem: Sensory Perception - Impaired:  Goal: Demonstrations of improved sensory functioning will increase  Description: Demonstrations of improved sensory functioning will increase  Outcome: Ongoing  Goal: Decrease in sensory misperception frequency  Description: Decrease in sensory misperception frequency  Outcome: Ongoing  Goal: Able to refrain from responding to false sensory perceptions  Description: Able to refrain from responding to false sensory perceptions  Outcome: Ongoing  Goal: Demonstrates accurate environmental perceptions  Description: Demonstrates accurate environmental perceptions  Outcome: Ongoing  Goal: Able to distinguish between reality-based and nonreality-based thinking  Description: Able to distinguish between reality-based and nonreality-based thinking  Outcome: Ongoing  Goal: Able to interrupt nonreality-based thinking  Description: Able to interrupt nonreality-based thinking  Outcome: Ongoing     Problem: Sleep Pattern Disturbance:  Goal: Appears well-rested  Description: Appears well-rested  Outcome: Ongoing     Problem: Pain:  Goal: Pain level will decrease  Description: Pain level will decrease  Outcome: Ongoing  Goal: Control of acute pain  Description: Control of acute pain  Outcome: Ongoing  Goal: Control of chronic pain  Description: Control of chronic pain  Outcome: Ongoing     Problem: Nutrition  Goal: Optimal nutrition therapy  Description: Nutrition Problem #1: Increased nutrient needs  Intervention: Food and/or Nutrient Delivery: Continue Current Diet, Start Oral Nutrition Supplement  Nutritional Goals: po intake greater than 50%     Outcome: Ongoing

## 2021-05-02 NOTE — PROGRESS NOTES
[facility]   Progress Note                   Date:   5/2/2021  Patient name: Vincent Nunez  Date of admission:  4/28/2021  2:27 PM  MRN:   [id number]  YOB: 1955  PCP: Victor M Winchester MD    Reason for Admission: Diarrhea weakness    Subjective:       Clinical Changes / Abnormalities: Patient patient is much awake alert talking denies any specific symptoms chest pain heart rate is better.   Patient is still on esmolol drip and tapering dose of phenylephrine  Patient had dialysis done yesterday      Medications:   Scheduled Meds:   vancomycin  500 mg Oral 4 times per day    cefTRIAXone (ROCEPHIN) IV  2,000 mg Intravenous Q24H    sevelamer  4,000 mg Oral TID WC    aspirin  81 mg Oral Daily    lidocaine  1 patch Transdermal Daily    midodrine  7.5 mg Oral TID WC    neomycin-bacitracin-polymyxin   Topical BID    sodium chloride flush  5-40 mL Intravenous 2 times per day    atorvastatin  40 mg Oral Nightly    [Held by provider] digoxin  125 mcg Oral Daily    [Held by provider] metoprolol succinate  100 mg Oral Daily    [Held by provider] NIFEdipine  30 mg Oral Daily    pantoprazole  40 mg Oral Daily    insulin lispro  0-6 Units Subcutaneous TID WC    insulin lispro  0-3 Units Subcutaneous Nightly     Continuous Infusions:   esmolol 150 mcg/kg/min (05/02/21 0823)    phenylephrine (JASON-SYNEPHRINE) 50mg/250mL infusion 55 mcg/min (05/02/21 0857)    sodium chloride Stopped (04/30/21 1250)    norepinephrine      vasopressin (Septic Shock) infusion      sodium chloride      dextrose      sodium chloride 75 mL/hr at 05/02/21 0854     CBC:   Recent Labs     04/30/21  0822 05/01/21  0532 05/02/21  0457   WBC 27.4* 25.1* 14.6*   HGB 10.4* 10.7* 10.5*   PLT 74* 72* 41*     BMP:    Recent Labs     04/30/21  0822 05/01/21  0532 05/02/21  0457    142 138   K 4.4 4.5 3.8   CL 96* 100 97*   CO2 20 19* 22   BUN 83* 95* 59*   CREATININE 8.42* 8.25* 5.66*   GLUCOSE 105* 152* 141* Hepatic:   Recent Labs     04/30/21  0822   AST 17   ALT 12   BILITOT 0.74   ALKPHOS 137*     Troponin: No results for input(s): TROPONINI in the last 72 hours. BNP: No results for input(s): BNP in the last 72 hours. Lipids: No results for input(s): CHOL, HDL in the last 72 hours. Invalid input(s): LDLCALCU  INR: No results for input(s): INR in the last 72 hours. Objective:   Vitals: BP 97/68   Pulse 117   Temp 97.6 °F (36.4 °C) (Oral)   Resp 22   Ht 6' (1.829 m)   Wt 229 lb 15 oz (104.3 kg)   SpO2 96%   BMI 31.19 kg/m²   I/O last 3 completed shifts: In: 2893.4 [I.V.:2843.4; IV Piggyback:50]  Out: -   No intake/output data recorded.   Scheduled Meds:   vancomycin  500 mg Oral 4 times per day    cefTRIAXone (ROCEPHIN) IV  2,000 mg Intravenous Q24H    sevelamer  4,000 mg Oral TID WC    aspirin  81 mg Oral Daily    lidocaine  1 patch Transdermal Daily    midodrine  7.5 mg Oral TID WC    neomycin-bacitracin-polymyxin   Topical BID    sodium chloride flush  5-40 mL Intravenous 2 times per day    atorvastatin  40 mg Oral Nightly    [Held by provider] digoxin  125 mcg Oral Daily    [Held by provider] metoprolol succinate  100 mg Oral Daily    [Held by provider] NIFEdipine  30 mg Oral Daily    pantoprazole  40 mg Oral Daily    insulin lispro  0-6 Units Subcutaneous TID WC    insulin lispro  0-3 Units Subcutaneous Nightly     Continuous Infusions:   esmolol 150 mcg/kg/min (05/02/21 0823)    phenylephrine (JASON-SYNEPHRINE) 50mg/250mL infusion 55 mcg/min (05/02/21 0857)    sodium chloride Stopped (04/30/21 1250)    norepinephrine      vasopressin (Septic Shock) infusion      sodium chloride      dextrose      sodium chloride 75 mL/hr at 05/02/21 0854     PRN Meds:.oxyCODONE-acetaminophen, sodium chloride flush, sodium chloride, promethazine **OR** ondansetron, polyethylene glycol, acetaminophen **OR** acetaminophen, glucose, dextrose, glucagon (rDNA), dextrose    CONSTITUTIONAL: Awake alert much improved from yesterday  HEAD: normocephalic, atraumatic   EYES: PERRLA, EOMI   ENT: moist mucous membranes, uvula midline   NECK: symmetric, no midline tenderness to palpation   LUNGS: Reduced to auscultation bilaterally   CARDIOVASCULAR: regular rate and rhythm, no murmurs, rubs or gallops   ABDOMEN: Soft, non-tender, non-distended with normal active bowel sounds   SKIN: Right foot with dressing with ulcer on the heel, BKA left       EKG: Sinus tachycardia, inferior infarction, ST-T changes consistent with ischemia, QTc 515 msec     TTE 6/2/19  Summary  Technically difficult study due to patient habitus. Left ventricle is normal in size. Mild left ventricular hypertrophy. Severely reduced left ventricular systolic function. Estimated LV EF 30-35 %. Grade II (moderate) left ventricular diastolic dysfunction. Left atrium is mildly dilated. Trivial valve disease as below.        Cardiac cath 6/2/19   Severe native vessel disease.   Patent LIMA-LAD. The LAD distal to touch down is small in caliber with 80%   diffuse disease.  (Not amenable to PCI)   Chronic total occlusion of RCA.   LCX had 80% mid stenosis reduced to 0% using 3x38 mm Xience stent, post   dilated using 3.25 mm NC balloon.             Assessment / Acute Cardiac Problems:       Patient Active Problem List:     SDH (subdural hematoma) ([facility])     ESRD on dialysis ([address]     DM (diabetes mellitus) ([address]     Anemia of chronic disease     Charcot foot due to diabetes mellitus ([address]     Diabetic ulcer of left foot ([address]     Hypertension, essential     Type 2 diabetes mellitus with foot ulcer ([address]     Noncompliance     Acute on chronic congestive heart failure (HCC)     Sepsis (HCC)     Metabolic acidosis     Azotemia     Hyperkalemia     Diarrhea     Nausea and vomiting     Peripheral vascular disease ([address]     History of left below knee amputation ([address]     Hemoptysis     Ulcerated, foot, right, with fat layer exposed ([address]     Charcot's joint of foot, right     Pneumonia     Sepsis due to pneumonia [facility])      Plan of Treatment:   Tachycardia, multifactorial sepsis, hypoxia hypotension. EKG is done seem like ectopic atrial with 2-1 conduction. QTC is much improved from yesterday  Plan to start on amiodarone protocol, 150 followed by drip with 1 mg/h for 8 hours then 0.5/h  Please do daily EKG for QTCc  Will DC esmolol drip. Hypotension is getting better still on lip tapering dose of phenylephrine I would rather wean off if we can. I had DC beta-blockers calcium channel blockers hydralazine so far till the blood pressure is able to tolerate off pressors. Extensive history of CAD CABG, Stent , last cath 2019 as above.   CHF HFrEF, at present the volume status will be controlled by dialysis  CKD dialysis as was told patient may be getting dialysis today  Ulcerated foot being followed by podiatry  Sepsis, C. difficile, sputum grow staph aureus ID is on case  Pneumonia sepsis being followed by ID  Patient with slow improvement      Madelon Gottron, [address] [facility] Cardiology  [phone]

## 2021-05-02 NOTE — PROGRESS NOTES
Nephrology ESRD Consult Note    Reason for Consult:  End stage renal disease  Requesting Physician: Dr Karyle Bridgeman      History of Present Illness: This is a 60 y.o. male with history of CAD status post CABG, cardiomyopathy, dyslipidemia, end stage renal disease secondary to nephrosclerosis on hemodialysis Tuesday Thursday Saturday under the care of Dr. Daniels Overall at [address] renal care by way of a left arm AV fistula. He had presented initially to [facility]. [facility] with diarrhea, nausea and vomiting and generalized weakness for six days. He had missed multiple treatments of dialysis. He presented at [facility]. [facility] with hyperkalemia potassium of  6.9, BUN/ creatinine of 241 and 18.47 mg/dl and serum bicarbonate of 9. He was dialyzed yesterday 4/28/2021 at [facility]. [facility] and transferred to [facility] due to in availability of a bed. Less than 500 cc was removed with dialysis due to hypotension. He is seen at dialysis today. His blood pressures have been in the hypotensive trends between 13-71 systolic blood pressure. He denies fever. He denies abdominal pain. He has ongoing nonbloody diarrhea, watery frequency of 6 times per day. he is tachycardic with heart rate in 127. Work-up for diarrhea shows positive C. difficile. Received a liter bolus of 0.9 saline yesterday. Subjective/internal history  Patient seen and examined. Denies chest pain, nausea vomiting and diarrhea is improving  Tachycardia slowly improving- he was started on amiodarone and esmolol has been weaned off. He remains on Filippo-Synephrine at 50 mics/minute. Cortisol is 38. Had dialysis yesterday without fluid removal tolerated the procedure  He is on treatment with  oral vancomycin for C. difficile colitis -WBC decreased to 14 from 25 yesterday.   Platelet count is 41      Past Medical History:        Diagnosis Date    Acute on chronic congestive heart failure (HCC)     Anemia     CAD (coronary artery disease)     Cardiomyopathy ([address]     (GLYCOLAX) packet 17 g, Daily PRN  acetaminophen (TYLENOL) tablet 650 mg, Q6H PRN    Or  acetaminophen (TYLENOL) suppository 650 mg, Q6H PRN  atorvastatin (LIPITOR) tablet 40 mg, Nightly  [Held by provider] digoxin (LANOXIN) tablet 125 mcg, Daily  [Held by provider] metoprolol succinate (TOPROL XL) extended release tablet 100 mg, Daily  [Held by provider] NIFEdipine (ADALAT CC) extended release tablet 30 mg, Daily  pantoprazole (PROTONIX) tablet 40 mg, Daily  glucose (GLUTOSE) 40 % oral gel 15 g, PRN  dextrose 50 % IV solution, PRN  glucagon (rDNA) injection 1 mg, PRN  dextrose 5 % solution, PRN  insulin lispro (HUMALOG) injection vial 0-6 Units, TID WC  insulin lispro (HUMALOG) injection vial 0-3 Units, Nightly  0.9 % sodium chloride infusion, Continuous        Allergies:  Pcn [penicillins]      Objective:  Constitutional:    CURRENT TEMPERATURE:  Temp: 97.7 °F (36.5 °C)  MAXIMUM TEMPERATURE OVER 24HRS:  Temp (24hrs), Av °F (36.7 °C), Min:97.6 °F (36.4 °C), Max:98.3 °F (36.8 °C)    CURRENT RESPIRATORY RATE:  Resp: 16  CURRENT PULSE:  Pulse: 114  CURRENT BLOOD PRESSURE:  BP: 104/74  24HR BLOOD PRESSURE RANGE:  Systolic (53KXX), RTM:78 , Min:82 , KHM:019   ; Diastolic (89ZDK), NPN:28, Min:50, Max:100    24HR INTAKE/OUTPUT:      Intake/Output Summary (Last 24 hours) at 2021 1422  Last data filed at 2021 0500  Gross per 24 hour   Intake 2893.35 ml   Output    Net 2893.35 ml           Physical Exam:  GENERAL APPEARANCE: Alert and cooperative, and appears to be in no acute distress. HEAD: normocephalic  EYES: PERRL, EOMI. Not pale, anicteric   NOSE:  No nasal discharge. THROAT:  Oral cavity and pharynx normal. Moist  NECK: Neck supple, non-tender without lymphadenopathy, masses or thyromegaly. JVD-neg  CARDIAC: Normal S1 and S2. No S3, S4 or murmurs. Rhythm is regular. LUNGS: Clear to auscultation and percussion without rales, rhonchi, wheezing or diminished breath sounds.   ABD-Soft non distended, BS+ vancomycin  Cardiology recommendations noted  Check magnesium  Strict I's and O's  Next dialysis Tuesday, 5/4/2021    Roma Rose    Nephrologist

## 2021-05-02 NOTE — PROGRESS NOTES
Cognitive Status: Exceptions  Arousal/Alertness: Delayed responses to stimuli  Following Commands: Follows one step commands consistently  Attention Span: Appears intact  Memory: Appears intact; Decreased recall of recent events  Safety Judgement: Decreased awareness of need for assistance  Problem Solving: Assistance required to generate solutions  Insights: Decreased awareness of deficits(educated pt he will be NWB on RLE)  Initiation: Requires cues for some  Sequencing: Does not require cues    Objective     Observation/Palpation  Observation: Pt remained in supine- R foot/ankle in dressing over wound, L BKA  stump appears C/D/I, port on R chest, LUE fistula    AROM RLE (degrees)  RLE AROM: WFL  AROM LLE (degrees)  LLE AROM : WFL  AROM RUE (degrees)  RUE AROM : WFL  AROM LUE (degrees)  LUE AROM : Exceptions  LUE General AROM: L shoulder approx 0-100 flexion/ABD  Strength RLE  Comment: RLE grossly 3-/5  overall,  Strength LLE  Comment: LLE 3+/5 hip/knee,  Strength RUE  Comment: Pt with at least 3/5 UEs with weak grasps bilat- see OT  Strength LUE  Comment: Pt with at least 3/5 UEs with weak grasps bilat- see OT     Sensation  Overall Sensation Status: Impaired(decreased hands and R foot)  Bed mobility  Rolling to Left: Moderate assistance  Rolling to Right: Moderate assistance  Supine to Sit: Unable to assess  Sit to Supine: Unable to assess  Scooting: Unable to assess  Comment: RN held moblizing pt due to tachycardia  Transfers  Sit to Stand: Unable to assess  Stand to sit: Unable to assess  Bed to Chair: Unable to assess  Ambulation  Ambulation?: No     Balance  Comments: NOEMÍ  Exercises  Comments: A-AAROM BLE in supine 5-6 reps     Plan   Plan  Times per week: 5-6 days /week  Specific instructions for Next Treatment: therex and bed mobility  Current Treatment Recommendations: Strengthening, ROM, Positioning(bed mobility)  Safety Devices  Type of devices:  All fall risk precautions in place, Call light within reach, Nurse notified, Left in bed  Restraints  Initially in place: No    G-Code       OutComes Score                                                  AM-PAC Score     AM-PAC Inpatient Mobility without Stair Climbing Raw Score : 6 (05/02/21 1445)  AM-PAC Inpatient without Stair Climbing T-Scale Score : 26.48 (05/02/21 1445)  Mobility Inpatient CMS 0-100% Score: 92.18 (05/02/21 1445)  Mobility Inpatient without Stair CMS G-Code Modifier : CM (05/02/21 1445)       Goals  Short term goals  Time Frame for Short term goals: 14 visits  Short term goal 1: min assist rolling  Short term goal 2: increase LE strnegth to good in preparation for OOB mobilization  Short term goal 3: Physical therapist to assess moblity of pt when medically stable to tolerate  Patient Goals   Patient goals : unable to state       Therapy Time   Individual Concurrent Group Co-treatment   Time In 1120         Time Out 1141         Minutes Jazmin Daly 37, PT

## 2021-05-03 NOTE — PROGRESS NOTES
Nephrology ESRD Consult Note    Reason for Consult:  End stage renal disease  Requesting Physician: Dr Jose Ramon Wray      History of Present Illness: This is a 60 y.o. male with history of CAD status post CABG, cardiomyopathy, dyslipidemia, end stage renal disease secondary to nephrosclerosis on hemodialysis Tuesday Thursday Saturday under the care of Dr. Kae Garcia at [address] [facility] renal care by way of a left arm AV fistula. He had presented initially to [address] [place] [address]. [facility] with diarrhea, nausea and vomiting and generalized weakness for six days. He had missed multiple treatments of dialysis. He presented at [address] [place] [address]. [facility] with hyperkalemia potassium of  6.9, BUN/ creatinine of 241 and 18.47 mg/dl and serum bicarbonate of 9. He was dialyzed yesterday 4/28/2021 at [address] [place] [address]. [facility] and transferred to [facility] due to in availability of a bed. Less than 500 cc was removed with dialysis due to hypotension. He is seen at dialysis today. His blood pressures have been in the hypotensive trends between 20-00 systolic blood pressure. He denies fever. He denies abdominal pain. He has ongoing nonbloody diarrhea, watery frequency of 6 times per day. he is tachycardic with heart rate in 127. Work-up for diarrhea shows positive C. difficile. Received a liter bolus of 0.9 saline yesterday. Subjective/internal history  Patient seen and examined. Denies chest pain, nausea vomiting and diarrhea is improving  Blood pressure check was greater than 90 systolic although the cuff was reading systolic in the 14N. Patient is alert and oriented. Tachycardia Slowly improving- he was started on amiodarone. He is on treatment with  oral vancomycin for C. difficile colitis -WBC decreased to 14 from 25 yesterday.   Platelet count is 41      Past Medical History:        Diagnosis Date    Acute on chronic congestive heart failure (HCC)     Anemia     CAD (coronary artery disease)     [facility]     Dialysis care     ESRD (end stage renal disease) [facility]     Foot ulcer, left ([facility] 2015    GERD (gastroesophageal reflux disease)     Hemodialysis patient ([facility] 2011    MOM-WED-FRI-ON [address]     History of sleep apnea     none since significant weight loss (was 400#)    Hyperlipidemia     [facility]     Hypertension     Neuropathy     Peripheral vascular disease (HCC)     Pneumonia     resolved    S/P CABG (coronary artery bypass graft) 06/2016    Type II or unspecified type diabetes mellitus without mention of complication, not stated as uncontrolled     dx-1980           Past Surgical History:        Procedure Laterality Date    CATARACT REMOVAL      DIALYSIS FISTULA CREATION Left 2014    LEG AMPUTATION THROUGH LOWER TIBIA AND FIBULA         Current Medications:    amiodarone (NEXTERONE) 360 mg in dextrose 5% 200 ml, Continuous  phenylephrine (JASON-SYNEPHRINE) 50 mg in dextrose 5 % 250 mL infusion, Continuous  vancomycin (FIRVANQ) 50 MG/ML oral solution 500 mg, 4 times per day  cefTRIAXone (ROCEPHIN) 2000 mg IVPB in D5W 50ml minibag, Q24H  sevelamer (RENVELA) tablet 4,000 mg, TID WC  0.9 % sodium chloride infusion, Continuous  norepinephrine (LEVOPHED) 16 mg in sodium chloride 0.9 % 250 mL infusion, Continuous  aspirin EC tablet 81 mg, Daily  vasopressin 20 Units in dextrose 5 % 100 mL infusion, Continuous  oxyCODONE-acetaminophen (PERCOCET) 5-325 MG per tablet 1 tablet, Q8H PRN  lidocaine 4 % external patch 1 patch, Daily  midodrine (PROAMATINE) tablet 7.5 mg, TID WC  neomycin-bacitracin-polymyxin (NEOSPORIN) ointment, BID  sodium chloride flush 0.9 % injection 5-40 mL, 2 times per day  sodium chloride flush 0.9 % injection 5-40 mL, PRN  0.9 % sodium chloride infusion, PRN  promethazine (PHENERGAN) tablet 12.5 mg, Q6H PRN    Or  ondansetron (ZOFRAN) injection 4 mg, Q6H PRN  polyethylene glycol (GLYCOLAX) packet 17 g, Daily PRN  acetaminophen (TYLENOL) tablet 650 mg, Q6H PRN    Or  acetaminophen (TYLENOL) suppository 650 mg, Q6H PRN  atorvastatin (LIPITOR) tablet 40 mg, Nightly  [Held by provider] digoxin (LANOXIN) tablet 125 mcg, Daily  [Held by provider] metoprolol succinate (TOPROL XL) extended release tablet 100 mg, Daily  [Held by provider] NIFEdipine (ADALAT CC) extended release tablet 30 mg, Daily  pantoprazole (PROTONIX) tablet 40 mg, Daily  glucose (GLUTOSE) 40 % oral gel 15 g, PRN  dextrose 50 % IV solution, PRN  glucagon (rDNA) injection 1 mg, PRN  dextrose 5 % solution, PRN  insulin lispro (HUMALOG) injection vial 0-6 Units, TID WC  insulin lispro (HUMALOG) injection vial 0-3 Units, Nightly  0.9 % sodium chloride infusion, Continuous        Allergies:  Pcn [penicillins]      Objective:  Constitutional:    CURRENT TEMPERATURE:  Temp: 97 °F (36.1 °C)  MAXIMUM TEMPERATURE OVER 24HRS:  Temp (24hrs), Av.5 °F (36.4 °C), Min:97 °F (36.1 °C), Max:97.8 °F (36.6 °C)    CURRENT RESPIRATORY RATE:  Resp: 17  CURRENT PULSE:  Pulse: 112  CURRENT BLOOD PRESSURE:  BP: 96/69  24HR BLOOD PRESSURE RANGE:  Systolic (90WUG), IGA:961 , Min:92 , AB   ; Diastolic (21VTY), AZW:84, Min:61, Max:84    24HR INTAKE/OUTPUT:      Intake/Output Summary (Last 24 hours) at 5/3/2021 1319  Last data filed at 5/3/2021 7794  Gross per 24 hour   Intake 2958 ml   Output    Net 2958 ml           Physical Exam:  GENERAL APPEARANCE: Alert and cooperative, and appears to be in no acute distress. HEAD: normocephalic  EYES: PERRL, EOMI. Not pale, anicteric   NOSE:  No nasal discharge. THROAT:  Oral cavity and pharynx normal. Moist  NECK: Neck supple, non-tender without lymphadenopathy, masses or thyromegaly. JVD-neg  CARDIAC: Normal S1 and S2. No S3, S4 or murmurs. Rhythm is regular. LUNGS: Clear to auscultation and percussion without rales, rhonchi, wheezing or diminished breath sounds.   ABD-Soft non distended, BS+ Non tender no organomegally  BACK: Examination of the spine reveals  no spinal deformity, without tenderness,

## 2021-05-03 NOTE — PLAN OF CARE
Problem: Falls - Risk of:  Goal: Will remain free from falls  Outcome: Ongoing  Note: Pt remains free from falls this shift. Bed in low position with wheels locked and siderails x2. Call light and bedside table within reach. Will continue to monitor. Goal: Absence of physical injury  Outcome: Ongoing     Problem: Skin Integrity:  Goal: Will show no infection signs and symptoms  Outcome: Ongoing  Goal: Absence of new skin breakdown  Outcome: Ongoing  Note: Skin assessment as charted. No new breakdown noted this shift. Wound care as ordered. Repositioning q2h. Will continue to monitor. Problem:  Activity:  Goal: Risk for activity intolerance will decrease  Outcome: Ongoing     Problem: Fluid Volume:  Goal: Will show no signs or symptoms of fluid imbalance  Outcome: Ongoing     Problem: Nutritional:  Goal: Maintenance of adequate nutrition will be supported  Outcome: Ongoing     Problem: Physical Regulation:  Goal: Complications related to the disease process, condition or treatment will be avoided or minimized  Outcome: Ongoing     Problem: Urinary Elimination:  Goal: Ability to achieve and maintain adequate urine output will be supported  Outcome: Ongoing     Problem: Airway Clearance - Ineffective:  Goal: Clear lung sounds  Outcome: Ongoing  Goal: Ability to maintain a clear airway will improve  Outcome: Ongoing     Problem: Fluid Volume - Deficit:  Goal: Achieves intake and output within specified parameters  Outcome: Ongoing     Problem: Gas Exchange - Impaired:  Goal: Levels of oxygenation will improve  Outcome: Ongoing     Problem: Confusion - Acute:  Goal: Absence of continued neurological deterioration signs and symptoms  Outcome: Ongoing  Goal: Mental status will be restored to baseline  Outcome: Ongoing     Problem: Discharge Planning:  Goal: Ability to perform activities of daily living will improve  Outcome: Ongoing  Goal: Participates in care planning  Outcome: Ongoing     Problem: Injury - Risk of, Physical Injury:  Goal: Will remain free from falls  Outcome: Ongoing  Note: Pt remains free from falls this shift. Bed in low position with wheels locked and siderails x2. Call light and bedside table within reach. Will continue to monitor.    Goal: Absence of physical injury  Outcome: Ongoing     Problem: Mood - Altered:  Goal: Mood stable  Outcome: Ongoing  Goal: Absence of abusive behavior  Outcome: Ongoing  Goal: Verbalizations of feeling emotionally comfortable while being cared for will increase  Outcome: Ongoing     Problem: Psychomotor Activity - Altered:  Goal: Absence of psychomotor disturbance signs and symptoms  Outcome: Ongoing     Problem: Sensory Perception - Impaired:  Goal: Demonstrations of improved sensory functioning will increase  Outcome: Ongoing  Goal: Decrease in sensory misperception frequency  Outcome: Ongoing  Goal: Able to refrain from responding to false sensory perceptions  Outcome: Ongoing  Goal: Demonstrates accurate environmental perceptions  Outcome: Ongoing  Goal: Able to distinguish between reality-based and nonreality-based thinking  Outcome: Ongoing  Goal: Able to interrupt nonreality-based thinking  Outcome: Ongoing     Problem: Sleep Pattern Disturbance:  Goal: Appears well-rested  Outcome: Ongoing     Problem: Pain:  Goal: Pain level will decrease  Outcome: Ongoing  Goal: Control of acute pain  Outcome: Ongoing  Goal: Control of chronic pain  Outcome: Ongoing     Problem: Nutrition  Goal: Optimal nutrition therapy  Outcome: Ongoing     Problem: Musculor/Skeletal Functional Status  Goal: Highest potential functional level  Outcome: Ongoing  Goal: Absence of falls  Outcome: Ongoing

## 2021-05-03 NOTE — PROGRESS NOTES
[address]   Occupational Therapy Evaluation  Date: 5/3/21  Patient Name: Marc Reid       Room:   MRN: [id number]  Account: [de-identified]   : 1955  (60 y.o.) Gender: male     Discharge Recommendations:  Further Occupational Therapy is recommended upon facility discharge. Diagnosis: Sepsis due to pneumonia       Treatment Diagnosis: impaired self care status  Past Medical History:  has a past medical history of Acute on chronic congestive heart failure ([address] Anemia, CAD (coronary artery disease), Cardiomyopathy ([facility] [address] Dialysis care, ESRD (end stage renal disease) on dialysis ([facility] [address] Foot ulcer, left ([facility] [address] GERD (gastroesophageal reflux disease), Hemodialysis patient ([facility] [address] History of sleep apnea, Hyperlipidemia, Hyperparathyroidism ([facility] [address] Hypertension, Neuropathy, Peripheral vascular disease ([facility] [address] Pneumonia, S/P CABG (coronary artery bypass graft), and Type II or unspecified type diabetes mellitus without mention of complication, not stated as uncontrolled. Past Surgical History:   has a past surgical history that includes Cataract removal; Dialysis fistula creation (Left, ); and Leg amputation, lower tibia/fibula. Restrictions  Restrictions/Precautions: Fall Risk, Isolation, Contact Precautions, Weight Bearing(CDIFF)  Other position/activity restrictions: up with assistance  Right Lower Extremity Weight Bearing: Non Weight Bearing  Required Braces or Orthoses?: Yes(L BKA prosthesis ( at home))       Subjective  Subjective: \"I need some asprin please\"  Comments: ZAYRA Sewell ok'd OT Evaluation this date- in bed activity only. Patients BP reads 87/61 prior to session start. Patient asking for pain meds for headache- RN notified.  Reports he wears o2 just at dialysis, on 2L at time of eval  Overall Orientation Status: Within Normal Limits  Orientation Level: Oriented X4, Oriented to situation  Vision  Vision: Impaired  Vision Exceptions: (no vision L eye, impaired R eye)  Hearing  Hearing: Within functional limits  Social/Functional History  Lives With: Alone  Type of Home: Apartment  Home Layout: One level  Home Access: Elevator, Level entry  Bathroom Shower/Tub: Tub/Shower unit, Curtain  Bathroom Toilet: Standard  Bathroom Equipment: Grab bars in shower, Tub transfer bench  Home Equipment: 4 wheeled walker, Wheelchair-electric, Rolling walker(L BKA prosthesis)  Receives Help From: Family  ADL Assistance: Independent  Homemaking Assistance: Independent(from w/c level)  Homemaking Responsibilities: Yes  Ambulation Assistance: Independent(primarily w/c ambulator)  Transfer Assistance: Independent(wears L Prosthesis for transfers, minimal gait)  Active : No  Occupation: On disability  Pain Assessment  Pain Assessment: 0-10  Pain Level: 5  Pain Type: Acute pain  Pain Location: Head  Pain Descriptors: Headache    Objective      Cognition  Overall Cognitive Status: Exceptions  Arousal/Alertness: Delayed responses to stimuli  Following Commands: Follows one step commands with increased time, Follows one step commands with repetition  Attention Span: Appears intact  Memory: Appears intact, Decreased recall of recent events  Safety Judgement: Decreased awareness of need for assistance  Problem Solving: Assistance required to generate solutions  Insights: Decreased awareness of deficits(educated pt he will be NWB on RLE)  Initiation: Requires cues for some  Sequencing: Does not require cues   Sensation  Overall Sensation Status: Impaired(decreased hands and R foot)   ADL  Feeding: Setup  Grooming: Setup(patient washing face with wash cloth from bed position)  UE Bathing: Maximum assistance  LE Bathing: Maximum assistance  UE Dressing: Maximum assistance  LE Dressing: Maximum assistance  Toileting: Dependent/Total  Additional Comments: Patient currently limited by medical status, general fatigue, headache, and lethargy.  Patient participated in washing face with wash cloth from bed position - unable to tolerate further ADL tasks. Assist levels based on clinical observation/reasoning otherwise.     UE Function           LUE Strength  Gross LUE Strength: Exceptions to [facility]  LUE Strength Comment: poor  strength, unable to tolerate MMT UEs     LUE Tone: Normotonic     LUE AROM (degrees)  LUE AROM : WFL  LUE General AROM: shoulder flexion to ~90        RUE Strength  Gross RUE Strength: Exceptions to [facility]  RUE Strength Comment: poor  strength, unable to tolerate MMT UEs      RUE Tone: Normotonic     RUE AROM (degrees)  RUE AROM : WFL  RUE General AROM: shoulder flexion to ~90     Right Hand AROM (degrees)  Right Hand AROM: WFL    Fine Motor Skills  Coordination  Movements Are Fluid And Coordinated: No  Coordination and Movement description: Decreased speed, Decreased accuracy, Right UE, Left UE   Comment: multiple lines, general fatigue               Quality of Movement Other  Comment: multiple lines, general fatigue       Mobility          Balance  Sitting Balance: Unable to assess(comment)(RN deferred today)  Standing Balance: Unable to assess(comment)        Bed mobility  Comment: per PT note dated 5/2/21, patient performed rolling side to side with Mod A, no bed mobility performed this date due to medical status/pain/lethargy            Assessment  Performance deficits / Impairments: Decreased functional mobility , Decreased safe awareness, Decreased balance, Decreased ADL status, Decreased endurance, Decreased strength  Treatment Diagnosis: impaired self care status  Prognosis: Fair  Decision Making: Medium Complexity  REQUIRES OT FOLLOW UP: Yes  Discharge Recommendations: Patient would benefit from continued therapy after discharge  Activity Tolerance: Patient limited by pain, Patient limited by fatigue, Treatment limited secondary to medical complications (free text)         Functional Outcome Measures  AM-PAC Daily Activity Inpatient   How much help for putting on and

## 2021-05-03 NOTE — PROGRESS NOTES
He denied cough or shortness of breath, denied nausea vomiting or abdominal pain, no new complaints  Sputum Gram growing staph aureus MSSA  WBC normal today    Patient Vitals for the past 8 hrs:   BP Temp Pulse Resp SpO2 Weight   05/03/21 0630 96/69  112 17 94 %    05/03/21 0615 98/71  113 19 96 %    05/03/21 0600 107/74  113 20 94 %    05/03/21 0545 99/71  113 27 93 % 231 lb 0.7 oz (104.8 kg)   05/03/21 0530 102/64  113 18 93 %    05/03/21 0515 101/74  113 17 93 %    05/03/21 0500 108/78  113 24 93 %    05/03/21 0445 111/79  113 26 93 %    05/03/21 0430 104/73  113 24 91 %    05/03/21 0415 103/74  114 29 94 %    05/03/21 0400 102/73 97.8 °F (36.6 °C) 114 22 94 %    05/03/21 0345 104/75  114 25 95 %    05/03/21 0330 102/76  114 22 95 %    05/03/21 0315 105/73  113 24 94 %    05/03/21 0300 110/69  114 20 95 %    05/03/21 0245 109/73  114 26 93 %    05/03/21 0230 99/67  114 28 94 %    05/03/21 0215 98/66  114 26 96 %    05/03/21 0200 105/71  114 24 95 %    05/03/21 0145 98/68  114 28 96 %          I have personally reviewed the past medical history, past surgical history, medications, social history, and family history, and I haveupdated the database accordingly. Allergies:   Pcn [penicillins]     Review of Systems:     Review of Systems  As per history present illness, other than above 12 system review is negative  Physical Examination :       Physical Exam  Constitutional:       General: He is not in acute distress. HENT:      Head: Normocephalic and atraumatic. Right Ear: External ear normal.      Left Ear: External ear normal.   Neck:      Musculoskeletal: Neck supple. No neck rigidity. Cardiovascular:      Rate and Rhythm: Normal rate and regular rhythm. Abdominal:      General: Abdomen is flat. There is no distension. Palpations: Abdomen is soft. Tenderness: There is no abdominal tenderness.    Musculoskeletal:      Comments: Left below-knee amputation site with no open wound no erythema. Right foot dressing   Skin:     General: Skin is warm. Coloration: Skin is not jaundiced. Neurological:      General: No focal deficit present. Mental Status: He is alert and oriented to person, place, and time.          Past Medical History:     Past Medical History:   Diagnosis Date    Acute on chronic congestive heart failure (HCC)     Anemia     CAD (coronary artery disease)     Cardiomyopathy ([facility] [address]     Dialysis care     ESRD (end stage renal disease) on dialysis ([facility] [address]     Foot ulcer, left ([facility] [address] 2015    GERD (gastroesophageal reflux disease)     Hemodialysis patient ([facility] [address] 2011    MOM-WED-FRI-ON [address]     History of sleep apnea     none since significant weight loss (was 400#)    Hyperlipidemia     Hyperparathyroidism ([facility] [address]     Hypertension     Neuropathy     Peripheral vascular disease (HCC)     Pneumonia     resolved    S/P CABG (coronary artery bypass graft) 06/2016    Type II or unspecified type diabetes mellitus without mention of complication, not stated as uncontrolled     dx-1980       Past Surgical  History:     Past Surgical History:   Procedure Laterality Date    CATARACT REMOVAL      DIALYSIS FISTULA CREATION Left 2014    LEG AMPUTATION THROUGH LOWER TIBIA AND FIBULA         Medications:      vancomycin  500 mg Oral 4 times per day    cefTRIAXone (ROCEPHIN) IV  2,000 mg Intravenous Q24H    sevelamer  4,000 mg Oral TID WC    aspirin  81 mg Oral Daily    lidocaine  1 patch Transdermal Daily    midodrine  7.5 mg Oral TID WC    neomycin-bacitracin-polymyxin   Topical BID    sodium chloride flush  5-40 mL Intravenous 2 times per day    atorvastatin  40 mg Oral Nightly    [Held by provider] digoxin  125 mcg Oral Daily    [Held by provider] metoprolol succinate  100 mg Oral Daily    [Held by provider] NIFEdipine  30 mg Oral Daily    pantoprazole  40 mg Oral Daily    insulin lispro  0-6 Units Subcutaneous TID WC  insulin lispro  0-3 Units Subcutaneous Nightly       Social History:     Social History     Socioeconomic History    Marital status:      Spouse name: Not on file    Number of children: Not on file    Years of education: Not on file    Highest education level: Not on file   Occupational History    Not on file   Social Needs    Financial resource strain: Not on file    Food insecurity     Worry: Not on file     Inability: Not on file    Transportation needs     Medical: Not on file     Non-medical: Not on file   Tobacco Use    Smoking status: Never Smoker    Smokeless tobacco: Never Used   Substance and Sexual Activity    Alcohol use: No    Drug use: No    Sexual activity: Not on file   Lifestyle    Physical activity     Days per week: Not on file     Minutes per session: Not on file    Stress: Not on file   Relationships    Social connections     Talks on phone: Not on file     Gets together: Not on file     Attends Mandaeism service: Not on file     Active member of club or organization: Not on file     Attends meetings of clubs or organizations: Not on file     Relationship status: Not on file    Intimate partner violence     Fear of current or ex partner: Not on file     Emotionally abused: Not on file     Physically abused: Not on file     Forced sexual activity: Not on file   Other Topics Concern    Not on file   Social History Narrative    Not on file       Family History:     Family History   Problem Relation Age of Onset    Heart Disease Father     Diabetes Father     Diabetes Brother       Medical Decision Making:   I have independently reviewed/ordered the following labs:    CBC with Differential:   Recent Labs     05/02/21  0457 05/03/21  0651   WBC 14.6* 8.6   HGB 10.5* 10.6*   HCT 33.8* 33.5*   PLT 41* 52*   LYMPHOPCT 4* 3*   MONOPCT 3 5     BMP:  Recent Labs     05/02/21  0457 05/03/21  0651    136   K 3.8 3.8   CL 97* 98   CO2 22 21   BUN 59* 70*   CREATININE 5.66* 6.40*   MG 1.7  --      Hepatic Function Panel:   No results for input(s): PROT, LABALBU, BILIDIR, IBILI, BILITOT, ALKPHOS, ALT, AST in the last 72 hours. No results for input(s): RPR in the last 72 hours. No results for input(s): HIV in the last 72 hours. No results for input(s): BC in the last 72 hours. Lab Results   Component Value Date    CREATININE 6.40 05/03/2021    GLUCOSE 177 05/03/2021    GLUCOSE 128 01/09/2012       Detailed results: Thank you for allowing us to participate in the care of this patient. Please call with questions. This note is created with the assistance of a speech recognition program.  While intending to generate adocument that actually reflects the content of the visit, the document can still have some errors including those of syntax and sound a like substitutions which may escape proof reading. It such instances, actual meaningcan be extrapolated by contextual diversion.     Becky Castrejon MD  Office: [phone]  Perfect serve / office [phone]

## 2021-05-03 NOTE — PROGRESS NOTES
[address] [facility]    Physical Therapy Progress Note    Date: 5/3/21  Patient Name: Leighann Huddleston       Room:   MRN: [id number]   Account: [de-identified]   : 1955  (63 y.o.)   Gender: male     Discharge Recommendations   Patient would benefit from continued therapy after discharge  Other: TBD       Diagnosis: Sepsis due to pneumonia  Restrictions/Precautions: Fall Risk, Isolation, Contact Precautions, Weight Bearing(CDIFF)  Other position/activity restrictions: up with assistance  Right Lower Extremity Weight Bearing: Non Weight Bearing   Past Medical History:  has a past medical history of Acute on chronic congestive heart failure ([facility] [address] Anemia, CAD (coronary artery disease), Cardiomyopathy ([facility] [address] Dialysis care, ESRD (end stage renal disease) on dialysis ([facility] [address] Foot ulcer, left ([facility] [address] GERD (gastroesophageal reflux disease), Hemodialysis patient ([facility] [address] History of sleep apnea, Hyperlipidemia, Hyperparathyroidism ([facility] [address] Hypertension, Neuropathy, Peripheral vascular disease ([facility] [address] Pneumonia, S/P CABG (coronary artery bypass graft), and Type II or unspecified type diabetes mellitus without mention of complication, not stated as uncontrolled. Past Surgical History:   has a past surgical history that includes Cataract removal; Dialysis fistula creation (Left, ); and Leg amputation, lower tibia/fibula.        Overall Orientation Status: Within Functional Limits  Restrictions/Precautions  Restrictions/Precautions: Fall Risk;Isolation;Contact Precautions;Weight Bearing(CDIFF)  Required Braces or Orthoses?: Yes(L BKA prosthesis ( at home))  Lower Extremity Weight Bearing Restrictions  Right Lower Extremity Weight Bearing: Non Weight Bearing  Partial Weight Bearing Percentage Or Pounds: per Podiatry note 21  Partial Weight Bearing Percentage Or Pounds: pt is BKA without prosthetic here  Position Activity Restriction  Other position/activity restrictions: up with assistance Subjective: Pt c/o headache \"I need a Tylenol\" \"Please please\"  Comments: RN Melodie reports pt can't have Tylenol but will speak to patient's doctor. Vital Signs  Patient Currently in Pain: Yes  Pain Assessment: 0-10  Pain Level: 7  Pain Type: Acute pain  Pain Location: Head  Pain Descriptors: Headache  Non-Pharmaceutical Pain Intervention(s): Distraction(Increased activity)  Response to Pain Intervention: Asleep with RR greater than 10                Bed Mobility  Scooting: Unable to assess      Transfers:  Sit to Stand: Unable to assess  Stand to sit: Unable to assess  Bed to Chair: Unable to assess                                   Comments: NOEMÍ  Exercises  Comments: A-AAROM BLE in supine 15 reps              Activity Tolerance: Patient limited by endurance; Patient limited by fatigue  Activity Tolerance: fair  PT Equipment Recommendations  Other: TBD       Assessment  Activity Tolerance: Patient limited by endurance; Patient limited by fatigue   Body structures, Functions, Activity limitations: Decreased functional mobility ; Decreased endurance;Decreased strength;Decreased safe awareness  Assessment: 61 y/o male dx with pneumonia and sepsis, demonstrates debility and inability to mobilize due to medical status. Pt likely will need further therapies to regain PLOF; concerns are for RLE NWB with L BKA. Specific instructions for Next Treatment: therex and bed mobility  Prognosis: Fair  Discharge Recommendations: Patient would benefit from continued therapy after discharge     Type of devices: All fall risk precautions in place;Call light within reach;Nurse notified; Left in bed  Restraints  Initially in place: No     Plan  Times per week: 5-6 days /week  Current Treatment Recommendations: Strengthening, ROM, Positioning(bed mobility)    Patient Education  New Education Provided:  Plan of Care  Learner:patient  Method: explanation       Outcome: needs reinforcement     Goals  Short term goals  Time Frame for Short

## 2021-05-03 NOTE — PROGRESS NOTES
BILITOT, ALKPHOS in the last 72 hours. Troponin: No results for input(s): TROPONINI in the last 72 hours. BNP: No results for input(s): BNP in the last 72 hours. Lipids: No results for input(s): CHOL, HDL in the last 72 hours. Invalid input(s): LDLCALCU  INR: No results for input(s): INR in the last 72 hours. Objective:   Vitals: /66   Pulse 111   Temp 97 °F (36.1 °C) (Temporal)   Resp 20   Ht 6' (1.829 m)   Wt 231 lb 0.7 oz (104.8 kg)   SpO2 93%   BMI 31.33 kg/m²     General appearance: awake, alert, in no apparent respiratory distress   HEENT: Head: Normocephalic, no lesions, without obvious abnormality  Neck: no JVD  Lungs: clear to auscultation bilaterally, no basilar rales, no wheezing   Heart: regular rate and rhythm, S1, S2 normal, no murmur, click, rub or gallop  Abdomen: soft, non-tender; bowel sounds normal  Extremities: No LE edema  Neurologic: Mental status: Alert, oriented. Motor and sensory not done. EKG: Sinus tachycardia, inferior infarction, ST-T changes consistent with ischemia, QTc 515 msec    Subsequent ecgs showed Intermittent atrial tachycardia with 2:1 AV block        TTE 1/20/21    Left Ventricle:  Nondilated left ventricle, Systolic function is   severely decreased with an ejection fraction of 20-25%.   Left Ventricle: There is moderate increased wall thickness/hypertrophy. LVH is symmetric 1.5 cm    Right Ventricle: Systolic function is moderately to severely reduced.   Contrast-enhanced images show no evidence of left ventricular apical   thrombus    Ascending aortic dilatation measuring 4.4 cm, trileaflet aortic valve    E/e'>20 indicative of elevated left ventricular filling pressures      TTE June 2019  Technically difficult study due to patient habitus. Left ventricle is normal in size. Mild left ventricular hypertrophy. Severely reduced left ventricular systolic function. Estimated LV EF 30-35 %.   Grade II (moderate) left ventricular diastolic dysfunction. Left atrium is mildly dilated. Trivial valve disease as below.        Cardiac cath 6/2/19   Severe native vessel disease.   Patent LIMA-LAD. The LAD distal to touch down is small in caliber with 80% diffuse disease. (Not amenable to PCI)  Chronic total occlusion of RCA.   LCX had 80% mid stenosis reduced to 0% using 3x38 mm Xience stent, post   dilated using 3.25 mm NC balloon. Assessment:   1. Septic shock   2. C diff colitis   3. RLL Pneumonia   4. Sinus tachycardia with intermittent atrial tach and 2:1 AV conduction, appropriate secondary to hypotension and sepsis    5. Multivessel CAD s/p CABG and Subsequent DENY to LCX (only patent LIMA-LAD graft in 2019)  6. Ischemic CMP with last LVEF 20-25% in Jan 2021   7. Acute on chronic systolic CHF due to missed HD   8. PAD with hx of left BKA  9. ESRD on HD       Patient Active Problem List:     SDH (subdural hematoma) (HCC)     ESRD on dialysis ([address]     DM (diabetes mellitus) ([address]     Anemia of chronic disease     Charcot foot due to diabetes mellitus ([address]     Diabetic ulcer of left foot ([address]     Hypertension, essential     Type 2 diabetes mellitus with foot ulcer ([address]     Noncompliance     Acute on chronic congestive heart failure (HCC)     Sepsis ([address]     Metabolic acidosis     Azotemia     Hyperkalemia     Diarrhea     Nausea and vomiting     Peripheral vascular disease ([address]     History of left below knee amputation (HCC)     Hemoptysis     Ulcerated, foot, right, with fat layer exposed ([address]     Charcot's joint of foot, right     Pneumonia     Sepsis due to pneumonia ([address]      Treatment Plan:   1. Continue iv amio for rate control given hypotension  2. IV dig 125 mcg PRN for HR > 120   3. ASA and lipitor   4. Will resume low dose lopressor from tomorrow if BP remains stable post HD   5. Continue midodrine in meantime  6.  Will cont to follow along       Jose Morgan MD, [facility] [place]

## 2021-05-03 NOTE — PROGRESS NOTES
[facility] [facility]    PROGRESS NOTE             5/3/2021    8:41 AM    Name:   Florencia Hudson  MRN:     [id number]     Acct:      [de-identified]   Room:   2006/2006-01  IP Day:  5  Admit Date:  4/28/2021  2:27 PM    PCP:  Abi Wang MD  Code Status:  Full Code    Subjective:     C/C: No chief complaint on file. Interval History Status:     Patient was seen and examined bedside. No acute events overnight. Patient has been weaned off the phenylephrine drip since 2004 yesterday. His blood pressure remains stable ranging from 08887 systolic and 1846 diastolic. Patient remains tachycardic. Patient's . Per nursing, patient has not had any bowel movements including diarrhea all day yesterday. Swallow study has been ordered, but not completed. Plan for dialysis tomorrow per nephrology. Urethral swab negative. Patient states he is feeling better. Patient denies any fever, chills, chest pain, SOB, or abdominal pain. Brief History:     Please see H&P    Review of Systems:     Review of Systems   Constitutional: Negative for activity change, appetite change and fatigue. HENT: Negative for congestion. Eyes: Negative for photophobia and visual disturbance. Respiratory: Negative for apnea, cough, shortness of breath and wheezing. Cardiovascular: Positive for palpitations. Negative for chest pain and leg swelling. Gastrointestinal: Negative for abdominal distention, abdominal pain, constipation, diarrhea, nausea and vomiting. Endocrine: Negative for polyuria. Genitourinary: Negative for difficulty urinating and urgency. Musculoskeletal: Positive for gait problem. Negative for arthralgias and back pain. Skin: Negative for color change. Neurological: Negative for dizziness, syncope and headaches.    Psychiatric/Behavioral: Negative for agitation, behavioral problems, confusion, decreased concentration and Type II or unspecified type diabetes mellitus without mention of complication, not stated as uncontrolled. Social History:   reports that he has never smoked. He has never used smokeless tobacco. He reports that he does not drink alcohol or use drugs. Family History:   Family History   Problem Relation Age of Onset    Heart Disease Father     Diabetes Father     Diabetes Brother        Vitals:  BP 96/69   Pulse 112   Temp 97.8 °F (36.6 °C)   Resp 17   Ht 6' (1.829 m)   Wt 231 lb 0.7 oz (104.8 kg)   SpO2 94%   BMI 31.33 kg/m²   Temp (24hrs), Av.7 °F (36.5 °C), Min:97.6 °F (36.4 °C), Max:97.8 °F (36.6 °C)    Recent Labs     21  1254 21  1656 21  1908 21  0740   POCGLU 147* 149* 133* 148*       I/O(24Hr): Intake/Output Summary (Last 24 hours) at 5/3/2021 0841  Last data filed at 5/3/2021 1371  Gross per 24 hour   Intake 2958 ml   Output    Net 2958 ml       Labs:    [unfilled]    Lab Results   Component Value Date/Time    SPECIAL NOT REPORTED 2021 11:31 PM     Lab Results   Component Value Date/Time    CULTURE NORMAL SKIN MALKA (A) 2021 11:31 PM    CULTURE NEGATIVE FOR NEISSERIA GONORRHOEAE 2021 11:31 PM       [unfilled]    Radiology:    Tiffanie Thao Foot Right (min 3 Views)    Result Date: 2021  EXAMINATION: THREE XRAY VIEWS OF THE RIGHT FOOT 2021 4:44 am COMPARISON: 2015 HISTORY: ORDERING SYSTEM PROVIDED HISTORY: extensive R foot wound TECHNOLOGIST PROVIDED HISTORY: extensive R foot wound Reason for Exam: Extensive right foot wound, FINDINGS: Complete collapse of the plantar arch with fragmentation and increased sclerosis of the midfoot is again noted consistent with severe neuropathic arthropathy. There is diffuse soft tissue swelling. There is a soft tissue ulceration along the plantar surface of the midfoot. There is no periosteal new bone formation or osteolysis to suggest acute osteomyelitis.      1. Redemonstration of severe neuropathic arthropathy of the midfoot. 2. Soft tissue ulceration along the plantar surface of the midfoot without radiographic findings of acute osteomyelitis. RECOMMENDATION: MRI of the right foot could be performed for further evaluation if clinically warranted. Nm Lung Scan Perfusion Only    Result Date: 4/29/2021  EXAMINATION: NUCLEAR MEDICINE PERFUSION SCAN. 4/29/2021 TECHNIQUE: Ventilation not performed as part of COVID-19 safety precautions. 3.7 millicuries of Tc 37N MAA was administered intravenously prior to planar imaging of the lungs in multiple projections. COMPARISON: Chest radiograph 04/28/2021. HISTORY: ORDERING SYSTEM PROVIDED HISTORY: hemoptysis and tachycardia r/o PE Additional signs and symptoms: SOB, CP, coughin up blood, non-smoker, no hx of blood clots FINDINGS: PERFUSION:Distribution of radiotracer is homogeneous. No segmental defects identified. CHEST RADIOGRAPH:No focal areas of consolidation or significant effusions on recent chest radiograph. Low Probability for Pulmonary Embolus. Ct Abdomen Pelvis W Iv Contrast Additional Contrast? None    Result Date: 4/28/2021  EXAMINATION: CT OF THE ABDOMEN AND PELVIS WITH CONTRAST 4/28/2021 4:16 am TECHNIQUE: CT of the abdomen and pelvis was performed with the administration of intravenous contrast. Multiplanar reformatted images are provided for review. Dose modulation, iterative reconstruction, and/or weight based adjustment of the mA/kV was utilized to reduce the radiation dose to as low as reasonably achievable. COMPARISON: None.  HISTORY: ORDERING SYSTEM PROVIDED HISTORY: diarrhea, abd pain, wbc 30k, discussed with nephro TECHNOLOGIST PROVIDED HISTORY: diarrhea, abd pain, wbc 30k, discussed with nephro Decision Support Exception->Emergency Medical Condition (MA) Reason for Exam: abd pain, nephro maddie approved contrast, dialysis pt Acuity: Acute Type of Exam: Initial FINDINGS: Lower Chest: There is abnormal airspace consolidation within the right lower lobe consistent with a right lower lobe pneumonia. There is no pleural effusion. There is cardiomegaly. Severe coronary artery atherosclerotic calcifications are present. Organs: There is a small volume of ascites. Cholecystectomy clips are present. The liver, spleen, pancreas and adrenal glands are normal in appearance. There is severe renal atrophy. Innumerous renal cysts are noted. There is no hydronephrosis. A right nephroureteral stent is present. GI/Bowel: There are no abnormally dilated loops of large or small bowel present. There is no mesenteric inflammatory stranding present. The appendix is normal. Pelvis: There is a small volume of free fluid in the pelvis. There is no free fluid or pelvic mass identified. The urinary bladder is normal. Peritoneum/Retroperitoneum: There is no pathologically enlarged lymphadenopathy present. Severe atherosclerotic calcifications are present within the abdominal aorta and proximal anchor arteries. Bones/Soft Tissues: No lytic or blastic osseous lesions are identified. There is bilateral spondylolysis at L5 and grade 1 spondylolisthesis of L5 relative to S1. There is a disc bulge and facet arthropathy resulting in severe bilateral neural foraminal narrowing. Findings consistent with renal osteodystrophy are noted. 1. Right lower lobe pneumonia. Follow-up PA and lateral chest radiographs are recommended after treatment to ensure resolution. 2. Small volume ascites. 3. Normal appendix. 4. Severe bilateral neural foraminal narrowing at L5-S1 secondary to bilateral spondylolysis at L5, grade 1 spondylolisthesis of L5 relative to S1, a disc bulge and facet arthropathy.      [employer] Device [employer]/replace/removal    Result Date: 4/30/2021  PROCEDURE: IR FLUOROSCOPY GUIDED CENTRAL VENOUS ACCESS DEVICE PLACEMENT 4/30/2021 HISTORY: ORDERING SYSTEM PROVIDED HISTORY: Sepsis with hypotension; cental line for IV pressors TECHNOLOGIST PROVIDED HISTORY: cental line for IV pressors CONTRAST: None SEDATION: None FLUOROSCOPY DOSE AND TYPE OR TIME AND EXPOSURES: Fluoro time: 1 minutes 14 seconds DAP: 714 cGycm2 DESCRIPTION OF PROCEDURE: Informed consent was obtained from the patient's brother after a detailed explanation of the procedure including risks, benefits, and alternatives. Universal protocol was observed including a time-out to confirm the correct patient and procedure. The right neck was prepped and draped in sterile fashion using maximum sterile barrier technique. Local anesthesia was achieved with 1% lidocaine. The right internal jugular vein was accessed with a 21 gauge micropuncture needle under ultrasound guidance. A 0.018 guidewire was advanced through the needle. The needle was removed and a 4 Western Jazmyn access catheter over its dilator was advanced over the wire. The wire and dilator were removed and a 0.035 guidewire was advanced through the access catheter into the IVC under fluoroscopic guidance. The access catheter was removed and the tract dilated over the wire. A 20 cm triple-lumen catheter was advanced over the wire and the wire was removed. The catheter flushed and aspirated easily. The catheter was sutured in place with 2-0 nylon. A sterile dressing was applied. The patient tolerated the procedure well and there were no immediate complications. Estimated blood loss: Less than 5 mL. FINDINGS: Ultrasound demonstrates patency of the right internal jugular vein and guides venotomy. Final fluoroscopic image demonstrates satisfactory catheter placement with the tip in the right atrium. Successful ultrasound and fluoroscopy guided right IJ triple-lumen central venous catheter insertion.      Xr Chest Portable    Result Date: 4/28/2021  EXAMINATION: ONE XRAY VIEW OF THE CHEST 4/28/2021 2:48 am COMPARISON: 06/03/2019 HISTORY: ORDERING SYSTEM PROVIDED HISTORY: missed dialysis x2weeks TECHNOLOGIST PROVIDED HISTORY: missed dialysis amputation ([facility] [Z89.512]     Hemoptysis [R04.2]     Ulcerated, foot, right, with fat layer exposed ([facility] [L97.512]     Hypertension, essential [I10]     Charcot foot due to diabetes mellitus ([facility] [E11.610] 12/28/2015    Anemia of chronic disease [D63.8] 07/03/2013    ESRD on dialysis ([facility] [N18.6, Z99.2] 06/27/2013    DM (diabetes mellitus) ([facility] [E11.9] 06/27/2013       Plan:      Sepsis 2/2 CAP  -CT A&P showed RLL PNA  -Lactic acid 2.1  -Pro-Kiran 25  -Mycoplasma, legionella, strep pneumo pending  -Blood cultures x 2 NGTD  -Resp Cx  show just above ed gram +ve cocci in clusters with rare GN rods: Growing staph aureus methicillin susceptible  -Continue IV ceftriaxone (day 6)  -On NS 0.9% 75 ml/hr. Will try to wean  -Filippo-synephrine DC'd yesterday  -Continue midodrine 7.5 mg 3 times daily  -I. D consulted:   -Continue IV ceftriaxone and oral vancomycin   -Swallow study ordered. -Appreciate further recommendations  -Penile discharge swab negative  -Covid negative     Chronic systolic and diastolic CHF/Hx of HTN. Currently hypotension and tachycardic  -EKG: Ectopic atrial with 21 conduction.   -BNP > 300,000, likely elevated from missed dialysis for 2 weeks  -Echo 6/2019: LVEF 30-35% with moderate diastolic dysfunction  -CAD, CABG, stents, last cath 2019.   -Tachycardia 2/2 to hypotension likely 2/2 diarrhea  -Continue amiodarone  Gtt  -Toprol 100 mg PO daily on hold due to hypotension.  -Nifedipine 30 mg PO daily on hold due to hypotension  -Cardiology on board:    -Started on amiodarone protocol yesterday   -Daily EKG for QTC   -Appreciate further recommendations     Hyperkalemia 2/2 ESRD on hemodialysis TTS  -Missed 2 weeks of dialysis.   -K 6.9 on admission with EKG changes, received IV calcium gluconate and insulin/dextrose.  -Underwent hemodialysis at [facility]. V's  -Nephrology on board:   -S/P HD here in [facility].  Had 2 sessions so far     -Plan for dialysis tomorrow   -Will try to wean off IV fluids today   -Renvela 4000 mg PO TID    -Magnesium 1.7    Hemoptysis likely 2/2 pneumonia (improving)  -HgB stable  -H&H q8h, keep HgB > 7  -NM lung scan perfusion low probability of PE     Acute diarrhea 2/2 C. difficile infection (improving)  -CT abdomen and pelvis showed small volume ascites and normal appendix  -C. Diff +ve   -Continue oral vancomycin 500 mg q6hr  -Antinausea meds PRN  -Protonix 40 mg PO daily             Right foot ulcer  -X-ray showed no evidence of OM  -Podiatry on board  -Wound care on board      Dyslipidemia   -Lipitor 40 mg PO nightly     Type 2 diabetes mellitus  -Low dose ISS  -Hypoglycemia protocol     Bilateral neural foraminal narrowing at L5-S1 with spondylolysis  -Showed up on CT A&P  -Monitor     Diet: renal diet low salt low fluid  DVT prophylaxis: EPCs; avoid chemical due to hemoptysis, thrombocytopenia 52     Uziel Adler MD  5/3/2021  8:41 AM   Attending Physician Statement    I have discussed the case of Kelsea Campbell, including pertinent history and exam findings with the resident. I have seen and examined the patient and the key elements of the encounter have been performed by me. I agree with the assessment, plan, and orders as documented by the resident. The patient has made significant improvement from his presentation, is admitted with sepsis secondary to pneumonia and is known to have end-stage renal disease. Today was alert and responsive with no evidence of thrush and no acute distress. The patient had acute diarrhea 2 weeks prior to admission which is secondary to C. difficile colitis. At present he is on oral vancomycin.   He is scheduled for swallow study it is my impression that will normal most likely  Electronically signed by Shirley Lucia MD on 5/3/2021 at 2:26 PM

## 2021-05-03 NOTE — PROCEDURES
blood.  Came to ED today for worsening respiratory symptoms.  Denies any smoking history, alcohol, or illicit drug use.  Comes from home where he lives alone.     At [facility] V's Cr was 18.47, , K 6.9.  EKG did show widened QRS.  Patient received IV calcium gluconate and insulin/dextrose.  Nephrology was consulted and patient underwent hemodialysis at [address] [place] [address] unremarkable but on CT abdomen and pelvis there was right lower lobe pneumonia.  Patient was septic with tachycardia and leukocytosis of 29.6.  Started on IV antibiotocs and blood cultures taken. Hendricks Oris R foot was also done for foot wound which did not show findings of acute osteomyelitis.  Podiatry was consulted and agreed that the wound was not infected and there was no osteomyelitis.  They did debride loose skin and recommend conservative management.  After dialysis patient was transferred to [address] [place] [address] my exam he was alert and oriented x 3 and satting on room air.  He was tachycardic.  He appeared fatigued.  He was actively coughing up small amounts of blood. Behavior/Cognition/Vision/Hearing:  Behavior/Cognition: Cooperative;Lethargic  Vision: Impaired  Vision Exceptions: (keeps eyes closed, no vision L eye)  Hearing: Within functional limits    Impressions:     Patient Position: Lateral     Consistencies Administered: Dysphagia Soft and Bite-Sized (Dysphagia III); Nectar cup; Thin cup; Thin straw;Pudding teaspoon              Oral Phase: Premature vallecular spillage of all consistencies, decreased mastication of soft solids. Pt. is endentulous, reports he never wears dentures. Pharyngeal: Min vallecular residue c pudding, mod vallecular residue c soft solids that pt. Shantelle cleared. Dysphagia Outcome Severity Scale: Level 6: Within functional limits/Modified independence       Recommended Diet:  Solid consistency: Dental Soft  Liquid consistency:  Thin            Safe Swallow Protocol:     Compensatory Swallowing Strategies: Eat/Feed slowly; Small bites/sips;Upright as possible for all oral intake              Recommendations/Treatment  Requires SLP Intervention: No                Education: Results and  recommendations were reviewed with  Pt. following this exam.      Patient Education Response: Needs reinforcement; Denies need for education \"I just want to go up to my room! \"                      Oral Preparation / Oral Phase  Oral Phase: Impaired        Pharyngeal Phase  Pharyngeal Phase: WNL      Esophageal Phase  Esophageal Screen: WNL        Pain   Patient Currently in Pain: Yes  Pain Level: 6  Pain Type: Acute pain  Pain Location: Head      Therapy Time:   Individual Concurrent Group Co-treatment   Time In 1445         Time Out 1455         Minutes 10                 Mary BASS A.CCC/SLP     5/3/2021, 3:04 PM

## 2021-05-03 NOTE — PROGRESS NOTES
[facility] following for discharge to [facility]. Facility will start pre-cert when discharge is imminent. Pt's insurance typically responds within one day.

## 2021-05-04 NOTE — PROGRESS NOTES
[facility]   Progress Note                   Date:   5/4/2021  Patient name: Haja Carrillo  Date of admission:  4/28/2021  2:27 PM  MRN:   [id number]  YOB: 1955  PCP: Tamiko Saleem MD    Reason for Admission:  sepsis     Subjective:       Continues to be tachycardiac, no clear p waves today on tele. Denies any cp. On amio drip. Now taking orals. Medications:   Scheduled Meds:   vancomycin  500 mg Oral 4 times per day    cefTRIAXone (ROCEPHIN) IV  2,000 mg Intravenous Q24H    sevelamer  4,000 mg Oral TID WC    aspirin  81 mg Oral Daily    lidocaine  1 patch Transdermal Daily    midodrine  7.5 mg Oral TID WC    neomycin-bacitracin-polymyxin   Topical BID    sodium chloride flush  5-40 mL Intravenous 2 times per day    atorvastatin  40 mg Oral Nightly    [Held by provider] digoxin  125 mcg Oral Daily    [Held by provider] metoprolol succinate  100 mg Oral Daily    [Held by provider] NIFEdipine  30 mg Oral Daily    pantoprazole  40 mg Oral Daily    insulin lispro  0-6 Units Subcutaneous TID WC    insulin lispro  0-3 Units Subcutaneous Nightly       Continuous Infusions:   amiodarone 0.5 mg/min (05/04/21 0441)    phenylephrine (JASON-SYNEPHRINE) 50mg/250mL infusion Stopped (05/02/21 2004)    norepinephrine      vasopressin (Septic Shock) infusion      sodium chloride      dextrose      sodium chloride 75 mL/hr at 05/04/21 0442       CBC:   Recent Labs     05/02/21 0457 05/03/21  0651 05/04/21  0452   WBC 14.6* 8.6 7.7   HGB 10.5* 10.6* 10.0*   PLT 41* 52* 55*     BMP:    Recent Labs     05/02/21 0457 05/03/21  0651 05/04/21  0452    136 137   K 3.8 3.8 4.0   CL 97* 98 98   CO2 22 21 22   BUN 59* 70* 75*   CREATININE 5.66* 6.40* 6.34*   GLUCOSE 141* 177* 148*     Hepatic: No results for input(s): AST, ALT, ALB, BILITOT, ALKPHOS in the last 72 hours. Troponin: No results for input(s): TROPONINI in the last 72 hours.   BNP: No results for input(s): BNP in the last 72 hours. Lipids: No results for input(s): CHOL, HDL in the last 72 hours. Invalid input(s): LDLCALCU  INR: No results for input(s): INR in the last 72 hours. Objective:   Vitals: BP 85/68   Pulse 108   Temp 97 °F (36.1 °C) (Tympanic)   Resp 16   Ht 6' (1.829 m)   Wt 231 lb 0.7 oz (104.8 kg)   SpO2 95%   BMI 31.33 kg/m²     General appearance: awake, alert, in no apparent respiratory distress   HEENT: Head: Normocephalic, no lesions, without obvious abnormality  Neck: no JVD  Lungs: clear to auscultation bilaterally, no basilar rales, no wheezing   Heart: regular rate and rhythm, S1, S2 normal, no murmur, click, rub or gallop  Abdomen: soft, non-tender; bowel sounds normal  Extremities: No LE edema  Neurologic: Mental status: Alert, oriented. Motor and sensory not done. EKG: Sinus tachycardia, inferior infarction, ST-T changes consistent with ischemia, QTc 515 msec    Subsequent ecgs showed Intermittent atrial tachycardia with 2:1 AV block        TTE 1/20/21    Left Ventricle:  Nondilated left ventricle, Systolic function is   severely decreased with an ejection fraction of 20-25%.   Left Ventricle: There is moderate increased wall thickness/hypertrophy. LVH is symmetric 1.5 cm    Right Ventricle: Systolic function is moderately to severely reduced.   Contrast-enhanced images show no evidence of left ventricular apical   thrombus    Ascending aortic dilatation measuring 4.4 cm, trileaflet aortic valve    E/e'>20 indicative of elevated left ventricular filling pressures      TTE June 2019  Technically difficult study due to patient habitus. Left ventricle is normal in size. Mild left ventricular hypertrophy. Severely reduced left ventricular systolic function. Estimated LV EF 30-35 %. Grade II (moderate) left ventricular diastolic dysfunction. Left atrium is mildly dilated. Trivial valve disease as below.        Cardiac cath 6/2/19   Severe native vessel disease.  Patent LIMA-LAD. The LAD distal to touch down is small in caliber with 80% diffuse disease. (Not amenable to PCI)  Chronic total occlusion of RCA.   LCX had 80% mid stenosis reduced to 0% using 3x38 mm Xience stent, post   dilated using 3.25 mm NC balloon. Assessment:   1. Septic shock   2. C diff colitis   3. RLL Pneumonia   4. Tachycardia- suspect PAT today- not sinus tachy  5. Was in Sinus tachycardia with intermittent atrial tach and 2:1 AV conduction, appropriate secondary to hypotension and sepsis    6. Multivessel CAD s/p CABG and Subsequent DENY to LCX (only patent LIMA-LAD graft in 2019)  7. Ischemic CMP with last LVEF 20-25% in Jan 2021   8. Acute on chronic systolic CHF due to missed HD   9. PAD with hx of left BKA  10. ESRD on HD       Treatment Plan:   1. Gave Adenosine 6, 12, 12- rhythm showed Atrial Tachy waves. 2. Change amio to 200 bid  3. Start lopressor 12.5 mg bid  4. If remains in same rhythm tomorrow, will attempt bedside CV. 5. ASA and lipitor   6. Continue midodrine in meantime  7. Will cont to follow along     Discussed with patient and nursing. Thank you for allowing me to participate in the care of this patient, please do not hesitate to call if you have any questions. Abby Tucker DO, [address] [facility], [address] [facility]  [facility] [employer]  [phone]

## 2021-05-04 NOTE — PLAN OF CARE
Problem: Falls - Risk of:  Goal: Will remain free from falls  Description: The patient remained free from falls this shift, call light within reach, bed in locked and lowest position. Side rails up x2. Continue to monitor closely. Outcome: Ongoing     Problem: Falls - Risk of:  Goal: Absence of physical injury  Description: Absence of physical injury  Outcome: Ongoing     Problem: Skin Integrity:  Goal: Will show no infection signs and symptoms  Description: Patient skin assessment complete. No signs/symptoms of new skin breakdown. Waffle mattress in place. Pt turned and repositioned every two hours with assistance from staff. Area kept free from moisture. Proper nourishment and fluids encouraged, as appropriate. Skin remains clean, dry, and intact. Will continue to monitor for additional needs and changes in skin breakdown. Outcome: Ongoing     Problem: Skin Integrity:  Goal: Absence of new skin breakdown  Description: Absence of new skin breakdown  Outcome: Ongoing     Problem:  Activity:  Goal: Risk for activity intolerance will decrease  Description: Risk for activity intolerance will decrease  Outcome: Ongoing     Problem: Fluid Volume:  Goal: Will show no signs or symptoms of fluid imbalance  Description: Will show no signs or symptoms of fluid imbalance  Outcome: Ongoing     Problem: Nutritional:  Goal: Maintenance of adequate nutrition will be supported  Description: Maintenance of adequate nutrition will be supported  Outcome: Ongoing     Problem: Physical Regulation:  Goal: Complications related to the disease process, condition or treatment will be avoided or minimized  Description: Complications related to the disease process, condition or treatment will be avoided or minimized  Outcome: Ongoing     Problem: Urinary Elimination:  Goal: Ability to achieve and maintain adequate urine output will be supported  Description: Ability to achieve and maintain adequate urine output will be supported Outcome: Ongoing

## 2021-05-04 NOTE — PROGRESS NOTES
Hemodialysis patient ([facility] 2011    MOM-WED-FRI-ON [address]     History of sleep apnea     none since significant weight loss (was 400#)    Hyperlipidemia     Hyperparathyroidism ([facility]     Hypertension     Neuropathy     Peripheral vascular disease (HCC)     Pneumonia     resolved    S/P CABG (coronary artery bypass graft) 06/2016    Type II or unspecified type diabetes mellitus without mention of complication, not stated as uncontrolled     dx-1980           Past Surgical History:        Procedure Laterality Date    CATARACT REMOVAL      DIALYSIS FISTULA CREATION Left 2014    LEG AMPUTATION THROUGH LOWER TIBIA AND FIBULA         Current Medications:    amiodarone (CORDARONE) tablet 200 mg, BID  metoprolol tartrate (LOPRESSOR) tablet 12.5 mg, BID  phenylephrine (JASON-SYNEPHRINE) 50 mg in dextrose 5 % 250 mL infusion, Continuous  vancomycin (FIRVANQ) 50 MG/ML oral solution 500 mg, 4 times per day  cefTRIAXone (ROCEPHIN) 2000 mg IVPB in D5W 50ml minibag, Q24H  sevelamer (RENVELA) tablet 4,000 mg, TID WC  norepinephrine (LEVOPHED) 16 mg in sodium chloride 0.9 % 250 mL infusion, Continuous  aspirin EC tablet 81 mg, Daily  vasopressin 20 Units in dextrose 5 % 100 mL infusion, Continuous  oxyCODONE-acetaminophen (PERCOCET) 5-325 MG per tablet 1 tablet, Q8H PRN  lidocaine 4 % external patch 1 patch, Daily  midodrine (PROAMATINE) tablet 7.5 mg, TID WC  neomycin-bacitracin-polymyxin (NEOSPORIN) ointment, BID  sodium chloride flush 0.9 % injection 5-40 mL, 2 times per day  sodium chloride flush 0.9 % injection 5-40 mL, PRN  0.9 % sodium chloride infusion, PRN  promethazine (PHENERGAN) tablet 12.5 mg, Q6H PRN    Or  ondansetron (ZOFRAN) injection 4 mg, Q6H PRN  polyethylene glycol (GLYCOLAX) packet 17 g, Daily PRN  acetaminophen (TYLENOL) tablet 650 mg, Q6H PRN    Or  acetaminophen (TYLENOL) suppository 650 mg, Q6H PRN  atorvastatin (LIPITOR) tablet 40 mg, Nightly  digoxin (LANOXIN) tablet 125 mcg, Daily  pantoprazole (PROTONIX) tablet 40 mg, Daily  glucose (GLUTOSE) 40 % oral gel 15 g, PRN  dextrose 50 % IV solution, PRN  glucagon (rDNA) injection 1 mg, PRN  dextrose 5 % solution, PRN  insulin lispro (HUMALOG) injection vial 0-6 Units, TID WC  insulin lispro (HUMALOG) injection vial 0-3 Units, Nightly  0.9 % sodium chloride infusion, Continuous        Allergies:  Pcn [penicillins]      Objective:  Constitutional:    CURRENT TEMPERATURE:  Temp: 96.4 °F (35.8 °C)  MAXIMUM TEMPERATURE OVER 24HRS:  Temp (24hrs), Av °F (36.1 °C), Min:96.4 °F (35.8 °C), Max:97.4 °F (36.3 °C)    CURRENT RESPIRATORY RATE:  Resp: 17  CURRENT PULSE:  Pulse: 109  CURRENT BLOOD PRESSURE:  BP: 99/68  24HR BLOOD PRESSURE RANGE:  Systolic (55NHT), CXR:19 , Min:74 , NZT:365   ; Diastolic (48GWK), UNV:12, Min:53, Max:78    24HR INTAKE/OUTPUT:    No intake or output data in the 24 hours ending 21 1345        Physical Exam:  GENERAL APPEARANCE: Alert and cooperative, and appears to be in no acute distress. HEAD: normocephalic  EYES: PERRL, EOMI. Not pale, anicteric   NOSE:  No nasal discharge. THROAT:  Oral cavity and pharynx normal. Moist  NECK: Neck supple, non-tender without lymphadenopathy, masses or thyromegaly. JVD-neg  CARDIAC: Normal S1 and S2. No S3, S4 or murmurs. Rhythm is regular. LUNGS: Clear to auscultation and percussion without rales, rhonchi, wheezing or diminished breath sounds. ABD-Soft non distended, BS+ Non tender no organomegally  BACK: Examination of the spine reveals  no spinal deformity, without tenderness,   MUSKULOSKELETAL: Adequately aligned spine. No joint erythema or tenderness. EXTREMITIES: No edema. Peripheral pulses intact.    NEURO:Alert oriented x 3 ,power 5/5 in all extremities      Labs:  PTH:  No results found for: PTH  abs:   CBC:   Recent Labs     21  0457 21  0651 21  0452   WBC 14.6* 8.6 7.7   RBC 3.96* 3.94* 3.71*   HGB 10.5* 10.6* 10.0*   HCT 33.8* 33.5* 31.2*   MCV 85.4 85.0 84.1   MCH 26.5 27.0 27.0   MCHC 31.1 31.8 32.1   RDW 20.7* 20.6* 20.8*   PLT 41* 52* 55*   MPV 9.9 10.0 10.9      BMP:   Recent Labs     05/02/21  0457 05/03/21  0651 05/04/21  0452    136 137   K 3.8 3.8 4.0   CL 97* 98 98   CO2 22 21 22   BUN 59* 70* 75*   CREATININE 5.66* 6.40* 6.34*   GLUCOSE 141* 177* 148*   CALCIUM 9.3 9.1 9.0        Phosphorus:    No results for input(s): PHOS in the last 72 hours. Magnesium:   Recent Labs     05/02/21 0457   MG 1.7     Albumin:   No results for input(s): LABALBU in the last 72 hours. Assessment:  1. ESRD on Hemodialysis. His regular HD days are TTS at  [facility] facility using Left AVF under Dr. Herminia Tejeda. His dry weight is not available patient is likely under his dry weight due to history of diarrhea. Renal diet with 1500 mils fluid restriction     2. Anemia of chronic kidney disease-Retacrit once iron studies are adequate    3. Pneumonia and acute diarrhea secondary to C. difficile colitis-ID following    4. Hypotension -improving    5. Secondary hyperparathyroidism    6. Anion gap metabolic acidosis from missed dialysis-improved    7. Coronary artery disease status post CABG    8. Peripheral artery disease with history of left BKA and right lower extremity chronic wound    Plan  decrease IV fluids 0.9 saline to 50 cc/h  Continue midodrine 7.5 mg tid   Continue oral vancomycin  Diet as tolerated per  swallow test recommendation.   Strict I's and O's  Dialysis to be done today with gentle fluid removal    Rosa Krishna-Jeanette    Nephrologist

## 2021-05-04 NOTE — PROGRESS NOTES
[facility] -   Infectious diseases evaluation  admission date 4/28/2021    reason for consultation:   Pneumonia/C. difficile colitis    Impression :   Current:  · C. difficile colitis  · Right lower lobe pneumonia, staph aureus MSSA growth on respiratory culture  · Sepsis secondary to above  · Right foot wound  · Metabolic acidosis  · CHF  · End-stage renal disease on hemodialysis  · Diabetes mellitus  · Dyslipidemia  · Hypertension      Recommendations   · Continue p.o. vancomycin   · IV ceftriaxone  · MRSA nasal swab was negative  · Swallow study pending  · No growth on blood cultures  · Continue supportive care          History of Present Illness:   Initial history:  Bella Glez is a 60y.o.-year-old male was transferred from [place].  The patient presented with worsening cough productive with blood-tinged sputum associated with shortness of breath and diarrhea for 2 weeks. He also complaining of generalized weakness, nausea and vomiting. no alleviating or aggravating factors. The patient is poor historian, lives at home alone, states that he received Covid 19 vaccine. Apparently the patient missed hemodialysis.   He also had left foot wound with no purulent drainage or necrotic tissue, was evaluated by podiatry, foot x-ray not suggestive of osteomyelitis  On admission he was tachycardic, had leukocytosis with WBC of 29.6  CT chest and abdomen showed right lower lobe infiltrates  COVID-19 rapid test negative  VQ scan was low probability on 4/29/2021  On 4/30/2021He became hypotensive with a systolic blood pressure in the 70s, tachycardic was transferred to ICU, was started on pressors  Procalcitonin was 25.7 on 4/30/2021  IJ triple-lumen was placed was placed 4/30/2021    Interval changes  5/4/2021   He remains in the ICU, no pressors, remains tachycardiac on amiodarone drip, no bowel movement today, no new events    Sputum Gram growing staph time.         Past Medical History:     Past Medical History:   Diagnosis Date    Acute on chronic congestive heart failure (HCC)     Anemia     CAD (coronary artery disease)     [facility]     Dialysis care     ESRD (end stage renal disease) on dialysis ([facility]     Foot ulcer, left ([facility] 2015    GERD (gastroesophageal reflux disease)     Hemodialysis patient ([facility] 2011    MOM-WED-FRI-ON [address]     History of sleep apnea     none since significant weight loss (was 400#)    Hyperlipidemia     Hyperparathyroidism ([facility]     Hypertension     Neuropathy     Peripheral vascular disease (HCC)     Pneumonia     resolved    S/P CABG (coronary artery bypass graft) 06/2016    Type II or unspecified type diabetes mellitus without mention of complication, not stated as uncontrolled     dx-1980       Past Surgical  History:     Past Surgical History:   Procedure Laterality Date    CATARACT REMOVAL      DIALYSIS FISTULA CREATION Left 2014    LEG AMPUTATION THROUGH LOWER TIBIA AND FIBULA         Medications:      amiodarone  200 mg Oral BID    metoprolol tartrate  12.5 mg Oral BID    vancomycin  500 mg Oral 4 times per day    cefTRIAXone (ROCEPHIN) IV  2,000 mg Intravenous Q24H    sevelamer  4,000 mg Oral TID WC    aspirin  81 mg Oral Daily    lidocaine  1 patch Transdermal Daily    midodrine  7.5 mg Oral TID WC    neomycin-bacitracin-polymyxin   Topical BID    sodium chloride flush  5-40 mL Intravenous 2 times per day    atorvastatin  40 mg Oral Nightly    digoxin  125 mcg Oral Daily    pantoprazole  40 mg Oral Daily    insulin lispro  0-6 Units Subcutaneous TID WC    insulin lispro  0-3 Units Subcutaneous Nightly       Social History:     Social History     Socioeconomic History    Marital status:      Spouse name: Not on file    Number of children: Not on file    Years of education: Not on file    Highest education level: Not on file   Occupational History    Not on file Social Needs    Financial resource strain: Not on file    Food insecurity     Worry: Not on file     Inability: Not on file    Transportation needs     Medical: Not on file     Non-medical: Not on file   Tobacco Use    Smoking status: Never Smoker    Smokeless tobacco: Never Used   Substance and Sexual Activity    Alcohol use: No    Drug use: No    Sexual activity: Not on file   Lifestyle    Physical activity     Days per week: Not on file     Minutes per session: Not on file    Stress: Not on file   Relationships    Social connections     Talks on phone: Not on file     Gets together: Not on file     Attends Yazdanism service: Not on file     Active member of club or organization: Not on file     Attends meetings of clubs or organizations: Not on file     Relationship status: Not on file    Intimate partner violence     Fear of current or ex partner: Not on file     Emotionally abused: Not on file     Physically abused: Not on file     Forced sexual activity: Not on file   Other Topics Concern    Not on file   Social History Narrative    Not on file       Family History:     Family History   Problem Relation Age of Onset    Heart Disease Father     Diabetes Father     Diabetes Brother       Medical Decision Making:   I have independently reviewed/ordered the following labs:    CBC with Differential:   Recent Labs     05/03/21  0651 05/04/21 0452   WBC 8.6 7.7   HGB 10.6* 10.0*   HCT 33.5* 31.2*   PLT 52* 55*   LYMPHOPCT 3* 6*   MONOPCT 5 6     BMP:  Recent Labs     05/02/21 0457 05/03/21  0651 05/04/21 0452    136 137   K 3.8 3.8 4.0   CL 97* 98 98   CO2 22 21 22   BUN 59* 70* 75*   CREATININE 5.66* 6.40* 6.34*   MG 1.7  --   --      Hepatic Function Panel:   No results for input(s): PROT, LABALBU, BILIDIR, IBILI, BILITOT, ALKPHOS, ALT, AST in the last 72 hours. No results for input(s): RPR in the last 72 hours. No results for input(s): HIV in the last 72 hours.   No results for input(s): BC in the last 72 hours. Lab Results   Component Value Date    CREATININE 6.34 05/04/2021    GLUCOSE 148 05/04/2021    GLUCOSE 128 01/09/2012       Detailed results: Thank you for allowing us to participate in the care of this patient. Please call with questions. This note is created with the assistance of a speech recognition program.  While intending to generate adocument that actually reflects the content of the visit, the document can still have some errors including those of syntax and sound a like substitutions which may escape proof reading. It such instances, actual meaningcan be extrapolated by contextual diversion.     Juno Bertrand MD  Office: [phone]  Perfect serve / office [phone]

## 2021-05-04 NOTE — PROGRESS NOTES
HEMODIALYSIS PRE-TREATMENT NOTE    Patient Identifiers prior to treatment: Name,     Isolation Required: yes                      Isolation Type: Contact       (please document if patient is being managed as a PUI/COVID-19 patient)        Hepatitis status:                           Date Drawn                             Result  Hepatitis B Surface Antigen 21     negative                     Hepatitis B Surface Antibody 21 95        Hepatitis B Core Antibody 19 negative          How was Hepatitis Status verified: outpt records     Was a copy of the labs you documented provided to facility for the patient's chart: yes    Hemodialysis orders verified: yes    Access Within normal limits ( I.e. s/s of infection,...): WNL     Pre-Assessment completed: yes    Pre-dialysis report received from: Karla Luo RN                       Time: 71

## 2021-05-04 NOTE — PROGRESS NOTES
tachycardia and leukocytosis of 29.6. Started on IV antibiotocs and blood cultures taken. X-ray R foot was also done for foot wound which did not show findings of acute osteomyelitis. Podiatry was consulted and agreed that the wound was not infected and there was no osteomyelitis. They did debride loose skin and recommend conservative management. After dialysis patient was transferred to [facility]. On my exam he was alert and oriented x 3 and satting on room air. He was tachycardic. He appeared fatigued. He was actively coughing up small amounts of blood. Review of Systems:     Review of Systems   Constitutional: Negative for chills, diaphoresis, fatigue and fever. Respiratory: Negative for cough and shortness of breath. Cardiovascular: Negative for chest pain and palpitations. Gastrointestinal: Negative for abdominal pain, constipation, diarrhea, nausea and vomiting. Genitourinary: Negative for dysuria. Musculoskeletal: Positive for back pain. Neurological: Negative for weakness, light-headedness and numbness. All other systems reviewed and are negative. Medications: Allergies:     Allergies   Allergen Reactions    Pcn [Penicillins] Other (See Comments)     Trouble walking       Current Meds:   Scheduled Meds:    amiodarone  200 mg Oral BID    metoprolol tartrate  12.5 mg Oral BID    vancomycin  500 mg Oral 4 times per day    cefTRIAXone (ROCEPHIN) IV  2,000 mg Intravenous Q24H    sevelamer  4,000 mg Oral TID WC    aspirin  81 mg Oral Daily    lidocaine  1 patch Transdermal Daily    midodrine  7.5 mg Oral TID WC    neomycin-bacitracin-polymyxin   Topical BID    sodium chloride flush  5-40 mL Intravenous 2 times per day    atorvastatin  40 mg Oral Nightly    digoxin  125 mcg Oral Daily    pantoprazole  40 mg Oral Daily    insulin lispro  0-6 Units Subcutaneous TID WC    insulin lispro  0-3 Units Subcutaneous Nightly     Continuous Infusions:    phenylephrine (JASON-SYNEPHRINE) 50mg/250mL infusion Stopped (21)    norepinephrine      vasopressin (Septic Shock) infusion      sodium chloride      dextrose      sodium chloride 75 mL/hr at 21 0442     PRN Meds: oxyCODONE-acetaminophen, sodium chloride flush, sodium chloride, promethazine **OR** ondansetron, polyethylene glycol, acetaminophen **OR** acetaminophen, glucose, dextrose, glucagon (rDNA), dextrose    Data:     Past Medical History:   has a past medical history of Acute on chronic congestive heart failure ([facility] [address] [facility] Anemia, CAD (coronary artery disease), Cardiomyopathy ([facility] [address] Dialysis care, ESRD (end stage renal disease) on dialysis ([email or web address] Foot ulcer, left ([email or web address] GERD (gastroesophageal reflux disease), Hemodialysis patient ([email or web address] History of sleep apnea, Hyperlipidemia, Hyperparathyroidism ([facility] Hypertension, Neuropathy, Peripheral vascular disease ([facility] Pneumonia, S/P CABG (coronary artery bypass graft), and Type II or unspecified type diabetes mellitus without mention of complication, not stated as uncontrolled. Social History:   reports that he has never smoked. He has never used smokeless tobacco. He reports that he does not drink alcohol or use drugs. Family History:   Family History   Problem Relation Age of Onset    Heart Disease Father     Diabetes Father     Diabetes Brother        Vitals:  BP 99/68   Pulse 109   Temp 96.4 °F (35.8 °C) (Temporal)   Resp 17   Ht 6' (1.829 m)   Wt 236 lb 12.4 oz (107.4 kg)   SpO2 95%   BMI 32.11 kg/m²   Temp (24hrs), Av °F (36.1 °C), Min:96.4 °F (35.8 °C), Max:97.4 °F (36.3 °C)    Recent Labs     21  2153 21  0838 21  1147 21  1237   POCGLU 142* 137* 134* 127*       I/O(24Hr):   No intake or output data in the 24 hours ending 21 1304    Labs:    CBC with Differential:    Lab Results   Component Value Date    WBC 7.7 2021    RBC 3.71 2021    RBC 3.51 2012    HGB 10.0 2021    HCT 31.2 05/04/2021    PLT 55 05/04/2021     01/09/2012    MCV 84.1 05/04/2021    MCH 27.0 05/04/2021    MCHC 32.1 05/04/2021    RDW 20.8 05/04/2021    NRBC 1 03/07/2016    METASPCT 2 05/01/2021    LYMPHOPCT 6 05/04/2021    MONOPCT 6 05/04/2021    MYELOPCT 1 03/06/2016    BASOPCT 0 05/04/2021    MONOSABS 0.46 05/04/2021    LYMPHSABS 0.46 05/04/2021    EOSABS 0.08 05/04/2021    BASOSABS 0.00 05/04/2021    DIFFTYPE NOT REPORTED 05/04/2021     BMP:    Lab Results   Component Value Date     05/04/2021    K 4.0 05/04/2021    CL 98 05/04/2021    CO2 22 05/04/2021    BUN 75 05/04/2021    LABALBU 2.9 04/30/2021    LABALBU 3.8 01/09/2012    CREATININE 6.34 05/04/2021    CALCIUM 9.0 05/04/2021    GFRAA 11 05/04/2021    LABGLOM 9 05/04/2021    GLUCOSE 148 05/04/2021    GLUCOSE 128 01/09/2012       Lab Results   Component Value Date/Time    SPECIAL NOT REPORTED 04/30/2021 11:31 PM     Lab Results   Component Value Date/Time    CULTURE NORMAL SKIN MALKA (A) 04/30/2021 11:31 PM    CULTURE NEGATIVE FOR NEISSERIA GONORRHOEAE 04/30/2021 11:31 PM         Radiology:    Steff Aden Foot Right (min 3 Views)    Result Date: 4/28/2021  EXAMINATION: THREE XRAY VIEWS OF THE RIGHT FOOT 4/28/2021 4:44 am COMPARISON: 12/20/2015 HISTORY: ORDERING SYSTEM PROVIDED HISTORY: extensive R foot wound TECHNOLOGIST PROVIDED HISTORY: extensive R foot wound Reason for Exam: Extensive right foot wound, FINDINGS: Complete collapse of the plantar arch with fragmentation and increased sclerosis of the midfoot is again noted consistent with severe neuropathic arthropathy. There is diffuse soft tissue swelling. There is a soft tissue ulceration along the plantar surface of the midfoot. There is no periosteal new bone formation or osteolysis to suggest acute osteomyelitis. 1. Redemonstration of severe neuropathic arthropathy of the midfoot. 2. Soft tissue ulceration along the plantar surface of the midfoot without radiographic findings of acute osteomyelitis. RECOMMENDATION: MRI of the right foot could be performed for further evaluation if clinically warranted. Nm Lung Scan Perfusion Only    Result Date: 4/29/2021  EXAMINATION: NUCLEAR MEDICINE PERFUSION SCAN. 4/29/2021 TECHNIQUE: Ventilation not performed as part of COVID-19 safety precautions. 3.7 millicuries of Tc 57T MAA was administered intravenously prior to planar imaging of the lungs in multiple projections. COMPARISON: Chest radiograph 04/28/2021. HISTORY: ORDERING SYSTEM PROVIDED HISTORY: hemoptysis and tachycardia r/o PE Additional signs and symptoms: SOB, CP, coughin up blood, non-smoker, no hx of blood clots FINDINGS: PERFUSION:Distribution of radiotracer is homogeneous. No segmental defects identified. CHEST RADIOGRAPH:No focal areas of consolidation or significant effusions on recent chest radiograph. Low Probability for Pulmonary Embolus. Ct Abdomen Pelvis W Iv Contrast Additional Contrast? None    Result Date: 4/28/2021  EXAMINATION: CT OF THE ABDOMEN AND PELVIS WITH CONTRAST 4/28/2021 4:16 am TECHNIQUE: CT of the abdomen and pelvis was performed with the administration of intravenous contrast. Multiplanar reformatted images are provided for review. Dose modulation, iterative reconstruction, and/or weight based adjustment of the mA/kV was utilized to reduce the radiation dose to as low as reasonably achievable. COMPARISON: None. HISTORY: ORDERING SYSTEM PROVIDED HISTORY: diarrhea, abd pain, wbc 30k, discussed with nephro TECHNOLOGIST PROVIDED HISTORY: diarrhea, abd pain, wbc 30k, discussed with nephro Decision Support Exception->Emergency Medical Condition (MA) Reason for Exam: abd pain, nephro maddie approved contrast, dialysis pt Acuity: Acute Type of Exam: Initial FINDINGS: Lower Chest: There is abnormal airspace consolidation within the right lower lobe consistent with a right lower lobe pneumonia. There is no pleural effusion. There is cardiomegaly.   Severe coronary artery atherosclerotic calcifications are present. Organs: There is a small volume of ascites. Cholecystectomy clips are present. The liver, spleen, pancreas and adrenal glands are normal in appearance. There is severe renal atrophy. Innumerous renal cysts are noted. There is no hydronephrosis. A right nephroureteral stent is present. GI/Bowel: There are no abnormally dilated loops of large or small bowel present. There is no mesenteric inflammatory stranding present. The appendix is normal. Pelvis: There is a small volume of free fluid in the pelvis. There is no free fluid or pelvic mass identified. The urinary bladder is normal. Peritoneum/Retroperitoneum: There is no pathologically enlarged lymphadenopathy present. Severe atherosclerotic calcifications are present within the abdominal aorta and proximal anchor arteries. Bones/Soft Tissues: No lytic or blastic osseous lesions are identified. There is bilateral spondylolysis at L5 and grade 1 spondylolisthesis of L5 relative to S1. There is a disc bulge and facet arthropathy resulting in severe bilateral neural foraminal narrowing. Findings consistent with renal osteodystrophy are noted. 1. Right lower lobe pneumonia. Follow-up PA and lateral chest radiographs are recommended after treatment to ensure resolution. 2. Small volume ascites. 3. Normal appendix. 4. Severe bilateral neural foraminal narrowing at L5-S1 secondary to bilateral spondylolysis at L5, grade 1 spondylolisthesis of L5 relative to S1, a disc bulge and facet arthropathy.      Ir Niya Spire Device Plmt/replace/removal    Result Date: 4/30/2021  PROCEDURE: IR FLUOROSCOPY GUIDED CENTRAL VENOUS ACCESS DEVICE PLACEMENT 4/30/2021 HISTORY: ORDERING SYSTEM PROVIDED HISTORY: Sepsis with hypotension; cental line for IV pressors TECHNOLOGIST PROVIDED HISTORY: cental line for IV pressors CONTRAST: None SEDATION: None FLUOROSCOPY DOSE AND TYPE OR TIME AND EXPOSURES: Fluoro time: 1 minutes 14 seconds DAP: 714 cGycm2 DESCRIPTION OF PROCEDURE: Informed consent was obtained from the patient's brother after a detailed explanation of the procedure including risks, benefits, and alternatives. Universal protocol was observed including a time-out to confirm the correct patient and procedure. The right neck was prepped and draped in sterile fashion using maximum sterile barrier technique. Local anesthesia was achieved with 1% lidocaine. The right internal jugular vein was accessed with a 21 gauge micropuncture needle under ultrasound guidance. A 0.018 guidewire was advanced through the needle. The needle was removed and a 4 Western Jazmyn access catheter over its dilator was advanced over the wire. The wire and dilator were removed and a 0.035 guidewire was advanced through the access catheter into the IVC under fluoroscopic guidance. The access catheter was removed and the tract dilated over the wire. A 20 cm triple-lumen catheter was advanced over the wire and the wire was removed. The catheter flushed and aspirated easily. The catheter was sutured in place with 2-0 nylon. A sterile dressing was applied. The patient tolerated the procedure well and there were no immediate complications. Estimated blood loss: Less than 5 mL. FINDINGS: Ultrasound demonstrates patency of the right internal jugular vein and guides venotomy. Final fluoroscopic image demonstrates satisfactory catheter placement with the tip in the right atrium. Successful ultrasound and fluoroscopy guided right IJ triple-lumen central venous catheter insertion. Xr Chest Portable    Result Date: 4/28/2021  EXAMINATION: ONE XRAY VIEW OF THE CHEST 4/28/2021 2:48 am COMPARISON: 06/03/2019 HISTORY: ORDERING SYSTEM PROVIDED HISTORY: missed dialysis x2weeks TECHNOLOGIST PROVIDED HISTORY: missed dialysis x2weeks Reason for Exam: Upright port, missed dialysis x2 weeks FINDINGS: There is stable cardiomegaly.   There is no focal consolidation, pleural effusion or evidence of edema. There is no pneumothorax identified. 1. Stable cardiomegaly. 2. No acute cardiopulmonary process identified. Fl Modified Barium Swallow W Video    Result Date: 5/3/2021  EXAMINATION: MODIFIED BARIUM SWALLOW WAS PERFORMED IN CONJUNCTION WITH SPEECH PATHOLOGY SERVICES TECHNIQUE: Fluoroscopic evaluation of the swallowing mechanism was performed with multiple consistency of barium product. FLUOROSCOPY DOSE AND TYPE OR TIME AND EXPOSURES: Fluoroscopic time of 1 minutes 44 seconds. DAP of 71.9 dGycm2. COMPARISON: None HISTORY: ORDERING SYSTEM PROVIDED HISTORY: evaluate for diet modifications TECHNOLOGIST PROVIDED HISTORY: evaluate for diet modifications Reason for Exam: evaluate for diet modifications Acuity: Acute Type of Exam: Initial FINDINGS: Oral phase of swallowing was grossly within normal limits. No evidence of laryngeal penetration or aspiration. Swallowing mechanism grossly within normal limits without evidence of aspiration. Please see separate speech pathology report for full discussion of findings and recommendations. Physical Examination:        Physical Exam  Vitals signs reviewed. Constitutional:       General: He is awake. He is not in acute distress. Appearance: He is obese. He is not ill-appearing. Interventions: Nasal cannula in place. Comments: On 2L of NC. HENT:      Head: Normocephalic. Eyes:      Pupils: Pupils are equal, round, and reactive to light. Cardiovascular:      Rate and Rhythm: Regular rhythm. Tachycardia present. Heart sounds: Normal heart sounds. Pulmonary:      Effort: Pulmonary effort is normal.      Breath sounds: Normal breath sounds and air entry. Abdominal:      General: Abdomen is flat. Palpations: Abdomen is soft. Tenderness: There is generalized abdominal tenderness. Feet:      Left foot:      Amputation: Left leg is amputated below knee. Skin:     Findings: No rash. Neurological:      Mental Status: He is alert and easily aroused. Psychiatric:         Behavior: Behavior is cooperative. Assessment:        Primary Problem  Sepsis due to pneumonia [facility])    Active Hospital Problems    Diagnosis Date Noted    Pneumonia [J18.9] 04/28/2021    Sepsis ([facility] [A41.9] 04/28/2021    Sepsis due to pneumonia ([facility] [J18.9, A41.9] 69/49/3291    Metabolic acidosis [D35.8]     Hyperkalemia [E87.5]     Diarrhea [R19.7]     Nausea and vomiting [R11.2]     History of left below knee amputation (HCC) [Z89.512]     Hemoptysis [R04.2]     Ulcerated, foot, right, with fat layer exposed ([facility] [L97.512]     Hypertension, essential [I10]     Charcot foot due to diabetes mellitus ([facility] [E11.610] 12/28/2015    Anemia of chronic disease [D63.8] 07/03/2013    ESRD on dialysis ([facility] [N18.6, Z99.2] 06/27/2013    DM (diabetes mellitus) ([facility] [E11.9] 06/27/2013       Plan:        Sepsis 2/2 CAP  -CT A&P showed RLL PNA  -Lactic acid 2.1  -Pro-Kiran 25.70  -Blood cultures x 2 NGTD  -Resp Cx  show just above ed gram +ve cocci in clusters with rare GN rods: Growing staph aureus methicillin susceptible  -Continue IV ceftriaxone (day 4)  -On NS 0.9% 75 ml/hr  -Continue midodrine 7.5 mg 3 times daily  -I. D on board              -Continue IV ceftriaxone and oral vancomycin              -Swallow study ordered               -Appreciate further recommendations  -Penile discharge swab negative  -Covid negative     Chronic systolic and diastolic CHF/Hx of HTN. Currently hypotension and tachycardic  -EKG: Ectopic atrial with 21 conduction.    -BNP > 300,000, likely elevated from missed dialysis for 2 weeks  -Echo 6/2019: LVEF 30-35% with moderate diastolic dysfunction  -CAD, CABG, stents, last cath 2019.   -Tachycardia 2/2 to hypotension likely 2/2 diarrhea  -Toprol 100 mg PO daily on hold due to hypotension  -Nifedipine 30 mg PO daily on hold due to hypotension  -Cardiology on board:               -

## 2021-05-05 NOTE — PROGRESS NOTES
[address] [facility].    PROGRESS NOTE             5/5/2021    7:12 AM    Name:   Florencia Hudson  MRN:     [id number]     [place]:      [de-identified]   Room:   2006/2006-01  IP Day:  7  Admit Date:  4/28/2021  2:27 PM    PCP:  Abi Wang MD  Code Status:  Full Code    Subjective:     C/C: No chief complaint on file. Interval History Status:     Patient was seen and evaluated bedside. No acute events overnight. Patient blood pressure stable. S/p hemodialysis yesterday with 1 dose of albumin given for low blood pressure. EKG yesterday showed wide QRS rhythm with PVCs. Patient was given adenosine 6, 12, 12. Amiodarone drip was transitioned to oral 200 BID and started on Lopressor 12.5 mg twice daily yesterday as well. As per cardiology, plan is to perform bedside cardioversion if patient remains in same rhythm. Patient states he is doing better. Patient is on 3L NC. He has not had a bowel movement in 3 days. He still reports nonspecific generalized abdominal pain. Patient is passing flatus. Denies chest pain, palpitations, nausea or vomiting. Brief History:     Please see H&P    Review of Systems:     Review of Systems   Constitutional: Negative for activity change, appetite change and fatigue. HENT: Negative for congestion. Eyes: Negative for photophobia and visual disturbance. Respiratory: Negative for apnea, cough, shortness of breath and wheezing. Cardiovascular: Negative for chest pain, palpitations and leg swelling. Gastrointestinal: Negative for abdominal distention, abdominal pain, constipation, diarrhea, nausea and vomiting. Endocrine: Negative for polyuria. Genitourinary: Negative for difficulty urinating and urgency. Musculoskeletal: Positive for gait problem. Negative for arthralgias and back pain. Skin: Negative for color change. Neurological: Negative for dizziness, syncope and headaches. mellitus without mention of complication, not stated as uncontrolled. Social History:   reports that he has never smoked. He has never used smokeless tobacco. He reports that he does not drink alcohol or use drugs. Family History:   Family History   Problem Relation Age of Onset    Heart Disease Father     Diabetes Father     Diabetes Brother        Vitals:  BP 92/60   Pulse 114   Temp 97.6 °F (36.4 °C) (Temporal)   Resp 15   Ht 6' (1.829 m)   Wt 236 lb 12.4 oz (107.4 kg)   SpO2 93%   BMI 32.11 kg/m²   Temp (24hrs), Av.6 °F (35.9 °C), Min:96 °F (35.6 °C), Max:97.6 °F (36.4 °C)    Recent Labs     21  1147 21  1237 21  1655 21  2156   POCGLU 134* 127* 122* 104       I/O(24Hr): Intake/Output Summary (Last 24 hours) at 2021 1614  Last data filed at 2021 2129  Gross per 24 hour   Intake 10 ml   Output 1500 ml   Net -1490 ml       Labs:    [unfilled]    Lab Results   Component Value Date/Time    SPECIAL NOT REPORTED 2021 11:31 PM     Lab Results   Component Value Date/Time    CULTURE NORMAL SKIN MALKA (A) 2021 11:31 PM    CULTURE NEGATIVE FOR NEISSERIA GONORRHOEAE 2021 11:31 PM       [unfilled]    Radiology:    Marta Grise Foot Right (min 3 Views)    Result Date: 2021  EXAMINATION: THREE XRAY VIEWS OF THE RIGHT FOOT 2021 4:44 am COMPARISON: 2015 HISTORY: ORDERING SYSTEM PROVIDED HISTORY: extensive R foot wound TECHNOLOGIST PROVIDED HISTORY: extensive R foot wound Reason for Exam: Extensive right foot wound, FINDINGS: Complete collapse of the plantar arch with fragmentation and increased sclerosis of the midfoot is again noted consistent with severe neuropathic arthropathy. There is diffuse soft tissue swelling. There is a soft tissue ulceration along the plantar surface of the midfoot. There is no periosteal new bone formation or osteolysis to suggest acute osteomyelitis.      1. Redemonstration of severe neuropathic arthropathy of the midfoot. 2. Soft tissue ulceration along the plantar surface of the midfoot without radiographic findings of acute osteomyelitis. RECOMMENDATION: MRI of the right foot could be performed for further evaluation if clinically warranted. Nm Lung Scan Perfusion Only    Result Date: 4/29/2021  EXAMINATION: NUCLEAR MEDICINE PERFUSION SCAN. 4/29/2021 TECHNIQUE: Ventilation not performed as part of COVID-19 safety precautions. 3.7 millicuries of Tc 56K MAA was administered intravenously prior to planar imaging of the lungs in multiple projections. COMPARISON: Chest radiograph 04/28/2021. HISTORY: ORDERING SYSTEM PROVIDED HISTORY: hemoptysis and tachycardia r/o PE Additional signs and symptoms: SOB, CP, coughin up blood, non-smoker, no hx of blood clots FINDINGS: PERFUSION:Distribution of radiotracer is homogeneous. No segmental defects identified. CHEST RADIOGRAPH:No focal areas of consolidation or significant effusions on recent chest radiograph. Low Probability for Pulmonary Embolus. Ct Abdomen Pelvis W Iv Contrast Additional Contrast? None    Result Date: 4/28/2021  EXAMINATION: CT OF THE ABDOMEN AND PELVIS WITH CONTRAST 4/28/2021 4:16 am TECHNIQUE: CT of the abdomen and pelvis was performed with the administration of intravenous contrast. Multiplanar reformatted images are provided for review. Dose modulation, iterative reconstruction, and/or weight based adjustment of the mA/kV was utilized to reduce the radiation dose to as low as reasonably achievable. COMPARISON: None.  HISTORY: ORDERING SYSTEM PROVIDED HISTORY: diarrhea, abd pain, wbc 30k, discussed with nephro TECHNOLOGIST PROVIDED HISTORY: diarrhea, abd pain, wbc 30k, discussed with nephro Decision Support Exception->Emergency Medical Condition (MA) Reason for Exam: abd pain, nephro maddie approved contrast, dialysis pt Acuity: Acute Type of Exam: Initial FINDINGS: Lower Chest: There is abnormal airspace consolidation within the right lower lobe consistent with a right lower lobe pneumonia. There is no pleural effusion. There is cardiomegaly. Severe coronary artery atherosclerotic calcifications are present. Organs: There is a small volume of ascites. Cholecystectomy clips are present. The liver, spleen, pancreas and adrenal glands are normal in appearance. There is severe renal atrophy. Innumerous renal cysts are noted. There is no hydronephrosis. A right nephroureteral stent is present. GI/Bowel: There are no abnormally dilated loops of large or small bowel present. There is no mesenteric inflammatory stranding present. The appendix is normal. Pelvis: There is a small volume of free fluid in the pelvis. There is no free fluid or pelvic mass identified. The urinary bladder is normal. Peritoneum/Retroperitoneum: There is no pathologically enlarged lymphadenopathy present. Severe atherosclerotic calcifications are present within the abdominal aorta and proximal anchor arteries. Bones/Soft Tissues: No lytic or blastic osseous lesions are identified. There is bilateral spondylolysis at L5 and grade 1 spondylolisthesis of L5 relative to S1. There is a disc bulge and facet arthropathy resulting in severe bilateral neural foraminal narrowing. Findings consistent with renal osteodystrophy are noted. 1. Right lower lobe pneumonia. Follow-up PA and lateral chest radiographs are recommended after treatment to ensure resolution. 2. Small volume ascites. 3. Normal appendix. 4. Severe bilateral neural foraminal narrowing at L5-S1 secondary to bilateral spondylolysis at L5, grade 1 spondylolisthesis of L5 relative to S1, a disc bulge and facet arthropathy.      Ir Bernard Sinha Device Plmt/replace/removal    Result Date: 4/30/2021  PROCEDURE: IR FLUOROSCOPY GUIDED CENTRAL VENOUS ACCESS DEVICE PLACEMENT 4/30/2021 HISTORY: ORDERING SYSTEM PROVIDED HISTORY: Sepsis with hypotension; cental line for IV pressors TECHNOLOGIST PROVIDED HISTORY: cental Upright port, missed dialysis x2 weeks FINDINGS: There is stable cardiomegaly. There is no focal consolidation, pleural effusion or evidence of edema. There is no pneumothorax identified. 1. Stable cardiomegaly. 2. No acute cardiopulmonary process identified. Fl Modified Barium Swallow W Video    Result Date: 5/3/2021  EXAMINATION: MODIFIED BARIUM SWALLOW WAS PERFORMED IN CONJUNCTION WITH SPEECH PATHOLOGY SERVICES TECHNIQUE: Fluoroscopic evaluation of the swallowing mechanism was performed with multiple consistency of barium product. FLUOROSCOPY DOSE AND TYPE OR TIME AND EXPOSURES: Fluoroscopic time of 1 minutes 44 seconds. DAP of 71.9 dGycm2. COMPARISON: None HISTORY: ORDERING SYSTEM PROVIDED HISTORY: evaluate for diet modifications TECHNOLOGIST PROVIDED HISTORY: evaluate for diet modifications Reason for Exam: evaluate for diet modifications Acuity: Acute Type of Exam: Initial FINDINGS: Oral phase of swallowing was grossly within normal limits. No evidence of laryngeal penetration or aspiration. Swallowing mechanism grossly within normal limits without evidence of aspiration. Please see separate speech pathology report for full discussion of findings and recommendations. Physical Examination:        Physical Exam  Constitutional:       General: He is not in acute distress. Appearance: He is obese. He is not ill-appearing. Comments: On 3L NC   Cardiovascular:      Rate and Rhythm: Tachycardia present. Heart sounds: No murmur. No gallop. Pulmonary:      Effort: Pulmonary effort is normal. No respiratory distress. Breath sounds: Normal breath sounds. No wheezing. Abdominal:      General: Bowel sounds are normal. There is no distension. Tenderness: There is no abdominal tenderness. There is no guarding or rebound. Musculoskeletal:         General: No swelling or deformity.       Comments: Left BKA   Skin:     Capillary Refill: Capillary refill takes less than 2 seconds. Neurological:      General: No focal deficit present. Mental Status: He is alert and oriented to person, place, and time. Psychiatric:         Mood and Affect: Mood normal.         Thought Content: Thought content normal.           Assessment:        Primary Problem  Sepsis due to pneumonia [facility])    Active Hospital Problems    Diagnosis Date Noted    Pneumonia [J18.9] 04/28/2021    Sepsis ([facility] [A41.9] 04/28/2021    Sepsis due to pneumonia ([facility] [J18.9, A41.9] 44/60/8418    Metabolic acidosis [X45.2]     Hyperkalemia [E87.5]     Diarrhea [R19.7]     Nausea and vomiting [R11.2]     History of left below knee amputation (HCC) [Z89.512]     Hemoptysis [R04.2]     Ulcerated, foot, right, with fat layer exposed ([facility] [L97.512]     Hypertension, essential [I10]     Charcot foot due to diabetes mellitus ([facility] [E11.610] 12/28/2015    Anemia of chronic disease [D63.8] 07/03/2013    ESRD on dialysis ([facility] [N18.6, Z99.2] 06/27/2013    DM (diabetes mellitus) ([facility] [E11.9] 06/27/2013       Plan:      Sepsis 2/2 CAP  -CT A&P showed RLL PNA  -Lactic acid 2.1  -Pro-Kiran 25.70  -Blood cultures x 2 NGTD  -Resp Cx  show just above ed gram +ve cocci in clusters with rare GN rods: Growing staph aureus methicillin susceptible  -Continue IV ceftriaxone (day 5)  -On NS 0.9% 50 ml/hr  -Continue midodrine 7.5 mg 3 times daily  -I. D on board              -Continue IV ceftriaxone (day 5) and oral vancomycin (day 6)              -Appreciate further recommendations  -Covid negative     Chronic systolic and diastolic CHF/Hx of HTN. Currently hypotension and tachycardic  -EKG:  Wide QRS rhythm  -BNP > 300,000, likely elevated from missed dialysis for 2 weeks  -Echo 6/2019: LVEF 30-35% with moderate diastolic dysfunction  -CAD, CABG, stents, last cath 2019.   -Tachycardia 2/2 to hypotension likely 2/2 diarrhea  -Amiodarone 20 mg twice daily  -Digoxin 125 MCG daily  -Lopressor 12.5 mg twice daily  -Cardiology on board: -Will possibly perform bedside cardioversion today              -Continue Midodrine 7.5 3 times daily     Hyperkalemia 2/2 ESRD on hemodialysis TTS  -Missed 2 weeks of dialysis.   -K 6.9 on admission with EKG changes, received IV calcium gluconate and insulin/dextrose.  -[facility] hemod[facility]. V's  -Nephrology on board:   -S/p hemodialysis yesterday              -S/P HD here in [address]. Had 3 sessions so far    -Plan for dialysis tomorrow 5/6/21    -Renvela 4000 mg PO TID              -Magnesium 1.7     Hemoptysis likely 2/2 pneumonia (improving)  -HgB stable  -H&H q8h, keep HgB > 7     Acute diarrhea 2/2 C. difficile infection (improving)  -No bowel movements in 3 days  -CT abdomen and pelvis showed small volume ascites and normal appendix  -C. Diff +ve   -Continue oral vancomycin 500 mg q6hr day 4   -Antinausea meds PRN  -Protonix 40 mg PO daily             Right foot ulcer  -X-ray showed no evidence of OM  -Podiatry on board  -Wound care on board      Dyslipidemia   -Lipitor 40 mg PO nightly     Type 2 diabetes mellitus  -Low dose ISS  -Hypoglycemia protocol     Bilateral neural foraminal narrowing at L5-S1 with spondylolysis  -Showed up on CT A&P  -We will continue to monitor     Diet: Barium Swallow Study wnl. Dental soft diet, Low Sodium, Fluid restriction to 1500 mL   DVT prophylaxis: EPCs; avoid chemical due to hemoptysis, thrombocytopenia 40    Hilary Givens MD  5/5/2021  7:12 AM     Attending Physician Statement    I have discussed the case of Jasen De La Rosa, including pertinent history and exam findings with the resident. I have seen and examined the patient and the key elements of the encounter have been performed by me. I agree with the assessment, plan, and orders as documented by the resident. The patient had paroxysmal atrial tachycardia and required cardioversion presently in normal sinus rhythm. His acute C. difficile diarrhea has also subsided.   Continues to require supplemental oxygen therapy due to his pneumonia  Electronically signed by Estela Kehr, MD on 5/5/2021 at 2:46 PM

## 2021-05-05 NOTE — PROCEDURES
[facility]  Cardioversion procedure Note         Today's Date: 5/5/2021  Primary/Ordering Cardiologist: Bryan Geller  Indication: Persistent Atrial Tachy    Patient seen and examined. History and Physical reviewed. Labs reviewed. After informed consent was obtained with explanation of the risks and benefits, the patient was prepared using standard tecqniques. All Conscious Sedation was administered via the Cardiologist.     CARDIOVERSION:    After an adequate level of sedation was achieved  200J in biphasic synchronized delivery was administered. conversion to normal sinus rhythm. The patient awoke without complications. A post procedure 12 L ECG was ordered and reviewed. The patient will continue with the same medications. Long term care and cardiovascular management    Impression:  Successful Consious Sedation - safely  Successful Cardioversion    Complications: There were no complications encountered. Electronically signed by     Paul Lucas DO, [place], [address] [facility]  [facility]. [facility]  [phone]

## 2021-05-05 NOTE — PROGRESS NOTES
[facility]   Progress Note                   Date:   5/5/2021  Patient name: Bella Glez  Date of admission:  4/28/2021  2:27 PM  MRN:   [id number]  YOB: 1955  PCP: Anaya Mitchell MD    Reason for Admission:  sepsis     Subjective:       Continues to be tachycardiac, no clear p waves today on tele. Denies any cp. Medications:   Scheduled Meds:   verapamil  5 mg Intravenous Once    midazolam  2 mg Intravenous Once    fentanNYL  50 mcg Intravenous Once    amiodarone  200 mg Oral BID    metoprolol tartrate  12.5 mg Oral BID    vancomycin  500 mg Oral 4 times per day    cefTRIAXone (ROCEPHIN) IV  2,000 mg Intravenous Q24H    sevelamer  4,000 mg Oral TID WC    aspirin  81 mg Oral Daily    lidocaine  1 patch Transdermal Daily    midodrine  7.5 mg Oral TID WC    neomycin-bacitracin-polymyxin   Topical BID    sodium chloride flush  5-40 mL Intravenous 2 times per day    atorvastatin  40 mg Oral Nightly    digoxin  125 mcg Oral Daily    pantoprazole  40 mg Oral Daily    insulin lispro  0-6 Units Subcutaneous TID WC    insulin lispro  0-3 Units Subcutaneous Nightly       Continuous Infusions:   phenylephrine (JASON-SYNEPHRINE) 50mg/250mL infusion Stopped (05/02/21 2004)    norepinephrine      vasopressin (Septic Shock) infusion      sodium chloride      dextrose      sodium chloride 50 mL/hr at 05/04/21 2129       CBC:   Recent Labs     05/03/21  0651 05/04/21  0452 05/05/21 0519   WBC 8.6 7.7 8.9   HGB 10.6* 10.0* 9.9*   PLT 52* 55* 40*     BMP:    Recent Labs     05/03/21  0651 05/04/21  0452 05/05/21 0519    137 138   K 3.8 4.0 4.0   CL 98 98 97*   CO2 21 22 21   BUN 70* 75* 51*   CREATININE 6.40* 6.34* 4.95*   GLUCOSE 177* 148* 112*     Hepatic: No results for input(s): AST, ALT, ALB, BILITOT, ALKPHOS in the last 72 hours. Troponin: No results for input(s): TROPONINI in the last 72 hours. BNP: No results for input(s): BNP in the last 72 hours. Lipids: No results for input(s): CHOL, HDL in the last 72 hours. Invalid input(s): LDLCALCU  INR: No results for input(s): INR in the last 72 hours. Objective:   Vitals: BP 98/74   Pulse 114   Temp 97.6 °F (36.4 °C) (Temporal)   Resp 22   Ht 6' (1.829 m)   Wt 236 lb 12.4 oz (107.4 kg)   SpO2 94%   BMI 32.11 kg/m²     General appearance: awake, alert, in no apparent respiratory distress   HEENT: Head: Normocephalic, no lesions, without obvious abnormality  Neck: no JVD  Lungs: clear to auscultation bilaterally, no basilar rales, no wheezing   Heart: regular rate and rhythm, S1, S2 normal, no murmur, click, rub or gallop  Abdomen: soft, non-tender; bowel sounds normal  Extremities: No LE edema  Neurologic: Mental status: Alert, oriented. Motor and sensory not done. EKG: Sinus tachycardia, inferior infarction, ST-T changes consistent with ischemia, QTc 515 msec    Subsequent ecgs showed Intermittent atrial tachycardia with 2:1 AV block        TTE 1/20/21    Left Ventricle:  Nondilated left ventricle, Systolic function is   severely decreased with an ejection fraction of 20-25%.   Left Ventricle: There is moderate increased wall thickness/hypertrophy. LVH is symmetric 1.5 cm    Right Ventricle: Systolic function is moderately to severely reduced.   Contrast-enhanced images show no evidence of left ventricular apical   thrombus    Ascending aortic dilatation measuring 4.4 cm, trileaflet aortic valve    E/e'>20 indicative of elevated left ventricular filling pressures      TTE June 2019  Technically difficult study due to patient habitus. Left ventricle is normal in size. Mild left ventricular hypertrophy. Severely reduced left ventricular systolic function. Estimated LV EF 30-35 %. Grade II (moderate) left ventricular diastolic dysfunction. Left atrium is mildly dilated. Trivial valve disease as below.        Cardiac cath 6/2/19   Severe native vessel disease.   Patent LIMA-LAD.  The LAD distal to touch down is small in caliber with 80% diffuse disease. (Not amenable to PCI)  Chronic total occlusion of RCA.   LCX had 80% mid stenosis reduced to 0% using 3x38 mm Xience stent, post   dilated using 3.25 mm NC balloon. Assessment:   1. Septic shock   2. C diff colitis   3. RLL Pneumonia   4. Tachycardia- suspect PAT - not sinus tachy- s/p adenosine 6, 12, 12- on 5/4/21- showed ATach waves  5. Was in Sinus tachycardia with intermittent atrial tach and 2:1 AV conduction, appropriate secondary to hypotension and sepsis    6. Multivessel CAD s/p CABG and Subsequent DENY to LCX (only patent LIMA-LAD graft in 2019)  7. Ischemic CMP with last LVEF 20-25% in Jan 2021   8. Acute on chronic systolic CHF due to missed HD   9. PAD with hx of left BKA  10. ESRD on HD       Treatment Plan:   1. Will try verapamil IV x 2, if no response will proceed with bedside CV (keep npo, will attempt at 12 pm)  2. Discussed the risks, benefits, alternatives, agrees to proceed. 3. amio to 200 bid, lopressor 12.5 mg bid  4. ASA and lipitor   5. Continue midodrine in meantime  6. Will cont to follow along     Discussed with patient and nursing. Thank you for allowing me to participate in the care of this patient, please do not hesitate to call if you have any questions. Odilia Sebastian DO, [address] [facility], [address] [facility]  [facility]. [facility]  [phone]

## 2021-05-05 NOTE — PROGRESS NOTES
diarrhea today, status post bedside cardioversion per cardiology earlier today. Sputum Gram growing staph aureus MSSA  WBC normal today    Patient Vitals for the past 8 hrs:   BP Temp Temp src Pulse Resp SpO2   05/05/21 1000 111/71   116 24 93 %   05/05/21 0945 106/74   116 22 92 %   05/05/21 0930 (!) 94/50   82 17 91 %   05/05/21 0915 95/63   116 21 92 %   05/05/21 0900 102/70   117 17 91 %   05/05/21 0845 105/70   116 19 (!) 88 %   05/05/21 0830    114 28 (!) 89 %   05/05/21 0815 113/75   115 (!) 0 93 %   05/05/21 0800 119/87 97.7 °F (36.5 °C) Oral 115 23 96 %   05/05/21 0745    114 16 94 %   05/05/21 0730    114 22 94 %   05/05/21 0715 98/74   114 15 100 %   05/05/21 0700 94/61   113 16 97 %   05/05/21 0645    112 14 98 %   05/05/21 0630    114 19 96 %   05/05/21 0615 107/75   114 11 95 %   05/05/21 0600 111/77   115 15 96 %         I have personally reviewed the past medical history, past surgical history, medications, social history, and family history, and I haveupdated the database accordingly. Allergies:   Pcn [penicillins]     Review of Systems:     Review of Systems  As per history present illness, other than above 12 system review is negative  Physical Examination :       Physical Exam  Constitutional:       General: He is not in acute distress. HENT:      Head: Normocephalic and atraumatic. Right Ear: External ear normal.      Left Ear: External ear normal.   Neck:      Musculoskeletal: Neck supple. No neck rigidity. Cardiovascular:      Rate and Rhythm: Normal rate and regular rhythm. Abdominal:      General: Abdomen is flat. There is no distension. Palpations: Abdomen is soft. Tenderness: There is no abdominal tenderness. Musculoskeletal:      Comments: Left below-knee amputation site with no open wound no erythema. Right foot dressing   Skin:     General: Skin is warm. Coloration: Skin is not jaundiced.    Neurological: General: No focal deficit present. Mental Status: He is alert and oriented to person, place, and time.          Past Medical History:     Past Medical History:   Diagnosis Date    Acute on chronic congestive heart failure (HCC)     Anemia     CAD (coronary artery disease)     [facility]     Dialysis care     ESRD (end stage renal disease) on dialysis ([facility]     Foot ulcer, left ([facility] 2015    GERD (gastroesophageal reflux disease)     Hemodialysis patient ([facility] 2011    MOM-WED-FRI-ON [address]     History of sleep apnea     none since significant weight loss (was 400#)    Hyperlipidemia     Hyperparathyroidism ([facility]     Hypertension     Neuropathy     Peripheral vascular disease (HCC)     Pneumonia     resolved    S/P CABG (coronary artery bypass graft) 06/2016    Type II or unspecified type diabetes mellitus without mention of complication, not stated as uncontrolled     dx-1980       Past Surgical  History:     Past Surgical History:   Procedure Laterality Date    CATARACT REMOVAL      DIALYSIS FISTULA CREATION Left 2014    LEG AMPUTATION THROUGH LOWER TIBIA AND FIBULA         Medications:      midodrine  7.5 mg Oral Q8H    metoprolol tartrate  25 mg Oral BID    amiodarone  200 mg Oral BID    vancomycin  500 mg Oral 4 times per day    cefTRIAXone (ROCEPHIN) IV  2,000 mg Intravenous Q24H    sevelamer  4,000 mg Oral TID WC    aspirin  81 mg Oral Daily    lidocaine  1 patch Transdermal Daily    neomycin-bacitracin-polymyxin   Topical BID    sodium chloride flush  5-40 mL Intravenous 2 times per day    atorvastatin  40 mg Oral Nightly    digoxin  125 mcg Oral Daily    pantoprazole  40 mg Oral Daily    insulin lispro  0-6 Units Subcutaneous TID WC    insulin lispro  0-3 Units Subcutaneous Nightly       Social History:     Social History     Socioeconomic History    Marital status:      Spouse name: Not on file    Number of children: Not on file    Years of education: Not on file    Highest education level: Not on file   Occupational History    Not on file   Social Needs    Financial resource strain: Not on file    Food insecurity     Worry: Not on file     Inability: Not on file    Transportation needs     Medical: Not on file     Non-medical: Not on file   Tobacco Use    Smoking status: Never Smoker    Smokeless tobacco: Never Used   Substance and Sexual Activity    Alcohol use: No    Drug use: No    Sexual activity: Not on file   Lifestyle    Physical activity     Days per week: Not on file     Minutes per session: Not on file    Stress: Not on file   Relationships    Social connections     Talks on phone: Not on file     Gets together: Not on file     Attends Anabaptist service: Not on file     Active member of club or organization: Not on file     Attends meetings of clubs or organizations: Not on file     Relationship status: Not on file    Intimate partner violence     Fear of current or ex partner: Not on file     Emotionally abused: Not on file     Physically abused: Not on file     Forced sexual activity: Not on file   Other Topics Concern    Not on file   Social History Narrative    Not on file       Family History:     Family History   Problem Relation Age of Onset    Heart Disease Father     Diabetes Father     Diabetes Brother       Medical Decision Making:   I have independently reviewed/ordered the following labs:    CBC with Differential:   Recent Labs     05/04/21  0452 05/05/21 0519   WBC 7.7 8.9   HGB 10.0* 9.9*   HCT 31.2* 30.7*   PLT 55* 40*   LYMPHOPCT 6* 3*   MONOPCT 6 7     BMP:  Recent Labs     05/04/21  0452 05/05/21 0519    138   K 4.0 4.0   CL 98 97*   CO2 22 21   BUN 75* 51*   CREATININE 6.34* 4.95*   MG  --  1.8     Hepatic Function Panel:   No results for input(s): PROT, LABALBU, BILIDIR, IBILI, BILITOT, ALKPHOS, ALT, AST in the last 72 hours. No results for input(s): RPR in the last 72 hours.   No results for input(s): HIV in the last 72 hours. No results for input(s): BC in the last 72 hours. Lab Results   Component Value Date    CREATININE 4.95 05/05/2021    GLUCOSE 112 05/05/2021    GLUCOSE 128 01/09/2012       Detailed results: Thank you for allowing us to participate in the care of this patient. Please call with questions. This note is created with the assistance of a speech recognition program.  While intending to generate adocument that actually reflects the content of the visit, the document can still have some errors including those of syntax and sound a like substitutions which may escape proof reading. It such instances, actual meaningcan be extrapolated by contextual diversion.     Dario Yousif MD  Office: [phone]  Perfect serve / office [phone]

## 2021-05-05 NOTE — FLOWSHEET NOTE
05/05/21 1410   Encounter Summary   Services provided to: Patient   Referral/Consult From: Rounding   Complexity of Encounter Low   Length of Encounter 15 minutes   Spiritual/Sikh   Type Spiritual support   Assessment Sleeping   Intervention Prayer

## 2021-05-05 NOTE — PLAN OF CARE
Nutrition Problem #1: Increased nutrient needs  Intervention: Food and/or Nutrient Delivery: Start Oral Diet, Start Oral Nutrition Supplement  Nutritional Goals: po intake greater than 50%

## 2021-05-05 NOTE — PROGRESS NOTES
Physical Therapy    DATE: 2021    NAME: Jennifer Hager  MRN: [id number]   : 1955      Patient not seen this date for Physical Therapy due to:    PT held this AM per Hebert Fabry RN due to pt going to undergo a cardioversion - will recheck in the PM as time permits      Electronically signed by Sharon Parekh PT on 2021 at 10:21 AM

## 2021-05-05 NOTE — PROGRESS NOTES
Comprehensive Nutrition Assessment    Type and Reason for Visit:  Reassess    Nutrition Recommendations/Plan: Recommend restart Renal diet and Nepro supplements 2 times daily. Nutrition Assessment:  Pt is still npo after cardioversion earlier today. Per nursing pt ate Spanish toast and 2 eggs for breakfast this morning. Pt states, \"I'm not eating very much\". He would benefit from supplements. Malnutrition Assessment:  Malnutrition Status:   At risk for malnutrition (Comment)    Context:  Acute Illness     Findings of the 6 clinical characteristics of malnutrition:  Energy Intake:  7 - 50% or less of estimated energy requirements for 5 or more days  Weight Loss:  No significant weight loss     Body Fat Loss:  Unable to assess     Muscle Mass Loss:  Unable to assess    Fluid Accumulation:  1 - Mild Extremities   Strength:  Not Performed    Estimated Daily Nutrient Needs:  Energy (kcal):  22 kcal/kg= 2100 kcal; Weight Used for Energy Requirements:  Usual     Protein (g):  1.5g/wn=334 g; Weight Used for Protein Requirements:  Ideal          Nutrition Related Findings:  mild edema BLE, labs (5/5) BUN/Cr 51/4.95, Phos 5.7 K4.0, Meds: Reviewed, Stools: none since 5/2      Wounds:  (foot wound)       Current Nutrition Therapies:    Diet NPO Effective Now    Anthropometric Measures:  · Height: 6' (182.9 cm)  · Current Body Weight: 236 lb (107 kg)   · Admission Body Weight: 219 lb (99.3 kg)    · Usual Body Weight: 210 lb (95.3 kg)(4/28/21)     Ideal Body Weight: 178 lbs     Nutrition Diagnosis:   · Increased nutrient needs related to renal dysfunction(healing) as evidenced by wounds    Nutrition Interventions:   Food and/or Nutrient Delivery:  Start Oral Diet, Start Oral Nutrition Supplement  Nutrition Education/Counseling:  No recommendation at this time   Coordination of Nutrition Care:  Continue to monitor while inpatient    Goals:  po intake greater than 50%       Nutrition Monitoring and Evaluation:

## 2021-05-05 NOTE — PLAN OF CARE
Problem: Falls - Risk of:  Goal: Will remain free from falls  Description: The patient remained free from falls this shift, call light within reach, bed in locked and lowest position. Side rails up x2. Continue to monitor closely. Outcome: Ongoing     Problem: Falls - Risk of:  Goal: Absence of physical injury  Description: Absence of physical injury  Outcome: Ongoing     Problem: Skin Integrity:  Goal: Will show no infection signs and symptoms  Description: Patient skin assessment complete. No signs/symptoms of new skin breakdown. Waffle mattress in place. Pt turned and repositioned every two hours with assistance from staff. Area kept free from moisture. Proper nourishment and fluids encouraged, as appropriate. Skin remains clean, dry, and intact. Will continue to monitor for additional needs and changes in skin breakdown. Outcome: Ongoing     Problem: Skin Integrity:  Goal: Absence of new skin breakdown  Description: Absence of new skin breakdown  Outcome: Ongoing     Problem:  Activity:  Goal: Risk for activity intolerance will decrease  Description: Risk for activity intolerance will decrease  Outcome: Ongoing     Problem: Fluid Volume:  Goal: Will show no signs or symptoms of fluid imbalance  Description: Will show no signs or symptoms of fluid imbalance  Outcome: Ongoing     Problem: Nutritional:  Goal: Maintenance of adequate nutrition will be supported  Description: Maintenance of adequate nutrition will be supported  Outcome: Ongoing     Problem: Physical Regulation:  Goal: Complications related to the disease process, condition or treatment will be avoided or minimized  Description: Complications related to the disease process, condition or treatment will be avoided or minimized  Outcome: Ongoing     Problem: Urinary Elimination:  Goal: Ability to achieve and maintain adequate urine output will be supported  Description: Ability to achieve and maintain adequate urine output will be supported Outcome: Ongoing     Problem: Airway Clearance - Ineffective:  Goal: Clear lung sounds  Description: Clear lung sounds  Outcome: Ongoing     Problem: Airway Clearance - Ineffective:  Goal: Ability to maintain a clear airway will improve  Description: Ability to maintain a clear airway will improve  Outcome: Ongoing     Problem: Fluid Volume - Deficit:  Goal: Achieves intake and output within specified parameters  Description: Achieves intake and output within specified parameters  Outcome: Ongoing     Problem: Gas Exchange - Impaired:  Goal: Levels of oxygenation will improve  Description: Levels of oxygenation will improve  Outcome: Ongoing     Problem: Confusion - Acute:  Goal: Absence of continued neurological deterioration signs and symptoms  Description: Absence of continued neurological deterioration signs and symptoms  Outcome: Ongoing     Problem: Confusion - Acute:  Goal: Mental status will be restored to baseline  Description: Mental status will be restored to baseline  Outcome: Ongoing     Problem: Discharge Planning:  Goal: Ability to perform activities of daily living will improve  Description: Ability to perform activities of daily living will improve  Outcome: Ongoing     Problem: Discharge Planning:  Goal: Participates in care planning  Description: Participates in care planning  Outcome: Ongoing     Problem: Injury - Risk of, Physical Injury:  Goal: Will remain free from falls  Description: The patient remained free from falls this shift, call light within reach, bed in locked and lowest position. Side rails up x2. Continue to monitor closely.    Outcome: Ongoing     Problem: Injury - Risk of, Physical Injury:  Goal: Absence of physical injury  Description: Absence of physical injury  Outcome: Ongoing     Problem: Mood - Altered:  Goal: Mood stable  Description: Mood stable  Outcome: Ongoing     Problem: Mood - Altered:  Goal: Absence of abusive behavior  Description: Absence of abusive behavior Outcome: Ongoing     Problem: Mood - Altered:  Goal: Verbalizations of feeling emotionally comfortable while being cared for will increase  Description: Verbalizations of feeling emotionally comfortable while being cared for will increase  Outcome: Ongoing     Problem: Psychomotor Activity - Altered:  Goal: Absence of psychomotor disturbance signs and symptoms  Description: Absence of psychomotor disturbance signs and symptoms  Outcome: Ongoing     Problem: Sensory Perception - Impaired:  Goal: Demonstrations of improved sensory functioning will increase  Description: Demonstrations of improved sensory functioning will increase  Outcome: Ongoing     Problem: Sensory Perception - Impaired:  Goal: Decrease in sensory misperception frequency  Description: Decrease in sensory misperception frequency  Outcome: Ongoing     Problem: Sensory Perception - Impaired:  Goal: Able to refrain from responding to false sensory perceptions  Description: Able to refrain from responding to false sensory perceptions  Outcome: Ongoing     Problem: Sensory Perception - Impaired:  Goal: Demonstrates accurate environmental perceptions  Description: Demonstrates accurate environmental perceptions  Outcome: Ongoing     Problem: Sensory Perception - Impaired:  Goal: Able to distinguish between reality-based and nonreality-based thinking  Description: Able to distinguish between reality-based and nonreality-based thinking  Outcome: Ongoing     Problem: Sensory Perception - Impaired:  Goal: Able to interrupt nonreality-based thinking  Description: Able to interrupt nonreality-based thinking  Outcome: Ongoing

## 2021-05-05 NOTE — PROGRESS NOTES
Nephrology ESRD Consult Note    Reason for Consult:  End stage renal disease  Requesting Physician: Dr Garland Nissen      History of Present Illness: This is a 60 y.o. male with history of CAD status post CABG, cardiomyopathy, dyslipidemia, end stage renal disease secondary to nephrosclerosis on hemodialysis Tuesday Thursday Saturday under the care of Dr. Allegra Granados at [address] [facility] renal care by way of a left arm AV fistula. He had presented initially to [facility] with diarrhea, nausea and vomiting and generalized weakness for six days. He had missed multiple treatments of dialysis. He presented at [facility] with hyperkalemia potassium of  6.9, BUN/ creatinine of 241 and 18.47 mg/dl and serum bicarbonate of 9. He was dialyzed yesterday 4/28/2021 at [facility] and transferred to [facility] due to in availability of a bed. Less than 500 cc was removed with dialysis due to hypotension. He is seen at dialysis today. His blood pressures have been in the hypotensive trends between 81-07 systolic blood pressure. He denies fever. He denies abdominal pain. He has ongoing nonbloody diarrhea, watery frequency of 6 times per day. he is tachycardic with heart rate in 127. Work-up for diarrhea shows positive C. difficile. Received a liter bolus of 0.9 saline yesterday. Subjective/internal history  Patient seen and examined. Denies chest pain, nausea vomiting and diarrhea is improving. Complains of shortness of breath. He received IV verapamil x2 for tachycardia without response and plans for cardioversion by cardiology at bedside. Aracelis Horn He is on treatment with  oral vancomycin for C. difficile colitis -WBC improving. patient had dialysis yesterday tolerated 1 kg fluid removal.      Past Medical History:        Diagnosis Date    Acute on chronic congestive heart failure (HCC)     Anemia     CAD (coronary artery disease)     [facility]opathy [facility]     Dialysis care     [facility]     [facility]    GERD (gastroesophageal reflux disease)     [facility]    MOM-WED-FRI-ON [address]     History of sleep apnea     none since significant weight loss (was 400#)    Hyperlipidemia     [facility]     Hypertension     Neuropathy     Peripheral vascular disease (HCC)     Pneumonia     resolved    S/P CABG (coronary artery bypass graft) 06/2016    Type II or unspecified type diabetes mellitus without mention of complication, not stated as uncontrolled     dx-1980           Past Surgical History:        Procedure Laterality Date    CATARACT REMOVAL      DIALYSIS FISTULA CREATION Left 2014    LEG AMPUTATION THROUGH LOWER TIBIA AND FIBULA         Current Medications:    midazolam (VERSED) injection 2 mg, Once  fentaNYL (SUBLIMAZE) injection 50 mcg, Once  amiodarone (CORDARONE) tablet 200 mg, BID  metoprolol tartrate (LOPRESSOR) tablet 12.5 mg, BID  phenylephrine (JASON-SYNEPHRINE) 50 mg in dextrose 5 % 250 mL infusion, Continuous  vancomycin (FIRVANQ) 50 MG/ML oral solution 500 mg, 4 times per day  cefTRIAXone (ROCEPHIN) 2000 mg IVPB in D5W 50ml minibag, Q24H  sevelamer (RENVELA) tablet 4,000 mg, TID WC  norepinephrine (LEVOPHED) 16 mg in sodium chloride 0.9 % 250 mL infusion, Continuous  aspirin EC tablet 81 mg, Daily  vasopressin 20 Units in dextrose 5 % 100 mL infusion, Continuous  oxyCODONE-acetaminophen (PERCOCET) 5-325 MG per tablet 1 tablet, Q8H PRN  lidocaine 4 % external patch 1 patch, Daily  midodrine (PROAMATINE) tablet 7.5 mg, TID WC  neomycin-bacitracin-polymyxin (NEOSPORIN) ointment, BID  sodium chloride flush 0.9 % injection 5-40 mL, 2 times per day  sodium chloride flush 0.9 % injection 5-40 mL, PRN  0.9 % sodium chloride infusion, PRN  promethazine (PHENERGAN) tablet 12.5 mg, Q6H PRN    Or  ondansetron (ZOFRAN) injection 4 mg, Q6H PRN  polyethylene glycol (GLYCOLAX) packet 17 g, Daily PRN  acetaminophen (TYLENOL) tablet 650 mg, Q6H PRN    Or  acetaminophen (TYLENOL) suppository 650 mg, Q6H PRN  atorvastatin (LIPITOR) tablet 40 mg, Nightly  digoxin (LANOXIN) tablet 125 mcg, Daily  pantoprazole (PROTONIX) tablet 40 mg, Daily  glucose (GLUTOSE) 40 % oral gel 15 g, PRN  dextrose 50 % IV solution, PRN  glucagon (rDNA) injection 1 mg, PRN  dextrose 5 % solution, PRN  insulin lispro (HUMALOG) injection vial 0-6 Units, TID WC  insulin lispro (HUMALOG) injection vial 0-3 Units, Nightly  0.9 % sodium chloride infusion, Continuous        Allergies:  Pcn [penicillins]      Objective:  Constitutional:    CURRENT TEMPERATURE:  Temp: 97.6 °F (36.4 °C)  MAXIMUM TEMPERATURE OVER 24HRS:  Temp (24hrs), Av.8 °F (36 °C), Min:96 °F (35.6 °C), Max:97.6 °F (36.4 °C)    CURRENT RESPIRATORY RATE:  Resp: 22  CURRENT PULSE:  Pulse: 114  CURRENT BLOOD PRESSURE:  BP: 98/74  24HR BLOOD PRESSURE RANGE:  Systolic (25SWS), CBU:385 , Min:84 , EHP:723   ; Diastolic (55CUM), APF:18, Min:53, Max:87    24HR INTAKE/OUTPUT:      Intake/Output Summary (Last 24 hours) at 2021 7055  Last data filed at 2021 2129  Gross per 24 hour   Intake 10 ml   Output 1500 ml   Net -1490 ml           Physical Exam:  GENERAL APPEARANCE: Alert and cooperative, and appears to be in no acute distress. HEAD: normocephalic  EYES: PERRL, EOMI. Not pale, anicteric   NOSE:  No nasal discharge. THROAT:  Oral cavity and pharynx normal. Moist  NECK: Neck supple, non-tender without lymphadenopathy, masses or thyromegaly. JVD-neg  CARDIAC: Normal S1 and S2. No S3, S4 or murmurs. Rhythm is regular. LUNGS: Clear to auscultation and percussion without rales, rhonchi, wheezing or diminished breath sounds. ABD-Soft non distended, BS+ Non tender no organomegally  BACK: Examination of the spine reveals  no spinal deformity, without tenderness,   MUSKULOSKELETAL: Adequately aligned spine. No joint erythema or tenderness. EXTREMITIES: No edema. Peripheral pulses intact.    [place] Mark oriented x 3 ,power 5/5 in all extremities      Labs:  PTH:  No results found for: PTH  abs:   CBC:   Recent Labs     05/03/21  0651 05/04/21 0452 05/05/21 0519   WBC 8.6 7.7 8.9   RBC 3.94* 3.71* 3.65*   HGB 10.6* 10.0* 9.9*   HCT 33.5* 31.2* 30.7*   MCV 85.0 84.1 84.1   MCH 27.0 27.0 27.0   MCHC 31.8 32.1 32.1   RDW 20.6* 20.8* 20.3*   PLT 52* 55* 40*   MPV 10.0 10.9 8.8      BMP:   Recent Labs     05/03/21 0651 05/04/21 0452 05/05/21 0519    137 138   K 3.8 4.0 4.0   CL 98 98 97*   CO2 21 22 21   BUN 70* 75* 51*   CREATININE 6.40* 6.34* 4.95*   GLUCOSE 177* 148* 112*   CALCIUM 9.1 9.0 8.9        Phosphorus:    No results for input(s): PHOS in the last 72 hours. Magnesium:   No results for input(s): MG in the last 72 hours. Albumin:   No results for input(s): LABALBU in the last 72 hours. Assessment:  1. ESRD on Hemodialysis. His regular HD days are TTS at  [facility] using Left AVF under Dr. Oli White. His dry weight is not available patient is likely under his dry weight due to history of diarrhea. Renal diet with 1500 mils fluid restriction     2. Anemia of chronic kidney disease-Retacrit once iron studies are adequate    3. Pneumonia and acute diarrhea secondary to C. difficile colitis-ID following    4. Hypotension -improving    5. Secondary hyperparathyroidism-check serum phosphorus    6. Anion gap metabolic acidosis from missed dialysis-improved    7. Coronary artery disease status post CABG    8. Peripheral artery disease with history of left BKA and right lower extremity chronic wound    Plan  Continue IV fluids 0.9 saline to 50 cc/h  Switch midodrine to 7.5 mg every 8 hourly  Continue oral vancomycin. Continue renal diet as tolerated  Strict I's and O's  Dialysis tomorrow 5/6/2021  For cardioversion by cardiology today.     Mary Cain M.D, [facility]  Nephrologist

## 2021-05-06 NOTE — PROGRESS NOTES
Nephrology ESRD Consult Note    Reason for Consult:  End stage renal disease  Requesting Physician: Dr Gill Hinojosa      History of Present Illness: This is a 60 y.o. male with history of CAD status post CABG, cardiomyopathy, dyslipidemia, end stage renal disease secondary to nephrosclerosis on hemodialysis Tuesday Thursday Saturday under the care of Dr. Hannah Hartmann at [address] renal care by way of a left arm AV fistula. He had presented initially to [facility]. [facility] with diarrhea, nausea and vomiting and generalized weakness for six days. He had missed multiple treatments of dialysis. He presented at [facility]. [facility] with hyperkalemia potassium of  6.9, BUN/ creatinine of 241 and 18.47 mg/dl and serum bicarbonate of 9. He was dialyzed yesterday 4/28/2021 at [facility]. [facility] and transferred to [facility] due to in availability of a bed. Less than 500 cc was removed with dialysis due to hypotension. He is seen at dialysis today. His blood pressures have been in the hypotensive trends between 27-16 systolic blood pressure. He denies fever. He denies abdominal pain. He has ongoing nonbloody diarrhea, watery frequency of 6 times per day. he is tachycardic with heart rate in 127. Work-up for diarrhea shows positive C. difficile. Received a liter bolus of 0.9 saline yesterday. Subjective/internal history  Patient seen and examined. He is seen on dialysis today and complains of shortness of breath and is on 5 L nasal cannula oxygen. UF goal was increased to 2 kg. He had successful cardioversion yesterday and heart rate is stable. Denies chest pain, nausea vomiting and diarrhea is improving. He is on treatment with  oral vancomycin for C. difficile colitis -WBC improving. patient had dialysis yesterday tolerated 1 kg fluid removal.      Past Medical History:        Diagnosis Date    Acute on chronic congestive heart failure (HCC)     Anemia     CAD (coronary artery disease)     Cardiomyopathy ([facility]     Dialysis care     ESRD (end stage renal disease) on dialysis ([facility]     [facility] ulcer[facility] ([facility] 2015    GERD (gastroesophageal reflux disease)     Hemodialysis patient ([facility] 2011    MOM-WED-FRI-ON [address]     History of sleep apnea     none since significant weight loss (was 400#)    Hyperlipidemia     Hyperparathyroidism ([facility]     Hypertension     Neuropathy     Peripheral vascular disease (HCC)     Pneumonia     resolved    S/P CABG (coronary artery bypass graft) 06/2016    Type II or unspecified type diabetes mellitus without mention of complication, not stated as uncontrolled     dx-1980           Past Surgical History:        Procedure Laterality Date    CATARACT REMOVAL      DIALYSIS FISTULA CREATION Left 2014    LEG AMPUTATION THROUGH LOWER TIBIA AND FIBULA         Current Medications:    senna (SENOKOT) tablet 8.6 mg, Nightly  midodrine (PROAMATINE) tablet 7.5 mg, Q8H  metoprolol tartrate (LOPRESSOR) tablet 25 mg, BID  amiodarone (CORDARONE) tablet 200 mg, BID  phenylephrine (JASON-SYNEPHRINE) 50 mg in dextrose 5 % 250 mL infusion, Continuous  vancomycin (FIRVANQ) 50 MG/ML oral solution 500 mg, 4 times per day  cefTRIAXone (ROCEPHIN) 2000 mg IVPB in D5W 50ml minibag, Q24H  sevelamer (RENVELA) tablet 4,000 mg, TID WC  norepinephrine (LEVOPHED) 16 mg in sodium chloride 0.9 % 250 mL infusion, Continuous  aspirin EC tablet 81 mg, Daily  vasopressin 20 Units in dextrose 5 % 100 mL infusion, Continuous  oxyCODONE-acetaminophen (PERCOCET) 5-325 MG per tablet 1 tablet, Q8H PRN  lidocaine 4 % external patch 1 patch, Daily  neomycin-bacitracin-polymyxin (NEOSPORIN) ointment, BID  sodium chloride flush 0.9 % injection 5-40 mL, 2 times per day  sodium chloride flush 0.9 % injection 5-40 mL, PRN  0.9 % sodium chloride infusion, PRN  promethazine (PHENERGAN) tablet 12.5 mg, Q6H PRN    Or  ondansetron (ZOFRAN) injection 4 mg, Q6H PRN  polyethylene glycol (GLYCOLAX) packet 17 g, Daily PRN  acetaminophen (TYLENOL) tablet 650 mg, Q6H PRN    Or  acetaminophen (TYLENOL) suppository 650 mg, Q6H PRN  atorvastatin (LIPITOR) tablet 40 mg, Nightly  digoxin (LANOXIN) tablet 125 mcg, Daily  pantoprazole (PROTONIX) tablet 40 mg, Daily  glucose (GLUTOSE) 40 % oral gel 15 g, PRN  dextrose 50 % IV solution, PRN  glucagon (rDNA) injection 1 mg, PRN  dextrose 5 % solution, PRN  insulin lispro (HUMALOG) injection vial 0-6 Units, TID WC  insulin lispro (HUMALOG) injection vial 0-3 Units, Nightly  0.9 % sodium chloride infusion, Continuous        Allergies:  Pcn [penicillins]      Objective:  Constitutional:    CURRENT TEMPERATURE:  Temp: 97.5 °F (36.4 °C)  MAXIMUM TEMPERATURE OVER 24HRS:  Temp (24hrs), Av.7 °F (36.5 °C), Min:97.5 °F (36.4 °C), Max:98 °F (36.7 °C)    CURRENT RESPIRATORY RATE:  Resp: 18  CURRENT PULSE:  Pulse: 85  CURRENT BLOOD PRESSURE:  BP: (P) 137/77  24HR BLOOD PRESSURE RANGE:  Systolic (23LOJ), SEU:000 , Min:87 , SGB:411   ; Diastolic (62ACY), TCW:28, Min:58, Max:91    24HR INTAKE/OUTPUT:      Intake/Output Summary (Last 24 hours) at 2021 1213  Last data filed at 2021 0600  Gross per 24 hour   Intake 896 ml   Output 0 ml   Net 896 ml           Physical Exam:  GENERAL APPEARANCE: Alert and cooperative, and appears to be in no acute distress. HEAD: normocephalic  EYES: PERRL, EOMI. Not pale, anicteric   NOSE:  No nasal discharge. THROAT:  Oral cavity and pharynx normal. Moist  NECK: Neck supple, non-tender without lymphadenopathy, masses or thyromegaly. JVD-neg  CARDIAC: Normal S1 and S2. No S3, S4 or murmurs. Rhythm is regular. LUNGS: Clear to auscultation and percussion without rales, rhonchi, wheezing or diminished breath sounds. ABD-Soft non distended, BS+ Non tender no organomegally  BACK: Examination of the spine reveals  no spinal deformity, without tenderness,   MUSKULOSKELETAL: Adequately aligned spine. No joint erythema or tenderness. EXTREMITIES: No edema. Peripheral pulses intact.    [facility]

## 2021-05-06 NOTE — FLOWSHEET NOTE
05/06/21 1000   Precautions   Precautions Fall risk;Contact   Negative Pressure Room No   Positive Pressure Room No   Safe Environment   Arm Bands On ID;Fall; Allergies   Overbed Table Within Reach Yes   Fall Risk Interventions   Toilet Every 2 Hours-In Advance of Need No (Comment)  (able to make needs known)   Hourly Visual Checks In bed   Fall Visual Posted Armband   Room Door Open Yes   Alarm On Bed   Mobility   Activity In bed   Level of Assistance Dependent, patient does less than 25%   Repositioned Pillow support;Semi fowlers; Lying right side   Head of Bed Elevated  Self regulated   Heels/Feet Heel(s) elevated off bed   Range of Motion Active; All extremities   Anti-Embolism Devices Right lower extremity   Anti-Embolism Intervention Refused  (pt educated)   Hygiene   Level of Assistance Moderate assist   Telemetry Details   Telemetry Monitor On Yes   Telemetry Audible Yes   Telemetry Alarms Set Yes   Telemetry Box Number [id number]   Telemetry Monitor Alarm Parameters set/checked   Verbal   Verbal Impairment None   Preferred language English   Needs  N/A    Used N/A   Comfort and Environment Interventions   Additional Comfort/Environmental Interventions Special mattress   Special Mattress Pressure relief overlay   Entertainment   Entertainment Activities Television   Pt in dialysis

## 2021-05-06 NOTE — PLAN OF CARE
Problem: Falls - Risk of:  Goal: Will remain free from falls  Description: The patient remained free from falls this shift, call light within reach, bed in locked and lowest position. Side rails up x2. Continue to monitor closely. 5/6/2021 1816 by Joe Jacob RN  Outcome: Ongoing  5/6/2021 0642 by Morris Arndt  Outcome: Ongoing  Goal: Absence of physical injury  Description: Absence of physical injury  5/6/2021 1816 by Joe Jacob RN  Outcome: Ongoing  5/6/2021 0642 by Morris Arndt  Outcome: Ongoing     Problem: Skin Integrity:  Goal: Will show no infection signs and symptoms  Description: Patient skin assessment complete. No signs/symptoms of new skin breakdown. Waffle mattress in place. Pt turned and repositioned every two hours with assistance from staff. Area kept free from moisture. Proper nourishment and fluids encouraged, as appropriate. Skin remains clean, dry, and intact. Will continue to monitor for additional needs and changes in skin breakdown. 5/6/2021 1816 by Joe Jacob RN  Outcome: Ongoing  5/6/2021 0642 by Morris Arndt  Outcome: Ongoing  Goal: Absence of new skin breakdown  Description: Absence of new skin breakdown  5/6/2021 1816 by Joe Jacob RN  Outcome: Ongoing  5/6/2021 0642 by Morris Arndt  Outcome: Ongoing     Problem:  Activity:  Goal: Risk for activity intolerance will decrease  Description: Risk for activity intolerance will decrease  5/6/2021 1816 by Joe Jacob RN  Outcome: Ongoing  5/6/2021 0642 by Morris Arndt  Outcome: Ongoing     Problem: Fluid Volume:  Goal: Will show no signs or symptoms of fluid imbalance  Description: Will show no signs or symptoms of fluid imbalance  5/6/2021 1816 by Joe Jacob RN  Outcome: Ongoing  5/6/2021 0642 by Morris Arndt  Outcome: Ongoing     Problem: Nutritional:  Goal: Maintenance of adequate nutrition will be supported  Description: Maintenance of adequate nutrition will be supported  5/6/2021 1816 by Earl Baca RN  Outcome: Ongoing  5/6/2021 0642 by Jeanine Vidal  Outcome: Ongoing     Problem: Physical Regulation:  Goal: Complications related to the disease process, condition or treatment will be avoided or minimized  Description: Complications related to the disease process, condition or treatment will be avoided or minimized  5/6/2021 1816 by Earl Baca RN  Outcome: Ongoing  5/6/2021 0642 by Jeanine Vidal  Outcome: Ongoing     Problem: Urinary Elimination:  Goal: Ability to achieve and maintain adequate urine output will be supported  Description: Ability to achieve and maintain adequate urine output will be supported  5/6/2021 1816 by Earl Baca RN  Outcome: Ongoing  5/6/2021 0642 by Jeanine Vidal  Outcome: Ongoing     Problem: Urinary Elimination:  Goal: Ability to achieve and maintain adequate urine output will be supported  Description: Ability to achieve and maintain adequate urine output will be supported  5/6/2021 1816 by Earl Baca RN  Outcome: Ongoing  5/6/2021 0642 by Jeanine Vidal  Outcome: Ongoing     Problem: Airway Clearance - Ineffective:  Goal: Clear lung sounds  Description: Clear lung sounds  5/6/2021 1816 by Earl Baca RN  Outcome: Ongoing  5/6/2021 0642 by Jeanine Vidal  Outcome: Ongoing  Goal: Ability to maintain a clear airway will improve  Description: Ability to maintain a clear airway will improve  5/6/2021 1816 by Earl Baca RN  Outcome: Ongoing  5/6/2021 0642 by Jeanine Vidal  Outcome: Ongoing     Problem: Fluid Volume - Deficit:  Goal: Achieves intake and output within specified parameters  Description: Achieves intake and output within specified parameters  5/6/2021 1816 by Earl Baca RN  Outcome: Ongoing  5/6/2021 0642 by Jeanine Vidal  Outcome: Ongoing     Problem: Injury - Risk of, Physical Injury:  Goal: Will remain free from falls  Description: The patient remained free from falls this shift, call light within reach, bed in locked and lowest position. Side rails up x2. Continue to monitor closely.    5/6/2021 1816 by Concepcion Clemons RN  Outcome: Ongoing  5/6/2021 0642 by Mary Carbone  Outcome: Ongoing  Goal: Absence of physical injury  Description: Absence of physical injury  5/6/2021 1816 by Concepcion Clemons RN  Outcome: Ongoing  5/6/2021 0642 by Mary Carbone  Outcome: Ongoing

## 2021-05-06 NOTE — PROGRESS NOTES
Treatment time: 195 minutes    Net UF: 2000 mL    Pre weight: 107.4 kg  Post weight: n/a  EDW: 94 kg    Access used: Left forearm AVF  Access function: good    Medications or blood products given: n/a    Regular outpatient schedule: TTS    Summary of response to treatment: Patient tolerated treatment well, Dr. Brijesh Salazar evaluated patient during HD, report given to Rodri Tovar [facility]. Copy of dialysis treatment record placed in chart, to be scanned into EMR.

## 2021-05-06 NOTE — PROGRESS NOTES
[facility]   Progress Note                   Date:   5/6/2021  Patient name: Bella Glez  Date of admission:  4/28/2021  2:27 PM  MRN:   [id number]  YOB: 1955  PCP: Anaya Mitchell MD    Reason for Admission:  sepsis     Subjective:     Seen in dialysis. No chest pain. S/p cardioversion at bedside for atrial tachycardia. Medications:   Scheduled Meds:   senna  1 tablet Oral Nightly    midodrine  7.5 mg Oral Q8H    metoprolol tartrate  25 mg Oral BID    amiodarone  200 mg Oral BID    vancomycin  500 mg Oral 4 times per day    cefTRIAXone (ROCEPHIN) IV  2,000 mg Intravenous Q24H    sevelamer  4,000 mg Oral TID WC    aspirin  81 mg Oral Daily    lidocaine  1 patch Transdermal Daily    neomycin-bacitracin-polymyxin   Topical BID    sodium chloride flush  5-40 mL Intravenous 2 times per day    atorvastatin  40 mg Oral Nightly    digoxin  125 mcg Oral Daily    pantoprazole  40 mg Oral Daily    insulin lispro  0-6 Units Subcutaneous TID WC    insulin lispro  0-3 Units Subcutaneous Nightly       Continuous Infusions:   phenylephrine (JASON-SYNEPHRINE) 50mg/250mL infusion Stopped (05/02/21 2004)    norepinephrine      vasopressin (Septic Shock) infusion      sodium chloride      dextrose      sodium chloride 50 mL/hr at 05/04/21 2129       CBC:   Recent Labs     05/04/21 0452 05/05/21 0519 05/06/21  0439   WBC 7.7 8.9 8.1   HGB 10.0* 9.9* 9.5*   PLT 55* 40* 60*     BMP:    Recent Labs     05/04/21 0452 05/05/21 0519 05/06/21  0439    138 138   K 4.0 4.0 4.0   CL 98 97* 97*   CO2 22 21 19*   BUN 75* 51* 58*   CREATININE 6.34* 4.95* 5.61*   GLUCOSE 148* 112* 145*     Hepatic: No results for input(s): AST, ALT, ALB, BILITOT, ALKPHOS in the last 72 hours. Troponin: No results for input(s): TROPONINI in the last 72 hours. BNP: No results for input(s): BNP in the last 72 hours. Lipids: No results for input(s): CHOL, HDL in the last 72 hours.     Invalid input(s): LDLCALCU  INR: No results for input(s): INR in the last 72 hours. Objective:   Vitals: /83   Pulse 78   Temp 97.5 °F (36.4 °C)   Resp 18   Ht 6' (1.829 m)   Wt 236 lb 12.4 oz (107.4 kg)   SpO2 (!) 89%   BMI 32.11 kg/m²     General appearance: awake, alert, in no apparent respiratory distress   HEENT: Head: Normocephalic, no lesions, without obvious abnormality  Neck: no JVD  Lungs: clear to auscultation bilaterally, no basilar rales, no wheezing   Heart: regular rate and rhythm, S1, S2 no murmurs  Abdomen: soft, non-tender; bowel sounds normal  Extremities: Left BKA. Right 2+ edema  Neurologic: Mental status: Alert, oriented. Motor and sensory not done. EKG: Sinus tachycardia, inferior infarction, ST-T changes consistent with ischemia, QTc 515 msec    Subsequent ecgs showed Intermittent atrial tachycardia with 2:1 AV block        TTE 1/20/21    Left Ventricle:  Nondilated left ventricle, Systolic function is   severely decreased with an ejection fraction of 20-25%.   Left Ventricle: There is moderate increased wall thickness/hypertrophy. LVH is symmetric 1.5 cm    Right Ventricle: Systolic function is moderately to severely reduced.   Contrast-enhanced images show no evidence of left ventricular apical   thrombus    Ascending aortic dilatation measuring 4.4 cm, trileaflet aortic valve    E/e'>20 indicative of elevated left ventricular filling pressures      TTE June 2019  Technically difficult study due to patient habitus. Left ventricle is normal in size. Mild left ventricular hypertrophy. Severely reduced left ventricular systolic function. Estimated LV EF 30-35 %. Grade II (moderate) left ventricular diastolic dysfunction. Left atrium is mildly dilated. Trivial valve disease as below.        Cardiac cath 6/2/19   Severe native vessel disease.   Patent LIMA-LAD. The LAD distal to touch down is small in caliber with 80% diffuse disease.  (Not amenable to PCI)

## 2021-05-06 NOTE — FLOWSHEET NOTE
05/06/21 1300   Precautions   Precautions Fall risk;Contact   Negative Pressure Room No   Positive Pressure Room No   Safe Environment   Arm Bands On ID;Fall; Allergies   Overbed Table Within Reach Yes   Fall Risk Interventions   Toilet Every 2 Hours-In Advance of Need No (Comment)  (able to make needs known)   Hourly Visual Checks In bed   Fall Visual Posted Armband   Room Door Open Yes   Alarm On Bed   Mobility   Activity In bed   Level of Assistance Dependent, patient does less than 25%   Repositioned Pillow support;Semi fowlers; Lying right side   Head of Bed Elevated  Self regulated   Heels/Feet Heel(s) elevated off bed   Range of Motion Active; All extremities   Anti-Embolism Devices Right lower extremity   Anti-Embolism Intervention Refused  (pt educated)   Hygiene   Level of Assistance Moderate assist   Telemetry Details   Telemetry Monitor On Yes   Telemetry Audible Yes   Telemetry Alarms Set Yes   Telemetry Box Number [id number]   Telemetry Monitor Alarm Parameters set/checked   Verbal   Verbal Impairment None   Preferred language English   Needs  N/A    Used N/A   Comfort and Environment Interventions   Additional Comfort/Environmental Interventions Special mattress   Special Mattress Pressure relief overlay   Entertainment   Entertainment Activities Television   Pt in dialysis

## 2021-05-06 NOTE — PROGRESS NOTES
[address] [facility].    PROGRESS NOTE             5/6/2021    12:16 PM    Name:   Jennifer Hager  MRN:     [id number]     [place]:      [de-identified]   Room:   2006/2006-01  IP Day:  8  Admit Date:  4/28/2021  2:27 PM    PCP:  Tara Ackerman MD  Code Status:  Full Code    Subjective:     C/C: No chief complaint on file. Interval History Status:     Patient was seen and examined bedside. No acute events overnight. Patient remains on 3L NC. S/p cardioversion with 200 J. Patient remains in normal sinus rhythm. Plan for dialysis today. C. difficile seems to have resolved. Patient has not had a bowel movement in 4 days. We will start pt on Senakot. Patient denies any chest pain, SOB, palpitations, abdominal pain, nausea, or vomiting. Brief History:     Please see H&P    Review of Systems:     Review of Systems   Constitutional: Negative for activity change, appetite change and fatigue. HENT: Negative for congestion. Eyes: Negative for photophobia and visual disturbance. Respiratory: Negative for apnea, cough, shortness of breath and wheezing. Cardiovascular: Negative for chest pain, palpitations and leg swelling. Gastrointestinal: Positive for constipation. Negative for abdominal distention, abdominal pain, diarrhea, nausea and vomiting. Endocrine: Negative for polyuria. Genitourinary: Negative for difficulty urinating and urgency. Musculoskeletal: Positive for gait problem. Negative for arthralgias and back pain. Skin: Negative for color change. Neurological: Negative for dizziness, syncope and headaches. Psychiatric/Behavioral: Negative for agitation, behavioral problems, confusion, decreased concentration and dysphoric mood. Medications: Allergies:     Allergies   Allergen Reactions    Pcn [Penicillins] Other (See Comments)     Trouble walking       Current Meds:   Scheduled Meds:    senna  1 tablet Oral Nightly    midodrine  7.5 mg Oral Q8H    metoprolol tartrate  25 mg Oral BID    amiodarone  200 mg Oral BID    vancomycin  500 mg Oral 4 times per day    cefTRIAXone (ROCEPHIN) IV  2,000 mg Intravenous Q24H    sevelamer  4,000 mg Oral TID     aspirin  81 mg Oral Daily    lidocaine  1 patch Transdermal Daily    neomycin-bacitracin-polymyxin   Topical BID    sodium chloride flush  5-40 mL Intravenous 2 times per day    atorvastatin  40 mg Oral Nightly    digoxin  125 mcg Oral Daily    pantoprazole  40 mg Oral Daily    insulin lispro  0-6 Units Subcutaneous TID     insulin lispro  0-3 Units Subcutaneous Nightly     Continuous Infusions:    phenylephrine (JASON-SYNEPHRINE) 50mg/250mL infusion Stopped (05/02/21 2004)    norepinephrine      vasopressin (Septic Shock) infusion      sodium chloride      dextrose      sodium chloride 50 mL/hr at 05/04/21 2129     PRN Meds: oxyCODONE-acetaminophen, sodium chloride flush, sodium chloride, promethazine **OR** ondansetron, polyethylene glycol, acetaminophen **OR** acetaminophen, glucose, dextrose, glucagon (rDNA), dextrose    Data:     Past Medical History:   has a past medical history of Acute on chronic congestive heart failure ([facility] Anemia, CAD (coronary artery disease), Cardiomyopathy ([facility] Dialysis care, ESRD (end stage renal disease) on dialysis ([facility] Foot ulcer, left ([facility] GERD (gastroesophageal reflux disease), [facility]od[facility] History of sleep apnea, Hyperlipidemia, Hyperparathyroidism ([facility] Hypertension, Neuropathy, Peripheral vascular disease ([facility] Pneumonia, S/P CABG (coronary artery bypass graft), and Type II or unspecified type diabetes mellitus without mention of complication, not stated as uncontrolled. Social History:   reports that he has never smoked. He has never used smokeless tobacco. He reports that he does not drink alcohol or use drugs.      Family History:   Family History   Problem Relation Age of Onset EXAMINATION: NUCLEAR MEDICINE PERFUSION SCAN. 4/29/2021 TECHNIQUE: Ventilation not performed as part of COVID-19 safety precautions. 3.7 millicuries of Tc 05M MAA was administered intravenously prior to planar imaging of the lungs in multiple projections. COMPARISON: Chest radiograph 04/28/2021. HISTORY: ORDERING SYSTEM PROVIDED HISTORY: hemoptysis and tachycardia r/o PE Additional signs and symptoms: SOB, CP, coughin up blood, non-smoker, no hx of blood clots FINDINGS: PERFUSION:Distribution of radiotracer is homogeneous. No segmental defects identified. CHEST RADIOGRAPH:No focal areas of consolidation or significant effusions on recent chest radiograph. Low Probability for Pulmonary Embolus. Ct Abdomen Pelvis W Iv Contrast Additional Contrast? None    Result Date: 4/28/2021  EXAMINATION: CT OF THE ABDOMEN AND PELVIS WITH CONTRAST 4/28/2021 4:16 am TECHNIQUE: CT of the abdomen and pelvis was performed with the administration of intravenous contrast. Multiplanar reformatted images are provided for review. Dose modulation, iterative reconstruction, and/or weight based adjustment of the mA/kV was utilized to reduce the radiation dose to as low as reasonably achievable. COMPARISON: None. HISTORY: ORDERING SYSTEM PROVIDED HISTORY: diarrhea, abd pain, wbc 30k, discussed with nephro TECHNOLOGIST PROVIDED HISTORY: diarrhea, abd pain, wbc 30k, discussed with nephro Decision Support Exception->Emergency Medical Condition (MA) Reason for Exam: abd pain, nephro maddie approved contrast, dialysis pt Acuity: Acute Type of Exam: Initial FINDINGS: Lower Chest: There is abnormal airspace consolidation within the right lower lobe consistent with a right lower lobe pneumonia. There is no pleural effusion. There is cardiomegaly. Severe coronary artery atherosclerotic calcifications are present. Organs: There is a small volume of ascites. Cholecystectomy clips are present.   The liver, spleen, pancreas and adrenal glands benefits, and alternatives. Universal protocol was observed including a time-out to confirm the correct patient and procedure. The right neck was prepped and draped in sterile fashion using maximum sterile barrier technique. Local anesthesia was achieved with 1% lidocaine. The right internal jugular vein was accessed with a 21 gauge micropuncture needle under ultrasound guidance. A 0.018 guidewire was advanced through the needle. The needle was removed and a 4 Western Jazmyn access catheter over its dilator was advanced over the wire. The wire and dilator were removed and a 0.035 guidewire was advanced through the access catheter into the IVC under fluoroscopic guidance. The access catheter was removed and the tract dilated over the wire. A 20 cm triple-lumen catheter was advanced over the wire and the wire was removed. The catheter flushed and aspirated easily. The catheter was sutured in place with 2-0 nylon. A sterile dressing was applied. The patient tolerated the procedure well and there were no immediate complications. Estimated blood loss: Less than 5 mL. FINDINGS: Ultrasound demonstrates patency of the right internal jugular vein and guides venotomy. Final fluoroscopic image demonstrates satisfactory catheter placement with the tip in the right atrium. Successful ultrasound and fluoroscopy guided right IJ triple-lumen central venous catheter insertion. Xr Chest Portable    Result Date: 4/28/2021  EXAMINATION: ONE XRAY VIEW OF THE CHEST 4/28/2021 2:48 am COMPARISON: 06/03/2019 HISTORY: ORDERING SYSTEM PROVIDED HISTORY: missed dialysis x2weeks TECHNOLOGIST PROVIDED HISTORY: missed dialysis x2weeks Reason for Exam: Upright port, missed dialysis x2 weeks FINDINGS: There is stable cardiomegaly. There is no focal consolidation, pleural effusion or evidence of edema. There is no pneumothorax identified. 1. Stable cardiomegaly. 2. No acute cardiopulmonary process identified.      Fl Modified Barium Swallow W Video    Result Date: 5/3/2021  EXAMINATION: MODIFIED BARIUM SWALLOW WAS PERFORMED IN CONJUNCTION WITH SPEECH PATHOLOGY SERVICES TECHNIQUE: Fluoroscopic evaluation of the swallowing mechanism was performed with multiple consistency of barium product. FLUOROSCOPY DOSE AND TYPE OR TIME AND EXPOSURES: Fluoroscopic time of 1 minutes 44 seconds. DAP of 71.9 dGycm2. COMPARISON: None HISTORY: ORDERING SYSTEM PROVIDED HISTORY: evaluate for diet modifications TECHNOLOGIST PROVIDED HISTORY: evaluate for diet modifications Reason for Exam: evaluate for diet modifications Acuity: Acute Type of Exam: Initial FINDINGS: Oral phase of swallowing was grossly within normal limits. No evidence of laryngeal penetration or aspiration. Swallowing mechanism grossly within normal limits without evidence of aspiration. Please see separate speech pathology report for full discussion of findings and recommendations. Physical Examination:        Physical Exam  Constitutional:       General: He is not in acute distress. Appearance: He is obese. He is not ill-appearing. Comments: On 3L NC   Cardiovascular:      Rate and Rhythm: Normal rate and regular rhythm. Heart sounds: No murmur. No gallop. Pulmonary:      Effort: Pulmonary effort is normal. No respiratory distress. Breath sounds: Normal breath sounds. No wheezing. Abdominal:      General: Bowel sounds are normal. There is no distension. Tenderness: There is no abdominal tenderness. There is no guarding or rebound. Musculoskeletal:         General: No swelling or deformity. Comments: Left BKA. Right foot wrapped C/D/I   Skin:     Capillary Refill: Capillary refill takes less than 2 seconds. Neurological:      General: No focal deficit present. Mental Status: He is alert and oriented to person, place, and time. Psychiatric:         Mood and Affect: Mood normal.         Thought Content:  Thought content normal. Assessment:        Primary Problem  Sepsis due to pneumonia [facility])    Active Hospital Problems    Diagnosis Date Noted    Pneumonia [J18.9] 04/28/2021    Sepsis ([facility] [A41.9] 04/28/2021    Sepsis due to pneumonia ([facility] [J18.9, A41.9] 55/76/6367    Metabolic acidosis [W19.9]     Hyperkalemia [E87.5]     Diarrhea [R19.7]     Nausea and vomiting [R11.2]     History of left below knee amputation (HCC) [Z89.512]     Hemoptysis [R04.2]     Ulcerated, foot, right, with fat layer exposed ([facility] [L97.512]     Hypertension, essential [I10]     Charcot foot due to diabetes mellitus ([facility] [E11.610] 12/28/2015    Anemia of chronic disease [D63.8] 07/03/2013    ESRD on dialysis ([facility] [N18.6, Z99.2] 06/27/2013    DM (diabetes mellitus) ([facility] [E11.9] 06/27/2013       Plan:      Sepsis 2/2 CAP  -CT A&P showed RLL PNA  -Lactic acid 2.1  -Pro-Kiran 25.70  -Blood cultures x 2 NGTD  -Resp Cx  show just above ed gram +ve cocci in clusters with rare GN rods: Growing staph aureus methicillin susceptible  -On NS 0.9% 50 ml/hr  -Continue midodrine 7.5 mg 3 times daily  -I. D on board              -Continue IV ceftriaxone (day 6) and oral vancomycin (day 7)              -Appreciate further recommendations    Chronic systolic and diastolic CHF/Hx of HTN.    -S/p cardioversion yesterday to normal sinus rhythm  -BNP > 300,000, likely elevated from missed dialysis for 2 weeks  -Echo 6/2019: LVEF 30-35% with moderate diastolic dysfunction  -CAD, CABG, stents, last cath 2019.   -Tachycardia 2/2 to hypotension likely 2/2 diarrhea (resolving)  -Amiodarone 20 mg twice daily  -Digoxin 125 MCG daily  -Lopressor 12.5 mg twice daily  -Cardiology on board:               -S/p cardioversion yesterday              -Appreciate further recs     Hyperkalemia 2/2 ESRD on hemodialysis TTS  -Missed 2 weeks of dialysis.   -K 6.9 on admission with EKG changes, received IV calcium gluconate and insulin/dextrose.  -Underwent hemodialysis at [facility]. V's -Nephrology on board:   -Plan for dialysis today              -S/P HD here in [place]. Had 3 sessions so far    -Renvela 4000 mg PO TID              -Magnesium 1.8     Hemoptysis likely 2/2 pneumonia (improving)  -HgB stable  -H&H q8h, keep HgB > 7     Acute diarrhea 2/2 C. difficile infection (improving)  -No bowel movements in 4 days  -We will start patient on Senokot nightly  -CT abdomen and pelvis showed small volume ascites and normal appendix  -C. Diff +ve   -Continue oral vancomycin 500 mg q6hr day 4   -Antinausea meds PRN  -Protonix 40 mg PO daily             Right foot ulcer  -X-ray showed no evidence of OM  -Podiatry on board  -Wound care on board      Dyslipidemia   -Lipitor 40 mg PO nightly     Type 2 diabetes mellitus  -Low dose ISS  -Hypoglycemia protocol     Bilateral neural foraminal narrowing at L5-S1 with spondylolysis  -Showed up on CT A&P  -We will continue to monitor     Diet:  Dental soft diet, Low Sodium, Fluid restriction to 1500 mL   DVT prophylaxis: EPCs; avoid chemical due to hemoptysis, thrombocytopenia 60    Digna Cortes MD  5/6/2021  12:16 PM     Attending Physician Statement    I have discussed the case of Williemae Alpers, including pertinent history and exam findings with the resident. I have seen and examined the patient and the key elements of the encounter have been performed by me. I agree with the assessment, plan, and orders as documented by the resident. The patient is more alert today and responding to all my questions.   Being dialyzed at present and does have low ejection fraction less than 25 and is a candidate for ICD  Electronically signed by Digna Cortes MD on 5/6/2021 at 12:16 PM

## 2021-05-06 NOTE — PLAN OF CARE
disease process, condition or treatment will be avoided or minimized  Outcome: Ongoing     Problem: Urinary Elimination:  Goal: Ability to achieve and maintain adequate urine output will be supported  Description: Ability to achieve and maintain adequate urine output will be supported  Outcome: Ongoing     Problem: Airway Clearance - Ineffective:  Goal: Clear lung sounds  Description: Clear lung sounds  Outcome: Ongoing  Goal: Ability to maintain a clear airway will improve  Description: Ability to maintain a clear airway will improve  Outcome: Ongoing     Problem: Fluid Volume - Deficit:  Goal: Achieves intake and output within specified parameters  Description: Achieves intake and output within specified parameters  Outcome: Ongoing     Problem: Gas Exchange - Impaired:  Goal: Levels of oxygenation will improve  Description: Levels of oxygenation will improve  Outcome: Ongoing     Problem: Confusion - Acute:  Goal: Absence of continued neurological deterioration signs and symptoms  Description: Absence of continued neurological deterioration signs and symptoms  Outcome: Ongoing  Goal: Mental status will be restored to baseline  Description: Mental status will be restored to baseline  Outcome: Ongoing     Problem: Discharge Planning:  Goal: Ability to perform activities of daily living will improve  Description: Ability to perform activities of daily living will improve  Outcome: Ongoing  Goal: Participates in care planning  Description: Participates in care planning  Outcome: Ongoing     Problem: Discharge Planning:  Goal: Ability to perform activities of daily living will improve  Description: Ability to perform activities of daily living will improve  Outcome: Ongoing  Goal: Participates in care planning  Description: Participates in care planning  Outcome: Ongoing     Problem: Injury - Risk of, Physical Injury:  Goal: Will remain free from falls  Description: The patient remained free from falls this shift, false sensory perceptions  Description: Able to refrain from responding to false sensory perceptions  Outcome: Ongoing  Goal: Demonstrates accurate environmental perceptions  Description: Demonstrates accurate environmental perceptions  Outcome: Ongoing  Goal: Able to distinguish between reality-based and nonreality-based thinking  Description: Able to distinguish between reality-based and nonreality-based thinking  Outcome: Ongoing  Goal: Able to interrupt nonreality-based thinking  Description: Able to interrupt nonreality-based thinking  Outcome: Ongoing     Problem: Pain:  Goal: Pain level will decrease  Description: Pain level will decrease  Outcome: Ongoing  Goal: Control of acute pain  Description: Control of acute pain  Outcome: Ongoing  Goal: Control of chronic pain  Description: Control of chronic pain  Outcome: Ongoing     Problem: Nutrition  Goal: Optimal nutrition therapy  Description: Nutrition Problem #1: Increased nutrient needs  Intervention: Food and/or Nutrient Delivery: Continue Current Diet, Start Oral Nutrition Supplement  Nutritional Goals: po intake greater than 50%     Outcome: Ongoing     Problem: Musculor/Skeletal Functional Status  Goal: Highest potential functional level  Outcome: Ongoing  Goal: Absence of falls  Outcome: Ongoing

## 2021-05-06 NOTE — CARE COORDINATION
Social work: spoke to [facility] they will start precert when pt is close to ready as the insurance is an easy precert. Will need kayla and Rx at discharge. lsw to do hens. Pt goes to [facility] on [address], Thurs and Sat.  For dialysis at 5 am.  Celso badillow

## 2021-05-06 NOTE — PROGRESS NOTES
Pt requests frequent repositioning but refuses to lay on his left or back. Pt requesting right side lying and prone positioning only and is still stating he is uncomfortable after multiple repositioning attempts. Percocet given for generalized pain, especially in his legs and back. Educated pt on the importance of turning to other sides to prevent bed sores.      Sam Herring

## 2021-05-06 NOTE — FLOWSHEET NOTE
05/06/21 1100   Precautions   Precautions Fall risk;Contact   Negative Pressure Room No   Positive Pressure Room No   Safe Environment   Arm Bands On ID;Fall; Allergies   Overbed Table Within Reach Yes   Fall Risk Interventions   Toilet Every 2 Hours-In Advance of Need No (Comment)  (able to make needs known)   Hourly Visual Checks In bed   Fall Visual Posted Armband   Room Door Open Yes   Alarm On Bed   Mobility   Activity In bed   Level of Assistance Dependent, patient does less than 25%   Repositioned Pillow support;Semi fowlers; Lying right side   Head of Bed Elevated  Self regulated   Heels/Feet Heel(s) elevated off bed   Range of Motion Active; All extremities   Anti-Embolism Devices Right lower extremity   Anti-Embolism Intervention Refused  (pt educated)   Hygiene   Level of Assistance Moderate assist   Telemetry Details   Telemetry Monitor On Yes   Telemetry Audible Yes   Telemetry Alarms Set Yes   Telemetry Box Number [id number]   Telemetry Monitor Alarm Parameters set/checked   Verbal   Verbal Impairment None   Preferred language English   Needs  N/A    Used N/A   Comfort and Environment Interventions   Additional Comfort/Environmental Interventions Special mattress   Special Mattress Pressure relief overlay   Entertainment   Entertainment Activities Television   Pt in dialysis

## 2021-05-06 NOTE — PROGRESS NOTES
[facility] -   Infectious diseases evaluation  admission date 4/28/2021    reason for consultation:   Pneumonia/C. difficile colitis    Impression :   Current:  · C. difficile colitis  · Right lower lobe pneumonia, staph aureus MSSA growth on respiratory culture  · Sepsis secondary to above  · Right foot wound  · Metabolic acidosis  · CHF  · End-stage renal disease on hemodialysis  · Diabetes mellitus  · Dyslipidemia  · Hypertension      Recommendations   · Continue p.o. vancomycin day 5/14  · IV ceftriaxone day 6, received 3 days of cefepime previously  · Discontinue IV ceftriaxone  · MRSA nasal swab was negative  · Swallow study showed no evidence of aspiration  · No growth on blood cultures  · Continue supportive care          History of Present Illness:   Initial history:  Conner Gauthier is a 60y.o.-year-old male was transferred from [facility].  The patient presented with worsening cough productive with blood-tinged sputum associated with shortness of breath and diarrhea for 2 weeks. He also complaining of generalized weakness, nausea and vomiting. no alleviating or aggravating factors. The patient is poor historian, lives at home alone, states that he received Covid 19 vaccine. Apparently the patient missed hemodialysis.   He also had left foot wound with no purulent drainage or necrotic tissue, was evaluated by podiatry, foot x-ray not suggestive of osteomyelitis  On admission he was tachycardic, had leukocytosis with WBC of 29.6  CT chest and abdomen showed right lower lobe infiltrates  COVID-19 rapid test negative  VQ scan was low probability on 4/29/2021  On 4/30/2021He became hypotensive with a systolic blood pressure in the 70s, tachycardic was transferred to ICU, was started on pressors  Procalcitonin was 25.7 on 4/30/2021  IJ triple-lumen was placed was placed 4/30/2021    Interval changes  5/6/2021   He remains in the ICU, status post cardioversion yesterday, heart rate normal, the denied increased cough or shortness of breath, denied nausea or vomiting, denied fever or chills, no bowel movement today, no new complaints    Sputum Gram growing staph aureus MSSA  WBC normal today    Patient Vitals for the past 8 hrs:   BP Temp Pulse Resp SpO2   05/06/21 1400 (!) 147/85 98 °F (36.7 °C) 85 22 95 %   05/06/21 1316 119/68  85 22    05/06/21 1230 123/73  83     05/06/21 1200 137/77  85     05/06/21 1130 131/76  82     05/06/21 1100 133/77  79     05/06/21 1030 137/73  81     05/06/21 1000 129/83  78     05/06/21 0942 134/77  80     05/06/21 0930 134/77 97.5 °F (36.4 °C) 79 18 (!) 89 %         I have personally reviewed the past medical history, past surgical history, medications, social history, and family history, and I haveupdated the database accordingly. Allergies:   Pcn [penicillins]     Review of Systems:     Review of Systems  As per history present illness, other than above 12 system review is negative  Physical Examination :       Physical Exam  Constitutional:       General: He is not in acute distress. HENT:      Head: Normocephalic and atraumatic. Right Ear: External ear normal.      Left Ear: External ear normal.   Neck:      Musculoskeletal: Neck supple. No neck rigidity. Cardiovascular:      Rate and Rhythm: Normal rate and regular rhythm. Abdominal:      General: Abdomen is flat. There is no distension. Palpations: Abdomen is soft. Tenderness: There is no abdominal tenderness. Musculoskeletal:      Comments: Left below-knee amputation site with no open wound no erythema. Right foot dressing   Skin:     General: Skin is warm. Coloration: Skin is not jaundiced. Neurological:      General: No focal deficit present. Mental Status: He is alert and oriented to person, place, and time.          Past Medical History:     Past Medical History:   Diagnosis Date    Acute on chronic congestive heart failure (HCC)     Anemia     CAD (coronary artery disease)     [facility]     Dialysis care     ESRD (end stage renal disease) on dialysis ([facility]     Foot ulcer, left ([facility] 2015    GERD (gastroesophageal reflux disease)     Hemodialysis patient ([facility] 2011    MOM-WED-FRI-ON [address]     History of sleep apnea     none since significant weight loss (was 400#)    Hyperlipidemia     Hyperparathyroidism ([facility]     Hypertension     Neuropathy     Peripheral vascular disease (HCC)     Pneumonia     resolved    S/P CABG (coronary artery bypass graft) 06/2016    Type II or unspecified type diabetes mellitus without mention of complication, not stated as uncontrolled     dx-1980       Past Surgical  History:     Past Surgical History:   Procedure Laterality Date    CATARACT REMOVAL      DIALYSIS FISTULA CREATION Left 2014    LEG AMPUTATION THROUGH LOWER TIBIA AND FIBULA         Medications:      senna  1 tablet Oral Nightly    midodrine  7.5 mg Oral Q8H    metoprolol tartrate  25 mg Oral BID    amiodarone  200 mg Oral BID    vancomycin  500 mg Oral 4 times per day    cefTRIAXone (ROCEPHIN) IV  2,000 mg Intravenous Q24H    sevelamer  4,000 mg Oral TID WC    aspirin  81 mg Oral Daily    lidocaine  1 patch Transdermal Daily    neomycin-bacitracin-polymyxin   Topical BID    sodium chloride flush  5-40 mL Intravenous 2 times per day    atorvastatin  40 mg Oral Nightly    digoxin  125 mcg Oral Daily    pantoprazole  40 mg Oral Daily    insulin lispro  0-6 Units Subcutaneous TID WC    insulin lispro  0-3 Units Subcutaneous Nightly       Social History:     Social History     Socioeconomic History    Marital status:      Spouse name: Not on file    Number of children: Not on file    Years of education: Not on file    Highest education level: Not on file   Occupational History    Not on file   Social Needs    Financial resource strain: Not on file   [place] insecurity     Worry: Not on file     Inability: Not on file    Transportation needs     Medical: Not on file     Non-medical: Not on file   Tobacco Use    Smoking status: Never Smoker    Smokeless tobacco: Never Used   Substance and Sexual Activity    Alcohol use: No    Drug use: No    Sexual activity: Not on file   Lifestyle    Physical activity     Days per week: Not on file     Minutes per session: Not on file    Stress: Not on file   Relationships    Social connections     Talks on phone: Not on file     Gets together: Not on file     Attends Jain service: Not on file     Active member of club or organization: Not on file     Attends meetings of clubs or organizations: Not on file     Relationship status: Not on file    Intimate partner violence     Fear of current or ex partner: Not on file     Emotionally abused: Not on file     Physically abused: Not on file     Forced sexual activity: Not on file   Other Topics Concern    Not on file   Social History Narrative    Not on file       Family History:     Family History   Problem Relation Age of Onset    Heart Disease Father     Diabetes Father    Jeannine Dong Diabetes Brother       Medical Decision Making:   I have independently reviewed/ordered the following labs:    CBC with Differential:   Recent Labs     05/05/21  0519 05/06/21  0439   WBC 8.9 8.1   HGB 9.9* 9.5*   HCT 30.7* 30.5*   PLT 40* 60*   LYMPHOPCT 3* 5*   MONOPCT 7 6     BMP:  Recent Labs     05/05/21  0519 05/06/21  0439    138   K 4.0 4.0   CL 97* 97*   CO2 21 19*   BUN 51* 58*   CREATININE 4.95* 5.61*   MG 1.8  --      Hepatic Function Panel:   No results for input(s): PROT, LABALBU, BILIDIR, IBILI, BILITOT, ALKPHOS, ALT, AST in the last 72 hours. No results for input(s): RPR in the last 72 hours. No results for input(s): HIV in the last 72 hours. No results for input(s): BC in the last 72 hours.   Lab Results   Component Value Date    CREATININE 5.61 05/06/2021    GLUCOSE 145 05/06/2021    GLUCOSE 128 01/09/2012       Detailed results: Thank you for allowing us to participate in the care of this patient. Please call with questions. This note is created with the assistance of a speech recognition program.  While intending to generate adocument that actually reflects the content of the visit, the document can still have some errors including those of syntax and sound a like substitutions which may escape proof reading. It such instances, actual meaningcan be extrapolated by contextual diversion.     Bravo Gonzalez MD  Office: [phone]  Perfect serve / office [phone]

## 2021-05-06 NOTE — PROGRESS NOTES
Physical Therapy    DATE: 2021    NAME: Haja Carrillo  MRN: [id number]   : 1955      Patient not seen this date for Physical Therapy due to:     Other: Pt unavailable, Pt off the unit for dialysis      Electronically signed by Gena Plummer PT on 2021 at 9:35 AM

## 2021-05-07 NOTE — PROGRESS NOTES
[facility]   OCCUPATIONAL THERAPY MISSED TREATMENT NOTE   INPATIENT   Date: 21  Patient Name: Nicole Rojas       Room:   MRN: [id number]   Account #: [de-identified]    : 1955  (63 y.o.)  Gender: male   Diagnosis: Sepsis due to pneumonia             REASON FOR MISSED TREATMENT:  Patient at testing and/or off the floor   -   Hemodialysis        NASIM Patel

## 2021-05-07 NOTE — PROGRESS NOTES
[address] [facility], [place].    PROGRESS NOTE             5/7/2021    8:50 AM    Name:   Mary Masterson  MRN:     [id number]     Acct:      [de-identified]   Room:   2006/2006-01  IP Day:  9  Admit Date:  4/28/2021  2:27 PM    PCP:  Justin Clark MD  Code Status:  Full Code    Subjective:     C/C: No chief complaint on file. Interval History Status:     Patient seen and evaluated bedside. No acute events overnight. S/p dialysis yesterday. Patient is on 4L NC. Chest x-ray yesterday showed new moderate to large right pleural effusion. CT chest was performed this a.m. which showed moderately large right pleural effusion and consolidation of the entire right lower lobe. There appears to be areas of loculation. Per pulmonology, will need CT-guided drainage by IR. Concern is parapneumonic effusion versus empyema. Per nursing staff, patient is taking in minimal p.o. intake. Patient states he feels short of breath. He denies any fever, chills, chest pain, palpitations, or abdominal pain. Brief History:     Please see H&P    Review of Systems:     Review of Systems   Constitutional: Negative for activity change, appetite change and fatigue. HENT: Negative for congestion. Eyes: Negative for photophobia and visual disturbance. Respiratory: Positive for shortness of breath. Negative for apnea, cough and wheezing. Cardiovascular: Negative for chest pain, palpitations and leg swelling. Gastrointestinal: Positive for constipation. Negative for abdominal distention, abdominal pain, diarrhea, nausea and vomiting. Endocrine: Negative for polyuria. Genitourinary: Negative for difficulty urinating and urgency. Musculoskeletal: Positive for gait problem. Negative for arthralgias and back pain. Skin: Negative for color change. Neurological: Negative for dizziness, syncope and headaches.    Psychiatric/Behavioral: Negative for agitation, behavioral problems, confusion, decreased concentration and dysphoric mood. Medications: Allergies: Allergies   Allergen Reactions    Pcn [Penicillins] Other (See Comments)     Trouble walking       Current Meds:   Scheduled Meds:    benzonatate  200 mg Oral TID    dextromethorphan  60 mg Oral 2 times per day    famotidine  10 mg Oral Daily    senna  1 tablet Oral Nightly    midodrine  7.5 mg Oral Q8H    metoprolol tartrate  25 mg Oral BID    amiodarone  200 mg Oral BID    vancomycin  500 mg Oral 4 times per day    sevelamer  4,000 mg Oral TID WC    aspirin  81 mg Oral Daily    lidocaine  1 patch Transdermal Daily    neomycin-bacitracin-polymyxin   Topical BID    sodium chloride flush  5-40 mL Intravenous 2 times per day    atorvastatin  40 mg Oral Nightly    digoxin  125 mcg Oral Daily    insulin lispro  0-6 Units Subcutaneous TID WC    insulin lispro  0-3 Units Subcutaneous Nightly     Continuous Infusions:    sodium chloride      dextrose      sodium chloride 50 mL/hr at 05/04/21 2129     PRN Meds: oxyCODONE-acetaminophen, sodium chloride flush, sodium chloride, promethazine **OR** ondansetron, polyethylene glycol, acetaminophen **OR** acetaminophen, glucose, dextrose, glucagon (rDNA), dextrose    Data:     Past Medical History:   has a past medical history of Acute on chronic congestive heart failure (HCC), Anemia, CAD (coronary artery disease), Cardiomyopathy ([facility] Dialysis care, ESRD (end stage renal disease) on dialysis ([facility] Foot ulcer, left ([facility] GERD (gastroesophageal reflux disease), Hemodialysis patient ([facility] [facility] History of sleep apnea, Hyperlipidemia, Hyperparathyroidism ([facility] [facility] Hypertension, Neuropathy, Peripheral vascular disease ([facility] [facility] Pneumonia, S/P CABG (coronary artery bypass graft), and Type II or unspecified type diabetes mellitus without mention of complication, not stated as uncontrolled. Social History:   reports that he has never smoked.  He MRI of the right foot could be performed for further evaluation if clinically warranted. Nm Lung Scan Perfusion Only    Result Date: 4/29/2021  EXAMINATION: NUCLEAR MEDICINE PERFUSION SCAN. 4/29/2021 TECHNIQUE: Ventilation not performed as part of COVID-19 safety precautions. 3.7 millicuries of Tc 32H MAA was administered intravenously prior to planar imaging of the lungs in multiple projections. COMPARISON: Chest radiograph 04/28/2021. HISTORY: ORDERING SYSTEM PROVIDED HISTORY: hemoptysis and tachycardia r/o PE Additional signs and symptoms: SOB, CP, coughin up blood, non-smoker, no hx of blood clots FINDINGS: PERFUSION:Distribution of radiotracer is homogeneous. No segmental defects identified. CHEST RADIOGRAPH:No focal areas of consolidation or significant effusions on recent chest radiograph. Low Probability for Pulmonary Embolus. Ct Abdomen Pelvis W Iv Contrast Additional Contrast? None    Result Date: 4/28/2021  EXAMINATION: CT OF THE ABDOMEN AND PELVIS WITH CONTRAST 4/28/2021 4:16 am TECHNIQUE: CT of the abdomen and pelvis was performed with the administration of intravenous contrast. Multiplanar reformatted images are provided for review. Dose modulation, iterative reconstruction, and/or weight based adjustment of the mA/kV was utilized to reduce the radiation dose to as low as reasonably achievable. COMPARISON: None. HISTORY: ORDERING SYSTEM PROVIDED HISTORY: diarrhea, abd pain, wbc 30k, discussed with nephro TECHNOLOGIST PROVIDED HISTORY: diarrhea, abd pain, wbc 30k, discussed with nephro Decision Support Exception->Emergency Medical Condition (MA) Reason for Exam: abd pain, nephro maddie approved contrast, dialysis pt Acuity: Acute Type of Exam: Initial FINDINGS: Lower Chest: There is abnormal airspace consolidation within the right lower lobe consistent with a right lower lobe pneumonia. There is no pleural effusion. There is cardiomegaly.   Severe coronary artery atherosclerotic of edema. There is no pneumothorax identified. 1. Stable cardiomegaly. 2. No acute cardiopulmonary process identified. Fl Modified Barium Swallow W Video    Result Date: 5/3/2021  EXAMINATION: MODIFIED BARIUM SWALLOW WAS PERFORMED IN CONJUNCTION WITH SPEECH PATHOLOGY SERVICES TECHNIQUE: Fluoroscopic evaluation of the swallowing mechanism was performed with multiple consistency of barium product. FLUOROSCOPY DOSE AND TYPE OR TIME AND EXPOSURES: Fluoroscopic time of 1 minutes 44 seconds. DAP of 71.9 dGycm2. COMPARISON: None HISTORY: ORDERING SYSTEM PROVIDED HISTORY: evaluate for diet modifications TECHNOLOGIST PROVIDED HISTORY: evaluate for diet modifications Reason for Exam: evaluate for diet modifications Acuity: Acute Type of Exam: Initial FINDINGS: Oral phase of swallowing was grossly within normal limits. No evidence of laryngeal penetration or aspiration. Swallowing mechanism grossly within normal limits without evidence of aspiration. Please see separate speech pathology report for full discussion of findings and recommendations. Physical Examination:        Physical Exam  Constitutional:       General: He is not in acute distress. Appearance: He is obese. He is not ill-appearing. Comments: On 4L NC   Cardiovascular:      Rate and Rhythm: Normal rate and regular rhythm. Heart sounds: No murmur. No gallop. Pulmonary:      Effort: No respiratory distress. Breath sounds: No wheezing. Comments: Decreased breath sounds RLL  Abdominal:      General: Bowel sounds are normal. There is no distension. Tenderness: There is no abdominal tenderness. There is no guarding or rebound. Musculoskeletal:         General: No swelling or deformity. Comments: Left BKA. Right foot wrapped C/D/I   Skin:     Capillary Refill: Capillary refill takes less than 2 seconds. Neurological:      General: No focal deficit present.       Mental Status: He is alert and oriented to > 300,000, likely elevated from missed dialysis for 2 weeks  -Echo 6/2019: LVEF 30-35% with moderate diastolic dysfunction  -CAD, CABG, stents, last cath 2019.   -Tachycardia 2/2 to hypotension likely 2/2 diarrhea (resolving)  -Amiodarone 20 mg twice daily  -Digoxin 125 MCG daily  -Lopressor 25 mg twice daily  -Cardiology on board:    -Recommends continuing amiodarone 200 mg twice daily for 7 days. Then 200 mg once daily thereafter.     -Patient is a candidate for AICD due to ICM. Plan for AICD in future once infectious process resolves              -Appreciate further recs     Hyperkalemia 2/2 ESRD on hemodialysis TTS  -Missed 2 weeks of dialysis.   -K 6.9 on admission with EKG changes, received IV calcium gluconate and insulin/dextrose.  -Underwent hemodialysis at [facility]. V's  -Nephrology on board:   -S/p dialysis yesterday   -Plan for ultrafiltration today   -Renvela 4000 mg PO TID              -Magnesium 1.8     Hemoptysis likely 2/2 pneumonia (improving)  -HgB stable  -H&H q8h, keep HgB > 7     Acute diarrhea 2/2 C. difficile infection (improving)  -No bowel movements in 5 days  -\Continue Senokot nightly  -CT abdomen and pelvis showed small volume ascites and normal appendix  -C.  Diff +ve   -Continue oral vancomycin 500 mg q6hr day 4   -Protonix 40 mg PO daily             Right foot ulcer  -X-ray showed no evidence of OM  -Podiatry on board  -Wound care on board      Dyslipidemia   -Lipitor 40 mg PO nightly     Type 2 diabetes mellitus  -Low dose ISS  -Hypoglycemia protocol   al interventional radiology  Electronically signed by Wing Sagar MD on 5/7/2021 at 1:09 PM       Bilateral neural foraminal narrowing at L5-S1 with spondylolysis  -Showed up on CT A&P  -We will continue to monitor     Diet:  Dental soft diet, Low Sodium, Fluid restriction to 1500 mL   DVT prophylaxis: EPCs; avoid chemical due to hemoptysis, thrombocytopenia 63     Mg Duran MD  5/7/2021  8:50 AM     Attending Physician Statement I have discussed the case of Bella Glez, including pertinent history and exam findings with the resident. I have seen and examined the patient and the key elements of the encounter have been performed by me. I agree with the assessment, plan, and orders as documented by the resident. Patient's clinical condition has not improved. Has right-sided pneumonia with pleural effusion which is most likely parapneumonic. Did not respond to cephalosporins.   Today I started him meropenem he is also having aspiration of pleural fluid by interventional radiology

## 2021-05-07 NOTE — PROGRESS NOTES
Pulmonary follow-up note. CT of chest reviewed, several areas of loculated fluid with large fluid collections in the upper portion of the lung as well as lower. Unfortunately, we will not be able to do a bedside ultrasound due to the areas of loculation. Would recommend CT-guided drainage I spoke to interventional radiology (Dr. Camilla Russ), who has kindly agreed to perform CT-guided drainage. My concern is possible parapneumonic effusion/empyema. His oxygen desaturation at night may have been due to underlying obstructive sleep apnea because we have been able to wean him down to 3 to 4 L nasal cannula this morning. Patient is agreeable to procedure.

## 2021-05-07 NOTE — PROGRESS NOTES
Right thoracentesis completed in IR with US per Dr Rose Mary Cassidy. Total of 1.3 liters of burgundy fluid removed and sent to lab for ordered testing. Pt tolerated procedure well.  [facility] ICU RN at bedside, pt transported back to 2006 per bed with 4L NC.

## 2021-05-07 NOTE — PROGRESS NOTES
HEMODIALYSIS POST TREATMENT NOTE    Treatment time ordered: 195 minutes  Ultrafiltrtation only  Actual treatment time: 195 minutes    UltraFiltration Goal: 3000 ml  UltraFiltration Removed: 3100 ml      Pre Treatment weight: 109.5KG actual bedscale  Post Treatment weight: 104.9 Kg actual bedscale  Estimated Dry Weight: 94 kg    Access used: Internal Access:       AV Fistula          Site: Lt forearm       Access Flow: good bruit and thrill       Sign and symptoms of infection: N0         Medications or blood products given: N0    Chronic outpatient schedule: [facility]    Chronic outpatient unit: [facility]    Summary of response to treatment: tolerated Ultrafiltration treatment well        ACES flowsheet faxed to patient unit/ placed in patient chart: N/A Epic charting    Post assessment completed: Yes    Report given to: 1 Alissa Mayen documented Safety Checks include the followin) Access and face visible at all times. 2) All connections and blood lines are secure with no kinks. 3) NVL alarm engaged. 4) Hemosafe device applied (if applicable). 5) No collapse of Arterial or Venous blood chambers. 6) All blood lines / pump segments in the air detectors.

## 2021-05-07 NOTE — PROGRESS NOTES
HEMODIALYSIS PRE-TREATMENT NOTE     Patient Identifiers prior to treatment: Name,      Isolation Required: yes                      Isolation Type: Contact        (please document if patient is being managed as a PUI/COVID-19 patient)           Hepatitis status:                                                                     Date Drawn                             Result  Hepatitis B Surface Antigen 21     negative                        Hepatitis B Surface Antibody 21 95           Hepatitis B Core Antibody 19 negative              How was Hepatitis Status verified: outpt records     Was a copy of the labs you documented provided to facility for the patient's chart: yes     Hemodialysis orders verified: yes     Access Within normal limits ( I.e. s/s of infection,...): WNL      Pre-Assessment completed: yes     Report received from [address] [place] [address]

## 2021-05-07 NOTE — CONSULTS
[facility]   CONSULT NOTE:      DATE OF CONSULT 5/7/2021    REASON FOR CONSULTATION:  Hypoxemia      PCP Dick Alvarez MD     CHIEF COMPLAINT: Cough, dyspnea, hemoptysis and hypoxemia    HISTORY OF PRESENT ILLNESS:     Bryce Wade is a 56-year-old obese  male who is on dialysis with end-stage renal failure. He usually has dialysis Tuesday Thursday and Saturday with a left aVF under . He presented to [facility] after missed multiple dialysis treatments due to weakness, diarrhea, nausea, and hemoptysis. His chest x-ray did not show any abnormalities, but his CT scan of the abdomen showed a dense right lower lobe infiltrate. His rapid COVID-19 was negative. Initially on presentation there, his creatinine was 18.47 with a BUN of 241 and potassium of 6.9. A VQ scan done showed low probability for pulmonary embolism. Patient was actively coughing up small amounts of blood on April 28 according to the history and physical.  He was started on IV cefepime and IV vancomycin. He received hemodialysis for the last 4 days. He was found to have C. difficile colitis. IV vancomycin was discontinued and p.o. vancomycin was started. The patient also had a negative MRSA swab but a positive culture for methicillin sensitive staph. On April 30, he was transferred to intensive care unit because of hypotension. In route, a central line was placed by interventional radiology. 1 point, the patient was placed on 3 pressors. He was also on an amiodarone drip. His pulmonary status worsened and finally we were asked to see him after his oxygen saturation dropped and he was placed on 5 L nasal cannula. Patient continues to complain of right-sided chest pain and pain when he is coughing. He is not on any antitussives. He says that he continues to cough up small amounts of blood.   He complains that when he is on his back or left side he has more shortness of breath. Chest x-ray done yesterday showed worsening volume loss on the right side. Is a non-smoker and has no documented underlying pulmonary illness however he complains of fatigue and snoring.     ALLERGIES:  Allergies   Allergen Reactions    Pcn [Penicillins] Other (See Comments)     Trouble walking       HOME MEDICATIONS:  Medications Prior to Admission: metoprolol succinate (TOPROL XL) 50 MG extended release tablet, Take 100 mg by mouth daily  metOLazone (ZAROXOLYN) 10 MG tablet, Take 10 mg by mouth daily  digoxin (LANOXIN) 125 MCG tablet, Take 125 mcg by mouth daily Take on Tuesday, Thursday and Saturday in the evening  NIFEdipine (ADALAT CC) 30 MG extended release tablet, Take 30 mg by mouth daily  pantoprazole (PROTONIX) 40 MG tablet, Take 40 mg by mouth daily  patiromer sorbitex calcium (VELTASSA) 8.4 g PACK packet, Take 8.4 g by mouth daily  carvedilol (COREG) 3.125 MG tablet, Take 1 tablet by mouth 2 times daily (with meals) (Patient taking differently: Take 12.5 mg by mouth 2 times daily (with meals) )  bumetanide (BUMEX) 1 MG tablet, Take 2 mg by mouth daily  ONE TOUCH ULTRA TEST strip, USE AS DIRECTED DAILY AS NEEDED  atorvastatin (LIPITOR) 40 MG tablet, Take 1 tablet by mouth nightly  midodrine (PROAMATINE) 10 MG tablet, Take 1 tablet by mouth 3 times daily (with meals)  aspirin 81 MG EC tablet, Take 1 tablet by mouth daily  sevelamer (RENVELA) 800 MG tablet, Take 5 tablets by mouth 3 times daily   B Complex-C-Folic Acid (TRIPHROCAPS PO), Take 15 mg by mouth daily  vitamin D (CHOLECALCIFEROL) 1000 UNIT TABS tablet, Take 2,000 Units by mouth daily   clopidogrel (PLAVIX) 75 MG tablet, Take 75 mg by mouth daily  insulin lispro (HUMALOG) 100 UNIT/ML injection vial, Inject into the skin 3 times daily (before meals)  lisinopril (PRINIVIL;ZESTRIL) 2.5 MG tablet, Take 2.5 mg by mouth daily  nitroGLYCERIN (NITROSTAT) 0.4 MG SL tablet, Place 0.4 mg under the tongue every 5 minutes as needed for Chest pain History     Socioeconomic History    Marital status:      Spouse name: Not on file    Number of children: Not on file    Years of education: Not on file    Highest education level: Not on file   Occupational History    Not on file   Social Needs    Financial resource strain: Not on file    Food insecurity     Worry: Not on file     Inability: Not on file    Transportation needs     Medical: Not on file     Non-medical: Not on file   Tobacco Use    Smoking status: Never Smoker    Smokeless tobacco: Never Used   Substance and Sexual Activity    Alcohol use: No    Drug use: No    Sexual activity: Not on file   Lifestyle    Physical activity     Days per week: Not on file     Minutes per session: Not on file    Stress: Not on file   Relationships    Social connections     Talks on phone: Not on file     Gets together: Not on file     Attends Voodoo service: Not on file     Active member of club or organization: Not on file     Attends meetings of clubs or organizations: Not on file     Relationship status: Not on file    Intimate partner violence     Fear of current or ex partner: Not on file     Emotionally abused: Not on file     Physically abused: Not on file     Forced sexual activity: Not on file   Other Topics Concern    Not on file   Social History Narrative    Not on file       FAMILY HISTORY:  Family History   Problem Relation Age of Onset    Heart Disease Father     Diabetes Father     Diabetes Brother        REVIEW OF SYSTEMS:  All other systems reviewed and are negative. PHYSICAL EXAM:  Vital Signs Blood pressure 134/66, pulse 76, temperature 98.6 °F (37 °C), temperature source Axillary, resp. rate 21, height 6' (1.829 m), weight 236 lb 12.4 oz (107.4 kg), SpO2 93 %.   Oxygen Amount and Delivery: O2 Flow Rate (L/min): (S) 3 L/min    Admission Weight Weight: 219 lb 9.3 oz (99.6 kg)    General Appearance   Ill-appearing middle-aged gentleman  Head  Normocephalic, without obvious abnormality, atraumatic    Eyes  conjunctivae/corneas clear. PERRL, EOM's intact. Fundi benign. 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 No rashes or lesions  Neurologic: Alert and oriented X 3, normal strength and tone.          Imaging      Lab Review  CBC     Lab Results   Component Value Date    WBC 8.6 05/07/2021    RBC 3.63 05/07/2021    RBC 3.51 01/09/2012    HGB 9.7 05/07/2021    HCT 30.8 05/07/2021    PLT 63 05/07/2021     01/09/2012    MCV 85.0 05/07/2021    MCH 26.8 05/07/2021    MCHC 31.5 05/07/2021    RDW 21.0 05/07/2021    NRBC 1 03/07/2016    METASPCT 2 05/01/2021    LYMPHOPCT PENDING 05/07/2021    MONOPCT PENDING 05/07/2021    MYELOPCT 1 03/06/2016    BASOPCT PENDING 05/07/2021    MONOSABS PENDING 05/07/2021    LYMPHSABS PENDING 05/07/2021    EOSABS PENDING 05/07/2021    BASOSABS PENDING 05/07/2021    DIFFTYPE NOT REPORTED 05/07/2021       BMP   Lab Results   Component Value Date     05/07/2021    K 3.9 05/07/2021    CL 97 05/07/2021    CO2 25 05/07/2021    BUN 39 05/07/2021    CREATININE 4.47 05/07/2021    GLUCOSE 107 05/07/2021    GLUCOSE 128 01/09/2012    CALCIUM 9.0 05/07/2021       LFTS  Lab Results   Component Value Date    ALKPHOS 137 04/30/2021    ALT 12 04/30/2021    AST 17 04/30/2021    PROT 6.0 04/30/2021    BILITOT 0.74 04/30/2021    BILIDIR 0.14 10/19/2011    IBILI 0.26 10/19/2011    LABALBU 2.9 04/30/2021    LABALBU 3.8 01/09/2012       INR  No results for input(s): PROTIME, INR in the last 72 hours. APTT  No results for input(s): APTT in the last 72 hours. Lactic Acid  No results found for: LACTA     PRO-BNP   No results for input(s): PROBNP in the last 72 hours. ABGs: No results found for: PHART, PO2ART, WFA1DRM    Lab Results   Component Value Date    MODE NOT REPORTED 06/02/2019         Impression    Acute hypoxic respiratory failure  Right lower lobe infiltrate with volume loss with possible additional pleural effusion  End-stage renal disease on dialysis  C. difficile colitis  Probable ROOSEVELT  Non-smoker    Plan:      Check stat CT of the chest to differentiate how much is infiltrate and effusion  If there is a significant amount of pleural fluid, he will need a thoracentesis; however, platelet count is low and he is obese; would transfuse platelets if needed  We will place him on antitussives  Should sleep with his right side down to aerate the left portion of the lung  Antibiotics per ID-currently only on oral vancomycin  Recheck procalcitonin level  Recheck MRSA swab, he has been here for over a week and his x-rays gotten worse.   If he worsens, would try him on BiPAP  Dialysis per nephrology, still seems to be slightly volume overloaded   Continue ICU care  Outpatient sleep study  Add incentive spirometry, but he may not be able to perform due to his right-sided pain  Further recommendations to follow, I am very concerned about his worsening hypoxemia

## 2021-05-07 NOTE — PROGRESS NOTES
[facility] -   Infectious diseases evaluation  admission date 4/28/2021    reason for consultation:   Pneumonia/C. difficile colitis    Impression :   Current:  · C. difficile colitis  · Right lower lobe pneumonia, staph aureus MSSA growth on respiratory culture  · Moderate to large loculated pleural fluid  · Sepsis secondary to above  · Right foot wound  · Metabolic acidosis  · CHF  · End-stage renal disease on hemodialysis  · Diabetes mellitus  · Dyslipidemia  · Hypertension      Recommendations   · Continue p.o. vancomycin day 5/14  · IV meropenem started today  · IV ceftriaxone discontinued 5/6/2021  · IR consulted for thoracentesis  · Follow pleural fluid analysis and culture, adjust antibiotics as needed  · MRSA nasal swab was negative  · Swallow study showed no evidence of aspiration  · No growth on blood cultures  · Continue supportive care          History of Present Illness:   Initial history:  Elizabeth Villegas is a 60y.o.-year-old male was transferred from [place].  The patient presented with worsening cough productive with blood-tinged sputum associated with shortness of breath and diarrhea for 2 weeks. He also complaining of generalized weakness, nausea and vomiting. no alleviating or aggravating factors. The patient is poor historian, lives at home alone, states that he received Covid 19 vaccine. Apparently the patient missed hemodialysis.   He also had left foot wound with no purulent drainage or necrotic tissue, was evaluated by podiatry, foot x-ray not suggestive of osteomyelitis  On admission he was tachycardic, had leukocytosis with WBC of 29.6  CT chest and abdomen showed right lower lobe infiltrates  COVID-19 rapid test negative  VQ scan was low probability on 4/29/2021  On 4/30/2021He became hypotensive with a systolic blood pressure in the 70s, tachycardic was transferred to ICU, was started on pressors  Procalcitonin was 25.7 on amiodarone  200 mg Oral BID    vancomycin  500 mg Oral 4 times per day    sevelamer  4,000 mg Oral TID WC    aspirin  81 mg Oral Daily    lidocaine  1 patch Transdermal Daily    neomycin-bacitracin-polymyxin   Topical BID    sodium chloride flush  5-40 mL Intravenous 2 times per day    atorvastatin  40 mg Oral Nightly    digoxin  125 mcg Oral Daily    insulin lispro  0-6 Units Subcutaneous TID WC    insulin lispro  0-3 Units Subcutaneous Nightly       Social History:     Social History     Socioeconomic History    Marital status:      Spouse name: Not on file    Number of children: Not on file    Years of education: Not on file    Highest education level: Not on file   Occupational History    Not on file   Social Needs    Financial resource strain: Not on file    Food insecurity     Worry: Not on file     Inability: Not on file    Transportation needs     Medical: Not on file     Non-medical: Not on file   Tobacco Use    Smoking status: Never Smoker    Smokeless tobacco: Never Used   Substance and Sexual Activity    Alcohol use: No    Drug use: No    Sexual activity: Not on file   Lifestyle    Physical activity     Days per week: Not on file     Minutes per session: Not on file    Stress: Not on file   Relationships    Social connections     Talks on phone: Not on file     Gets together: Not on file     Attends Holiness service: Not on file     Active member of club or organization: Not on file     Attends meetings of clubs or organizations: Not on file     Relationship status: Not on file    Intimate partner violence     Fear of current or ex partner: Not on file     Emotionally abused: Not on file     Physically abused: Not on file     Forced sexual activity: Not on file   Other Topics Concern    Not on file   Social History Narrative    Not on file       Family History:     Family History   Problem Relation Age of Onset    Heart Disease Father     Diabetes Father    Elle Solis

## 2021-05-07 NOTE — PLAN OF CARE
Problem: Falls - Risk of:  Goal: Will remain free from falls  5/7/2021 0730 by Art Seo RN  Outcome: Ongoing  5/6/2021 1816 by Kadeem Lam RN  Outcome: Ongoing  Goal: Absence of physical injury  5/7/2021 0730 by Art Seo RN  Outcome: Ongoing  5/6/2021 1816 by Kadeem Lam RN  Outcome: Ongoing     Problem: Skin Integrity:  Goal: Will show no infection signs and symptoms  5/7/2021 0730 by Art Seo RN  Outcome: Ongoing  5/6/2021 1816 by Kadeem Lam RN  Outcome: Ongoing  Goal: Absence of new skin breakdown  5/7/2021 0730 by Art Seo RN  Outcome: Ongoing  5/6/2021 1816 by Kadeem Lam RN  Outcome: Ongoing     Problem:  Activity:  Goal: Risk for activity intolerance will decrease  5/7/2021 0730 by Art Seo RN  Outcome: Ongoing  5/6/2021 1816 by Kadeem Lam RN  Outcome: Ongoing     Problem: Fluid Volume:  Goal: Will show no signs or symptoms of fluid imbalance  5/7/2021 0730 by Art Seo RN  Outcome: Ongoing  5/6/2021 1816 by Kadeem Lam RN  Outcome: Ongoing     Problem: Nutritional:  Goal: Maintenance of adequate nutrition will be supported  5/7/2021 0730 by Art Seo RN  Outcome: Ongoing  5/6/2021 1816 by Kadeem Lam RN  Outcome: Ongoing     Problem: Physical Regulation:  Goal: Complications related to the disease process, condition or treatment will be avoided or minimized  5/7/2021 0730 by Art Seo RN  Outcome: Ongoing  5/6/2021 1816 by Kadeem Lam RN  Outcome: Ongoing     Problem: Urinary Elimination:  Goal: Ability to achieve and maintain adequate urine output will be supported  5/7/2021 0730 by Art Seo RN  Outcome: Ongoing  5/6/2021 1816 by Kadeem Lam RN  Outcome: Ongoing     Problem: Airway Clearance - Ineffective:  Goal: Clear lung sounds  5/7/2021 0730 by Art Seo RN  Outcome: Ongoing  5/6/2021 1816 by Kadeem Lam RN Outcome: Ongoing  Goal: Ability to maintain a clear airway will improve  5/7/2021 0730 by Mary Rosales RN  Outcome: Ongoing  5/6/2021 1816 by Marcia Cedillo RN  Outcome: Ongoing     Problem: Fluid Volume - Deficit:  Goal: Achieves intake and output within specified parameters  5/7/2021 0730 by Mary Rosales RN  Outcome: Ongoing  5/6/2021 1816 by Marcia Cedillo RN  Outcome: Ongoing     Problem: Gas Exchange - Impaired:  Goal: Levels of oxygenation will improve  5/7/2021 0730 by Mary Rosales RN  Outcome: Ongoing  5/6/2021 1816 by Marcia Cedillo RN  Outcome: Ongoing     Problem: Confusion - Acute:  Goal: Absence of continued neurological deterioration signs and symptoms  5/7/2021 0730 by Mary Rosales RN  Outcome: Ongoing  5/6/2021 1816 by Marcia Cedillo RN  Outcome: Ongoing  Goal: Mental status will be restored to baseline  5/7/2021 0730 by Mary Rosales RN  Outcome: Ongoing  5/6/2021 1816 by Marcia Cedillo RN  Outcome: Ongoing     Problem: Discharge Planning:  Goal: Ability to perform activities of daily living will improve  5/7/2021 0730 by Mary Rosales RN  Outcome: Ongoing  5/6/2021 1816 by Marcia Cedillo RN  Outcome: Ongoing  Goal: Participates in care planning  5/7/2021 0730 by Mary Rosales RN  Outcome: Ongoing  5/6/2021 1816 by Marcia Cedillo RN  Outcome: Ongoing     Problem: Injury - Risk of, Physical Injury:  Goal: Will remain free from falls  5/7/2021 0730 by Mary Rosales RN  Outcome: Ongoing  5/6/2021 1816 by Marcia Cedillo RN  Outcome: Ongoing  Goal: Absence of physical injury  5/7/2021 0730 by Mary Rosales RN  Outcome: Ongoing  5/6/2021 1816 by Marcia Cedillo RN  Outcome: Ongoing     Problem: Mood - Altered:  Goal: Mood stable  5/7/2021 0730 by Mary Rosales RN  Outcome: Ongoing  5/6/2021 1816 by Marcia Cedlilo RN  Outcome: Ongoing  Goal: Absence of abusive behavior  5/7/2021 0730 by Sona Peoples RN  Outcome: Ongoing  5/6/2021 1816 by Mar Caraballo RN  Outcome: Ongoing  Goal: Verbalizations of feeling emotionally comfortable while being cared for will increase  5/7/2021 0730 by Sona Peoples RN  Outcome: Ongoing  5/6/2021 1816 by Mar Caraballo RN  Outcome: Ongoing     Problem: Psychomotor Activity - Altered:  Goal: Absence of psychomotor disturbance signs and symptoms  5/7/2021 0730 by Sona Peoples RN  Outcome: Ongoing  5/6/2021 1816 by Mar Caraballo RN  Outcome: Ongoing     Problem: Sensory Perception - Impaired:  Goal: Demonstrations of improved sensory functioning will increase  5/7/2021 0730 by Sona Peoples RN  Outcome: Ongoing  5/6/2021 1816 by Mar Caraballo RN  Outcome: Ongoing  Goal: Decrease in sensory misperception frequency  5/7/2021 0730 by Sona Peoples RN  Outcome: Ongoing  5/6/2021 1816 by Mar Caraballo RN  Outcome: Ongoing  Goal: Able to refrain from responding to false sensory perceptions  5/7/2021 0730 by Sona Peoples RN  Outcome: Ongoing  5/6/2021 1816 by Mar Caraballo RN  Outcome: Ongoing  Goal: Demonstrates accurate environmental perceptions  5/7/2021 0730 by Sona Peoples RN  Outcome: Ongoing  5/6/2021 1816 by Mar Caraballo RN  Outcome: Ongoing  Goal: Able to distinguish between reality-based and nonreality-based thinking  5/7/2021 0730 by Sona Peoples RN  Outcome: Ongoing  5/6/2021 1816 by Mar Caraballo RN  Outcome: Ongoing  Goal: Able to interrupt nonreality-based thinking  5/7/2021 0730 by Sona Peoples RN  Outcome: Ongoing  5/6/2021 1816 by Mar Caraballo RN  Outcome: Ongoing     Problem: Sleep Pattern Disturbance:  Goal: Appears well-rested  5/7/2021 0730 by Sona Peoples RN  Outcome: Ongoing  5/6/2021 1816 by Mar Caraballo RN  Outcome: Ongoing     Problem: Pain:  Goal: Pain level will decrease  5/7/2021 0730 by Sona Peoples RN Outcome: Ongoing  5/6/2021 1816 by Ana María Weller RN  Outcome: Ongoing  Goal: Control of acute pain  5/7/2021 0730 by Stephanie Bhagat RN  Outcome: Ongoing  5/6/2021 1816 by Ana María Weller RN  Outcome: Ongoing  Goal: Control of chronic pain  5/7/2021 0730 by Stephanie Bhagat RN  Outcome: Ongoing  5/6/2021 1816 by Ana María Weller RN  Outcome: Ongoing     Problem: Nutrition  Goal: Optimal nutrition therapy  5/7/2021 0730 by Stephanie Bhagat RN  Outcome: Ongoing  5/6/2021 1816 by Ana María Weller RN  Outcome: Ongoing     Problem: Musculor/Skeletal Functional Status  Goal: Highest potential functional level  5/7/2021 0730 by Stephanie Bhagat RN  Outcome: Ongoing  5/6/2021 1816 by Ana María Weller RN  Outcome: Ongoing  Goal: Absence of falls  5/7/2021 0730 by Stephanie Bhagat RN  Outcome: Ongoing  5/6/2021 1816 by Ana María Weller RN  Outcome: Ongoing

## 2021-05-07 NOTE — PLAN OF CARE
Problem: Falls - Risk of:  Goal: Will remain free from falls  Description: The patient remained free from falls this shift, call light within reach, bed in locked and lowest position. Side rails up x2. Continue to monitor closely. 5/7/2021 1821 by Paramjit Harvey RN  Outcome: Met This Shift     Problem: Falls - Risk of:  Goal: Absence of physical injury  Description: Absence of physical injury  5/7/2021 1821 by Rosie Vivas RN  Outcome: Met This Shift     Problem: Injury - Risk of, Physical Injury:  Goal: Will remain free from falls  Description: The patient remained free from falls this shift, call light within reach, bed in locked and lowest position. Side rails up x2. Continue to monitor closely. 5/7/2021 1821 by Paramjit Harvey RN  Outcome: Met This Shift     Problem: Injury - Risk of, Physical Injury:  Goal: Absence of physical injury  Description: Absence of physical injury  5/7/2021 1821 by Paramjit Harvey RN  Outcome: Met This Shift     Problem: Skin Integrity:  Goal: Will show no infection signs and symptoms  Description: Patient skin assessment complete. No signs/symptoms of new skin breakdown. Waffle mattress in place. Pt turned and repositioned every two hours with assistance from staff. Area kept free from moisture. Proper nourishment and fluids encouraged, as appropriate. Skin remains clean, dry, and intact. Will continue to monitor for additional needs and changes in skin breakdown. 5/7/2021 1821 by Paramjit Harvey RN  Outcome: Ongoing     Problem: Skin Integrity:  Goal: Absence of new skin breakdown  Description: Absence of new skin breakdown  5/7/2021 1821 by Paramjit Harvey RN  Outcome: Ongoing     Problem:  Activity:  Goal: Risk for activity intolerance will decrease  Description: Risk for activity intolerance will decrease  5/7/2021 1821 by Rosie Vivas RN  Outcome: Ongoing     Problem: Fluid Volume:  Goal: Will show no signs or symptoms of fluid imbalance Confusion - Acute:  Goal: Mental status will be restored to baseline  Description: Mental status will be restored to baseline  5/7/2021 1821 by Ernst Hendricks RN  Outcome: Ongoing     Problem: Discharge Planning:  Goal: Ability to perform activities of daily living will improve  Description: Ability to perform activities of daily living will improve  5/7/2021 1821 by Ernst Hendricks RN  Outcome: Ongoing     Problem: Discharge Planning:  Goal: Participates in care planning  Description: Participates in care planning  5/7/2021 1821 by Ernst Hendricks RN  Outcome: Ongoing     Problem: Mood - Altered:  Goal: Mood stable  Description: Mood stable  5/7/2021 1821 by Ernst Hendricks RN  Outcome: Ongoing     Problem: Mood - Altered:  Goal: Absence of abusive behavior  Description: Absence of abusive behavior  5/7/2021 1821 by Ernst Hendricks RN  Outcome: Ongoing     Problem: Mood - Altered:  Goal: Verbalizations of feeling emotionally comfortable while being cared for will increase  Description: Verbalizations of feeling emotionally comfortable while being cared for will increase  5/7/2021 1821 by Ernst Hendricks RN  Outcome: Ongoing     Problem: Psychomotor Activity - Altered:  Goal: Absence of psychomotor disturbance signs and symptoms  Description: Absence of psychomotor disturbance signs and symptoms  5/7/2021 1821 by Ernst Hendricks RN  Outcome: Ongoing     Problem: Sensory Perception - Impaired:  Goal: Demonstrations of improved sensory functioning will increase  Description: Demonstrations of improved sensory functioning will increase  5/7/2021 1821 by Ernst Hendricks RN  Outcome: Ongoing     Problem: Sensory Perception - Impaired:  Goal: Decrease in sensory misperception frequency  Description: Decrease in sensory misperception frequency  5/7/2021 1821 by Santo Vivas RN  Outcome: Ongoing     Problem: Sensory Perception - Impaired:  Goal: Able to refrain from responding to false sensory perceptions  Description: Able to refrain from responding to false sensory perceptions  5/7/2021 1821 by Samuel Alford RN  Outcome: Ongoing     Problem: Sensory Perception - Impaired:  Goal: Demonstrates accurate environmental perceptions  Description: Demonstrates accurate environmental perceptions  5/7/2021 1821 by Samuel Alford RN  Outcome: Ongoing     Problem: Sensory Perception - Impaired:  Goal: Able to distinguish between reality-based and nonreality-based thinking  Description: Able to distinguish between reality-based and nonreality-based thinking  5/7/2021 1821 by Samuel Alford RN  Outcome: Ongoing     Problem: Sensory Perception - Impaired:  Goal: Able to interrupt nonreality-based thinking  Description: Able to interrupt nonreality-based thinking  5/7/2021 1821 by Samuel Alford RN  Outcome: Ongoing     Problem: Sleep Pattern Disturbance:  Goal: Appears well-rested  Description: Appears well-rested  5/7/2021 1821 by Samuel Alford RN  Outcome: Ongoing     Problem: Pain:  Goal: Pain level will decrease  Description: Pain level will decrease  5/7/2021 1821 by Samuel Alford RN  Outcome: Ongoing     Problem: Pain:  Goal: Control of acute pain  Description: Control of acute pain  5/7/2021 1821 by Ludwig Saint Difiore, RN  Outcome: Ongoing     Problem: Pain:  Goal: Control of chronic pain  Description: Control of chronic pain  5/7/2021 1821 by Ludwig Saint Difiore, RN  Outcome: Ongoing     Problem: Nutrition  Goal: Optimal nutrition therapy  Description: Nutrition Problem #1: Increased nutrient needs  Intervention: Food and/or Nutrient Delivery: Continue Current Diet, Start Oral Nutrition Supplement  Nutritional Goals: po intake greater than 50%     5/7/2021 1821 by Samuel Alford RN  Outcome: Ongoing     Problem: Musculor/Skeletal Functional Status  Goal: Highest potential functional level  5/7/2021 1821 by Samuel Alford RN  Outcome: Ongoing     Problem: Musculor/Skeletal

## 2021-05-07 NOTE — PROGRESS NOTES
[facility]   Progress Note                   Date:   5/7/2021  Patient name: Leana Willingham  Date of admission:  4/28/2021  2:27 PM  MRN:   [id number]  YOB: 1955  PCP: Saroj Kaur MD    Reason for Admission:  sepsis     Subjective:     No chest pain. Some dyspnea    S/p cardioversion 5/5/2021 at bedside for atrial tachycardia. IR guided pleurocentesis is planned today    Medications:   Scheduled Meds:   benzonatate  200 mg Oral TID    dextromethorphan  60 mg Oral 2 times per day    famotidine  10 mg Oral Daily    senna  1 tablet Oral Nightly    midodrine  7.5 mg Oral Q8H    metoprolol tartrate  25 mg Oral BID    amiodarone  200 mg Oral BID    vancomycin  500 mg Oral 4 times per day    sevelamer  4,000 mg Oral TID WC    aspirin  81 mg Oral Daily    lidocaine  1 patch Transdermal Daily    neomycin-bacitracin-polymyxin   Topical BID    sodium chloride flush  5-40 mL Intravenous 2 times per day    atorvastatin  40 mg Oral Nightly    digoxin  125 mcg Oral Daily    insulin lispro  0-6 Units Subcutaneous TID WC    insulin lispro  0-3 Units Subcutaneous Nightly       Continuous Infusions:   sodium chloride      dextrose      sodium chloride 50 mL/hr at 05/04/21 2129       CBC:   Recent Labs     05/05/21 0519 05/06/21  0439 05/07/21  0456   WBC 8.9 8.1 8.6   HGB 9.9* 9.5* 9.7*   PLT 40* 60* 63*     BMP:    Recent Labs     05/05/21 0519 05/06/21  0439 05/07/21  0456    138 139   K 4.0 4.0 3.9   CL 97* 97* 97*   CO2 21 19* 25   BUN 51* 58* 39*   CREATININE 4.95* 5.61* 4.47*   GLUCOSE 112* 145* 107*     Hepatic: No results for input(s): AST, ALT, ALB, BILITOT, ALKPHOS in the last 72 hours. Troponin: No results for input(s): TROPONINI in the last 72 hours. BNP: No results for input(s): BNP in the last 72 hours. Lipids: No results for input(s): CHOL, HDL in the last 72 hours.     Invalid input(s): LDLCALCU  INR:   Recent Labs     05/07/21  0931   INR 2.1 Objective:   Vitals: /66   Pulse 72   Temp 97.7 °F (36.5 °C) (Oral)   Resp 21   Ht 6' (1.829 m)   Wt 241 lb 6.5 oz (109.5 kg)   SpO2 93%   BMI 32.74 kg/m²     General appearance: awake, alert, in no apparent respiratory distress   HEENT: Head: Normocephalic, no lesions, without obvious abnormality  Neck: no JVD  Lungs: clear to auscultation bilaterally, no basilar rales, no wheezing   Heart: regular rate and rhythm, S1, S2 no murmurs  Abdomen: soft, non-tender; bowel sounds normal  Extremities: Left BKA. Right 2+ edema with bandage on. Has ulceration on toe. Neurologic: Mental status: Alert, oriented. Motor and sensory not done. EKG: Sinus tachycardia, inferior infarction, ST-T changes consistent with ischemia, QTc 515 msec    Subsequent ecgs showed Intermittent atrial tachycardia with 2:1 AV block        TTE 1/20/21    Left Ventricle:  Nondilated left ventricle, Systolic function is   severely decreased with an ejection fraction of 20-25%.   Left Ventricle: There is moderate increased wall thickness/hypertrophy. LVH is symmetric 1.5 cm    Right Ventricle: Systolic function is moderately to severely reduced.   Contrast-enhanced images show no evidence of left ventricular apical   thrombus    Ascending aortic dilatation measuring 4.4 cm, trileaflet aortic valve    E/e'>20 indicative of elevated left ventricular filling pressures      TTE June 2019  Technically difficult study due to patient habitus. Left ventricle is normal in size. Mild left ventricular hypertrophy. Severely reduced left ventricular systolic function. Estimated LV EF 30-35 %. Grade II (moderate) left ventricular diastolic dysfunction. Left atrium is mildly dilated. Trivial valve disease as below.        Cardiac cath 6/2/19   Severe native vessel disease.   Patent LIMA-LAD. The LAD distal to touch down is small in caliber with 80% diffuse disease.  (Not amenable to PCI)  Chronic total occlusion of RCA.   LCX had 80% mid stenosis reduced to 0% using 3x38 mm Xience stent, post   dilated using 3.25 mm NC balloon. Assessment:   1. Septic shock   2. C diff colitis   3. RLL Pneumonia   4. Atrial Tachycardia- S/p cardioversion 5/5/21  5. Was in Sinus tachycardia with intermittent atrial tach and 2:1 AV conduction, appropriate secondary to hypotension and sepsis    6. Multivessel CAD s/p CABG and Subsequent DENY to LCX (only patent LIMA-LAD graft in 2019)  7. Ischemic CMP with last LVEF 20-25% in Jan 2021   8. Acute on chronic systolic CHF due to missed HD   9. PAD with hx of left BKA  10. ESRD on HD       Treatment Plan:   1. Continue amiodarone 200mg po BID for 7 days than 200mg po qday. Continue metoprolol 25mg po BID. S/p cardioversion yesterday  2. ASA and lipitor   3. Continue midodrine in meantime  4. Consideration of AICD after infection process has resolved. At this point in time not a candidate due to infectious process. 5. Volume and electrolytes management per nephrology. 6. IR guided pleurocentesis is planned today    Discussed with patient and nursing. Ruma Barnes MD  [facility]  [email or web address]  [phone]

## 2021-05-07 NOTE — PROGRESS NOTES
Nephrology ESRD Consult Note    Reason for Consult:  End stage renal disease  Requesting Physician: Dr Jeremiah Guevara      History of Present Illness: This is a 60 y.o. male with history of CAD status post CABG, cardiomyopathy, dyslipidemia, end stage renal disease secondary to nephrosclerosis on hemodialysis Tuesday Thursday Saturday under the care of Dr. Josefa Chen at [facility] renal care by way of a left arm AV fistula. He had presented initially to [facility] with diarrhea, nausea and vomiting and generalized weakness for six days. He had missed multiple treatments of dialysis. He presented at [facility] with hyperkalemia potassium of  6.9, BUN/ creatinine of 241 and 18.47 mg/dl and serum bicarbonate of 9. He was dialyzed yesterday 4/28/2021 at [facility] and transferred to [facility] due to in availability of a bed. Less than 500 cc was removed with dialysis due to hypotension. He is seen at dialysis today. His blood pressures have been in the hypotensive trends between 39-66 systolic blood pressure. He denies fever. He denies abdominal pain. He has ongoing nonbloody diarrhea, watery frequency of 6 times per day. he is tachycardic with heart rate in 127. Work-up for diarrhea shows positive C. difficile. Received a liter bolus of 0.9 saline yesterday. Subjective/internal history  Patient seen and examined. He is seen on dialysis today and complains of shortness of breath and is receiving nasal cannula oxygen. Chest x-ray yesterday shows increasing right pleural effusion CT scan showed consolidative process with the right lung. Increasing pulmonary vascular congestion -he had successful cardioversion on 5/5/21 and heart rate is stable. Denies chest pain, nausea vomiting and diarrhea is improving. He is on treatment with  oral vancomycin for C. difficile colitis -WBC improving. patient had dialysis yesterday tolerated 2 kg fluid removal.      Past Medical History:        Diagnosis Date    Acute on chronic congestive heart failure (HCC)     Anemia     CAD (coronary artery disease)     [facility]omy[facility]     Dialysis care     ESRD (end stage renal disease) on dialysis ([facility]     [facility]    GERD (gastroesophageal reflux disease)     Hemodialysis patient ([facility] 2011    MOM-WED-FRI-ON [address]     History of sleep apnea     none since significant weight loss (was 400#)    Hyperlipidemia     Hyperparathyroidism ([facility]     Hypertension     Neuropathy     Peripheral vascular disease ([facility]     Pneumonia     resolved    S/P CABG (coronary artery bypass graft) 06/2016    Type II or unspecified type diabetes mellitus without mention of complication, not stated as uncontrolled     dx-[id number]           Past Surgical History:        Procedure Laterality Date    CATARACT REMOVAL      DIALYSIS FISTULA CREATION Left 2014    LEG AMPUTATION THROUGH LOWER TIBIA AND FIBULA         Current Medications:    benzonatate (TESSALON) capsule 200 mg, TID  dextromethorphan (DELSYM) 30 MG/5ML extended release liquid 60 mg, 2 times per day  famotidine (PEPCID) tablet 10 mg, Daily  senna (SENOKOT) tablet 8.6 mg, Nightly  midodrine (PROAMATINE) tablet 7.5 mg, Q8H  metoprolol tartrate (LOPRESSOR) tablet 25 mg, BID  amiodarone (CORDARONE) tablet 200 mg, BID  vancomycin (FIRVANQ) 50 MG/ML oral solution 500 mg, 4 times per day  sevelamer (RENVELA) tablet 4,000 mg, TID WC  aspirin EC tablet 81 mg, Daily  oxyCODONE-acetaminophen (PERCOCET) 5-325 MG per tablet 1 tablet, Q8H PRN  lidocaine 4 % external patch 1 patch, Daily  neomycin-bacitracin-polymyxin (NEOSPORIN) ointment, BID  sodium chloride flush 0.9 % injection 5-40 mL, 2 times per day  sodium chloride flush 0.9 % injection 5-40 mL, PRN  0.9 % sodium chloride infusion, PRN  promethazine (PHENERGAN) tablet 12.5 mg, Q6H PRN    Or  ondansetron (ZOFRAN) injection 4 mg, Q6H PRN  polyethylene glycol (GLYCOLAX) packet 17 g, Daily PRN  acetaminophen (TYLENOL) tablet 650 mg, Q6H PRN    Or  acetaminophen (TYLENOL) suppository 650 mg, Q6H PRN  atorvastatin (LIPITOR) tablet 40 mg, Nightly  digoxin (LANOXIN) tablet 125 mcg, Daily  glucose (GLUTOSE) 40 % oral gel 15 g, PRN  dextrose 50 % IV solution, PRN  glucagon (rDNA) injection 1 mg, PRN  dextrose 5 % solution, PRN  insulin lispro (HUMALOG) injection vial 0-6 Units, TID WC  insulin lispro (HUMALOG) injection vial 0-3 Units, Nightly  0.9 % sodium chloride infusion, Continuous        Allergies:  Pcn [penicillins]      Objective:  Constitutional:    CURRENT TEMPERATURE:  Temp: 97.7 °F (36.5 °C)  MAXIMUM TEMPERATURE OVER 24HRS:  Temp (24hrs), Av °F (36.7 °C), Min:97.7 °F (36.5 °C), Max:98.6 °F (37 °C)    CURRENT RESPIRATORY RATE:  Resp: 21  CURRENT PULSE:  Pulse: 72  CURRENT BLOOD PRESSURE:  BP: 130/66  24HR BLOOD PRESSURE RANGE:  Systolic (29CVZ), XBY:000 , Min:110 , FNL:870   ; Diastolic (30BNT), FNJ:32, Min:61, Max:93    24HR INTAKE/OUTPUT:      Intake/Output Summary (Last 24 hours) at 2021 1044  Last data filed at 2021 0819  Gross per 24 hour   Intake 50 ml   Output    Net 50 ml           Physical Exam:  GENERAL APPEARANCE: Alert and cooperative, and appears to be in no acute distress. HEAD: normocephalic  EYES: PERRL, EOMI. Not pale, anicteric   NOSE:  No nasal discharge. THROAT:  Oral cavity and pharynx normal. Moist  NECK: Neck supple, non-tender without lymphadenopathy, masses or thyromegaly. JVD-neg  CARDIAC: Normal S1 and S2. No S3, S4 or murmurs. Rhythm is regular. LUNGS: Clear to auscultation and percussion without rales, rhonchi, wheezing or diminished breath sounds. ABD-Soft non distended, BS+ Non tender no organomegally  BACK: Examination of the spine reveals  no spinal deformity, without tenderness,   MUSKULOSKELETAL: Adequately aligned spine. No joint erythema or tenderness. EXTREMITIES: No edema. Peripheral pulses intact.    NEURO:Alert oriented x 3 ,power 5/5 in all extremities      Labs: PTH:  No results found for: PTH  abs:   CBC:   Recent Labs     05/05/21 0519 05/06/21 0439 05/07/21  0456   WBC 8.9 8.1 8.6   RBC 3.65* 3.60* 3.63*   HGB 9.9* 9.5* 9.7*   HCT 30.7* 30.5* 30.8*   MCV 84.1 84.7 85.0   MCH 27.0 26.3 26.8   MCHC 32.1 31.1 31.5   RDW 20.3* 20.3* 21.0*   PLT 40* 60* 63*   MPV 8.8 11.5 10.8      BMP:   Recent Labs     05/05/21 0519 05/06/21 0439 05/07/21 0456    138 139   K 4.0 4.0 3.9   CL 97* 97* 97*   CO2 21 19* 25   BUN 51* 58* 39*   CREATININE 4.95* 5.61* 4.47*   GLUCOSE 112* 145* 107*   CALCIUM 8.9 8.9 9.0        Phosphorus:    Recent Labs     05/05/21  0519   PHOS 5.7*     Magnesium:   Recent Labs     05/05/21 0519   MG 1.8     Albumin:   No results for input(s): LABALBU in the last 72 hours. Assessment:  1. ESRD on Hemodialysis. His regular HD days are TTS at [facility] using Left AVF under Dr. Juan Luis Vieira. His dry weight is not available patient is likely under his dry weight due to history of diarrhea. Renal diet with 1500 mils fluid restriction     2. Anemia of chronic kidney disease-Retacrit once iron studies are adequate    3. Pneumonia and acute diarrhea secondary to C. difficile colitis-patient with worsening shortness of breath on 5L oxygen    4. Hypotension -improving    5. Secondary hyperparathyroidism-serum phosphorus 5.7. Continue Renvela    6. Anion gap metabolic acidosis from missed dialysis-improved    7. Coronary artery disease status post CABG    8. Peripheral artery disease with history of left BKA and right lower extremity chronic wound    9. Right large pleural effusion rule out parapneumonic process-pulmonology following-plans for IR guided thoracentesis    Plan  Extra ultrafiltration treatment today. Attempt 3 kg  KVO  IV fluids  Continue midodrine to 7.5 mg every 8 hourly  Continue oral vancomycin.   Continue renal diet as tolerated  Strict I's and O's  Plans for AICD in the future per cardiology   Maintain normokalemia- while on digoxin and amiodarone.   plans for IR guided thoracentesis    Abhay Mabry M.D, [facility]  Nephrologist

## 2021-05-08 PROBLEM — J90 PLEURAL EFFUSION ON RIGHT: Status: ACTIVE | Noted: 2021-01-01

## 2021-05-08 PROBLEM — J90 LOCULATED PLEURAL EFFUSION: Status: ACTIVE | Noted: 2021-01-01

## 2021-05-08 PROBLEM — G47.33 OSA (OBSTRUCTIVE SLEEP APNEA): Chronic | Status: ACTIVE | Noted: 2021-01-01

## 2021-05-08 NOTE — PROGRESS NOTES
[address].    PROGRESS NOTE             5/8/2021    9:02 AM    Name:   Nicole Rojas  MRN:     [id number]     Jessica Christinatein:      [de-identified]   Room:   2006/2006-01  IP Day:  10  Admit Date:  4/28/2021  2:27 PM    PCP:  Alec Shah MD  Code Status:  Full Code    Subjective:     C/C: No chief complaint on file. Interval History Status:     Patient was seen and examined bedside. No acute events overnight. S/p ultrafiltration at bedside with 3.1 L removed. S/p right-sided thoracentesis with 1.3L of clear raul fluid removed. According to lights criteria, patient has exudative effusion likely parapneumonic effusion with low suspicion for empyema at this point. Nursing staff, patient has had 2 bowel movements overnight that were nonfoul-smelling. Patient states his shortness of breath has improved since yesterday. He denies any chest pain, worsening shortness of breath, palpitations, abdominal pain, nausea, or vomiting. Plan for hemodialysis today  Pro-Kiran 4.55    Brief History:     Please see H&P    Review of Systems:     Review of Systems   Constitutional: Negative for activity change, appetite change and fatigue. HENT: Negative for congestion. Eyes: Negative for photophobia and visual disturbance. Respiratory: Positive for shortness of breath. Negative for apnea, cough and wheezing. Cardiovascular: Negative for chest pain, palpitations and leg swelling. Gastrointestinal: Positive for constipation. Negative for abdominal distention, abdominal pain, diarrhea, nausea and vomiting. Endocrine: Negative for polyuria. Genitourinary: Negative for difficulty urinating and urgency. Musculoskeletal: Positive for gait problem. Negative for arthralgias and back pain. Skin: Negative for color change. Neurological: Negative for dizziness, syncope and headaches.    Psychiatric/Behavioral: Negative for agitation, behavioral problems, confusion, decreased concentration and dysphoric mood. Medications: Allergies:     Allergies   Allergen Reactions    Pcn [Penicillins] Other (See Comments)     Trouble walking       Current Meds:   Scheduled Meds:    benzonatate  200 mg Oral TID    dextromethorphan  60 mg Oral 2 times per day    famotidine  10 mg Oral Daily    meropenem  500 mg Intravenous Q24H    ciprofloxacin  1 drop Left Eye Q2H While awake    senna  1 tablet Oral Nightly    midodrine  7.5 mg Oral Q8H    metoprolol tartrate  25 mg Oral BID    amiodarone  200 mg Oral BID    vancomycin  500 mg Oral 4 times per day    sevelamer  4,000 mg Oral TID WC    aspirin  81 mg Oral Daily    lidocaine  1 patch Transdermal Daily    neomycin-bacitracin-polymyxin   Topical BID    sodium chloride flush  5-40 mL Intravenous 2 times per day    atorvastatin  40 mg Oral Nightly    digoxin  125 mcg Oral Daily    insulin lispro  0-6 Units Subcutaneous TID WC    insulin lispro  0-3 Units Subcutaneous Nightly     Continuous Infusions:    sodium chloride      dextrose      sodium chloride 10 mL/hr at 05/07/21 1242     PRN Meds: oxyCODONE-acetaminophen, sodium chloride flush, sodium chloride, promethazine **OR** ondansetron, polyethylene glycol, acetaminophen **OR** acetaminophen, glucose, dextrose, glucagon (rDNA), dextrose    Data:     Past Medical History:   has a past medical history of Acute on chronic congestive heart failure ([facility] [address] Anemia, CAD (coronary artery disease), Cardiomyopathy ([facility] [address] Dialysis care, ESRD (end stage renal disease) on dialysis ([facility] [address] Foot ulcer, left ([facility] [address] GERD (gastroesophageal reflux disease), Hemodialysis patient ([facility] [address] History of sleep apnea, Hyperlipidemia, Hyperparathyroidism ([facility] [address] Hypertension, Neuropathy, ROOSEVELT (obstructive sleep apnea), [facility] vascular [facility] Pneumonia, S/P CABG (coronary artery bypass graft), and Type II or unspecified type diabetes mellitus without mention of of acute osteomyelitis. RECOMMENDATION: MRI of the right foot could be performed for further evaluation if clinically warranted. Nm Lung Scan Perfusion Only    Result Date: 4/29/2021  EXAMINATION: NUCLEAR MEDICINE PERFUSION SCAN. 4/29/2021 TECHNIQUE: Ventilation not performed as part of COVID-19 safety precautions. 3.7 millicuries of Tc 49E MAA was administered intravenously prior to planar imaging of the lungs in multiple projections. COMPARISON: Chest radiograph 04/28/2021. HISTORY: ORDERING SYSTEM PROVIDED HISTORY: hemoptysis and tachycardia r/o PE Additional signs and symptoms: SOB, CP, coughin up blood, non-smoker, no hx of blood clots FINDINGS: PERFUSION:Distribution of radiotracer is homogeneous. No segmental defects identified. CHEST RADIOGRAPH:No focal areas of consolidation or significant effusions on recent chest radiograph. Low Probability for Pulmonary Embolus. Ct Abdomen Pelvis W Iv Contrast Additional Contrast? None    Result Date: 4/28/2021  EXAMINATION: CT OF THE ABDOMEN AND PELVIS WITH CONTRAST 4/28/2021 4:16 am TECHNIQUE: CT of the abdomen and pelvis was performed with the administration of intravenous contrast. Multiplanar reformatted images are provided for review. Dose modulation, iterative reconstruction, and/or weight based adjustment of the mA/kV was utilized to reduce the radiation dose to as low as reasonably achievable. COMPARISON: None. HISTORY: ORDERING SYSTEM PROVIDED HISTORY: diarrhea, abd pain, wbc 30k, discussed with nephro TECHNOLOGIST PROVIDED HISTORY: diarrhea, abd pain, wbc 30k, discussed with nephro Decision Support Exception->Emergency Medical Condition (MA) Reason for Exam: abd pain, nephro maddie approved contrast, dialysis pt Acuity: Acute Type of Exam: Initial FINDINGS: Lower Chest: There is abnormal airspace consolidation within the right lower lobe consistent with a right lower lobe pneumonia. There is no pleural effusion. There is cardiomegaly.   Severe coronary artery atherosclerotic calcifications are present. Organs: There is a small volume of ascites. Cholecystectomy clips are present. The liver, spleen, pancreas and adrenal glands are normal in appearance. There is severe renal atrophy. Innumerous renal cysts are noted. There is no hydronephrosis. A right nephroureteral stent is present. GI/Bowel: There are no abnormally dilated loops of large or small bowel present. There is no mesenteric inflammatory stranding present. The appendix is normal. Pelvis: There is a small volume of free fluid in the pelvis. There is no free fluid or pelvic mass identified. The urinary bladder is normal. Peritoneum/Retroperitoneum: There is no pathologically enlarged lymphadenopathy present. Severe atherosclerotic calcifications are present within the abdominal aorta and proximal anchor arteries. Bones/Soft Tissues: No lytic or blastic osseous lesions are identified. There is bilateral spondylolysis at L5 and grade 1 spondylolisthesis of L5 relative to S1. There is a disc bulge and facet arthropathy resulting in severe bilateral neural foraminal narrowing. Findings consistent with renal osteodystrophy are noted. 1. Right lower lobe pneumonia. Follow-up PA and lateral chest radiographs are recommended after treatment to ensure resolution. 2. Small volume ascites. 3. Normal appendix. 4. Severe bilateral neural foraminal narrowing at L5-S1 secondary to bilateral spondylolysis at L5, grade 1 spondylolisthesis of L5 relative to S1, a disc bulge and facet arthropathy.      Ir Karla Mena Device Plmt/replace/removal    Result Date: 4/30/2021  PROCEDURE: IR FLUOROSCOPY GUIDED CENTRAL VENOUS ACCESS DEVICE PLACEMENT 4/30/2021 HISTORY: ORDERING SYSTEM PROVIDED HISTORY: Sepsis with hypotension; cental line for IV pressors TECHNOLOGIST PROVIDED HISTORY: cental line for IV pressors CONTRAST: None SEDATION: None FLUOROSCOPY DOSE AND TYPE OR TIME AND EXPOSURES: Fluoro time: 1 minutes 14 seconds DAP: 714 cGycm2 DESCRIPTION OF PROCEDURE: Informed consent was obtained from the patient's brother after a detailed explanation of the procedure including risks, benefits, and alternatives. Universal protocol was observed including a time-out to confirm the correct patient and procedure. The right neck was prepped and draped in sterile fashion using maximum sterile barrier technique. Local anesthesia was achieved with 1% lidocaine. The right internal jugular vein was accessed with a 21 gauge micropuncture needle under ultrasound guidance. A 0.018 guidewire was advanced through the needle. The needle was removed and a 4 Western Jazmyn access catheter over its dilator was advanced over the wire. The wire and dilator were removed and a 0.035 guidewire was advanced through the access catheter into the IVC under fluoroscopic guidance. The access catheter was removed and the tract dilated over the wire. A 20 cm triple-lumen catheter was advanced over the wire and the wire was removed. The catheter flushed and aspirated easily. The catheter was sutured in place with 2-0 nylon. A sterile dressing was applied. The patient tolerated the procedure well and there were no immediate complications. Estimated blood loss: Less than 5 mL. FINDINGS: Ultrasound demonstrates patency of the right internal jugular vein and guides venotomy. Final fluoroscopic image demonstrates satisfactory catheter placement with the tip in the right atrium. Successful ultrasound and fluoroscopy guided right IJ triple-lumen central venous catheter insertion. Xr Chest Portable    Result Date: 4/28/2021  EXAMINATION: ONE XRAY VIEW OF THE CHEST 4/28/2021 2:48 am COMPARISON: 06/03/2019 HISTORY: ORDERING SYSTEM PROVIDED HISTORY: missed dialysis x2weeks TECHNOLOGIST PROVIDED HISTORY: missed dialysis x2weeks Reason for Exam: Upright port, missed dialysis x2 weeks FINDINGS: There is stable cardiomegaly.   There is no focal consolidation, pleural effusion or evidence of edema. There is no pneumothorax identified. 1. Stable cardiomegaly. 2. No acute cardiopulmonary process identified. Fl Modified Barium Swallow W Video    Result Date: 5/3/2021  EXAMINATION: MODIFIED BARIUM SWALLOW WAS PERFORMED IN CONJUNCTION WITH SPEECH PATHOLOGY SERVICES TECHNIQUE: Fluoroscopic evaluation of the swallowing mechanism was performed with multiple consistency of barium product. FLUOROSCOPY DOSE AND TYPE OR TIME AND EXPOSURES: Fluoroscopic time of 1 minutes 44 seconds. DAP of 71.9 dGycm2. COMPARISON: None HISTORY: ORDERING SYSTEM PROVIDED HISTORY: evaluate for diet modifications TECHNOLOGIST PROVIDED HISTORY: evaluate for diet modifications Reason for Exam: evaluate for diet modifications Acuity: Acute Type of Exam: Initial FINDINGS: Oral phase of swallowing was grossly within normal limits. No evidence of laryngeal penetration or aspiration. Swallowing mechanism grossly within normal limits without evidence of aspiration. Please see separate speech pathology report for full discussion of findings and recommendations. Physical Examination:        Physical Exam  Constitutional:       General: He is not in acute distress. Appearance: He is obese. He is not ill-appearing. Comments: On 3L NC   Cardiovascular:      Rate and Rhythm: Normal rate and regular rhythm. Heart sounds: No murmur. No gallop. Pulmonary:      Effort: No respiratory distress. Breath sounds: No wheezing. Comments: Decreased breath sounds RLL  Abdominal:      General: Bowel sounds are normal. There is no distension. Tenderness: There is no abdominal tenderness. There is no guarding or rebound. Musculoskeletal:         General: No swelling or deformity. Comments: Left BKA. Right foot wrapped C/D/I   Skin:     Capillary Refill: Capillary refill takes less than 2 seconds. Neurological:      General: No focal deficit present.       Mental Status: He is alert and oriented to person, place, and time. Psychiatric:         Mood and Affect: Mood normal.         Thought Content: Thought content normal.           Assessment:        Primary Problem  Sepsis due to pneumonia [facility])    Active Hospital Problems    Diagnosis Date Noted    Loculated pleural effusion [J90] 05/08/2021    Pleural effusion on right [J90] 05/08/2021    ROOSEVELT (obstructive sleep apnea) [G47.33] 05/08/2021    Pneumonia [J18.9] 04/28/2021    Sepsis ([facility] [A41.9] 04/28/2021    Sepsis due to pneumonia ([facility] [J18.9, A41.9] 34/39/6029    Metabolic acidosis [R75.0]     Hyperkalemia [E87.5]     Diarrhea [R19.7]     Nausea and vomiting [R11.2]     History of left below knee amputation (HCC) [Z89.512]     Hemoptysis [R04.2]     Ulcerated, foot, right, with fat layer exposed ([facility] [facility] [L97.512]     Hypertension, essential [I10]     Charcot foot due to diabetes mellitus ([facility] [facility] [E11.610] 12/28/2015    Anemia of chronic disease [D63.8] 07/03/2013    ESRD on dialysis ([facility] [H68.8, Z99.2] 06/27/2013    DM (diabetes mellitus) ([facility] [address] [E11.9] 06/27/2013       Plan:      Acute hypoxic respiratory failure likely 2/2  developing parapneumonic effusion  -On 3L NC  -CXR 5/7: Showed large right pleural effusion  -CT chest: Shows moderate to large right pleural effusion and consolidation involving the entire RLL. There appears to be multiple areas of loculation  -S/p right-sided thoracentesis with 1.3L raul fluid removed  -According to lights criteria, patient has exudative effusion  -Pulmonology on board:   -F/U cultures and cytology   -Continue to wean oxygen as tolerated   -Transfer to intermediate ICU status   -CXR tomorrow    Sepsis 2/2 CAP   -CT A&P showed RLL PNA  -Pro-Kiran 4.55  -Blood cultures x 2 NGTD  -Resp Cx  show just above ed gram +ve cocci in clusters with rare GN rods: Growing staph aureus methicillin susceptible  -On NS 0.9% 10 ml/hr  -Continue midodrine 7.5 mg 3 times daily  -I. D on board -IV Merrem (Day 2)              -Discontinued IV ceftriaxone after 6 days of therapy. Completed 3 days of  Cefepime    -Continue oral vancomycin until 5/14/21 for C. Diff              -Appreciate further recommendations    Chronic systolic and diastolic CHF/Hx of HTN. -S/p cardioversion 5/5  -BNP > 300,000, likely elevated from missed dialysis for 2 weeks  -Echo 6/2019: LVEF 30-35% with moderate diastolic dysfunction  -CAD, CABG, stents, last cath 2019.   -Amiodarone 20 mg twice daily  -Digoxin 125 MCG daily  -Lopressor 25 mg twice daily  -Cardiology on board:    -Recommends continuing amiodarone 200 mg twice daily for 7 days. Then 200 mg once daily thereafter.     -Patient is a candidate for AICD due to ICM. Plan for AICD in future once infectious process resolves              -Appreciate further recs     Hyperkalemia 2/2 ESRD on hemodialysis TTS  -Missed 2 weeks of dialysis.   -K 6.9 on admission with EKG changes, received IV calcium gluconate and insulin/dextrose.  -Underwent hemodialysis at [place]. V's  -Nephrology on board:   -S/p ultrafiltration with 3.1L removed   -Plan for dialysis today   -Renvela 4000 mg PO TID              -Magnesium 1.8     Hemoptysis likely 2/2 pneumonia (improving)  -HgB stable  -H&H q8h, keep HgB > 7     Acute diarrhea 2/2 C. difficile infection (improving)  -2 bowel movements overnight, non-foul-smelling  -Continue Senokot nightly  -CT abdomen and pelvis showed small volume ascites and normal appendix  -C.  Diff +ve   -Continue oral vancomycin 500 mg q6hr day 4 until 5/14  -Protonix 40 mg PO daily             Right foot ulcer  -X-ray showed no evidence of OM  -Podiatry on board  -Wound care on board      Dyslipidemia   -Lipitor 40 mg PO nightly     Type 2 diabetes mellitus  -Low dose ISS  -Hypoglycemia protocol    Bilateral neural foraminal narrowing at L5-S1 with spondylolysis  -Showed up on CT A&P  -We will continue to monitor     Diet:  Dental soft diet, Low Sodium, Fluid restriction to 1500 mL   DVT prophylaxis: EPCs; avoid chemical due to hemoptysis, thrombocytopenia 63     Amy Noonan MD  5/8/2021  9:02 AM       I have discussed the care of Cora Ojeda , including pertinent history and exam findings,    today with the resident. I have seen and examined the patient and the key elements of all parts of the encounter have been performed by me . I agree with the assessment, plan and orders as documented by the resident. Principal Problem:    Sepsis due to pneumonia [facility])  Active Problems:    ESRD on dialysis ([facility] [address]    DM (diabetes mellitus) ([facility] [address]    Anemia of chronic disease    Charcot foot due to diabetes mellitus ([facility] [address]    Hypertension, essential    Sepsis ([facility] [address]    Metabolic acidosis    Hyperkalemia    Diarrhea    Nausea and vomiting    History of left below knee amputation (HCC)    Hemoptysis    Ulcerated, foot, right, with fat layer exposed ([facility] [address]    Pneumonia    Loculated pleural effusion    Pleural effusion on right    ROOSEVELT (obstructive sleep apnea)  Resolved Problems:    * No resolved hospital problems. *        Overall  course ;                                   are improving over time.         Patient status post thoracentesis on right side  Exudative effusion  Pneumonia on meropenem  C. difficile colitis on p.o. vancomycin  Chronic leg wound on right side, x-ray negative for osteomyelitis, wound care following  Clinical concerns of possible underlying cirrhosis of liver, patient never officially diagnosed with cirrhosis of liver  Has low albumin, high INR, chronically low platelets  Ordering ultrasound liver, ammonia level  Moved out of ICU to intermediate ICU  Platelets more than 89,269  We will start on heparin 5000 every 8, patient is critically ill to avoid developing DVT  Atrial tachycardia, evaluated by cardiologist on amiodarone          Electronically signed by Mukesh Hernandez MD

## 2021-05-08 NOTE — PROGRESS NOTES
HEMODIALYSIS POST TREATMENT NOTE    Treatment time ordered: 195 Minutes    Actual treatment time: 195 minutes    UltraFiltration Goal: 2000 ml  UltraFiltration Removed: 2000 ml      Pre Treatment weight: 104.9 KG post wt of 21  Post Treatment weight: 101 KG actual bedscale   Estimated Dry Weight:     Access used: Internal Access:       AV Fistula          Site: Lt forearm        Access Flow: good       Sign and symptoms of infection: No           Medications or blood products given: Proamatine    Ch[facility] outpatient schedule: [facility]    Ch[facility]    Summary of response to treatment: Tolerated well        ACES flowsheet faxed to patient unit/ placed in patient chart: N/A Epic     Post assessment completed: Yes    Report given to: Theron Willis RN      * Intra-treatment documented Safety Checks include the followin) Access and face visible at all times. 2) All connections and blood lines are secure with no kinks. 3) NVL alarm engaged. 4) Hemosafe device applied (if applicable). 5) No collapse of Arterial or Venous blood chambers. 6) All blood lines / pump segments in the air detectors.

## 2021-05-08 NOTE — PROGRESS NOTES
[facility]   Progress Note                   Date:   5/8/2021  Patient name: Florencia Hudson  Date of admission:  4/28/2021  2:27 PM  MRN:   [id number]  YOB: 1955  PCP: Abi Wang MD    Reason for Admission:  sepsis     Subjective:     No chest pain. Some dyspnea. No new cardiac events overnight. S/p cardioversion 5/5/2021 at bedside for atrial tachycardia. Medications:   Scheduled Meds:   benzonatate  200 mg Oral TID    dextromethorphan  60 mg Oral 2 times per day    famotidine  10 mg Oral Daily    meropenem  500 mg Intravenous Q24H    ciprofloxacin  1 drop Left Eye Q2H While awake    senna  1 tablet Oral Nightly    midodrine  7.5 mg Oral Q8H    metoprolol tartrate  25 mg Oral BID    amiodarone  200 mg Oral BID    vancomycin  500 mg Oral 4 times per day    sevelamer  4,000 mg Oral TID WC    aspirin  81 mg Oral Daily    lidocaine  1 patch Transdermal Daily    neomycin-bacitracin-polymyxin   Topical BID    sodium chloride flush  5-40 mL Intravenous 2 times per day    atorvastatin  40 mg Oral Nightly    digoxin  125 mcg Oral Daily    insulin lispro  0-6 Units Subcutaneous TID WC    insulin lispro  0-3 Units Subcutaneous Nightly       Continuous Infusions:   sodium chloride      dextrose      sodium chloride 10 mL/hr at 05/07/21 1242       CBC:   Recent Labs     05/06/21 0439 05/07/21  0456 05/08/21  0417   WBC 8.1 8.6 8.1   HGB 9.5* 9.7* 9.6*   PLT 60* 63* 53*     BMP:    Recent Labs     05/06/21 0439 05/07/21  0456 05/08/21  0417    139 138   K 4.0 3.9 3.8   CL 97* 97* 97*   CO2 19* 25 29   BUN 58* 39* 49*   CREATININE 5.61* 4.47* 5.44*   GLUCOSE 145* 107* 136*     Hepatic: No results for input(s): AST, ALT, ALB, BILITOT, ALKPHOS in the last 72 hours. Troponin: No results for input(s): TROPONINI in the last 72 hours. BNP: No results for input(s): BNP in the last 72 hours. Lipids: No results for input(s): CHOL, HDL in the last 72 hours. Invalid input(s): LDLCALCU  INR:   Recent Labs     05/07/21  0931   INR 2.1       Objective:   Vitals: BP (!) 114/58   Pulse 65   Temp 98.2 °F (36.8 °C) (Axillary)   Resp 13   Ht 6' (1.829 m)   Wt 231 lb 4.2 oz (104.9 kg)   SpO2 96%   BMI 31.36 kg/m²     General appearance: awake, alert, in no apparent respiratory distress   HEENT: Head: Normocephalic, no lesions, without obvious abnormality  Neck: no JVD  Lungs: clear to auscultation bilaterally, no basilar rales, no wheezing   Heart: regular rate and rhythm, S1, S2 no murmurs  Abdomen: soft, non-tender; bowel sounds normal  Extremities: Left BKA. Right 2+ edema with bandage on. Has ulceration on toe. Neurologic: Mental status: Alert, oriented. Motor and sensory not done. EKG: Sinus tachycardia, inferior infarction, ST-T changes consistent with ischemia, QTc 515 msec    Subsequent ecgs showed Intermittent atrial tachycardia with 2:1 AV block        TTE 1/20/21    Left Ventricle:  Nondilated left ventricle, Systolic function is   severely decreased with an ejection fraction of 20-25%.   Left Ventricle: There is moderate increased wall thickness/hypertrophy. LVH is symmetric 1.5 cm    Right Ventricle: Systolic function is moderately to severely reduced.   Contrast-enhanced images show no evidence of left ventricular apical   thrombus    Ascending aortic dilatation measuring 4.4 cm, trileaflet aortic valve    E/e'>20 indicative of elevated left ventricular filling pressures      TTE June 2019  Technically difficult study due to patient habitus. Left ventricle is normal in size. Mild left ventricular hypertrophy. Severely reduced left ventricular systolic function. Estimated LV EF 30-35 %. Grade II (moderate) left ventricular diastolic dysfunction. Left atrium is mildly dilated. Trivial valve disease as below.        Cardiac cath 6/2/19   Severe native vessel disease.   Patent LIMA-LAD.  The LAD distal to touch down is small in caliber

## 2021-05-08 NOTE — PROGRESS NOTES
Nephrology ESRD Consult Note    Reason for Consult:  End stage renal disease  Requesting Physician: Dr Nishant Ordonez      History of Present Illness: This is a 60 y.o. male with history of CAD status post CABG, cardiomyopathy, dyslipidemia, end stage renal disease secondary to nephrosclerosis on hemodialysis Tuesday Thursday Saturday under the care of Dr. Mya Dinero at [address] [facility] renal care by way of a left arm AV fistula. He had presented initially to [facility] with diarrhea, nausea and vomiting and generalized weakness for six days. He had missed multiple treatments of dialysis. He presented at [facility]. [facility] with hyperkalemia potassium of  6.9, BUN/ creatinine of 241 and 18.47 mg/dl and serum bicarbonate of 9. He was dialyzed yesterday 4/28/2021 at [facility]. [facility] and transferred to [facility] due to in availability of a bed. Less than 500 cc was removed with dialysis due to hypotension. He is seen at dialysis today. His blood pressures have been in the hypotensive trends between 41-78 systolic blood pressure. He denies fever. He denies abdominal pain. He has ongoing nonbloody diarrhea, watery frequency of 6 times per day. he is tachycardic with heart rate in 127. Work-up for diarrhea shows positive C. difficile. Received a liter bolus of 0.9 saline yesterday. Subjective/internal history  Patient seen and examined. Patient is tolerating dialysis well  Patient complains of increasing sputum  Denies chest pain, nausea vomiting and diarrhea is improving. He is on treatment with  oral vancomycin for C. difficile colitis -WBC improving.   Patient had extra sequential ultrafiltration session yesterday  Patient is currently having regular hemodialysis Tuesday Thursday Saturday schedule    Past Medical History:        Diagnosis Date    Acute on chronic congestive heart failure (HCC)     Anemia     CAD (coronary artery disease)     Cardiomyopathy ([address]     Dialysis care     ESRD (end stage renal disease) on dialysis [facility])     Foot ulcer, left ([facility] [address] 2015    GERD (gastroesophageal reflux disease)     [facility]    MOM-WED-FRI-ON [address]     History of sleep apnea     none since significant weight loss (was 400#)    Hyperlipidemia     Hyperparathyroidism ([facility] [address]     Hypertension     Neuropathy     ROOSEVELT (obstructive sleep apnea) 5/8/2021    Peripheral vascular disease ([facility]     Pneumonia     resolved    S/P CABG (coronary artery bypass graft) 06/2016    Type II or unspecified type diabetes mellitus without mention of complication, not stated as uncontrolled     dx-1980           Past Surgical History:        Procedure Laterality Date    CATARACT REMOVAL      DIALYSIS FISTULA CREATION Left 2014    LEG AMPUTATION THROUGH LOWER TIBIA AND FIBULA         Current Medications:    epoetin willian-epbx (RETACRIT) injection 2,000 Units, Once per day on Mon Wed Fri    And  epoetin willian-epbx (RETACRIT) injection 3,000 Units, Once per day on Mon Wed Fri  heparin (porcine) injection 5,000 Units, 3 times per day  benzonatate (TESSALON) capsule 200 mg, TID  dextromethorphan (DELSYM) 30 MG/5ML extended release liquid 60 mg, 2 times per day  famotidine (PEPCID) tablet 10 mg, Daily  meropenem (MERREM) 500 mg IVPB extended (mini-bag), Q24H  ciprofloxacin (CILOXAN) 0.3 % ophthalmic solution 1 drop, Q2H While awake  senna (SENOKOT) tablet 8.6 mg, Nightly  midodrine (PROAMATINE) tablet 7.5 mg, Q8H  metoprolol tartrate (LOPRESSOR) tablet 25 mg, BID  amiodarone (CORDARONE) tablet 200 mg, BID  vancomycin (FIRVANQ) 50 MG/ML oral solution 500 mg, 4 times per day  sevelamer (RENVELA) tablet 4,000 mg, TID WC  aspirin EC tablet 81 mg, Daily  oxyCODONE-acetaminophen (PERCOCET) 5-325 MG per tablet 1 tablet, Q8H PRN  lidocaine 4 % external patch 1 patch, Daily  neomycin-bacitracin-polymyxin (NEOSPORIN) ointment, BID  sodium chloride flush 0.9 % injection 5-40 mL, 2 times per day  sodium chloride flush 0.9 % injection 5-40 mL, MUSKULOSKELETAL: Adequately aligned spine. No joint erythema or tenderness. EXTREMITIES: No edema. Peripheral pulses intact. NEURO:Alert oriented x 3 ,power 5/5 in all extremities      Labs:  PTH:  No results found for: PTH  abs:   CBC:   Recent Labs     05/06/21 0439 05/07/21 0456 05/08/21 0417   WBC 8.1 8.6 8.1   RBC 3.60* 3.63* 3.57*   HGB 9.5* 9.7* 9.6*   HCT 30.5* 30.8* 30.4*   MCV 84.7 85.0 85.3   MCH 26.3 26.8 26.8   MCHC 31.1 31.5 31.5   RDW 20.3* 21.0* 20.7*   PLT 60* 63* 53*   MPV 11.5 10.8 10.6      BMP:   Recent Labs     05/06/21 0439 05/07/21 0456 05/08/21 0417    139 138   K 4.0 3.9 3.8   CL 97* 97* 97*   CO2 19* 25 29   BUN 58* 39* 49*   CREATININE 5.61* 4.47* 5.44*   GLUCOSE 145* 107* 136*   CALCIUM 8.9 9.0 9.1        Phosphorus:    Recent Labs     05/07/21  0456   PHOS 5.4*     Magnesium:   Recent Labs     05/07/21  0456   MG 1.6     Albumin:   No results for input(s): LABALBU in the last 72 hours. Assessment:  1. ESRD on Hemodialysis. His regular HD days are TTS at [facility] facility using Left AVF under Dr. Qing Landa. His dry weight is not available patient is likely under his dry weight due to history of diarrhea. Renal diet with 1500 mils fluid restriction     2. Anemia of chronic kidney disease-Retacrit once iron studies are adequate    3. Pneumonia and acute diarrhea secondary to C. difficile colitis-patient with worsening shortness of breath on 5L oxygen    4. Hypotension -improving    5. Secondary hyperparathyroidism-serum phosphorus 5.7. Continue Renvela    6. Anion gap metabolic acidosis from missed dialysis-improved    7. Coronary artery disease status post CABG    8. Peripheral artery disease with history of left BKA and right lower extremity chronic wound    9.   Right large pleural effusion rule out parapneumonic process-pulmonology following-plans for IR guided thoracentesis    Plan  Continue HD TTS  HD today as per orders  Continue midodrine to 7.5 mg every 8 hourly  Continue oral vancomycin.   Continue renal diet as tolerated  Strict I's and Sahil Purdy MD    Nephrologist

## 2021-05-08 NOTE — PROGRESS NOTES
HEMODIALYSIS PRE-TREATMENT NOTE     Patient Identifiers prior to treatment: Name,      Isolation Required: yes                      Isolation Type: Contact        (please document if patient is being managed as a PUI/COVID-19 patient)           Hepatitis status:                                                                     WQYD Drawn                             Result  Hepatitis B Surface Antigen 21     negative                        Hepatitis B Surface Antibody 21 95           Hepatitis B Core Antibody 19 negative              How was Hepatitis Status verified: outpt records     Was a copy of the labs you documented provided to facility for the patient's chart: yes     Hemodialysis orders verified: yes     Access Within normal limits ( I.e. s/s of infection,. ..): WNL      Pre-Assessment completed: yes      Report received from [employer]

## 2021-05-08 NOTE — PROGRESS NOTES
[facility] -   Infectious diseases evaluation  admission date 4/28/2021    reason for consultation:   Pneumonia/C. difficile colitis    Impression :   Current:  · C. difficile colitis  · Right lower lobe pneumonia, staph aureus MSSA growth on respiratory culture  · Moderate to large loculated pleural fluid status post ultrasound guided thoracentesis thoracentesis 5/7/2021  · Sepsis secondary to above  · Right foot wound  · Metabolic acidosis  · CHF  · End-stage renal disease on hemodialysis  · Diabetes mellitus  · Dyslipidemia  · Hypertension      Recommendations   · Continue p.o. vancomycin day 6/14  · IV meropenem day 2  · IV ceftriaxone discontinued 5/6/2021  · Follow pleural fluid culture   · Liver ultrasound and chest x-ray pending  · MRSA nasal swab was negative  · Swallow study showed no evidence of aspiration  · No growth on blood cultures  · Continue supportive care          History of Present Illness:   Initial history:  Marko Cox is a 60y.o.-year-old male was transferred from [place].  The patient presented with worsening cough productive with blood-tinged sputum associated with shortness of breath and diarrhea for 2 weeks. He also complaining of generalized weakness, nausea and vomiting. no alleviating or aggravating factors. The patient is poor historian, lives at home alone, states that he received Covid 19 vaccine. Apparently the patient missed hemodialysis.   He also had left foot wound with no purulent drainage or necrotic tissue, was evaluated by podiatry, foot x-ray not suggestive of osteomyelitis  On admission he was tachycardic, had leukocytosis with WBC of 29.6  CT chest and abdomen showed right lower lobe infiltrates  COVID-19 rapid test negative  VQ scan was low probability on 4/29/2021  On 4/30/2021He became hypotensive with a systolic blood pressure in the 70s, tachycardic was transferred to ICU, was started on pressors Procalcitonin was 25.7 on 4/30/2021  IJ triple-lumen was placed was placed 4/30/2021    Interval changes  5/8/2021   He remains in the ICU, no reported fever, on oxygen per nasal cannula, denied increased shortness of breath, no acute events status post ultrasound guided thoracentesis thoracentesis yesterday, had 2 bowel movement overnight, remains on oxygen per nasal cannula  Fluid analysis exudative  CT chest without contrast from earlier yesterday showed moderate to large right pleural effusion tracking up to the right lateral chest wall with consolidative process to the anterior right lower lobe, moderate ascites, enlarged right lobe of thyroid    Sputum Gram growing staph aureus MSSA  WBC normal today    Patient Vitals for the past 8 hrs:   BP Temp Temp src Pulse Resp SpO2   05/08/21 1330 104/60   66 18    05/08/21 1300 (!) 105/55   69 18    05/08/21 1230 100/63   66 18    05/08/21 1200 (!) 91/57   66 18    05/08/21 1130 113/61   65 18    05/08/21 1100 110/65   60 18    05/08/21 1030 133/74   71 18    05/08/21 1009 123/67   71 18    05/08/21 0930 (!) 102/48   68 13 98 %   05/08/21 0900 (!) 109/54   68 14 98 %   05/08/21 0830 (!) 115/57   70 13 94 %   05/08/21 0800 (!) 114/58 98.2 °F (36.8 °C) Axillary 65 13 96 %   05/08/21 0730 (!) 94/57   60 12 97 %   05/08/21 0700 (!) 106/58   65 18 98 %   05/08/21 0630 (!) 101/59   65 14 98 %   05/08/21 0600 (!) 102/56   64 14 97 %         I have personally reviewed the past medical history, past surgical history, medications, social history, and family history, and I haveupdated the database accordingly. Allergies:   Pcn [penicillins]     Review of Systems:     Review of Systems  As per history present illness, other than above 12 system review is negative  Physical Examination :       Physical Exam  Constitutional:       General: He is not in acute distress. HENT:      Head: Normocephalic and atraumatic.       Right Ear: External ear normal.      Left Ear: External ear normal.   Neck:      Musculoskeletal: Neck supple. No neck rigidity. Cardiovascular:      Rate and Rhythm: Normal rate and regular rhythm. Pulmonary:      Effort: No respiratory distress. Comments: Decreased breath sounds bilaterally  Abdominal:      General: Abdomen is flat. There is no distension. Palpations: Abdomen is soft. Tenderness: There is no abdominal tenderness. Musculoskeletal:      Comments: Left below-knee amputation site with no open wound no erythema. Right foot dressing   Skin:     General: Skin is warm. Coloration: Skin is not jaundiced. Neurological:      General: No focal deficit present. Mental Status: He is alert and oriented to person, place, and time.          Past Medical History:     Past Medical History:   Diagnosis Date    Acute on chronic congestive heart failure (HCC)     Anemia     CAD (coronary artery disease)     [facility]omy[facility]     Dialysis care     ESRD (end stage renal disease) on dialysis ([facility]     Foot ulcer, left ([facility] 2015    GERD (gastroesophageal reflux disease)     Hemodialysis patient ([facility] 2011    MOM-WED-FRI-ON [address]     History of sleep apnea     none since significant weight loss (was 400#)    Hyperlipidemia     Hyperparathyroidism ([address]     Hypertension     Neuropathy     ROOSEVELT (obstructive sleep apnea) 5/8/2021    Peripheral vascular disease ([facility]     Pneumonia     resolved    S/P CABG (coronary artery bypass graft) 06/2016    Type II or unspecified type diabetes mellitus without mention of complication, not stated as uncontrolled     dx-1980       Past Surgical  History:     Past Surgical History:   Procedure Laterality Date    CATARACT REMOVAL      DIALYSIS FISTULA CREATION Left 2014    LEG AMPUTATION THROUGH LOWER TIBIA AND FIBULA         Medications:      epoetin willian-epbx  2,000 Units Subcutaneous Once per day on Mon Wed Fri    And    epoetin willian-epbx  3,000 Units Subcutaneous Once per day on Mon Wed Fri    heparin (porcine)  5,000 Units Subcutaneous 3 times per day    benzonatate  200 mg Oral TID    dextromethorphan  60 mg Oral 2 times per day    famotidine  10 mg Oral Daily    meropenem  500 mg Intravenous Q24H    ciprofloxacin  1 drop Left Eye Q2H While awake    senna  1 tablet Oral Nightly    midodrine  7.5 mg Oral Q8H    metoprolol tartrate  25 mg Oral BID    amiodarone  200 mg Oral BID    vancomycin  500 mg Oral 4 times per day    sevelamer  4,000 mg Oral TID WC    aspirin  81 mg Oral Daily    lidocaine  1 patch Transdermal Daily    neomycin-bacitracin-polymyxin   Topical BID    sodium chloride flush  5-40 mL Intravenous 2 times per day    atorvastatin  40 mg Oral Nightly    digoxin  125 mcg Oral Daily    insulin lispro  0-6 Units Subcutaneous TID WC    insulin lispro  0-3 Units Subcutaneous Nightly       Social History:     Social History     Socioeconomic History    Marital status:      Spouse name: Not on file    Number of children: Not on file    Years of education: Not on file    Highest education level: Not on file   Occupational History    Not on file   Social Needs    Financial resource strain: Not on file    Food insecurity     Worry: Not on file     Inability: Not on file    Transportation needs     Medical: Not on file     Non-medical: Not on file   Tobacco Use    Smoking status: Never Smoker    Smokeless tobacco: Never Used   Substance and Sexual Activity    Alcohol use: No    Drug use: No    Sexual activity: Not on file   Lifestyle    Physical activity     Days per week: Not on file     Minutes per session: Not on file    Stress: Not on file   Relationships    Social connections     Talks on phone: Not on file     Gets together: Not on file     Attends [employer] service: Not on file     Active member of club or organization: Not on file     Attends meetings of clubs or organizations: Not on file Relationship status: Not on file    Intimate partner violence     Fear of current or ex partner: Not on file     Emotionally abused: Not on file     Physically abused: Not on file     Forced sexual activity: Not on file   Other Topics Concern    Not on file   Social History Narrative    Not on file       Family History:     Family History   Problem Relation Age of Onset    Heart Disease Father     Diabetes Father     Diabetes Brother       Medical Decision Making:   I have independently reviewed/ordered the following labs:    CBC with Differential:   Recent Labs     05/07/21 0456 05/08/21 0417   WBC 8.6 8.1   HGB 9.7* 9.6*   HCT 30.8* 30.4*   PLT 63* 53*   LYMPHOPCT 3* 5*   MONOPCT 8* 9*     BMP:  Recent Labs     05/07/21 0456 05/08/21 0417    138   K 3.9 3.8   CL 97* 97*   CO2 25 29   BUN 39* 49*   CREATININE 4.47* 5.44*   MG 1.6  --      Hepatic Function Panel:   Recent Labs     05/07/21 0456   PROT 6.2*     No results for input(s): RPR in the last 72 hours. No results for input(s): HIV in the last 72 hours. No results for input(s): BC in the last 72 hours. Lab Results   Component Value Date    CREATININE 5.44 05/08/2021    GLUCOSE 136 05/08/2021    GLUCOSE 128 01/09/2012       Detailed results: Thank you for allowing us to participate in the care of this patient. Please call with questions. This note is created with the assistance of a speech recognition program.  While intending to generate adocument that actually reflects the content of the visit, the document can still have some errors including those of syntax and sound a like substitutions which may escape proof reading. It such instances, actual meaningcan be extrapolated by contextual diversion.     Yamila Galeano MD  Office: [phone]  Perfect serve / office [phone]

## 2021-05-08 NOTE — PROGRESS NOTES
Pulmonary Progress Note  [facility]      Patient - Kelsea Campbell,  Age - 60 y.o.    - 1955      Room Number -    MRN -  [id number]   Acct # - [de-identified]  Date of Admission -  2021  2:27 Jennifer Finn MD  Primary Care Physician - Octaviano Baer MD     SUBJECTIVE   He is sleeping and snoring, no overt episodes of apnea observed, however has mild desaturations until he wakes up. I have cut his oxygen down from 4-1/2 L down to 3  He feels better without any pleurisy after thoracentesis    OBJECTIVE   VITALS    height is 6' (1.829 m) and weight is 231 lb 4.2 oz (104.9 kg). His oral temperature is 98 °F (36.7 °C). His blood pressure is 94/57 (abnormal) and his pulse is 60. His respiration is 12 and oxygen saturation is 97%. Body mass index is 31.36 kg/m². Temperature Range: Temp: 98 °F (36.7 °C) Temp  Av.6 °F (36.4 °C)  Min: 97.3 °F (36.3 °C)  Max: 98 °F (36.7 °C)  BP Range:  Systolic (95EWU), TOV:014 , Min:91 , MNO:678     Diastolic (85WKU), IZQ:18, Min:49, Max:79    Pulse Range: Pulse  Av.2  Min: 60  Max: 80  Respiration Range: Resp  Av.5  Min: 12  Max: 26  Current Pulse Ox[de-identified]  SpO2: 97 %  24HR Pulse Ox Range:  SpO2  Av.5 %  Min: 91 %  Max: 100 %  Oxygen Amount and Delivery: O2 Flow Rate (L/min): 4 L/min    Wt Readings from Last 3 Encounters:   21 231 lb 4.2 oz (104.9 kg)   21 210 lb 8.6 oz (95.5 kg)   20 223 lb (101.2 kg)       I/O (24 Hours)    Intake/Output Summary (Last 24 hours) at 2021 0793  Last data filed at 2021  Gross per 24 hour   Intake 50 ml   Output 4800 ml   Net -4750 ml       EXAM     General Appearance  Awake, alert, oriented, in no acute distress  HEENT - normocephalic, atraumatic.  []  Mallampati  [x] Crowded airway   [] Macroglossia  [x]  Retrognathia  [x] Micrognathia  []  Normal tongue size [] Normal Bite  [] Cat sign positive    Neck - Supple,  trachea midline   Lungs -diminished breath sounds on the right side  Cardiovascular - Heart sounds are normal.  Regular rate and rhythm   Abdomen - Soft, nontender, nondistended, no masses or organomegaly  Neurologic - There are no focal motor or sensory deficits  Skin - No bruising or bleeding  Extremities - No clubbing, cyanosis, edema; left BKA, right foot wrapped    MEDS      benzonatate  200 mg Oral TID    dextromethorphan  60 mg Oral 2 times per day    famotidine  10 mg Oral Daily    meropenem  500 mg Intravenous Q24H    ciprofloxacin  1 drop Left Eye Q2H While awake    senna  1 tablet Oral Nightly    midodrine  7.5 mg Oral Q8H    metoprolol tartrate  25 mg Oral BID    amiodarone  200 mg Oral BID    vancomycin  500 mg Oral 4 times per day    sevelamer  4,000 mg Oral TID WC    aspirin  81 mg Oral Daily    lidocaine  1 patch Transdermal Daily    neomycin-bacitracin-polymyxin   Topical BID    sodium chloride flush  5-40 mL Intravenous 2 times per day    atorvastatin  40 mg Oral Nightly    digoxin  125 mcg Oral Daily    insulin lispro  0-6 Units Subcutaneous TID WC    insulin lispro  0-3 Units Subcutaneous Nightly      sodium chloride      dextrose      sodium chloride 10 mL/hr at 05/07/21 1242     oxyCODONE-acetaminophen, sodium chloride flush, sodium chloride, promethazine **OR** ondansetron, polyethylene glycol, acetaminophen **OR** acetaminophen, glucose, dextrose, glucagon (rDNA), dextrose    LABS   CBC   Recent Labs     05/08/21  0417   WBC 8.1   HGB 9.6*   HCT 30.4*   MCV 85.3   PLT 53*     BMP:   Lab Results   Component Value Date     05/08/2021    K 3.8 05/08/2021    CL 97 05/08/2021    CO2 29 05/08/2021    BUN 49 05/08/2021    LABALBU 2.9 04/30/2021    LABALBU 3.8 01/09/2012    CREATININE 5.44 05/08/2021    CALCIUM 9.1 05/08/2021    GFRAA 13 05/08/2021    LABGLOM 11 05/08/2021     ABGs:No results found for: PHART, PO2ART, VYS8QWC   Lab Results   Component Value Date    MODE NOT REPORTED 06/02/2019     Ionized Calcium:  No results found for: IONCA  Magnesium:    Lab Results   Component Value Date    MG 1.6 05/07/2021     Phosphorus:    Lab Results   Component Value Date    PHOS 5.4 05/07/2021        LIVER PROFILE No results for input(s): AST, ALT, LIPASE, BILIDIR, BILITOT, ALKPHOS in the last 72 hours. Invalid input(s): AMYLASE,  ALB  INR   Recent Labs     05/07/21  0931   INR 2.1     PTT   Lab Results   Component Value Date    APTT 49.1 (H) 05/07/2021         RADIOLOGY     (See actual reports for details)    ASSESSMENT/PLAN   Principal Problem:    Sepsis due to pneumonia [facility])  Active Problems:    ESRD on dialysis ([facility]    DM (diabetes mellitus) ([facility]    Anemia of chronic disease    Charcot foot due to diabetes mellitus ([facility] [facility]    Hypertension, essential    Sepsis ([facility]    Metabolic acidosis    Hyperkalemia    Diarrhea    Nausea and vomiting    History of left below knee amputation (HCC)    Hemoptysis    Ulcerated, foot, right, with fat layer exposed ([facility]    Pneumonia    Loculated pleural effusion    Pleural effusion on right    ROOSEVELT (obstructive sleep apnea)  Resolved Problems:    * No resolved hospital problems. *    Pleural fluid analysis suggestive of exudative effusion by LDH criteria  However glucose is not significantly decreased and pH is not significantly decreased.   Therefore, suspect that he has a parapneumonic effusion rather than empyema  Awaiting cultures and cytologies  Wean oxygen  Follow-up chest x-ray tomorrow  Continue IV antibiotics  Can make intermediate ICU status  Outpatient PFTs and sleep study  Electronically signed by Sima Stevens MD on 5/8/2021 at 7:49 AM

## 2021-05-08 NOTE — PLAN OF CARE
Problem: Falls - Risk of:  Goal: Will remain free from falls  Description: The patient remained free from falls this shift, call light within reach, bed in locked and lowest position. Side rails up x2. Continue to monitor closely. 5/8/2021 1833 by Vito Pete RN  Outcome: Met This Shift  Note: Patient remained free from falls. Call light within reach and bed in lowest position. Patient oriented to room and surroundings. 5/8/2021 0647 by Art Seo RN  Outcome: Ongoing  Goal: Absence of physical injury  Description: Absence of physical injury  5/8/2021 1833 by Vito Pete RN  Outcome: Met This Shift  5/8/2021 0647 by Art Seo RN  Outcome: Ongoing     Problem: Skin Integrity:  Goal: Will show no infection signs and symptoms  Description: Patient skin assessment complete. No signs/symptoms of new skin breakdown. Waffle mattress in place. Pt turned and repositioned every two hours with assistance from staff. Area kept free from moisture. Proper nourishment and fluids encouraged, as appropriate. Skin remains clean, dry, and intact. Will continue to monitor for additional needs and changes in skin breakdown. 5/8/2021 1833 by Vito Pete RN  Outcome: Ongoing  5/8/2021 0647 by Art Seo RN  Outcome: Ongoing  Goal: Absence of new skin breakdown  Description: Absence of new skin breakdown  5/8/2021 1833 by Vito Pete RN  Outcome: Ongoing  Note: Structural intactness and normal physiological function of skin and  mucous membranes. Patient turned and repositioned every 2 hours and PRN.    5/8/2021 0647 by Art Seo RN  Outcome: Ongoing     Problem:  Activity:  Goal: Risk for activity intolerance will decrease  Description: Risk for activity intolerance will decrease  5/8/2021 1833 by Vito Pete RN  Outcome: Ongoing  5/8/2021 0647 by Art Seo RN  Outcome: Ongoing     Problem: Fluid Volume:  Goal: Will show no signs or symptoms of fluid imbalance  Description: Will show no signs or symptoms of fluid imbalance  5/8/2021 1833 by Gilmar Aguilera RN  Outcome: Ongoing  Note: Pt tolerated hemodialysis today with 2 L of fluid removed.    5/8/2021 0647 by Marielle Akins RN  Outcome: Ongoing     Problem: Nutritional:  Goal: Maintenance of adequate nutrition will be supported  Description: Maintenance of adequate nutrition will be supported  5/8/2021 1833 by Gilmar Aguilera RN  Outcome: Ongoing  5/8/2021 0647 by Marielle Akins RN  Outcome: Ongoing     Problem: Physical Regulation:  Goal: Complications related to the disease process, condition or treatment will be avoided or minimized  Description: Complications related to the disease process, condition or treatment will be avoided or minimized  5/8/2021 1833 by Gilmar Aguilera RN  Outcome: Ongoing  5/8/2021 0647 by Marielle Akins RN  Outcome: Ongoing     Problem: Urinary Elimination:  Goal: Ability to achieve and maintain adequate urine output will be supported  Description: Ability to achieve and maintain adequate urine output will be supported  5/8/2021 1833 by Gilmar Aguilera RN  Outcome: Ongoing  5/8/2021 0647 by Marielle Akins RN  Outcome: Ongoing     Problem: Airway Clearance - Ineffective:  Goal: Clear lung sounds  Description: Clear lung sounds  5/8/2021 1833 by Gilmar Aguilera RN  Outcome: Ongoing  5/8/2021 0647 by Marielle Akins RN  Outcome: Ongoing  Goal: Ability to maintain a clear airway will improve  Description: Ability to maintain a clear airway will improve  5/8/2021 1833 by Gilmar Aguilera RN  Outcome: Ongoing  5/8/2021 0647 by Marielle Akins RN  Outcome: Ongoing     Problem: Fluid Volume - Deficit:  Goal: Achieves intake and output within specified parameters  Description: Achieves intake and output within specified parameters  5/8/2021 1833 by Gilmar Aguilera RN Outcome: Ongoing  5/8/2021 0647 by Sona Peoples RN  Outcome: Ongoing     Problem: Gas Exchange - Impaired:  Goal: Levels of oxygenation will improve  Description: Levels of oxygenation will improve  5/8/2021 1833 by Samantha Dixon RN  Outcome: Ongoing  5/8/2021 0647 by Sona Peoples RN  Outcome: Ongoing     Problem: Confusion - Acute:  Goal: Absence of continued neurological deterioration signs and symptoms  Description: Absence of continued neurological deterioration signs and symptoms  5/8/2021 1833 by Samantha Dixon RN  Outcome: Ongoing  5/8/2021 0647 by Sona Peoples RN  Outcome: Ongoing  Goal: Mental status will be restored to baseline  Description: Mental status will be restored to baseline  5/8/2021 1833 by Samantha Dixon RN  Outcome: Ongoing  Note: Pt alert and oriented x4.   5/8/2021 0647 by Sona Peoples RN  Outcome: Ongoing     Problem: Discharge Planning:  Goal: Ability to perform activities of daily living will improve  Description: Ability to perform activities of daily living will improve  5/8/2021 1833 by Samantha Dixon RN  Outcome: Met This Shift  5/8/2021 0647 by Sona Peoples RN  Outcome: Ongoing  Goal: Participates in care planning  Description: Participates in care planning  5/8/2021 1833 by Samantha Dixon RN  Outcome: Ongoing  5/8/2021 0647 by Sona Peoples RN  Outcome: Ongoing     Problem: Injury - Risk of, Physical Injury:  Goal: Will remain free from falls  Description: The patient remained free from falls this shift, call light within reach, bed in locked and lowest position. Side rails up x2. Continue to monitor closely. 5/8/2021 1833 by Samantha Dixon RN  Outcome: Met This Shift  Note: Patient remained free from falls. Call light within reach and bed in lowest position. Patient oriented to room and surroundings.        5/8/2021 0647 by Sona Peoples RN  Outcome: Ongoing  Goal: Absence of physical injury  Description: Absence of physical injury  5/8/2021 1833 by Marianne Clemons RN  Outcome: Met This Shift  5/8/2021 0647 by Jazmin Jung RN  Outcome: Ongoing     Problem: Mood - Altered:  Goal: Mood stable  Description: Mood stable  5/8/2021 1833 by Marianne Clemons RN  Outcome: Ongoing  5/8/2021 0647 by Jazmin Jung RN  Outcome: Ongoing  Goal: Absence of abusive behavior  Description: Absence of abusive behavior  5/8/2021 1833 by Marianne Clemons RN  Outcome: Ongoing  5/8/2021 0647 by Jazmin Jung RN  Outcome: Ongoing  Goal: Verbalizations of feeling emotionally comfortable while being cared for will increase  Description: Verbalizations of feeling emotionally comfortable while being cared for will increase  5/8/2021 1833 by Marianne Clemons RN  Outcome: Ongoing  5/8/2021 0647 by Jazmin Jung RN  Outcome: Ongoing     Problem: Psychomotor Activity - Altered:  Goal: Absence of psychomotor disturbance signs and symptoms  Description: Absence of psychomotor disturbance signs and symptoms  5/8/2021 1833 by Marianne Clemons RN  Outcome: Ongoing  5/8/2021 0647 by Jazmin Jung RN  Outcome: Ongoing     Problem: Sleep Pattern Disturbance:  Goal: Appears well-rested  Description: Appears well-rested  5/8/2021 1833 by Marianne Clemons RN  Outcome: Ongoing  5/8/2021 0647 by Jazmin Jung RN  Outcome: Ongoing     Problem: Pain:  Goal: Pain level will decrease  Description: Pain level will decrease  5/8/2021 1833 by Marianne Clemons RN  Outcome: Ongoing  5/8/2021 0647 by Jazmin Jung RN  Outcome: Ongoing  Goal: Control of acute pain  Description: Control of acute pain  5/8/2021 1833 by Marianne Clemons RN  Outcome: Ongoing  5/8/2021 0647 by Jazmin Jung RN  Outcome: Ongoing  Goal: Control of chronic pain  Description: Control of chronic pain  5/8/2021 1833 by Marianne Clemons RN  Outcome: Ongoing 5/8/2021 4333 by Sona Peoples RN  Outcome: Ongoing     Problem: Nutrition  Goal: Optimal nutrition therapy  Description: Nutrition Problem #1: Increased nutrient needs  Intervention: Food and/or Nutrient Delivery: Continue Current Diet, Start Oral Nutrition Supplement  Nutritional Goals: po intake greater than 50%     5/8/2021 1833 by Samantha Dixon RN  Outcome: Ongoing  5/8/2021 0647 by Sona Peoples RN  Outcome: Ongoing

## 2021-05-08 NOTE — PROGRESS NOTES
Pt transferred to dialysis via bed accompanied by RN. Pt on monitors and on 3 L N/C. He is A/O and in no distress.

## 2021-05-09 PROBLEM — J91.8 PARAPNEUMONIC EFFUSION: Status: ACTIVE | Noted: 2021-01-01

## 2021-05-09 PROBLEM — J18.9 PARAPNEUMONIC EFFUSION: Status: ACTIVE | Noted: 2021-01-01

## 2021-05-09 PROBLEM — A04.72 C. DIFFICILE COLITIS: Status: ACTIVE | Noted: 2021-01-01

## 2021-05-09 NOTE — PROGRESS NOTES
[address] [facility].    PROGRESS NOTE             5/9/2021    8:42 AM    Name:   Florencia Hudson  MRN:     [id number]     Acct:      [de-identified]   Room:   2006/2006-01  IP Day:  6  Admit Date:  4/28/2021  2:27 PM    PCP:  Abi Wang MD  Code Status:  Full Code    Subjective:     C/C: No chief complaint on file. Interval History Status:     Patient was seen and examined at bedside. No acute events overnight. S/p dialysis yesterday with 2L removed. Chest x-ray this a.m. shows reaccumulation of fluid. Patient will need IR pigtail catheter. Patient is currently on 3L NC. Patient still complains of shortness of breath. On examination, coughing blood-tinged sputum. Per nursing staff, patient had 4 bowel movements overnight which were non-foul-smelling. Patient denies any fever, chills, chest pain, palpitations, or abdominal pain. Liver ultrasound pending this AM  Ammonia 22      Brief History:     Please see H&P    Review of Systems:     Review of Systems   Constitutional: Negative for activity change, appetite change and fatigue. HENT: Negative for congestion. Eyes: Negative for photophobia and visual disturbance. Respiratory: Positive for shortness of breath. Negative for apnea, cough and wheezing. Cardiovascular: Negative for chest pain, palpitations and leg swelling. Gastrointestinal: Negative for abdominal distention, abdominal pain, constipation, diarrhea, nausea and vomiting. Endocrine: Negative for polyuria. Genitourinary: Negative for difficulty urinating and urgency. Musculoskeletal: Positive for gait problem. Negative for arthralgias and back pain. Skin: Negative for color change. Neurological: Negative for dizziness, syncope and headaches. Psychiatric/Behavioral: Negative for agitation, behavioral problems, confusion, decreased concentration and dysphoric mood. Medications:      Allergies: Pneumonia, S/P CABG (coronary artery bypass graft), and Type II or unspecified type diabetes mellitus without mention of complication, not stated as uncontrolled. Social History:   reports that he has never smoked. He has never used smokeless tobacco. He reports that he does not drink alcohol or use drugs. Family History:   Family History   Problem Relation Age of Onset    Heart Disease Father     Diabetes Father     Diabetes Brother        Vitals:  BP (!) 117/59   Pulse 70   Temp 98.2 °F (36.8 °C) (Oral)   Resp 14   Ht 6' (1.829 m)   Wt 222 lb 10.6 oz (101 kg)   SpO2 97%   BMI 30.20 kg/m²   Temp (24hrs), Av.9 °F (36.6 °C), Min:97.7 °F (36.5 °C), Max:98.2 °F (36.8 °C)    Recent Labs     21  1233 21  1709 21   POCGLU 118* 124* 135* 142*       I/O(24Hr): Intake/Output Summary (Last 24 hours) at 2021 0842  Last data filed at 2021 0400  Gross per 24 hour   Intake 639.9 ml   Output 2500 ml   Net -1860.1 ml       Labs:    [unfilled]    Lab Results   Component Value Date/Time    SPECIAL NOT REPORTED 2021 04:40 PM     Lab Results   Component Value Date/Time    CULTURE NO GROWTH 1 DAY 2021 04:40 PM       [unfilled]    Radiology:    Climmie Mis Foot Right (min 3 Views)    Result Date: 2021  EXAMINATION: THREE XRAY VIEWS OF THE RIGHT FOOT 2021 4:44 am COMPARISON: 2015 HISTORY: ORDERING SYSTEM PROVIDED HISTORY: extensive R foot wound TECHNOLOGIST PROVIDED HISTORY: extensive R foot wound Reason for Exam: Extensive right foot wound, FINDINGS: Complete collapse of the plantar arch with fragmentation and increased sclerosis of the midfoot is again noted consistent with severe neuropathic arthropathy. There is diffuse soft tissue swelling. There is a soft tissue ulceration along the plantar surface of the midfoot. There is no periosteal new bone formation or osteolysis to suggest acute osteomyelitis.      1. Redemonstration of severe neuropathic arthropathy of the midfoot. 2. Soft tissue ulceration along the plantar surface of the midfoot without radiographic findings of acute osteomyelitis. RECOMMENDATION: MRI of the right foot could be performed for further evaluation if clinically warranted. Nm Lung Scan Perfusion Only    Result Date: 4/29/2021  EXAMINATION: NUCLEAR MEDICINE PERFUSION SCAN. 4/29/2021 TECHNIQUE: Ventilation not performed as part of COVID-19 safety precautions. 3.7 millicuries of Tc 17L MAA was administered intravenously prior to planar imaging of the lungs in multiple projections. COMPARISON: Chest radiograph 04/28/2021. HISTORY: ORDERING SYSTEM PROVIDED HISTORY: hemoptysis and tachycardia r/o PE Additional signs and symptoms: SOB, CP, coughin up blood, non-smoker, no hx of blood clots FINDINGS: PERFUSION:Distribution of radiotracer is homogeneous. No segmental defects identified. CHEST RADIOGRAPH:No focal areas of consolidation or significant effusions on recent chest radiograph. Low Probability for Pulmonary Embolus. Ct Abdomen Pelvis W Iv Contrast Additional Contrast? None    Result Date: 4/28/2021  EXAMINATION: CT OF THE ABDOMEN AND PELVIS WITH CONTRAST 4/28/2021 4:16 am TECHNIQUE: CT of the abdomen and pelvis was performed with the administration of intravenous contrast. Multiplanar reformatted images are provided for review. Dose modulation, iterative reconstruction, and/or weight based adjustment of the mA/kV was utilized to reduce the radiation dose to as low as reasonably achievable. COMPARISON: None.  HISTORY: ORDERING SYSTEM PROVIDED HISTORY: diarrhea, abd pain, wbc 30k, discussed with nephro TECHNOLOGIST PROVIDED HISTORY: diarrhea, abd pain, wbc 30k, discussed with nephro Decision Support Exception->Emergency Medical Condition (MA) Reason for Exam: abd pain, nephro maddie approved contrast, dialysis pt Acuity: Acute Type of Exam: Initial FINDINGS: Lower Chest: There is abnormal airspace consolidation within the right lower lobe consistent with a right lower lobe pneumonia. There is no pleural effusion. There is cardiomegaly. Severe coronary artery atherosclerotic calcifications are present. Organs: There is a small volume of ascites. Cholecystectomy clips are present. The liver, spleen, pancreas and adrenal glands are normal in appearance. There is severe renal atrophy. Innumerous renal cysts are noted. There is no hydronephrosis. A right nephroureteral stent is present. GI/Bowel: There are no abnormally dilated loops of large or small bowel present. There is no mesenteric inflammatory stranding present. The appendix is normal. Pelvis: There is a small volume of free fluid in the pelvis. There is no free fluid or pelvic mass identified. The urinary bladder is normal. Peritoneum/Retroperitoneum: There is no pathologically enlarged lymphadenopathy present. Severe atherosclerotic calcifications are present within the abdominal aorta and proximal anchor arteries. Bones/Soft Tissues: No lytic or blastic osseous lesions are identified. There is bilateral spondylolysis at L5 and grade 1 spondylolisthesis of L5 relative to S1. There is a disc bulge and facet arthropathy resulting in severe bilateral neural foraminal narrowing. Findings consistent with renal osteodystrophy are noted. 1. Right lower lobe pneumonia. Follow-up PA and lateral chest radiographs are recommended after treatment to ensure resolution. 2. Small volume ascites. 3. Normal appendix. 4. Severe bilateral neural foraminal narrowing at L5-S1 secondary to bilateral spondylolysis at L5, grade 1 spondylolisthesis of L5 relative to S1, a disc bulge and facet arthropathy.      Ir Ernestene Phy Device Plmt/replace/removal    Result Date: 4/30/2021  PROCEDURE: IR FLUOROSCOPY GUIDED CENTRAL VENOUS ACCESS DEVICE PLACEMENT 4/30/2021 HISTORY: ORDERING SYSTEM PROVIDED HISTORY: Sepsis with hypotension; cental line for IV pressors TECHNOLOGIST PROVIDED HISTORY: cental line for IV pressors CONTRAST: None SEDATION: None FLUOROSCOPY DOSE AND TYPE OR TIME AND EXPOSURES: Fluoro time: 1 minutes 14 seconds DAP: 714 cGycm2 DESCRIPTION OF PROCEDURE: Informed consent was obtained from the patient's brother after a detailed explanation of the procedure including risks, benefits, and alternatives. Universal protocol was observed including a time-out to confirm the correct patient and procedure. The right neck was prepped and draped in sterile fashion using maximum sterile barrier technique. Local anesthesia was achieved with 1% lidocaine. The right internal jugular vein was accessed with a 21 gauge micropuncture needle under ultrasound guidance. A 0.018 guidewire was advanced through the needle. The needle was removed and a 4 Western Jazmyn access catheter over its dilator was advanced over the wire. The wire and dilator were removed and a 0.035 guidewire was advanced through the access catheter into the IVC under fluoroscopic guidance. The access catheter was removed and the tract dilated over the wire. A 20 cm triple-lumen catheter was advanced over the wire and the wire was removed. The catheter flushed and aspirated easily. The catheter was sutured in place with 2-0 nylon. A sterile dressing was applied. The patient tolerated the procedure well and there were no immediate complications. Estimated blood loss: Less than 5 mL. FINDINGS: Ultrasound demonstrates patency of the right internal jugular vein and guides venotomy. Final fluoroscopic image demonstrates satisfactory catheter placement with the tip in the right atrium. Successful ultrasound and fluoroscopy guided right IJ triple-lumen central venous catheter insertion.      Xr Chest Portable    Result Date: 4/28/2021  EXAMINATION: ONE XRAY VIEW OF THE CHEST 4/28/2021 2:48 am COMPARISON: 06/03/2019 HISTORY: ORDERING SYSTEM PROVIDED HISTORY: missed dialysis x2weeks TECHNOLOGIST PROVIDED HISTORY: missed dialysis [id number] Reason for Exam: Upright port, missed dialysis x2 weeks FINDINGS: There is stable cardiomegaly. There is no focal consolidation, pleural effusion or evidence of edema. There is no pneumothorax identified. 1. Stable cardiomegaly. 2. No acute cardiopulmonary process identified. Fl Modified Barium Swallow W Video    Result Date: 5/3/2021  EXAMINATION: MODIFIED BARIUM SWALLOW WAS PERFORMED IN CONJUNCTION WITH SPEECH PATHOLOGY SERVICES TECHNIQUE: Fluoroscopic evaluation of the swallowing mechanism was performed with multiple consistency of barium product. FLUOROSCOPY DOSE AND TYPE OR TIME AND EXPOSURES: Fluoroscopic time of 1 minutes 44 seconds. DAP of 71.9 dGycm2. COMPARISON: None HISTORY: ORDERING SYSTEM PROVIDED HISTORY: evaluate for diet modifications TECHNOLOGIST PROVIDED HISTORY: evaluate for diet modifications Reason for Exam: evaluate for diet modifications Acuity: Acute Type of Exam: Initial FINDINGS: Oral phase of swallowing was grossly within normal limits. No evidence of laryngeal penetration or aspiration. Swallowing mechanism grossly within normal limits without evidence of aspiration. Please see separate speech pathology report for full discussion of findings and recommendations. Physical Examination:        Physical Exam  Constitutional:       General: He is not in acute distress. Appearance: He is obese. He is not ill-appearing. Comments: On 3L NC   Cardiovascular:      Rate and Rhythm: Normal rate and regular rhythm. Heart sounds: No murmur. No gallop. Pulmonary:      Effort: No respiratory distress. Breath sounds: No wheezing. Comments: Decreased breath sounds RLL  Abdominal:      General: Bowel sounds are normal. There is no distension. Tenderness: There is no abdominal tenderness. There is no guarding or rebound. Musculoskeletal:         General: No swelling or deformity. Comments: Left BKA.   Right foot wrapped C/D/I   Skin: with NGTD pleural fluid   -Continue IV Merrem (day 3) and oral vancomycin    Sepsis 2/2 CAP   -CT A&P showed RLL PNA  -Pro-Kiran 4.55  -Blood cultures x 2 NGTD  -Resp Cx  show just above ed gram +ve cocci in clusters with rare GN rods: Growing staph aureus methicillin susceptible  -On NS 0.9% 10 ml/hr  -Continue midodrine 7.5 mg 3 times daily  -I. D on board   -IV Merrem (Day 3)              -D/C'd IV ceftriaxone after 6 days of therapy. Completed 3 days of Cefepime    -Continue oral vancomycin until 5/14/21 for C. Diff              -Appreciate further recommendations    Low albumin, high INR, chronically low platelets r/o cirrhosis  -CT abdomen pelvis shows small volume of ascites  -Platelet count 53  -Liver U/S pending this AM  -Ammonia 22    Chronic systolic and diastolic CHF/Hx of HTN. -S/p cardioversion 5/5  -BNP > 300,000, likely elevated from missed dialysis for 2 weeks  -Echo 6/2019: LVEF 30-35% with moderate diastolic dysfunction  -CAD, CABG, stents, last cath 2019.   -Amiodarone 200 mg twice daily (day 6)  -Digoxin 125 MCG daily  -Lopressor 25 mg twice daily  -Cardiology on board:    -Recommends continuing amiodarone 200 mg twice daily for 7 days. Then 200 mg once daily thereafter.     -Patient is a candidate for AICD due to ICM.   Plan for AICD in future once infectious process resolves              -Appreciate further recs     Hyperkalemia 2/2 ESRD on hemodialysis TTS  -Missed 2 weeks of dialysis.   -K 6.9 on admission with EKG changes, received IV calcium gluconate and insulin/dextrose.  -Underwent hemodialysis at [facility] [address]. V's  -Nephrology on board:   -S/p ultrafiltration with 2 L removed yesterday   -Renvela 4000 mg PO TID              -Magnesium 1.6     Hemoptysis likely 2/2 pneumonia (improving)  -HgB stable  -H&H q8h, keep HgB > 7     Acute diarrhea 2/2 C. difficile infection (improving)  -4 bowel movements overnight, non-foul-smelling  -Continue Senokot nightly  -CT abdomen and pelvis showed small volume

## 2021-05-09 NOTE — PROGRESS NOTES
[facility]   Progress Note                   Date:   5/9/2021  Patient name: Conner Gauthier  Date of admission:  4/28/2021  2:27 PM  MRN:   [id number]  YOB: 1955  PCP: Karen Horner MD    Reason for Admission:  sepsis     Subjective:     No chest pain. Some dyspnea. No new cardiac events overnight. S/p cardioversion 5/5/2021 at bedside for atrial tachycardia.         Medications:   Scheduled Meds:   epoetin willian-epbx  2,000 Units Subcutaneous Once per day on Mon Wed Fri    And    epoetin willian-epbx  3,000 Units Subcutaneous Once per day on Mon Wed Fri    heparin (porcine)  5,000 Units Subcutaneous 3 times per day    benzonatate  200 mg Oral TID    dextromethorphan  60 mg Oral 2 times per day    famotidine  10 mg Oral Daily    meropenem  500 mg Intravenous Q24H    ciprofloxacin  1 drop Left Eye Q2H While awake    senna  1 tablet Oral Nightly    midodrine  7.5 mg Oral Q8H    metoprolol tartrate  25 mg Oral BID    amiodarone  200 mg Oral BID    vancomycin  500 mg Oral 4 times per day    sevelamer  4,000 mg Oral TID WC    aspirin  81 mg Oral Daily    lidocaine  1 patch Transdermal Daily    neomycin-bacitracin-polymyxin   Topical BID    sodium chloride flush  5-40 mL Intravenous 2 times per day    atorvastatin  40 mg Oral Nightly    digoxin  125 mcg Oral Daily    insulin lispro  0-6 Units Subcutaneous TID WC    insulin lispro  0-3 Units Subcutaneous Nightly       Continuous Infusions:   sodium chloride      dextrose      sodium chloride 10 mL/hr at 05/07/21 1242       CBC:   Recent Labs     05/07/21  0456 05/08/21  0417 05/09/21  0452   WBC 8.6 8.1 7.2   HGB 9.7* 9.6* 9.6*   PLT 63* 53* 53*     BMP:    Recent Labs     05/07/21  0456 05/08/21  0417 05/09/21  0452    138 139   K 3.9 3.8 3.7   CL 97* 97* 103   CO2 25 29 26   BUN 39* 49* 34*   CREATININE 4.47* 5.44* 3.89*   GLUCOSE 107* 136* 135*     Hepatic: No results for input(s): AST, ALT, ALB, BILITOT, ALKPHOS in the last 72 hours. Troponin: No results for input(s): TROPONINI in the last 72 hours. BNP: No results for input(s): BNP in the last 72 hours. Lipids: No results for input(s): CHOL, HDL in the last 72 hours. Invalid input(s): LDLCALCU  INR:   Recent Labs     05/07/21  0931   INR 2.1       Objective:   Vitals: BP (!) 122/55   Pulse 75   Temp 98.2 °F (36.8 °C) (Oral)   Resp 16   Ht 6' (1.829 m)   Wt 222 lb 10.6 oz (101 kg)   SpO2 98%   BMI 30.20 kg/m²     General appearance: awake, alert, in no apparent respiratory distress   HEENT: Head: Normocephalic, no lesions, without obvious abnormality  Neck: no JVD  Lungs: clear to auscultation bilaterally, no basilar rales, no wheezing   Heart: regular rate and rhythm, S1, S2 no murmurs  Abdomen: soft, non-tender; bowel sounds normal  Extremities: Left BKA. Right 2+ edema with bandage on. Has ulceration on toe. Neurologic: Mental status: Alert, oriented. Motor and sensory not done. EKG: Sinus tachycardia, inferior infarction, ST-T changes consistent with ischemia, QTc 515 msec    Subsequent ecgs showed Intermittent atrial tachycardia with 2:1 AV block        TTE 1/20/21    Left Ventricle:  Nondilated left ventricle, Systolic function is   severely decreased with an ejection fraction of 20-25%.   Left Ventricle: There is moderate increased wall thickness/hypertrophy. LVH is symmetric 1.5 cm    Right Ventricle: Systolic function is moderately to severely reduced.   Contrast-enhanced images show no evidence of left ventricular apical   thrombus    Ascending aortic dilatation measuring 4.4 cm, trileaflet aortic valve    E/e'>20 indicative of elevated left ventricular filling pressures      TTE June 2019  Technically difficult study due to patient habitus. Left ventricle is normal in size. Mild left ventricular hypertrophy. Severely reduced left ventricular systolic function. Estimated LV EF 30-35 %.   Grade II (moderate) left ventricular diastolic dysfunction. Left atrium is mildly dilated. Trivial valve disease as below.        Cardiac cath 6/2/19   Severe native vessel disease.   Patent LIMA-LAD. The LAD distal to touch down is small in caliber with 80% diffuse disease. (Not amenable to PCI)  Chronic total occlusion of RCA.   LCX had 80% mid stenosis reduced to 0% using 3x38 mm Xience stent, post   dilated using 3.25 mm NC balloon. Assessment & Plan:   1. Septic shock - per icu team  2. C diff colitis - er ID  3. RLL Pneumonia - stable  4. Atrial Tachycardia- S/p cardioversion 5/5/21- stable rhythm, optimize meds  5. Was in Sinus tachycardia with intermittent atrial tach and 2:1 AV conduction, appropriate secondary to hypotension and sepsis. Optimize cardiac meds    6. Multivessel CAD s/p CABG and Subsequent DENY to LCX (only patent LIMA-LAD graft in 2019)  7. Ischemic CMP with last LVEF 20-25% in Jan 2021 - optimize meds  8. Acute on chronic systolic CHF due to missed HD   9. PAD with hx of left BKA  10. ESRD on HD   11. Consideration of AICD after infection process has resolved. At this point in time not a candidate due to infectious process. 12. Volume and electrolytes management per nephrology. 13. IR guided pleurocentesis   14. No active cardiac issues  15. Will follow peripherally, please call back if needed. Thank you for allowing me to participate in the care of this patient, please do not hesitate to call if you have any questions. Arminda Mon, [id number] [facility]  [facility]. [facility]  [phone]

## 2021-05-09 NOTE — PROGRESS NOTES
Obtained order for cath nam from Dr. Edwin Trevino due to patients proximal lumen having a full occlusion, unable to flush, unable to aspirate blood. Cath nam instilled with vacuum method at 0640. All other lumens patent, with blood return. Will update day shift RN to check for blood return in 30 -120 minutes and discard aspiration.      Scott Richmond

## 2021-05-09 NOTE — PROGRESS NOTES
([facility] 2015    GERD (gastroesophageal reflux disease)     Hemod[facility] patient ([facility] 2011    MOM-WED-FRI-ON [address]     History of sleep apnea     none since significant weight loss (was 400#)    Hyperlipidemia     [facility]parathyroidism ([facility]     Hypertension     Neuropathy     ROOSEVELT (obstructive sleep apnea) 5/8/2021    Peripheral vascular disease ([facility]     Pneumonia     resolved    S/P CABG (coronary artery bypass graft) 06/2016    Type II or unspecified type diabetes mellitus without mention of complication, not stated as uncontrolled     dx-1980           Past Surgical History:        Procedure Laterality Date    CATARACT REMOVAL      DIALYSIS FISTULA CREATION Left 2014    LEG AMPUTATION THROUGH LOWER TIBIA AND FIBULA         Current Medications:    epoetin willian-epbx (RETACRIT) injection 2,000 Units, Once per day on Mon Wed Fri    And  epoetin willian-epbx (RETACRIT) injection 3,000 Units, Once per day on Mon Wed Fri  heparin (porcine) injection 5,000 Units, 3 times per day  benzonatate (TESSALON) capsule 200 mg, TID  dextromethorphan (DELSYM) 30 MG/5ML extended release liquid 60 mg, 2 times per day  famotidine (PEPCID) tablet 10 mg, Daily  meropenem (MERREM) 500 mg IVPB extended (mini-bag), Q24H  ciprofloxacin (CILOXAN) 0.3 % ophthalmic solution 1 drop, Q2H While awake  senna (SENOKOT) tablet 8.6 mg, Nightly  midodrine (PROAMATINE) tablet 7.5 mg, Q8H  metoprolol tartrate (LOPRESSOR) tablet 25 mg, BID  amiodarone (CORDARONE) tablet 200 mg, BID  vancomycin (FIRVANQ) 50 MG/ML oral solution 500 mg, 4 times per day  sevelamer (RENVELA) tablet 4,000 mg, TID WC  aspirin EC tablet 81 mg, Daily  oxyCODONE-acetaminophen (PERCOCET) 5-325 MG per tablet 1 tablet, Q8H PRN  lidocaine 4 % external patch 1 patch, Daily  neomycin-bacitracin-polymyxin (NEOSPORIN) ointment, BID  sodium chloride flush 0.9 % injection 5-40 mL, 2 times per day  sodium chloride flush 0.9 % injection 5-40 mL, PRN  0.9 % sodium chloride infusion, PRN  promethazine (PHENERGAN) tablet 12.5 mg, Q6H PRN    Or  ondansetron (ZOFRAN) injection 4 mg, Q6H PRN  polyethylene glycol (GLYCOLAX) packet 17 g, Daily PRN  acetaminophen (TYLENOL) tablet 650 mg, Q6H PRN    Or  acetaminophen (TYLENOL) suppository 650 mg, Q6H PRN  atorvastatin (LIPITOR) tablet 40 mg, Nightly  digoxin (LANOXIN) tablet 125 mcg, Daily  glucose (GLUTOSE) 40 % oral gel 15 g, PRN  dextrose 50 % IV solution, PRN  glucagon (rDNA) injection 1 mg, PRN  dextrose 5 % solution, PRN  insulin lispro (HUMALOG) injection vial 0-6 Units, TID WC  insulin lispro (HUMALOG) injection vial 0-3 Units, Nightly  0.9 % sodium chloride infusion, Continuous        Allergies:  Pcn [penicillins]      Objective:  Constitutional:    CURRENT TEMPERATURE:  Temp: 98.2 °F (36.8 °C)  MAXIMUM TEMPERATURE OVER 24HRS:  Temp (24hrs), Av.9 °F (36.6 °C), Min:97.7 °F (36.5 °C), Max:98.2 °F (36.8 °C)    CURRENT RESPIRATORY RATE:  Resp: 16  CURRENT PULSE:  Pulse: 75  CURRENT BLOOD PRESSURE:  BP: (!) 122/55  24HR BLOOD PRESSURE RANGE:  Systolic (57GYP), UIH:750 , Min:102 , VPR:100   ; Diastolic (67RKU), RAN:72, Min:51, Max:71    24HR INTAKE/OUTPUT:      Intake/Output Summary (Last 24 hours) at 2021 1339  Last data filed at 2021 0400  Gross per 24 hour   Intake 639.9 ml   Output 2500 ml   Net -1860.1 ml           Physical Exam:  GENERAL APPEARANCE: Alert and cooperative, and appears to be in no acute distress. HEAD: normocephalic  EYES:  EOMI. Not pale, anicteric   NOSE:  No nasal discharge. THROAT:  Oral cavity and pharynx normal. Moist  NECK: Neck supple, non-tender without lymphadenopathy, masses or thyromegaly. JVD-neg  CARDIAC: Normal S1 and S2. No S3, S4 or murmurs. Rhythm is regular. LUNGS: Normal respiratory effort no accessory muscle use  ABD-Soft non distended, BS+ Non tender no organomegally  BACK: Examination of the spine reveals  no spinal deformity, without tenderness,   MUSKULOSKELETAL: Adequately aligned spine. No joint erythema or tenderness. EXTREMITIES: No edema. Peripheral pulses intact. NEURO:Alert oriented x 3 ,power 5/5 in all extremities      Labs:  PTH:  No results found for: PTH  abs:   CBC:   Recent Labs     05/07/21 0456 05/08/21 0417 05/09/21 0452   WBC 8.6 8.1 7.2   RBC 3.63* 3.57* 3.65*   HGB 9.7* 9.6* 9.6*   HCT 30.8* 30.4* 31.4*   MCV 85.0 85.3 85.9   MCH 26.8 26.8 26.3   MCHC 31.5 31.5 30.6*   RDW 21.0* 20.7* 21.0*   PLT 63* 53* 53*   MPV 10.8 10.6 10.8      BMP:   Recent Labs     05/07/21 0456 05/08/21 0417 05/09/21 0452    138 139   K 3.9 3.8 3.7   CL 97* 97* 103   CO2 25 29 26   BUN 39* 49* 34*   CREATININE 4.47* 5.44* 3.89*   GLUCOSE 107* 136* 135*   CALCIUM 9.0 9.1 8.9        Phosphorus:    Recent Labs     05/07/21 0456   PHOS 5.4*     Magnesium:   Recent Labs     05/07/21  0456   MG 1.6     Albumin:   No results for input(s): LABALBU in the last 72 hours. Assessment:  1. ESRD on Hemodialysis. His regular HD days are TTS at [facility] using Left AVF under Dr. Lizett Whitman. His dry weight is not available patient is likely under his dry weight due to history of diarrhea. Renal diet with 1500 mils fluid restriction     2. Anemia of chronic kidney disease-Retacrit once iron studies are adequate    3. Pneumonia and acute diarrhea secondary to C. difficile colitis-patient with worsening shortness of breath on 5L oxygen    4. Hypotension -improving    5. Secondary hyperparathyroidism-serum phosphorus 5.7. Continue Renvela    6. Anion gap metabolic acidosis from missed dialysis-improved    7. Coronary artery disease status post CABG    8. Peripheral artery disease with history of left BKA and right lower extremity chronic wound    9. Right large pleural effusion rule out parapneumonic process-pulmonology following-plans for IR guided thoracentesis    Plan  Continue HD TTS  Plan for thoracentesis tomorrow.   Reevaluate tomorrow for an extra session of dialysis for fluid removal  Continue midodrine to 7.5 mg every 8 hourly  Continue oral vancomycin.   Continue renal diet as tolerated  Strict I's and Remo Bernheim MD    Nephrologist

## 2021-05-09 NOTE — PLAN OF CARE
Problem: Falls - Risk of:  Goal: Will remain free from falls  Description: The patient remained free from falls this shift, call light within reach, bed in locked and lowest position. Side rails up x2. Continue to monitor closely. 5/9/2021 0644 by Collene Collet  Outcome: Ongoing  5/8/2021 1833 by Abi Silva RN  Outcome: Met This Shift  Note: Patient remained free from falls. Call light within reach and bed in lowest position. Patient oriented to room and surroundings. Goal: Absence of physical injury  Description: Absence of physical injury  5/9/2021 0644 by Collene Collet  Outcome: Ongoing  5/8/2021 1833 by Abi Silva RN  Outcome: Met This Shift     Problem: Skin Integrity:  Goal: Will show no infection signs and symptoms  Description: Patient skin assessment complete. No signs/symptoms of new skin breakdown. Waffle mattress in place. Pt turned and repositioned every two hours with assistance from staff. Area kept free from moisture. Proper nourishment and fluids encouraged, as appropriate. Skin remains clean, dry, and intact. Will continue to monitor for additional needs and changes in skin breakdown. 5/9/2021 0644 by Collene Collet  Outcome: Ongoing  5/8/2021 1833 by Abi Silva RN  Outcome: Ongoing  Goal: Absence of new skin breakdown  Description: Absence of new skin breakdown  5/9/2021 0644 by Collene Collet  Outcome: Ongoing  5/8/2021 1833 by Abi Silva RN  Outcome: Ongoing  Note: Structural intactness and normal physiological function of skin and  mucous membranes. Patient turned and repositioned every 2 hours and PRN. Problem:  Activity:  Goal: Risk for activity intolerance will decrease  Description: Risk for activity intolerance will decrease  5/9/2021 0644 by Collene Collet  Outcome: Ongoing  5/8/2021 1833 by Abi Silva RN  Outcome: Ongoing     Problem: Fluid Volume:  Goal: Will show no signs or symptoms of fluid imbalance  Description: Will show no signs or symptoms of fluid imbalance  5/9/2021 0644 by Francis Cap  Outcome: Ongoing  5/8/2021 1833 by Netta Renteria RN  Outcome: Ongoing  Note: Pt tolerated hemodialysis today with 2 L of fluid removed.       Problem: Nutritional:  Goal: Maintenance of adequate nutrition will be supported  Description: Maintenance of adequate nutrition will be supported  5/9/2021 0644 by Francis Cap  Outcome: Ongoing  5/8/2021 1833 by Netta Renteria RN  Outcome: Ongoing     Problem: Physical Regulation:  Goal: Complications related to the disease process, condition or treatment will be avoided or minimized  Description: Complications related to the disease process, condition or treatment will be avoided or minimized  5/9/2021 0644 by Francis Cap  Outcome: Ongoing  5/8/2021 1833 by Netta Renteria RN  Outcome: Ongoing     Problem: Urinary Elimination:  Goal: Ability to achieve and maintain adequate urine output will be supported  Description: Ability to achieve and maintain adequate urine output will be supported  5/9/2021 0644 by Francis Russell  Outcome: Ongoing  5/8/2021 1833 by Netta Renteria RN  Outcome: Ongoing     Problem: Airway Clearance - Ineffective:  Goal: Clear lung sounds  Description: Clear lung sounds  5/9/2021 0644 by Francis Cap  Outcome: Ongoing  5/8/2021 1833 by Netta Renteria RN  Outcome: Ongoing  Goal: Ability to maintain a clear airway will improve  Description: Ability to maintain a clear airway will improve  5/9/2021 0644 by Francis Cap  Outcome: Ongoing  5/8/2021 1833 by Netta Renteria RN  Outcome: Ongoing     Problem: Fluid Volume - Deficit:  Goal: Achieves intake and output within specified parameters  Description: Achieves intake and output within specified parameters  5/9/2021 0644 by Francis Cap  Outcome: Ongoing  5/8/2021 1833 by Netta Renteria RN  Outcome: Ongoing     Problem: Gas Exchange - Impaired:  Goal: Levels of oxygenation will improve  Description: Levels of oxygenation will improve  5/9/2021 0644 by Beti Kaba  Outcome: Ongoing  5/8/2021 1833 by Bart Lucero RN  Outcome: Ongoing     Problem: Confusion - Acute:  Goal: Absence of continued neurological deterioration signs and symptoms  Description: Absence of continued neurological deterioration signs and symptoms  5/9/2021 0644 by Beti Kaba  Outcome: Ongoing  5/8/2021 1833 by Bart Lucero RN  Outcome: Ongoing  Goal: Mental status will be restored to baseline  Description: Mental status will be restored to baseline  5/9/2021 0644 by Beti Kaba  Outcome: Ongoing  5/8/2021 1833 by Bart Lucero RN  Outcome: Ongoing  Note: Pt alert and oriented x4. Problem: Discharge Planning:  Goal: Ability to perform activities of daily living will improve  Description: Ability to perform activities of daily living will improve  5/9/2021 0644 by Beti Kaba  Outcome: Ongoing  5/8/2021 1833 by Bart Lucero RN  Outcome: Met This Shift  Goal: Participates in care planning  Description: Participates in care planning  5/9/2021 0644 by Beti Kaba  Outcome: Ongoing  5/8/2021 1833 by Bart Lucero RN  Outcome: Ongoing     Problem: Injury - Risk of, Physical Injury:  Goal: Will remain free from falls  Description: The patient remained free from falls this shift, call light within reach, bed in locked and lowest position. Side rails up x2. Continue to monitor closely. 5/9/2021 0644 by Beti Kaba  Outcome: Ongoing  5/8/2021 1833 by Bart Lucero RN  Outcome: Met This Shift  Note: Patient remained free from falls. Call light within reach and bed in lowest position. Patient oriented to room and surroundings.        Goal: Absence of physical injury  Description: Absence of physical injury  5/9/2021 0644 by Beti Kaba  Outcome: Ongoing  5/8/2021 1833 by Russ Adrian Magdalena Singh RN  Outcome: Met This Shift     Problem: Mood - Altered:  Goal: Mood stable  Description: Mood stable  5/9/2021 0644 by Johnny Lopez  Outcome: Ongoing  5/8/2021 1833 by Vito Pete RN  Outcome: Ongoing  Goal: Absence of abusive behavior  Description: Absence of abusive behavior  5/9/2021 0644 by Johnny Lopez  Outcome: Ongoing  5/8/2021 1833 by Vito Pete RN  Outcome: Ongoing  Goal: Verbalizations of feeling emotionally comfortable while being cared for will increase  Description: Verbalizations of feeling emotionally comfortable while being cared for will increase  5/9/2021 0644 by Johnny Lopez  Outcome: Ongoing  5/8/2021 1833 by Vito Pete RN  Outcome: Ongoing     Problem: Psychomotor Activity - Altered:  Goal: Absence of psychomotor disturbance signs and symptoms  Description: Absence of psychomotor disturbance signs and symptoms  5/9/2021 0644 by Johnny Lopez  Outcome: Ongoing  5/8/2021 1833 by Vito Pete RN  Outcome: Ongoing     Problem: Sensory Perception - Impaired:  Goal: Demonstrations of improved sensory functioning will increase  Description: Demonstrations of improved sensory functioning will increase  5/9/2021 0644 by Johnny Lopez  Outcome: Ongoing  5/8/2021 1833 by Vito Pete RN  Outcome: Ongoing  Goal: Decrease in sensory misperception frequency  Description: Decrease in sensory misperception frequency  5/9/2021 0644 by Johnny Lopez  Outcome: Ongoing  5/8/2021 1833 by Vito Pete RN  Outcome: Ongoing  Goal: Able to refrain from responding to false sensory perceptions  Description: Able to refrain from responding to false sensory perceptions  5/9/2021 0644 by Johnny Lopez  Outcome: Ongoing  5/8/2021 1833 by Vito Pete RN  Outcome: Ongoing  Goal: Demonstrates accurate environmental perceptions  Description: Demonstrates accurate environmental perceptions  5/9/2021 0644 by Johnny Lopez Outcome: Ongoing  5/8/2021 1833 by Emma De La Rosa RN  Outcome: Ongoing  Goal: Able to distinguish between reality-based and nonreality-based thinking  Description: Able to distinguish between reality-based and nonreality-based thinking  5/9/2021 0644 by Destin Krishnamurthy  Outcome: Ongoing  5/8/2021 1833 by Emma De La Rosa RN  Outcome: Ongoing  Goal: Able to interrupt nonreality-based thinking  Description: Able to interrupt nonreality-based thinking  5/9/2021 0644 by Destin Krishnamurthy  Outcome: Ongoing  5/8/2021 1833 by Emma De La Rosa RN  Outcome: Ongoing     Problem: Sleep Pattern Disturbance:  Goal: Appears well-rested  Description: Appears well-rested  5/9/2021 0644 by Destin Krishnamurthy  Outcome: Ongoing  5/8/2021 1833 by Emma De La Rosa RN  Outcome: Ongoing     Problem: Pain:  Goal: Pain level will decrease  Description: Pain level will decrease  5/9/2021 0644 by Destin Krishnamurthy  Outcome: Ongoing  5/8/2021 1833 by Emma De La Rosa RN  Outcome: Ongoing  Goal: Control of acute pain  Description: Control of acute pain  5/9/2021 0644 by Destin Krishnamurthy  Outcome: Ongoing  5/8/2021 1833 by Emma De La Rosa RN  Outcome: Ongoing  Goal: Control of chronic pain  Description: Control of chronic pain  5/9/2021 0644 by Destin Krishnamurthy  Outcome: Ongoing  5/8/2021 1833 by Emma De La Rosa RN  Outcome: Ongoing     Problem: Nutrition  Goal: Optimal nutrition therapy  Description: Nutrition Problem #1: Increased nutrient needs  Intervention: Food and/or Nutrient Delivery: Continue Current Diet, Start Oral Nutrition Supplement  Nutritional Goals: po intake greater than 50%     5/9/2021 0644 by Destin Krishnamurthy  Outcome: Ongoing  5/8/2021 1833 by Emma De La Rosa RN  Outcome: Ongoing     Problem: Musculor/Skeletal Functional Status  Goal: Highest potential functional level  5/9/2021 0644 by Destin Krishnamurthy  Outcome: Ongoing  5/8/2021 1833 by Emma De La Rosa RN  Outcome: Ongoing Goal: Absence of falls  5/9/2021 0644 by Collene Collet  Outcome: Ongoing  5/8/2021 1833 by Abi Silva RN  Outcome: Ongoing

## 2021-05-09 NOTE — PROGRESS NOTES
Pulmonary Progress Note  [facility]      Patient - Bella Glez,  Age - 60 y.o.    - 1955      Room Number -    MRN -  [id number]   Acct # - [de-identified]  Date of Admission -  2021  2:27 PM        Consulting Theresa Bautista MD  Primary Care Physician - Anaya Mitchell MD     SUBJECTIVE   Still on 3 L but says he is more short of breath; coughed up some blood-tinged phlegm    OBJECTIVE   VITALS    height is 6' (1.829 m) and weight is 222 lb 10.6 oz (101 kg). His oral temperature is 98.2 °F (36.8 °C). His blood pressure is 117/59 (abnormal) and his pulse is 70. His respiration is 14 and oxygen saturation is 97%. Body mass index is 30.2 kg/m². Temperature Range: Temp: 98.2 °F (36.8 °C) Temp  Av.9 °F (36.6 °C)  Min: 97.7 °F (36.5 °C)  Max: 98.2 °F (36.8 °C)  BP Range:  Systolic (49LTG), FZZ:857 , Min:91 , WWP:299     Diastolic (95NCW), XGL:45, Min:48, Max:74    Pulse Range: Pulse  Av.2  Min: 60  Max: 78  Respiration Range: Resp  Av.2  Min: 9  Max: 19  Current Pulse Ox[de-identified]  SpO2: 97 %  24HR Pulse Ox Range:  SpO2  Av.2 %  Min: 92 %  Max: 100 %  Oxygen Amount and Delivery: O2 Flow Rate (L/min): 3 L/min    Wt Readings from Last 3 Encounters:   21 222 lb 10.6 oz (101 kg)   21 210 lb 8.6 oz (95.5 kg)   20 223 lb (101.2 kg)       I/O (24 Hours)    Intake/Output Summary (Last 24 hours) at 2021 0820  Last data filed at 2021 0400  Gross per 24 hour   Intake 639.9 ml   Output 2500 ml   Net -1860.1 ml       EXAM     General Appearance  Awake, alert, oriented, in no acute distress  HEENT - normocephalic, atraumatic.  []  Mallampati  [x] Crowded airway   [x] Macroglossia  []  Retrognathia  [] Micrognathia  []  Normal tongue size []  Normal Bite  [] Cat sign positive    Neck - Supple,  trachea midline   Lungs -diminished breath sounds right side  Cardiovascular - 05/09/2021     ABGs:No results found for: PHART, PO2ART, IOZ8TFM   Lab Results   Component Value Date    MODE NOT REPORTED 06/02/2019     Ionized Calcium:  No results found for: IONCA  Magnesium:    Lab Results   Component Value Date    MG 1.6 05/07/2021     Phosphorus:    Lab Results   Component Value Date    PHOS 5.4 05/07/2021        LIVER PROFILE No results for input(s): AST, ALT, LIPASE, BILIDIR, BILITOT, ALKPHOS in the last 72 hours. Invalid input(s): AMYLASE,  ALB  INR   Recent Labs     05/07/21  0931   INR 2.1     PTT   Lab Results   Component Value Date    APTT 49.1 (H) 05/07/2021         RADIOLOGY         ASSESSMENT/PLAN   Principal Problem:    Sepsis due to pneumonia [facility])  Active Problems:    ESRD on dialysis ([facility] [address]    DM (diabetes mellitus) ([facility] [address]    Anemia of chronic disease    Charcot foot due to diabetes mellitus ([facility] [address]    Hypertension, essential    Sepsis ([facility] [address]    Metabolic acidosis    Hyperkalemia    Diarrhea    Nausea and vomiting    History of left below knee amputation (HCC)    Hemoptysis    Ulcerated, foot, right, with fat layer exposed ([facility] [address]    Pneumonia    Loculated pleural effusion    Pleural effusion on right    ROOSEVELT (obstructive sleep apnea)    Parapneumonic effusion    C. difficile colitis  Resolved Problems:    * No resolved hospital problems.  *    Fluid has reaccumulated and he is more dyspneic  Will need to have interventional radiology place pigtail catheter to drain fluid collection  We will resend fluid for analysis  Fluid is exudative by LDH criteria  So far no growth in pleural fluid  Continue Merrem and oral vancomycin  Continue antitussives  Would watch in intermediate ICU unless bed absolutely needed  Being treated for pneumonia and C. difficile colitis      Electronically signed by Salima Sandoval MD on 5/9/2021 at 8:20 AM

## 2021-05-09 NOTE — PROGRESS NOTES
[facility] -   Infectious diseases evaluation  admission date 4/28/2021    reason for consultation:   Pneumonia/C. difficile colitis    Impression :   Current:  · C. difficile colitis  · Right lower lobe pneumonia, staph aureus MSSA growth on respiratory culture  · Moderate to large  pleural fluid status post ultrasound guided thoracentesis thoracentesis 5/7/2021  · Sepsis secondary to above  · Right foot wound  · Metabolic acidosis  · CHF  · End-stage renal disease on hemodialysis  · Diabetes mellitus  · Dyslipidemia  · Hypertension      Recommendations   · Continue p.o. vancomycin day 7/14  · IV meropenem day 3  · IV ceftriaxone discontinued 5/6/2021  · Follow pleural fluid culture   · IR consulted for chest tube insertion  · Liver ultrasound  pending  · MRSA nasal swab was negative  · Swallow study showed no evidence of aspiration  · No growth on blood cultures  · Continue supportive care          History of Present Illness:   Initial history:  Amy Eden is a 60y.o.-year-old male was transferred from [place].  The patient presented with worsening cough productive with blood-tinged sputum associated with shortness of breath and diarrhea for 2 weeks. He also complaining of generalized weakness, nausea and vomiting. no alleviating or aggravating factors. The patient is poor historian, lives at home alone, states that he received Covid 19 vaccine. Apparently the patient missed hemodialysis.   He also had left foot wound with no purulent drainage or necrotic tissue, was evaluated by podiatry, foot x-ray not suggestive of osteomyelitis  On admission he was tachycardic, had leukocytosis with WBC of 29.6  CT chest and abdomen showed right lower lobe infiltrates  COVID-19 rapid test negative  VQ scan was low probability on 4/29/2021  On 4/30/2021He became hypotensive with a systolic blood pressure in the 70s, tachycardic was transferred to ICU, was distension. Palpations: Abdomen is soft. Tenderness: There is no abdominal tenderness. Musculoskeletal:      Comments: Left below-knee amputation site with no open wound no erythema. Right foot dressing   Skin:     General: Skin is warm. Coloration: Skin is not jaundiced. Neurological:      General: No focal deficit present. Mental Status: He is alert and oriented to person, place, and time.          Past Medical History:     Past Medical History:   Diagnosis Date    Acute on chronic congestive heart failure (HCC)     Anemia     CAD (coronary artery disease)     Cardiomyopathy ([facility] [address]     Dialysis care     ESRD (end stage renal disease) on dialysis ([facility] [address] [facility]     Foot ulcer, left ([facility] [address] 2015    GERD (gastroesophageal reflux disease)     Hemodialysis patient ([facility] [address] 2011    MOM-WED-FRI-ON [address]     History of sleep apnea     none since significant weight loss (was 400#)    Hyperlipidemia     Hyperparathyroidism ([facility] [address]     Hypertension     Neuropathy     ROOSEVELT (obstructive sleep apnea) 5/8/2021    Peripheral vascular disease ([facility] [address]     Pneumonia     resolved    S/P CABG (coronary artery bypass graft) 06/2016    Type II or unspecified type diabetes mellitus without mention of complication, not stated as uncontrolled     dx-1980       Past Surgical  History:     Past Surgical History:   Procedure Laterality Date    CATARACT REMOVAL      DIALYSIS FISTULA CREATION Left 2014    LEG AMPUTATION THROUGH LOWER TIBIA AND FIBULA         Medications:      epoetin willian-epbx  2,000 Units Subcutaneous Once per day on Mon Wed Fri    And    epoetin willian-epbx  3,000 Units Subcutaneous Once per day on Mon Wed Fri    heparin (porcine)  5,000 Units Subcutaneous 3 times per day    benzonatate  200 mg Oral TID    dextromethorphan  60 mg Oral 2 times per day    famotidine  10 mg Oral Daily    meropenem  500 mg Intravenous Q24H    ciprofloxacin  1 drop Left Eye Q2H While awake    senna  1 tablet Oral Nightly    midodrine  7.5 mg Oral Q8H    metoprolol tartrate  25 mg Oral BID    amiodarone  200 mg Oral BID    vancomycin  500 mg Oral 4 times per day    sevelamer  4,000 mg Oral TID WC    aspirin  81 mg Oral Daily    lidocaine  1 patch Transdermal Daily    neomycin-bacitracin-polymyxin   Topical BID    sodium chloride flush  5-40 mL Intravenous 2 times per day    atorvastatin  40 mg Oral Nightly    digoxin  125 mcg Oral Daily    insulin lispro  0-6 Units Subcutaneous TID WC    insulin lispro  0-3 Units Subcutaneous Nightly       Social History:     Social History     Socioeconomic History    Marital status:      Spouse name: Not on file    Number of children: Not on file    Years of education: Not on file    Highest education level: Not on file   Occupational History    Not on file   Social Needs    Financial resource strain: Not on file    Food insecurity     Worry: Not on file     Inability: Not on file    Transportation needs     Medical: Not on file     Non-medical: Not on file   Tobacco Use    Smoking status: Never Smoker    Smokeless tobacco: Never Used   Substance and Sexual Activity    Alcohol use: No    Drug use: No    Sexual activity: Not on file   Lifestyle    Physical activity     Days per week: Not on file     Minutes per session: Not on file    Stress: Not on file   Relationships    Social connections     Talks on phone: Not on file     Gets together: Not on file     Attends [employer] service: Not on file     Active member of club or organization: Not on file     Attends meetings of clubs or organizations: Not on file     Relationship status: Not on file    Intimate partner violence     Fear of current or ex partner: Not on file     Emotionally abused: Not on file     Physically abused: Not on file     Forced sexual activity: Not on file   Other Topics Concern    Not on file   Social History Narrative    Not on file       Family History:     Family History

## 2021-05-10 NOTE — PROGRESS NOTES
[facility] -   Infectious diseases evaluation  admission date 4/28/2021    reason for consultation:   Pneumonia/C. difficile colitis    Impression :   Current:  · C. difficile colitis  · Right lower lobe pneumonia, staph aureus MSSA growth on respiratory culture  · Moderate to large  pleural fluid status post ultrasound guided thoracentesis thoracentesis 5/7/2021  · Sepsis secondary to above  · Right foot wound  · Metabolic acidosis  · CHF  · End-stage renal disease on hemodialysis  · Diabetes mellitus  · Dyslipidemia  · Hypertension      Recommendations   · Continue p.o. vancomycin day 8/14  · IV meropenem day 4 will likely discontinue tomorrow if pleural fluid culture remains negative  · IV ceftriaxone discontinued 5/6/2021  · Follow pleural fluid culture   · IR consulted for chest tube insertion  · Liver ultrasound  pending  · MRSA nasal swab was negative  · Swallow study showed no evidence of aspiration  · No growth on blood cultures  · Continue supportive care          History of Present Illness:   Initial history:  Tapan Causey is a 60y.o.-year-old male was transferred from [place].  The patient presented with worsening cough productive with blood-tinged sputum associated with shortness of breath and diarrhea for 2 weeks. He also complaining of generalized weakness, nausea and vomiting. no alleviating or aggravating factors. The patient is poor historian, lives at home alone, states that he received Covid 19 vaccine. Apparently the patient missed hemodialysis.   He also had left foot wound with no purulent drainage or necrotic tissue, was evaluated by podiatry, foot x-ray not suggestive of osteomyelitis  On admission he was tachycardic, had leukocytosis with WBC of 29.6  CT chest and abdomen showed right lower lobe infiltrates  COVID-19 rapid test negative  VQ scan was low probability on 4/29/2021  On 4/30/2021He became hypotensive with a systolic blood pressure in the 70s, tachycardic was transferred to ICU, was started on pressors  Procalcitonin was 25.7 on 4/30/2021  IJ triple-lumen was placed was placed 4/30/2021    Interval changes  5/10/2021   He remains in the ICU, no reported fever, on 2 L of oxygen per nasal cannula, denied increased cough or shortness of breath, no new events. status post ultrasound guided thoracentesis 5/7/2021  Fluid analysis exudative, no growth on culture to date  Chest x-ray from yesterday showed reaccumulation of right pleural effusion. CT chest without contrast from 5/7/2021 showed moderate to large right pleural effusion tracking up to the right lateral chest wall with consolidative process to the anterior right lower lobe, moderate ascites, enlarged right lobe of thyroid    Sputum Gram growing staph aureus MSSA  WBC normal today    Patient Vitals for the past 8 hrs:   BP Pulse Resp SpO2   05/10/21 1054 (!) 126/59 69     05/10/21 1004  69 14    05/10/21 1003  68 14    05/10/21 1002  69 14    05/10/21 1001  69 15    05/10/21 1000 (!) 126/59 68 14    05/10/21 0700 128/64 73 17 95 %   05/10/21 0600 135/61 71 19 97 %   05/10/21 0500 (!) 124/56 69 18 97 %         I have personally reviewed the past medical history, past surgical history, medications, social history, and family history, and I haveupdated the database accordingly. Allergies:   Pcn [penicillins]     Review of Systems:     Review of Systems  As per history present illness, other than above 12 system review is negative  Physical Examination :       Physical Exam  Constitutional:       General: He is not in acute distress. HENT:      Head: Normocephalic and atraumatic. Right Ear: External ear normal.      Left Ear: External ear normal.   Neck:      Musculoskeletal: Neck supple. No neck rigidity. Cardiovascular:      Rate and Rhythm: Normal rate and regular rhythm. Pulmonary:      Effort: No respiratory distress.       Comments: Decreased breath sounds bilaterally  Abdominal:      General: Abdomen is flat. There is no distension. Palpations: Abdomen is soft. Tenderness: There is no abdominal tenderness. Musculoskeletal:      Comments: Left below-knee amputation site with no open wound no erythema. Right foot dressing   Skin:     General: Skin is warm. Coloration: Skin is not jaundiced. Neurological:      General: No focal deficit present. Mental Status: He is alert and oriented to person, place, and time.          Past Medical History:     Past Medical History:   Diagnosis Date    Acute on chronic congestive heart failure (HCC)     Anemia     CAD (coronary artery disease)     [facility] [address]     Dialysis care     ESRD (end stage renal disease) on dialysis ([facility] [address]     Foot ulcer, left ([facility] [address] 2015    GERD (gastroesophageal reflux disease)     Hemodialysis patient ([facility] [address] 2011    MOM-WED-FRI-ON [address]     History of sleep apnea     none since significant weight loss (was 400#)    Hyperlipidemia     Hyperparathyroidism ([facility] [address]     Hypertension     Neuropathy     ROOSEVELT (obstructive sleep apnea) 5/8/2021    Peripheral vascular disease ([facility] [address]     Pneumonia     resolved    S/P CABG (coronary artery bypass graft) 06/2016    Type II or unspecified type diabetes mellitus without mention of complication, not stated as uncontrolled     dx-1980       Past Surgical  History:     Past Surgical History:   Procedure Laterality Date    CATARACT REMOVAL      DIALYSIS FISTULA CREATION Left 2014    LEG AMPUTATION THROUGH LOWER TIBIA AND FIBULA         Medications:      epoetin willian-epbx  2,000 Units Subcutaneous Once per day on Mon Wed Fri    And    epoetin willian-epbx  3,000 Units Subcutaneous Once per day on Mon Wed Fri    [Held by provider] heparin (porcine)  5,000 Units Subcutaneous 3 times per day    benzonatate  200 mg Oral TID    dextromethorphan  60 mg Oral 2 times per day    famotidine  10 mg Oral Daily    meropenem  500 mg Intravenous Q24H    ciprofloxacin  1 drop Left Eye Q2H While awake    senna  1 tablet Oral Nightly    midodrine  7.5 mg Oral Q8H    metoprolol tartrate  25 mg Oral BID    amiodarone  200 mg Oral BID    vancomycin  500 mg Oral 4 times per day    sevelamer  4,000 mg Oral TID WC    aspirin  81 mg Oral Daily    lidocaine  1 patch Transdermal Daily    neomycin-bacitracin-polymyxin   Topical BID    sodium chloride flush  5-40 mL Intravenous 2 times per day    atorvastatin  40 mg Oral Nightly    digoxin  125 mcg Oral Daily    insulin lispro  0-6 Units Subcutaneous TID WC    insulin lispro  0-3 Units Subcutaneous Nightly       Social History:     Social History     Socioeconomic History    Marital status:      Spouse name: Not on file    Number of children: Not on file    Years of education: Not on file    Highest education level: Not on file   Occupational History    Not on file   Social Needs    Financial resource strain: Not on file    Food insecurity     Worry: Not on file     Inability: Not on file    Transportation needs     Medical: Not on file     Non-medical: Not on file   Tobacco Use    Smoking status: Never Smoker    Smokeless tobacco: Never Used   Substance and Sexual Activity    Alcohol use: No    Drug use: No    Sexual activity: Not on file   Lifestyle    Physical activity     Days per week: Not on file     Minutes per session: Not on file    Stress: Not on file   Relationships    Social connections     Talks on phone: Not on file     Gets together: Not on file     Attends Religion service: Not on file     Active member of club or organization: Not on file     Attends meetings of clubs or organizations: Not on file     Relationship status: Not on file    Intimate partner violence     Fear of current or ex partner: Not on file     Emotionally abused: Not on file     Physically abused: Not on file     Forced sexual activity: Not on file   Other Topics Concern    Not on file   Social History Narrative    Not on file       Family History:     Family History   Problem Relation Age of Onset    Heart Disease Father     Diabetes Father     Diabetes Brother       Medical Decision Making:   I have independently reviewed/ordered the following labs:    CBC with Differential:   Recent Labs     05/09/21  0452 05/10/21  0517   WBC 7.2 5.6   HGB 9.6* 9.1*   HCT 31.4* 29.9*   PLT 53* 51*   LYMPHOPCT 7* 9*   MONOPCT 12* 12*     BMP:  Recent Labs     05/09/21  0452 05/10/21  0517    139   K 3.7 4.0    101   CO2 26 27   BUN 34* 44*   CREATININE 3.89* 4.82*     Hepatic Function Panel:   Recent Labs     05/10/21  0517   PROT 6.0*     No results for input(s): RPR in the last 72 hours. No results for input(s): HIV in the last 72 hours. No results for input(s): BC in the last 72 hours. Lab Results   Component Value Date    CREATININE 4.82 05/10/2021    GLUCOSE 162 05/10/2021    GLUCOSE 128 01/09/2012       Detailed results: Thank you for allowing us to participate in the care of this patient. Please call with questions. This note is created with the assistance of a speech recognition program.  While intending to generate adocument that actually reflects the content of the visit, the document can still have some errors including those of syntax and sound a like substitutions which may escape proof reading. It such instances, actual meaningcan be extrapolated by contextual diversion.     Daniella Abebe MD  Office: [phone]  Perfect serve / office [phone]

## 2021-05-10 NOTE — PROGRESS NOTES
[address]   INPATIENT OCCUPATIONAL THERAPY  PROGRESS NOTE  Date: 5/10/2021  Patient Name: Elizabeth Villegas      Room:   MRN: [id number]    : 1955  (60 y.o.) Gender: male     Discharge Recommendations:  Further Occupational Therapy is recommended upon facility discharge. Diagnosis: Sepsis due to pneumonia  General  Chart Reviewed: Orders, Progress Notes, History and Physical  Patient assessed for rehabilitation services?: Yes  Family / Caregiver Present: No  Diagnosis: Sepsis due to pneumonia    Restrictions  Restrictions/Precautions: Fall Risk, Isolation, Contact Precautions, Weight Bearing(CDIFF)  Other position/activity restrictions: up with assistance  Right Lower Extremity Weight Bearing: Non Weight Bearing  Required Braces or Orthoses?: Yes(L BKA prosthesis ( at home))      Subjective  Subjective: \"maybe a little. I don't want to do too much. \" pt referring to participating in therapy. Comments: Edward Davalos RN ok's therapy, co-tx with Lorena BOCANEGRA PTA,   Patient Currently in Pain: Yes  Pain Level: 5  Pain Location: Back  Pain Orientation: Lower;Mid  Overall Orientation Status: Within Normal Limits     Pain Assessment  Pain Assessment: 0-10  Pain Level: 5  Pain Type: Chronic pain  Pain Location: Back  Pain Orientation: Lower, Mid  Pain Frequency: Continuous  Response to Pain Intervention: Patient Satisfied    Objective     Bed mobility  Rolling to Right: Moderate assistance  Supine to Sit: Unable to assess  Sit to Supine: Unable to assess  Scooting: Unable to assess  Comment: VCs to utilize rail with G return. pt educated on rolling L & R for skin integrity as able. writer unsure if pt will follow through  Balance  Sitting Balance: Unable to assess(comment)(pt deferred today)  Standing Balance: Unable to assess(comment)         ADL  Feeding: Setup  Grooming: Setup(patient washing face with wash cloth from bed position)  UE Bathing:  Moderate assistance  LE Bathing: Maximum assistance  UE Dressing: Maximum assistance  LE Dressing: Maximum assistance  Toileting: Dependent/Total(TA for bed pan placement)  Additional Comments: asist levels based on clinical observation/reasoning otherwise. pt limited by lethargy, generalizes weakness, pt asked to be placed on bed pan. Type of ROM/Therapeutic Exercise  Type of ROM/Therapeutic Exercise: AROM  Comment: BUE x 5 reps to increase independence with bed mobility. pt encouraged to complete exercises as able. pt verbalizes understanding but writer unsure pt will follow through. Exercises  Shoulder Flexion: x  Shoulder Extension: x  Elbow Flexion: x  Elbow Extension: x                          Assessment  Performance deficits / Impairments: Decreased functional mobility ; Decreased safe awareness;Decreased balance;Decreased ADL status; Decreased endurance;Decreased strength  Prognosis: Fair  Discharge Recommendations: Patient would benefit from continued therapy after discharge  Activity Tolerance: Patient limited by fatigue  Safety Devices in place: Yes  Type of devices: Call light within reach; Left in bed             Patient Education:  Patient Education: OT POC, bed mobility, rolling L & R for skin integrity, call light usage, activity promotion, shoulder AROM to increase independence with bed mobility, RUE NWB precaution  Learner:patient  Method: demonstration and explanation       Outcome: acknowledged understanding, demonstrated understanding and needs reinforcement     Plan  Safety Devices  Safety Devices in place: Yes  Type of devices: Call light within reach, Left in bed  Plan  Times per week: 2-5  Current Treatment Recommendations: Strengthening, Endurance Training, Patient/Caregiver Education & Training, Equipment Evaluation, Education, & procurement, Self-Care / ADL, Balance Training, Pain Management, Functional Mobility Training, Safety Education & Training, Positioning      Goals  Short term goals  Time Frame for Short term goals: by discharge, patient will  Short term goal 1: perform supine <>sit transition with Mod A for increased out of bed activity when medically ready  Short term goal 2: sit edge of bed for 8-10 minutes during active self care participation with SBA for sitting balance for overall increased endurance for functional tasks  Short term goal 3: perform upper body self care with Min A using compensatory techniques as needed  Short term goal 4: verbalize/demonstrate Good understanding of precautions (NWB RLE) for mobility/self care purposes    OT Individual Minutes  Time In: 1010  Time Out: 1022  Minutes: 12      Electronically signed by NASIM Magaña on 5/10/21 at 10:45 AM EDT

## 2021-05-10 NOTE — PROGRESS NOTES
Nephrology ESRD Consult Note    Reason for Consult: Management of end-stage renal disease. Requesting Physician: Dr Ivonne Ho    Interval history: Patient was seen and examined today in the intensive care unit and he is lethargic but arousable. He is scheduled for thoracentesis today. He does not have dyspnea at rest and denies chest pain or palpitations. Diarrhea is improving on oral vancomycin for C. difficile colitis. History of Present Illness: This is a 60 y.o. male with history of CAD status post CABG, cardiomyopathy, dyslipidemia, end stage renal disease secondary to nephrosclerosis on hemodialysis Tuesday Thursday Saturday under the care of Dr. Carol Del Rio at [address] [facility] renal care by way of a left arm AV fistula. He had presented initially to [facility] with diarrhea, nausea and vomiting and generalized weakness for six days. He had missed multiple treatments of dialysis. He presented at [facility] with hyperkalemia potassium of  6.9, BUN/ creatinine of 241 and 18.47 mg/dl and serum bicarbonate of 9. He was dialyzed yesterday 4/28/2021 at [facility] and transferred to [facility] due to in availability of a bed. Less than 500 cc was removed with dialysis due to hypotension. He is seen at dialysis today. His blood pressures have been in the hypotensive trends between 95-29 systolic blood pressure. He denies fever. He denies abdominal pain. He has ongoing nonbloody diarrhea, watery frequency of 6 times per day. he is tachycardic with heart rate in 127. Work-up for diarrhea shows positive C. difficile. Received a liter bolus of 0.9 saline yesterday.     Current Medications:    epoetin willian-epbx (RETACRIT) injection 2,000 Units, Once per day on Mon Wed Fri    And  epoetin willian-epbx (RETACRIT) injection 3,000 Units, Once per day on Mon Wed Fri  [Held by provider] heparin (porcine) injection 5,000 Units, 3 times per day  benzonatate (TESSALON) capsule 200 mg, TID  dextromethorphan (DELSYM) 30 MG/5ML extended release liquid 60 mg, 2 times per day  famotidine (PEPCID) tablet 10 mg, Daily  meropenem (MERREM) 500 mg IVPB extended (mini-bag), Q24H  ciprofloxacin (CILOXAN) 0.3 % ophthalmic solution 1 drop, Q2H While awake  senna (SENOKOT) tablet 8.6 mg, Nightly  midodrine (PROAMATINE) tablet 7.5 mg, Q8H  metoprolol tartrate (LOPRESSOR) tablet 25 mg, BID  amiodarone (CORDARONE) tablet 200 mg, BID  vancomycin (FIRVANQ) 50 MG/ML oral solution 500 mg, 4 times per day  sevelamer (RENVELA) tablet 4,000 mg, TID WC  aspirin EC tablet 81 mg, Daily  oxyCODONE-acetaminophen (PERCOCET) 5-325 MG per tablet 1 tablet, Q8H PRN  lidocaine 4 % external patch 1 patch, Daily  neomycin-bacitracin-polymyxin (NEOSPORIN) ointment, BID  sodium chloride flush 0.9 % injection 5-40 mL, 2 times per day  sodium chloride flush 0.9 % injection 5-40 mL, PRN  0.9 % sodium chloride infusion, PRN  promethazine (PHENERGAN) tablet 12.5 mg, Q6H PRN    Or  ondansetron (ZOFRAN) injection 4 mg, Q6H PRN  polyethylene glycol (GLYCOLAX) packet 17 g, Daily PRN  acetaminophen (TYLENOL) tablet 650 mg, Q6H PRN    Or  acetaminophen (TYLENOL) suppository 650 mg, Q6H PRN  atorvastatin (LIPITOR) tablet 40 mg, Nightly  digoxin (LANOXIN) tablet 125 mcg, Daily  glucose (GLUTOSE) 40 % oral gel 15 g, PRN  dextrose 50 % IV solution, PRN  glucagon (rDNA) injection 1 mg, PRN  dextrose 5 % solution, PRN  insulin lispro (HUMALOG) injection vial 0-6 Units, TID WC  insulin lispro (HUMALOG) injection vial 0-3 Units, Nightly  0.9 % sodium chloride infusion, Continuous      Objective:  Constitutional:    CURRENT TEMPERATURE:  Temp: 97 °F (36.1 °C)  MAXIMUM TEMPERATURE OVER 24HRS:  Temp (24hrs), Av.6 °F (36.4 °C), Min:96.8 °F (36 °C), Max:98.3 °F (36.8 °C)    CURRENT RESPIRATORY RATE:  Resp: 17  CURRENT PULSE:  Pulse: 73  CURRENT BLOOD PRESSURE:  BP: 128/64  24HR BLOOD PRESSURE RANGE:  Systolic (42ICC), EWD:442 , Min:96 , MOV:946   ; Diastolic (45PCJ), RFV:25, Min:51, Max:68 24HR INTAKE/OUTPUT:    No intake or output data in the 24 hours ending 05/10/21 0803    Physical Exam:  GENERAL APPEARANCE: Alert and cooperative, and appears to be in no acute distress. Blade Brittle NECK: Neck supple, non-tender without lymphadenopathy, masses or thyromegaly. JVD-neg  CARDIAC: Normal S1 and S2. No S3, S4 or murmurs. Rhythm is regular. LUNGS: Normal respiratory effort no accessory muscle use  ABDOMEN: Soft non distended, BS+ Non tender no organomegally  BACK: Examination of the spine reveals  no spinal deformity, without tenderness,   MUSKULOSKELETAL: S/p right BKA. EXTREMITIES: Status post left below-knee amputation with intact stump; right foot ulcer wrapped in bandage. NEURO:Alert oriented x 3 ,power 5/5 in all extremities    Labs:     CBC:   Recent Labs     05/08/21  0417 05/09/21  0452 05/10/21  0517   WBC 8.1 7.2 5.6   RBC 3.57* 3.65* 3.47*   HGB 9.6* 9.6* 9.1*   HCT 30.4* 31.4* 29.9*   MCV 85.3 85.9 86.2   MCH 26.8 26.3 26.1   MCHC 31.5 30.6* 30.3*   RDW 20.7* 21.0* 20.9*   PLT 53* 53* 51*   MPV 10.6 10.8 10.3      BMP:   Recent Labs     05/08/21  0417 05/09/21  0452 05/10/21  0517    139 139   K 3.8 3.7 4.0   CL 97* 103 101   CO2 29 26 27   BUN 49* 34* 44*   CREATININE 5.44* 3.89* 4.82*   GLUCOSE 136* 135* 162*   CALCIUM 9.1 8.9 8.8      Assessment/plan:    1. ESRD -his regular hemodialysis schedule is TTS [at  renal care on [address] under the care of Dr. Kely Sylvester using left radiocephalic AV fistula]. Next dialysis will be scheduled for tomorrow. Renal diet,i.e 2-gram sodium, 2-gram potassium,1500 ml fluid restriction,1-gram phosphorus, 1800 KCal and 1.2 gram/kg protein per day. 2.  Anemia of chronic kidney disease - continue Retacrit 5000 units subcutaneously 3 times a week. 3.  C. difficile colitis - continue oral vancomycin. 4.  Coronary artery disease - status post CABG 4 years ago.     5.  Mineral and bone disease profile - serum phosphorus 5.4 mg/dL on 5/7/2021 is within target range. Continue sevelamer 4000 mg p.o. 3 times daily with meals. 6.   Peripheral artery disease with history of left BKA and right lower extremity chronic wound. 7.  Right large pleural effusion rule out parapneumonic process. Plans are for pleural catheter drainage today. Prognosis is guarded.     Christie Ruiz  Attending Clinical Nephrologist

## 2021-05-10 NOTE — CARE COORDINATION
Social work: contacted [phone] where pt is from to follow along in the care planning. Will need kayla and Rx at discharge. No new hens will be needed if pt is a return.  Nay thao

## 2021-05-10 NOTE — FLOWSHEET NOTE
05/10/21 1557   Encounter Summary   Services provided to: Patient   Referral/Consult From: Rounding   Complexity of Encounter Low   Length of Encounter 15 minutes   Spiritual/Jewish   Type Spiritual support   Assessment Sleeping   Intervention Prayer

## 2021-05-10 NOTE — PROGRESS NOTES
 epoetin willian-epbx  3,000 Units Subcutaneous Once per day on Mon Wed Fri    [Held by provider] heparin (porcine)  5,000 Units Subcutaneous 3 times per day    benzonatate  200 mg Oral TID    dextromethorphan  60 mg Oral 2 times per day    famotidine  10 mg Oral Daily    meropenem  500 mg Intravenous Q24H    ciprofloxacin  1 drop Left Eye Q2H While awake    senna  1 tablet Oral Nightly    midodrine  7.5 mg Oral Q8H    metoprolol tartrate  25 mg Oral BID    amiodarone  200 mg Oral BID    vancomycin  500 mg Oral 4 times per day    sevelamer  4,000 mg Oral TID WC    aspirin  81 mg Oral Daily    lidocaine  1 patch Transdermal Daily    neomycin-bacitracin-polymyxin   Topical BID    sodium chloride flush  5-40 mL Intravenous 2 times per day    atorvastatin  40 mg Oral Nightly    digoxin  125 mcg Oral Daily    insulin lispro  0-6 Units Subcutaneous TID WC    insulin lispro  0-3 Units Subcutaneous Nightly     Continuous Infusions:    sodium chloride      dextrose      sodium chloride 10 mL/hr at 05/07/21 1242     PRN Meds: oxyCODONE-acetaminophen, sodium chloride flush, sodium chloride, promethazine **OR** ondansetron, polyethylene glycol, acetaminophen **OR** acetaminophen, glucose, dextrose, glucagon (rDNA), dextrose    Data:     Past Medical History:   has a past medical history of Acute on chronic congestive heart failure ([facility] [address] Anemia, CAD (coronary artery disease), Cardiomyopathy ([facility] [address] Dialysis care, ESRD (end stage renal disease) on dialysis ([facility] [address] Foot ulcer, left ([facility] [address] GERD (gastroesophageal reflux disease), Hemodialysis patient ([facility] [address] History of sleep apnea, Hyperlipidemia, Hyperparathyroidism ([facility] [address] Hypertension, Neuropathy, ROOSEVELT (obstructive sleep apnea), Peripheral vascular disease ([facility] [address] Pneumonia, S/P CABG (coronary artery bypass graft), and Type II or unspecified type diabetes mellitus without mention of complication, not stated as uncontrolled.     Social History:   reports that he Date: 4/29/2021  EXAMINATION: NUCLEAR MEDICINE PERFUSION SCAN. 4/29/2021 TECHNIQUE: Ventilation not performed as part of COVID-19 safety precautions. 3.7 millicuries of Tc 39M MAA was administered intravenously prior to planar imaging of the lungs in multiple projections. COMPARISON: Chest radiograph 04/28/2021. HISTORY: ORDERING SYSTEM PROVIDED HISTORY: hemoptysis and tachycardia r/o PE Additional signs and symptoms: SOB, CP, coughin up blood, non-smoker, no hx of blood clots FINDINGS: PERFUSION:Distribution of radiotracer is homogeneous. No segmental defects identified. CHEST RADIOGRAPH:No focal areas of consolidation or significant effusions on recent chest radiograph. Low Probability for Pulmonary Embolus. Ct Abdomen Pelvis W Iv Contrast Additional Contrast? None    Result Date: 4/28/2021  EXAMINATION: CT OF THE ABDOMEN AND PELVIS WITH CONTRAST 4/28/2021 4:16 am TECHNIQUE: CT of the abdomen and pelvis was performed with the administration of intravenous contrast. Multiplanar reformatted images are provided for review. Dose modulation, iterative reconstruction, and/or weight based adjustment of the mA/kV was utilized to reduce the radiation dose to as low as reasonably achievable. COMPARISON: None. HISTORY: ORDERING SYSTEM PROVIDED HISTORY: diarrhea, abd pain, wbc 30k, discussed with nephro TECHNOLOGIST PROVIDED HISTORY: diarrhea, abd pain, wbc 30k, discussed with nephro Decision Support Exception->Emergency Medical Condition (MA) Reason for Exam: abd pain, nephro maddie approved contrast, dialysis pt Acuity: Acute Type of Exam: Initial FINDINGS: Lower Chest: There is abnormal airspace consolidation within the right lower lobe consistent with a right lower lobe pneumonia. There is no pleural effusion. There is cardiomegaly. Severe coronary artery atherosclerotic calcifications are present. Organs: There is a small volume of ascites. Cholecystectomy clips are present.   The liver, spleen, pancreas and adrenal glands are normal in appearance. There is severe renal atrophy. Innumerous renal cysts are noted. There is no hydronephrosis. A right nephroureteral stent is present. GI/Bowel: There are no abnormally dilated loops of large or small bowel present. There is no mesenteric inflammatory stranding present. The appendix is normal. Pelvis: There is a small volume of free fluid in the pelvis. There is no free fluid or pelvic mass identified. The urinary bladder is normal. Peritoneum/Retroperitoneum: There is no pathologically enlarged lymphadenopathy present. Severe atherosclerotic calcifications are present within the abdominal aorta and proximal anchor arteries. Bones/Soft Tissues: No lytic or blastic osseous lesions are identified. There is bilateral spondylolysis at L5 and grade 1 spondylolisthesis of L5 relative to S1. There is a disc bulge and facet arthropathy resulting in severe bilateral neural foraminal narrowing. Findings consistent with renal osteodystrophy are noted. 1. Right lower lobe pneumonia. Follow-up PA and lateral chest radiographs are recommended after treatment to ensure resolution. 2. Small volume ascites. 3. Normal appendix. 4. Severe bilateral neural foraminal narrowing at L5-S1 secondary to bilateral spondylolysis at L5, grade 1 spondylolisthesis of L5 relative to S1, a disc bulge and facet arthropathy.      Ir Nirali Sheerer Device Plmt/replace/removal    Result Date: 4/30/2021  PROCEDURE: IR FLUOROSCOPY GUIDED CENTRAL VENOUS ACCESS DEVICE PLACEMENT 4/30/2021 HISTORY: ORDERING SYSTEM PROVIDED HISTORY: Sepsis with hypotension; cental line for IV pressors TECHNOLOGIST PROVIDED HISTORY: cental line for IV pressors CONTRAST: None SEDATION: None FLUOROSCOPY DOSE AND TYPE OR TIME AND EXPOSURES: Fluoro time: 1 minutes 14 seconds DAP: 714 cGycm2 DESCRIPTION OF PROCEDURE: Informed consent was obtained from the patient's brother after a detailed explanation of the procedure including risks, benefits, and alternatives. Universal protocol was observed including a time-out to confirm the correct patient and procedure. The right neck was prepped and draped in sterile fashion using maximum sterile barrier technique. Local anesthesia was achieved with 1% lidocaine. The right internal jugular vein was accessed with a 21 gauge micropuncture needle under ultrasound guidance. A 0.018 guidewire was advanced through the needle. The needle was removed and a 4 Western Jazmyn access catheter over its dilator was advanced over the wire. The wire and dilator were removed and a 0.035 guidewire was advanced through the access catheter into the IVC under fluoroscopic guidance. The access catheter was removed and the tract dilated over the wire. A 20 cm triple-lumen catheter was advanced over the wire and the wire was removed. The catheter flushed and aspirated easily. The catheter was sutured in place with 2-0 nylon. A sterile dressing was applied. The patient tolerated the procedure well and there were no immediate complications. Estimated blood loss: Less than 5 mL. FINDINGS: Ultrasound demonstrates patency of the right internal jugular vein and guides venotomy. Final fluoroscopic image demonstrates satisfactory catheter placement with the tip in the right atrium. Successful ultrasound and fluoroscopy guided right IJ triple-lumen central venous catheter insertion. Xr Chest Portable    Result Date: 4/28/2021  EXAMINATION: ONE XRAY VIEW OF THE CHEST 4/28/2021 2:48 am COMPARISON: 06/03/2019 HISTORY: ORDERING SYSTEM PROVIDED HISTORY: missed dialysis x2weeks TECHNOLOGIST PROVIDED HISTORY: missed dialysis x2weeks Reason for Exam: Upright port, missed dialysis x2 weeks FINDINGS: There is stable cardiomegaly. There is no focal consolidation, pleural effusion or evidence of edema. There is no pneumothorax identified. 1. Stable cardiomegaly. 2. No acute cardiopulmonary process identified.      Fl Modified Barium Swallow W Video    Result Date: 5/3/2021  EXAMINATION: MODIFIED BARIUM SWALLOW WAS PERFORMED IN CONJUNCTION WITH SPEECH PATHOLOGY SERVICES TECHNIQUE: Fluoroscopic evaluation of the swallowing mechanism was performed with multiple consistency of barium product. FLUOROSCOPY DOSE AND TYPE OR TIME AND EXPOSURES: Fluoroscopic time of 1 minutes 44 seconds. DAP of 71.9 dGycm2. COMPARISON: None HISTORY: ORDERING SYSTEM PROVIDED HISTORY: evaluate for diet modifications TECHNOLOGIST PROVIDED HISTORY: evaluate for diet modifications Reason for Exam: evaluate for diet modifications Acuity: Acute Type of Exam: Initial FINDINGS: Oral phase of swallowing was grossly within normal limits. No evidence of laryngeal penetration or aspiration. Swallowing mechanism grossly within normal limits without evidence of aspiration. Please see separate speech pathology report for full discussion of findings and recommendations. Physical Examination:        Physical Exam  Constitutional:       General: He is not in acute distress. Appearance: He is obese. He is not ill-appearing. Comments: On 2L NC   Cardiovascular:      Rate and Rhythm: Normal rate and regular rhythm. Heart sounds: No murmur. No gallop. Pulmonary:      Effort: No respiratory distress. Breath sounds: No wheezing. Comments: Decreased breath sounds RLL  Abdominal:      General: Bowel sounds are normal. There is no distension. Tenderness: There is no abdominal tenderness. There is no guarding or rebound. Musculoskeletal:         General: No swelling or deformity. Comments: Left BKA. Right foot wrapped C/D/I   Skin:     Capillary Refill: Capillary refill takes less than 2 seconds. Neurological:      General: No focal deficit present. Mental Status: He is alert and oriented to person, place, and time. Psychiatric:         Mood and Affect: Mood normal.         Thought Content:  Thought content normal. Assessment:        Primary Problem  Sepsis due to pneumonia [facility])    Active Hospital Problems    Diagnosis Date Noted    Parapneumonic effusion [J18.9, J91.8] 05/09/2021    C. difficile colitis [A04.72] 05/09/2021    Loculated pleural effusion [J90] 05/08/2021    Pleural effusion on right [J90] 05/08/2021    ROOSEVELT (obstructive sleep apnea) [G47.33] 05/08/2021    Pneumonia [J18.9] 04/28/2021    Sepsis ([address] [A41.9] 04/28/2021    Sepsis due to pneumonia ([address] [J18.9, A41.9] 92/57/5494    Metabolic acidosis [T90.0]     Hyperkalemia [E87.5]     Diarrhea [R19.7]     Nausea and vomiting [R11.2]     History of left below knee amputation (HCC) [Z89.512]     Hemoptysis [R04.2]     Ulcerated, foot, right, with fat layer exposed ([facility] [address] [L97.512]     Hypertension, essential [I10]     Charcot foot due to diabetes mellitus ([facility] [address] [E11.610] 12/28/2015    Anemia of chronic disease [D63.8] 07/03/2013    ESRD on dialysis ([address] [N18.6, Z99.2] 06/27/2013    DM (diabetes mellitus) ([facility] [address] [E11.9] 06/27/2013       Plan:      Acute hypoxic respiratory failure likely 2/2  developing parapneumonic effusion  -On 2L NC  -Repeat CXR shows reaccumulation of fluid  -Plan for IR pigtail catheter for drainage today  -CXR 5/7: Showed large right pleural effusion  -CT chest: Shows moderate to large right pleural effusion and consolidation involving the entire RLL.   There appears to be multiple areas of loculation  -S/p right-sided thoracentesis with 1.3L raul fluid removed  -According to lights criteria, patient has exudative effusion  -Pulmonology on board:   -We will need IR placement of pigtail catheter to drain fluid   -Continue IV Merrem (day 4) and oral vancomycin until 5/14    Sepsis 2/2 CAP   -CT A&P showed RLL PNA  -Pro-Kiran 4.55  -Blood cultures x 2 NGTD  -Resp Cx  show just above ed gram +ve cocci in clusters with rare GN rods: Growing staph aureus methicillin susceptible  -On NS 0.9% 10 ml/hr  -Continue midodrine 7.5 mgTID  -I. D on board   -IV Merrem (Day 4)              -D/C'd IV ceftriaxone after 6 days of therapy. Completed 3 days of Cefepime    -Continue oral vancomycin until 5/14/21 for C. Diff              -Appreciate further recommendations    Low albumin, high INR, chronically low platelets r/o cirrhosis  -CT abdomen pelvis shows small volume of ascites  -Platelet count 51  -Liver U/S pending   -Ammonia 22    Chronic systolic and diastolic CHF/Hx of HTN. -S/p cardioversion 5/5  -BNP > 300,000, likely elevated from missed dialysis for 2 weeks  -Echo 6/2019: LVEF 30-35% with moderate diastolic dysfunction  -CAD, CABG, stents, last cath 2019.   -Amiodarone 200 mg twice daily (day 6)  -Digoxin 125 MCG daily  -Lopressor 25 mg twice daily  -Cardiology on board:    -Recommends continuing amiodarone 200 mg twice daily for 7 days. Will change amiodarone from 200 BID to QD tomorrow    -Patient is a candidate for AICD due to ICM. Plan for AICD in future once infectious process resolves              -Appreciate further recs     Hyperkalemia 2/2 ESRD on hemodialysis TTS  -Missed 2 weeks of dialysis.   -K 6.9 on admission with EKG changes, received IV calcium gluconate and insulin/dextrose.  -Underwent hemodialysis at [facility] [address] V's  -Nephrology on board:   -Possible dialysis today   -Renvela 4000 mg PO TID              -Magnesium 1.6     Hemoptysis likely 2/2 pneumonia (improving)  -HgB stable  -H&H q8h, keep HgB > 7     Acute diarrhea 2/2 C. difficile infection (improving)  -3 bowel movements overnight, non-foul-smelling  -Continue Senokot nightly  -CT abdomen and pelvis showed small volume ascites and normal appendix  -C.  Diff +ve   -Continue oral vancomycin 500 mg q6hr day 4 until 5/14  -Protonix 40 mg PO daily             Right foot ulcer  -X-ray showed no evidence of OM  -Podiatry on board  -Wound care on board      Dyslipidemia   -Lipitor 40 mg PO nightly     Type 2 diabetes mellitus  -Low dose ISS  -Hypoglycemia protocol Bilateral neural foraminal narrowing at L5-S1 with spondylolysis  -Showed up on CT A&P  -We will continue to monitor     Diet:  Dental soft diet, Low Sodium, Fluid restriction to 1500 mL   DVT prophylaxis: Heparin 5,000 3 times daily    Atilio West MD  5/10/2021  8:18 AM       I have discussed the care of Shasta Price , including pertinent history and exam findings,    today with the resident. I have seen and examined the patient and the key elements of all parts of the encounter have been performed by me . I agree with the assessment, plan and orders as documented by the resident. Principal Problem:    Sepsis due to pneumonia [facility])  Active Problems:    ESRD on dialysis ([facility] [address]    DM (diabetes mellitus) ([facility] [address]    Anemia of chronic disease    Charcot foot due to diabetes mellitus ([facility] [address]    Hypertension, essential    Sepsis ([facility] [address]    Metabolic acidosis    Hyperkalemia    Diarrhea    Nausea and vomiting    History of left below knee amputation (HCC)    Hemoptysis    Ulcerated, foot, right, with fat layer exposed ([facility] [address]    Pneumonia    Loculated pleural effusion    Pleural effusion on right    ROOSEVELT (obstructive sleep apnea)    Parapneumonic effusion    C. difficile colitis  Resolved Problems:    * No resolved hospital problems. *        Overall  course ;                                   are improving over time.         Patient doing better  Plan for IR guided chest tube placement today  On meropenem  Multiple comorbidities, coronary artery disease, ischemic cardiomyopathy  Peripheral artery disease s/p amputation  C. difficile colitis on vancomycin  Awaiting ultrasound liver          Electronically signed by Saulo Fields MD

## 2021-05-10 NOTE — PROGRESS NOTES
ICU Progress Note (Non-Vent)  [facility]    Patient - David Nick,  Age - 60 y.o.    - 1955      Room Number -    MRN -  [id number]   [facility] # - [de-identified]  Date of Admission -  2021  2:27 PM     Follow-up: Recurrent pleural effusion    Events of Past 24 Hours   Looks comfortable on 2 L O2  No drips  Discussed with staff, no fever or sweats  Not much short of breath, cough or wheezing  No nausea vomiting, + loose stools  Going for right-sided pigtail chest tube placement this afternoon    Vitals    height is 6' (1.829 m) and weight is 222 lb 10.6 oz (101 kg). His temperature is 98 °F (36.7 °C). His blood pressure is 142/66 (abnormal) and his pulse is 72. His respiration is 15 and oxygen saturation is 95%.        Temperature Range: Temp: 98 °F (36.7 °C) Temp  Av.6 °F (36.4 °C)  Min: 96.8 °F (36 °C)  Max: 98.3 °F (36.8 °C)  BP Range:  Systolic (09VKX), JUDITH:211 , Min:96 , VZT:523     Diastolic (22JXP), MVB:44, Min:51, Max:76    Pulse Range: Pulse  Av.7  Min: 66  Max: 79  Respiration Range: Resp  Av.5  Min: 14  Max: 26  Current Pulse Ox[de-identified]  SpO2: 95 %  24HR Pulse Ox Range:  SpO2  Av.1 %  Min: 92 %  Max: 100 %  Oxygen Amount and Delivery: O2 Flow Rate (L/min): 2 L/min    Wt Readings from Last 3 Encounters:   21 222 lb 10.6 oz (101 kg)   21 210 lb 8.6 oz (95.5 kg)   20 223 lb (101.2 kg)     I/O   No intake or output data in the 24 hours ending 05/10/21 1335  DRAIN/TUBE OUTPUT       Invasive Lines   ICP PRESSURE RANGE  No data recorded  CVP PRESSURE RANGE  No data recorded      Medications      epoetin willian-epbx  2,000 Units Subcutaneous Once per day on     And    epoetin willian-epbx  3,000 Units Subcutaneous Once per day on     [Held by provider] heparin (porcine)  5,000 Units Subcutaneous 3 times per day    benzonatate  200 mg Oral TID    dextromethorphan  60 mg Oral 2 times per day    famotidine  10 mg Oral Daily    meropenem  500 mg Intravenous Q24H    ciprofloxacin  1 drop Left Eye Q2H While awake    senna  1 tablet Oral Nightly    midodrine  7.5 mg Oral Q8H    metoprolol tartrate  25 mg Oral BID    amiodarone  200 mg Oral BID    vancomycin  500 mg Oral 4 times per day    sevelamer  4,000 mg Oral TID WC    aspirin  81 mg Oral Daily    lidocaine  1 patch Transdermal Daily    neomycin-bacitracin-polymyxin   Topical BID    sodium chloride flush  5-40 mL Intravenous 2 times per day    atorvastatin  40 mg Oral Nightly    digoxin  125 mcg Oral Daily    insulin lispro  0-6 Units Subcutaneous TID WC    insulin lispro  0-3 Units Subcutaneous Nightly     oxyCODONE-acetaminophen, sodium chloride flush, sodium chloride, promethazine **OR** ondansetron, polyethylene glycol, acetaminophen **OR** acetaminophen, glucose, dextrose, glucagon (rDNA), dextrose  IV Drips/Infusions   sodium chloride      dextrose      sodium chloride 10 mL/hr at 05/07/21 1242       Diet/Nutrition   DIET GENERAL; Low Sodium (2 GM); Dental Soft; Daily Fluid Restriction: 1500 ml  Dietary Nutrition Supplements: Renal Oral Supplement    Exam      Constitutional -somnolent, arousable, no distress  General Appearance  well developed, well nourished  HEENT -normocephalic, atraumatic. PERRLA  Lungs - Chest expands equally, no wheezes, rales, + coarse rhonchi   Cardiovascular - Heart sounds are normal.  normal rate and rhythm regular, no murmur, gallop or rub.   Abdomen - soft, nontender, nondistended, no guarding  Neurologic -arousable, following commands, no restlessness or agitation  Skin - no bruising or bleeding  Extremities - no cyanosis, clubbing or edema, left BKA    Lab Results   CBC     Lab Results   Component Value Date    WBC 5.6 05/10/2021    RBC 3.47 05/10/2021    RBC 3.51 01/09/2012    HGB 9.1 05/10/2021    HCT 29.9 05/10/2021    PLT 51 05/10/2021    PLT 106 01/09/2012    MCV 86.2 05/10/2021    MCH 26.1 05/10/2021    MCHC 30.3 05/10/2021    RDW 20.9 05/10/2021    NRBC 1 03/07/2016    METASPCT 2 05/01/2021    LYMPHOPCT 9 05/10/2021    MONOPCT 12 05/10/2021    MYELOPCT 1 03/06/2016    BASOPCT 2 05/10/2021    MONOSABS 0.67 05/10/2021    LYMPHSABS 0.50 05/10/2021    EOSABS 0.22 05/10/2021    BASOSABS 0.11 05/10/2021    DIFFTYPE NOT REPORTED 05/10/2021       BMP   Lab Results   Component Value Date     05/10/2021    K 4.0 05/10/2021     05/10/2021    CO2 27 05/10/2021    BUN 44 05/10/2021    CREATININE 4.82 05/10/2021    GLUCOSE 162 05/10/2021    GLUCOSE 128 01/09/2012       LFTS  Lab Results   Component Value Date    ALKPHOS 137 04/30/2021    ALT 12 04/30/2021    AST 17 04/30/2021    PROT 6.0 05/10/2021    BILITOT 0.74 04/30/2021    BILIDIR 0.14 10/19/2011    IBILI 0.26 10/19/2011    LABALBU 2.9 04/30/2021    LABALBU 3.8 01/09/2012       ABG ABGs: No results found for: PHART, PO2ART, [id number]    Lab Results   Component Value Date    MODE NOT REPORTED 06/02/2019         INR  No results for input(s): PROTIME, INR in the last 72 hours. APTT  No results for input(s): APTT in the last 72 hours. Lactic Acid  No results found for: LACTA     BNP   No results for input(s): BNP in the last 72 hours. Cultures       Radiology     CXR      CT Scans    (See actual reports for details)      SYSTEMS ASSESSMENT    Neuro       Respiratory   Wean oxygen as tolerated. Keep O2 sat > 88%    Cardiovascular        Gastrointestinal       Renal       Infectious Disease       Hematology/Oncology       Endocrine       Social/Spiritual/DNR/Disposition/Other     Recurrent right sided pleural effusion/thought to be exudate, negative cultures/parapneumonic effusion  Hypoxia, was up to 5 L now down to 2  ROOSEVELT  Metabolic acidosis/resolved, bicarb up to 27  Right lower lobe pneumonia  C. difficile colitis  Right foot wound  HTN, history of CHF  ESRD/HD  Diabetes mellitus    Plan:   For pigtail chest tube placement this afternoon  Wean O2 as tolerated  Antibiotic per ID, on oral vancomycin and meropenem  On heparin subcu/held for the procedure  Discussed with staff      Critical Care Time    min    Electronically signed by Reva Leavitt MD on 5/10/2021 at 1:35 PM

## 2021-05-10 NOTE — PROGRESS NOTES
intake greater than 50%       Nutrition Monitoring and Evaluation:   Behavioral-Environmental Outcomes:  None Identified   Food/Nutrient Intake Outcomes:  Food and Nutrient Intake, Supplement Intake  Physical Signs/Symptoms Outcomes:  Biochemical Data, GI Status, Fluid Status or Edema, Skin, Weight     Discharge Planning: Too soon to determine     Some areas of assessment may be incomplete due to COVID-19 precautions. Ligia Skinner R.D., L.D.   Clinical Dietitian  Office: [phone]

## 2021-05-10 NOTE — PROGRESS NOTES
Physical Therapy  Facility/Department: [facility] ICU  Daily Treatment Note  NAME: Nicole Rojas  : 1955  MRN: [id number]    Date of Service: 5/10/2021    Discharge Recommendations:  Patient would benefit from continued therapy after discharge   PT Equipment Recommendations  Other: TBD    Assessment   Body structures, Functions, Activity limitations: Decreased functional mobility ; Decreased endurance;Decreased strength;Decreased safe awareness  Assessment: 59 y/o male dx with pneumonia and sepsis, demonstrates debility and inability to mobilize due to medical status. Pt likely will need further therapies to regain PLOF; concerns are for RLE NWB with L BKA. Specific instructions for Next Treatment: therex and bed mobility  Prognosis: Fair  Decision Making: Medium Complexity  History: L BKA with R plantar wound  Barriers to Learning: lethargic/cognition  REQUIRES PT FOLLOW UP: Yes  Activity Tolerance  Activity Tolerance: Patient limited by endurance; Patient limited by fatigue  Activity Tolerance: fair     Patient Diagnosis(es): There were no encounter diagnoses. has a past medical history of Acute on chronic congestive heart failure ([address] Anemia, CAD (coronary artery disease), Cardiomyopathy ([address] Dialysis care, ESRD (end stage renal disease) on dialysis ([address] Foot ulcer, left ([address] GERD (gastroesophageal reflux disease), Hemodialysis patient ([address] History of sleep apnea, Hyperlipidemia, Hyperparathyroidism ([address] Hypertension, Neuropathy, ROOSEVELT (obstructive sleep apnea), Peripheral vascular disease ([address] Pneumonia, S/P CABG (coronary artery bypass graft), and Type II or unspecified type diabetes mellitus without mention of complication, not stated as uncontrolled. has a past surgical history that includes Cataract removal; Dialysis fistula creation (Left, ); and Leg amputation, lower tibia/fibula.     Restrictions  Restrictions/Precautions  Restrictions/Precautions: Fall Risk, Isolation, Contact Precautions, Weight Bearing(CDIFF)  Required Braces or Orthoses?: Yes(L BKA prosthesis ( at home))  Lower Extremity Weight Bearing Restrictions  Right Lower Extremity Weight Bearing: Non Weight Bearing  Partial Weight Bearing Percentage Or Pounds: per Podiatry note 4/29/21  Partial Weight Bearing Percentage Or Pounds: pt is BKA without prosthetic here  Position Activity Restriction  Other position/activity restrictions: up with assistance  Subjective   General  Chart Reviewed: Yes  Response To Previous Treatment: Patient with no complaints from previous session. ;Not applicable  Family / Caregiver Present: No  Referring Practitioner: Ofelia Mason  Subjective  Subjective: Pt agreeable to PT/OT with encouragement. \"Maybe a little. I don't want to do too much\" referring to completing PT/OT tx. PT reports back pain from \"laying in bed\". Pt reports he completes stand pivot transfers at home prior to admission;. Will have to assess lateral/slide board transfers when tolerated due to RLE NWB. General Comment  Comments: ZAYRA woodson's pt for tx. Co-treat with Merline Lone. Pain Screening  Patient Currently in Pain: Yes  Pain Assessment  Pain Assessment: 0-10  Pain Level: 5  Pain Type: Chronic pain  Pain Location: Back  Pain Orientation: Lower;Mid  Pain Frequency: Continuous  Response to Pain Intervention: Patient Satisfied  Vital Signs  BP Location: Right upper arm  Patient Currently in Pain: Yes  Oxygen Therapy  O2 Device: Nasal cannula  O2 Flow Rate (L/min): 2 L/min  Pre Treatment Pain Screening  Comments / Details: pt reports no pain in R foot where large wound is presenet on medial plantar surface. Orientation  Orientation  Overall Orientation Status: Within Functional Limits  Objective   Bed mobility  Rolling to Right: Moderate assistance  Scooting: Unable to assess  Comment: Pt edu/encouraged to utilized bed rail for UE assist. Mod A to roll to right side. Pt requesting to use bed pan.  Pt left on bed pan with call light

## 2021-05-11 NOTE — PROGRESS NOTES
became hypotensive with a systolic blood pressure in the 70s, tachycardic was transferred to ICU, was started on pressors  Procalcitonin was 25.7 on 4/30/2021  IJ triple-lumen was placed was placed 4/30/2021    Interval changes  5/11/2021   He was seen during dialysis today, had loose stool, on 2 L of oxygen by nasal cannula denied increased shortness of breath or cough, denied fever or chills, no acute events. status post right pleural pigtail catheter placement 5/10/2021.  status post ultrasound guided thoracentesis 5/7/2021  Fluid analysis exudative, no growth on culture to date    CT chest without contrast from 5/7/2021 showed moderate to large right pleural effusion tracking up to the right lateral chest wall with consolidative process to the anterior right lower lobe, moderate ascites, enlarged right lobe of thyroid    Sputum 4/28/2021 grew staph aureus MSSA      Patient Vitals for the past 8 hrs:   BP Temp Temp src Pulse Resp Weight   05/11/21 1245 (!) 97/43   59     05/11/21 1230 (!) 77/33   56     05/11/21 1200 118/61   61     05/11/21 1130 (!) 107/54   58     05/11/21 1100 120/61   60     05/11/21 1035 127/65   65     05/11/21 1026 129/69    16 218 lb 14.7 oz (99.3 kg)   05/11/21 0736 (!) 121/59 97.4 °F (36.3 °C) Oral 62 16          I have personally reviewed the past medical history, past surgical history, medications, social history, and family history, and I haveupdated the database accordingly. Allergies:   Pcn [penicillins]     Review of Systems:     Review of Systems  As per history present illness, other than above 12 system review is negative  Physical Examination :       Physical Exam  Constitutional:       General: He is not in acute distress. HENT:      Head: Normocephalic and atraumatic. Right Ear: External ear normal.      Left Ear: External ear normal.   Neck:      Musculoskeletal: Neck supple. No neck rigidity.    Cardiovascular:      Rate and Rhythm: Normal rate and regular rhythm. Pulmonary:      Effort: No respiratory distress. Comments: Decreased breath sounds bilaterally  Abdominal:      General: Abdomen is flat. There is no distension. Palpations: Abdomen is soft. Tenderness: There is no abdominal tenderness. Musculoskeletal:      Comments: Left below-knee amputation site with no open wound no erythema. Right foot dressing   Skin:     General: Skin is warm. Coloration: Skin is not jaundiced. Neurological:      General: No focal deficit present. Mental Status: He is alert and oriented to person, place, and time.          Past Medical History:     Past Medical History:   Diagnosis Date    Acute on chronic congestive heart failure (HCC)     Anemia     CAD (coronary artery disease)     [facility]omyopathy [facility]     Dialysis care     ESRD (end stage renal disease) on dialysis ([facility]     Foot ulcer, left ([facility] 2015    GERD (gastroesophageal reflux disease)     Hemodialysis patient ([facility] 2011    MOM-WED-FRI-ON [address]     History of sleep apnea     none since significant weight loss (was 400#)    Hyperlipidemia     Hyperparathyroidism ([facility]     Hypertension     Neuropathy     ROOSEVELT (obstructive sleep apnea) 5/8/2021    Peripheral vascular disease ([facility]     Pneumonia     resolved    S/P CABG (coronary artery bypass graft) 06/2016    Type II or unspecified type diabetes mellitus without mention of complication, not stated as uncontrolled     dx-1980       Past Surgical  History:     Past Surgical History:   Procedure Laterality Date    CATARACT REMOVAL      DIALYSIS FISTULA CREATION Left 2014    IR CHEST TUBE INSERTION  5/10/2021    IR CHEST TUBE INSERTION 5/10/2021 [facility] PROCED[facility]    LEG AMPUTATION THROUGH LOWER TIBIA AND FIBULA         Medications:      amiodarone  200 mg Oral Daily    epoetin willian-epbx  2,000 Units Subcutaneous Once per day on Mon Wed Fri    And    epoetin willian-epbx  3,000 Units Subcutaneous Once per day on Mon Wed Fri    [Held by provider] heparin (porcine)  5,000 Units Subcutaneous 3 times per day    benzonatate  200 mg Oral TID    dextromethorphan  60 mg Oral 2 times per day    famotidine  10 mg Oral Daily    ciprofloxacin  1 drop Left Eye Q2H While awake    senna  1 tablet Oral Nightly    midodrine  7.5 mg Oral Q8H    metoprolol tartrate  25 mg Oral BID    vancomycin  500 mg Oral 4 times per day    sevelamer  4,000 mg Oral TID WC    aspirin  81 mg Oral Daily    lidocaine  1 patch Transdermal Daily    neomycin-bacitracin-polymyxin   Topical BID    sodium chloride flush  5-40 mL Intravenous 2 times per day    atorvastatin  40 mg Oral Nightly    digoxin  125 mcg Oral Daily    insulin lispro  0-6 Units Subcutaneous TID WC    insulin lispro  0-3 Units Subcutaneous Nightly       Social History:     Social History     Socioeconomic History    Marital status:      Spouse name: Not on file    Number of children: Not on file    Years of education: Not on file    Highest education level: Not on file   Occupational History    Not on file   Social Needs    Financial resource strain: Not on file    Food insecurity     Worry: Not on file     Inability: Not on file    Transportation needs     Medical: Not on file     Non-medical: Not on file   Tobacco Use    Smoking status: Never Smoker    Smokeless tobacco: Never Used   Substance and Sexual Activity    Alcohol use: No    Drug use: No    Sexual activity: Not on file   Lifestyle    Physical activity     Days per week: Not on file     Minutes per session: Not on file    Stress: Not on file   Relationships    Social connections     Talks on phone: Not on file     Gets together: Not on file     Attends Christianity service: Not on file     Active member of club or organization: Not on file     Attends meetings of clubs or organizations: Not on file     Relationship status: Not on file    Intimate partner violence

## 2021-05-11 NOTE — PROGRESS NOTES
epoetin willian-epbx  2,000 Units Subcutaneous Once per day on Mon Wed Fri    And    epoetin willian-epbx  3,000 Units Subcutaneous Once per day on Mon Wed Fri    [Held by provider] heparin (porcine)  5,000 Units Subcutaneous 3 times per day    benzonatate  200 mg Oral TID    dextromethorphan  60 mg Oral 2 times per day    famotidine  10 mg Oral Daily    ciprofloxacin  1 drop Left Eye Q2H While awake    senna  1 tablet Oral Nightly    midodrine  7.5 mg Oral Q8H    metoprolol tartrate  25 mg Oral BID    vancomycin  500 mg Oral 4 times per day    sevelamer  4,000 mg Oral TID WC    aspirin  81 mg Oral Daily    lidocaine  1 patch Transdermal Daily    neomycin-bacitracin-polymyxin   Topical BID    sodium chloride flush  5-40 mL Intravenous 2 times per day    atorvastatin  40 mg Oral Nightly    digoxin  125 mcg Oral Daily    insulin lispro  0-6 Units Subcutaneous TID WC    insulin lispro  0-3 Units Subcutaneous Nightly     oxyCODONE-acetaminophen, sodium chloride flush, sodium chloride, promethazine **OR** ondansetron, polyethylene glycol, acetaminophen **OR** acetaminophen, glucose, dextrose, glucagon (rDNA), dextrose  IV Drips/Infusions   sodium chloride      dextrose      sodium chloride 10 mL/hr at 05/07/21 1242       Diet/Nutrition   DIET GENERAL; Low Sodium (2 GM); Dental Soft; Daily Fluid Restriction: 1500 ml  Dietary Nutrition Supplements: Renal Oral Supplement    Exam      Constitutional -somnolent, no distress  General Appearance  well developed, well nourished  HEENT -normocephalic, atraumatic. PERRLA  Lungs - Chest expands equally, no wheezes, rales, + coarse rhonchi, slightly decreased sounds on right base  Cardiovascular - Heart sounds are normal.  normal rate and rhythm regular, no murmur, gallop or rub.   Abdomen - soft, nontender, nondistended, no guarding  Neurologic -looks comfortable, no restlessness or agitation  Skin - no bruising or bleeding  Extremities - no cyanosis, clubbing or edema, left BKA    Lab Results   CBC     Lab Results   Component Value Date    WBC 4.9 05/11/2021    RBC 3.39 05/11/2021    RBC 3.51 01/09/2012    HGB 9.1 05/11/2021    HCT 28.9 05/11/2021    PLT 45 05/11/2021     01/09/2012    MCV 85.4 05/11/2021    MCH 26.8 05/11/2021    MCHC 31.3 05/11/2021    RDW 21.0 05/11/2021    NRBC 1 03/07/2016    METASPCT 2 05/01/2021    LYMPHOPCT 8 05/11/2021    MONOPCT 16 05/11/2021    MYELOPCT 1 03/06/2016    BASOPCT 3 05/11/2021    MONOSABS 0.78 05/11/2021    LYMPHSABS 0.39 05/11/2021    EOSABS 0.15 05/11/2021    BASOSABS 0.15 05/11/2021    DIFFTYPE NOT REPORTED 05/11/2021       BMP   Lab Results   Component Value Date     05/11/2021    K 4.2 05/11/2021     05/11/2021    CO2 26 05/11/2021    BUN 52 05/11/2021    CREATININE 5.33 05/11/2021    GLUCOSE 153 05/11/2021    GLUCOSE 128 01/09/2012       LFTS  Lab Results   Component Value Date    ALKPHOS 137 04/30/2021    ALT 12 04/30/2021    AST 17 04/30/2021    PROT 6.0 05/10/2021    BILITOT 0.74 04/30/2021    BILIDIR 0.14 10/19/2011    IBILI 0.26 10/19/2011    LABALBU 2.9 04/30/2021    LABALBU 3.8 01/09/2012       ABG ABGs: No results found for: PHART, PO2ART, LMU8DAC    Lab Results   Component Value Date    MODE NOT REPORTED 06/02/2019         INR  No results for input(s): PROTIME, INR in the last 72 hours. APTT  No results for input(s): APTT in the last 72 hours. Lactic Acid  No results found for: LACTA     BNP   No results for input(s): BNP in the last 72 hours. Cultures       Radiology     CXR      CT Scans    (See actual reports for details)      SYSTEMS ASSESSMENT    Neuro       Respiratory   Wean oxygen as tolerated.  Keep O2 sat > 88%    Cardiovascular        Gastrointestinal       Renal       Infectious Disease       Hematology/Oncology       Endocrine       Social/Spiritual/DNR/Disposition/Other     Recurrent right sided pleural effusion/ exudate, negative cultures/parapneumonic effusion  Hypoxia, was up to 5 L now down to 2  ROOSEVELT  Metabolic acidosis/resolved, bicarb up to 26  Right lower lobe pneumonia  C. difficile colitis  Right foot wound  HTN, history of CHF  ESRD/HD  Diabetes mellitus    Plan:   pigtail chest tube placement 5/10  Follow-up chest x-ray in a.m. Wean O2 as tolerated  Antibiotic per ID, on oral vancomycin and off meropenem   heparin subcu on hold, ??   If secondary to thrombocytopenia   Discussed with staff  No objection to transfer to [facility]    Electronically signed by Ernestine Soriano MD on 5/11/2021 at 11:30 AM

## 2021-05-11 NOTE — PLAN OF CARE
Problem: Falls - Risk of:  Goal: Will remain free from falls  Description: The patient remained free from falls this shift, call light within reach, bed in locked and lowest position. Side rails up x2. Continue to monitor closely. Outcome: Ongoing  Goal: Absence of physical injury  Description: Absence of physical injury  Outcome: Ongoing     Problem: Skin Integrity:  Goal: Will show no infection signs and symptoms  Description: Patient skin assessment complete. No signs/symptoms of new skin breakdown. Waffle mattress in place. Pt turned and repositioned every two hours with assistance from staff. Area kept free from moisture. Proper nourishment and fluids encouraged, as appropriate. Skin remains clean, dry, and intact. Will continue to monitor for additional needs and changes in skin breakdown. Outcome: Ongoing  Goal: Absence of new skin breakdown  Description: Absence of new skin breakdown  Outcome: Ongoing     Problem:  Activity:  Goal: Risk for activity intolerance will decrease  Description: Risk for activity intolerance will decrease  Outcome: Ongoing     Problem: Fluid Volume:  Goal: Will show no signs or symptoms of fluid imbalance  Description: Will show no signs or symptoms of fluid imbalance  Outcome: Ongoing     Problem: Nutritional:  Goal: Maintenance of adequate nutrition will be supported  Description: Maintenance of adequate nutrition will be supported  Outcome: Ongoing     Problem: Physical Regulation:  Goal: Complications related to the disease process, condition or treatment will be avoided or minimized  Description: Complications related to the disease process, condition or treatment will be avoided or minimized  Outcome: Ongoing     Problem: Urinary Elimination:  Goal: Ability to achieve and maintain adequate urine output will be supported  Description: Ability to achieve and maintain adequate urine output will be supported  Outcome: Ongoing     Problem: Airway Clearance - Ineffective: Goal: Clear lung sounds  Description: Clear lung sounds  Outcome: Ongoing  Goal: Ability to maintain a clear airway will improve  Description: Ability to maintain a clear airway will improve  Outcome: Ongoing     Problem: Fluid Volume - Deficit:  Goal: Achieves intake and output within specified parameters  Description: Achieves intake and output within specified parameters  Outcome: Ongoing     Problem: Gas Exchange - Impaired:  Goal: Levels of oxygenation will improve  Description: Levels of oxygenation will improve  Outcome: Ongoing     Problem: Confusion - Acute:  Goal: Absence of continued neurological deterioration signs and symptoms  Description: Absence of continued neurological deterioration signs and symptoms  Outcome: Ongoing  Goal: Mental status will be restored to baseline  Description: Mental status will be restored to baseline  Outcome: Ongoing     Problem: Discharge Planning:  Goal: Ability to perform activities of daily living will improve  Description: Ability to perform activities of daily living will improve  Outcome: Ongoing  Goal: Participates in care planning  Description: Participates in care planning  Outcome: Ongoing     Problem: Injury - Risk of, Physical Injury:  Goal: Will remain free from falls  Description: The patient remained free from falls this shift, call light within reach, bed in locked and lowest position. Side rails up x2. Continue to monitor closely.    Outcome: Ongoing  Goal: Absence of physical injury  Description: Absence of physical injury  Outcome: Ongoing     Problem: Mood - Altered:  Goal: Mood stable  Description: Mood stable  Outcome: Ongoing  Goal: Absence of abusive behavior  Description: Absence of abusive behavior  Outcome: Ongoing  Goal: Verbalizations of feeling emotionally comfortable while being cared for will increase  Description: Verbalizations of feeling emotionally comfortable while being cared for will increase  Outcome: Ongoing     Problem: Psychomotor Activity - Altered:  Goal: Absence of psychomotor disturbance signs and symptoms  Description: Absence of psychomotor disturbance signs and symptoms  Outcome: Ongoing     Problem: Sensory Perception - Impaired:  Goal: Demonstrations of improved sensory functioning will increase  Description: Demonstrations of improved sensory functioning will increase  Outcome: Ongoing  Goal: Decrease in sensory misperception frequency  Description: Decrease in sensory misperception frequency  Outcome: Ongoing  Goal: Able to refrain from responding to false sensory perceptions  Description: Able to refrain from responding to false sensory perceptions  Outcome: Ongoing  Goal: Demonstrates accurate environmental perceptions  Description: Demonstrates accurate environmental perceptions  Outcome: Ongoing  Goal: Able to distinguish between reality-based and nonreality-based thinking  Description: Able to distinguish between reality-based and nonreality-based thinking  Outcome: Ongoing  Goal: Able to interrupt nonreality-based thinking  Description: Able to interrupt nonreality-based thinking  Outcome: Ongoing     Problem: Sleep Pattern Disturbance:  Goal: Appears well-rested  Description: Appears well-rested  Outcome: Ongoing     Problem: Pain:  Description: Pain management should include both nonpharmacologic and pharmacologic interventions.   Goal: Pain level will decrease  Description: Pain level will decrease  Outcome: Ongoing  Goal: Control of acute pain  Description: Control of acute pain  Outcome: Ongoing  Goal: Control of chronic pain  Description: Control of chronic pain  Outcome: Ongoing     Problem: Nutrition  Goal: Optimal nutrition therapy  Description: Nutrition Problem #1: Increased nutrient needs  Intervention: Food and/or Nutrient Delivery: Continue Current Diet, Start Oral Nutrition Supplement  Nutritional Goals: po intake greater than 50%     Outcome: Ongoing     Problem: Musculor/Skeletal Functional Status  Goal: Highest potential functional level  Outcome: Ongoing  Goal: Absence of falls  Outcome: Ongoing

## 2021-05-11 NOTE — PROGRESS NOTES
HEMODIALYSIS PRE-TREATMENT NOTE    Patient Identifiers prior to treatment: Name,     Isolation Required: yes                      Isolation Type: Contact C Diff       (please document if patient is being managed as a PUI/COVID-19 patient)        Hepatitis status:                           Date Drawn                             Result  Hepatitis B Surface Antigen 21     negative                     Hepatitis B Surface Antibody 21 95        Hepatitis B Core Antibody 21 negative          How was Hepatitis Status verified: Epic     Was a copy of the labs you documented provided to facility for the patient's chart: yes    Hemodialysis orders verified: yes    Access Within normal limits ( I.e. s/s of infection,...): WNL     Pre-Assessment completed: yes    Pre-dialysis report received from: Lin IVERSON                    Time: 9633

## 2021-05-11 NOTE — PROGRESS NOTES
[address] [facility]    OCCUPATIONAL THERAPY MISSED TREATMENT NOTE   INPATIENT   Date: 21  Patient Name: Clark Gregorio       Room:   MRN: [id number]   Account #: [de-identified]    : 1955  (60 y.o.)  Gender: male   Diagnosis: Sepsis due to pneumonia             REASON FOR MISSED TREATMENT:  Patient at testing and/or off the floor   -   Per nursing, pt out at Hemodialysis. Occupational therapy to attempt again when able.          Jarad Ordaz, OT

## 2021-05-11 NOTE — PROGRESS NOTES
[address] [facility].    PROGRESS NOTE             5/11/2021    8:41 AM    Name:   Jasen De La Rosa  MRN:     [id number]     [place]:      [de-identified]   Room:   2006/2006-01  IP Day:  15  Admit Date:  4/28/2021  2:27 PM    PCP:  Gwendolyn Rivas MD  Code Status:  Full Code    Subjective:     C/C: No chief complaint on file. Interval History Status: improved. No acute events over night. Patient is sitting comfortably in bed eating breakfast. Patient continues to reports productive cough brown/yellow sputum. He denies any pain. On 2L O2 NC. Over he feels he's improving and has no other complaints at this time. Brief History:     The patient is a 60 y.o. / male with chronic systolic and diastolic CHF, ESRD on HD TTS, left BKA & foot ulcer who was transferred from [facility]. V's for missed hemodialysis and sepsis 2/2 pneumonia. Patient has had diarrhea for the past 2 weeks as well as cough and SOB. He has not gone to dialysis for the past 2 weeks because of his diarrhea. Over the past few days he has felt generalized weakness and fatigue and also started coughing up blood. Came to ED today for worsening respiratory symptoms. Denies any smoking history, alcohol, or illicit drug use. Comes from home where he lives alone.     At [facility]. V's Cr was 18.47, , K 6.9. EKG did show widened QRS. Patient received IV calcium gluconate and insulin/dextrose. Nephrology was consulted and patient underwent hemodialysis at [facility]. V's.  CXR unremarkable but on CT abdomen and pelvis there was right lower lobe pneumonia. Patient was septic with tachycardia and leukocytosis of 29.6. Started on IV antibiotocs and blood cultures taken. X-ray R foot was also done for foot wound which did not show findings of acute osteomyelitis. Podiatry was consulted and agreed that the wound was not infected and there was no osteomyelitis.   They did debride loose skin and recommend conservative management. After dialysis patient was transferred to [facility]. On my exam he was alert and oriented x 3 and satting on room air. He was tachycardic. He appeared fatigued. He was actively coughing up small amounts of blood.       Review of Systems:     Review of Systems   Constitutional: Negative. HENT: Negative. Eyes: Positive for discharge. Respiratory: Positive for cough and shortness of breath. Cardiovascular: Negative for chest pain and palpitations. Gastrointestinal: Negative. Genitourinary: Negative. Musculoskeletal: Negative. Skin: Negative. Neurological: Negative. Hematological: Negative. Medications: Allergies:     Allergies   Allergen Reactions    Pcn [Penicillins] Other (See Comments)     Trouble walking       Current Meds:   Scheduled Meds:    amiodarone  200 mg Oral Daily    epoetin willian-epbx  2,000 Units Subcutaneous Once per day on Mon Wed Fri    And    epoetin willian-epbx  3,000 Units Subcutaneous Once per day on Mon Wed Fri    heparin (porcine)  5,000 Units Subcutaneous 3 times per day    benzonatate  200 mg Oral TID    dextromethorphan  60 mg Oral 2 times per day    famotidine  10 mg Oral Daily    ciprofloxacin  1 drop Left Eye Q2H While awake    senna  1 tablet Oral Nightly    midodrine  7.5 mg Oral Q8H    metoprolol tartrate  25 mg Oral BID    vancomycin  500 mg Oral 4 times per day    sevelamer  4,000 mg Oral TID WC    aspirin  81 mg Oral Daily    lidocaine  1 patch Transdermal Daily    neomycin-bacitracin-polymyxin   Topical BID    sodium chloride flush  5-40 mL Intravenous 2 times per day    atorvastatin  40 mg Oral Nightly    digoxin  125 mcg Oral Daily    insulin lispro  0-6 Units Subcutaneous TID WC    insulin lispro  0-3 Units Subcutaneous Nightly     Continuous Infusions:    sodium chloride      dextrose      sodium chloride 10 mL/hr at 05/07/21 1242     PRN Meds: oxyCODONE-acetaminophen, sodium chloride flush, sodium chloride, promethazine **OR** ondansetron, polyethylene glycol, acetaminophen **OR** acetaminophen, glucose, dextrose, glucagon (rDNA), dextrose    Data:     Past Medical History:   has a past medical history of Acute on chronic congestive heart failure ([facility] Anemia, CAD (coronary artery disease), Cardiomyopathy ([facility] Dialysis care, ESRD (end stage renal disease) on dialysis ([facility] Foot ulcer, left ([facility] GERD (gastroesophageal reflux disease), Hemodialysis patient ([facility] History of sleep apnea, Hyperlipidemia, Hyperparathyroidism ([facility] Hypertension, Neuropathy, ROOSEVELT (obstructive sleep apnea), Peripheral vascular disease ([facility] Pneumonia, S/P CABG (coronary artery bypass graft), and Type II or unspecified type diabetes mellitus without mention of complication, not stated as uncontrolled. Social History:   reports that he has never smoked. He has never used smokeless tobacco. He reports that he does not drink alcohol or use drugs. Family History:   Family History   Problem Relation Age of Onset    Heart Disease Father     Diabetes Father     Diabetes Brother        Vitals:  BP (!) 121/59   Pulse 62   Temp 97.4 °F (36.3 °C) (Oral)   Resp 16   Ht 6' (1.829 m)   Wt 222 lb 10.6 oz (101 kg)   SpO2 97%   BMI 30.20 kg/m²   Temp (24hrs), Av.9 °F (36.6 °C), Min:97.4 °F (36.3 °C), Max:98.2 °F (36.8 °C)    Recent Labs     05/10/21  0813 05/10/21  1244 05/10/21  2113 21  0732   POCGLU 156* 146* 153* 130*       I/O(24Hr):     Intake/Output Summary (Last 24 hours) at 2021 0841  Last data filed at 2021 5306  Gross per 24 hour   Intake 30 ml   Output 675 ml   Net -645 ml       Labs:    [unfilled]    Lab Results   Component Value Date/Time    SPECIAL NOT REPORTED 05/10/2021 03:10 PM     Lab Results   Component Value Date/Time    CULTURE NO GROWTH 9 HOURS 05/10/2021 03:10 PM       @LA@    Radiology:    [facility] Foot Right (min 3 Views) Result Date: 4/28/2021  EXAMINATION: THREE XRAY VIEWS OF THE RIGHT FOOT 4/28/2021 4:44 am COMPARISON: 12/20/2015 HISTORY: ORDERING SYSTEM PROVIDED HISTORY: extensive R foot wound TECHNOLOGIST PROVIDED HISTORY: extensive R foot wound Reason for Exam: Extensive right foot wound, FINDINGS: Complete collapse of the plantar arch with fragmentation and increased sclerosis of the midfoot is again noted consistent with severe neuropathic arthropathy. There is diffuse soft tissue swelling. There is a soft tissue ulceration along the plantar surface of the midfoot. There is no periosteal new bone formation or osteolysis to suggest acute osteomyelitis. 1. Redemonstration of severe neuropathic arthropathy of the midfoot. 2. Soft tissue ulceration along the plantar surface of the midfoot without radiographic findings of acute osteomyelitis. RECOMMENDATION: MRI of the right foot could be performed for further evaluation if clinically warranted. Ct Chest Wo Contrast    Result Date: 5/7/2021  EXAMINATION: CT OF THE CHEST WITHOUT CONTRAST 5/7/2021 6:48 am TECHNIQUE: CT of the chest was performed without the administration of intravenous contrast. Multiplanar reformatted images are provided for review. Dose modulation, iterative reconstruction, and/or weight based adjustment of the mA/kV was utilized to reduce the radiation dose to as low as reasonably achievable. COMPARISON: None. HISTORY: ORDERING SYSTEM PROVIDED HISTORY: Pleural effusion versus worsening infiltrate right lung TECHNOLOGIST PROVIDED HISTORY: Pleural effusion versus worsening infiltrate right lung Reason for Exam: Pleural effusion versus worsening infiltrate, right lung Acuity: Unknown Type of Exam: Unknown FINDINGS: Mediastinum: Thyroid is asymmetric. Right lobe is enlarged and heterogenous with largest nodule of 9 mm not requiring further follow-up. Shotty mediastinal lymph nodes are present without bill adenopathy.   Moderate calcified plaque in the HISTORY: hemoptysis and tachycardia r/o PE Additional signs and symptoms: SOB, CP, coughin up blood, non-smoker, no hx of blood clots FINDINGS: PERFUSION:Distribution of radiotracer is homogeneous. No segmental defects identified. CHEST RADIOGRAPH:No focal areas of consolidation or significant effusions on recent chest radiograph. Low Probability for Pulmonary Embolus. Ct Abdomen Pelvis W Iv Contrast Additional Contrast? None    Result Date: 4/28/2021  EXAMINATION: CT OF THE ABDOMEN AND PELVIS WITH CONTRAST 4/28/2021 4:16 am TECHNIQUE: CT of the abdomen and pelvis was performed with the administration of intravenous contrast. Multiplanar reformatted images are provided for review. Dose modulation, iterative reconstruction, and/or weight based adjustment of the mA/kV was utilized to reduce the radiation dose to as low as reasonably achievable. COMPARISON: None. HISTORY: ORDERING SYSTEM PROVIDED HISTORY: diarrhea, abd pain, wbc 30k, discussed with nephro TECHNOLOGIST PROVIDED HISTORY: diarrhea, abd pain, wbc 30k, discussed with nephro Decision Support Exception->Emergency Medical Condition (MA) Reason for Exam: abd pain, nephro maddie approved contrast, dialysis pt Acuity: Acute Type of Exam: Initial FINDINGS: Lower Chest: There is abnormal airspace consolidation within the right lower lobe consistent with a right lower lobe pneumonia. There is no pleural effusion. There is cardiomegaly. Severe coronary artery atherosclerotic calcifications are present. Organs: There is a small volume of ascites. Cholecystectomy clips are present. The liver, spleen, pancreas and adrenal glands are normal in appearance. There is severe renal atrophy. Innumerous renal cysts are noted. There is no hydronephrosis. A right nephroureteral stent is present. GI/Bowel: There are no abnormally dilated loops of large or small bowel present. There is no mesenteric inflammatory stranding present.   The appendix is normal. Pelvis: micropuncture needle under ultrasound guidance. A 0.018 guidewire was advanced through the needle. The needle was removed and a 4 Western Jazmyn access catheter over its dilator was advanced over the wire. The wire and dilator were removed and a 0.035 guidewire was advanced through the access catheter into the IVC under fluoroscopic guidance. The access catheter was removed and the tract dilated over the wire. A 20 cm triple-lumen catheter was advanced over the wire and the wire was removed. The catheter flushed and aspirated easily. The catheter was sutured in place with 2-0 nylon. A sterile dressing was applied. The patient tolerated the procedure well and there were no immediate complications. Estimated blood loss: Less than 5 mL. FINDINGS: Ultrasound demonstrates patency of the right internal jugular vein and guides venotomy. Final fluoroscopic image demonstrates satisfactory catheter placement with the tip in the right atrium. Successful ultrasound and fluoroscopy guided right IJ triple-lumen central venous catheter insertion. Us Liver    Result Date: 5/10/2021  EXAMINATION: RIGHT UPPER QUADRANT ULTRASOUND 5/10/2021 1:49 pm COMPARISON: CT abdomen pelvis with contrast 04/28/2021 HISTORY: ORDERING SYSTEM PROVIDED HISTORY: low platelets TECHNOLOGIST PROVIDED HISTORY: low platelets Acuity: Acute Type of Exam: Initial FINDINGS: LIVER:  The liver demonstrates normal echogenicity without evidence of intrahepatic biliary ductal dilatation. BILIARY SYSTEM:  Gallbladder surgically absent Common bile duct is within normal limits measuring 5 mm. OTHER: Small amount of abdominal ascites. Small amount of abdominal ascites.      Xr Chest Portable    Result Date: 5/9/2021  EXAMINATION: ONE XRAY VIEW OF THE CHEST 5/9/2021 6:39 am COMPARISON: 05/07/2021 HISTORY: ORDERING SYSTEM PROVIDED HISTORY: Follow-up loculated pleural effusion TECHNOLOGIST PROVIDED HISTORY: Follow-up loculated pleural effusion Reason for Exam: atelectasis. There is worsening mild to moderate pulmonary vascular congestion. Cardiomegaly is stable status post median sternotomy and CABG. There is no pneumothorax. 1. Congestive heart failure with new moderate to large right pleural effusion. Xr Chest Portable    Result Date: 4/28/2021  EXAMINATION: ONE XRAY VIEW OF THE CHEST 4/28/2021 2:48 am COMPARISON: 06/03/2019 HISTORY: ORDERING SYSTEM PROVIDED HISTORY: missed dialysis x2weeks TECHNOLOGIST PROVIDED HISTORY: missed dialysis x2weeks Reason for Exam: Upright port, missed dialysis x2 weeks FINDINGS: There is stable cardiomegaly. There is no focal consolidation, pleural effusion or evidence of edema. There is no pneumothorax identified. 1. Stable cardiomegaly. 2. No acute cardiopulmonary process identified. Ir Chest Tube Insertion    Result Date: 5/10/2021  PROCEDURE: ULTRASOUND GUIDED CHEST TUBE PLACEMENT WITH THORACENTESIS 5/10/2021 HISTORY: ORDERING SYSTEM PROVIDED HISTORY: Recurrent, loculated right effusion; pigtail catheter okay TECHNOLOGIST PROVIDED HISTORY: Recurrent, loculated right effusion; pigtail catheter okay Which side should the procedure be performed?->Right Recurring right pleural effusion, dyspnea, please place pigtail catheter. SEDATION: None TECHNIQUE AND FINDINGS: This procedure was performed by Dr. Michaela Peña. Informed consent was obtained after a detailed explanation of the procedure including risks, benefits, and alternatives. Universal protocol was followed. A suitable skin site was prepped and draped in sterile fashion following ultrasound localization. Ultrasound shows a moderate right pleural effusion. The patient was placed in left lateral decubitus position. Right posterolateral chest was prepped and draped in standard sterile fashion. 1% lidocaine used for local anesthesia.   Using realtime ultrasound guidance an 8 Setswana pigtail catheter was advanced into the moderate right pleural effusion from a posterolateral approach just superior to a rib. Ultrasound images show a safe tract and access into the fluid with the pigtail formed in the posterior inferior/dependent portion of the fluid. Approximately 750 mL of dark red colored fluid was drained. The catheter was sutured to the skin and secured in place with a Vaseline gauze dressing. The tube was connected to an atrium drainage system. Further management per the pulmonary service. There are no immediate complications. The patient left the department stable condition. EBL: Minimal.     Successful ultrasound guided placement of an 8 Nepali right pleural pigtail catheter which was connected to an atrium drainage system. Right thoracentesis with drainage of 750 mL of fluid. Fl Modified Barium Swallow W Video    Result Date: 5/3/2021  EXAMINATION: MODIFIED BARIUM SWALLOW WAS PERFORMED IN CONJUNCTION WITH SPEECH PATHOLOGY SERVICES TECHNIQUE: Fluoroscopic evaluation of the swallowing mechanism was performed with multiple consistency of barium product. FLUOROSCOPY DOSE AND TYPE OR TIME AND EXPOSURES: Fluoroscopic time of 1 minutes 44 seconds. DAP of 71.9 dGycm2. COMPARISON: None HISTORY: ORDERING SYSTEM PROVIDED HISTORY: evaluate for diet modifications TECHNOLOGIST PROVIDED HISTORY: evaluate for diet modifications Reason for Exam: evaluate for diet modifications Acuity: Acute Type of Exam: Initial FINDINGS: Oral phase of swallowing was grossly within normal limits. No evidence of laryngeal penetration or aspiration. Swallowing mechanism grossly within normal limits without evidence of aspiration. Please see separate speech pathology report for full discussion of findings and recommendations.      Ir Guided Thoracentesis Pleural    Result Date: 5/7/2021  PROCEDURE: ULTRASOUNDGUIDED RIGHT THORACENTESIS 5/7/2021 HISTORY: ORDERING SYSTEM PROVIDED HISTORY: Loculated right pleural effusion; chest tube/pigtail catheter at discretion of radiologist-not necessarily needed TECHNOLOGIST PROVIDED HISTORY: Discussed with Dr. Jesica Myers right pleural effusion; chest tube/pigtail catheter at discretion of radiologist-not necessarily needed Which side should the procedure be performed?->Right TECHNIQUE: This procedure was performed by Dr. Saroj Lane. Informed consent was obtained after a detailed explanation of the procedure including risks, benefits, and alternatives. Universal protocol was performed. The right chest was prepped and draped in sterile fashion and local anesthesia was achieved with lidocaine. A 5 Icelandic needle sheath was advanced under ultrasound guidance into pleural effusion and thoracentesis was performed. The patient tolerated the procedure well. EBL: None FINDINGS: A total of 1.3 L of fairly clear raul colored fluid was removed. Sample was sent for the requested studies. Successful ultrasound guided right thoracentesis. Physical Examination:        Physical Exam  Constitutional:       Appearance: Normal appearance. He is obese. Eyes:      General:         Left eye: Discharge present. Cardiovascular:      Rate and Rhythm: Normal rate and regular rhythm. Pulses: Normal pulses. Heart sounds: Normal heart sounds. Pulmonary:      Effort: Pulmonary effort is normal.      Breath sounds: Normal breath sounds. Abdominal:      Palpations: Abdomen is soft. Musculoskeletal:      Comments: Chest tube in place on right posterior thorax. Left BKA   Skin:     General: Skin is warm. Neurological:      Mental Status: He is alert and oriented to person, place, and time.            Assessment:        Primary Problem  Sepsis due to pneumonia [facility])    Active Hospital Problems    Diagnosis Date Noted    Parapneumonic effusion [J18.9, J91.8] 05/09/2021    C. difficile colitis [A04.72] 05/09/2021    Loculated pleural effusion [J90] 05/08/2021    Pleural effusion on right [J90] 05/08/2021    ROOSEVELT (obstructive sleep apnea) [G47.33] 05/08/2021    Pneumonia [J18.9] 04/28/2021    Sepsis ([facility] [A41.9] 04/28/2021    Seps[facility] due to pneumonia ([facility] [J18.9, A41.9] 80/84/6404    Metabolic acidosis [W01.8]     Hyperkalemia [E87.5]     Diarrhea [R19.7]     Nausea and vomiting [R11.2]     History of left below knee amputation (HCC) [Z89.512]     Hemoptysis [R04.2]     Ulcerated, foot, right, with fat layer exposed ([facility] [L97.512]     Hypertension, essential [I10]     Charcot foot due to diabetes mellitus ([facility] [E11.610] 12/28/2015    Anemia of chronic disease [D63.8] 07/03/2013    ESRD on dialysis ([facility] [N18.6, Z99.2] 06/27/2013    DM (diabetes mellitus) ([facility] [E11.9] 06/27/2013       Plan:        Acute hypoxic respiratory failure likely 2/2  developing parapneumonic effusion  -On 2L NC  -Repeat CXR shows reaccumulation of fluid  -5/10: IR pigtail catheter placed successfully 750 mL of fluid drained    -CXR 5/7: Showed large right pleural effusion  -CT chest: Shows moderate to large right pleural effusion and consolidation involving the entire RLL. There appears to be multiple areas of loculation  -S/p right-sided thoracentesis with 1.3L raul fluid removed  -According to lights criteria, patient has exudative effusion  -Pulmonology on board:              -5/10: IR pigtail catheter placed successfully 750 mL of fluid drained    -ID on board    -Discontinue Merrem (day 4)   -Continue oral vancomycin until 5/14     Sepsis 2/2 CAP   -CT A&P showed RLL PNA  -Pro-Kiran 4.55  -Blood cultures x 2 NGTD  -Resp Cx  show just above ed gram +ve cocci in clusters with rare GN rods: Growing staph aureus methicillin susceptible  -On NS 0.9% 10 ml/hr  -Continue midodrine 7.5 mgTID  -I. D on board              -IV Merrem (Day 4)              -D/C'd IV ceftriaxone after 6 days of therapy. Completed 3 days of Cefepime               -Continue oral vancomycin until 5/14/21 for C.  Diff              -Appreciate further recommendations     Low albumin, high INR, chronically low platelets r/o cirrhosis  -CT abdomen pelvis shows small volume of ascites  -Platelet count 51  -Liver U/S pending   -Ammonia 22     Chronic systolic and diastolic CHF/Hx of HTN. -S/p cardioversion 5/5  -BNP > 300,000, likely elevated from missed dialysis for 2 weeks  -Echo 6/2019: LVEF 30-35% with moderate diastolic dysfunction  -CAD, CABG, stents, last cath 2019.   -Amiodarone 200 mg twice daily (day 6)  -Digoxin 125 MCG daily  -Lopressor 25 mg twice daily  -Cardiology on board:               -Completed amiodarone 200 mg twice daily for 7 days.   - Started Amiodarone from 200 mg QD               -Patient is a candidate for AICD due to ICM. Plan for AICD in future once infectious process resolves              -Appreciate further recs     Hyperkalemia 2/2 ESRD on hemodialysis TTS  -Missed 2 weeks of dialysis.   -K 6.9 on admission with EKG changes, received IV calcium gluconate and insulin/dextrose.  -Underwent hemodialysis at [facility]. V's  -Nephrology on board:              -Dialysis today              -Renvela 4000 mg PO TID              -Magnesium 1.6     Hemoptysis likely 2/2 pneumonia (improving)  -HgB stable  -H&H q8h, keep HgB > 7     Acute diarrhea 2/2 C. difficile infection (improving)  -Continues to have bowel movements overnight, non-foul-smelling  -Continue Senokot nightly  -CT abdomen and pelvis showed small volume ascites and normal appendix  -C.  Diff +ve   -Continue oral vancomycin 500 mg q6hr day 4 until 5/14  -Protonix 40 mg PO daily             Right foot ulcer  -X-ray showed no evidence of OM  -Podiatry on board  -Wound care on board      Dyslipidemia   -Lipitor 40 mg PO nightly     Type 2 diabetes mellitus  -Low dose ISS  -Hypoglycemia protocol     Bilateral neural foraminal narrowing at L5-S1 with spondylolysis  -Showed up on CT A&P  -We will continue to monitor     Diet:  Dental soft diet, Low Sodium, Fluid restriction to 1500 mL   DVT prophylaxis: Heparin 5,000 3 times daily HELD GI prophylaxis: Famotidine 10 mg tab PO QD. Johnny Goldstein MD  5/11/2021  8:41 AM       I have discussed the care of Jasen De La Rosa , including pertinent history and exam findings,    today with the resident. I have seen and examined the patient and the key elements of all parts of the encounter have been performed by me . I agree with the assessment, plan and orders as documented by the resident. Principal Problem:    Sepsis due to pneumonia [facility])  Active Problems:    ESRD on dialysis ([facility]    DM (diabetes mellitus) ([facility]    Anemia of chronic disease    Charcot foot due to diabetes mellitus ([facility]    Hypertension, essential    Sepsis ([facility]    Metabolic acidosis    Hyperkalemia    Diarrhea    Nausea and vomiting    History of left below knee amputation (HCC)    Hemoptysis    Ulcerated, foot, right, with fat layer exposed ([facility]    Pneumonia    Loculated pleural effusion    Pleural effusion on right    ROOSEVELT (obstructive sleep apnea)    Parapneumonic effusion    C. difficile colitis  Resolved Problems:    * No resolved hospital problems. *        Overall  course ;                                   are improving over time.         S/p chest tube insertion  Patient doing much better  Seen in dialysis unit  Off antibiotics  Ultrasound liver, negative for cirrhosis of liver  Thrombocytopenia less than 50,000, will holding heparin  Patient getting transferred to progressive floor            Electronically signed by Scott Segovia MD

## 2021-05-11 NOTE — PROGRESS NOTES
Physical Therapy    DATE: 2021    NAME: Cora Ojeda  MRN: [id number]   : 1955      Patient not seen this date for Physical Therapy due to:    Hemodialysis: Per nursing staff pt is currently in dialysis.        Electronically signed by Elsie Angelo PTA on 2021 at 2:20 PM

## 2021-05-11 NOTE — PROGRESS NOTES
Nephrology ESRD Consult Note    Reason for Consult: Management of end-stage renal disease. Requesting Physician: Dr Juwan Jeffries    Interval history: Patient was seen and examined today in the intensive care unit and he is awake, alert and oriented. Diarrhea is improving on oral vancomycin for C. difficile colitis. He underwent successful ultrasound-guided placement of an 8 [place]ian right pleural pigtail catheter connected to an atrium drainage system on 5/10/2021. History of Present Illness: This is a 60 y.o. male with history of CAD status post CABG, cardiomyopathy, dyslipidemia, end stage renal disease secondary to nephrosclerosis on hemodialysis Tuesday Thursday Saturday under the care of Dr. Anibal Gilbert at [address] [facility] renal care by way of a left arm AV fistula. He had presented initially to [facility] with diarrhea, nausea and vomiting and generalized weakness for six days. He had missed multiple treatments of dialysis. He presented at [facility]. [facility] with hyperkalemia potassium of  6.9, BUN/ creatinine of 241 and 18.47 mg/dl and serum bicarbonate of 9. He was dialyzed yesterday 4/28/2021 at [facility]. [facility] and transferred to [facility] due to in availability of a bed. Less than 500 cc was removed with dialysis due to hypotension. He is seen at dialysis today. His blood pressures have been in the hypotensive trends between 81-62 systolic blood pressure. He denies fever. He denies abdominal pain. He has ongoing nonbloody diarrhea, watery frequency of 6 times per day. he is tachycardic with heart rate in 127. Work-up for diarrhea shows positive C. difficile. Received a liter bolus of 0.9 saline yesterday.     Current Medications:    epoetin willian-epbx (RETACRIT) injection 2,000 Units, Once per day on Mon Wed Fri    And  epoetin willian-epbx (RETACRIT) injection 3,000 Units, Once per day on Mon Wed Fri  heparin (porcine) injection 5,000 Units, 3 times per day  benzonatate (TESSALON) capsule 200 mg, TID  dextromethorphan (DELSYM) 30 MG/5ML extended release liquid 60 mg, 2 times per day  famotidine (PEPCID) tablet 10 mg, Daily  meropenem (MERREM) 500 mg IVPB extended (mini-bag), Q24H  ciprofloxacin (CILOXAN) 0.3 % ophthalmic solution 1 drop, Q2H While awake  senna (SENOKOT) tablet 8.6 mg, Nightly  midodrine (PROAMATINE) tablet 7.5 mg, Q8H  metoprolol tartrate (LOPRESSOR) tablet 25 mg, BID  amiodarone (CORDARONE) tablet 200 mg, BID  vancomycin (FIRVANQ) 50 MG/ML oral solution 500 mg, 4 times per day  sevelamer (RENVELA) tablet 4,000 mg, TID WC  aspirin EC tablet 81 mg, Daily  oxyCODONE-acetaminophen (PERCOCET) 5-325 MG per tablet 1 tablet, Q8H PRN  lidocaine 4 % external patch 1 patch, Daily  neomycin-bacitracin-polymyxin (NEOSPORIN) ointment, BID  sodium chloride flush 0.9 % injection 5-40 mL, 2 times per day  sodium chloride flush 0.9 % injection 5-40 mL, PRN  0.9 % sodium chloride infusion, PRN  promethazine (PHENERGAN) tablet 12.5 mg, Q6H PRN    Or  ondansetron (ZOFRAN) injection 4 mg, Q6H PRN  polyethylene glycol (GLYCOLAX) packet 17 g, Daily PRN  acetaminophen (TYLENOL) tablet 650 mg, Q6H PRN    Or  acetaminophen (TYLENOL) suppository 650 mg, Q6H PRN  atorvastatin (LIPITOR) tablet 40 mg, Nightly  digoxin (LANOXIN) tablet 125 mcg, Daily  glucose (GLUTOSE) 40 % oral gel 15 g, PRN  dextrose 50 % IV solution, PRN  glucagon (rDNA) injection 1 mg, PRN  dextrose 5 % solution, PRN  insulin lispro (HUMALOG) injection vial 0-6 Units, TID WC  insulin lispro (HUMALOG) injection vial 0-3 Units, Nightly  0.9 % sodium chloride infusion, Continuous      Objective:  Constitutional:    CURRENT TEMPERATURE:  Temp: 97.4 °F (36.3 °C)  MAXIMUM TEMPERATURE OVER 24HRS:  Temp (24hrs), Av.9 °F (36.6 °C), Min:97.4 °F (36.3 °C), Max:98.2 °F (36.8 °C)    CURRENT RESPIRATORY RATE:  Resp: 19  CURRENT PULSE:  Pulse: 71  CURRENT BLOOD PRESSURE:  BP: (!) 121/59  24HR BLOOD PRESSURE RANGE:  Systolic (87RLC), VBV:745 , Min:101 , IIN:245   ; Diastolic (03JUD), Av, Min:50, Max:76    24HR INTAKE/OUTPUT:      Intake/Output Summary (Last 24 hours) at 2021 0748  Last data filed at 2021 9411  Gross per 24 hour   Intake 30 ml   Output 675 ml   Net -645 ml     Physical Exam:  GENERAL APPEARANCE: Alert and cooperative, and appears to be in no acute distress. Brandie Сергей NECK: Supple with no jugular venous distention  CARDIAC: Normal S1 and S2. No S3, S4 or murmurs. Rhythm is regular. LUNGS: Normal respiratory effort no accessory muscle use  ABDOMEN: Soft non distended, BS+ Non tender no organomegally   MUSKULOSKELETAL: S/p right below-knee amputation with intact stump  EXTREMITIES: Status post left below-knee amputation with intact stump; right foot ulcer wrapped in bandage. NEURO:Alert oriented x 3 ,power 5/5 in all extremities    Labs:     CBC:   Recent Labs     05/09/21  0452 05/10/21  0521  0425   WBC 7.2 5.6 4.9   RBC 3.65* 3.47* 3.39*   HGB 9.6* 9.1* 9.1*   HCT 31.4* 29.9* 28.9*   MCV 85.9 86.2 85.4   MCH 26.3 26.1 26.8   MCHC 30.6* 30.3* 31.3   RDW 21.0* 20.9* 21.0*   PLT 53* 51* 45*   MPV 10.8 10.3 10.0      BMP:   Recent Labs     05/09/21  0452 05/10/21  0521  0425    139 140   K 3.7 4.0 4.2    101 104   CO2 26 27 26   BUN 34* 44* 52*   CREATININE 3.89* 4.82* 5.33*   GLUCOSE 135* 162* 153*   CALCIUM 8.9 8.8 9.1      Assessment/plan:    1. ESRD - his regular hemodialysis schedule is TTS [at  renal care on [address] under the care of Dr. Amy Brock using left radiocephalic AV fistula]. Patient is scheduled to receive acute hemodialysis today   Renal diet,i.e 2-gram sodium, 2-gram potassium,1500 ml fluid restriction,1-gram phosphorus, 1800 KCal and 1.2 gram/kg protein per day. 2.  Anemia of chronic kidney disease - Hemoglobin today is 9.1 g/dL and we will continue Retacrit 5000 units subcutaneously 3 times a week. 3.  C. difficile colitis - Day #7 of oral vancomycin.     4.  Coronary artery disease - status post CABG 4 years

## 2021-05-11 NOTE — PROGRESS NOTES
HEMODIALYSIS POST TREATMENT NOTE    Treatment time ordered:3.15hrs   Actual treatment time: 3.15hrs    UltraFiltration Goal: 1500ml as tolerated   UltraFiltration Removed: 1000ml      Pre Treatment weight: 99.3kg  Post Treatment weight: 98.4kg  Estimated Dry Weight:     Access used:     Central Venous Catheter:          Tunneled or Non-tunneled:            Site:           Access Flow:       Internal Access:       AV Fistula or AV Graft: AV Fistula          Site: Left FA       Access Flow: good       Sign and symptoms of infection: none       If YES:     Medications or blood products given: 25g Albumin given x1    Chronic outpatient schedule: [facility]    Chronic outpatient unit:  Renal     Summary of response to treatment: Pt tolerated tx fair. UF goal decreased and 25g Albumin given x1 for BP support. Pt slept through the majority of tx but was easily arousable. Explain if orders NOT met, was physician notified:      Bhupinder Veloz faxed to patient unit/ placed in patient chart: yes    Post assessment completed: yes    Report given to: [address] [place] [address] Marielena documented Safety Checks include the followin) Access and face visible at all times. 2) All connections and blood lines are secure with no kinks. 3) NVL alarm engaged. 4) Hemosafe device applied (if applicable). 5) No collapse of Arterial or Venous blood chambers. 6) All blood lines / pump segments in the air detectors.

## 2021-05-11 NOTE — PLAN OF CARE
Problem: Falls - Risk of:  Goal: Will remain free from falls  5/11/2021 0626 by Milo Harry RN  Outcome: Ongoing  Note: Pt remains free of falls this shift. Approprate safety measures in place   5/10/2021 1959 by Boby Francois RN  Outcome: Ongoing  Goal: Absence of physical injury  5/11/2021 0626 by Milo Harry RN  Outcome: Ongoing  5/10/2021 1959 by Boby Francois RN  Outcome: Ongoing     Problem: Skin Integrity:  Goal: Will show no infection signs and symptoms  5/11/2021 0626 by Milo Harry RN  Outcome: Ongoing  Note: Pt has no new skin breakdown this shift. Pt turned q2h.   5/10/2021 1959 by Boby Francois RN  Outcome: Ongoing  Goal: Absence of new skin breakdown  5/11/2021 0626 by Milo Harry RN  Outcome: Ongoing  5/10/2021 1959 by Boby Francois RN  Outcome: Ongoing     Problem:  Activity:  Goal: Risk for activity intolerance will decrease  5/11/2021 0626 by Milo Harry RN  Outcome: Ongoing  5/10/2021 1959 by Boby Francois RN  Outcome: Ongoing     Problem: Fluid Volume:  Goal: Will show no signs or symptoms of fluid imbalance  5/11/2021 0626 by Milo Harry RN  Outcome: Ongoing  5/10/2021 1959 by Boby Francois RN  Outcome: Ongoing     Problem: Nutritional:  Goal: Maintenance of adequate nutrition will be supported  5/11/2021 0626 by Milo Harry RN  Outcome: Ongoing  5/10/2021 1959 by Boby Francois RN  Outcome: Ongoing     Problem: Physical Regulation:  Goal: Complications related to the disease process, condition or treatment will be avoided or minimized  5/11/2021 0626 by Milo Harry RN  Outcome: Ongoing  5/10/2021 1959 by Boby Francois RN  Outcome: Ongoing     Problem: Urinary Elimination:  Goal: Ability to achieve and maintain adequate urine output will be supported  5/11/2021 0626 by Milo Harry RN  Outcome: Ongoing  5/10/2021 1959 by Boby Francois RN Jenna Arechiga RN  Outcome: Ongoing  5/10/2021 1959 by Earl Ewing RN  Outcome: Ongoing     Problem: Mood - Altered:  Goal: Mood stable  5/11/2021 0626 by Brook Daily RN  Outcome: Ongoing  5/10/2021 1959 by Ealr Ewing RN  Outcome: Ongoing  Goal: Absence of abusive behavior  5/11/2021 0626 by Brook Daily RN  Outcome: Ongoing  5/10/2021 1959 by Earl Ewing RN  Outcome: Ongoing  Goal: Verbalizations of feeling emotionally comfortable while being cared for will increase  5/11/2021 0626 by Brook Daily RN  Outcome: Ongoing  5/10/2021 1959 by Earl Ewing RN  Outcome: Ongoing     Problem: Psychomotor Activity - Altered:  Goal: Absence of psychomotor disturbance signs and symptoms  5/11/2021 0626 by Brook Daily RN  Outcome: Ongoing  5/10/2021 1959 by Earl Ewing RN  Outcome: Ongoing     Problem: Sensory Perception - Impaired:  Goal: Demonstrations of improved sensory functioning will increase  5/11/2021 0626 by Brook Daily RN  Outcome: Ongoing  5/10/2021 1959 by Earl Ewing RN  Outcome: Ongoing  Goal: Decrease in sensory misperception frequency  5/11/2021 0626 by Brook Daily RN  Outcome: Ongoing  5/10/2021 1959 by Earl Ewnig RN  Outcome: Ongoing  Goal: Able to refrain from responding to false sensory perceptions  5/11/2021 0626 by Brook Daily RN  Outcome: Ongoing  5/10/2021 1959 by Earl Ewing RN  Outcome: Ongoing  Goal: Demonstrates accurate environmental perceptions  5/11/2021 0626 by Brook Daily RN  Outcome: Ongoing  5/10/2021 1959 by Earl Ewing RN  Outcome: Ongoing  Goal: Able to distinguish between reality-based and nonreality-based thinking  5/11/2021 0626 by Brook Daily RN  Outcome: Ongoing  5/10/2021 1959 by Earl Ewing RN  Outcome: Ongoing  Goal: Able to interrupt nonreality-based thinking  5/11/2021 0626 by Brook Daily, ZAYRA  Outcome: Ongoing  5/10/2021 1959 by Earl Ewing RN  Outcome: Ongoing     Problem: Sleep Pattern Disturbance:  Goal: Appears well-rested  5/11/2021 0626 by Brook Daily RN  Outcome: Ongoing  5/10/2021 1959 by Earl Ewing RN  Outcome: Ongoing     Problem: Pain:  Goal: Pain level will decrease  5/11/2021 0626 by Brook Daily RN  Outcome: Ongoing  Note: Pt Continued to assess pain level with appropriate pain scale.     5/10/2021 1959 by Earl Ewing RN  Outcome: Ongoing  Goal: Control of acute pain  5/11/2021 0626 by Brook Daily RN  Outcome: Ongoing  5/10/2021 1959 by Earl Ewing RN  Outcome: Ongoing  Goal: Control of chronic pain  5/11/2021 0626 by Brook Daily RN  Outcome: Ongoing  5/10/2021 1959 by Earl Ewing RN  Outcome: Ongoing     Problem: Nutrition  Goal: Optimal nutrition therapy  5/11/2021 0626 by Brook Daily RN  Outcome: Ongoing  5/10/2021 1959 by Earl Ewing RN  Outcome: Ongoing     Problem: Musculor/Skeletal Functional Status  Goal: Highest potential functional level  5/11/2021 0626 by Brook Daily RN  Outcome: Ongoing  5/10/2021 1959 by Earl Ewing RN  Outcome: Ongoing  Goal: Absence of falls  5/11/2021 0626 by Brook Daily RN  Outcome: Ongoing  5/10/2021 1959 by Earl Ewing RN  Outcome: Ongoing

## 2021-05-11 NOTE — PROGRESS NOTES
SW is following for Haresh Stanley 673 or return to [facility]. [facility] cannot accept pt with his chest tube. [facility] will accept and can start pre-cert in the morning. Pt is agreeable. Message left with pt's brother Theodroe Hof.

## 2021-05-11 NOTE — FLOWSHEET NOTE
ssisted lead rn Zoë DORSEY  putting patient on bed katz//and Ayanna HILL removing patient and cleaning patient when patient was done

## 2021-05-12 NOTE — PLAN OF CARE
Problem: Falls - Risk of:  Goal: Will remain free from falls  Description: The patient remained free from falls this shift, call light within reach, bed in locked and lowest position. Side rails up x2. Continue to monitor closely. Outcome: Ongoing  Note: Patient remained free from falls. Call light within reach. Problem: Skin Integrity:  Goal: Absence of new skin breakdown  Description: Absence of new skin breakdown  Outcome: Ongoing  Note: No new alterations in skin integrity. Problem: Injury - Risk of, Physical Injury:  Goal: Will remain free from falls  Description: The patient remained free from falls this shift, call light within reach, bed in locked and lowest position. Side rails up x2. Continue to monitor closely. Outcome: Ongoing  Note: Patient remained free from falls. Call light within reach. Problem: Pain:  Goal: Pain level will decrease  Description: Pain level will decrease  Outcome: Ongoing  Note: Patient given pain medication as ordered.

## 2021-05-12 NOTE — PROGRESS NOTES
Nephrology ESRD Consult Note    Reason for Consult: Management of end-stage renal disease. Requesting Physician: Dr Zhane Palma    Interval history: Patient was transferred out of the ICU on 5/11/2021. He feels well today and breathing is improved. He still has right chest pigtail catheter and chest x-ray today showed marked improvement in right pleural effusion. He is on oral vancomycin for C. difficile colitis and diarrhea is completely resolved at this time. He does not have nausea or vomiting and is afebrile. History of Present Illness: This is a 60 y.o. male with history of CAD [status post CABG], Heart failure with reduced ejection fraction secondary to ischemic cardiomyopathy [LVEF 30 to 35% in June 2019], dyslipidemia and End stage renal disease secondary to nephrosclerosis [on hemodialysis Tuesday,Thursday and Saturday under the care of Dr. Yessi Tucker at [address] [facility] renal care by way of a left arm AV fistula], who had presented initially to [facility]. [facility] with diarrhea, nausea and vomiting and generalized weakness for six days. He had missed multiple treatments of dialysis. He presented at [facility]. [facility] with hyperkalemia potassium of  6.9, BUN/ creatinine of 241 and 18.47 mg/dl and serum bicarbonate of 9. He was dialyzed yesterday 4/28/2021 at [facility]. [facility] and was transferred to [facility] due to in availability of a bed. Less than 500 cc was removed with dialysis due to hypotension. He was seen at dialysis today. His blood pressures have been in the hypotensive trends between 60-06 systolic blood pressure. He denies fever. He denies abdominal pain. He has ongoing nonbloody diarrhea, watery frequency of 6 times per day. He is tachycardic with heart rate in 127. Work-up for diarrhea shows positive C. difficile. Received a liter bolus of 0.9 saline yesterday.     Current Medications:    amiodarone (CORDARONE) tablet 200 mg, Daily  epoetin willian-epbx (RETACRIT) injection 2,000 Units, Once per day on     And  epoetin willian-epbx (RETACRIT) injection 3,000 Units, Once per day on   [Held by provider] heparin (porcine) injection 5,000 Units, 3 times per day  benzonatate (TESSALON) capsule 200 mg, TID  dextromethorphan (DELSYM) 30 MG/5ML extended release liquid 60 mg, 2 times per day  famotidine (PEPCID) tablet 10 mg, Daily  ciprofloxacin (CILOXAN) 0.3 % ophthalmic solution 1 drop, Q2H While awake  senna (SENOKOT) tablet 8.6 mg, Nightly  midodrine (PROAMATINE) tablet 7.5 mg, Q8H  metoprolol tartrate (LOPRESSOR) tablet 25 mg, BID  vancomycin (FIRVANQ) 50 MG/ML oral solution 500 mg, 4 times per day  sevelamer (RENVELA) tablet 4,000 mg, TID WC  aspirin EC tablet 81 mg, Daily  oxyCODONE-acetaminophen (PERCOCET) 5-325 MG per tablet 1 tablet, Q8H PRN  lidocaine 4 % external patch 1 patch, Daily  neomycin-bacitracin-polymyxin (NEOSPORIN) ointment, BID  sodium chloride flush 0.9 % injection 5-40 mL, 2 times per day  sodium chloride flush 0.9 % injection 5-40 mL, PRN  0.9 % sodium chloride infusion, PRN  promethazine (PHENERGAN) tablet 12.5 mg, Q6H PRN    Or  ondansetron (ZOFRAN) injection 4 mg, Q6H PRN  polyethylene glycol (GLYCOLAX) packet 17 g, Daily PRN  acetaminophen (TYLENOL) tablet 650 mg, Q6H PRN    Or  acetaminophen (TYLENOL) suppository 650 mg, Q6H PRN  atorvastatin (LIPITOR) tablet 40 mg, Nightly  digoxin (LANOXIN) tablet 125 mcg, Daily  glucose (GLUTOSE) 40 % oral gel 15 g, PRN  dextrose 50 % IV solution, PRN  glucagon (rDNA) injection 1 mg, PRN  dextrose 5 % solution, PRN  insulin lispro (HUMALOG) injection vial 0-6 Units, TID WC  insulin lispro (HUMALOG) injection vial 0-3 Units, Nightly  0.9 % sodium chloride infusion, Continuous      Objective:  Constitutional:    CURRENT TEMPERATURE:  Temp: 97.1 °F (36.2 °C)  MAXIMUM TEMPERATURE OVER 24HRS:  Temp (24hrs), Av.6 °F (36.4 °C), Min:97.1 °F (36.2 °C), Max:98.1 °F (36.7 °C)    CURRENT RESPIRATORY RATE:  Resp: 24  CURRENT PULSE:  Pulse: 74 CURRENT BLOOD PRESSURE:  BP: (!) 104/53  24HR BLOOD PRESSURE RANGE:  Systolic (27VXU), JGD:591 , Min:77 , FBU:169   ; Diastolic (54ZWQ), EKS:31, Min:33, Max:65    24HR INTAKE/OUTPUT:      Intake/Output Summary (Last 24 hours) at 5/12/2021 1026  Last data filed at 5/12/2021 0610  Gross per 24 hour   Intake 730 ml   Output 1905 ml   Net -1175 ml     Physical Exam:  GENERAL APPEARANCE: Alert and cooperative, and appears to be in no acute distress. Dorcalista Ciro NECK: Supple with no jugular venous distention  CARDIAC: Normal S1 and S2. No S3, S4 or murmurs. Rhythm is regular. LUNGS: Diminished breath sounds at lung bases; right pleural catheter in place. ABDOMEN: Soft non distended, BS+ Non tender no organomegally   MUSKULOSKELETAL: S/p right below-knee amputation with intact stump  EXTREMITIES: Status post left below-knee amputation with intact stump; right foot ulcer wrapped in bandage. NEURO:  Alert oriented x 3 ,power 5/5 in all extremities    Labs:     CBC:   Recent Labs     05/10/21  0517 05/11/21  0425 05/12/21  0528   WBC 5.6 4.9 3.8   RBC 3.47* 3.39* 3.17*   HGB 9.1* 9.1* 8.4*   HCT 29.9* 28.9* 27.5*   MCV 86.2 85.4 86.8   MCH 26.1 26.8 26.6   MCHC 30.3* 31.3 30.7*   RDW 20.9* 21.0* 21.5*   PLT 51* 45* 44*   MPV 10.3 10.0 11.8      BMP:   Recent Labs     05/10/21  0517 05/11/21  0425 05/12/21  0528    140 138   K 4.0 4.2 4.7    104 101   CO2 27 26 28   BUN 44* 52* 36*   CREATININE 4.82* 5.33* 3.93*   GLUCOSE 162* 153* 196*   CALCIUM 8.8 9.1 9.2      Assessment/plan:    1. ESRD - Will maintain TTS hemodialysis schedule. Left radiocephalic AV fistula has good thrill and bruit. Renal diet,i.e 2-gram sodium, 2-gram potassium,1500 ml fluid restriction,1-gram phosphorus, 1800 KCal and 1.2 gram/kg protein per day. 2.  Anemia of chronic kidney disease - Hemoglobin today is 8.4 g/dL and we will continue Retacrit 5000 units subcutaneously 3 times a week.     3.  C. difficile colitis - Day #10/14 of oral vancomycin. 4.  Coronary artery disease - status post CABG 4 years ago. 5.  Mineral and bone disease profile - serum phosphorus 5.4 mg/dL on 5/7/2021 is within target range. Continue sevelamer 4000 mg p.o. 3 times daily with meals. 6.   Peripheral artery disease - s/p left BKA and right lower extremity chronic wound. 7.  Right large pleural effusion - S/p ultrasound-guided right thoracentesis [1300 and] on 5/7/2020 and placement of a pigtail catheter 5/10/2021    Prognosis is guarded.     Alessandro Simms  Attending Clinical Nephrologist

## 2021-05-12 NOTE — PROGRESS NOTES
[address]    INPATIENT OCCUPATIONAL THERAPY  PROGRESS NOTE  Date: 2021  Patient Name: David Nick      Room: 4-  MRN: [id number]    : 1955  (60 y.o.) Gender: male     Discharge Recommendations:  Further Occupational Therapy is recommended upon facility discharge. Diagnosis: Sepsis due to pneumonia  General  Chart Reviewed: Orders, Progress Notes, History and Physical  Patient assessed for rehabilitation services?: Yes  Family / Caregiver Present: No  Diagnosis: Sepsis due to pneumonia    Restrictions  Restrictions/Precautions: Fall Risk, Isolation, Contact Precautions, Weight Bearing(CDIFF)  Other position/activity restrictions: up with assistance  Right Lower Extremity Weight Bearing: Non Weight Bearing  Required Braces or Orthoses?: Yes(L BKA prosthesis ( at home))      Subjective  Subjective: pt states that he is tired but agreeable to tx  Comments: Anita IVERSON oks therapy  Patient Currently in Pain: No  Overall Orientation Status: Within Normal Limits     Pain Assessment  Pain Assessment: 0-10  Pain Level: 5  Pain Type: Chronic pain  Pain Location: Back  Pain Orientation: Lower, Mid  Pain Frequency: Continuous  Response to Pain Intervention: Patient Satisfied    Objective     Bed mobility  Rolling to Left: Moderate assistance  Rolling to Right: Moderate assistance  Supine to Sit: Unable to assess  Sit to Supine: Unable to assess  Scooting: Unable to assess  Balance  Sitting Balance: Unable to assess(comment)(pt deferred today)  Standing Balance: Unable to assess(comment)         ADL  Feeding: Setup  Grooming: Setup(patient washing face with wash cloth)  UE Bathing: Minimal assistance(VCs for HD )  LE Bathing: Maximum assistance  UE Dressing: Maximum assistance  LE Dressing: Maximum assistance  Toileting: Dependent/Total(TA for bed pan placement)  Additional Comments: asist levels based on clinical observation/reasoning otherwise.  pt limited by lethargy, generalizes weakness        Upper Extremity Function  UE Strengthing: BUE HEP with orange theraband x 10 reps to support bed mobility and transfers  Type of ROM/Therapeutic Exercise  Type of ROM/Therapeutic Exercise: AROM  Comment: BUE x 15 reps to increase independence with bed mobility. pt encouraged to complete exercises as able. pt verbalizes understanding but writer unsure pt will follow through. Exercises  Shoulder Flexion: x  Shoulder Extension: x  Shoulder ABduction: x  Shoulder ADduction: x  Horizontal ABduction: x  Horizontal ADduction: x  Elbow Flexion: x  Elbow Extension: x                          Assessment  Performance deficits / Impairments: Decreased functional mobility ; Decreased safe awareness;Decreased balance;Decreased ADL status; Decreased endurance;Decreased strength  Prognosis: Fair  Discharge Recommendations: Patient would benefit from continued therapy after discharge  Activity Tolerance: Patient Tolerated treatment well  Safety Devices in place: Yes  Type of devices: Call light within reach; Left in bed             Patient Education:  Patient Education: OT POC, bed mobility, rolling L & R for skin integrity, call light usage, activity promotion, HEP to increase independence with bed mobility  Learner:patient  Method: demonstration, explanation and handout       Outcome: acknowledged understanding and demonstrated understanding     Plan  Safety Devices  Safety Devices in place: Yes  Type of devices: Call light within reach, Left in bed  Plan  Times per week: 2-5  Current Treatment Recommendations: Strengthening, Endurance Training, Patient/Caregiver Education & Training, Equipment Evaluation, Education, & procurement, Self-Care / ADL, Balance Training, Pain Management, Functional Mobility Training, Safety Education & Training, Positioning      Goals  Short term goals  Time Frame for Short term goals: by discharge, patient will  Short term goal 1: perform supine <>sit transition with Mod A for increased out of bed activity when medically ready  Short term goal 2: sit edge of bed for 8-10 minutes during active self care participation with SBA for sitting balance for overall increased endurance for functional tasks  Short term goal 3: perform upper body self care with Min A using compensatory techniques as needed  Short term goal 4: verbalize/demonstrate Good understanding of precautions (NWB RLE) for mobility/self care purposes    OT Individual Minutes  Time In: 1330  Time Out: 1408  Minutes: 38      Electronically signed by NASIM Perez on 5/12/21 at 2:30 PM EDT

## 2021-05-12 NOTE — PROGRESS NOTES
uncontrolled. Social History:   reports that he has never smoked. He has never used smokeless tobacco. He reports that he does not drink alcohol or use drugs. Family History:   Family History   Problem Relation Age of Onset    Heart Disease Father     Diabetes Father     Diabetes Brother        Vitals:  BP (!) 104/53   Pulse 74   Temp 97.1 °F (36.2 °C) (Temporal)   Resp 24   Ht 6' (1.829 m)   Wt 216 lb 14.9 oz (98.4 kg)   SpO2 96%   BMI 29.42 kg/m²   Temp (24hrs), Av.6 °F (36.4 °C), Min:97.1 °F (36.2 °C), Max:98.1 °F (36.7 °C)    Recent Labs     21  0732 21  1622 21  0700   POCGLU 130* 138* 156* 144*       I/O(24Hr): Intake/Output Summary (Last 24 hours) at 2021 0926  Last data filed at 2021 0610  Gross per 24 hour   Intake 730 ml   Output 1905 ml   Net -1175 ml       Labs:    [unfilled]    Lab Results   Component Value Date/Time    SPECIAL NOT REPORTED 05/10/2021 03:10 PM     Lab Results   Component Value Date/Time    CULTURE NO GROWTH 2 DAYS 05/10/2021 03:10 PM       [unfilled]    Radiology:    Tiffanie Thao Foot Right (min 3 Views)    Result Date: 2021  EXAMINATION: THREE XRAY VIEWS OF THE RIGHT FOOT 2021 4:44 am COMPARISON: 2015 HISTORY: ORDERING SYSTEM PROVIDED HISTORY: extensive R foot wound TECHNOLOGIST PROVIDED HISTORY: extensive R foot wound Reason for Exam: Extensive right foot wound, FINDINGS: Complete collapse of the plantar arch with fragmentation and increased sclerosis of the midfoot is again noted consistent with severe neuropathic arthropathy. There is diffuse soft tissue swelling. There is a soft tissue ulceration along the plantar surface of the midfoot. There is no periosteal new bone formation or osteolysis to suggest acute osteomyelitis. 1. Redemonstration of severe neuropathic arthropathy of the midfoot.  2. Soft tissue ulceration along the plantar surface of the midfoot without radiographic findings of acute osteomyelitis. RECOMMENDATION: MRI of the right foot could be performed for further evaluation if clinically warranted. Ct Chest Wo Contrast    Result Date: 5/7/2021  EXAMINATION: CT OF THE CHEST WITHOUT CONTRAST 5/7/2021 6:48 am TECHNIQUE: CT of the chest was performed without the administration of intravenous contrast. Multiplanar reformatted images are provided for review. Dose modulation, iterative reconstruction, and/or weight based adjustment of the mA/kV was utilized to reduce the radiation dose to as low as reasonably achievable. COMPARISON: None. HISTORY: ORDERING SYSTEM PROVIDED HISTORY: Pleural effusion versus worsening infiltrate right lung TECHNOLOGIST PROVIDED HISTORY: Pleural effusion versus worsening infiltrate right lung Reason for Exam: Pleural effusion versus worsening infiltrate, right lung Acuity: Unknown Type of Exam: Unknown FINDINGS: Mediastinum: Thyroid is asymmetric. Right lobe is enlarged and heterogenous with largest nodule of 9 mm not requiring further follow-up. Shotty mediastinal lymph nodes are present without bill adenopathy. Moderate calcified plaque in the aortic arch. Calcified coronary arteries. Moderate cardiomegaly noted without pericardial effusion or epicardial adenopathy. Lungs/pleura: Small left pleural effusion with compressive atelectasis at the left lung base. Vascular markings are hazy suggesting pulmonary venous congestion. Moderate to moderately large right pleural effusion tracking along the right lateral chest wall. There is consolidation of the entire right lower lobe with some air bronchograms. Upper Abdomen: Moderate ascites in the included upper abdomen. The patient has significant atherosclerotic calcification of the major branches off the aorta with moderate calcification of the aorta. Suggestion of collapsed gallbladder with gallstones. Soft Tissues/Bones: Multilevel degenerative changes in the thoracic spine.  No acute osseous or soft tissue abnormality although there is slight subcutaneous haziness suggesting anasarca. 1.  Moderate to moderately large right pleural effusion tracking up the right lateral chest wall. 2.  Consolidative process involving the entire right lower lobe with air bronchograms. 3.  Small left effusion. 4.  Hazy vascular markings most compatible with pulmonary venous congestion. 5.  Moderate intra-abdominal ascites. 6.  Atherosclerotic disease, most severe in the major branches of the abdominal aorta. Coronary artery calcification, significant, is noted. 7.  Heterogeneous thyroid with enlarged right lobe. No further ultrasound follow-up is required. RECOMMENDATIONS: No further ultrasound follow-up advised for thyroid findings. Nm Lung Scan Perfusion Only    Result Date: 4/29/2021  EXAMINATION: NUCLEAR MEDICINE PERFUSION SCAN. 4/29/2021 TECHNIQUE: Ventilation not performed as part of COVID-19 safety precautions. 3.7 millicuries of Tc 99K MAA was administered intravenously prior to planar imaging of the lungs in multiple projections. COMPARISON: Chest radiograph 04/28/2021. HISTORY: ORDERING SYSTEM PROVIDED HISTORY: hemoptysis and tachycardia r/o PE Additional signs and symptoms: SOB, CP, coughin up blood, non-smoker, no hx of blood clots FINDINGS: PERFUSION:Distribution of radiotracer is homogeneous. No segmental defects identified. CHEST RADIOGRAPH:No focal areas of consolidation or significant effusions on recent chest radiograph. Low Probability for Pulmonary Embolus. Ct Abdomen Pelvis W Iv Contrast Additional Contrast? None    Result Date: 4/28/2021  EXAMINATION: CT OF THE ABDOMEN AND PELVIS WITH CONTRAST 4/28/2021 4:16 am TECHNIQUE: CT of the abdomen and pelvis was performed with the administration of intravenous contrast. Multiplanar reformatted images are provided for review.  Dose modulation, iterative reconstruction, and/or weight based adjustment of the mA/kV was utilized to reduce the radiation dose to as low as reasonably achievable. COMPARISON: None. HISTORY: ORDERING SYSTEM PROVIDED HISTORY: diarrhea, abd pain, wbc 30k, discussed with nephro TECHNOLOGIST PROVIDED HISTORY: diarrhea, abd pain, wbc 30k, discussed with nephro Decision Support Exception->Emergency Medical Condition (MA) Reason for Exam: abd pain, nephro maddie approved contrast, dialysis pt Acuity: Acute Type of Exam: Initial FINDINGS: Lower Chest: There is abnormal airspace consolidation within the right lower lobe consistent with a right lower lobe pneumonia. There is no pleural effusion. There is cardiomegaly. Severe coronary artery atherosclerotic calcifications are present. Organs: There is a small volume of ascites. Cholecystectomy clips are present. The liver, spleen, pancreas and adrenal glands are normal in appearance. There is severe renal atrophy. Innumerous renal cysts are noted. There is no hydronephrosis. A right nephroureteral stent is present. GI/Bowel: There are no abnormally dilated loops of large or small bowel present. There is no mesenteric inflammatory stranding present. The appendix is normal. Pelvis: There is a small volume of free fluid in the pelvis. There is no free fluid or pelvic mass identified. The urinary bladder is normal. Peritoneum/Retroperitoneum: There is no pathologically enlarged lymphadenopathy present. Severe atherosclerotic calcifications are present within the abdominal aorta and proximal anchor arteries. Bones/Soft Tissues: No lytic or blastic osseous lesions are identified. There is bilateral spondylolysis at L5 and grade 1 spondylolisthesis of L5 relative to S1. There is a disc bulge and facet arthropathy resulting in severe bilateral neural foraminal narrowing. Findings consistent with renal osteodystrophy are noted. 1. Right lower lobe pneumonia. Follow-up PA and lateral chest radiographs are recommended after treatment to ensure resolution. 2. Small volume ascites.  3. Normal 5/11/2021  PROCEDURE: ULTRASOUND GUIDED CHEST TUBE PLACEMENT WITH THORACENTESIS 5/10/2021 HISTORY: ORDERING SYSTEM PROVIDED HISTORY: Recurrent, loculated right effusion; pigtail catheter okay TECHNOLOGIST PROVIDED HISTORY: Recurrent, loculated right effusion; pigtail catheter okay Which side should the procedure be performed?->Right Recurring right pleural effusion, dyspnea, please place pigtail catheter. SEDATION: None TECHNIQUE AND FINDINGS: This procedure was performed by Dr. Alan Anderson. Informed consent was obtained after a detailed explanation of the procedure including risks, benefits, and alternatives. Universal protocol was followed. A suitable skin site was prepped and draped in sterile fashion following ultrasound localization. Ultrasound shows a moderate right pleural effusion. The patient was placed in left lateral decubitus position. Right posterolateral chest was prepped and draped in standard sterile fashion. 1% lidocaine used for local anesthesia. Using realtime ultrasound guidance an 8 Afghan pigtail catheter was advanced into the moderate right pleural effusion from a posterolateral approach just superior to a rib. Ultrasound images show a safe tract and access into the fluid with the pigtail formed in the posterior inferior/dependent portion of the fluid. Approximately 750 mL of dark red colored fluid was drained. The catheter was sutured to the skin and secured in place with a Vaseline gauze dressing. The tube was connected to an atrium drainage system. Further management per the pulmonary service. There are no immediate complications. The patient left the department stable condition. EBL: Minimal.     Successful ultrasound guided placement of an 8 Afghan right pleural pigtail catheter which was connected to an atrium drainage system. Right thoracentesis with drainage of 750 mL of fluid.      Fl Modified Barium Swallow W Video    Result Date: 5/3/2021  EXAMINATION: MODIFIED BARIUM SWALLOW studies. Successful ultrasound guided right thoracentesis. Physical Examination:        Physical Exam  Constitutional:       General: He is not in acute distress. Appearance: Normal appearance. He is not ill-appearing. Comments: Patient resting comfortably in bed eating breakfast.  On 2L NC   HENT:      Head: Normocephalic and atraumatic. Eyes:      Extraocular Movements: Extraocular movements intact. Pupils: Pupils are equal, round, and reactive to light. Cardiovascular:      Rate and Rhythm: Normal rate and regular rhythm. Heart sounds: No murmur. No gallop. Pulmonary:      Effort: Pulmonary effort is normal. No respiratory distress. Breath sounds: Normal breath sounds. No wheezing. Comments: Right-sided chest tube in place  Abdominal:      General: Bowel sounds are normal. There is no distension. Tenderness: There is no abdominal tenderness. Musculoskeletal:         General: No swelling or deformity. Comments: Left BKA    Right foot wrapped C/D/I   Neurological:      General: No focal deficit present. Mental Status: He is alert and oriented to person, place, and time. Psychiatric:         Mood and Affect: Mood normal.         Thought Content:  Thought content normal.           Assessment:        Primary Problem  Sepsis due to pneumonia [facility])    Active Hospital Problems    Diagnosis Date Noted    Parapneumonic effusion [J18.9, J91.8] 05/09/2021    C. difficile colitis [A04.72] 05/09/2021    Loculated pleural effusion [J90] 05/08/2021    Pleural effusion on right [J90] 05/08/2021    ROOSE[place]ELT (obstructive sleep apnea) [G47.33] 05/08/2021    Pneumonia [J18.9] 04/28/2021    Sepsis ([facility] [A41.9] 04/28/2021    [facility] to [facility] [J18.9, A41.9] 06/55/8312    Metabolic acidosis [S08.5]     Hyperkalemia [E87.5]     Diarrhea [R19.7]     Nausea and vomiting [R11.2]     History of left below knee amputation ([facility] [Z89.512]     Hemoptysis [R04.2]     Ulcer[facility] right[facility] [L97.512]     Hypertension, essential [I10]     Charcot foot due to diabetes mellitus ([facility] [E11.610] 12/28/2015    Anemia of chronic disease [D63.8] 07/03/2013    ESRD on dialysis ([facility] [N18.6, Z99.2] 06/27/2013    DM (diabetes mellitus) ([facility] [E11.9] 06/27/2013       Plan:        Acute hypoxic respiratory failure likely 2/2  developing recurrent right sided pleural effusion  -On 2L NC  -5/7:S/p U/s guided right thoracentesis 1.3L removed  -5/10: S/p right sided IR pigtail catheter 750 mL of fluid drained     -Chest tube -405 cc in 24hrs  -CT chest: Shows moderate to large right pleural effusion and consolidation involving the entire RLL.  There appears to be multiple areas of loculation  -According to lights criteria, patient has exudative effusion  -Pulmonology on board:              -1/80: S/p IR pigtail catheter placed successfully 750 mL of fluid drained     -CXR pending this AM  -ID on board:               -Continue oral vancomycin until 5/14 for C. Diff     Sepsis 2/2 CAP   -CT A&P showed RLL PNA  -Pro-Kiran 4.55  -Blood cultures x 2 NGTD  -Resp Cx  show just above ed gram +ve cocci in clusters with rare GN rods: Growing staph aureus methicillin susceptible  -On NS 0.9% 10 ml/hr  -Continue midodrine 7.5 mgTID  -I. D on board              -D/C'd IV ceftriaxone after 6 days of therapy. Completed 3 days of Cefepime . Completed 4 days of IV Merrem              -Continue oral vancomycin until 5/14/21 for C. Diff              -Appreciate further recommendations     Low albumin, high INR, chronically low platelets cirrhosis ruled-out  -CT abdomen pelvis shows small volume of ascites  -Platelet count 44  -Liver U/S: Small amount of abdominal ascites  -Ammonia 22     Chronic systolic and diastolic CHF/Hx of HTN.    -S/p cardioversion 5/5  -BNP > 300,000, likely elevated from missed dialysis for 2 weeks  -Echo 6/2019: LVEF 30-35% with moderate diastolic dysfunction  -CAD, CABG, stents, last cath 2019.   -Amiodarone 200 mg QD  -Digoxin 125 MCG daily  -Lopressor 25 mg twice daily  -Cardiology on board:               -RGAPPMVSV amiodarone 200 mg twice daily for 7 days. - Continue Amiodarone from 200 mg QD               -Patient is a candidate for AICD due to ICM.  Plan for AICD in future once infectious process resolves              -Appreciate further recs     Hyperkalemia 2/2 ESRD on hemodialysis TTS  -Missed 2 weeks of dialysis.   -K 6.9 on admission with EKG changes, received IV calcium gluconate and insulin/dextrose.  -Underwent hemodialysis at [facility]. V's  -Nephrology on board:              -S/p dialysis yesterday with 1L removed   -Retacrit 5000U subcutaneously 3 times weekly              -Renvela 4000 mg PO TID     Hemoptysis likely 2/2 pneumonia (improving)  -HgB stable  -keep HgB > 7     Acute diarrhea 2/2 C. difficile infection (improving)  -Continues to have bowel movements overnight, non-foul-smelling  -Continue Senokot nightly  -CT abdomen and pelvis showed small volume ascites and normal appendix  -C. Diff +ve   -Continue oral vancomycin 500 mg q6hr day 4 until 5/14  -Protonix 40 mg PO daily             Right foot ulcer  -X-ray showed no evidence of OM  -Podiatry on board  -Wound care on board      Dyslipidemia   -Lipitor 40 mg PO nightly     Type 2 diabetes mellitus  -Low dose ISS  -Hypoglycemia protocol     Bilateral neural foraminal narrowing at L5-S1 with spondylolysis  -Showed up on CT A&P  -We will continue to monitor     Diet:  Dental soft diet, Low Sodium, Fluid restriction to 1500 mL   DVT prophylaxis: Heparin 5,000 3 times daily (HELD due to low PC)  GI prophylaxis: Famotidine 10 mg tab PO QD. Stephy Reyez MD  5/12/2021  9:26 AM     I have discussed the care of Howard aHrrislla , including pertinent history and exam findings,    today with the resident.   I have seen and examined the patient and the key elements of all parts of

## 2021-05-12 NOTE — PROGRESS NOTES
[facility] -   Infectious diseases evaluation  admission date 4/28/2021    reason for consultation:   Pneumonia/C. difficile colitis    Impression :   Current:  · C. difficile colitis  · Right lower lobe pneumonia, staph aureus MSSA growth on respiratory culture  · Moderate to large  pleural fluid status post ultrasound guided thoracentesis 5/7/2021 no growth on cultures status post right pleural pigtail catheter placement 5/10/2021. · Sepsis secondary to above  · Right foot wound  · Metabolic acidosis  · CHF  · End-stage renal disease on hemodialysis  · Diabetes mellitus  · Dyslipidemia  · Hypertension  · Thrombocytopenia/Heparin on hold      Recommendations   · Continue p.o. vancomycin until 5/16/2021  ·  IV meropenem was discontinued 5/11/2021  · IV ceftriaxone discontinued 5/6/2021  · No growth on pleural fluid culture   · Liver ultrasound showed small amount of ascites  · MRSA nasal swab was negative  · Swallow study showed no evidence of aspiration  · No growth on blood cultures  · Continue supportive care          History of Present Illness:   Initial history:  Leann Gonzalez is a 60y.o.-year-old male was transferred from [place].  The patient presented with worsening cough productive with blood-tinged sputum associated with shortness of breath and diarrhea for 2 weeks. He also complaining of generalized weakness, nausea and vomiting. no alleviating or aggravating factors. The patient is poor historian, lives at home alone, states that he received Covid 19 vaccine. Apparently the patient missed hemodialysis.   He also had left foot wound with no purulent drainage or necrotic tissue, was evaluated by podiatry, foot x-ray not suggestive of osteomyelitis  On admission he was tachycardic, had leukocytosis with WBC of 29.6  CT chest and abdomen showed right lower lobe infiltrates  COVID-19 rapid test negative  VQ scan was low probability on 4/29/2021  On 4/30/2021He became hypotensive with a systolic blood pressure in the 70s, tachycardic was transferred to ICU, was started on pressors  Procalcitonin was 25.7 on 4/30/2021  Sputum 4/28/2021 grew staph aureus MSSA  IJ triple-lumen was placed was placed 4/30/2021  CT chest without contrast from 5/7/2021 showed moderate to large right pleural effusion tracking up to the right lateral chest wall with consolidative process to the anterior right lower lobe, moderate ascites, enlarged right lobe of thyroid  status post ultrasound guided thoracentesis 5/7/2021  Fluid analysis exudative, no growth on culture to date   status post right pleural pigtail catheter placement 5/10/2021. Interval history 5/12/2021  He is feeling better, on 2 L of oxygen by nasal cannula  Right chest tube in place with around 400 mL drainage in the last 24 hours  He denied significant cough or shortness of breath, denied fever or chills, denied significant diarrhea  Chest x-ray showed improved right-sided pleural effusion              Patient Vitals for the past 8 hrs:   BP Temp Temp src Pulse Resp SpO2   05/12/21 0730 127/67 98.1 °F (36.7 °C) Oral 66 16 96 %         I have personally reviewed the past medical history, past surgical history, medications, social history, and family history, and I haveupdated the database accordingly. Allergies:   Pcn [penicillins]     Review of Systems:     Review of Systems  As per history present illness, other than above 12 system review is negative  Physical Examination :       Physical Exam  Constitutional:       General: He is not in acute distress. HENT:      Head: Normocephalic and atraumatic. Right Ear: External ear normal.      Left Ear: External ear normal.   Neck:      Musculoskeletal: Neck supple. No neck rigidity. Cardiovascular:      Rate and Rhythm: Normal rate and regular rhythm. Pulmonary:      Effort: No respiratory distress.       Comments: Decreased breath sounds bilaterally  Right-sided chest tube in place  Abdominal:      General: Abdomen is flat. There is no distension. Palpations: Abdomen is soft. Tenderness: There is no abdominal tenderness. Musculoskeletal:      Comments: Left below-knee amputation site with no open wound no erythema. Right foot dressing   Skin:     General: Skin is warm. Coloration: Skin is not jaundiced. Neurological:      General: No focal deficit present. Mental Status: He is alert and oriented to person, place, and time.          Past Medical History:     Past Medical History:   Diagnosis Date    Acute on chronic congestive heart failure (HCC)     Anemia     CAD (coronary artery disease)     Cardiomyopathy ([address]     Dialysis care     ESRD [facility]end stage renal disease[facility]     Foot ulcer, left ([address] 2015    GERD (gastroesophageal reflux disease)     Hemodialysis patient ([facility] 2011    MOM-WED-FRI-ON [address]     History of sleep apnea     none since significant weight loss (was 400#)    Hyperlipidemia     [facility]parathyroidism [facility]     Hypertension     Neuropathy     ROOSEVELT (obstructive sleep apnea) 5/8/2021    Peripheral vascular disease ([facility]     Pneumonia     resolved    S/P CABG (coronary artery bypass graft) 06/2016    Type II or unspecified type diabetes mellitus without mention of complication, not stated as uncontrolled     dx-1980       Past Surgical  History:     Past Surgical History:   Procedure Laterality Date    CATARACT REMOVAL      DIALYSIS FISTULA CREATION Left 2014    IR CHEST TUBE INSERTION  5/10/2021    IR CHEST TUBE INSERTION 5/10/2021 [facility]    LEG AMPUTATION THROUGH LOWER TIBIA AND FIBULA         Medications:      amiodarone  200 mg Oral Daily    epoetin willian-epbx  2,000 Units Subcutaneous Once per day on Mon Wed Fri    And    epoetin willian-epbx  3,000 Units Subcutaneous Once per day on Mon Wed Fri    [Held by provider] heparin (porcine)  5,000 Units abused: Not on file     Forced sexual activity: Not on file   Other Topics Concern    Not on file   Social History Narrative    Not on file       Family History:     Family History   Problem Relation Age of Onset    Heart Disease Father     Diabetes Father     Diabetes Brother       Medical Decision Making:   I have independently reviewed/ordered the following labs:    CBC with Differential:   Recent Labs     05/11/21 0425 05/12/21 0528   WBC 4.9 3.8   HGB 9.1* 8.4*   HCT 28.9* 27.5*   PLT 45* 44*   LYMPHOPCT 8* 14*   MONOPCT 16* 15*     BMP:  Recent Labs     05/11/21 0425 05/12/21 0528    138   K 4.2 4.7    101   CO2 26 28   BUN 52* 36*   CREATININE 5.33* 3.93*     Hepatic Function Panel:   Recent Labs     05/10/21  0517   PROT 6.0*     No results for input(s): RPR in the last 72 hours. No results for input(s): HIV in the last 72 hours. No results for input(s): BC in the last 72 hours. Lab Results   Component Value Date    CREATININE 3.93 05/12/2021    GLUCOSE 196 05/12/2021    GLUCOSE 128 01/09/2012       Detailed results: Thank you for allowing us to participate in the care of this patient. Please call with questions. This note is created with the assistance of a speech recognition program.  While intending to generate adocument that actually reflects the content of the visit, the document can still have some errors including those of syntax and sound a like substitutions which may escape proof reading. It such instances, actual meaningcan be extrapolated by contextual diversion.     Dario Yousif MD  Office: [phone]  Perfect serve / office [phone]

## 2021-05-12 NOTE — PROGRESS NOTES
[address]   Physical Therapy Progress Note    Date: 21  Patient Name: Cora Ojeda       Room: 4-  MRN: [id number]   Account: [de-identified]   : 1955  (63 y.o.)   Gender: male     Discharge Recommendations   Patient would benefit from continued therapy after discharge  Other: TBD       Diagnosis: Sepsis due to pneumonia  Restrictions/Precautions: Fall Risk, Isolation, Contact Precautions, Weight Bearing(CDIFF)  Other position/activity restrictions: up with assistance  Right Lower Extremity Weight Bearing: Non Weight Bearing   Past Medical History:  has a past medical history of Acute on chronic congestive heart failure ([facility] Anemia, CAD (coronary artery disease), Cardiomyopathy ([facility] Dialysis care, ESRD (end stage renal disease) on dialysis ([facility] Foot ulcer, left ([facility] GERD (gastroesophageal reflux disease), Hemodialysis patient ([facility] History of sleep apnea, Hyperlipidemia, [facility] Hypertension, Neuropathy, ROOSEVELT (obstructive sleep apnea), Peripheral vascular disease ([facility] Pneumonia, S/P CABG (coronary artery bypass graft), and Type II or unspecified type diabetes mellitus without mention of complication, not stated as uncontrolled. Past Surgical History:   has a past surgical history that includes Cataract removal; Dialysis fistula creation (Left, ); Leg amputation, lower tibia/fibula; and IR CHEST TUBE INSERTION (5/10/2021).        Overall Orientation Status: Within Functional Limits  Restrictions/Precautions  Restrictions/Precautions: Fall Risk;Isolation;Contact Precautions;Weight Bearing(CDIFF)  Required Braces or Orthoses?: Yes(L BKA prosthesis ( at home))  Lower Extremity Weight Bearing Restrictions  Right Lower Extremity Weight Bearing: Non Weight Bearing  Partial Weight Bearing Percentage Or Pounds: per Podiatry note 21  Left Lower Extremity Weight Bearing: Non Weight Bearing (Left BKA)  Partial Weight Bearing Percentage Or Pounds: pt is BKA with prosthetic here    Subjective: Pt reports feeling better today  Comments: RN Kye lafleur with PT/OT tx. Pt has left LE prosthetic here in room however no socks or silicone sleeve. Pt has recovering wound on left residual limb. Educated pt to not wear left prosthetic yet until wound is fully healed. Vital Signs  Patient Currently in Pain: No                   Bed Mobility  Rolling: Unable to assess  Supine to Sit: Unable to assess  Sit to Supine: Unable to assess  Scooting: Unable to assess      Transfers: (Not at this time)                         Comments: NOEMÍ  Exercises  Comments: AHSAN RLE in supine 15 reps. Edu on importance of NWB of RLE for skin protection. Education provided on the importance of increasing ther ex, bed mobility, and frequent repositioning for skin protection. Activity Tolerance: Patient limited by endurance; Patient limited by fatigue  Activity Tolerance: fair  PT Equipment Recommendations  Other: TBD       Assessment  Activity Tolerance: Patient limited by endurance; Patient limited by fatigue   Body structures, Functions, Activity limitations: Decreased functional mobility ; Decreased endurance;Decreased strength;Decreased safe awareness  Specific instructions for Next Treatment: therex and bed mobility  Prognosis: Fair  Discharge Recommendations: Patient would benefit from continued therapy after discharge     Type of devices: All fall risk precautions in place;Call light within reach;Nurse notified; Left in bed  Restraints  Initially in place: No     Plan  Times per week: 5-6 days /week  Current Treatment Recommendations: Strengthening, ROM, Positioning(bed mobility)    Patient Education  New Education Provided:  Plan of Care  Learner:patient  Method: demonstration and explanation       Outcome: needs reinforcement     Goals  Short term goals  Time Frame for Short term goals: 14 visits  Short term goal 1: min assist rolling  Short term goal 2: increase LE strength to good in preparation for OOB mobilization  Short term goal 3: Physical therapist to assess moblity of pt when medically stable to tolerate    PT Individual Minutes  Time In: 1330  Time Out: 1408  Minutes: 38    Electronically signed by Lennox Deeds, PTA on 5/12/21 at 2:25 PM EDT

## 2021-05-12 NOTE — CARE COORDINATION
TALITA spoke with Maxine Rizo and Debbie with admissions at [facility]. TALITA spoke to them about this patient's Chest tube. Debbie will come and assess the patient and look at the drain and then they will let this SW know if they are able to accept this patient with this drain.

## 2021-05-12 NOTE — PROGRESS NOTES
Physical Therapy  [address]     Date: 21  Patient Name: Leann Gonzalez       Room:   MRN: [id number]  Account: [de-identified]   : 1955  (60 y.o.) Gender: male     Modified goal as below      21 1724   Short term goals   Time Frame for Short term goals 14 visits (modified on 21)   Short term goal 1 min assist rolling   Short term goal 2 increase LE strength to good in preparation for OOB mobilization   Short term goal 3 Pt able to perform supine<>sit at mod A x 1 to 2 person assist , to dangle at EOB for 10 minutes. Short term goal 4 Pt able to tolerate sitting at EOB with atleast 1 UE support, at CGA/Guille for balance   Short term goal 5 Pt to tolerate B LE ex's supine or dangling at EOB  for 10 to 20 minutes to improve fucntion/strength/endurance for mobility.     Electronically signed by Jessica Whiting PT on 2021 at 5:29 PM

## 2021-05-12 NOTE — PROGRESS NOTES
ICU Progress Note (Non-Vent)  [facility]    Patient - Mary Masterson,  Age - 60 y.o.    - 1955      Room Number - 2114/2114-01   MRN -  [id number]   [facility]  Date of Admission -  2021  2:27 PM     Follow-up: Recurrent pleural effusion    Events of Past 24 Hours   Feeling much better,  2 L O2  no fever or sweats  Dry cough, no short of breath or wheezing  Chest x-ray with much improved right-sided pleural effusion  Right CT drainage with 405 mL last 24 hours  Vitals    height is 6' (1.829 m) and weight is 216 lb 14.9 oz (98.4 kg). His oral temperature is 98.1 °F (36.7 °C). His blood pressure is 127/67 and his pulse is 66. His respiration is 16 and oxygen saturation is 96%.        Temperature Range: Temp: 98.1 °F (36.7 °C) Temp  Av.6 °F (36.4 °C)  Min: 97.1 °F (36.2 °C)  Max: 98.1 °F (36.7 °C)  BP Range:  Systolic (79VPE), SS , Min:96 , NVZ:289     Diastolic (07ALU), AKR:52, Min:38, Max:67    Pulse Range: Pulse  Av.6  Min: 66  Max: 77  Respiration Range: Resp  Av.6  Min: 16  Max: 24  Current Pulse Ox[de-identified]  SpO2: 96 %  24HR Pulse Ox Range:  SpO2  Av.6 %  Min: 94 %  Max: 100 %  Oxygen Amount and Delivery: O2 Flow Rate (L/min): 2 L/min    Wt Readings from Last 3 Encounters:   21 216 lb 14.9 oz (98.4 kg)   21 210 lb 8.6 oz (95.5 kg)   20 223 lb (101.2 kg)     I/O       Intake/Output Summary (Last 24 hours) at 2021 1443  Last data filed at 2021 0610  Gross per 24 hour   Intake 730 ml   Output 405 ml   Net 325 ml     DRAIN/TUBE OUTPUT       Invasive Lines   ICP PRESSURE RANGE  No data recorded  CVP PRESSURE RANGE  No data recorded      Medications      amiodarone  200 mg Oral Daily    epoetin willian-epbx  2,000 Units Subcutaneous Once per day on     And    epoetin willian-epbx  3,000 Units Subcutaneous Once per day on     [Held by provider] heparin (porcine)  5,000 Units Subcutaneous 3 times per day    benzonatate  200 mg Oral TID    dextromethorphan  60 mg Oral 2 times per day    famotidine  10 mg Oral Daily    ciprofloxacin  1 drop Left Eye Q2H While awake    senna  1 tablet Oral Nightly    midodrine  7.5 mg Oral Q8H    metoprolol tartrate  25 mg Oral BID    vancomycin  500 mg Oral 4 times per day    sevelamer  4,000 mg Oral TID WC    aspirin  81 mg Oral Daily    lidocaine  1 patch Transdermal Daily    neomycin-bacitracin-polymyxin   Topical BID    sodium chloride flush  5-40 mL Intravenous 2 times per day    atorvastatin  40 mg Oral Nightly    digoxin  125 mcg Oral Daily    insulin lispro  0-6 Units Subcutaneous TID WC    insulin lispro  0-3 Units Subcutaneous Nightly     oxyCODONE-acetaminophen, sodium chloride flush, sodium chloride, promethazine **OR** ondansetron, polyethylene glycol, acetaminophen **OR** acetaminophen, glucose, dextrose, glucagon (rDNA), dextrose  IV Drips/Infusions   sodium chloride      dextrose      sodium chloride 10 mL/hr at 05/07/21 1242       Diet/Nutrition   DIET GENERAL; Low Sodium (2 GM); Dental Soft; Daily Fluid Restriction: 1500 ml  Dietary Nutrition Supplements: Renal Oral Supplement    Exam      Constitutional -awake and alert, no distress  General Appearance  well developed, well nourished  HEENT -normocephalic, atraumatic. PERRLA  Lungs - Chest expands equally, no wheezes, rales, + coarse sounds  Cardiovascular - Heart sounds are normal.  normal rate and rhythm regular, no murmur, gallop or rub.   Abdomen - soft, nontender, nondistended, no guarding  Neurologic - comfortable, following commands  Extremities - no cyanosis, clubbing or edema, left BKA    Lab Results   CBC     Lab Results   Component Value Date    WBC 3.8 05/12/2021    RBC 3.17 05/12/2021    RBC 3.51 01/09/2012    HGB 8.4 05/12/2021    HCT 27.5 05/12/2021    PLT 44 05/12/2021     01/09/2012    MCV 86.8 05/12/2021    MCH 26.6 05/12/2021    MCHC 30.7 05/12/2021    RDW 21.5 05/12/2021    NRBC 1 03/07/2016    METASPCT 2 05/01/2021    LYMPHOPCT 14 05/12/2021    MONOPCT 15 05/12/2021    MYELOPCT 1 03/06/2016    BASOPCT 0 05/12/2021    MONOSABS 0.57 05/12/2021    LYMPHSABS 0.53 05/12/2021    EOSABS 0.08 05/12/2021    BASOSABS 0.00 05/12/2021    DIFFTYPE NOT REPORTED 05/12/2021       BMP   Lab Results   Component Value Date     05/12/2021    K 4.7 05/12/2021     05/12/2021    CO2 28 05/12/2021    BUN 36 05/12/2021    CREATININE 3.93 05/12/2021    GLUCOSE 196 05/12/2021    GLUCOSE 128 01/09/2012       LFTS  Lab Results   Component Value Date    ALKPHOS 137 04/30/2021    ALT 12 04/30/2021    AST 17 04/30/2021    PROT 6.0 05/10/2021    BILITOT 0.74 04/30/2021    BILIDIR 0.14 10/19/2011    IBILI 0.26 10/19/2011    LABALBU 2.9 04/30/2021    LABALBU 3.8 01/09/2012       ABG ABGs: No results found for: PHART, PO2ART, MCL1CFO    Lab Results   Component Value Date    MODE NOT REPORTED 06/02/2019         INR  No results for input(s): PROTIME, INR in the last 72 hours. APTT  No results for input(s): APTT in the last 72 hours. Lactic Acid  No results found for: LACTA     BNP   No results for input(s): BNP in the last 72 hours. Cultures   Right pleural fluid collected 5/10 with no growth for 2 days    Radiology     CXR as above      CT Scans    (See actual reports for details)      SYSTEMS ASSESSMENT    Neuro       Respiratory   Wean oxygen as tolerated.  Keep O2 sat > 88%    Cardiovascular        Gastrointestinal       Renal       Infectious Disease       Hematology/Oncology       Endocrine       Social/Spiritual/DNR/Disposition/Other     Recurrent right sided pleural effusion/ exudate, negative cultures/possibly parapneumonic effusion  Hypoxia, was up to 5 L now down to 2  ROOSEVELT  Metabolic acidosis/resolved,   Right lower lobe pneumonia  C. difficile colitis  Right foot wound  HTN, history of CHF  ESRD/HD Diabetes mellitus  Thrombocytopenia platelets down to 44    Plan:   pigtail chest tube placement 5/10  Follow-up chest x-ray and daily CT drainage, hopefully remove when down to 100 mL or less per day  Wean O2 as tolerated  Antibiotic per ID, on oral vancomycin and off meropenem   heparin subcu on hold,      Electronically signed by Katja Boucher MD on 5/12/2021 at 2:43 PM

## 2021-05-13 NOTE — PROGRESS NOTES
ICU Progress Note (Non-Vent)  [facility]    Patient - Car Kiran,  Age - 60 y.o.    - 1955      Room Number - 2114/2114-01   N -  [id number]   [facility]  Date of Admission -  2021  2:27 PM     Follow-up: Recurrent pleural effusion    Events of Past 24 Hours   Somnolent, on 2 L O2  no fever or sweats  Little dry cough, no short of breath or wheezing  Chest x-ray with small right-sided pleural effusion and congestion  Right CT without any drainage in the last 24 hours  Vitals    height is 6' (1.829 m) and weight is 221 lb 9 oz (100.5 kg). His axillary temperature is 97.6 °F (36.4 °C). His blood pressure is 135/71 and his pulse is 80. His respiration is 18 and oxygen saturation is 91%.        Temperature Range: Temp: 97.6 °F (36.4 °C) Temp  Av °F (36.7 °C)  Min: 97.6 °F (36.4 °C)  Max: 98.2 °F (36.8 °C)  BP Range:  Systolic (47FRE), GQT:292 , Min:110 , EGK:015     Diastolic (63GJC), MBV:68, Min:57, Max:80    Pulse Range: Pulse  Av.5  Min: 65  Max: 80  Respiration Range: Resp  Av.3  Min: 16  Max: 18  Current Pulse Ox[[id number]]  SpO2: 91 %  24HR Pulse Ox Range:  SpO2  Av.5 %  Min: 91 %  Max: 99 %  Oxygen Amount and Delivery: O2 Flow Rate (L/min): 2 L/min    Wt Readings from Last 3 Encounters:   21 221 lb 9 oz (100.5 kg)   21 210 lb 8.6 oz (95.5 kg)   20 223 lb (101.2 kg)     I/O       Intake/Output Summary (Last 24 hours) at 2021 1600  Last data filed at 2021 1408  Gross per 24 hour   Intake 875 ml   Output 0 ml   Net 875 ml     DRAIN/TUBE OUTPUT       Invasive Lines   ICP PRESSURE RANGE  No data recorded  CVP PRESSURE RANGE  No data recorded      Medications      amiodarone  200 mg Oral Daily    epoetin willian-epbx  2,000 Units Subcutaneous Once per day on     And    epoetin willian-epbx  3,000 Units Subcutaneous Once per day on   [Held by provider] heparin (porcine)  5,000 Units Subcutaneous 3 times per day    benzonatate  200 mg Oral TID    dextromethorphan  60 mg Oral 2 times per day    ciprofloxacin  1 drop Left Eye Q2H While awake    senna  1 tablet Oral Nightly    midodrine  7.5 mg Oral Q8H    metoprolol tartrate  25 mg Oral BID    vancomycin  500 mg Oral 4 times per day    sevelamer  4,000 mg Oral TID WC    aspirin  81 mg Oral Daily    lidocaine  1 patch Transdermal Daily    neomycin-bacitracin-polymyxin   Topical BID    sodium chloride flush  5-40 mL Intravenous 2 times per day    atorvastatin  40 mg Oral Nightly    digoxin  125 mcg Oral Daily    insulin lispro  0-6 Units Subcutaneous TID WC    insulin lispro  0-3 Units Subcutaneous Nightly     oxyCODONE-acetaminophen, sodium chloride flush, sodium chloride, promethazine **OR** ondansetron, polyethylene glycol, acetaminophen **OR** acetaminophen, glucose, dextrose, glucagon (rDNA), dextrose  IV Drips/Infusions   sodium chloride      dextrose      sodium chloride 10 mL/hr at 05/07/21 1242       Diet/Nutrition   DIET GENERAL; Low Sodium (2 GM); Dental Soft; Daily Fluid Restriction: 1500 ml  Dietary Nutrition Supplements: Renal Oral Supplement    Exam      Constitutional -somnolent, no distress  General Appearance  well developed, well nourished  HEENT -normocephalic, atraumatic. PERRLA  Lungs - Chest expands equally, no wheezes, rales, + coarse sounds  Cardiovascular - Heart sounds are normal.  normal rate and rhythm regular, no murmur, gallop or rub.   Abdomen - soft, nontender, nondistended, no guarding  Neurologic -arousable, comfortable, no restlessness or agitation  Extremities - no cyanosis, clubbing or edema, left BKA    Lab Results   CBC     Lab Results   Component Value Date    WBC 4.0 05/13/2021    RBC 3.22 05/13/2021    RBC 3.51 01/09/2012    HGB 8.7 05/13/2021    HCT 27.9 05/13/2021    PLT 46 05/13/2021     01/09/2012    MCV 86.5 05/13/2021 MCH 27.1 05/13/2021    MCHC 31.3 05/13/2021    RDW 21.1 05/13/2021    NRBC 1 03/07/2016    METASPCT 2 05/01/2021    LYMPHOPCT 17 05/13/2021    MONOPCT 15 05/13/2021    MYELOPCT 1 03/06/2016    BASOPCT 0 05/13/2021    MONOSABS 0.60 05/13/2021    LYMPHSABS 0.68 05/13/2021    EOSABS 0.08 05/13/2021    BASOSABS 0.00 05/13/2021    DIFFTYPE NOT REPORTED 05/13/2021       BMP   Lab Results   Component Value Date     05/13/2021    K 4.7 05/13/2021     05/13/2021    CO2 31 05/13/2021    BUN 49 05/13/2021    CREATININE 5.08 05/13/2021    GLUCOSE 191 05/13/2021    GLUCOSE 128 01/09/2012       LFTS  Lab Results   Component Value Date    ALKPHOS 137 04/30/2021    ALT 12 04/30/2021    AST 17 04/30/2021    PROT 6.0 05/10/2021    BILITOT 0.74 04/30/2021    BILIDIR 0.14 10/19/2011    IBILI 0.26 10/19/2011    LABALBU 2.9 04/30/2021    LABALBU 3.8 01/09/2012       ABG ABGs: No results found for: PHART, PO2ART, TSE7BVM    Lab Results   Component Value Date    MODE NOT REPORTED 06/02/2019         INR  No results for input(s): PROTIME, INR in the last 72 hours. APTT  No results for input(s): APTT in the last 72 hours. Lactic Acid  No results found for: LACTA     BNP   No results for input(s): BNP in the last 72 hours. Cultures   Right pleural fluid collected 5/10 with no growth for 3 days    Radiology     CXR as above      CT Scans    (See actual reports for details)      SYSTEMS ASSESSMENT    Neuro       Respiratory   Wean oxygen as tolerated.  Keep O2 sat > 88%    Cardiovascular        Gastrointestinal       Renal       Infectious Disease       Hematology/Oncology       Endocrine       Social/Spiritual/DNR/Disposition/Other     Recurrent right sided pleural effusion/ exudate, negative cultures/possibly parapneumonic effusion  Hypoxia, was up to 5 L now down to 2  ROOSEVELT  Metabolic acidosis/resolved,   Right lower lobe pneumonia  C. difficile colitis  Right foot wound  HTN, history of CHF  ESRD/HD  Diabetes mellitus Thrombocytopenia platelets at 46    Plan:   pigtail chest tube placement 5/10  Follow-up chest x-ray in a.m. and daily CT drainage, hopefully remove chest tube tomorrow  Wean O2 as tolerated  Antibiotic per ID, on oral vancomycin and off meropenem   heparin subcu on hold,      Electronically signed by Inocencia Oseguera MD on 5/13/2021 at 4:00 PM

## 2021-05-13 NOTE — PROGRESS NOTES
HEMODIALYSIS PRE-TREATMENT NOTE    Patient Identifiers prior to treatment: NAME//MRN    Isolation Required: YES                      Isolation Type: CONTACT       (please document if patient is being managed as a PUI/COVID-19 patient)        Hepatitis status:                           Date Drawn                             Result  Hepatitis B Surface Antigen 2021     NEG                     Hepatitis B Surface Antibody 2021 POS        Hepatitis B Core Antibody N/A N/A          How was Hepatitis Status verified: OUTPATIENT LAB     Was a copy of the labs you documented provided to facility for the patient's chart: YES    Hemodialysis orders verified: YES    Access Within normal limits ( I.e. s/s of infection,...): YES     Pre-Assessment completed: YES BY STEPHANIE AGUIAR RN    Pre-dialysis report received from: ALANNA                      Time: 09:00

## 2021-05-13 NOTE — FLOWSHEET NOTE
05/13/21 1646   Encounter Summary   Services provided to: Patient   Referral/Consult From: Rounding   Complexity of Encounter Low   Length of Encounter 15 minutes   Spiritual/Adventist   Type Spiritual support   Assessment Sleeping   Intervention Prayer

## 2021-05-13 NOTE — PROGRESS NOTES
[facility] -   Infectious diseases evaluation  admission date 4/28/2021    reason for consultation:   Pneumonia/C. difficile colitis    Impression :   Current:  · C. difficile colitis  · Right lower lobe pneumonia, staph aureus MSSA growth on respiratory culture  · Moderate to large  pleural fluid status post ultrasound guided thoracentesis 5/7/2021 no growth on cultures status post right pleural pigtail catheter placement 5/10/2021. · Sepsis secondary to above  · Right foot wound  · Metabolic acidosis  · CHF  · End-stage renal disease on hemodialysis  · Diabetes mellitus  · Dyslipidemia  · Hypertension  · Thrombocytopenia/Heparin on hold      Recommendations   · Continue p.o. vancomycin until 5/16/2021  ·  IV meropenem was discontinued 5/11/2021  · IV ceftriaxone discontinued 5/6/2021  · No growth on pleural fluid culture   · Liver ultrasound showed small amount of ascites  · MRSA nasal swab was negative  · Swallow study showed no evidence of aspiration  · No growth on blood cultures  · Continue supportive care          History of Present Illness:   Initial history:  Car Kiran is a 60y.o.-year-old male was transferred from [place].  The patient presented with worsening cough productive with blood-tinged sputum associated with shortness of breath and diarrhea for 2 weeks. He also complaining of generalized weakness, nausea and vomiting. no alleviating or aggravating factors. The patient is poor historian, lives at home alone, states that he received Covid 19 vaccine. Apparently the patient missed hemodialysis.   He also had left foot wound with no purulent drainage or necrotic tissue, was evaluated by podiatry, foot x-ray not suggestive of osteomyelitis  On admission he was tachycardic, had leukocytosis with WBC of 29.6  CT chest and abdomen showed right lower lobe infiltrates  COVID-19 rapid test negative  VQ scan was low probability on 4/29/2021  On 4/30/2021He became hypotensive with a systolic blood pressure in the 70s, tachycardic was transferred to ICU, was started on pressors  Procalcitonin was 25.7 on 4/30/2021  Sputum 4/28/2021 grew staph aureus MSSA  IJ triple-lumen was placed was placed 4/30/2021  CT chest without contrast from 5/7/2021 showed moderate to large right pleural effusion tracking up to the right lateral chest wall with consolidative process to the anterior right lower lobe, moderate ascites, enlarged right lobe of thyroid  status post ultrasound guided thoracentesis 5/7/2021  Fluid analysis exudative, no growth on culture to date   status post right pleural pigtail catheter placement 5/10/2021. Interval history 5/12/2021  He remains on 2 L of oxygen by nasal cannula, occasional cough, denied fever  Right chest tube in place with no significant drainage in the last 24 hours              Patient Vitals for the past 8 hrs:   BP Temp Temp src Pulse Resp SpO2 Weight   05/13/21 1000 118/60   66      05/13/21 0930 139/80   76      05/13/21 0914 128/64   71      05/13/21 0911 131/68 98.2 °F (36.8 °C)  71 18  224 lb 13.9 oz (102 kg)   05/13/21 0841    78      05/13/21 0739 134/66 98.2 °F (36.8 °C) Oral 72 17 99 %          I have personally reviewed the past medical history, past surgical history, medications, social history, and family history, and I haveupdated the database accordingly. Allergies:   Pcn [penicillins]     Review of Systems:     Review of Systems  As per history present illness, other than above 12 system review is negative  Physical Examination :       Physical Exam  Constitutional:       General: He is not in acute distress. HENT:      Head: Normocephalic and atraumatic. Right Ear: External ear normal.      Left Ear: External ear normal.   Neck:      Musculoskeletal: Neck supple. No neck rigidity. Cardiovascular:      Rate and Rhythm: Normal rate and regular rhythm.    Pulmonary: Effort: No respiratory distress. Comments: Decreased breath sounds bilaterally  Right-sided chest tube in place  Abdominal:      General: Abdomen is flat. There is no distension. Palpations: Abdomen is soft. Tenderness: There is no abdominal tenderness. Musculoskeletal:      Comments: Left below-knee amputation site with no open wound no erythema. Right foot dressing   Skin:     General: Skin is warm. Coloration: Skin is not jaundiced. Neurological:      General: No focal deficit present. Mental Status: He is alert and oriented to person, place, and time.          Past Medical History:     Past Medical History:   Diagnosis Date    Acute on chronic congestive heart failure (HCC)     Anemia     CAD (coronary artery disease)     Cardiomyopathy ([facility] [place] [address]     Dialysis care     ESRD (end stage renal disease) on dialysis ([facility] [place] [address]     Foot ulcer, left ([facility] [place] [address] 2015    GERD (gastroesophageal reflux disease)     Hemodialysis patient ([facility] [place] [address] 2011    MOM-WED-FRI-ON [address]     History of sleep apnea     none since significant weight loss (was 400#)    Hyperlipidemia     Hyperparathyroidism ([facility] [place] [address]     Hypertension     Neuropathy     ROOSEVELT (obstructive sleep apnea) 5/8/2021    Peripheral vascular disease ([facility] [place] [address]     Pneumonia     resolved    S/P CABG (coronary artery bypass graft) 06/2016    Type II or unspecified type diabetes mellitus without mention of complication, not stated as uncontrolled     dx-1980       Past Surgical  History:     Past Surgical History:   Procedure Laterality Date    CATARACT REMOVAL      DIALYSIS FISTULA CREATION Left 2014    IR CHEST TUBE INSERTION  5/10/2021    IR CHEST TUBE INSERTION 5/10/2021 [facility]    LEG AMPUTATION THROUGH LOWER TIBIA AND FIBULA         Medications:      amiodarone  200 mg Oral Daily    epoetin willian-epbx  2,000 Units Subcutaneous Once per day on Mon Wed Fri    And    epoetin willian-epbx  3,000 Units Subcutaneous Once per day on Mon Wed Fri    [Held by provider] heparin (porcine)  5,000 Units Subcutaneous 3 times per day    benzonatate  200 mg Oral TID    dextromethorphan  60 mg Oral 2 times per day    famotidine  10 mg Oral Daily    ciprofloxacin  1 drop Left Eye Q2H While awake    senna  1 tablet Oral Nightly    midodrine  7.5 mg Oral Q8H    metoprolol tartrate  25 mg Oral BID    vancomycin  500 mg Oral 4 times per day    sevelamer  4,000 mg Oral TID WC    aspirin  81 mg Oral Daily    lidocaine  1 patch Transdermal Daily    neomycin-bacitracin-polymyxin   Topical BID    sodium chloride flush  5-40 mL Intravenous 2 times per day    atorvastatin  40 mg Oral Nightly    digoxin  125 mcg Oral Daily    insulin lispro  0-6 Units Subcutaneous TID WC    insulin lispro  0-3 Units Subcutaneous Nightly       Social History:     Social History     Socioeconomic History    Marital status:      Spouse name: Not on file    Number of children: Not on file    Years of education: Not on file    Highest education level: Not on file   Occupational History    Not on file   Social Needs    Financial resource strain: Not on file    Food insecurity     Worry: Not on file     Inability: Not on file    Transportation needs     Medical: Not on file     Non-medical: Not on file   Tobacco Use    Smoking status: Never Smoker    Smokeless tobacco: Never Used   Substance and Sexual Activity    Alcohol use: No    Drug use: No    Sexual activity: Not on file   Lifestyle    Physical activity     Days per week: Not on file     Minutes per session: Not on file    Stress: Not on file   Relationships    Social connections     Talks on phone: Not on file     Gets together: Not on file     Attends [employer] service: Not on file     Active member of club or organization: Not on file     Attends meetings of clubs or organizations: Not on file     Relationship status: Not on file    Intimate partner violence     Fear of current or ex

## 2021-05-13 NOTE — PROGRESS NOTES
[address] [facility] [place]    PROGRESS NOTE             5/13/2021    9:20 AM    Name:   Cora Ojeda  MRN:     [id number]     Acct:      [de-identified]   Room:   [facility]4/2114[facility]:  13  Admit Date:  4/28/2021  2:27 PM    PCP:  Elayne Eldridge MD  Code Status:  Full Code    Subjective:     C/C: No chief complaint on file. Interval History Status: . Patient was seen and examined bedside. No acute events overnight. Patient is on 2 L NC.  CXR yesterday showed improvement in right pleural fluid. Plan for dialysis today. Will discontinue oral vancomycin 5/16/2021. Patient denies any fever, chills, chest pain, worsening S OB, palpitations, abdominal pain, or diarrhea. Repeat chest x-ray pending this a.m. Brief History:     Please see H&P    Review of Systems:     Review of Systems   Constitutional: Negative for activity change, appetite change and fatigue. HENT: Negative for congestion. Eyes: Negative for photophobia and visual disturbance. Respiratory: Positive for shortness of breath. Negative for apnea, cough and wheezing. Cardiovascular: Negative for chest pain, palpitations and leg swelling. Gastrointestinal: Negative for abdominal distention, abdominal pain, constipation, diarrhea, nausea and vomiting. Endocrine: Negative for polyuria. Musculoskeletal: Positive for gait problem. Negative for arthralgias and back pain. Skin: Negative for color change. Neurological: Negative for dizziness, syncope and headaches. Psychiatric/Behavioral: Negative for agitation, behavioral problems, confusion, decreased concentration and dysphoric mood. Medications: Allergies:     Allergies   Allergen Reactions    Pcn [Penicillins] Other (See Comments)     Trouble walking       Current Meds:   Scheduled Meds:    amiodarone  200 mg Oral Daily    epoetin willian-epbx  2,000 Units Subcutaneous Once per day on Mon Wed Fri And    epoetin willian-epbx  3,000 Units Subcutaneous Once per day on Mon Wed Fri    [Held by provider] heparin (porcine)  5,000 Units Subcutaneous 3 times per day    benzonatate  200 mg Oral TID    dextromethorphan  60 mg Oral 2 times per day    famotidine  10 mg Oral Daily    ciprofloxacin  1 drop Left Eye Q2H While awake    senna  1 tablet Oral Nightly    midodrine  7.5 mg Oral Q8H    metoprolol tartrate  25 mg Oral BID    vancomycin  500 mg Oral 4 times per day    sevelamer  4,000 mg Oral TID WC    aspirin  81 mg Oral Daily    lidocaine  1 patch Transdermal Daily    neomycin-bacitracin-polymyxin   Topical BID    sodium chloride flush  5-40 mL Intravenous 2 times per day    atorvastatin  40 mg Oral Nightly    digoxin  125 mcg Oral Daily    insulin lispro  0-6 Units Subcutaneous TID WC    insulin lispro  0-3 Units Subcutaneous Nightly     Continuous Infusions:    sodium chloride      dextrose      sodium chloride 10 mL/hr at 05/07/21 1242     PRN Meds: oxyCODONE-acetaminophen, sodium chloride flush, sodium chloride, promethazine **OR** ondansetron, polyethylene glycol, acetaminophen **OR** acetaminophen, glucose, dextrose, glucagon (rDNA), dextrose    Data:     Past Medical History:   has a past medical history of Acute on chronic congestive heart failure ([facility] [address] Anemia, CAD (coronary artery disease), Cardiomyopathy ([facility] [address] Dialysis care, ESRD (end stage renal disease) on dialysis ([facility] [address] Foot ulcer, left ([facility] [address] GERD (gastroesophageal reflux disease), Hemodialysis patient ([facility] [address] History of sleep apnea, Hyperlipidemia, Hyperparathyroidism ([facility] [address] Hypertension, Neuropathy, ROOSEVELT (obstructive sleep apnea), Peripheral vascular disease ([facility] [address] Pneumonia, S/P CABG (coronary artery bypass graft), and Type II or unspecified type diabetes mellitus without mention of complication, not stated as uncontrolled. Social History:   reports that he has never smoked.  He has never used smokeless tobacco. He reports Contrast    Result Date: 5/7/2021  EXAMINATION: CT OF THE CHEST WITHOUT CONTRAST 5/7/2021 6:48 am TECHNIQUE: CT of the chest was performed without the administration of intravenous contrast. Multiplanar reformatted images are provided for review. Dose modulation, iterative reconstruction, and/or weight based adjustment of the mA/kV was utilized to reduce the radiation dose to as low as reasonably achievable. COMPARISON: None. HISTORY: ORDERING SYSTEM PROVIDED HISTORY: Pleural effusion versus worsening infiltrate right lung TECHNOLOGIST PROVIDED HISTORY: Pleural effusion versus worsening infiltrate right lung Reason for Exam: Pleural effusion versus worsening infiltrate, right lung Acuity: Unknown Type of Exam: Unknown FINDINGS: Mediastinum: Thyroid is asymmetric. Right lobe is enlarged and heterogenous with largest nodule of 9 mm not requiring further follow-up. Shotty mediastinal lymph nodes are present without bill adenopathy. Moderate calcified plaque in the aortic arch. Calcified coronary arteries. Moderate cardiomegaly noted without pericardial effusion or epicardial adenopathy. Lungs/pleura: Small left pleural effusion with compressive atelectasis at the left lung base. Vascular markings are hazy suggesting pulmonary venous congestion. Moderate to moderately large right pleural effusion tracking along the right lateral chest wall. There is consolidation of the entire right lower lobe with some air bronchograms. Upper Abdomen: Moderate ascites in the included upper abdomen. The patient has significant atherosclerotic calcification of the major branches off the aorta with moderate calcification of the aorta. Suggestion of collapsed gallbladder with gallstones. Soft Tissues/Bones: Multilevel degenerative changes in the thoracic spine. No acute osseous or soft tissue abnormality although there is slight subcutaneous haziness suggesting anasarca.      1.  Moderate to moderately large right pleural effusion tracking up the right lateral chest wall. 2.  Consolidative process involving the entire right lower lobe with air bronchograms. 3.  Small left effusion. 4.  Hazy vascular markings most compatible with pulmonary venous congestion. 5.  Moderate intra-abdominal ascites. 6.  Atherosclerotic disease, most severe in the major branches of the abdominal aorta. Coronary artery calcification, significant, is noted. 7.  Heterogeneous thyroid with enlarged right lobe. No further ultrasound follow-up is required. RECOMMENDATIONS: No further ultrasound follow-up advised for thyroid findings. Nm Lung Scan Perfusion Only    Result Date: 4/29/2021  EXAMINATION: NUCLEAR MEDICINE PERFUSION SCAN. 4/29/2021 TECHNIQUE: Ventilation not performed as part of COVID-19 safety precautions. 3.7 millicuries of Tc 47X MAA was administered intravenously prior to planar imaging of the lungs in multiple projections. COMPARISON: Chest radiograph 04/28/2021. HISTORY: ORDERING SYSTEM PROVIDED HISTORY: hemoptysis and tachycardia r/o PE Additional signs and symptoms: SOB, CP, coughin up blood, non-smoker, no hx of blood clots FINDINGS: PERFUSION:Distribution of radiotracer is homogeneous. No segmental defects identified. CHEST RADIOGRAPH:No focal areas of consolidation or significant effusions on recent chest radiograph. Low Probability for Pulmonary Embolus. Ct Abdomen Pelvis W Iv Contrast Additional Contrast? None    Result Date: 4/28/2021  EXAMINATION: CT OF THE ABDOMEN AND PELVIS WITH CONTRAST 4/28/2021 4:16 am TECHNIQUE: CT of the abdomen and pelvis was performed with the administration of intravenous contrast. Multiplanar reformatted images are provided for review. Dose modulation, iterative reconstruction, and/or weight based adjustment of the mA/kV was utilized to reduce the radiation dose to as low as reasonably achievable. COMPARISON: None.  HISTORY: ORDERING SYSTEM PROVIDED HISTORY: diarrhea, abd pain, wbc 30k, discussed with nephro TECHNOLOGIST PROVIDED HISTORY: diarrhea, abd pain, wbc 30k, discussed with nephro Decision Support Exception->Emergency Medical Condition (MA) Reason for Exam: abd pain, nephro maddie approved contrast, dialysis pt Acuity: Acute Type of Exam: Initial FINDINGS: Lower Chest: There is abnormal airspace consolidation within the right lower lobe consistent with a right lower lobe pneumonia. There is no pleural effusion. There is cardiomegaly. Severe coronary artery atherosclerotic calcifications are present. Organs: There is a small volume of ascites. Cholecystectomy clips are present. The liver, spleen, pancreas and adrenal glands are normal in appearance. There is severe renal atrophy. Innumerous renal cysts are noted. There is no hydronephrosis. A right nephroureteral stent is present. GI/Bowel: There are no abnormally dilated loops of large or small bowel present. There is no mesenteric inflammatory stranding present. The appendix is normal. Pelvis: There is a small volume of free fluid in the pelvis. There is no free fluid or pelvic mass identified. The urinary bladder is normal. Peritoneum/Retroperitoneum: There is no pathologically enlarged lymphadenopathy present. Severe atherosclerotic calcifications are present within the abdominal aorta and proximal anchor arteries. Bones/Soft Tissues: No lytic or blastic osseous lesions are identified. There is bilateral spondylolysis at L5 and grade 1 spondylolisthesis of L5 relative to S1. There is a disc bulge and facet arthropathy resulting in severe bilateral neural foraminal narrowing. Findings consistent with renal osteodystrophy are noted. 1. Right lower lobe pneumonia. Follow-up PA and lateral chest radiographs are recommended after treatment to ensure resolution. 2. Small volume ascites. 3. Normal appendix.  4. Severe bilateral neural foraminal narrowing at L5-S1 secondary to bilateral spondylolysis at L5, grade 1 spondylolisthesis of L5 relative to S1, a disc bulge and facet arthropathy. Ir Hamida Payne Device Plmt/replace/removal    Result Date: 4/30/2021  PROCEDURE: IR FLUOROSCOPY GUIDED CENTRAL VENOUS ACCESS DEVICE PLACEMENT 4/30/2021 HISTORY: ORDERING SYSTEM PROVIDED HISTORY: Sepsis with hypotension; cental line for IV pressors TECHNOLOGIST PROVIDED HISTORY: cental line for IV pressors CONTRAST: None SEDATION: None FLUOROSCOPY DOSE AND TYPE OR TIME AND EXPOSURES: Fluoro time: 1 minutes 14 seconds DAP: 714 cGycm2 DESCRIPTION OF PROCEDURE: Informed consent was obtained from the patient's brother after a detailed explanation of the procedure including risks, benefits, and alternatives. Universal protocol was observed including a time-out to confirm the correct patient and procedure. The right neck was prepped and draped in sterile fashion using maximum sterile barrier technique. Local anesthesia was achieved with 1% lidocaine. The right internal jugular vein was accessed with a 21 gauge micropuncture needle under ultrasound guidance. A 0.018 guidewire was advanced through the needle. The needle was removed and a 4 Western Jazmyn access catheter over its dilator was advanced over the wire. The wire and dilator were removed and a 0.035 guidewire was advanced through the access catheter into the IVC under fluoroscopic guidance. The access catheter was removed and the tract dilated over the wire. A 20 cm triple-lumen catheter was advanced over the wire and the wire was removed. The catheter flushed and aspirated easily. The catheter was sutured in place with 2-0 nylon. A sterile dressing was applied. The patient tolerated the procedure well and there were no immediate complications. Estimated blood loss: Less than 5 mL. FINDINGS: Ultrasound demonstrates patency of the right internal jugular vein and guides venotomy. Final fluoroscopic image demonstrates satisfactory catheter placement with the tip in the right atrium. Successful ultrasound and fluoroscopy guided right IJ triple-lumen central venous catheter insertion. Us Liver    Result Date: 5/10/2021  EXAMINATION: RIGHT UPPER QUADRANT ULTRASOUND 5/10/2021 1:49 pm COMPARISON: CT abdomen pelvis with contrast 04/28/2021 HISTORY: ORDERING SYSTEM PROVIDED HISTORY: low platelets TECHNOLOGIST PROVIDED HISTORY: low platelets Acuity: Acute Type of Exam: Initial FINDINGS: LIVER:  The liver demonstrates normal echogenicity without evidence of intrahepatic biliary ductal dilatation. BILIARY SYSTEM:  Gallbladder surgically absent Common bile duct is within normal limits measuring 5 mm. OTHER: Small amount of abdominal ascites. Small amount of abdominal ascites. Xr Chest Portable    Result Date: 5/9/2021  EXAMINATION: ONE XRAY VIEW OF THE CHEST 5/9/2021 6:39 am COMPARISON: 05/07/2021 HISTORY: ORDERING SYSTEM PROVIDED HISTORY: Follow-up loculated pleural effusion TECHNOLOGIST PROVIDED HISTORY: Follow-up loculated pleural effusion Reason for Exam: Follow-up loculated pleural effusion Acuity: Unknown Type of Exam: Unknown Additional signs and symptoms: Follow-up loculated pleural effusion FINDINGS: Tubes and lines: Right subclavian central line terminating near the floor of the right atrium. Heart and lungs: Stable cardiomediastinal silhouette. Low lung volumes. Layering right pleural effusion, moderate. Pulmonary vascular congestion. No pneumothorax. Bones/other:     1. Layering small to moderate right pleural effusion. 2. Pulmonary vascular congestion. 3. Right subclavian central line terminates near the floor of the right atrium.      Xr Chest Portable    Result Date: 5/7/2021  EXAMINATION: ONE XRAY VIEW OF THE CHEST 5/7/2021 10:05 am COMPARISON: 05/06/2021 HISTORY: ORDERING SYSTEM PROVIDED HISTORY: post thora TECHNOLOGIST PROVIDED HISTORY: post Jeremy Stapleton Reason for Exam: Post Thoracentesis Acuity: Acute Type of Exam: Subsequent/Follow-up FINDINGS: Significantly improved right pleural effusion following thoracentesis. Cardiac size is enlarged. No acute infiltrates are seen . The pulmonary vascularity is hazy and indistinct. No pneumothorax. Macyndie Gisele Postsurgical changes overlying the mediastinum. Stable right central venous catheter with tip in the right atrium. Significantly improved right pleural effusion following thoracentesis Pulmonary vascular congestion     Xr Chest Portable    Result Date: 5/6/2021  EXAMINATION: ONE XRAY VIEW OF THE CHEST 5/6/2021 6:37 pm COMPARISON: 04/28/2021 HISTORY: ORDERING SYSTEM PROVIDED HISTORY: shortness of breath TECHNOLOGIST PROVIDED HISTORY: shortness of breath Reason for Exam: Pt states increasing SOB and weakness. Best images per pt condition Acuity: Unknown Type of Exam: Unknown Additional signs and symptoms: Pt states increasing SOB and weakness. Best images per pt condition FINDINGS: The right internal jugular catheter terminates in the distal SVC. There is a moderate to large right pleural effusion with atelectasis. There is worsening mild to moderate pulmonary vascular congestion. Cardiomegaly is stable status post median sternotomy and CABG. There is no pneumothorax. 1. Congestive heart failure with new moderate to large right pleural effusion. Xr Chest Portable    Result Date: 4/28/2021  EXAMINATION: ONE XRAY VIEW OF THE CHEST 4/28/2021 2:48 am COMPARISON: 06/03/2019 HISTORY: ORDERING SYSTEM PROVIDED HISTORY: missed dialysis x2weeks TECHNOLOGIST PROVIDED HISTORY: missed dialysis x2weeks Reason for Exam: Upright port, missed dialysis x2 weeks FINDINGS: There is stable cardiomegaly. There is no focal consolidation, pleural effusion or evidence of edema. There is no pneumothorax identified. 1. Stable cardiomegaly. 2. No acute cardiopulmonary process identified.      Ir Chest Tube Insertion    Result Date: 5/11/2021  PROCEDURE: ULTRASOUND GUIDED CHEST TUBE PLACEMENT WITH THORACENTESIS 5/10/2021 HISTORY: ORDERING SYSTEM PROVIDED HISTORY: Recurrent, loculated right effusion; pigtail catheter okay TECHNOLOGIST PROVIDED HISTORY: Recurrent, loculated right effusion; pigtail catheter okay Which side should the procedure be performed?->Right Recurring right pleural effusion, dyspnea, please place pigtail catheter. SEDATION: None TECHNIQUE AND FINDINGS: This procedure was performed by Dr. Cindy Rocha. Informed consent was obtained after a detailed explanation of the procedure including risks, benefits, and alternatives. Universal protocol was followed. A suitable skin site was prepped and draped in sterile fashion following ultrasound localization. Ultrasound shows a moderate right pleural effusion. The patient was placed in left lateral decubitus position. Right posterolateral chest was prepped and draped in standard sterile fashion. 1% lidocaine used for local anesthesia. Using realtime ultrasound guidance an 8 Icelandic pigtail catheter was advanced into the moderate right pleural effusion from a posterolateral approach just superior to a rib. Ultrasound images show a safe tract and access into the fluid with the pigtail formed in the posterior inferior/dependent portion of the fluid. Approximately 750 mL of dark red colored fluid was drained. The catheter was sutured to the skin and secured in place with a Vaseline gauze dressing. The tube was connected to an atrium drainage system. Further management per the pulmonary service. There are no immediate complications. The patient left the department stable condition. EBL: Minimal.     Successful ultrasound guided placement of an 8 Icelandic right pleural pigtail catheter which was connected to an atrium drainage system. Right thoracentesis with drainage of 750 mL of fluid.      Fl Modified Barium Swallow W Video    Result Date: 5/3/2021  EXAMINATION: MODIFIED BARIUM SWALLOW WAS PERFORMED IN CONJUNCTION WITH [facility]CH PATHOLOG[facility] TECHNIQUE: Fluoroscopic evaluation of the swallowing mechanism General: He is not in acute distress. Appearance: Normal appearance. He is not ill-appearing. Comments: Patient on 2 L NC   HENT:      Head: Normocephalic and atraumatic. Eyes:      Extraocular Movements: Extraocular movements intact. Pupils: Pupils are equal, round, and reactive to light. Cardiovascular:      Rate and Rhythm: Normal rate and regular rhythm. Heart sounds: No murmur. No gallop. Pulmonary:      Effort: Pulmonary effort is normal. No respiratory distress. Breath sounds: Normal breath sounds. No wheezing. Comments: Right-sided chest tube in place  Abdominal:      General: Bowel sounds are normal. There is no distension. Tenderness: There is no abdominal tenderness. Musculoskeletal:         General: No swelling or deformity. Comments: Left BKA    Right foot wrapped C/D/I   Neurological:      General: No focal deficit present. Mental Status: He is alert and oriented to person, place, and time. Psychiatric:         Mood and Affect: Mood normal.         Thought Content:  Thought content normal.           Assessment:        Primary Problem  Sepsis due to pneumonia [facility])    Active Hospital Problems    Diagnosis Date Noted    Parapneumonic effusion [J18.9, J91.8] 05/09/2021    C. difficile colitis [A04.72] 05/09/2021    Loculated pleural effusion [J90] 05/08/2021    Pleural effusion on right [J90] 05/08/2021    ROOSEVELT (obstructive sleep apnea) [G47.33] 05/08/2021    Pneumonia [J18.9] 04/28/2021    Sepsis ([facility] [address] [A41.9] 04/28/2021    Sepsis due to pneumonia ([facility] [address] [J18.9, A41.9] 31/02/4589    Metabolic acidosis [S73.2]     Hyperkalemia [E87.5]     Diarrhea [R19.7]     Nausea and vomiting [R11.2]     History of left below knee amputation (HCC) [Z89.512]     Hemoptysis [R04.2]     Ulcerated, foot, right, with fat layer exposed ([address] [L97.512]     Hypertension, essential [I10]     Charcot foot due to diabetes mellitus ([facility] [address] [E11.610] 12/28/2015    Anemia of chronic disease [D63.8] 07/03/2013    ESRD on dialysis ([facility] [N18.6, Z99.2] 06/27/2013    DM (diabetes mellitus) ([facility] [E11.9] 06/27/2013       Plan:        Acute hypoxic respiratory failure likely 2/2  developing recurrent right sided pleural effusion  -On 2L NC  -5/7:S/p U/s guided right thoracentesis 1.3L removed  -5/10: S/p right sided IR pigtail catheter 750 mL of fluid drained     -Chest tube -405 cc in 24hrs  -CT chest: Shows moderate to large right pleural effusion and consolidation involving the entire RLL.  There appears to be multiple areas of loculation  -According to lights criteria, patient has exudative effusion  -Pulmonology on board:              -4/74: S/p IR pigtail catheter placed successfully 750 mL of fluid drained     -CXR yesterday shows improvement in right-sided pleural effusion. Repeat CXR pending this a.m.  -ID on board:               -Continue oral vancomycin until 5/16 for C. Diff     Sepsis 2/2 CAP   -CT A&P showed RLL PNA  -Pro-Kiran 4.55  -Blood cultures x 2 NGTD  -Resp Cx  show just above ed gram +ve cocci in clusters with rare GN rods: Growing staph aureus methicillin susceptible  -On NS 0.9% 10 ml/hr  -Continue midodrine 7.5 mgTID  -I. D on board              -D/C'd IV ceftriaxone after 6 days of therapy. Completed 3 days of Cefepime . Completed 4 days of IV Merrem              -Continue oral vancomycin until 5/16/21 for C. Diff              -Appreciate further recommendations     Low albumin, high INR, chronically low platelets cirrhosis ruled-out  -CT abdomen pelvis shows small volume of ascites  -Platelet count 46  -Liver U/S: Small amount of abdominal ascites  -Ammonia 22     Chronic systolic and diastolic CHF/Hx of HTN.    -S/p cardioversion 5/5  -BNP > 300,000, likely elevated from missed dialysis for 2 weeks  -Echo 6/2019: LVEF 30-35% with moderate diastolic dysfunction  -CAD, CABG, stents, last cath 2019.   -Amiodarone 200 mg QD  -Digoxin 125 MCG daily  -Lopressor 25 mg twice daily  -Cardiology on board:               -[id number] amiodarone 200 mg twice daily for 7 days. - Continue Amiodarone from 200 mg QD               -Patient is a candidate for AICD due to ICM.  Plan for AICD in future once infectious process resolves              -Appreciate further recs     Hyperkalemia 2/2 ESRD on hemodialysis TTS  -Missed 2 weeks of dialysis.   -K 6.9 on admission with EKG changes, received IV calcium gluconate and insulin/dextrose.  -Underwent hemodialysis at [facility]. V's  -Nephrology on board:              -OHGX for dialysis today   -Retacrit 5000U subcutaneously 3 times weekly              -Renvela 4000 mg PO TID     Hemoptysis likely 2/2 pneumonia (improving)  -HgB stable  -keep HgB > 7     Acute diarrhea 2/2 C. difficile infection (improving)  -Continues to have bowel movements overnight, non-foul-smelling  -Continue Senokot nightly  -CT abdomen and pelvis showed small volume ascites and normal appendix  -C. Diff +ve   -Continue oral vancomycin 500 mg q6hr day 4 until 5/16  -Protonix 40 mg PO daily             Right foot ulcer  -X-ray showed no evidence of OM  -Podiatry on board  -Wound care on board      Dyslipidemia   -Lipitor 40 mg PO nightly     Type 2 diabetes mellitus  -Low dose ISS  -Hypoglycemia protocol     Bilateral neural foraminal narrowing at L5-S1 with spondylolysis  -Showed up on CT A&P  -We will continue to monitor     Diet:  Dental soft diet, Low Sodium, Fluid restriction to 1500 mL   DVT prophylaxis: Heparin 5,000 3 times daily (HELD due to low PC)  GI prophylaxis: Famotidine 10 mg tab PO QD. I have discussed the care of Diego Cormier , including pertinent history and exam findings,    today with the resident. I have seen and examined the patient and the key elements of all parts of the encounter have been performed by me . I agree with the assessment, plan and orders as documented by the resident.      Principal Problem: Sepsis due to pneumonia [facility])  Active Problems:    ESRD on dialysis ([facility] [address]    DM (diabetes mellitus) ([facility] [address]    Anemia of chronic disease    Charcot foot due to diabetes mellitus ([facility] [facility]    Hypertension, essential    Sepsis ([facility]    Metabolic acidosis    Hyperkalemia    Diarrhea    Nausea and vomiting    History of left below knee amputation (HCC)    Hemoptysis    Ulcerated, foot, right, with fat layer exposed ([facility]    Pneumonia    Loculated pleural effusion    Pleural effusion on right    ROOSEVELT (obstructive sleep apnea)    Parapneumonic effusion    C. difficile colitis  Resolved Problems:    * No resolved hospital problems. *        Overall  course ;                                   are improving over time.         Patient doing much better today  Awaiting placement  Chest tube to be removed once output is less than 100 mL  On p.o. vancomycin  Thrombocytopenia, ultrasound liver is not suggestive of cirrhosis of liver, unclear cause, may be due to C. difficile, will discontinue Pepcid, patient is on dig  Heme-onc consulted          Electronically signed by Teja Ruiz MD

## 2021-05-13 NOTE — CONSULTS
Today's Date: 5/13/2021  Patient Name: Jazmin Marley  Date of admission: 4/28/2021  2:27 PM  Patient's age: 60 y.o., 1955  Admission Dx: Pneumonia [J18.9]  Sepsis due to pneumonia ([address] [J18.9, A41.9]    Reason for Consult:thrombocytopenia  Requesting Physician: Marina Martínez MD    CHIEF COMPLAINT:  No chief complaint on file. History Obtained From:  patient and chart    HISTORY OF PRESENT ILLNESS:      Jazmin Marley is a 60 y.o. male with end-stage renal disease on hemodialysis, chronic anemia, CAD who is admitted to the hospital on 4/28/2021  for shortness of breath and cough. He report that he is having significant diarrhea for the past few days and has missed hemodialysis sessions. Subsequently his breathing got worse therefore he presented to hospital.  Patient has been restarted on hemodialysis. His platelets noted to be around 46 therefore hematology was consulted for evaluation of his thrombocytopenia. He does have chronic anemia from his renal disease and receiving erythropoietin stimulating agent with dialysis. Patient had low platelet count even last year around 95 but now has dropped to 46. Patient is a poor historian. Past Medical History:   has a past medical history of Acute on chronic congestive heart failure ([address] Anemia, CAD (coronary artery disease), Cardiomyopathy ([address] Dialysis care, ESRD (end stage renal disease) on dialysis ([address] Foot ulcer, left ([address] GERD (gastroesophageal reflux disease), Hemodialysis patient ([address] History of sleep apnea, Hyperlipidemia, Hyperparathyroidism ([address] Hypertension, Neuropathy, ROOSEVELT (obstructive sleep apnea), Peripheral vascular disease ([address] Pneumonia, S/P CABG (coronary artery bypass graft), and Type II or unspecified type diabetes mellitus without mention of complication, not stated as uncontrolled.     Past Surgical History:   has a past surgical history that includes Cataract removal; Dialysis fistula creation (Left, per day on Mon Wed Fri Jennifer Cr MD   2,000 Units at 05/12/21 1252    And    epoetin willian-epbx (RETACRIT) injection 3,000 Units  3,000 Units Subcutaneous Once per day on Mon Wed Fri Jennifer Cr MD   3,000 Units at 05/12/21 1252    [Held by provider] heparin (porcine) injection 5,000 Units  5,000 Units Subcutaneous 3 times per day Lovely Carrel, MD   5,000 Units at 05/10/21 0601    benzonatate (TESSALON) capsule 200 mg  200 mg Oral TID Martha Hardin MD   200 mg at 05/13/21 8620    dextromethorphan (DELSYM) 30 MG/5ML extended release liquid 60 mg  60 mg Oral 2 times per day Martha Hardin MD   60 mg at 05/13/21 0839    ciprofloxacin (CILOXAN) 0.3 % ophthalmic solution 1 drop  1 drop Left Eye Q2H While awake Yoav Celeste MD   1 drop at 05/13/21 1218    senna (SENOKOT) tablet 8.6 mg  1 tablet Oral Nightly Madisyn Bowman MD   8.6 mg at 05/12/21 2021    midodrine (PROAMATINE) tablet 7.5 mg  7.5 mg Oral Q8H Cari Curtis MD   7.5 mg at 05/13/21 1212    metoprolol tartrate (LOPRESSOR) tablet 25 mg  25 mg Oral BID Moo Yimi, DO   25 mg at 05/12/21 0816    vancomycin [facility]) 50 MG/ML oral solution 500 mg  500 mg Oral 4 times per day Juno Bertrand MD   500 mg at 05/13/21 1218    sevelamer (RENVELA) tablet 4,000 mg  4,000 mg Oral TID AUGUSTINA Burdick MD   4,000 mg at 05/13/21 0838    aspirin EC tablet 81 mg  81 mg Oral Daily Donato Vidales MD   81 mg at 05/13/21 0839    oxyCODONE-acetaminophen (PERCOCET) 5-325 MG per tablet 1 tablet  1 tablet Oral Q8H PRN Marino Tripp MD   1 tablet at 05/10/21 2236    lidocaine 4 % external patch 1 patch  1 patch Transdermal Daily Marino Tripp MD   1 patch at 05/13/21 0839    neomycin-bacitracin-polymyxin (NEOSPORIN) ointment   Topical BID Patric Ty DPM   Given at 05/13/21 5896    sodium chloride flush 0.9 % injection 5-40 mL  5-40 mL Intravenous 2 times per day Toney Burdick MD   10 mL at 05/13/21 5753    Constitutional: No fever or chills. No night sweats, no weight loss   Eyes: No eye discharge, double vision, or eye pain   HEENT: negative for sore mouth, sore throat, hoarseness and voice change   Respiratory: negative for cough , sputum, dyspnea, wheezing, hemoptysis, chest pain   Cardiovascular: negative for chest pain, dyspnea, palpitations, orthopnea, PND   Gastrointestinal: negative for nausea, vomiting, diarrhea, constipation, abdominal pain, Dysphagia, hematemesis and hematochezia   Genitourinary: negative for frequency, dysuria, nocturia, urinary incontinence, and hematuria   Integument: negative for rash, skin lesions, bruises.    Hematologic/Lymphatic: negative for easy bruising, bleeding, lymphadenopathy, or petechiae   Endocrine: negative for heat or cold intolerance,weight changes, change in bowel habits and hair loss   Musculoskeletal: negative for myalgias, arthralgias, pain, joint swelling,and bone pain   Neurological: negative for headaches, dizziness, seizures, weakness, numbness    PHYSICAL EXAM:      /71   Pulse 69   Temp 98 °F (36.7 °C)   Resp 18   Ht 6' (1.829 m)   Wt 221 lb 9 oz (100.5 kg)   SpO2 99%   BMI 30.05 kg/m²    Temp (24hrs), Av °F (36.7 °C), Min:97.8 °F (36.6 °C), Max:98.2 °F (36.8 °C)    General appearance - well appearing, no in pain or distress   Mental status - alert and cooperative   Eyes - pupils equal and reactive, extraocular eye movements intact   Ears - bilateral TM's and external ear canals normal   Mouth - mucous membranes moist, pharynx normal without lesions   Neck - supple, no significant adenopathy   Lymphatics - no palpable lymphadenopathy, no hepatosplenomegaly   Chest - clear to auscultation, no wheezes, rales or rhonchi, symmetric air entry   Heart - normal rate, regular rhythm, normal S1, S2, no murmurs  Abdomen - soft, nontender, nondistended, no masses or organomegaly   Neurological - alert, oriented, normal speech, no focal findings or movement disorder noted   Musculoskeletal - no joint tenderness, deformity or swelling   Extremities - peripheral pulses normal, no pedal edema, no clubbing or cyanosis   Skin - normal coloration and turgor, no rashes, no suspicious skin lesions noted ,    DATA:    Labs:   CBC:   Recent Labs     05/12/21  0528 05/13/21 0449   WBC 3.8 4.0   HGB 8.4* 8.7*   HCT 27.5* 27.9*   PLT 44* 46*     BMP:   Recent Labs     05/12/21 0528 05/13/21 0449    141   K 4.7 4.7   CO2 28 31   BUN 36* 49*   CREATININE 3.93* 5.08*   LABGLOM 16* 12*   GLUCOSE 196* 191*     PT/INR: No results for input(s): PROTIME, INR in the last 72 hours. IMAGING DATA:  XR CHEST PORTABLE   Preliminary Result   Similar to slight increase in congestive changes and mild patchy perihilar   opacities. XR CHEST PORTABLE   Final Result   Right pigtail chest tube in place with marked reduction of right pleural   fluid. No pneumothorax. Overall improved aeration of the lungs and decrease in vascular congestion. IR CHEST TUBE INSERTION   Final Result   Successful ultrasound guided placement of an 8 Armenian right pleural pigtail   catheter which was connected to an atrium drainage system. Right thoracentesis with drainage of 750 mL of fluid. US LIVER   Final Result   Small amount of abdominal ascites. XR CHEST PORTABLE   Final Result   1. Layering small to moderate right pleural effusion. 2. Pulmonary vascular congestion. 3. Right subclavian central line terminates near the floor of the right   atrium. XR CHEST PORTABLE   Final Result   Significantly improved right pleural effusion following thoracentesis      Pulmonary vascular congestion         IR GUIDED THORACENTESIS PLEURAL   Final Result   Successful ultrasound guided right thoracentesis. CT CHEST WO CONTRAST   Final Result   1. Moderate to moderately large right pleural effusion tracking up the right   lateral chest wall.       2. Consolidative process involving the entire right lower lobe with air   bronchograms. 3.  Small left effusion. 4.  Hazy vascular markings most compatible with pulmonary venous congestion. 5.  Moderate intra-abdominal ascites. 6.  Atherosclerotic disease, most severe in the major branches of the   abdominal aorta. Coronary artery calcification, significant, is noted. 7.  Heterogeneous thyroid with enlarged right lobe. No further ultrasound   follow-up is required. RECOMMENDATIONS:   No further ultrasound follow-up advised for thyroid findings. XR CHEST PORTABLE   Final Result   1. Congestive heart failure with new moderate to large right pleural effusion. FL MODIFIED BARIUM SWALLOW W VIDEO   Final Result   Swallowing mechanism grossly within normal limits without evidence of   aspiration. Please see separate speech pathology report for full discussion of findings   and recommendations. IR FLUORO GUIDED CVA DEVICE PLMT/REPLACE/REMOVAL   Final Result   Successful ultrasound and fluoroscopy guided right IJ triple-lumen central   venous catheter insertion. NM LUNG SCAN PERFUSION ONLY   Final Result   Low Probability for Pulmonary Embolus.              Primary Problem  Sepsis due to pneumonia [facility])    Active Hospital Problems    Diagnosis Date Noted    Parapneumonic effusion [J18.9, J91.8] 05/09/2021    C. difficile colitis [A04.72] 05/09/2021    Loculated pleural effusion [J90] 05/08/2021    Pleural effusion on right [J90] 05/08/2021    ROOSEVELT (obstructive sleep apnea) [G47.33] 05/08/2021    Pneumonia [J18.9] 04/28/2021    Sepsis ([address] [A41.9] 04/28/2021    Sepsis due to pneumonia ([address] [J18.9, A41.9] 60/36/3133    Metabolic acidosis [E74.3]     Hyperkalemia [E87.5]     Diarrhea [R19.7]     Nausea and vomiting [R11.2]     History of left below knee amputation (HCC) [Z89.512]     Hemoptysis [R04.2]     Ulcerated, foot, right, with fat layer exposed ([facility] [L97.512]     Hypertension, essential [I10]     Charcot foot due to diabetes mellitus ([place] [address] [E11.610] 12/28/2015    Anemia of chronic disease [D63.8] 07/03/2013    ESRD on dialysis ([facility] [N18.6, Z99.2] 06/27/2013    DM (diabetes mellitus) ([place] [address] [E11.9] 06/27/2013         IMPRESSION:   1. Thrombocytopenia: This seems worsening over the past few years. He has low platelet count around 77 back in 2016 as well. There has been variable platelet count therefore autoimmune etiology cannot be ruled out  2. Anemia appears secondary to renal disease  3. End-stage renal disease on hemodialysis  4. CHF  5. CAD    RECOMMENDATIONS:  1. I reviewed the laboratory data, imaging studies, possible diagnosis and treatment recommendation patient  2. His thrombocytopenia appears chronic since 2016  3. I do not suspect acute bone marrow pathology  4. I will order peripheral blood smear and nutritional deficiencies  5. He does not have history of liver cirrhosis or splenomegaly  6. Continue supportive management as per primary  7. We will follow    Discussed with patient and Nurse. Thank you for asking us to see this patient. Wan Davila MD  Hematologist/Medical Oncologist    Cell: [phone]    This note is created with the assistance of a speech recognition program.  While intending to generate a document that actually reflects the content of the visit, the document can still have some errors including those of syntax and sound a like substitutions which may escape proof reading. It such instances, actual meaning can be extrapolated by contextual diversion.

## 2021-05-13 NOTE — PROGRESS NOTES
Nephrology ESRD Progress Note    Reason for Consult: Management of end-stage renal disease. Requesting Physician: Dr Marina Jones    Interval history: Patient was seen and examined today whilst receiving acute hemodialysis. He feels well but wants chest tube removed. He does not have shortness of breath or chest pain and diarrhea is resolved. Chest x-ray performed today showed slight increase in congestive changes and mild patchy perihilar opacities. History of Present Illness: This is a 60 y.o. male with history of CAD [status post CABG], Heart failure with reduced ejection fraction secondary to ischemic cardiomyopathy [LVEF 30 to 35% in June 2019], dyslipidemia and End stage renal disease secondary to nephrosclerosis [on hemodialysis Tuesday,Thursday and Saturday under the care of Dr. Tasha Alamo at [address] [facility] renal care by way of a left arm AV fistula], who had presented initially to [facility] with diarrhea, nausea and vomiting and generalized weakness for six days. He had missed multiple treatments of dialysis. He presented at [facility]. [facility] with hyperkalemia potassium of  6.9, BUN/ creatinine of 241 and 18.47 mg/dl and serum bicarbonate of 9. He was dialyzed yesterday 4/28/2021 at [facility]. [facility] and was transferred to [facility] due to in availability of a bed. Less than 500 cc was removed with dialysis due to hypotension. He was seen at dialysis today. His blood pressures have been in the hypotensive trends between 65-05 systolic blood pressure. He denies fever. He denies abdominal pain. He has ongoing nonbloody diarrhea, watery frequency of 6 times per day. He is tachycardic with heart rate in 127. Work-up for diarrhea shows positive C. difficile. Received a liter bolus of 0.9 saline yesterday.     Current Medications:    amiodarone (CORDARONE) tablet 200 mg, Daily  epoetin willian-epbx (RETACRIT) injection 2,000 Units, Once per day on Mon Wed Fri    And  epoetin willian-epbx (RETACRIT) injection 3,000 Systolic (73NEW), UBM:676 , Min:110 , NSE:432   ; Diastolic (70KNI), KMH:25, Min:60, Max:80    24HR INTAKE/OUTPUT:      Intake/Output Summary (Last 24 hours) at 5/13/2021 1040  Last data filed at 5/13/2021 1020  Gross per 24 hour   Intake 925 ml   Output 0 ml   Net 925 ml     Physical Exam:  GENERAL APPEARANCE: Alert and cooperative, and appears to be in no acute distress. Nadyne Kierra NECK: Supple with no jugular venous distention  CARDIAC: Normal S1 and S2. No S3, S4 or murmurs. Rhythm is regular. LUNGS: Diminished breath sounds at lung bases; right pleural catheter in place. ABDOMEN: Soft non distended, BS+ Non tender no organomegally   MUSKULOSKELETAL: S/p right below-knee amputation with intact stump  EXTREMITIES: Status post left below-knee amputation with intact stump; right foot ulcer wrapped in bandage. NEURO:  Alert oriented x 3 ,power 5/5 in all extremities    Labs:     CBC:   Recent Labs     05/11/21 0425 05/12/21 0528 05/13/21 0449   WBC 4.9 3.8 4.0   RBC 3.39* 3.17* 3.22*   HGB 9.1* 8.4* 8.7*   HCT 28.9* 27.5* 27.9*   MCV 85.4 86.8 86.5   MCH 26.8 26.6 27.1   MCHC 31.3 30.7* 31.3   RDW 21.0* 21.5* 21.1*   PLT 45* 44* 46*   MPV 10.0 11.8 10.2      BMP:   Recent Labs     05/11/21 0425 05/12/21 0528 05/13/21  0449    138 141   K 4.2 4.7 4.7    101 102   CO2 26 28 31   BUN 52* 36* 49*   CREATININE 5.33* 3.93* 5.08*   GLUCOSE 153* 196* 191*   CALCIUM 9.1 9.2 9.7      Assessment/plan:    1. ESRD - Will maintain TTS hemodialysis schedule. Left radiocephalic AV fistula is functioning well. Target ultrafiltration today 2 kg. Renal diet,i.e 2-gram sodium, 2-gram potassium,1500 ml fluid restriction,1-gram phosphorus, 1800 KCal and 1.2 gram/kg protein per day. 2.  Anemia of chronic kidney disease - Hemoglobin today is 8.7 g/dL and we will continue Retacrit 5000 units subcutaneously 3 times a week. 3.  C. difficile colitis - Day #11/14 of oral vancomycin.     4.  Coronary artery disease - status

## 2021-05-13 NOTE — PROGRESS NOTES
HEMODIALYSIS POST TREATMENT NOTE    Treatment time ordered: 3.25HRS    Actual treatment time: 3.25HRS    UltraFiltration Goal: 1.5KGS  UltraFiltration Removed: 1.5KGS      Pre Treatment weight: 102KGS  Post Treatment weight: 100.5KGS  Estimated Dry Weight: N/A    Access used:     Central Venous Catheter:          Tunneled or Non-tunneled: N/A           Site: N/A          Access Flow: N/A      Internal Access:       AV Fistula or AV Graft: AV FISTULA         Site: L LOWER ARM       Access Flow: GOOD       Sign and symptoms of infection: NO       If YES: N/A    Medications or blood products given: N/A    Chronic outpatient schedule: [facility]    Chronic outpatient unit: [facility]    Summary of response to treatment: TOLERATED FAIRLY WELL WITH NO ISSUES THROUGHOUT    Explain if orders NOT met, was physician notified:N/A      ACES flowsheet faxed to patient unit/ placed in patient chart: YES    Post assessment completed: Sulma Dueñas RN    Report given to: [address] documented Safety Checks include the followin) Access and face visible at all times. 2) All connections and blood lines are secure with no kinks. 3) NVL alarm engaged. 4) Hemosafe device applied (if applicable). 5) No collapse of Arterial or Venous blood chambers. 6) All blood lines / pump segments in the air detectors.

## 2021-05-13 NOTE — PROGRESS NOTES
[address]   OCCUPATIONAL THERAPY MISSED TREATMENT NOTE   INPATIENT   Date: 21  Patient Name: Yanna Orozco       Room:   MRN: [id number]   Account #: [de-identified]    : 1955  (63 y.o.)  Gender: male   Diagnosis: Sepsis due to pneumonia             REASON FOR MISSED TREATMENT:  Patient refusal   -   pt states that he's really tired from dialysis. will continue to follow.        NASIM Kerns

## 2021-05-13 NOTE — CARE COORDINATION
TALITA received info from [facility] with [address].  She reported that they had received the authorization for this patient to admit to their LTACH. TALITA informed Maco Lake RN Clinical lead and she reported that she would look into it. TALITA then received a request for an answer as [facility] also had another patient that needed that spot in the [facility]. TALITA informed Christine that it is unclear when this SW would receive the info that she was requesting so to go ahead and do what they need to do with the opening. Christine reported that if they decide that he can DC from Hospital and admit to their LTAC to just get back in touch with her and she would let this SW know if they still had a spot for this patient.

## 2021-05-13 NOTE — PLAN OF CARE
Description: Absence of continued neurological deterioration signs and symptoms  Outcome: Ongoing     Problem: Injury - Risk of, Physical Injury:  Goal: Will remain free from falls  Description: The patient remained free from falls this shift, call light within reach, bed in locked and lowest position. Side rails up x2. Continue to monitor closely. 5/13/2021 1639 by Kasuhik Terry RN  Outcome: Met This Shift  Note: The patient remained free from falls this shift, call light within reach, bed in locked and lowest position. Side rails up x2. Continue to monitor closely. Problem: Pain:  Goal: Pain level will decrease  Description: Pain level will decrease  5/13/2021 1639 by Kaushik Terry RN  Outcome: Met This Shift  Note: Patient's pain has been well controlled throughout the entire shift, please see MAR.

## 2021-05-13 NOTE — FLOWSHEET NOTE
05/13/21 1449   Provider Notification   Reason for Communication New orders   Provider Name Dr. Jose Guadalupe Santamaria   Provider Notification Physician   Method of Communication Face to face   Response See orders  (Regarding chest tube. )   Notification Time 1449     Dr. Jose Guadalupe Santamaria rounded, updated that the patient does not have any orders for his chest tube to be on continuous suction. Per Dr. Jose Guadalupe Santamaria the patient can continue on continuous suction and as there is zero output he would like a repeat chest x-ray in the morning. Per Dr. Jose Guadalupe Santamaria if the patient's x-ray does not show any worsening and there is no output he would like IR to remove chest tube tomorrow.

## 2021-05-13 NOTE — PROGRESS NOTES
Physical Therapy    DATE: 2021    NAME: Jasen De La Rosa  MRN: [id number]   : 1955      Patient not seen this date for Physical Therapy due to: Other: Cx, pt just returned from dialysis and is too lethargic to participate with therapy, will try back tomorrow.       Electronically signed by Daja Barbosa PTA on 2021 at 3:43 PM

## 2021-05-14 NOTE — CARE COORDINATION
TALITA was informed the the Chest tube did get taken out. TALITA spoke with Tameka Tinoco at [facility] and she reported that they have pre-cert but the patient will have to have dialysis here on Saturday and then can DC to their facility. TALITA set up transportation and the patient is scheduled to be picked up at 4:00 pm via stretcher by [employer]. Transportation paperwork is completed and is in this patient's chart. TALITA completed and submitted the 7000 form via [employer]. The number for report is [phone].

## 2021-05-14 NOTE — PROGRESS NOTES
Dr. Elenita Bar and writer at bedside. Dr. Elenita Bar removed chest chest tube using sterile technique. Pt tolerated well. Occusive dressing applied.

## 2021-05-14 NOTE — PROGRESS NOTES
NRBC 1 03/07/2016    METASPCT 2 05/01/2021    LYMPHOPCT 4 05/14/2021    MONOPCT 13 05/14/2021    MYELOPCT 1 03/06/2016    BASOPCT 2 05/14/2021    MONOSABS 0.60 05/14/2021    LYMPHSABS 0.18 05/14/2021    EOSABS 0.09 05/14/2021    BASOSABS 0.09 05/14/2021    DIFFTYPE NOT REPORTED 05/14/2021       BMP   Lab Results   Component Value Date     05/14/2021    K 4.9 05/14/2021    CL 95 05/14/2021    CO2 29 05/14/2021    BUN 34 05/14/2021    CREATININE 3.96 05/14/2021    GLUCOSE 126 05/14/2021    GLUCOSE 128 01/09/2012       LFTS  Lab Results   Component Value Date    ALKPHOS 137 04/30/2021    ALT 12 04/30/2021    AST 17 04/30/2021    PROT 6.0 05/10/2021    BILITOT 0.74 04/30/2021    BILIDIR 0.14 10/19/2011    IBILI 0.26 10/19/2011    LABALBU 2.9 04/30/2021    LABALBU 3.8 01/09/2012       ABG ABGs: No results found for: PHART, PO2ART, DCE6JIJ    Lab Results   Component Value Date    MODE NOT REPORTED 06/02/2019         INR  No results for input(s): PROTIME, INR in the last 72 hours. APTT  No results for input(s): APTT in the last 72 hours. Lactic Acid  No results found for: LACTA     BNP   No results for input(s): BNP in the last 72 hours. Cultures   Right pleural fluid collected 5/10 with no growth for 4 days    Radiology     CXR as above      CT Scans    (See actual reports for details)      SYSTEMS ASSESSMENT    Neuro       Respiratory   Wean oxygen as tolerated.  Keep O2 sat > 88%    Cardiovascular        Gastrointestinal       Renal       Infectious Disease       Hematology/Oncology       Endocrine       Social/Spiritual/DNR/Disposition/Other     Recurrent right sided pleural effusion/ exudate, negative cultures/possibly parapneumonic effusion  Hypoxia, was up to 5 L now down to 2  ROOSEVELT  Metabolic acidosis/resolved,   Right lower lobe pneumonia  C. difficile colitis  Right foot wound  HTN, history of CHF  ESRD/HD  Diabetes mellitus  Thrombocytopenia platelets at 41    Plan:   pigtail chest tube placement

## 2021-05-14 NOTE — PROGRESS NOTES
[facility]: ALEX SHETH   Physical Therapy Progress Note    Date: 21  Patient Name: Marko Cox       Room: 4-01  MRN: [id number]   Account: [de-identified]   : 1955  (63 y.o.)   Gender: male     Discharge Recommendations   Patient would benefit from continued therapy after discharge  Other: TBD       Diagnosis: Sepsis due to pneumonia  Restrictions/Precautions: Fall Risk, Isolation, Contact Precautions, Weight Bearing(CDIFF)  Other position/activity restrictions: up with assistance  Right Lower Extremity Weight Bearing: Non Weight Bearing  Left Lower Extremity Weight Bearing: Non Weight Bearing   Past Medical History:  has a past medical history of Acute on chronic congestive heart failure ([facility] [address] Anemia, CAD (coronary artery disease), Cardiomyopathy ([facility] [address] Dialysis care, ESRD (end stage renal disease) on dialysis ([facility] [address] Foot ulcer, left ([facility] [address] GERD (gastroesophageal reflux disease), Hemodialysis patient ([facility] [address] History of sleep apnea, Hyperlipidemia, Hyperparathyroidism ([facility] [address] Hypertension, Neuropathy, ROOSEVELT (obstructive sleep apnea), Peripheral vascular disease ([facility] [address] Pneumonia, S/P CABG (coronary artery bypass graft), and Type II or unspecified type diabetes mellitus without mention of complication, not stated as uncontrolled. Past Surgical History:   has a past surgical history that includes Cataract removal; Dialysis fistula creation (Left, ); Leg amputation, lower tibia/fibula; and IR CHEST TUBE INSERTION (5/10/2021).        Overall Orientation Status: Within Functional Limits  Restrictions/Precautions  Restrictions/Precautions: Fall Risk;Isolation;Contact Precautions;Weight Bearing(CDIFF)  Required Braces or Orthoses?: Yes(L BKA prosthesis ( at home))  Lower Extremity Weight Bearing Restrictions  Right Lower Extremity Weight Bearing: Non Weight Bearing  Partial Weight Bearing Percentage Or Pounds: per Podiatry note 21  Left Lower Extremity Weight Bearing: Non Weight Bearing  Partial Weight Bearing Percentage Or Pounds: pt is BKA with prosthetic here  Position Activity Restriction  Other position/activity restrictions: up with assistance    Subjective: Pt reports feeling better today; pt c/o constant sleep interruptions yesterday  Comments: ZAYRA Awan in room and okay with PT/OT tx. Co-treat with Marcus Herr. Pt is very pleasant and agreeable to tx. Pt is very enjoyable. Vital Signs  Patient Currently in Pain: Denies                   Bed Mobility:   Rolling: Minimal assistance;Rolling Left;Rolling Right  Supine to Sit: Moderate assistance  Sit to Supine: Moderate assistance  Scooting: Dependent/Total;2 Person assistance  Comment: Pt unable to push into right LE due to NWB restrictions therefore pt is Dependent to scoot to [facility], pt able to Scoot self to EOB      Transfers:  Sit to Stand: Unable to assess  Stand to sit: Unable to assess  Bed to Chair: Unable to assess                                                                             Comments: NOEMÍ     Other exercises?: Yes  Other exercises 1: Dangle EOB 20 min. Other exercises 2: Seated Dynamic Reaching Out of Base of Support, pt struggles with vision and depth perception  Other exercises 3: Lateral Scooting EOB towards HOB without LOB or putting weight into right LE           Activity Tolerance: Patient Tolerated treatment well  PT Equipment Recommendations  Other: TBD       Assessment  Activity Tolerance: Patient Tolerated treatment well   Body structures, Functions, Activity limitations: Decreased functional mobility ; Decreased endurance;Decreased strength;Decreased safe awareness  Specific instructions for Next Treatment: therex and bed mobility  Prognosis: Fair  Discharge Recommendations: Patient would benefit from continued therapy after discharge     Type of devices: All fall risk precautions in place;Call light within reach;Nurse notified; Left in bed  Restraints  Initially in place: No     Plan  Times per week: 5-6 days /week  Current Treatment Recommendations: Strengthening, ROM, Positioning(bed mobility)    Patient Education  New Education Provided:  Plan of Care  Learner:patient  Method: demonstration and explanation       Outcome: needs reinforcement     Goals  Short term goals  Time Frame for Short term goals: 14 visits (Modified goals on 5/12/21)  Short term goal 1: min assist rolling  Short term goal 2: increase LE strength to good in preparation for OOB mobilization  Short term goal 3: Pt able to perform supine<>sit at mod A x 1 to 2 person assist , to dangle at EOB for 10 minutes. Short term goal 4: Pt able to tolerate sitting at EOB with atleast 1 UE support, at CGA/Guille for balance  Short term goal 5: Pt to tolerate B LE ex's supine or dangling at EOB  for 10 to 20 minutes to improve fucntion/strength/endurance for mobility.      PT Individual Minutes  Time In: 9016  Time Out: 5037  Minutes: 33    Electronically signed by Latesha Solis PTA on 5/14/21 at 1:53 PM EDT

## 2021-05-14 NOTE — PROGRESS NOTES
2014); Leg amputation, lower tibia/fibula; and IR CHEST TUBE INSERTION (5/10/2021). Medications:    Prior to Admission medications    Medication Sig Start Date End Date Taking?  Authorizing Provider   metoprolol succinate (TOPROL XL) 50 MG extended release tablet Take 100 mg by mouth daily   Yes Historical Provider, MD   metOLazone (ZAROXOLYN) 10 MG tablet Take 10 mg by mouth daily   Yes Historical Provider, MD   digoxin (LANOXIN) 125 MCG tablet Take 125 mcg by mouth daily Take on Tuesday, Thursday and Saturday in the evening   Yes Historical Provider, MD   NIFEdipine (ADALAT CC) 30 MG extended release tablet Take 30 mg by mouth daily   Yes Historical Provider, MD   pantoprazole (PROTONIX) 40 MG tablet Take 40 mg by mouth daily   Yes Historical Provider, MD suttonromer sorbitex calcium (VELTASSA) 8.4 g PACK packet Take 8.4 g by mouth daily   Yes Historical Provider, MD   carvedilol (COREG) 3.125 MG tablet Take 1 tablet by mouth 2 times daily (with meals)  Patient taking differently: Take 12.5 mg by mouth 2 times daily (with meals)  6/6/19  Yes Harrington Dakins, APRN - CNP   bumetanide (BUMEX) 1 MG tablet Take 2 mg by mouth daily   Yes Historical Provider, MD   ONE TOUCH ULTRA TEST strip USE AS DIRECTED DAILY AS NEEDED 1/24/17  Yes Jaz Beaver, DO   atorvastatin (LIPITOR) 40 MG tablet Take 1 tablet by mouth nightly 3/14/16  Yes Roberth Mera, DO   midodrine (PROAMATINE) 10 MG tablet Take 1 tablet by mouth 3 times daily (with meals) 3/14/16  Yes Roberth Mera, DO   aspirin 81 MG EC tablet Take 1 tablet by mouth daily 3/14/16  Yes Roberth Mera, DO   sevelamer (RENVELA) 800 MG tablet Take 5 tablets by mouth 3 times daily    Yes Historical Provider, MD   B Complex-C-Folic Acid (TRIPHROCAPS PO) Take 15 mg by mouth daily   Yes Historical Provider, MD   vitamin D (CHOLECALCIFEROL) 1000 UNIT TABS tablet Take 2,000 Units by mouth daily    Yes Historical Provider, MD   clopidogrel (PLAVIX) 75 MG Intravenous PRN Ron Wall MD        promethazine (PHENERGAN) tablet 12.5 mg  12.5 mg Oral Q6H PRN Ron Wall MD        Or    ondansetron [facility]) injection 4 mg  4 mg Intravenous Q6H PRN Ron Wall MD   4 mg at 04/29/21 2034    polyethylene glycol (GLYCOLAX) packet 17 g  17 g Oral Daily PRN Ron Wall MD        acetaminophen (TYLENOL) tablet 650 mg  650 mg Oral Q6H PRN Ron Wall MD   650 mg at 05/13/21 1038    Or    acetaminophen (TYLENOL) suppository 650 mg  650 mg Rectal Q6H PRN Ron Wall MD        atorvastatin (LIPITOR) tablet 40 mg  40 mg Oral Nightly Ron Wall MD   40 mg at 05/13/21 2033    digoxin (LANOXIN) tablet 125 mcg  125 mcg Oral Daily Ron Wall MD   125 mcg at 05/14/21 0951    glucose (GLUTOSE) 40 % oral gel 15 g  15 g Oral PRN Ron Wall MD        dextrose 50 % IV solution  12.5 g Intravenous PRN Ron Wall MD        glucagon (rDNA) injection 1 mg  1 mg Intramuscular PRN Ron Wall MD        dextrose 5 % solution  100 mL/hr Intravenous PRN Ron Wall MD        insulin lispro (HUMALOG) injection vial 0-6 Units  0-6 Units Subcutaneous TID WC Ron Wall MD   1 Units at 05/13/21 1710    insulin lispro (HUMALOG) injection vial 0-3 Units  0-3 Units Subcutaneous Nightly Ron Wall MD   1 Units at 05/13/21 2034    0.9 % sodium chloride infusion   Intravenous Continuous Pankaj Cervantes MD 10 mL/hr at 05/07/21 1242 Rate Change at 05/07/21 1242       Allergies:  Pcn [penicillins]    Social History:   reports that he has never smoked. He has never used smokeless tobacco. He reports that he does not drink alcohol or use drugs. Family History: family history includes Diabetes in his brother and father; Heart Disease in his father. REVIEW OF SYSTEMS:    Constitutional: No fever or chills.  No night sweats, no weight loss   Eyes: No eye discharge, double vision, or eye pain   HEENT: normal, no pedal edema, no clubbing or cyanosis   Skin - normal coloration and turgor, no rashes, no suspicious skin lesions noted ,    DATA:    Labs:   CBC:   Recent Labs     05/13/21 0449 05/14/21 0522   WBC 4.0 4.6   HGB 8.7* 9.1*   HCT 27.9* 28.5*   PLT 46* 41*     BMP:   Recent Labs     05/13/21 0449 05/14/21 0522    136   K 4.7 4.9   CO2 31 29   BUN 49* 34*   CREATININE 5.08* 3.96*   LABGLOM 12* 15*   GLUCOSE 191* 126*     PT/INR: No results for input(s): PROTIME, INR in the last 72 hours. IMAGING DATA:  XR CHEST PORTABLE   Final Result   1. Stable appearance of support lines and tubes. 2. No pneumothorax evident. 3. Cardiomegaly with improving perihilar opacities. XR CHEST PORTABLE   Final Result   Similar to slight increase in congestive changes and mild patchy perihilar   opacities. XR CHEST PORTABLE   Final Result   Right pigtail chest tube in place with marked reduction of right pleural   fluid. No pneumothorax. Overall improved aeration of the lungs and decrease in vascular congestion. IR CHEST TUBE INSERTION   Final Result   Successful ultrasound guided placement of an 8 [place] right pleural pigtail   catheter which was connected to an atrium drainage system. Right thoracentesis with drainage of 750 mL of fluid. US LIVER   Final Result   Small amount of abdominal ascites. XR CHEST PORTABLE   Final Result   1. Layering small to moderate right pleural effusion. 2. Pulmonary vascular congestion. 3. Right subclavian central line terminates near the floor of the right   atrium. XR CHEST PORTABLE   Final Result   Significantly improved right pleural effusion following thoracentesis      Pulmonary vascular congestion         IR GUIDED THORACENTESIS PLEURAL   Final Result   Successful ultrasound guided right thoracentesis. CT CHEST WO CONTRAST   Final Result   1.   Moderate to moderately large right pleural effusion tracking up the right   lateral chest wall. 2.  Consolidative process involving the entire right lower lobe with air   bronchograms. 3.  Small left effusion. 4.  Hazy vascular markings most compatible with pulmonary venous congestion. 5.  Moderate intra-abdominal ascites. 6.  Atherosclerotic disease, most severe in the major branches of the   abdominal aorta. Coronary artery calcification, significant, is noted. 7.  Heterogeneous thyroid with enlarged right lobe. No further ultrasound   follow-up is required. RECOMMENDATIONS:   No further ultrasound follow-up advised for thyroid findings. XR CHEST PORTABLE   Final Result   1. Congestive heart failure with new moderate to large right pleural effusion. FL MODIFIED BARIUM SWALLOW W VIDEO   Final Result   Swallowing mechanism grossly within normal limits without evidence of   aspiration. Please see separate speech pathology report for full discussion of findings   and recommendations. IR FLUORO GUIDED CVA DEVICE PLMT/REPLACE/REMOVAL   Final Result   Successful ultrasound and fluoroscopy guided right IJ triple-lumen central   venous catheter insertion. NM LUNG SCAN PERFUSION ONLY   Final Result   Low Probability for Pulmonary Embolus.          IR CHEST TUBE INSERTION    (Results Pending)       Primary Problem  Sepsis due to pneumonia [facility])    Active Hospital Problems    Diagnosis Date Noted    Parapneumonic effusion [J18.9, J91.8] 05/09/2021    C. difficile colitis [A04.72] 05/09/2021    Loculated pleural effusion [J90] 05/08/2021    Pleural effusion on right [J90] 05/08/2021    ROOSEVELT (obstructive sleep apnea) [G47.33] 05/08/2021    Pneumonia [J18.9] 04/28/2021    Sepsis ([address] [A41.9] 04/28/2021    Sepsis due to pneumonia ([address] [J18.9, A41.9] 70/14/5384    Metabolic acidosis [W92.2]     Hyperkalemia [E87.5]     Diarrhea [R19.7]     Nausea and vomiting [R11.2]     History of left below knee amputation ([facility] [R20.796]     Hemoptysis [R04.2]     Ulcerated, foot, right, with fat layer exposed ([facility] [L97.512]     Hypertension, essential [I10]     Charcot foot due to diabetes mellitus ([facility] [E11.610] 12/28/2015    Anemia of chronic disease [D63.8] 07/03/2013    ESRD on dialysis ([facility] [N18.6, Z99.2] 06/27/2013    DM (diabetes mellitus) ([facility] [E11.9] 06/27/2013         IMPRESSION:   1. Thrombocytopenia: This seems worsening over the past few years. He has low platelet count around 77 back in 2016 as well. There has been variable platelet count therefore autoimmune etiology cannot be ruled out  2. Anemia appears secondary to renal disease  3. End-stage renal disease on hemodialysis  4. CHF  5. CAD    RECOMMENDATIONS:  1. I reviewed the laboratory data, imaging studies, possible diagnosis and treatment recommendation patient  2. His thrombocytopenia appears chronic since 2016  3. I do not suspect acute bone marrow pathology  4. I will order peripheral blood smear and nutritional deficiencies  5. He does not have history of liver cirrhosis or splenomegaly  6. Continue supportive management as per primary  7. We will follow    Discussed with patient and Nurse. Thank you for asking us to see this patient. Wan Castro MD  Hematologist/Medical Oncologist    Cell: [phone]    This note is created with the assistance of a speech recognition program.  While intending to generate a document that actually reflects the content of the visit, the document can still have some errors including those of syntax and sound a like substitutions which may escape proof reading. It such instances, actual meaning can be extrapolated by contextual diversion.

## 2021-05-14 NOTE — PLAN OF CARE
Problem: Falls - Risk of:  Goal: Will remain free from falls  Description: The patient remained free from falls this shift, call light within reach, bed in locked and lowest position. Side rails up x2. Continue to monitor closely. 5/14/2021 0421 by Tobias Elaine RN  Outcome: Ongoing  Note: Patient remained free from falls. Call light within reach. Problem: Skin Integrity:  Goal: Absence of new skin breakdown  Description: Absence of new skin breakdown  Outcome: Ongoing  Note: No new alterations in skin integrity. Problem: Injury - Risk of, Physical Injury:  Goal: Will remain free from falls  Description: The patient remained free from falls this shift, call light within reach, bed in locked and lowest position. Side rails up x2. Continue to monitor closely. 5/14/2021 0421 by Tobias Elaine RN  Outcome: Ongoing  Note: Patient remained free from falls. Call light within reach. Problem: Pain:  Goal: Pain level will decrease  Description: Pain level will decrease  5/14/2021 0421 by Tobias Elaine RN  Outcome: Ongoing  Note: Patient given pain medication as ordered.

## 2021-05-14 NOTE — PLAN OF CARE
Problem: Falls - Risk of:  Goal: Will remain free from falls  Description: The patient remained free from falls this shift, call light within reach, bed in locked and lowest position. Side rails up x2. Continue to monitor closely. 5/14/2021 1736 by Maggie Polo RN  Outcome: Ongoing  5/14/2021 0421 by James Bertrand RN  Outcome: Ongoing  Note: Patient remained free from falls. Call light within reach. Goal: Absence of physical injury  Description: Absence of physical injury  Outcome: Ongoing     Problem: Skin Integrity:  Goal: Will show no infection signs and symptoms  Description: Patient skin assessment complete. No signs/symptoms of new skin breakdown. Waffle mattress in place. Pt turned and repositioned every two hours with assistance from staff. Area kept free from moisture. Proper nourishment and fluids encouraged, as appropriate. Skin remains clean, dry, and intact. Will continue to monitor for additional needs and changes in skin breakdown. Outcome: Ongoing  Goal: Absence of new skin breakdown  Description: Absence of new skin breakdown  5/14/2021 1736 by Maggie Polo RN  Outcome: Ongoing  5/14/2021 0421 by James Bertrand RN  Outcome: Ongoing  Note: No new alterations in skin integrity. Problem: Activity:  Goal: Risk for activity intolerance will decrease  Description: Risk for activity intolerance will decrease  Outcome: Ongoing     Problem: Fluid Volume:  Goal: Will show no signs or symptoms of fluid imbalance  Description: Will show no signs or symptoms of fluid imbalance  Outcome: Ongoing     Problem: Injury - Risk of, Physical Injury:  Goal: Will remain free from falls  Description: The patient remained free from falls this shift, call light within reach, bed in locked and lowest position. Side rails up x2. Continue to monitor closely.    5/14/2021 1736 by Maggie Polo RN  Outcome: Ongoing  5/14/2021 0421 by James Bertrand RN  Outcome: Ongoing  Note: Patient remained free from falls. Call light within reach.    Goal: Absence of physical injury  Description: Absence of physical injury  Outcome: Ongoing
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
## 2021-05-14 NOTE — PROGRESS NOTES
[facility]: [facility]   INPATIENT OCCUPATIONAL THERAPY  PROGRESS NOTE  Date: 2021  Patient Name: Howard Grossman      Room: /4-01  MRN: [id number]    : 1955  (60 y.o.) Gender: male     Discharge Recommendations:  Further Occupational Therapy is recommended upon facility discharge. Diagnosis: Sepsis due to pneumonia  General  Chart Reviewed: Orders, Progress Notes, History and Physical  Patient assessed for rehabilitation services?: Yes  Family / Caregiver Present: No  Diagnosis: Sepsis due to pneumonia    Restrictions  Restrictions/Precautions: Fall Risk, Isolation, Contact Precautions, Weight Bearing(CDIFF)  Other position/activity restrictions: up with assistance  Right Lower Extremity Weight Bearing: Non Weight Bearing  Left Lower Extremity Weight Bearing: Non Weight Bearing  Required Braces or Orthoses?: Yes(L BKA prosthesis ( at home))      Subjective  Subjective: \"I don't know' when asked about WB precautions. Comments: Pt able to verbalized NWB 2/4 trials post initial review. He tolerated edge of bed ~30min with SBA while engaged in ADL tasks with no LOB. VC required for NWB during scooting activities with f carryover. Patient Currently in Pain: Denies  Overall Orientation Status: Within Normal Limits  Orientation Level: Oriented X4;Oriented to situation     Pain Assessment  Response to Pain Intervention: Patient Satisfied    Objective     Bed mobility  Rolling to Left: Minimal assistance  Rolling to Right: Minimal assistance  Supine to Sit: Moderate assistance  Sit to Supine: Moderate assistance  Scooting: Dependent/Total(G effort, becoming familiar with NWB RLE, LLE BKA)  Comment: multiple medical lines; chest tube, G effort, G use of rails, tactile and VC for ID location of bed rail.    Balance  Sitting Balance: Stand by assistance(ANUPAMA unsupported,30min, dynamic seated tasks)  Standing Balance: Unable to assess(comment)         ADL  Feeding: Setup  Grooming: Setup(patient washing face with wash cloth; oral ccare noted)  UE Bathing: Minimal assistance(VCs for HD )  LE Bathing: Maximum assistance  UE Dressing: Maximum assistance  LE Dressing: Maximum assistance  Toileting: Dependent/Total(TA for bed pan placement)  Additional Comments: UB/grooming ADLs completed from edge of bed with SBA. VC for setup of items/orientation;pt has baseline visual deficts. Used mouth wash and oral basin for oral hygiene (does not have teeth). UB and axillary care SBA. VC for NWB to RLE while weight shifting and scooting with f- carryover; therapies providing assist to eliminate gravity. LB deferred; receiving wound care from RN to RLE while seated EOB engaged in UB tasks. Good use of UE Weightbearing for compensation, alternating UE as appropriate. Additional Activities: awaiting clarification to ID when appropriate to wear prosthetic; at this point he is NWB BLE and relying solely on UB extremities         Positioning  Adaptations: bedside ADLs; waffle matress/skin protection; tactile and faciliated movement for cuing/id of items due to baseline visual deficits       Assessment  Performance deficits / Impairments: Decreased functional mobility ; Decreased safe awareness;Decreased balance;Decreased ADL status; Decreased endurance;Decreased strength  Assessment: resume OT POC, demoing improved alertness and ability to follow through with functional tasks; limited insight to NWB at this time, increased difficulty with tasks  Prognosis: Fair  Discharge Recommendations: Patient would benefit from continued therapy after discharge  Activity Tolerance: Patient Tolerated treatment well  Safety Devices in place: Yes  Type of devices: Call light within reach; Left in bed             Patient Education:   OT POC, WB preucations  Learner:patient  Method: demonstration and explanation       Outcome: needs reinforcement     Plan  Safety Devices  Safety Devices in place: Yes  Type of devices: Call light within reach, Left in bed  Plan  Times per week: 2-5  Current Treatment Recommendations: Strengthening, Endurance Training, Patient/Caregiver Education & Training, Equipment Evaluation, Education, & procurement, Self-Care / ADL, Balance Training, Pain Management, Functional Mobility Training, Safety Education & Training, Positioning      Goals  Short term goals  Time Frame for Short term goals: by discharge, patient will  Short term goal 1: perform supine <>sit transition with Mod A for increased out of bed activity when medically ready  Short term goal 2: sit edge of bed for 8-10 minutes during active self care participation with SBA for sitting balance for overall increased endurance for functional tasks  Short term goal 3: perform upper body self care with Min A using compensatory techniques as needed  Short term goal 4: verbalize/demonstrate Good understanding of precautions (NWB RLE) for mobility/self care purposes    OT Individual Minutes  Time In: 1302  Time Out: 1335  Minutes: 33      Electronically signed by NASIM Patel on 5/14/21 at 1:55 PM EDT

## 2021-05-14 NOTE — PROGRESS NOTES
Nephrology ESRD Progress Note    Reason for Consult: Management of end-stage renal disease. Requesting Physician: Dr Nishant Ordonez    Interval history: Patient was seen and examined today, patient had hemodialysis yesterday. Right-sided chest tube in place he does not have shortness of breath or chest pain and diarrhea is resolved. Chest x-ray performed today showed slight increase in congestive changes and mild patchy perihilar opacities. History of Present Illness: This is a 60 y.o. male with history of CAD [status post CABG], Heart failure with reduced ejection fraction secondary to ischemic cardiomyopathy [LVEF 30 to 35% in June 2019], dyslipidemia and End stage renal disease secondary to nephrosclerosis [on hemodialysis Tuesday,Thursday and Saturday under the care of Dr. Nataly Be at [address] [facility] renal care by way of a left arm AV fistula], who had presented initially to [facility] with diarrhea, nausea and vomiting and generalized weakness for six days. He had missed multiple treatments of dialysis. He presented at [facility] with hyperkalemia potassium of  6.9, BUN/ creatinine of 241 and 18.47 mg/dl and serum bicarbonate of 9. He was dialyzed yesterday 4/28/2021 at [facility] and was transferred to [facility] due to in availability of a bed. Less than 500 cc was removed with dialysis due to hypotension. He was seen at dialysis today. His blood pressures have been in the hypotensive trends between 33-94 systolic blood pressure. He denies fever. He denies abdominal pain. He has ongoing nonbloody diarrhea, watery frequency of 6 times per day. He is tachycardic with heart rate in 127. Work-up for diarrhea shows positive C. difficile. Received a liter bolus of 0.9 saline yesterday.     Current Medications:    amiodarone (CORDARONE) tablet 200 mg, Daily  epoetin willian-epbx (RETACRIT) injection 2,000 Units, Once per day on Mon Wed Fri    And  epoetin willian-epbx (RETACRIT) injection 3,000 Units, Once per day on   [Held by provider] heparin (porcine) injection 5,000 Units, 3 times per day  benzonatate (TESSALON) capsule 200 mg, TID  dextromethorphan (DELSYM) 30 MG/5ML extended release liquid 60 mg, 2 times per day  senna (SENOKOT) tablet 8.6 mg, Nightly  midodrine (PROAMATINE) tablet 7.5 mg, Q8H  metoprolol tartrate (LOPRESSOR) tablet 25 mg, BID  vancomycin (FIRVANQ) 50 MG/ML oral solution 500 mg, 4 times per day  sevelamer (RENVELA) tablet 4,000 mg, TID WC  aspirin EC tablet 81 mg, Daily  oxyCODONE-acetaminophen (PERCOCET) 5-325 MG per tablet 1 tablet, Q8H PRN  lidocaine 4 % external patch 1 patch, Daily  neomycin-bacitracin-polymyxin (NEOSPORIN) ointment, BID  sodium chloride flush 0.9 % injection 5-40 mL, 2 times per day  sodium chloride flush 0.9 % injection 5-40 mL, PRN  0.9 % sodium chloride infusion, PRN  promethazine (PHENERGAN) tablet 12.5 mg, Q6H PRN    Or  ondansetron (ZOFRAN) injection 4 mg, Q6H PRN  polyethylene glycol (GLYCOLAX) packet 17 g, Daily PRN  acetaminophen (TYLENOL) tablet 650 mg, Q6H PRN    Or  acetaminophen (TYLENOL) suppository 650 mg, Q6H PRN  atorvastatin (LIPITOR) tablet 40 mg, Nightly  digoxin (LANOXIN) tablet 125 mcg, Daily  glucose (GLUTOSE) 40 % oral gel 15 g, PRN  dextrose 50 % IV solution, PRN  glucagon (rDNA) injection 1 mg, PRN  dextrose 5 % solution, PRN  insulin lispro (HUMALOG) injection vial 0-6 Units, TID WC  insulin lispro (HUMALOG) injection vial 0-3 Units, Nightly  0.9 % sodium chloride infusion, Continuous      Objective:  Constitutional:    CURRENT TEMPERATURE:  Temp: 97 °F (36.1 °C)  MAXIMUM TEMPERATURE OVER 24HRS:  Temp (24hrs), Av.4 °F (36.3 °C), Min:97 °F (36.1 °C), Max:97.6 °F (36.4 °C)    CURRENT RESPIRATORY RATE:  Resp: 18  CURRENT PULSE:  Pulse: 78  CURRENT BLOOD PRESSURE:  BP: (!) 145/73  24HR BLOOD PRESSURE RANGE:  Systolic (93NOG), WXS:016 , Min:111 , TBY:676   ; Diastolic (82UST), FMS:19, Min:59, Max:73    24HR INTAKE/OUTPUT: Intake/Output Summary (Last 24 hours) at 5/14/2021 1246  Last data filed at 5/14/2021 0618  Gross per 24 hour   Intake 475 ml   Output 18 ml   Net 457 ml     Physical Exam:  GENERAL APPEARANCE: Alert and cooperative, and appears to be in no acute distress. Sahil Hoyles NECK: Supple with no jugular venous distention  CARDIAC: Normal S1 and S2. No S3, S4 or murmurs. Rhythm is regular. LUNGS: Diminished breath sounds at lung bases; right pleural catheter in place. ABDOMEN: Soft non distended, BS+ Non tender no organomegally   MUSKULOSKELETAL: S/p right below-knee amputation with intact stump  EXTREMITIES: Status post left below-knee amputation with intact stump; right foot ulcer wrapped in bandage. NEURO:  Alert oriented x 3 ,power 5/5 in all extremities    Labs:     CBC:   Recent Labs     05/12/21  0528 05/13/21 0449 05/14/21  0522   WBC 3.8 4.0 4.6   RBC 3.17* 3.22* 3.34*   HGB 8.4* 8.7* 9.1*   HCT 27.5* 27.9* 28.5*   MCV 86.8 86.5 85.4   MCH 26.6 27.1 27.2   MCHC 30.7* 31.3 31.8   RDW 21.5* 21.1* 21.6*   PLT 44* 46* 41*   MPV 11.8 10.2 12.3*      BMP:   Recent Labs     05/12/21  0528 05/13/21 0449 05/14/21  0522    141 136   K 4.7 4.7 4.9    102 95*   CO2 28 31 29   BUN 36* 49* 34*   CREATININE 3.93* 5.08* 3.96*   GLUCOSE 196* 191* 126*   CALCIUM 9.2 9.7 10.0      Assessment/plan:    1. ESRD - Will maintain TTS hemodialysis schedule. Left radiocephalic AV fistula is functioning well. Renal diet,i.e 2-gram sodium, 2-gram potassium,1500 ml fluid restriction,1-gram phosphorus, 1800 KCal and 1.2 gram/kg protein per day. 2.  Anemia of chronic kidney disease - Hemoglobin today is 8.7 g/dL and we will continue Retacrit 5000 units subcutaneously 3 times a week. 3.  C. difficile colitis - Day #11/14 of oral vancomycin. 4.  Coronary artery disease - status post CABG 4 years ago. 5.  Mineral and bone disease profile - serum phosphorus 5.4 mg/dL on 5/7/2021 is within target range.   Continue sevelamer 4000 mg p.o. 3 times daily with meals. 6.   Peripheral artery disease - s/p left BKA and right lower extremity chronic wound. 7.  Right large pleural effusion - S/p ultrasound-guided right thoracentesis [1300 ml] on 5/7/2020 and placement of a pigtail catheter 5/10/2021. Plan  Continue hemodialysis Tuesday Thursday Saturday  Epogen 3 times a week    Prognosis is guarded.     Rod Lindsey    Attending Clinical Nephrologist

## 2021-05-14 NOTE — PROGRESS NOTES
Comprehensive Nutrition Assessment    Type and Reason for Visit:  Reassess    Nutrition Recommendations/Plan: Will continue 2g Na, 1500 ml fluid restriction and add Low K restriction. Will decrease Nepro to once a day. Nutrition Assessment:  Pt is now consistently consuming 50-75% of food provided suggesting intake is improved. Nepro supplements stacking at bedside. Malnutrition Assessment:  Malnutrition Status: At risk for malnutrition (Comment)    Context:  Acute Illness     Findings of the 6 clinical characteristics of malnutrition:  Energy Intake:  7 - 50% or less of estimated energy requirements for 5 or more days  Weight Loss:  No significant weight loss     Body Fat Loss:  Unable to assess     Muscle Mass Loss:  Unable to assess    Fluid Accumulation:  1 - Mild Generalized, Extremities   Strength:  Not Performed    Estimated Daily Nutrient Needs:  Energy (kcal):  22 kcal/kg= 2100 kcal; Weight Used for Energy Requirements:  Usual     Protein (g):  1.5g/xp=459 g; Weight Used for Protein Requirements:  Ideal          Nutrition Related Findings:  trace edema generalized and all extremities, Labs (5/14) K 4.9, BUN/Cr 34/3.96 Glu 126, , Meds: Reviewed, BM smear 5/14, +c.dif      Wounds:  Diabetic Ulcer       Current Nutrition Therapies:    DIET GENERAL; Low Sodium (2 GM); Dental Soft; Daily Fluid Restriction: 1500 ml  Dietary Nutrition Supplements: Renal Oral Supplement    Anthropometric Measures:  · Height: 6' (182.9 cm)  · Current Body Weight: 214 lb (97.1 kg)   · Admission Body Weight: 219 lb (99.3 kg)    · Usual Body Weight: 210 lb (95.3 kg)(4/28/21)     · Ideal Body Weight: 178 lbs;BMI: 29  · BMI Categories: Overweight (BMI 25.0-29. 9)       Nutrition Diagnosis:   · Inadequate oral intake related to (current medical condition) as evidenced by intake 0-25%    Nutrition Interventions:   Food and/or Nutrient Delivery:  Modify Current Diet, Modify Oral Nutrition Supplement  Nutrition Education/Counseling:  No recommendation at this time   Coordination of Nutrition Care:  Continue to monitor while inpatient    Goals:  po intake greater than 50%       Nutrition Monitoring and Evaluation:   Food/Nutrient Intake Outcomes:  Food and Nutrient Intake, Supplement Intake  Physical Signs/Symptoms Outcomes:  Biochemical Data, GI Status, Skin, Weight, Fluid Status or Edema     Discharge Planning:    Continue current diet     Electronically signed by Kyle Slaazar RD, LD on 5/14/21 at 3:06 PM EDT    Contact: [phone]

## 2021-05-14 NOTE — PROGRESS NOTES
RECOMMENDATION: MRI of the right foot could be performed for further evaluation if clinically warranted. Ct Chest Wo Contrast    Result Date: 5/7/2021  EXAMINATION: CT OF THE CHEST WITHOUT CONTRAST 5/7/2021 6:48 am TECHNIQUE: CT of the chest was performed without the administration of intravenous contrast. Multiplanar reformatted images are provided for review. Dose modulation, iterative reconstruction, and/or weight based adjustment of the mA/kV was utilized to reduce the radiation dose to as low as reasonably achievable. COMPARISON: None. HISTORY: ORDERING SYSTEM PROVIDED HISTORY: Pleural effusion versus worsening infiltrate right lung TECHNOLOGIST PROVIDED HISTORY: Pleural effusion versus worsening infiltrate right lung Reason for Exam: Pleural effusion versus worsening infiltrate, right lung Acuity: Unknown Type of Exam: Unknown FINDINGS: Mediastinum: Thyroid is asymmetric. Right lobe is enlarged and heterogenous with largest nodule of 9 mm not requiring further follow-up. Shotty mediastinal lymph nodes are present without bill adenopathy. Moderate calcified plaque in the aortic arch. Calcified coronary arteries. Moderate cardiomegaly noted without pericardial effusion or epicardial adenopathy. Lungs/pleura: Small left pleural effusion with compressive atelectasis at the left lung base. Vascular markings are hazy suggesting pulmonary venous congestion. Moderate to moderately large right pleural effusion tracking along the right lateral chest wall. There is consolidation of the entire right lower lobe with some air bronchograms. Upper Abdomen: Moderate ascites in the included upper abdomen. The patient has significant atherosclerotic calcification of the major branches off the aorta with moderate calcification of the aorta. Suggestion of collapsed gallbladder with gallstones. Soft Tissues/Bones: Multilevel degenerative changes in the thoracic spine.  No acute osseous or soft tissue abnormality although there is slight subcutaneous haziness suggesting anasarca. 1.  Moderate to moderately large right pleural effusion tracking up the right lateral chest wall. 2.  Consolidative process involving the entire right lower lobe with air bronchograms. 3.  Small left effusion. 4.  Hazy vascular markings most compatible with pulmonary venous congestion. 5.  Moderate intra-abdominal ascites. 6.  Atherosclerotic disease, most severe in the major branches of the abdominal aorta. Coronary artery calcification, significant, is noted. 7.  Heterogeneous thyroid with enlarged right lobe. No further ultrasound follow-up is required. RECOMMENDATIONS: No further ultrasound follow-up advised for thyroid findings. Nm Lung Scan Perfusion Only    Result Date: 4/29/2021  EXAMINATION: NUCLEAR MEDICINE PERFUSION SCAN. 4/29/2021 TECHNIQUE: Ventilation not performed as part of COVID-19 safety precautions. 3.7 millicuries of Tc 57Z MAA was administered intravenously prior to planar imaging of the lungs in multiple projections. COMPARISON: Chest radiograph 04/28/2021. HISTORY: ORDERING SYSTEM PROVIDED HISTORY: hemoptysis and tachycardia r/o PE Additional signs and symptoms: SOB, CP, coughin up blood, non-smoker, no hx of blood clots FINDINGS: PERFUSION:Distribution of radiotracer is homogeneous. No segmental defects identified. CHEST RADIOGRAPH:No focal areas of consolidation or significant effusions on recent chest radiograph. Low Probability for Pulmonary Embolus. Ct Abdomen Pelvis W Iv Contrast Additional Contrast? None    Result Date: 4/28/2021  EXAMINATION: CT OF THE ABDOMEN AND PELVIS WITH CONTRAST 4/28/2021 4:16 am TECHNIQUE: CT of the abdomen and pelvis was performed with the administration of intravenous contrast. Multiplanar reformatted images are provided for review.  Dose modulation, iterative reconstruction, and/or weight based adjustment of the mA/kV was utilized to reduce the radiation dose to as low as reasonably achievable. COMPARISON: None. HISTORY: ORDERING SYSTEM PROVIDED HISTORY: diarrhea, abd pain, wbc 30k, discussed with nephro TECHNOLOGIST PROVIDED HISTORY: diarrhea, abd pain, wbc 30k, discussed with nephro Decision Support Exception->Emergency Medical Condition (MA) Reason for Exam: abd pain, nephro maddie approved contrast, dialysis pt Acuity: Acute Type of Exam: Initial FINDINGS: Lower Chest: There is abnormal airspace consolidation within the right lower lobe consistent with a right lower lobe pneumonia. There is no pleural effusion. There is cardiomegaly. Severe coronary artery atherosclerotic calcifications are present. Organs: There is a small volume of ascites. Cholecystectomy clips are present. The liver, spleen, pancreas and adrenal glands are normal in appearance. There is severe renal atrophy. Innumerous renal cysts are noted. There is no hydronephrosis. A right nephroureteral stent is present. GI/Bowel: There are no abnormally dilated loops of large or small bowel present. There is no mesenteric inflammatory stranding present. The appendix is normal. Pelvis: There is a small volume of free fluid in the pelvis. There is no free fluid or pelvic mass identified. The urinary bladder is normal. Peritoneum/Retroperitoneum: There is no pathologically enlarged lymphadenopathy present. Severe atherosclerotic calcifications are present within the abdominal aorta and proximal anchor arteries. Bones/Soft Tissues: No lytic or blastic osseous lesions are identified. There is bilateral spondylolysis at L5 and grade 1 spondylolisthesis of L5 relative to S1. There is a disc bulge and facet arthropathy resulting in severe bilateral neural foraminal narrowing. Findings consistent with renal osteodystrophy are noted. 1. Right lower lobe pneumonia. Follow-up PA and lateral chest radiographs are recommended after treatment to ensure resolution. 2. Small volume ascites. 3. Normal appendix.  4. Severe bilateral neural foraminal narrowing at L5-S1 secondary to bilateral spondylolysis at L5, grade 1 spondylolisthesis of L5 relative to S1, a disc bulge and facet arthropathy. Ir Lucille Aguilar Device Plmt/replace/removal    Result Date: 4/30/2021  PROCEDURE: IR FLUOROSCOPY GUIDED CENTRAL VENOUS ACCESS DEVICE PLACEMENT 4/30/2021 HISTORY: ORDERING SYSTEM PROVIDED HISTORY: Sepsis with hypotension; cental line for IV pressors TECHNOLOGIST PROVIDED HISTORY: cental line for IV pressors CONTRAST: None SEDATION: None FLUOROSCOPY DOSE AND TYPE OR TIME AND EXPOSURES: Fluoro time: 1 minutes 14 seconds DAP: 714 cGycm2 DESCRIPTION OF PROCEDURE: Informed consent was obtained from the patient's brother after a detailed explanation of the procedure including risks, benefits, and alternatives. Universal protocol was observed including a time-out to confirm the correct patient and procedure. The right neck was prepped and draped in sterile fashion using maximum sterile barrier technique. Local anesthesia was achieved with 1% lidocaine. The right internal jugular vein was accessed with a 21 gauge micropuncture needle under ultrasound guidance. A 0.018 guidewire was advanced through the needle. The needle was removed and a 4 Western Jazmyn access catheter over its dilator was advanced over the wire. The wire and dilator were removed and a 0.035 guidewire was advanced through the access catheter into the IVC under fluoroscopic guidance. The access catheter was removed and the tract dilated over the wire. A 20 cm triple-lumen catheter was advanced over the wire and the wire was removed. The catheter flushed and aspirated easily. The catheter was sutured in place with 2-0 nylon. A sterile dressing was applied. The patient tolerated the procedure well and there were no immediate complications. Estimated blood loss: Less than 5 mL. FINDINGS: Ultrasound demonstrates patency of the right internal jugular vein and guides venotomy. Final fluoroscopic image demonstrates satisfactory catheter placement with the tip in the right atrium. Successful ultrasound and fluoroscopy guided right IJ triple-lumen central venous catheter insertion. Us Liver    Result Date: 5/10/2021  EXAMINATION: RIGHT UPPER QUADRANT ULTRASOUND 5/10/2021 1:49 pm COMPARISON: CT abdomen pelvis with contrast 04/28/2021 HISTORY: ORDERING SYSTEM PROVIDED HISTORY: low platelets TECHNOLOGIST PROVIDED HISTORY: low platelets Acuity: Acute Type of Exam: Initial FINDINGS: LIVER:  The liver demonstrates normal echogenicity without evidence of intrahepatic biliary ductal dilatation. BILIARY SYSTEM:  Gallbladder surgically absent Common bile duct is within normal limits measuring 5 mm. OTHER: Small amount of abdominal ascites. Small amount of abdominal ascites. Xr Chest Portable    Result Date: 5/9/2021  EXAMINATION: ONE XRAY VIEW OF THE CHEST 5/9/2021 6:39 am COMPARISON: 05/07/2021 HISTORY: ORDERING SYSTEM PROVIDED HISTORY: Follow-up loculated pleural effusion TECHNOLOGIST PROVIDED HISTORY: Follow-up loculated pleural effusion Reason for Exam: Follow-up loculated pleural effusion Acuity: Unknown Type of Exam: Unknown Additional signs and symptoms: Follow-up loculated pleural effusion FINDINGS: Tubes and lines: Right subclavian central line terminating near the floor of the right atrium. Heart and lungs: Stable cardiomediastinal silhouette. Low lung volumes. Layering right pleural effusion, moderate. Pulmonary vascular congestion. No pneumothorax. Bones/other:     1. Layering small to moderate right pleural effusion. 2. Pulmonary vascular congestion. 3. Right subclavian central line terminates near the floor of the right atrium.      Xr Chest Portable    Result Date: 5/7/2021  EXAMINATION: ONE XRAY VIEW OF THE CHEST 5/7/2021 10:05 am COMPARISON: 05/06/2021 HISTORY: ORDERING SYSTEM PROVIDED HISTORY: post thora TECHNOLOGIST PROVIDED HISTORY: post Santi Javed Reason for Exam: Post Thoracentesis Acuity: Acute Type of Exam: Subsequent/Follow-up FINDINGS: Significantly improved right pleural effusion following thoracentesis. Cardiac size is enlarged. No acute infiltrates are seen . The pulmonary vascularity is hazy and indistinct. No pneumothorax. Brixey Andrew Postsurgical changes overlying the mediastinum. Stable right central venous catheter with tip in the right atrium. Significantly improved right pleural effusion following thoracentesis Pulmonary vascular congestion     Xr Chest Portable    Result Date: 5/6/2021  EXAMINATION: ONE XRAY VIEW OF THE CHEST 5/6/2021 6:37 pm COMPARISON: 04/28/2021 HISTORY: ORDERING SYSTEM PROVIDED HISTORY: shortness of breath TECHNOLOGIST PROVIDED HISTORY: shortness of breath Reason for Exam: Pt states increasing SOB and weakness. Best images per pt condition Acuity: Unknown Type of Exam: Unknown Additional signs and symptoms: Pt states increasing SOB and weakness. Best images per pt condition FINDINGS: The right internal jugular catheter terminates in the distal SVC. There is a moderate to large right pleural effusion with atelectasis. There is worsening mild to moderate pulmonary vascular congestion. Cardiomegaly is stable status post median sternotomy and CABG. There is no pneumothorax. 1. Congestive heart failure with new moderate to large right pleural effusion. Xr Chest Portable    Result Date: 4/28/2021  EXAMINATION: ONE XRAY VIEW OF THE CHEST 4/28/2021 2:48 am COMPARISON: 06/03/2019 HISTORY: ORDERING SYSTEM PROVIDED HISTORY: missed dialysis x2weeks TECHNOLOGIST PROVIDED HISTORY: missed dialysis x2weeks Reason for Exam: Upright port, missed dialysis x2 weeks FINDINGS: There is stable cardiomegaly. There is no focal consolidation, pleural effusion or evidence of edema. There is no pneumothorax identified. 1. Stable cardiomegaly. 2. No acute cardiopulmonary process identified.      Ir Chest Tube Insertion    Result Date: 5/11/2021 PERFORMED IN CONJUNCTION WITH SPEECH PATHOLOG[facility] TECHNIQUE: Fluoroscopic evaluation of the swallowing mechanism was performed with multiple consistency of barium product. FLUOROSCOPY DOSE AND TYPE OR TIME AND EXPOSURES: Fluoroscopic time of 1 minutes 44 seconds. DAP of 71.9 dGycm2. COMPARISON: None HISTORY: ORDERING SYSTEM PROVIDED HISTORY: evaluate for diet modifications TECHNOLOGIST PROVIDED HISTORY: evaluate for diet modifications Reason for Exam: evaluate for diet modifications Acuity: Acute Type of Exam: Initial FINDINGS: Oral phase of swallowing was grossly within normal limits. No evidence of laryngeal penetration or aspiration. Swallowing mechanism grossly within normal limits without evidence of aspiration. Please see separate speech pathology report for full discussion of findings and recommendations. Ir Guided Thoracentesis Pleural    Result Date: 5/7/2021  PROCEDURE: ULTRASOUNDGUIDED RIGHT THORACENTESIS 5/7/2021 HISTORY: ORDERING SYSTEM PROVIDED HISTORY: Loculated right pleural effusion; chest tube/pigtail catheter at discretion of radiologist-not necessarily needed TECHNOLOGIST PROVIDED HISTORY: Discussed with Dr. Remedios Moore right pleural effusion; chest tube/pigtail catheter at discretion of radiologist-not necessarily needed Which side should the procedure be performed?->Right TECHNIQUE: This procedure was performed by Dr. Demetrio Hunt. Informed consent was obtained after a detailed explanation of the procedure including risks, benefits, and alternatives. Universal protocol was performed. The right chest was prepped and draped in sterile fashion and local anesthesia was achieved with lidocaine. A 5 Syriac needle sheath was advanced under ultrasound guidance into pleural effusion and thoracentesis was performed. The patient tolerated the procedure well. EBL: None FINDINGS: A total of 1.3 L of fairly clear raul colored fluid was removed. Sample was sent for the requested studies. Successful ultrasound guided right thoracentesis. Physical Examination:        Physical Exam  Constitutional:       General: He is not in acute distress. Appearance: Normal appearance. He is not ill-appearing. Comments: Patient on 2 L NC   HENT:      Head: Normocephalic and atraumatic. Eyes:      Extraocular Movements: Extraocular movements intact. Pupils: Pupils are equal, round, and reactive to light. Cardiovascular:      Rate and Rhythm: Normal rate and regular rhythm. Heart sounds: No murmur. No gallop. Pulmonary:      Effort: Pulmonary effort is normal. No respiratory distress. Breath sounds: Normal breath sounds. No wheezing. Comments: Right-sided chest tube in place  Abdominal:      General: Bowel sounds are normal. There is no distension. Tenderness: There is no abdominal tenderness. Musculoskeletal:         General: No swelling or deformity. Comments: Left BKA    Right foot wrapped C/D/I   Neurological:      General: No focal deficit present. Mental Status: He is alert and oriented to person, place, and time. Psychiatric:         Mood and Affect: Mood normal.         Thought Content:  Thought content normal.           Assessment:        Primary Problem  Sepsis due to pneumonia [facility])    Active Hospital Problems    Diagnosis Date Noted    Parapneumonic effusion [J18.9, J91.8] 05/09/2021    C. difficile colitis [A04.72] 05/09/2021    Loculated pleural effusion [J90] 05/08/2021    Pleural effusion on right [J90] 05/08/2021    ROOSEVELT (obstructive sleep apnea) [G47.33] 05/08/2021    Pneumonia [J18.9] 04/28/2021    Sepsis ([address] [A41.9] 04/28/2021    Sepsis due to pneumonia ([address] [J18.9, A41.9] 01/36/0630    Metabolic acidosis [R81.9]     Hyperkalemia [E87.5]     Diarrhea [R19.7]     Nausea and vomiting [R11.2]     History of left below knee amputation (HCC) [Z89.512]     Hemoptysis [R04.2]     Ulcerated, foot, right, with fat layer exposed    Chronic systolic and diastolic CHF/Hx of HTN. -S/p cardioversion 5/5  -BNP > 300,000, likely elevated from missed dialysis for 2 weeks  -Echo 6/2019: LVEF 30-35% with moderate diastolic dysfunction  -CAD, CABG, stents, last cath 2019.   -Amiodarone 200 mg QD  -Digoxin 125 MCG daily  -Lopressor 25 mg twice daily  -Cardiology on board:               -[id number] amiodarone 200 mg twice daily for 7 days. - Continue Amiodarone from 200 mg QD               -Patient is a candidate for AICD due to ICM.  Plan for AICD in future once infectious process resolves              -Appreciate further recs     Hyperkalemia 2/2 ESRD on hemodialysis TTS  -Missed 2 weeks of dialysis.   -K 6.9 on admission with EKG changes, received IV calcium gluconate and insulin/dextrose.  -Underwent hemodialysis at [address] [place] [address]. V's  -Nephrology on board:              -S/p dialysis with 1.5 kg removed   -Retacrit 5000U subcutaneously 3 times weekly              -Renvela 4000 mg PO TID     Hemoptysis likely 2/2 pneumonia (improving)  -HgB stable  -keep HgB > 7     Acute diarrhea 2/2 C. difficile infection (improving)  -Continues to have bowel movements overnight, non-foul-smelling  -Continue Senokot nightly  -CT abdomen and pelvis showed small volume ascites and normal appendix  -C.  Diff +ve   -Continue oral vancomycin 500 mg q6hr day 4 until 5/16  -Protonix 40 mg PO daily             Right foot ulcer  -X-ray showed no evidence of OM  -Podiatry on board  -Wound care on board      Dyslipidemia   -Lipitor 40 mg PO nightly     Type 2 diabetes mellitus  -Low dose ISS  -Hypoglycemia protocol     Bilateral neural foraminal narrowing at L5-S1 with spondylolysis  -Showed up on CT A&P  -We will continue to monitor     Diet:  Dental soft diet, Low Sodium, Fluid restriction to 1500 mL   DVT prophylaxis: Heparin 5,000 3 times daily (HELD due to low PC)  GI prophylaxis: Pepcid discontinued yesterday       I have discussed the care of Katherine Parrish , including pertinent history and exam findings,    today with the resident. I have seen and examined the patient and the key elements of all parts of the encounter have been performed by me . I agree with the assessment, plan and orders as documented by the resident. Principal Problem:    Sepsis due to pneumonia [facility])  Active Problems:    ESRD on dialysis ([facility]    DM (diabetes mellitus) ([facility]    Anemia of chronic disease    Charcot foot due to diabetes mellitus ([facility]    Hypertension, essential    Sepsis ([facility]    Metabolic acidosis    Hyperkalemia    Diarrhea    Nausea and vomiting    History of left below knee amputation (HCC)    Hemoptysis    Ulcerated, foot, right, with fat layer exposed ([facility]    Pneumonia    Loculated pleural effusion    Pleural effusion on right    ROOSEVELT (obstructive sleep apnea)    Parapneumonic effusion    C. difficile colitis  Resolved Problems:    * No resolved hospital problems. *        Overall  course ;                                   are improving over time. Patient doing much better today  Evaluated by heme-onc  Chest tube only have 18 mL  We'll discuss with pulmonary about removal of chest tube  Discharge planning after that.           Electronically signed by Mukesh Hernandez MD

## 2021-05-15 NOTE — PROGRESS NOTES
HEMODIALYSIS POST TREATMENT NOTE    Treatment time ordered: 3.25 hours    Actual treatment time: 3.25 hours    UltraFiltration Goal: 1500ml as tolerated  UltraFiltration Removed: 500ml      Pre Treatment weight: 97.2kg (last reported weight 2021)   Post Treatment weight: unable to assess  Estimated Dry Weight:     Access used:     Central Venous Catheter:          Tunneled or Non-tunneled:            Site:           Access Flow:       Internal Access:       AV Fistula or AV Graft: AV fistula         Site: left forarm       Access Flow: Good       Sign and symptoms of infection: no       If YES:     Medications or blood products given: None    Chronic outpatient schedule: [facility]    Chronic outpatient unit:  [facility]    Summary of response to treatment: Patient tolerated treatment fair. Uf decreased and saline bolus given during treatment due to patient not feeling good. After intervention, patient stable    Explain if orders NOT met, was physician notified:maksim RIVERA flowsheet faxed to patient unit/ placed in patient chart: yes    Post assessment completed: yes    Report given to: Radha Meléndez documented Safety Checks include the followin) Access and face visible at all times. 2) All connections and blood lines are secure with no kinks. 3) NVL alarm engaged. 4) Hemosafe device applied (if applicable). 5) No collapse of Arterial or Venous blood chambers. 6) All blood lines / pump segments in the air detectors.

## 2021-05-15 NOTE — PLAN OF CARE
Problem: Falls - Risk of:  Goal: Will remain free from falls  Description: The patient remained free from falls this shift, call light within reach, bed in locked and lowest position. Side rails up x2. Continue to monitor closely. 5/15/2021 0501 by Marga Pallas, RN  Outcome: Ongoing  Note: Patient weak. Bed alarm on. Making no attempt to get out of bed per self. 5/14/2021 1736 by Concepcion Clemons RN  Outcome: Ongoing  Goal: Absence of physical injury  Description: Absence of physical injury  5/15/2021 0501 by Marga Pallas, RN  Outcome: Ongoing  5/14/2021 1736 by Concepcion Clemons RN  Outcome: Ongoing     Problem: Skin Integrity:  Goal: Will show no infection signs and symptoms  Description: Patient skin assessment complete. No signs/symptoms of new skin breakdown. Waffle mattress in place. Pt turned and repositioned every two hours with assistance from staff. Area kept free from moisture. Proper nourishment and fluids encouraged, as appropriate. Skin remains clean, dry, and intact. Will continue to monitor for additional needs and changes in skin breakdown. 5/15/2021 0501 by Marga Pallas, RN  Outcome: Ongoing  Note: Coccyx reddened. Nursing cream applied. Remains on waffle mattress. 5/14/2021 1736 by Concepcion Clemons RN  Outcome: Ongoing  Goal: Absence of new skin breakdown  Description: Absence of new skin breakdown  5/15/2021 0501 by Marga Pallas, RN  Outcome: Ongoing  5/14/2021 1736 by Concepcion Clemons RN  Outcome: Ongoing     Problem:  Activity:  Goal: Risk for activity intolerance will decrease  Description: Risk for activity intolerance will decrease  5/15/2021 0501 by Marga Pallas, RN  Outcome: Ongoing  5/14/2021 1736 by Concepcion Clemons RN  Outcome: Ongoing     Problem: Fluid Volume:  Goal: Will show no signs or symptoms of fluid imbalance  Description: Will show no signs or symptoms of fluid imbalance  5/15/2021 0501 by Marga Pallas, RN Outcome: Ongoing  Note: Patient without edema. Lungs with crackles in bases. 5/14/2021 1736 by Leon Crawford RN  Outcome: Ongoing     Problem: Nutritional:  Goal: Maintenance of adequate nutrition will be supported  Description: Maintenance of adequate nutrition will be supported  5/15/2021 0501 by Jessica Obrien RN  Outcome: Ongoing  5/14/2021 1736 by Leon Crawford RN  Outcome: Ongoing     Problem: Physical Regulation:  Goal: Complications related to the disease process, condition or treatment will be avoided or minimized  Description: Complications related to the disease process, condition or treatment will be avoided or minimized  5/15/2021 0501 by Jessica Obrien RN  Outcome: Ongoing  5/14/2021 1736 by Leon Crawford RN  Outcome: Ongoing     Problem: Urinary Elimination:  Goal: Ability to achieve and maintain adequate urine output will be supported  Description: Ability to achieve and maintain adequate urine output will be supported  5/15/2021 0501 by Jessica Obrien RN  Outcome: Ongoing  Note: Patient incontinent of a small amount of urine.   5/14/2021 1736 by Leon Crawford RN  Outcome: Ongoing     Problem: Airway Clearance - Ineffective:  Goal: Clear lung sounds  Description: Clear lung sounds  5/15/2021 0501 by Jessica Obrien RN  Outcome: Ongoing  5/14/2021 1736 by Leon Crawford RN  Outcome: Ongoing  Goal: Ability to maintain a clear airway will improve  Description: Ability to maintain a clear airway will improve  5/15/2021 0501 by Jessica Obrien RN  Outcome: Ongoing  5/14/2021 1736 by Leon Crawford RN  Outcome: Ongoing     Problem: Fluid Volume - Deficit:  Goal: Achieves intake and output within specified parameters  Description: Achieves intake and output within specified parameters  5/15/2021 0501 by Jessica Obrien RN  Outcome: Ongoing  5/14/2021 1736 by Leon Crawford RN  Outcome: Ongoing     Problem: Gas Exchange - Impaired: Goal: Levels of oxygenation will improve  Description: Levels of oxygenation will improve  5/15/2021 0501 by Rufino Aguirre RN  Outcome: Ongoing  5/14/2021 1736 by Sai Mullins RN  Outcome: Ongoing     Problem: Confusion - Acute:  Goal: Absence of continued neurological deterioration signs and symptoms  Description: Absence of continued neurological deterioration signs and symptoms  5/15/2021 0501 by Rufino Aguirre RN  Outcome: Ongoing  5/14/2021 1736 by Sai Mullins RN  Outcome: Ongoing  Goal: Mental status will be restored to baseline  Description: Mental status will be restored to baseline  5/15/2021 0501 by Rufino Aguirre RN  Outcome: Ongoing  5/14/2021 1736 by Sai Mullins RN  Outcome: Ongoing     Problem: Discharge Planning:  Goal: Ability to perform activities of daily living will improve  Description: Ability to perform activities of daily living will improve  5/15/2021 0501 by Rufino Aguirre RN  Outcome: Ongoing  5/14/2021 1736 by Sai Mullnis RN  Outcome: Ongoing  Goal: Participates in care planning  Description: Participates in care planning  5/15/2021 0501 by Rufino Aguirre RN  Outcome: Ongoing  5/14/2021 1736 by Sai Mullins RN  Outcome: Ongoing     Problem: Injury - Risk of, Physical Injury:  Goal: Will remain free from falls  Description: The patient remained free from falls this shift, call light within reach, bed in locked and lowest position. Side rails up x2. Continue to monitor closely. 5/15/2021 0501 by Rufino Aguirre RN  Outcome: Ongoing  Note: Patient weak. Bed alarm on. Making no attempt to get out of bed per self.   5/14/2021 1736 by Sai Mullins RN  Outcome: Ongoing  Goal: Absence of physical injury  Description: Absence of physical injury  5/15/2021 0501 by Rufino Aguirre RN  Outcome: Ongoing  5/14/2021 1736 by Sai Mullins RN  Outcome: Ongoing     Problem: Mood - Altered:  Goal: Mood stable Description: Mood stable  5/15/2021 0501 by Carolina Rose RN  Outcome: Ongoing  5/14/2021 1736 by Lorenzo Maldonado RN  Outcome: Ongoing  Goal: Absence of abusive behavior  Description: Absence of abusive behavior  5/15/2021 0501 by Carolina Rsoe RN  Outcome: Ongoing  5/14/2021 1736 by Lorenzo Maldonado RN  Outcome: Ongoing  Goal: Verbalizations of feeling emotionally comfortable while being cared for will increase  Description: Verbalizations of feeling emotionally comfortable while being cared for will increase  5/15/2021 0501 by Carolina Rose RN  Outcome: Ongoing  5/14/2021 1736 by Lorenzo Maldonado RN  Outcome: Ongoing     Problem: Psychomotor Activity - Altered:  Goal: Absence of psychomotor disturbance signs and symptoms  Description: Absence of psychomotor disturbance signs and symptoms  5/15/2021 0501 by Carolina Rose RN  Outcome: Ongoing  5/14/2021 1736 by Lorenzo Maldonado RN  Outcome: Ongoing     Problem: Sleep Pattern Disturbance:  Goal: Appears well-rested  Description: Appears well-rested  5/15/2021 0501 by Carolina Rose RN  Outcome: Ongoing  5/14/2021 1736 by Lorenzo Maldonado RN  Outcome: Ongoing     Problem: Pain:  Goal: Pain level will decrease  Description: Pain level will decrease  5/15/2021 0501 by Carolina Rose RN  Outcome: Ongoing  Note: Patient denies pain. Positioned comfortably.   5/14/2021 1736 by Lorenzo Maldonado RN  Outcome: Ongoing  Goal: Control of acute pain  Description: Control of acute pain  5/15/2021 0501 by Carolina Rose RN  Outcome: Ongoing  5/14/2021 1736 by Lorenzo Maldonado RN  Outcome: Ongoing  Goal: Control of chronic pain  Description: Control of chronic pain  5/15/2021 0501 by Carolina Rose RN  Outcome: Ongoing  5/14/2021 1736 by Lorenzo Maldonado RN  Outcome: Ongoing     Problem: Nutrition  Goal: Optimal nutrition therapy  Description: Nutrition Problem #1: Increased nutrient needs  Intervention: Food and/or Nutrient Delivery: Continue Current Diet, Start Oral Nutrition Supplement  Nutritional Goals: po intake greater than 50%     5/15/2021 0501 by Carolina Rose RN  Outcome: Ongoing  5/14/2021 1736 by Lorenzo Maldonado RN  Outcome: Ongoing  5/14/2021 1509 by Lesley Samson RD, LD  Outcome: Ongoing  Note: Nutrition Problem #1: Inadequate oral intake  Intervention: Food and/or Nutrient Delivery: Modify Current Diet, Modify Oral Nutrition Supplement  Nutritional Goals: po intake greater than 50%      Problem: Musculor/Skeletal Functional Status  Goal: Highest potential functional level  5/15/2021 0501 by Carolina Rose RN  Outcome: Ongoing  5/14/2021 1736 by Lorenzo Maldonado RN  Outcome: Ongoing  Goal: Absence of falls  5/15/2021 0501 by Carolina Rose RN  Outcome: Ongoing  5/14/2021 1736 by Lorenzo Maldonado RN  Outcome: Ongoing

## 2021-05-15 NOTE — PROGRESS NOTES
Nephrology ESRD Progress Note    Reason for Consult: Management of end-stage renal disease. Requesting Physician: Dr Garland Nissen    Interval history: Patient was seen and examined today. Right-sided chest tube was removed on 5/14/2021. Is comfortable at rest.    History of Present Illness: This is a 60 y.o. male with history of CAD [status post CABG], Heart failure with reduced ejection fraction secondary to ischemic cardiomyopathy [LVEF 30 to 35% in June 2019], dyslipidemia and End stage renal disease secondary to nephrosclerosis [on hemodialysis Tuesday,Thursday and Saturday under the care of Dr. Comfort Neff at [address] renal care by way of a left arm AV fistula], who had presented initially to [address] [place] [address]. Norbert's with diarrhea, nausea and vomiting and generalized weakness for six days. He had missed multiple treatments of dialysis. He presented at [address] [place] [address]. Norbert's with hyperkalemia potassium of  6.9, BUN/ creatinine of 241 and 18.47 mg/dl and serum bicarbonate of 9. He was dialyzed yesterday 4/28/2021 at [address] [place] [address]. Jose and was transferred to [facility] due to in availability of a bed. Less than 500 cc was removed with dialysis due to hypotension. He was seen at dialysis today. His blood pressures have been in the hypotensive trends between 91-97 systolic blood pressure. He denies fever. He denies abdominal pain. He has ongoing nonbloody diarrhea, watery frequency of 6 times per day. He is tachycardic with heart rate in 127. Work-up for diarrhea shows positive C. difficile. Received a liter bolus of 0.9 saline yesterday.     Current Medications:    amiodarone (CORDARONE) tablet 200 mg, Daily  epoetin willian-epbx (RETACRIT) injection 2,000 Units, Once per day on Mon Wed Fri   And  epoetin willian-epbx (RETACRIT) injection 3,000 Units, Once per day on Mon Wed Fri  [Held by provider] heparin (porcine) injection 5,000 Units, 3 times per day  benzonatate (TESSALON) capsule 200 mg, TID  dextromethorphan (DELSYM) 30 MG/5ML extended release liquid 60 mg, 2 times per day  senna (SENOKOT) tablet 8.6 mg, Nightly  midodrine (PROAMATINE) tablet 7.5 mg, Q8H  metoprolol tartrate (LOPRESSOR) tablet 25 mg, BID  vancomycin (FIRVANQ) 50 MG/ML oral solution 500 mg, 4 times per day  sevelamer (RENVELA) tablet 4,000 mg, TID WC  aspirin EC tablet 81 mg, Daily  oxyCODONE-acetaminophen (PERCOCET) 5-325 MG per tablet 1 tablet, Q8H PRN  lidocaine 4 % external patch 1 patch, Daily  neomycin-bacitracin-polymyxin (NEOSPORIN) ointment, BID  sodium chloride flush 0.9 % injection 5-40 mL, 2 times per day  sodium chloride flush 0.9 % injection 5-40 mL, PRN  0.9 % sodium chloride infusion, PRN  promethazine (PHENERGAN) tablet 12.5 mg, Q6H PRN   Or  ondansetron (ZOFRAN) injection 4 mg, Q6H PRN  polyethylene glycol (GLYCOLAX) packet 17 g, Daily PRN  acetaminophen (TYLENOL) tablet 650 mg, Q6H PRN   Or  acetaminophen (TYLENOL) suppository 650 mg, Q6H PRN  atorvastatin (LIPITOR) tablet 40 mg, Nightly  digoxin (LANOXIN) tablet 125 mcg, Daily  glucose (GLUTOSE) 40 % oral gel 15 g, PRN  dextrose 50 % IV solution, PRN  glucagon (rDNA) injection 1 mg, PRN  dextrose 5 % solution, PRN  insulin lispro (HUMALOG) injection vial 0-6 Units, TID WC  insulin lispro (HUMALOG) injection vial 0-3 Units, Nightly  0.9 % sodium chloride infusion, Continuous      Objective:  Constitutional:    CURRENT TEMPERATURE:  Temp: 97.3 °F (36.3 °C)  MAXIMUM TEMPERATURE OVER 24HRS:  Temp (24hrs), Av.5 °F (36.4 °C), Min:97.3 °F (36.3 °C), Max:97.6 °F (36.4 °C)    CURRENT RESPIRATORY RATE:  Resp: 16  CURRENT PULSE:  Pulse: 61  CURRENT BLOOD PRESSURE:  BP: 139/73  24HR BLOOD PRESSURE RANGE:  Systolic (53SLI), AYE:932 , Min:124 , WTM:290   ; Diastolic (32XFW), MARJORIE:82, Min:62, Max:73    24HR INTAKE/OUTPUT:      Intake/Output Summary (Last 24 hours) at 5/15/2021 1022  Last data filed at 5/15/2021 0907  Gross per 24 hour   Intake 120 ml   Output    Net 120 ml     Physical Exam:  GENERAL APPEARANCE: Alert and cooperative, and appears to be in no acute distress. Clement Fly NECK: Supple with no jugular venous distention  CARDIAC: Normal S1 and S2. No S3, S4 or murmurs. Rhythm is regular. LUNGS: Diminished breath sounds at lung bases; right pleural catheter in place. ABDOMEN: Soft non distended, BS+ Non tender no organomegally   MUSKULOSKELETAL: S/p right below-knee amputation with intact stump  EXTREMITIES: Status post left below-knee amputation with intact stump; right foot ulcer wrapped in bandage. NEURO:  Alert oriented x 3 ,power 5/5 in all extremities    Labs:     CBC:   Recent Labs     05/13/21  0449 05/14/21  0522 05/15/21  0520   WBC 4.0 4.6 5.1   RBC 3.22* 3.34* 3.39*   HGB 8.7* 9.1* 8.8*   HCT 27.9* 28.5* 29.0*   MCV 86.5 85.4 85.5   MCH 27.1 27.2 25.8*   MCHC 31.3 31.8 30.2*   RDW 21.1* 21.6* 22.0*   PLT 46* 41* 45*   MPV 10.2 12.3* 9.7      BMP:   Recent Labs     05/13/21 0449 05/14/21  0522 05/15/21  0520 05/15/21  0747    136 137  --    K 4.7 4.9 UNABLE TO REPORT POTASSIUM DUE TO GROSS HEMOLYSIS OF SPECIMEN. SPECIMEN TO BE REDRAWN. 6.1*    95* 95*  --    CO2 31 29 23  --    BUN 49* 34* 49*  --    CREATININE 5.08* 3.96* 4.64*  --    GLUCOSE 191* 126* 97  --    CALCIUM 9.7 10.0 10.4  --       Assessment/plan:    1. ESRD - Will maintain TTS hemodialysis schedule. Left radiocephalic AV fistula is functioning well. Patient will receive acute hemodialysis. Renal diet,i.e 2-gram sodium, 2-gram potassium,1500 ml fluid restriction,1-gram phosphorus, 1800 KCal and 1.2 gram/kg protein per day. 2.  Anemia of chronic kidney disease - Hemoglobin today is 8.8 g/dL and we will continue Retacrit 5000 units subcutaneously 3 times a week. 3.  C. difficile colitis - Day #13/14 of oral vancomycin. 4.  Coronary artery disease - status post CABG 4 years ago. 5.  Mineral and bone disease profile - serum phosphorus 5.4 mg/dL on 5/7/2021 is within target range.   Continue sevelamer 4000 mg p.o. 3 times daily with meals. 6.   Peripheral artery disease - s/p left BKA and right lower extremity chronic wound. 7.  Right large pleural effusion - S/p ultrasound-guided right thoracentesis [1300 ml] on 5/7/2020 and placement of a pigtail catheter 5/10/2021. Catheter was removed 5/14/2021.    8.  Systemic hypertension - blood pressure is adequately controlled    Prognosis is guarded.     Abiodun Carbajal MD, [address]  Attending Clinical Nephrologist

## 2021-05-15 NOTE — PROGRESS NOTES
[facility] -   Infectious diseases evaluation  admission date 4/28/2021    reason for consultation:   Pneumonia/C. difficile colitis    Impression :   Current:  · C. difficile colitis  · Right lower lobe pneumonia, staph aureus MSSA growth on respiratory culture  · Moderate to large  pleural fluid status post ultrasound guided thoracentesis 5/7/2021 no growth on cultures status post right pleural pigtail catheter placement 5/10/2021. · Sepsis secondary to above  · Right foot wound  · Metabolic acidosis  · CHF  · End-stage renal disease on hemodialysis  · Diabetes mellitus  · Dyslipidemia  · Hypertension  · Thrombocytopenia      Recommendations   · Continue p.o. vancomycin until 5/16/2021  ·  IV meropenem was discontinued 5/11/2021  · IV ceftriaxone discontinued 5/6/2021  · No growth on pleural fluid culture   · MRSA nasal swab was negative  · Swallow study showed no evidence of aspiration  · No growth on blood cultures  · Patient may be discharged from infectious disease point of view          History of Present Illness:   Initial history:  Vincent Nunez is a 60y.o.-year-old male was transferred from [place].  The patient presented with worsening cough productive with blood-tinged sputum associated with shortness of breath and diarrhea for 2 weeks. He also complaining of generalized weakness, nausea and vomiting. no alleviating or aggravating factors. The patient is poor historian, lives at home alone, states that he received Covid 19 vaccine. Apparently the patient missed hemodialysis.   He also had left foot wound with no purulent drainage or necrotic tissue, was evaluated by podiatry, foot x-ray not suggestive of osteomyelitis  On admission he was tachycardic, had leukocytosis with WBC of 29.6  CT chest and abdomen showed right lower lobe infiltrates  COVID-19 rapid test negative  VQ scan was low probability on 4/29/2021  On 4/30/2021He became hypotensive with a systolic blood pressure in the 70s, tachycardic was transferred to ICU, was started on pressors  Procalcitonin was 25.7 on 4/30/2021  Sputum 4/28/2021 grew staph aureus MSSA  IJ triple-lumen was placed was placed 4/30/2021  CT chest without contrast from 5/7/2021 showed moderate to large right pleural effusion tracking up to the right lateral chest wall with consolidative process to the anterior right lower lobe, moderate ascites, enlarged right lobe of thyroid  status post ultrasound guided thoracentesis 5/7/2021  Fluid analysis exudative, no growth on culture to date   status post right pleural pigtail catheter placement 5/10/2021. Interval history 5/15/2021  The patient is comfortable, denied increased shortness of breath or cough, right-sided chest tube was removed 5/14/2021, denied fever or chills, no other complaints      Patient Vitals for the past 8 hrs:   BP Temp Temp src Pulse Resp SpO2   05/15/21 1300 120/63   63     05/15/21 1230 122/63   63     05/15/21 1200 (!) 142/74   65     05/15/21 1130 131/70   67     05/15/21 1125 (!) 142/78   67 18    05/15/21 0800 139/73 97.3 °F (36.3 °C) Axillary 61 16 94 %         I have personally reviewed the past medical history, past surgical history, medications, social history, and family history, and I haveupdated the database accordingly. Allergies:   Pcn [penicillins]     Review of Systems:     Review of Systems  As per history present illness, other than above 12 system review is negative  Physical Examination :       Physical Exam  Constitutional:       General: He is not in acute distress. HENT:      Head: Normocephalic and atraumatic. Right Ear: External ear normal.      Left Ear: External ear normal.   Cardiovascular:      Rate and Rhythm: Normal rate and regular rhythm. Pulmonary:      Effort: No respiratory distress.       Comments:   Right-sided chest tube was removed  Abdominal:      General: Abdomen is flat. There is no distension. Palpations: Abdomen is soft. Tenderness: There is no abdominal tenderness. Musculoskeletal:      Cervical back: Neck supple. No rigidity. Comments: Left below-knee amputation site with no open wound no erythema. Right foot dressing   Skin:     General: Skin is warm. Coloration: Skin is not jaundiced. Neurological:      General: No focal deficit present. Mental Status: He is alert and oriented to person, place, and time.          Past Medical History:     Past Medical History:   Diagnosis Date    Acute on chronic congestive heart failure (HCC)     Anemia     CAD (coronary artery disease)     Cardiomyopathy ([address]     Dialysis care     ESRD (end stage renal disease) on dialysis ([address]     Foot ulcer, left ([address] 2015    GERD (gastroesophageal reflux disease)     [facility] 2011    MOM-WED-FRI-ON [address]     History of sleep apnea     none since significant weight loss (was 400#)    Hyperlipidemia     Hyperparathyroidism ([address]     Hypertension     Neuropathy     ROOSEVELT (obstructive sleep apnea) 5/8/2021    Peripheral vascular disease ([facility] [facility]     Pneumonia     resolved    S/P CABG (coronary artery bypass graft) 06/2016    Type II or unspecified type diabetes mellitus without mention of complication, not stated as uncontrolled     dx-1980       Past Surgical  History:     Past Surgical History:   Procedure Laterality Date    CATARACT REMOVAL      DIALYSIS FISTULA CREATION Left 2014    IR CHEST TUBE INSERTION  5/10/2021    IR CHEST TUBE INSERTION 5/10/2021 [facility]    LEG AMPUTATION THROUGH LOWER TIBIA AND FIBULA         Medications:      amiodarone  200 mg Oral Daily    epoetin willian-epbx  2,000 Units Subcutaneous Once per day on Mon Wed Fri    And    epoetin willian-epbx  3,000 Units Subcutaneous Once per day on Mon Wed Fri    [Held by provider] heparin (porcine)  5,000 Units Subcutaneous 3 times per day    benzonatate  200 mg Oral TID    dextromethorphan  60 mg Oral 2 times per day    senna  1 tablet Oral Nightly    midodrine  7.5 mg Oral Q8H    metoprolol tartrate  25 mg Oral BID    vancomycin  500 mg Oral 4 times per day    sevelamer  4,000 mg Oral TID WC    aspirin  81 mg Oral Daily    lidocaine  1 patch Transdermal Daily    neomycin-bacitracin-polymyxin   Topical BID    sodium chloride flush  5-40 mL Intravenous 2 times per day    atorvastatin  40 mg Oral Nightly    digoxin  125 mcg Oral Daily    insulin lispro  0-6 Units Subcutaneous TID WC    insulin lispro  0-3 Units Subcutaneous Nightly       Social History:     Social History     Socioeconomic History    Marital status:      Spouse name: Not on file    Number of children: Not on file    Years of education: Not on file    Highest education level: Not on file   Occupational History    Not on file   Tobacco Use    Smoking status: Never Smoker    Smokeless tobacco: Never Used   Substance and Sexual Activity    Alcohol use: No    Drug use: No    Sexual activity: Not on file   Other Topics Concern    Not on file   Social History Narrative    Not on file     Social Determinants of Health     Financial Resource Strain:     Difficulty of Paying Living Expenses:    Food Insecurity:     Worried About Running Out of Food in the Last Year:     Ran Out of Food in the Last Year:    Transportation Needs:     Lack of Transportation (Medical):      Lack of Transportation (Non-Medical):    Physical Activity:     Days of Exercise per Week:     Minutes of Exercise per Session:    Stress:     Feeling of Stress :    Social Connections:     Frequency of Communication with Friends and Family:     Frequency of Social Gatherings with Friends and Family:     Attends Orthodoxy Services:     Active Member of Clubs or Organizations:     Attends Club or Organization Meetings:     Marital Status:    Intimate Partner Violence:     Fear of Current or Ex-Partner:     Emotionally Abused:     Physically Abused:     Sexually Abused:        Family History:     Family History   Problem Relation Age of Onset    Heart Disease Father     Diabetes Father     Diabetes Brother       Medical Decision Making:   I have independently reviewed/ordered the following labs:    CBC with Differential:   Recent Labs     05/14/21  0522 05/15/21  0520   WBC 4.6 5.1   HGB 9.1* 8.8*   HCT 28.5* 29.0*   PLT 41* 45*   LYMPHOPCT 4* 13*   MONOPCT 13* 15*     BMP:  Recent Labs     05/14/21  0522 05/15/21  0520 05/15/21  0747    137  --    K 4.9 UNABLE TO REPORT POTASSIUM DUE TO GROSS HEMOLYSIS OF SPECIMEN. SPECIMEN TO BE REDRAWN. 6.1*   CL 95* 95*  --    CO2 29 23  --    BUN 34* 49*  --    CREATININE 3.96* 4.64*  --      Hepatic Function Panel:   No results for input(s): PROT, LABALBU, BILIDIR, IBILI, BILITOT, ALKPHOS, ALT, AST in the last 72 hours. No results for input(s): RPR in the last 72 hours. No results for input(s): HIV in the last 72 hours. No results for input(s): BC in the last 72 hours. Lab Results   Component Value Date    CREATININE 4.64 05/15/2021    GLUCOSE 97 05/15/2021    GLUCOSE 128 01/09/2012       Detailed results: Thank you for allowing us to participate in the care of this patient. Please call with questions. This note is created with the assistance of a speech recognition program.  While intending to generate adocument that actually reflects the content of the visit, the document can still have some errors including those of syntax and sound a like substitutions which may escape proof reading. It such instances, actual meaningcan be extrapolated by contextual diversion. Sen Kenny MD  Family Medicine Resident   Attending Physician Statement  I have discussed the care of the patient, including pertinent history and exam findings,  with the resident. I have reviewed the key elements of all parts of the encounter with the resident. I agree with the assessment, plan and orders as documented by the resident.     Anayeli Mayes

## 2021-05-15 NOTE — PROGRESS NOTES
HEMODIALYSIS PRE-TREATMENT NOTE    Patient Identifiers prior to treatment: name and     Isolation Required: yes                      Isolation Type: C-diff       (please document if patient is being managed as a PUI/COVID-19 patient)        Hepatitis status:                           Date Drawn                             Result  Hepatitis B Surface Antigen 2021     negative                     Hepatitis B Surface Antibody 2021 95        Hepatitis B Core Antibody 2021 negative          How was Hepatitis Status verified: [id number] flowsheet     Was a copy of the labs you documented provided to facility for the patient's chart: yes    Hemodialysis orders verified: yes    Access Within normal limits ( I.e. s/s of infection,...): yes     Pre-Assessment completed: yes    Pre-dialysis report received from: Rolan Lundy                      Time: [id number]

## 2021-05-15 NOTE — PROGRESS NOTES
Today's Date: 5/15/2021  Patient Name: Amy Eden  Date of admission: 4/28/2021  2:27 PM  Patient's age: 60 y.o., 1955  Admission Dx: Pneumonia [J18.9]  Sepsis due to pneumonia ([facility] [address] [J18.9, A41.9]    Reason for Consult:thrombocytopenia  Requesting Physician: Cayden Rothman MD    CHIEF COMPLAINT:  No chief complaint on file. History Obtained From:  patient and chart    Interval history:      Patient seen and examined  Labs and vitals reviewed  No acute event overnight. S/p removal of chest tube yesterday  Plan for dialysis today  Possible discharge later today  Platelet count 57,275        HISTORY OF PRESENT ILLNESS:      Amy Eden is a 60 y.o. male with end-stage renal disease on hemodialysis, chronic anemia, CAD who is admitted to the hospital on 4/28/2021  for shortness of breath and cough. He report that he is having significant diarrhea for the past few days and has missed hemodialysis sessions. Subsequently his breathing got worse therefore he presented to hospital.  Patient has been restarted on hemodialysis. His platelets noted to be around 46 therefore hematology was consulted for evaluation of his thrombocytopenia. He does have chronic anemia from his renal disease and receiving erythropoietin stimulating agent with dialysis. Patient had low platelet count even last year around 95 but now has dropped to 46. Patient is a poor historian.   Past Medical History:   has a past medical history of Acute on chronic congestive heart failure ([address] Anemia, CAD (coronary artery disease), Cardiomyopathy ([facility] [address] Dialysis care, ESRD (end stage renal disease) on dialysis ([facility] [address] Foot ulcer, left ([address] GERD (gastroesophageal reflux disease), Hemodialysis patient ([facility] [address] History of sleep apnea, Hyperlipidemia, Hyperparathyroidism ([address] Hypertension, Neuropathy, ROOSEVELT (obstructive sleep apnea), Peripheral vascular disease ([facility] [address] Pneumonia, S/P CABG (coronary artery bypass graft), and Type II or unspecified type diabetes mellitus without mention of complication, not stated as uncontrolled. Past Surgical History:   has a past surgical history that includes Cataract removal; Dialysis fistula creation (Left, 2014); Leg amputation, lower tibia/fibula; and IR CHEST TUBE INSERTION (5/10/2021). Medications:    Prior to Admission medications    Medication Sig Start Date End Date Taking?  Authorizing Provider   metoprolol succinate (TOPROL XL) 50 MG extended release tablet Take 100 mg by mouth daily   Yes Historical Provider, MD   metOLazone (ZAROXOLYN) 10 MG tablet Take 10 mg by mouth daily   Yes Historical Provider, MD   digoxin (LANOXIN) 125 MCG tablet Take 125 mcg by mouth daily Take on Tuesday, Thursday and Saturday in the evening   Yes Historical Provider, MD   NIFEdipine (ADALAT CC) 30 MG extended release tablet Take 30 mg by mouth daily   Yes Historical Provider, MD   pantoprazole (PROTONIX) 40 MG tablet Take 40 mg by mouth daily   Yes Historical Provider, MD   patiromer sorbitex calcium (VELTASSA) 8.4 g PACK packet Take 8.4 g by mouth daily   Yes Historical Provider, MD   carvedilol (COREG) 3.125 MG tablet Take 1 tablet by mouth 2 times daily (with meals)  Patient taking differently: Take 12.5 mg by mouth 2 times daily (with meals)  6/6/19  Yes ABEBA Claudio - CNP   bumetanide (BUMEX) 1 MG tablet Take 2 mg by mouth daily   Yes Historical Provider, MD   ONE TOUCH ULTRA TEST strip USE AS DIRECTED DAILY AS NEEDED 1/24/17  Yes Jonh Pinto DO   atorvastatin (LIPITOR) 40 MG tablet Take 1 tablet by mouth nightly 3/14/16  Yes Ramesh Ha DO   midodrine (PROAMATINE) 10 MG tablet Take 1 tablet by mouth 3 times daily (with meals) 3/14/16  Yes Ramesh Ha DO   aspirin 81 MG EC tablet Take 1 tablet by mouth daily 3/14/16  Yes Ramesh Ha DO   sevelamer (RENVELA) 800 MG tablet Take 5 tablets by mouth 3 times daily    Yes Historical Provider, MD   B Complex-C-Folic Acid (TRIPHROCAPS PO) Take 15 mg by mouth daily   Yes Historical Provider, MD   vitamin D (CHOLECALCIFEROL) 1000 UNIT TABS tablet Take 2,000 Units by mouth daily    Yes Historical Provider, MD   clopidogrel (PLAVIX) 75 MG tablet Take 75 mg by mouth daily    Historical Provider, MD   insulin lispro (HUMALOG) 100 UNIT/ML injection vial Inject into the skin 3 times daily (before meals)    Historical Provider, MD   lisinopril (PRINIVIL;ZESTRIL) 2.5 MG tablet Take 2.5 mg by mouth daily    Historical Provider, MD   nitroGLYCERIN (NITROSTAT) 0.4 MG SL tablet Place 0.4 mg under the tongue every 5 minutes as needed for Chest pain up to max of 3 total doses. If no relief after 1 dose, call 911. Historical Provider, MD   amLODIPine (NORVASC) 10 MG tablet Take 10 mg by mouth daily    Historical Provider, MD   oxyCODONE-acetaminophen (PERCOCET) 5-325 MG per tablet Take 1 tablet by mouth every 4 hours as needed for Pain. Historical Provider, MD   ticagrelor (BRILINTA) 90 MG TABS tablet Take 1 tablet by mouth 2 times daily 6/6/19   ABEBA Snider - CNP   cinacalcet (SENSIPAR) 90 MG tablet Take 90 mg by mouth daily    Historical Provider, MD   Blood Glucose Monitoring Suppl (ONE TOUCH ULTRA 2) W/DEVICE KIT TEST 3 TIMES DAILY 8/26/16   Curly Bane, DO   glucose monitoring kit (FREESTYLE) monitoring kit 1 kit by Does not apply route daily as needed 4/21/16   Curly Bane, DO   Lancets 30G MISC 1 each by Does not apply route 3 times daily 4/21/16   Curly Bane, DO   Misc.  Devices (WALKER GLIDE WHEELS) MISC 1 Device by Does not apply route continuous 4/21/16   Curly Bane, DO   Multiple Vitamins-Minerals (THERAPEUTIC MULTIVITAMIN-MINERALS) tablet Take 1 tablet by mouth daily 3/14/16   Valerie Johnston DO   calcium acetate (PHOSLO) 667 MG capsule Take 1,334 mg by mouth 3 times daily (with meals)     Historical Provider, MD     Current Facility-Administered Medications   Medication Dose Route Frequency Intravenous 2 times per day Edward Walton MD   10 mL at 05/15/21 0934    sodium chloride flush 0.9 % injection 5-40 mL  5-40 mL Intravenous PRN Edward Walton MD        0.9 % sodium chloride infusion  25 mL Intravenous PRN Edward Walton MD        promethazine (PHENERGAN) tablet 12.5 mg  12.5 mg Oral Q6H PRN Edward Walton MD        Or    ondansetron [facility]) injection 4 mg  4 mg Intravenous Q6H PRN Edward Walton MD   4 mg at 04/29/21 2034    polyethylene glycol (GLYCOLAX) packet 17 g  17 g Oral Daily PRN Edward Walton MD        acetaminophen (TYLENOL) tablet 650 mg  650 mg Oral Q6H PRN Edward Walton MD   650 mg at 05/13/21 1038    Or    acetaminophen (TYLENOL) suppository 650 mg  650 mg Rectal Q6H PRN Edward Walton MD        atorvastatin (LIPITOR) tablet 40 mg  40 mg Oral Nightly Edward Walton MD   40 mg at 05/14/21 2159    digoxin (LANOXIN) tablet 125 mcg  125 mcg Oral Daily Edward Walton MD   125 mcg at 05/15/21 0917    glucose (GLUTOSE) 40 % oral gel 15 g  15 g Oral PRN Edward Walton MD        dextrose 50 % IV solution  12.5 g Intravenous PRN Edward Walton MD        glucagon (rDNA) injection 1 mg  1 mg Intramuscular PRN Edward Walton MD        dextrose 5 % solution  100 mL/hr Intravenous PRN Edward Walton MD        insulin lispro (HUMALOG) injection vial 0-6 Units  0-6 Units Subcutaneous TID WC Edward Walton MD   1 Units at 05/13/21 1710    insulin lispro (HUMALOG) injection vial 0-3 Units  0-3 Units Subcutaneous Nightly Edward Walton MD   1 Units at 05/13/21 2034    0.9 % sodium chloride infusion   Intravenous Continuous Guerrero Molina MD 10 mL/hr at 05/07/21 1242 Rate Change at 05/07/21 1242       Allergies:  Pcn [penicillins]    Social History:   reports that he has never smoked. He has never used smokeless tobacco. He reports that he does not drink alcohol and does not use drugs.      Family History: family history includes Diabetes in his brother and father; Heart Disease in his father. REVIEW OF SYSTEMS:    Constitutional: No fever or chills. No night sweats, no weight loss   Eyes: No eye discharge, double vision, or eye pain   HEENT: negative for sore mouth, sore throat, hoarseness and voice change   Respiratory: negative for cough , sputum, dyspnea, wheezing, hemoptysis, chest pain   Cardiovascular: negative for chest pain, dyspnea, palpitations, orthopnea, PND   Gastrointestinal: negative for nausea, vomiting, diarrhea, constipation, abdominal pain, Dysphagia, hematemesis and hematochezia   Genitourinary: negative for frequency, dysuria, nocturia, urinary incontinence, and hematuria   Integument: negative for rash, skin lesions, bruises.    Hematologic/Lymphatic: negative for easy bruising, bleeding, lymphadenopathy, or petechiae   Endocrine: negative for heat or cold intolerance,weight changes, change in bowel habits and hair loss   Musculoskeletal: negative for myalgias, arthralgias, pain, joint swelling,and bone pain   Neurological: negative for headaches, dizziness, seizures, weakness, numbness    PHYSICAL EXAM:      /73   Pulse 61   Temp 97.3 °F (36.3 °C) (Axillary)   Resp 16   Ht 6' (1.829 m)   Wt 214 lb 4.6 oz (97.2 kg)   SpO2 94%   BMI 29.06 kg/m²    Temp (24hrs), Av.5 °F (36.4 °C), Min:97.3 °F (36.3 °C), Max:97.6 °F (36.4 °C)    General appearance - well appearing, no in pain or distress   Mental status - alert and cooperative   Eyes - pupils equal and reactive, extraocular eye movements intact   Ears - bilateral TM's and external ear canals normal   Mouth - mucous membranes moist, pharynx normal without lesions   Neck - supple, no significant adenopathy   Lymphatics - no palpable lymphadenopathy, no hepatosplenomegaly   Chest - clear to auscultation, no wheezes, rales or rhonchi, symmetric air entry   Heart - normal rate, regular rhythm, normal S1, S2, no murmurs  Abdomen - soft, nontender, XR CHEST PORTABLE   Final Result   Significantly improved right pleural effusion following thoracentesis      Pulmonary vascular congestion         IR GUIDED THORACENTESIS PLEURAL   Final Result   Successful ultrasound guided right thoracentesis. CT CHEST WO CONTRAST   Final Result   1. Moderate to moderately large right pleural effusion tracking up the right   lateral chest wall. 2.  Consolidative process involving the entire right lower lobe with air   bronchograms. 3.  Small left effusion. 4.  Hazy vascular markings most compatible with pulmonary venous congestion. 5.  Moderate intra-abdominal ascites. 6.  Atherosclerotic disease, most severe in the major branches of the   abdominal aorta. Coronary artery calcification, significant, is noted. 7.  Heterogeneous thyroid with enlarged right lobe. No further ultrasound   follow-up is required. RECOMMENDATIONS:   No further ultrasound follow-up advised for thyroid findings. XR CHEST PORTABLE   Final Result   1. Congestive heart failure with new moderate to large right pleural effusion. FL MODIFIED BARIUM SWALLOW W VIDEO   Final Result   Swallowing mechanism grossly within normal limits without evidence of   aspiration. Please see separate speech pathology report for full discussion of findings   and recommendations. IR FLUORO GUIDED CVA DEVICE PLMT/REPLACE/REMOVAL   Final Result   Successful ultrasound and fluoroscopy guided right IJ triple-lumen central   venous catheter insertion. NM LUNG SCAN PERFUSION ONLY   Final Result   Low Probability for Pulmonary Embolus.              Primary Problem  Sepsis due to pneumonia [facility])    Active Hospital Problems    Diagnosis Date Noted    Parapneumonic effusion [J18.9, J91.8] 05/09/2021    C. difficile colitis [A04.72] 05/09/2021    Loculated pleural effusion [J90] 05/08/2021    Pleural effusion on right [J90] 05/08/2021    ROOSEVELT (obstructive sleep apnea) [G47.33] 05/08/2021    Pneumonia [J18.9] 04/28/2021    Seps[facility] [A41.9] 04/28/2021    Sepsis due to pneumonia ([facility] [J18.9, A41.9] 53/42/4517    Metabolic acidosis [N08.8]     Hyperkalemia [E87.5]     Diarrhea [R19.7]     Nausea and vomiting [R11.2]     History of left below knee amputation (HCC) [Z89.512]     Hemoptysis [R04.2]     Ulcerated, foot, right, with fat layer exposed ([facility] [L97.512]     Hypertension, essential [I10]     Charcot foot due to diabetes mellitus ([facility] [E11.610] 12/28/2015    Anemia of chronic disease [D63.8] 07/03/2013    ESRD on dialysis ([facility] [X09.0, Z99.2] 06/27/2013    DM (diabetes mellitus) ([facility] [E11.9] 06/27/2013         IMPRESSION:   1. Thrombocytopenia: This seems worsening over the past few years. He has low platelet count around 77 back in 2016 as well. There has been variable platelet count therefore autoimmune etiology cannot be ruled out  2. Anemia appears secondary to renal disease  3. End-stage renal disease on hemodialysis  4. CHF  5. CAD    RECOMMENDATIONS:  1. I reviewed the laboratory data, imaging studies, possible diagnosis and treatment recommendation patient  2. His thrombocytopenia appears chronic since 2016  3. Likelihood of primary bone marrow disorder is low  4. Anemia work-up suggestive of anemia of chronic disease  5. Peripheral smear shows decreased platelets but no morphological abnormality  6. Thrombocytopenia likely multifactorial secondary acute illness myelosuppression as well as polypharmacy  7. Transfuse to keep platelet count above 10,000 patient is bleeding  8. Continue supportive management as per primary  9. We will follow    Discussed with patient and Nurse. Thank you for asking us to see this patient.     Altamease Rising      This note is created with the assistance of a speech recognition program.  While intending to generate a document that actually reflects the content of the visit, the document can still have some errors including those of syntax and sound a like substitutions which may escape proof reading. It such instances, actual meaning can be extrapolated by contextual diversion.

## 2021-05-15 NOTE — PROGRESS NOTES
[address] [facility].    PROGRESS NOTE             5/15/2021    8:33 AM    Name:   Marko Cox  MRN:     [id number]     Acct:      [de-identified]   Room:   2114/2114-01  IP Day:  16  Admit Date:  4/28/2021  2:27 PM    PCP:  Ayanna Morrissey MD  Code Status:  Full Code    Subjective:     C/C: No chief complaint on file. Interval History Status: . Patient was seen and examined bedside. No acute events overnight. Patient is satting 94% on room air. S/p right chest tube removal yesterday. Plan for dialysis today. Patient denies any chest pain, worsening shortness of breath, palpitations, abdominal pain, nausea, vomiting, or diarrhea. Plan for discharge today after dialysis to [facility]. Will remove central line prior to discharge. Brief History:     Please see H&P    Review of Systems:     Review of Systems   Constitutional: Negative for activity change, appetite change and fatigue. HENT: Negative for congestion. Eyes: Negative for photophobia and visual disturbance. Respiratory: Negative for apnea, cough, shortness of breath and wheezing. Cardiovascular: Negative for chest pain, palpitations and leg swelling. Gastrointestinal: Negative for abdominal distention, abdominal pain, constipation, diarrhea, nausea and vomiting. Endocrine: Negative for polyuria. Musculoskeletal: Positive for gait problem. Negative for arthralgias and back pain. Skin: Negative for color change. Neurological: Negative for dizziness, syncope and headaches. Psychiatric/Behavioral: Negative for agitation, behavioral problems, confusion, decreased concentration and dysphoric mood. Medications: Allergies:     Allergies   Allergen Reactions    Pcn [Penicillins] Other (See Comments)     Trouble walking       Current Meds:   Scheduled Meds:    amiodarone  200 mg Oral Daily    epoetin willian-epbx  2,000 Units Subcutaneous Once Family History:   Family History   Problem Relation Age of Onset    Heart Disease Father     Diabetes Father     Diabetes Brother        Vitals:  /73   Pulse 61   Temp 97.3 °F (36.3 °C) (Axillary)   Resp 16   Ht 6' (1.829 m)   Wt 214 lb 4.6 oz (97.2 kg)   SpO2 94%   BMI 29.06 kg/m²   Temp (24hrs), Av.5 °F (36.4 °C), Min:97.3 °F (36.3 °C), Max:97.6 °F (36.4 °C)    Recent Labs     21  1135 21  1641 21  1927 05/15/21  0703   POCGLU 114* 99 123* 82       I/O(24Hr): No intake or output data in the 24 hours ending 05/15/21 0833    Labs:    [unfilled]    Lab Results   Component Value Date/Time    SPECIAL NOT REPORTED 05/10/2021 03:10 PM     Lab Results   Component Value Date/Time    CULTURE NO GROWTH 4 DAYS 05/10/2021 03:10 PM       [unfilled]    Radiology:    Orvan Sharpe Foot Right (min 3 Views)    Result Date: 2021  EXAMINATION: THREE XRAY VIEWS OF THE RIGHT FOOT 2021 4:44 am COMPARISON: 2015 HISTORY: ORDERING SYSTEM PROVIDED HISTORY: extensive R foot wound TECHNOLOGIST PROVIDED HISTORY: extensive R foot wound Reason for Exam: Extensive right foot wound, FINDINGS: Complete collapse of the plantar arch with fragmentation and increased sclerosis of the midfoot is again noted consistent with severe neuropathic arthropathy. There is diffuse soft tissue swelling. There is a soft tissue ulceration along the plantar surface of the midfoot. There is no periosteal new bone formation or osteolysis to suggest acute osteomyelitis. 1. Redemonstration of severe neuropathic arthropathy of the midfoot. 2. Soft tissue ulceration along the plantar surface of the midfoot without radiographic findings of acute osteomyelitis. RECOMMENDATION: MRI of the right foot could be performed for further evaluation if clinically warranted.      Ct Chest Wo Contrast    Result Date: 2021  EXAMINATION: CT OF THE CHEST WITHOUT CONTRAST 2021 6:48 am TECHNIQUE: CT of the chest was performed without the administration of intravenous contrast. Multiplanar reformatted images are provided for review. Dose modulation, iterative reconstruction, and/or weight based adjustment of the mA/kV was utilized to reduce the radiation dose to as low as reasonably achievable. COMPARISON: None. HISTORY: ORDERING SYSTEM PROVIDED HISTORY: Pleural effusion versus worsening infiltrate right lung TECHNOLOGIST PROVIDED HISTORY: Pleural effusion versus worsening infiltrate right lung Reason for Exam: Pleural effusion versus worsening infiltrate, right lung Acuity: Unknown Type of Exam: Unknown FINDINGS: Mediastinum: Thyroid is asymmetric. Right lobe is enlarged and heterogenous with largest nodule of 9 mm not requiring further follow-up. Shotty mediastinal lymph nodes are present without bill adenopathy. Moderate calcified plaque in the aortic arch. Calcified coronary arteries. Moderate cardiomegaly noted without pericardial effusion or epicardial adenopathy. Lungs/pleura: Small left pleural effusion with compressive atelectasis at the left lung base. Vascular markings are hazy suggesting pulmonary venous congestion. Moderate to moderately large right pleural effusion tracking along the right lateral chest wall. There is consolidation of the entire right lower lobe with some air bronchograms. Upper Abdomen: Moderate ascites in the included upper abdomen. The patient has significant atherosclerotic calcification of the major branches off the aorta with moderate calcification of the aorta. Suggestion of collapsed gallbladder with gallstones. Soft Tissues/Bones: Multilevel degenerative changes in the thoracic spine. No acute osseous or soft tissue abnormality although there is slight subcutaneous haziness suggesting anasarca. 1.  Moderate to moderately large right pleural effusion tracking up the right lateral chest wall. 2.  Consolidative process involving the entire right lower lobe with air bronchograms.  3. Condition (MA) Reason for Exam: abd pain, nephro maddie approved contrast, dialysis pt Acuity: Acute Type of Exam: Initial FINDINGS: Lower Chest: There is abnormal airspace consolidation within the right lower lobe consistent with a right lower lobe pneumonia. There is no pleural effusion. There is cardiomegaly. Severe coronary artery atherosclerotic calcifications are present. Organs: There is a small volume of ascites. Cholecystectomy clips are present. The liver, spleen, pancreas and adrenal glands are normal in appearance. There is severe renal atrophy. Innumerous renal cysts are noted. There is no hydronephrosis. A right nephroureteral stent is present. GI/Bowel: There are no abnormally dilated loops of large or small bowel present. There is no mesenteric inflammatory stranding present. The appendix is normal. Pelvis: There is a small volume of free fluid in the pelvis. There is no free fluid or pelvic mass identified. The urinary bladder is normal. Peritoneum/Retroperitoneum: There is no pathologically enlarged lymphadenopathy present. Severe atherosclerotic calcifications are present within the abdominal aorta and proximal anchor arteries. Bones/Soft Tissues: No lytic or blastic osseous lesions are identified. There is bilateral spondylolysis at L5 and grade 1 spondylolisthesis of L5 relative to S1. There is a disc bulge and facet arthropathy resulting in severe bilateral neural foraminal narrowing. Findings consistent with renal osteodystrophy are noted. 1. Right lower lobe pneumonia. Follow-up PA and lateral chest radiographs are recommended after treatment to ensure resolution. 2. Small volume ascites. 3. Normal appendix. 4. Severe bilateral neural foraminal narrowing at L5-S1 secondary to bilateral spondylolysis at L5, grade 1 spondylolisthesis of L5 relative to S1, a disc bulge and facet arthropathy.      Ezequiel Pacheco Device Plmt/replace/removal    Result Date: 4/30/2021 PROCEDURE: IR FLUOROSCOPY GUIDED CENTRAL VENOUS ACCESS DEVICE PLACEMENT 4/30/2021 HISTORY: ORDERING SYSTEM PROVIDED HISTORY: Sepsis with hypotension; cental line for IV pressors TECHNOLOGIST PROVIDED HISTORY: cental line for IV pressors CONTRAST: None SEDATION: None FLUOROSCOPY DOSE AND TYPE OR TIME AND EXPOSURES: Fluoro time: 1 minutes 14 seconds DAP: 714 cGycm2 DESCRIPTION OF PROCEDURE: Informed consent was obtained from the patient's brother after a detailed explanation of the procedure including risks, benefits, and alternatives. Universal protocol was observed including a time-out to confirm the correct patient and procedure. The right neck was prepped and draped in sterile fashion using maximum sterile barrier technique. Local anesthesia was achieved with 1% lidocaine. The right internal jugular vein was accessed with a 21 gauge micropuncture needle under ultrasound guidance. A 0.018 guidewire was advanced through the needle. The needle was removed and a 4 Western Jazmyn access catheter over its dilator was advanced over the wire. The wire and dilator were removed and a 0.035 guidewire was advanced through the access catheter into the IVC under fluoroscopic guidance. The access catheter was removed and the tract dilated over the wire. A 20 cm triple-lumen catheter was advanced over the wire and the wire was removed. The catheter flushed and aspirated easily. The catheter was sutured in place with 2-0 nylon. A sterile dressing was applied. The patient tolerated the procedure well and there were no immediate complications. Estimated blood loss: Less than 5 mL. FINDINGS: Ultrasound demonstrates patency of the right internal jugular vein and guides venotomy. Final fluoroscopic image demonstrates satisfactory catheter placement with the tip in the right atrium. Successful ultrasound and fluoroscopy guided right IJ triple-lumen central venous catheter insertion.      Us Liver    Result Date: 5/10/2021  EXAMINATION: RIGHT UPPER QUADRANT ULTRASOUND 5/10/2021 1:49 pm COMPARISON: CT abdomen pelvis with contrast 04/28/2021 HISTORY: ORDERING SYSTEM PROVIDED HISTORY: low platelets TECHNOLOGIST PROVIDED HISTORY: low platelets Acuity: Acute Type of Exam: Initial FINDINGS: LIVER:  The liver demonstrates normal echogenicity without evidence of intrahepatic biliary ductal dilatation. BILIARY SYSTEM:  Gallbladder surgically absent Common bile duct is within normal limits measuring 5 mm. OTHER: Small amount of abdominal ascites. Small amount of abdominal ascites. Xr Chest Portable    Result Date: 5/9/2021  EXAMINATION: ONE XRAY VIEW OF THE CHEST 5/9/2021 6:39 am COMPARISON: 05/07/2021 HISTORY: ORDERING SYSTEM PROVIDED HISTORY: Follow-up loculated pleural effusion TECHNOLOGIST PROVIDED HISTORY: Follow-up loculated pleural effusion Reason for Exam: Follow-up loculated pleural effusion Acuity: Unknown Type of Exam: Unknown Additional signs and symptoms: Follow-up loculated pleural effusion FINDINGS: Tubes and lines: Right subclavian central line terminating near the floor of the right atrium. Heart and lungs: Stable cardiomediastinal silhouette. Low lung volumes. Layering right pleural effusion, moderate. Pulmonary vascular congestion. No pneumothorax. Bones/other:     1. Layering small to moderate right pleural effusion. 2. Pulmonary vascular congestion. 3. Right subclavian central line terminates near the floor of the right atrium. Xr Chest Portable    Result Date: 5/7/2021  EXAMINATION: ONE XRAY VIEW OF THE CHEST 5/7/2021 10:05 am COMPARISON: 05/06/2021 HISTORY: ORDERING SYSTEM PROVIDED HISTORY: post kevin TECHNOLOGIST PROVIDED HISTORY: post Pennie Bosworth Reason for Exam: Post Thoracentesis Acuity: Acute Type of Exam: Subsequent/Follow-up FINDINGS: Significantly improved right pleural effusion following thoracentesis. Cardiac size is enlarged. No acute infiltrates are seen . The pulmonary vascularity is hazy and indistinct.  No pneumothorax. Eliud Couch Postsurgical changes overlying the mediastinum. Stable right central venous catheter with tip in the right atrium. Significantly improved right pleural effusion following thoracentesis Pulmonary vascular congestion     Xr Chest Portable    Result Date: 5/6/2021  EXAMINATION: ONE XRAY VIEW OF THE CHEST 5/6/2021 6:37 pm COMPARISON: 04/28/2021 HISTORY: ORDERING SYSTEM PROVIDED HISTORY: shortness of breath TECHNOLOGIST PROVIDED HISTORY: shortness of breath Reason for Exam: Pt states increasing SOB and weakness. Best images per pt condition Acuity: Unknown Type of Exam: Unknown Additional signs and symptoms: Pt states increasing SOB and weakness. Best images per pt condition FINDINGS: The right internal jugular catheter terminates in the distal SVC. There is a moderate to large right pleural effusion with atelectasis. There is worsening mild to moderate pulmonary vascular congestion. Cardiomegaly is stable status post median sternotomy and CABG. There is no pneumothorax. 1. Congestive heart failure with new moderate to large right pleural effusion. Xr Chest Portable    Result Date: 4/28/2021  EXAMINATION: ONE XRAY VIEW OF THE CHEST 4/28/2021 2:48 am COMPARISON: 06/03/2019 HISTORY: ORDERING SYSTEM PROVIDED HISTORY: missed dialysis x2weeks TECHNOLOGIST PROVIDED HISTORY: missed dialysis x2weeks Reason for Exam: Upright port, missed dialysis x2 weeks FINDINGS: There is stable cardiomegaly. There is no focal consolidation, pleural effusion or evidence of edema. There is no pneumothorax identified. 1. Stable cardiomegaly. 2. No acute cardiopulmonary process identified.      Ir Chest Tube Insertion    Result Date: 5/11/2021  PROCEDURE: ULTRASOUND GUIDED CHEST TUBE PLACEMENT WITH THORACENTESIS 5/10/2021 HISTORY: ORDERING SYSTEM PROVIDED HISTORY: Recurrent, loculated right effusion; pigtail catheter okay TECHNOLOGIST PROVIDED HISTORY: Recurrent, loculated right effusion; pigtail catheter okay Which side should the procedure be performed?->Right Recurring right pleural effusion, dyspnea, please place pigtail catheter. SEDATION: None TECHNIQUE AND FINDINGS: This procedure was performed by Dr. Deja Figueroa. Informed consent was obtained after a detailed explanation of the procedure including risks, benefits, and alternatives. Universal protocol was followed. A suitable skin site was prepped and draped in sterile fashion following ultrasound localization. Ultrasound shows a moderate right pleural effusion. The patient was placed in left lateral decubitus position. Right posterolateral chest was prepped and draped in standard sterile fashion. 1% lidocaine used for local anesthesia. Using realtime ultrasound guidance an 8 Belgian pigtail catheter was advanced into the moderate right pleural effusion from a posterolateral approach just superior to a rib. Ultrasound images show a safe tract and access into the fluid with the pigtail formed in the posterior inferior/dependent portion of the fluid. Approximately 750 mL of dark red colored fluid was drained. The catheter was sutured to the skin and secured in place with a Vaseline gauze dressing. The tube was connected to an atrium drainage system. Further management per the pulmonary service. There are no immediate complications. The patient left the department stable condition. EBL: Minimal.     Successful ultrasound guided placement of an 8 Belgian right pleural pigtail catheter which was connected to an atrium drainage system. Right thoracentesis with drainage of 750 mL of fluid. Fl Modified Barium Swallow W Video    Result Date: 5/3/2021  EXAMINATION: MODIFIED BARIUM SWALLOW WAS PERFORMED IN CONJUNCTION WITH SPEECH PATHOLOGY SERVICES TECHNIQUE: Fluoroscopic evaluation of the swallowing mechanism was performed with multiple consistency of barium product. FLUOROSCOPY DOSE AND TYPE OR TIME AND EXPOSURES: Fluoroscopic time of 1 minutes 44 seconds. DAP of 71.9 dGycm2. COMPARISON: None HISTORY: ORDERING SYSTEM PROVIDED HISTORY: evaluate for diet modifications TECHNOLOGIST PROVIDED HISTORY: evaluate for diet modifications Reason for Exam: evaluate for diet modifications Acuity: Acute Type of Exam: Initial FINDINGS: Oral phase of swallowing was grossly within normal limits. No evidence of laryngeal penetration or aspiration. Swallowing mechanism grossly within normal limits without evidence of aspiration. Please see separate speech pathology report for full discussion of findings and recommendations. Ir Guided Thoracentesis Pleural    Result Date: 5/7/2021  PROCEDURE: ULTRASOUNDGUIDED RIGHT THORACENTESIS 5/7/2021 HISTORY: ORDERING SYSTEM PROVIDED HISTORY: Loculated right pleural effusion; chest tube/pigtail catheter at discretion of radiologist-not necessarily needed TECHNOLOGIST PROVIDED HISTORY: Discussed with Dr. Breann Last right pleural effusion; chest tube/pigtail catheter at discretion of radiologist-not necessarily needed Which side should the procedure be performed?->Right TECHNIQUE: This procedure was performed by Dr. Zara Fernandez. Informed consent was obtained after a detailed explanation of the procedure including risks, benefits, and alternatives. Universal protocol was performed. The right chest was prepped and draped in sterile fashion and local anesthesia was achieved with lidocaine. A 5 Russian needle sheath was advanced under ultrasound guidance into pleural effusion and thoracentesis was performed. The patient tolerated the procedure well. EBL: None FINDINGS: A total of 1.3 L of fairly clear raul colored fluid was removed. Sample was sent for the requested studies. Successful ultrasound guided right thoracentesis. Physical Examination:        Physical Exam  Constitutional:       General: He is not in acute distress. Appearance: Normal appearance. He is not ill-appearing.    HENT:      Head: Normocephalic and atraumatic. Eyes:      Extraocular Movements: Extraocular movements intact. Pupils: Pupils are equal, round, and reactive to light. Cardiovascular:      Rate and Rhythm: Normal rate and regular rhythm. Heart sounds: No murmur heard. No gallop. Pulmonary:      Effort: Pulmonary effort is normal. No respiratory distress. Breath sounds: Normal breath sounds. No wheezing. Abdominal:      General: Bowel sounds are normal. There is no distension. Tenderness: There is no abdominal tenderness. Musculoskeletal:         General: No swelling or deformity. Comments: Left BKA    Right foot wrapped C/D/I   Neurological:      General: No focal deficit present. Mental Status: He is alert and oriented to person, place, and time. Psychiatric:         Mood and Affect: Mood normal.         Thought Content:  Thought content normal.           Assessment:        Primary Problem  Sepsis due to pneumonia [facility])    Active Hospital Problems    Diagnosis Date Noted    Parapneumonic effusion [J18.9, J91.8] 05/09/2021    C. difficile colitis [A04.72] 05/09/2021    Loculated pleural effusion [J90] 05/08/2021    Pleural effusion on right [J90] 05/08/2021    ROOSEVELT (obstructive sleep apnea) [G47.33] 05/08/2021    Pneumonia [J18.9] 04/28/2021    Sepsis ([facility] [address] [A41.9] 04/28/2021    Sepsis due to pneumonia ([facility] [J18.9, A41.9] 27/28/7060    Metabolic acidosis [A20.5]     Hyperkalemia [E87.5]     Diarrhea [R19.7]     Nausea and vomiting [R11.2]     History of left below knee amputation ([address] [Z89.512]     Hemoptysis [R04.2]     Ulcerated, foot, right, with fat layer exposed ([address] [L97.512]     Hypertension, essential [I10]     Charcot foot due to diabetes mellitus ([address] [E11.610] 12/28/2015    Anemia of chronic disease [D63.8] 07/03/2013    ESRD on dialysis ([facility] [address] [H73.2, Z99.2] 06/27/2013    DM (diabetes mellitus) ([facility] [address] [address] [E11.9] 06/27/2013       Plan:        Acute hypoxic respiratory failure likely 2/2  developing recurrent right sided pleural effusion (improved)  -Satting 94% on room air  -S/p right-sided chest tube removal yesterday  -5/7:S/p U/s guided right thoracentesis 1.3L removed  -5/10: S/p right sided IR pigtail catheter 750 mL of fluid drained     -Chest tube -405 cc in 24hrs  -CT chest: Shows moderate to large right pleural effusion and consolidation involving the entire RLL.  There appears to be multiple areas of loculation  -According to lights criteria, patient has exudative effusion  -Pulmonology on board:              -Cleared for discharge to [facility] and office follow-up in a few weeks  -ID on board:               -Continue oral vancomycin until 5/16 for C. Diff     Sepsis 2/2 CAP (improved)  -CT A&P showed RLL PNA  -Pro-Kiran 4.55  -Blood cultures x 2 NGTD  -Resp Cx  show just above ed gram +ve cocci in clusters with rare GN rods: Growing staph aureus methicillin susceptible  -On NS 0.9% 10 ml/hr  -Continue midodrine 7.5 mgTID  -I. D on board              -D/C'd IV ceftriaxone after 6 days of therapy. Completed 3 days of Cefepime . Completed 4 days of IV Merrem              -Continue oral vancomycin until 5/16/21 for C. Diff              -Appreciate further recommendations     Low albumin, high INR, chronically low platelets cirrhosis ruled-out  -CT abdomen pelvis shows small volume of ascites  -Platelet count 46  -Liver U/S: Small amount of abdominal ascites  -Ammonia 22  -Hematology oncology consult:   -Chronic thrombocytopenia since 2016   -Does not expect any bone marrow pathology   -Peripheral blood smear and work-up for nutritional deficiency   -Appriecate further recommendations     Chronic systolic and diastolic CHF/Hx of HTN.    -S/p cardioversion 5/5  -BNP > 300,000, likely elevated from missed dialysis for 2 weeks  -Echo 6/2019: LVEF 30-35% with moderate diastolic dysfunction  -CAD, CABG, stents, last cath 2019.   -Amiodarone 200 mg QD  -Digoxin 125 MCG daily  -Lopressor 25 mg twice daily  -Cardiology on board:               -[id number] amiodarone 200 mg twice daily for 7 days. - Continue Amiodarone from 200 mg QD               -Patient is a candidate for AICD due to ICM.  Plan for AICD in future once infectious process resolves              -Appreciate further recs     Hyperkalemia 2/2 ESRD on hemodialysis TTS  -Missed 2 weeks of dialysis.   -K 6.9 on admission with EKG changes, received IV calcium gluconate and insulin/dextrose.  -Underwent hemodialysis at [facility] [place]. V's  -Nephrology on board:              -NFAJ for dialysis today   -Retacrit 5000U subcutaneously 3 times weekly              -Renvela 4000 mg PO TID     Hemoptysis likely 2/2 pneumonia (improving)  -HgB stable  -keep HgB > 7     Acute diarrhea 2/2 C. difficile infection (resolved)  -Continue Senokot nightly  -CT abdomen and pelvis showed small volume ascites and normal appendix  -C. Diff +ve   -Continue oral vancomycin 500 mg q6hr day 4 until 5/16  -Protonix 40 mg PO daily             Right foot ulcer  -X-ray showed no evidence of OM  -Podiatry on board  -Wound care on board      Dyslipidemia   -Lipitor 40 mg PO nightly     Type 2 diabetes mellitus  -Low dose ISS  -Hypoglycemia protocol     Bilateral neural foraminal narrowing at L5-S1 with spondylolysis  -Showed up on CT A&P  -We will continue to monitor     Diet:  Dental soft diet, Low Sodium, Fluid restriction to 1500 mL   DVT prophylaxis: Heparin 5,000 3 times daily (HELD due to low PC)  GI prophylaxis: None  Attending Physician Statement  I have discussed the care of Elizabeth Villegas and I have examined the patient myselft and taken ros and hpi , including pertinent history and exam findings,  with the resident. I have reviewed the key elements of all parts of the encounter with the resident. I agree with the assessment, plan and orders as documented by the resident.     Chest tube removed  Right side  c diff recovering   Oral vanoc  On hd  Ok to [employer] signed by Tulio George MD

## 2021-05-16 NOTE — DISCHARGE SUMMARY
[address]    Discharge Summary     Patient ID: Nicole Rojas  :  1955   MRN: [id number]     ACCOUNT:  [de-identified]   Patient's PCP: Alec Shah MD  Admit Date: 2021   Discharge Date: 2021     Length of Stay: 17  Code Status:  Prior  Admitting Physician: Meghan Mcgrath MD  Discharge Physician: Jeannie Jefferson MD     Active Discharge Diagnoses:       Primary Problem  Sepsis due to pneumonia [facility])      [place] Problems    Diagnosis Date Noted    Parapneumonic effusion [J18.9, J91.8] 2021    C. difficile colitis [A04.72] 2021    Loculated pleural effusion [J90] 2021    Pleural effusion on right [J90] 2021    [place] (obstructive sleep apnea) [G47.33] 2021    Pneumonia [J18.9] 2021    Sepsis ([address] [A41.9] 2021    Sepsis due to pneumonia ([facility] [J18.9, A41.9]     Metabolic acidosis [T15.6]     Hyperkalemia [E87.5]     Diarrhea [R19.7]     Nausea and vomiting [R11.2]     History of left below knee amputation ([address] [Z89.512]     Hemoptysis [R04.2]     Ulcerated, foot, right, with fat layer exposed ([address] [L97.512]     Hypertension, essential [I10]     Charcot foot due to diabetes mellitus ([address] [E11.610] 2015    Anemia of chronic disease [D63.8] 2013    ESRD on dialysis ([facility] [N18.6, Z99.2] 2013    DM (diabetes mellitus) ([facility] [E11.9] 2013       Admission Condition:  poor     Discharged Condition: fair    Hospital Stay:       Hospital Course:  Nicole Rojas is a 60 y.o. male with a PMH significant for chronic systolic and diastolic CHF, ESRD on HD TTS, left BKA, and foot ulcer who was transferred from [facility] for missed hemodialysis and was admitted for the management of sepsis 2/2 pneumonia and C. Diff colitis. During hospital course, respiratory cultures came back growing MSSA.  Patient was given 3 days of IV Cefepime and switched over to 6 days of IV Rocephin. Patient was also found to be in atrial tachycardia and patient was started on Amiodarone Gtt. Cardiology was consulted and patient underwent cardioversion on 5/5/21 and went back to NSR. At one point, patient's blood pressure was low and he required 3 pressors for support. Patient was successfully weaned off the drips and transferred out of the ICU. Patient was started on oral vancomycin for C Diff colitis. Patient continued receiving his regular dialysis sessions on TTS. Pulmonology was consulted due to worsening pulmonary status. Chest x-ray showed new moderate to large right pleural effusion. Patient underwent CT-guided drainage by IR on 5/7 which yielded 1.3L of raul colored fluid. Pleural fluid analysis suggestive of exudative effusion by light's criteria. Patient was started on IV Merrem for 4 days. Repeat chest x-ray showed fluid reaccumulation and decision was made to place an IR pigtail catheter on 5/10/21. Chest tube was removed on 5/14/2021. Today on discharge, patient is hemodynamically stable. All consultants and attendings involved in patient care agreeable for discharge at this time. Significant therapeutic interventions: Cardioversion, S/p right thoracentesis, S/p right chest tube placement    Significant Diagnostic Studies:   Labs / Micro:  CBC:   Lab Results   Component Value Date    WBC 6.5 05/18/2021    RBC 3.06 05/18/2021    RBC 3.51 01/09/2012    HGB 8.2 05/18/2021    HCT 27.6 05/18/2021    MCV 90.2 05/18/2021    MCH 26.8 05/18/2021    MCHC 29.7 05/18/2021    RDW 21.0 05/18/2021    PLT Platelet clumps present, count appears adequate.  05/18/2021     01/09/2012     BMP:    Lab Results   Component Value Date    GLUCOSE 89 05/18/2021    GLUCOSE 128 01/09/2012     05/18/2021    K 3.9 05/18/2021    CL 92 05/18/2021    CO2 29 05/18/2021    ANIONGAP 14 05/18/2021    BUN 25 05/18/2021    CREATININE 3.03 05/18/2021    BUNCRER 8 05/18/2021    CALCIUM 9.9 05/18/2021    LABGLOM 21 05/18/2021    GFRAA 25 05/18/2021    GFR      05/18/2021    GFR NOT REPORTED 05/18/2021     PT/INR:    Lab Results   Component Value Date    PROTIME 23.8 05/07/2021    PROTIME 10.9 10/25/2011    INR 2.1 05/07/2021       ,    ,     Radiology:    XR FOOT RIGHT (MIN 3 VIEWS)    Result Date: 4/28/2021  EXAMINATION: THREE XRAY VIEWS OF THE RIGHT FOOT 4/28/2021 4:44 am COMPARISON: 12/20/2015 HISTORY: ORDERING SYSTEM PROVIDED HISTORY: extensive R foot wound TECHNOLOGIST PROVIDED HISTORY: extensive R foot wound Reason for Exam: Extensive right foot wound, FINDINGS: Complete collapse of the plantar arch with fragmentation and increased sclerosis of the midfoot is again noted consistent with severe neuropathic arthropathy. There is diffuse soft tissue swelling. There is a soft tissue ulceration along the plantar surface of the midfoot. There is no periosteal new bone formation or osteolysis to suggest acute osteomyelitis. 1. Redemonstration of severe neuropathic arthropathy of the midfoot. 2. Soft tissue ulceration along the plantar surface of the midfoot without radiographic findings of acute osteomyelitis. RECOMMENDATION: MRI of the right foot could be performed for further evaluation if clinically warranted. CT CHEST WO CONTRAST    Result Date: 5/7/2021  EXAMINATION: CT OF THE CHEST WITHOUT CONTRAST 5/7/2021 6:48 am TECHNIQUE: CT of the chest was performed without the administration of intravenous contrast. Multiplanar reformatted images are provided for review. Dose modulation, iterative reconstruction, and/or weight based adjustment of the mA/kV was utilized to reduce the radiation dose to as low as reasonably achievable. COMPARISON: None.  HISTORY: ORDERING SYSTEM PROVIDED HISTORY: Pleural effusion versus worsening infiltrate right lung TECHNOLOGIST PROVIDED HISTORY: Pleural effusion versus worsening infiltrate right lung Reason for Exam: Pleural effusion versus worsening infiltrate, right lung Acuity: Unknown Type of Exam: Unknown FINDINGS: Mediastinum: Thyroid is asymmetric. Right lobe is enlarged and heterogenous with largest nodule of 9 mm not requiring further follow-up. Shotty mediastinal lymph nodes are present without bill adenopathy. Moderate calcified plaque in the aortic arch. Calcified coronary arteries. Moderate cardiomegaly noted without pericardial effusion or epicardial adenopathy. Lungs/pleura: Small left pleural effusion with compressive atelectasis at the left lung base. Vascular markings are hazy suggesting pulmonary venous congestion. Moderate to moderately large right pleural effusion tracking along the right lateral chest wall. There is consolidation of the entire right lower lobe with some air bronchograms. Upper Abdomen: Moderate ascites in the included upper abdomen. The patient has significant atherosclerotic calcification of the major branches off the aorta with moderate calcification of the aorta. Suggestion of collapsed gallbladder with gallstones. Soft Tissues/Bones: Multilevel degenerative changes in the thoracic spine. No acute osseous or soft tissue abnormality although there is slight subcutaneous haziness suggesting anasarca. 1.  Moderate to moderately large right pleural effusion tracking up the right lateral chest wall. 2.  Consolidative process involving the entire right lower lobe with air bronchograms. 3.  Small left effusion. 4.  Hazy vascular markings most compatible with pulmonary venous congestion. 5.  Moderate intra-abdominal ascites. 6.  Atherosclerotic disease, most severe in the major branches of the abdominal aorta. Coronary artery calcification, significant, is noted. 7.  Heterogeneous thyroid with enlarged right lobe. No further ultrasound follow-up is required. RECOMMENDATIONS: No further ultrasound follow-up advised for thyroid findings.      NM LUNG SCAN PERFUSION ONLY    Result Date: 4/29/2021 are normal in appearance. There is severe renal atrophy. Innumerous renal cysts are noted. There is no hydronephrosis. A right nephroureteral stent is present. GI/Bowel: There are no abnormally dilated loops of large or small bowel present. There is no mesenteric inflammatory stranding present. The appendix is normal. Pelvis: There is a small volume of free fluid in the pelvis. There is no free fluid or pelvic mass identified. The urinary bladder is normal. Peritoneum/Retroperitoneum: There is no pathologically enlarged lymphadenopathy present. Severe atherosclerotic calcifications are present within the abdominal aorta and proximal anchor arteries. Bones/Soft Tissues: No lytic or blastic osseous lesions are identified. There is bilateral spondylolysis at L5 and grade 1 spondylolisthesis of L5 relative to S1. There is a disc bulge and facet arthropathy resulting in severe bilateral neural foraminal narrowing. Findings consistent with renal osteodystrophy are noted. 1. Right lower lobe pneumonia. Follow-up PA and lateral chest radiographs are recommended after treatment to ensure resolution. 2. Small volume ascites. 3. Normal appendix. 4. Severe bilateral neural foraminal narrowing at L5-S1 secondary to bilateral spondylolysis at L5, grade 1 spondylolisthesis of L5 relative to S1, a disc bulge and facet arthropathy.      IR FLUORO GUIDED CVA DEVICE PLMT/REPLACE/REMOVAL    Result Date: 4/30/2021  PROCEDURE: IR FLUOROSCOPY GUIDED CENTRAL VENOUS ACCESS DEVICE PLACEMENT 4/30/2021 HISTORY: ORDERING SYSTEM PROVIDED HISTORY: Sepsis with hypotension; cental line for IV pressors TECHNOLOGIST PROVIDED HISTORY: cental line for IV pressors CONTRAST: None SEDATION: None FLUOROSCOPY DOSE AND TYPE OR TIME AND EXPOSURES: Fluoro time: 1 minutes 14 seconds DAP: 714 cGycm2 DESCRIPTION OF PROCEDURE: Informed consent was obtained from the patient's brother after a detailed explanation of the procedure including risks, benefits, and alternatives. Universal protocol was observed including a time-out to confirm the correct patient and procedure. The right neck was prepped and draped in sterile fashion using maximum sterile barrier technique. Local anesthesia was achieved with 1% lidocaine. The right internal jugular vein was accessed with a 21 gauge micropuncture needle under ultrasound guidance. A 0.018 guidewire was advanced through the needle. The needle was removed and a 4 [place] access catheter over its dilator was advanced over the wire. The wire and dilator were removed and a 0.035 guidewire was advanced through the access catheter into the IVC under fluoroscopic guidance. The access catheter was removed and the tract dilated over the wire. A 20 cm triple-lumen catheter was advanced over the wire and the wire was removed. The catheter flushed and aspirated easily. The catheter was sutured in place with 2-0 nylon. A sterile dressing was applied. The patient tolerated the procedure well and there were no immediate complications. Estimated blood loss: Less than 5 mL. FINDINGS: Ultrasound demonstrates patency of the right internal jugular vein and guides venotomy. Final fluoroscopic image demonstrates satisfactory catheter placement with the tip in the right atrium. Successful ultrasound and fluoroscopy guided right IJ triple-lumen central venous catheter insertion. US LIVER    Result Date: 5/10/2021  EXAMINATION: RIGHT UPPER QUADRANT ULTRASOUND 5/10/2021 1:49 pm COMPARISON: CT abdomen pelvis with contrast 04/28/2021 HISTORY: ORDERING SYSTEM PROVIDED HISTORY: low platelets TECHNOLOGIST PROVIDED HISTORY: low platelets Acuity: Acute Type of Exam: Initial FINDINGS: LIVER:  The liver demonstrates normal echogenicity without evidence of intrahepatic biliary ductal dilatation. BILIARY SYSTEM:  Gallbladder surgically absent Common bile duct is within normal limits measuring 5 mm. OTHER: Small amount of abdominal ascites. Small amount of abdominal ascites. XR CHEST PORTABLE    Result Date: 5/14/2021  EXAMINATION: ONE XRAY VIEW OF THE CHEST 5/14/2021 6:34 am COMPARISON: 05/13/2021 HISTORY: ORDERING SYSTEM PROVIDED HISTORY: Pleural effusion/chest tube. TECHNOLOGIST PROVIDED HISTORY: Pleural effusion/chest tube. Reason for Exam: right chest tube Acuity: Acute Type of Exam: Initial FINDINGS: There is a right internal jugular central venous catheter, unchanged in appearance. A small bore right chest tube projects over the right lung base. There is no pneumothorax identified. There are persistent but improving bilateral perihilar opacities. There is no enlarging pleural effusion or pneumothorax evident. Cardiomegaly is unchanged. 1. Stable appearance of support lines and tubes. 2. No pneumothorax evident. 3. Cardiomegaly with improving perihilar opacities. XR CHEST PORTABLE    Result Date: 5/13/2021  EXAMINATION: ONE X-RAY VIEW OF THE CHEST 5/13/2021 6:38 am COMPARISON: 05/12/2021 HISTORY: ORDERING SYSTEM PROVIDED HISTORY: Right pleural effusion TECHNOLOGIST PROVIDED HISTORY: Right pleural effusion Reason for Exam: right chest tube Acuity: Acute Type of Exam: Subsequent/Follow-up FINDINGS: Right jugular central venous catheter and right pleural pigtail catheter remain in place. Sternal wires from prior heart surgery. The heart is enlarged with perihilar congestive changes and patchy perihilar opacities which may be due to edema, atelectasis, infection, or a combination. Similar to slight increase in congestive changes and parenchymal opacities. No large pneumothorax is seen. Calcific plaque of the thoracic aorta. There may be a tiny residual right pleural effusion. Similar to slight increase in congestive changes and mild patchy perihilar opacities.      XR CHEST PORTABLE    Result Date: 5/12/2021  EXAMINATION: ONE XRAY VIEW OF THE CHEST 5/12/2021 9:42 am COMPARISON: Chest radiograph 05/09/2021, 05/07/2021 HISTORY: ORDERING SYSTEM PROVIDED HISTORY: Right pleural effusion/pigtail chest tube TECHNOLOGIST PROVIDED HISTORY: Right pleural effusion/pigtail chest tube Reason for Exam: Right pleural effusion/pigtail chest tube Acuity: Acute Type of Exam: Initial Additional signs and symptoms: Right pleural effusion/pigtail chest tube FINDINGS: Right pigtail chest tube projects over the inferior right hemithorax. Notable reduction of right pleural fluid. No pneumothorax identified. Overall improved aeration of the lungs and decrease in vascular congestion. Basilar linear opacities remain. The cardiac mediastinal contours appear unchanged. The right internal jugular line is unchanged in position. Right pigtail chest tube in place with marked reduction of right pleural fluid. No pneumothorax. Overall improved aeration of the lungs and decrease in vascular congestion. XR CHEST PORTABLE    Result Date: 5/9/2021  EXAMINATION: ONE XRAY VIEW OF THE CHEST 5/9/2021 6:39 am COMPARISON: 05/07/2021 HISTORY: ORDERING SYSTEM PROVIDED HISTORY: Follow-up loculated pleural effusion TECHNOLOGIST PROVIDED HISTORY: Follow-up loculated pleural effusion Reason for Exam: Follow-up loculated pleural effusion Acuity: Unknown Type of Exam: Unknown Additional signs and symptoms: Follow-up loculated pleural effusion FINDINGS: Tubes and lines: Right subclavian central line terminating near the floor of the right atrium. Heart and lungs: Stable cardiomediastinal silhouette. Low lung volumes. Layering right pleural effusion, moderate. Pulmonary vascular congestion. No pneumothorax. Bones/other:     1. Layering small to moderate right pleural effusion. 2. Pulmonary vascular congestion. 3. Right subclavian central line terminates near the floor of the right atrium.      XR CHEST PORTABLE    Result Date: 5/7/2021  EXAMINATION: ONE XRAY VIEW OF THE CHEST 5/7/2021 10:05 am COMPARISON: 05/06/2021 HISTORY: ORDERING SYSTEM PROVIDED HISTORY: post thora TECHNOLOGIST PROVIDED HISTORY: post thora Reason for Exam: Post Thoracentesis Acuity: Acute Type of Exam: Subsequent/Follow-up FINDINGS: Significantly improved right pleural effusion following thoracentesis. Cardiac size is enlarged. No acute infiltrates are seen . The pulmonary vascularity is hazy and indistinct. No pneumothorax. Rubens Cumber Postsurgical changes overlying the mediastinum. Stable right central venous catheter with tip in the right atrium. Significantly improved right pleural effusion following thoracentesis Pulmonary vascular congestion     XR CHEST PORTABLE    Result Date: 5/6/2021  EXAMINATION: ONE XRAY VIEW OF THE CHEST 5/6/2021 6:37 pm COMPARISON: 04/28/2021 HISTORY: ORDERING SYSTEM PROVIDED HISTORY: shortness of breath TECHNOLOGIST PROVIDED HISTORY: shortness of breath Reason for Exam: Pt states increasing SOB and weakness. Best images per pt condition Acuity: Unknown Type of Exam: Unknown Additional signs and symptoms: Pt states increasing SOB and weakness. Best images per pt condition FINDINGS: The right internal jugular catheter terminates in the distal SVC. There is a moderate to large right pleural effusion with atelectasis. There is worsening mild to moderate pulmonary vascular congestion. Cardiomegaly is stable status post median sternotomy and CABG. There is no pneumothorax. 1. Congestive heart failure with new moderate to large right pleural effusion. XR CHEST PORTABLE    Result Date: 4/28/2021  EXAMINATION: ONE XRAY VIEW OF THE CHEST 4/28/2021 2:48 am COMPARISON: 06/03/2019 HISTORY: ORDERING SYSTEM PROVIDED HISTORY: missed dialysis x2weeks TECHNOLOGIST PROVIDED HISTORY: missed dialysis x2weeks Reason for Exam: Upright port, missed dialysis x2 weeks FINDINGS: There is stable cardiomegaly. There is no focal consolidation, pleural effusion or evidence of edema. There is no pneumothorax identified. 1. Stable cardiomegaly.  2. No acute cardiopulmonary process identified. IR CHEST TUBE INSERTION    Result Date: 5/11/2021  PROCEDURE: ULTRASOUND GUIDED CHEST TUBE PLACEMENT WITH THORACENTESIS 5/10/2021 HISTORY: ORDERING SYSTEM PROVIDED HISTORY: Recurrent, loculated right effusion; pigtail catheter okay TECHNOLOGIST PROVIDED HISTORY: Recurrent, loculated right effusion; pigtail catheter okay Which side should the procedure be performed?->Right Recurring right pleural effusion, dyspnea, please place pigtail catheter. SEDATION: None TECHNIQUE AND FINDINGS: This procedure was performed by Dr. Demetrio Hunt. Informed consent was obtained after a detailed explanation of the procedure including risks, benefits, and alternatives. Universal protocol was followed. A suitable skin site was prepped and draped in sterile fashion following ultrasound localization. Ultrasound shows a moderate right pleural effusion. The patient was placed in left lateral decubitus position. Right posterolateral chest was prepped and draped in standard sterile fashion. 1% lidocaine used for local anesthesia. Using realtime ultrasound guidance an 8 [place]ore[place] pigtail catheter was advanced into the moderate right pleural effusion from a posterolateral approach just superior to a rib. Ultrasound images show a safe tract and access into the fluid with the pigtail formed in the posterior inferior/dependent portion of the fluid. Approximately 750 mL of dark red colored fluid was drained. The catheter was sutured to the skin and secured in place with a Vaseline gauze dressing. The tube was connected to an atrium drainage system. Further management per the pulmonary service. There are no immediate complications. The patient left the department stable condition. EBL: Minimal.     Successful ultrasound guided placement of an 8 [place]orese right pleural pigtail catheter which was connected to an atrium drainage system. Right thoracentesis with drainage of 750 mL of fluid.      FL MODIFIED BARIUM SWALLOW W VIDEO Result Date: 5/3/2021  EXAMINATION: MODIFIED BARIUM SWALLOW WAS PERFORMED IN CONJUNCTION WITH SPEECH PATHOLOGY SERVICES TECHNIQUE: Fluoroscopic evaluation of the swallowing mechanism was performed with multiple consistency of barium product. FLUOROSCOPY DOSE AND TYPE OR TIME AND EXPOSURES: Fluoroscopic time of 1 minutes 44 seconds. DAP of 71.9 dGycm2. COMPARISON: None HISTORY: ORDERING SYSTEM PROVIDED HISTORY: evaluate for diet modifications TECHNOLOGIST PROVIDED HISTORY: evaluate for diet modifications Reason for Exam: evaluate for diet modifications Acuity: Acute Type of Exam: Initial FINDINGS: Oral phase of swallowing was grossly within normal limits. No evidence of laryngeal penetration or aspiration. Swallowing mechanism grossly within normal limits without evidence of aspiration. Please see separate speech pathology report for full discussion of findings and recommendations. IR GUIDED THORACENTESIS PLEURAL    Result Date: 5/7/2021  PROCEDURE: ULTRASOUNDGUIDED RIGHT THORACENTESIS 5/7/2021 HISTORY: ORDERING SYSTEM PROVIDED HISTORY: Loculated right pleural effusion; chest tube/pigtail catheter at discretion of radiologist-not necessarily needed TECHNOLOGIST PROVIDED HISTORY: Discussed with Dr. Meghann Fontaine right pleural effusion; chest tube/pigtail catheter at discretion of radiologist-not necessarily needed Which side should the procedure be performed?->Right TECHNIQUE: This procedure was performed by Dr. Hosea Barrett. Informed consent was obtained after a detailed explanation of the procedure including risks, benefits, and alternatives. Universal protocol was performed. The right chest was prepped and draped in sterile fashion and local anesthesia was achieved with lidocaine. A 5 Sammarinese needle sheath was advanced under ultrasound guidance into pleural effusion and thoracentesis was performed. The patient tolerated the procedure well.  EBL: None FINDINGS: A total of 1.3 L of fairly clear raul colored fluid was removed. Sample was sent for the requested studies. Successful ultrasound guided right thoracentesis. Consultations:    Consults:     Final Specialist Recommendations/Findings:   IP CONSULT TO NEPHROLOGY  IP CONSULT TO SOCIAL WORK  PHARMACY TO DOSE MEDICATION  IP CONSULT TO PODIATRY  IP CONSULT TO INFECTIOUS DISEASES  IP CONSULT TO CARDIOLOGY  IP CONSULT TO PULMONOLOGY  IP CONSULT TO HEM/ONC      The patient was seen and examined on day of discharge and this discharge summary is in conjunction with any daily progress note from day of discharge. Discharge plan:       Disposition:  To a non-[facility] facility    Physician Follow Up:     David Martinez DPM  [address] [facility]  [address] [facility] [address]  [phone]    In 1 week  right foot wound    Michelle Bhandari MD  [address] [facility] [address] [phone]    Schedule an appointment as soon as possible for a visit in 7 days      Nathaniel Champagne MD  [facility] [address]  [address] [facility] [address]  [phone]    Call in 7 days      [facility]  [facility] [place] [address]  [phone]  Schedule an appointment as soon as possible for a visit in 3 days      Iglesia Tipton MD  [address] [place] [address]  [address] [address] [phone]    Schedule an appointment as soon as possible for a visit in 3 days      [facility] [address]  [phone]    Schedule an appointment as soon as possible for a visit in 2 weeks         Requiring Further Evaluation/Follow Up POST HOSPITALIZATION/Incidental Findings:     Diet: renal diet, cardiac diet    Activity: As tolerated    Instructions to Patient: Please follow-up with your PCP and please follow-up and schedule an appointment with cardiology, nephrology, and pulmonology    Discharge Medications:      Medication List        START taking these medications      amiodarone 200 MG tablet  Commonly known as: CORDARONE  Take 1 tablet by mouth daily     metoprolol tartrate 25 MG tablet  Commonly known as: LOPRESSOR  Take 1 tablet by mouth 2 times daily     vancomycin 50 MG/ML Solr oral solution  Commonly known as: FIRVANQ  Take 10 mLs by mouth every 6 hours for 2 days            CHANGE how you take these medications      digoxin 125 MCG tablet  Commonly known as: LANOXIN  Take 1 tablet by mouth daily  What changed: additional instructions     midodrine 2.5 MG tablet  Commonly known as: PROAMATINE  Take 3 tablets by mouth every 8 hours  What changed:   medication strength  how much to take  when to take this     sevelamer 800 MG tablet  Commonly known as: RENVELA  Take 5 tablets by mouth 3 times daily (with meals)  What changed: when to take this            CONTINUE taking these medications      aspirin 81 MG EC tablet  Take 1 tablet by mouth daily     atorvastatin 40 MG tablet  Commonly known as: LIPITOR  Take 1 tablet by mouth nightly     * glucose monitoring kit monitoring kit  1 kit by Does not apply route daily as needed     * ONE TOUCH ULTRA 2 w/Device Kit  TEST 3 TIMES DAILY     insulin lispro 100 UNIT/ML injection vial  Commonly known as: HUMALOG     Lancets 30G Misc  1 each by Does not apply route 3 times daily     nitroGLYCERIN 0.4 MG SL tablet  Commonly known as: NITROSTAT     ONE TOUCH ULTRA TEST strip  Generic drug: blood glucose test strips  USE AS DIRECTED DAILY AS NEEDED     therapeutic multivitamin-minerals tablet  Take 1 tablet by mouth daily     [place] Wheels Misc  1 Device by Does not apply route continuous           * This list has 2 medication(s) that are the same as other medications prescribed for you. Read the directions carefully, and ask your doctor or other care provider to review them with you.                 STOP taking these medications      amLODIPine 10 MG tablet  Commonly known as: NORVASC     bumetanide 1 MG tablet  Commonly known as: BUMEX     calcium acetate 667 MG capsule  Commonly known as: PHOSLO     carvedilol 3.125 MG tablet  Commonly known as: COREG     clopidogrel 75 MG tablet  Commonly known as: PLAVIX     lisinopril 2.5 MG tablet  Commonly known as: PRINIVIL;ZESTRIL     metOLazone 10 MG tablet  Commonly known as: ZAROXOLYN     metoprolol succinate 50 MG extended release tablet  Commonly known as: TOPROL XL     NIFEdipine 30 MG extended release tablet  Commonly known as: ADALAT CC     oxyCODONE-acetaminophen 5-325 MG per tablet  Commonly known as: PERCOCET     Protonix 40 MG tablet  Generic drug: pantoprazole     Sensipar 90 MG tablet  Generic drug: cinacalcet     ticagrelor 90 MG Tabs tablet  Commonly known as: BRILINTA     TRIPHROCAPS PO     Veltassa 8.4 g Pack packet  Generic drug: patiromer sorbitex calcium     vitamin D 1000 UNIT Tabs tablet  Commonly known as: CHOLECALCIFEROL               Where to Get Your Medications        These medications were sent to [facility], [address] [place], [place] [address] [address]      Phone: [phone]   amiodarone 200 MG tablet  digoxin 125 MCG tablet  metoprolol tartrate 25 MG tablet  sevelamer 800 MG tablet  vancomycin 50 MG/ML Solr oral solution       Information about where to get these medications is not yet available    Ask your nurse or doctor about these medications  midodrine 2.5 MG tablet         Time Spent on discharge is  31 mins in patient examination, evaluation, counseling as well as medication reconciliation, prescriptions for required medications, discharge plan and follow up. Electronically signed by   Baljit Pizano MD  5/16/2021  5:56 PM      Thank you Dr. Teddy Antonio MD for the opportunity to be involved in this patient's care. Attending Physician Statement  I have discussed the care of Clark Gregorio and I have examined the patient myselft and taken ros and hpi , including pertinent history and exam findings,  with the resident.  I have reviewed the key elements of all parts of the encounter with the resident. I agree with the assessment, plan and orders as documented by the resident. I spent over 35 mins in direct patient care as above and reviewing medications and counseling for discharge .         Electronically signed by Verena Talamantes MD

## 2021-05-19 NOTE — ADT AUTH CERT
Utilization Reviews       Pneumonia - Care Day 16 (5/13/2021) by Nica Quiroz RN       Review Status Review Entered   Completed 5/19/2021 11:08      Criteria Review      Care Day: 16 Care Date: 5/13/2021 Level of Care: ICU    Guideline Day 2    Clinical Status    (X) * No CO2 retention or acidosis    5/19/2021 11:07 AM EDT by Jaleesa Osorio      none    (X) * No requirement for mechanical ventilation    5/19/2021 11:07 AM EDT by Jaleesa Osorio      room air 91%    (X) * Hypotension absent    5/19/2021 11:07 AM EDT by Jaleesa Osorio      absent    (X) * Afebrile or fever improved    5/19/2021 11:07 AM EDT by Jaleesa Osorio      98.0    (X) * No hypoxia on room air or oxygenation improved    5/19/2021 11:07 AM EDT by Jaleesa Osorio      no hypoxia    (X) * Mental status improved or at baseline    5/19/2021 11:07 AM EDT by Juan Vargas x3 until after dialysis when he was lethargic per PT note- see long notes    Activity    ( ) * Increased activity    Routes    (X) Oral hydration, medications    5/19/2021 11:07 AM EDT by Jaleesa Osorio      renal diet; PO home meds and PO meds listed in long notes    Interventions    (X) Pulse oximetry    Medications    (X) IV or oral antibiotics    5/19/2021 11:07 AM EDT by Jaleesa Osorio      vancomycin 500mg PO x4    * Milestone   Additional Notes   5/13   vitals: 98.0, 18, 69, 137/71, 91% ra      labs:    bun 49, creatinine 5.08, gfr man 12, glucose 191, H/H 8.7/27.9%, rdw 21.1, platelets 46      prevalent meds:    tessalon 200mg PO TID   cipro gtt- left eye   delsym 60mg PO BID   digoxin 125mcg PO x1   humalog, 1u, sc x3   lidocaine 4% patch    vancomycin 500mg PO x4   PRN percocet 5-325mg PO x1   -------------------   PT Note @ 0813:    Other: Cx, pt just returned from dialysis and is too lethargic to participate with therapy, will try back tomorrow.    -----------------   Pulm   Somnolent, on 2 L O2   no fever or sweats   Little dry cough, no short of breath or wheezing   Chest x-ray with small right-sided pleural effusion and congestion   Right CT without any drainage in the last 24 hours      Constitutional -somnolent, no distress   General Appearance  well developed, well nourished   HEENT -normocephalic, atraumatic. PERRLA   Lungs - Chest expands equally, no wheezes, rales, + coarse sounds   Cardiovascular - Heart sounds are normal.  normal rate and rhythm regular, no murmur, gallop or rub. Abdomen - soft, nontender, nondistended, no guarding   Neurologic -arousable, comfortable, no restlessness or agitation   Extremities - no cyanosis, clubbing or edema, left BKA      Recurrent right sided pleural effusion/ exudate, negative cultures/possibly parapneumonic effusion   Hypoxia, was up to 5 L now down to 2   ROOSEVELT   Metabolic acidosis/resolved,    Right lower lobe pneumonia   C. difficile colitis   Right foot wound   HTN, history of CHF   ESRD/HD   Diabetes mellitus   Thrombocytopenia platelets at 46       Plan:    pigtail chest tube placement 5/10   Follow-up chest x-ray in a.m. and daily CT drainage, hopefully remove chest tube tomorrow   Wean O2 as tolerated   Antibiotic per ID, on oral vancomycin and off meropenem    heparin subcu on hold,     ---------------   5/13 CXR   Similar to slight increase in congestive changes and mild patchy perihilar   opacities. ----------------   Nephrology   Interval history: Patient was seen and examined today whilst receiving acute hemodialysis. [facility] feels well but wants chest tube removed. [facility] does not have shortness of breath or chest pain and diarrhea is resolved. Chest x-ray performed today showed slight increase in congestive changes and mild patchy perihilar opacities. 1.  ESRD - Will maintain TTS hemodialysis schedule. Left radiocephalic AV fistula is functioning well.  Target ultrafiltration today 2 kg.    Renal diet,i.e 2-gram sodium, 2-gram potassium,1500 ml fluid restriction,1-gram phosphorus, 1800 KCal lopressor 25mg PO bid  midodrine 7.5mg PO x1  vancomycin 500mg PO x3    Interventions    (X) Pulse oximetry    5/19/2021 9:01 AM EDT by Barbara Katz      95% 3L NC    Medications    (X) IV or oral antibiotics    5/19/2021 9:01 AM EDT by Barbara Katz      vancomycin 500mg PO x3    * Milestone   Additional Notes   5/6   vitals: 97.6, HR 74, RR 18, 22, 19; 131/85, 147/85, 95% 3L NC      labs:    cl 97, bun 58, creatinine 5.61, anion gap 22, GFR Lisa 10, GFR aa 12, glucose 145   H/H 9.5/30.5%, platelet 60      meds:    asa 81mg PO daily   digoxin 125mcg PO daily   lopressor 25mg PO bid   midodrine 7.5mg PO x1   vancomycin 500mg PO x3   ----------------   ID   Interval changes  5/6/2021    He remains in the ICU, status post cardioversion yesterday, heart rate normal, the denied increased cough or shortness of breath, denied nausea or vomiting, denied fever or chills, no bowel movement today, no new complaints   Sputum Gram growing staph aureus MSSA   WBC normal today      Constitutional:        General: He is not in acute distress. HENT:       Head: Normocephalic and atraumatic.       Right Ear: External ear normal.       Left Ear: External ear normal.    Neck:       Musculoskeletal: Neck supple. No neck rigidity. Cardiovascular:       Rate and Rhythm: Normal rate and regular rhythm. Abdominal:       General: Abdomen is flat. There is no distension.       Palpations: Abdomen is soft.       Tenderness: There is no abdominal tenderness. Musculoskeletal:       Comments: Left below-knee amputation site with no open wound no erythema. Right foot dressing    Skin:      General: Skin is warm.       Coloration: Skin is not jaundiced. Neurological:       General: No focal deficit present.       Mental Status: He is alert and oriented to person, place, and time.        Impression :   Current:   · C. difficile colitis   · Right lower lobe pneumonia, staph aureus MSSA growth on respiratory culture   · Sepsis

## 2021-06-07 PROBLEM — Z91.15 NON-COMPLIANCE WITH RENAL DIALYSIS (HCC): Status: ACTIVE | Noted: 2021-01-01

## 2021-06-07 PROBLEM — E87.70 VOLUME OVERLOAD: Status: ACTIVE | Noted: 2021-01-01

## 2021-06-07 PROBLEM — Z91.158 NON-COMPLIANCE WITH RENAL DIALYSIS: Status: ACTIVE | Noted: 2021-01-01

## 2021-06-07 PROBLEM — J81.0 ACUTE PULMONARY EDEMA (HCC): Status: ACTIVE | Noted: 2021-01-01

## 2021-06-07 NOTE — H&P
[address] [facility]     HISTORY AND PHYSICAL EXAMINATION            Date:   6/8/2021  Patientname:  Jasen De La Rosa  Date of admission:  6/7/2021  2:27 PM  MRN:   [id number]  Account:  [de-identified]  YOB: 1955  PCP:    Gwendolyn Rivas MD  Room:   /  Code Status:    Prior    CHIEF COMPLAINT     Chief Complaint   Patient presents with    Diarrhea       HISTORY OF PRESENT ILLNESS  (Character, Onset, Location, Duration,  Exacerbating/RelievingFactors, Radiation,   Associated Symptoms, Severity )      The patient is a 73 y.o.  male, with a history of CHF, anemia, CAD, ESRD on dialysis, hypertension, left BKA, tonic leg wounds, CABG, ROOSEVELT, diabetes mellitus left BKA. Patient presents for evaluation of shortness of breath and watery diarrhea for the past week. Patient resides at [facility]. Patient states he has not gone to dialysis this past week due to his diarrhea. He is currently on oral vancomycin for his diarrhea. Patient denies cough, wheezing, chest pain, abdominal pain, blood in stool, nausea and vomiting. No fever or chills.     HPI   1) Location/Symptom diarrhea, shortness of breath  2) Timing/Onset: Diarrhea x1 week, worsening shortness of breath past several days  3) Context/Setting: Has not gone to dialysis for the past week due to diarrhea, no blood in stool  4) Quality: Watery  5) Duration: continuous   6) Modifying Factors: No aggravating or alleviating factors  7) Severity: moderate     PAST MEDICAL HISTORY   Patient  has a past medical history of Acute on chronic congestive heart failure ([address] Anemia, CAD (coronary artery disease), Cardiomyopathy ([address] Dialysis care, ESRD (end stage renal disease) on dialysis ([address] Foot ulcer, left ([address] GERD (gastroesophageal reflux disease), Hemodialysis patient ([address] History of sleep apnea, Hyperlipidemia, Hyperparathyroidism ([address] Hypertension, Neuropathy, ROOSEVELT (obstructive sleep apnea), Peripheral vascular disease ([facility] Pneumonia, S/P CABG (coronary artery bypass graft), and Type II or unspecified type diabetes mellitus without mention of complication, not stated as uncontrolled. PAST SURGICAL HISTORY    Patient  has a past surgical history that includes Cataract removal; Dialysis fistula creation (Left, 2014); Leg amputation, lower tibia/fibula; and IR CHEST TUBE INSERTION (5/10/2021). FAMILY HISTORY    Patient family history includes Diabetes in his brother and father; Heart Disease in his father. SOCIAL HISTORY    Patient  reports that he has never smoked. He has never used smokeless tobacco. He reports that he does not drink alcohol and does not use drugs. HOME MEDICATIONS        Prior to Admission medications    Medication Sig Start Date End Date Taking?  Authorizing Provider   midodrine (PROAMATINE) 10 MG tablet Take 10 mg by mouth 3 times daily   Yes Historical Provider, MD BOCANEGRA Complex-C-Folic Acid (ALBERTA-FERNANDA RX) 1 MG TABS Take 1 tablet by mouth daily   Yes Historical Provider, MD   patiromer sorbitex calcium (VELTASSA) 8.4 g PACK packet Take 8.4 g by mouth once a week   Yes Historical Provider, MD   vancomycin (VANCOCIN) 125 MG capsule Take 125 mg by mouth 2 times daily For 7 days starting 6/4/21 6/4/21 6/10/21 Yes Historical Provider, MD   amiodarone (CORDARONE) 200 MG tablet TAKE 1 TABLET BY MOUTH DAILY 5/24/21  Yes Sukhdeep Cannon MD   metoprolol tartrate (LOPRESSOR) 25 MG tablet Take 1 tablet by mouth 2 times daily 5/15/21  Yes Kaley Eddy MD   digoxin [facility]) 125 MCG tablet Take 1 tablet by mouth daily 5/16/21  Yes Kaley Eddy MD   sevelamer (RENVELA) 800 MG tablet Take 5 tablets by mouth 3 times daily (with meals) 5/15/21  Yes Kaley Eddy MD   insulin lispro (HUMALOG) 100 UNIT/ML injection vial Inject into the skin 3 times daily (before meals)   Yes Historical Provider, MD   nitroGLYCERIN (NITROSTAT) 0.4 MG SL tablet Place 0.4 mg under the tongue every 5 minutes as needed for Chest pain up to max of 3 total doses. If no relief after 1 dose, call 911. Yes Historical Provider, MD   atorvastatin (LIPITOR) 40 MG tablet Take 1 tablet by mouth nightly 3/14/16  Yes Nghia Garbe, DO   aspirin 81 MG EC tablet Take 1 tablet by mouth daily 3/14/16  Yes Nghia Garbe, DO   ONE TOUCH ULTRA TEST strip USE AS DIRECTED DAILY AS NEEDED 1/24/17   Percilla Ping, DO   Blood Glucose Monitoring Suppl (ONE TOUCH ULTRA 2) W/DEVICE KIT TEST 3 TIMES DAILY 8/26/16   Percilla Ping, DO   glucose monitoring kit (FREESTYLE) monitoring kit 1 kit by Does not apply route daily as needed 4/21/16   Percilla Pign, DO   Lancets 30G MISC 1 each by Does not apply route 3 times daily 4/21/16   Percilla Ping, DO   Misc. Devices ([address] [place] [address]) [address] 1 Device by Does not apply route continuous 4/21/16   Percilla Ping, DO       ALLERGIES      Pcn [penicillins]    REVIEW OF SYSTEMS     Review of Systems   Constitutional: Positive for activity change. Negative for appetite change, chills, diaphoresis, fatigue and fever. HENT: Negative for congestion, rhinorrhea and sore throat. Eyes: Negative for photophobia and visual disturbance. Respiratory: Positive for shortness of breath. Negative for cough and wheezing. Cardiovascular: Positive for leg swelling. Negative for chest pain. Gastrointestinal: Positive for diarrhea. Negative for abdominal pain, blood in stool, nausea and vomiting. Endocrine: Negative for polydipsia, polyphagia and polyuria. Musculoskeletal: Negative for arthralgias and back pain. Skin: Negative for color change, pallor and rash. Allergic/Immunologic: Negative. Neurological: Negative for dizziness, light-headedness and headaches. Hematological: Negative. Psychiatric/Behavioral: Negative for behavioral problems, confusion and decreased concentration. The patient is not nervous/anxious.       PHYSICAL EXAM      /69   Pulse 67   Temp 97.5 °F (36.4 °C)   Resp 14 162, qtc 467     Labs:  CBC:   Recent Labs     06/07/21  1146 06/07/21  1713   WBC 6.1 5.5   HGB 9.4* 9.8*   PLT 69* 96*     BMP:    Recent Labs     06/07/21  1146 06/07/21  1713   * 140   K 5.2 5.2    96*   CO2 22 24   BUN 97* 97*   CREATININE 9.17* 9.00*   GLUCOSE 155* 122*     S. Calcium:  Recent Labs     06/07/21  1713   CALCIUM 10.3     S. Ionized Calcium:No results for input(s): IONCA in the last 72 hours. S. Magnesium:  Recent Labs     06/07/21  1713   MG 2.6     S. Phosphorus:No results for input(s): PHOS in the last 72 hours. S. Glucose:No results for input(s): POCGLU in the last 72 hours. Glycosylated hemoglobin A1C:   Lab Results   Component Value Date    LABA1C 6.6 01/23/2021     Hepatic:   Recent Labs     06/07/21  1146 06/07/21  1713   AST 14 14   ALT 11 11   ALKPHOS 124 132*     CARDIAC ENZY:   Recent Labs     06/07/21  1713 06/07/21  2134   TROPHS 337* 306*     INR: No results for input(s): INR in the last 72 hours. BNP: No results for input(s): PROBNP in the last 72 hours. ABGs: No results for input(s): PH, PCO2, PO2, HCO3, O2SAT in the last 72 hours. Lipids: No results for input(s): CHOL, TRIG, HDL, LDLCALC in the last 72 hours. Invalid input(s): LDL  Pancreatic functions:No results for input(s): LIPASE, AMYLASE in the last 72 hours. Brena Presser: No results for input(s): LACTA in the last 72 hours. Thyroid functions:   Lab Results   Component Value Date    TSH 1.51 05/01/2021      U/A:No results for input(s): NITRITE, COLORU, WBCUA, RBCUA, MUCUS, BACTERIA, CLARITYU, SPECGRAV, LEUKOCYTESUR, BLOODU, GLUCOSEU, AMORPHOUS in the last 72 hours. Invalid input(s): [facility]    Imaging/Diagonstics:     XR CHEST PORTABLE    Result Date: 6/7/2021  EXAMINATION: ONE XRAY VIEW OF THE CHEST 6/7/2021 2:15 pm COMPARISON: May 14, 2021.  HISTORY: ORDERING SYSTEM PROVIDED HISTORY: ESRD TECHNOLOGIST PROVIDED HISTORY: ESRD Reason for Exam: Pt states right wrist and right elbow pain and missed dialysis  Potassium 5.2, creatinine 9.17, BUN 97, mag 2.6  Physician spoke with nephrologist on-call. Patient volume overload from missed dialysis. Plan for dialysis tomorrow  Renal diet, fluid restriction    Diabetes mellitus  Glucose 145  Medium dose insulin sliding scale  AC and HS checks    Diarrhea  Patient currently on oral vancomycin for his diarrhea  C. difficile and stool studies pending  C. difficile contact precautions    Obstructive sleep apnea  CPAP nightly    DVT prophylaxisheparin 5000 units subcu every 8 hours    Consultations:     IP CONSULT TO NEPHROLOGY  IP CONSULT TO INTERNAL MEDICINE      ABEBA Davidson CNP   6/8/2021  12:11 AM    [address] [facility] [address] Jimmie Barros [address] [place] [address], [address] [place] [address]. Phone [phone]     Attending Physician Statement  I have discussed the care of Conner Gauthier with the CNP. I have examined the patient myself and taken ros and hpi , including pertinent history and exam findings. I have reviewed the key elements of all parts of the encounter with the CNPt. I agree with the assessment, plan and orders as documented by the CNP. 73 y.o.  male, with a history of CHF, anemia, CAD, ESRD on dialysis, hypertension, left BKA, tonic leg wounds, CABG, ROOSEVELT, diabetes mellitus left BKA. Interval shortness of breath and watery diarrhea from [employer]. 1. Acute pulmonary edema secondary to volume overload from missing dialysis  2. Acute hypoxic respiratory failure secondary to pulmonary edema Covid negative  3. Chronic combined systolic diastolic heart failure, last EF 30 to 35% in 2019  4. ESRD on hemodialysis  5. ROOSEVELTcontinue home CPAP  6. Coronary artery disease post CABG 4 years ago  7. Hypertensioncurrently controlled  8. Type 2 diabetes well controlled last HbA1c 6.6 January 2021  9. Peripheral artery diseasestatus post left BKA, right LE chronic wound.   10. Anemia of CKDHb stable  Body mass index is 23.38 kg/m².    6/8  Respiratory distress has improved, patient saturating 100% on 2 L nasal cannula. No diarrhea today. Undergoing dialysis today.      DVT prophylaxisHeparin    Electronically signed by Christy Patel MD

## 2021-06-07 NOTE — ED NOTES
Diarrhea for five or more days, per patient report. Patient has a fistula to the left lower arm for dialysis on T-TH-Sat. Last dialysis done last week at [facility] on [facility]. . Patient has a Lt. BKA and wound to right foot with dressing applied.       Daiana Schaefer RN  06/07/21 6634 Ramin OCHOA RN  06/07/21 7247 Ramin OCHOA RN  06/07/21 3621

## 2021-06-07 NOTE — ED PROVIDER NOTES
Hypertension, essential I10    Type 2 diabetes mellitus with foot ulcer ([facility] [address] E11.621, L97.509    Noncompliance Z91.19    Acute on chronic congestive heart failure ([facility]) I50.9    Sepsis ([facility]) K75.3    Metabolic acidosis U85.2    Azotemia R79.89    Hyperkalemia E87.5    Diarrhea R19.7    Nausea and vomiting R11.2    Peripheral vascular disease ([facility]) I73.9    History of left below knee amputation ([facility]) Z89.512    Hemoptysis R04.2    Ulcerated, foot, right, with fat layer exposed ([facility] [address] L97.512    Charcot's joint of foot, right M14.671    Pneumonia J18.9    Sepsis due to pneumonia ([facility]) J18.9, A41.9    Loculated pleural effusion J90    Pleural effusion on right J90    ROOSEVELT (obstructive sleep apnea) G47.33    Parapneumonic effusion J18.9, J91.8    C. difficile colitis A04.72     SURGICAL HISTORY       Past Surgical History:   Procedure Laterality Date    CATARACT REMOVAL      DIALYSIS FISTULA CREATION Left 2014    IR CHEST TUBE INSERTION  5/10/2021    IR CHEST TUBE INSERTION 5/10/2021 [facility] PROCED[facility]    LEG AMPUTATION THROUGH LOWER TIBIA AND FIBULA       CURRENT MEDICATIONS       Previous Medications    AMIODARONE (CORDARONE) 200 MG TABLET    TAKE 1 TABLET BY MOUTH DAILY    ASPIRIN 81 MG EC TABLET    Take 1 tablet by mouth daily    ATORVASTATIN (LIPITOR) 40 MG TABLET    Take 1 tablet by mouth nightly    BLOOD GLUCOSE MONITORING SUPPL (ONE TOUCH ULTRA 2) W/DEVICE KIT    TEST 3 TIMES DAILY    DIGOXIN (LANOXIN) 125 MCG TABLET    Take 1 tablet by mouth daily    GLUCOSE MONITORING KIT (FREESTYLE) MONITORING KIT    1 kit by Does not apply route daily as needed    INSULIN LISPRO (HUMALOG) 100 UNIT/ML INJECTION VIAL    Inject into the skin 3 times daily (before meals)    LANCETS 30G MISC    1 each by Does not apply route 3 times daily    METOPROLOL TARTRATE (LOPRESSOR) 25 MG TABLET    Take 1 tablet by mouth 2 times daily    MIDODRINE (PROAMATINE) 2.5 MG TABLET    Take 3 tablets by mouth every 8 hours    MISC. DEVICES (WALKER GLIDE WHEELS) MISC    1 Device by Does not apply route continuous    MULTIPLE VITAMINS-MINERALS (THERAPEUTIC MULTIVITAMIN-MINERALS) TABLET    Take 1 tablet by mouth daily    NITROGLYCERIN (NITROSTAT) 0.4 MG SL TABLET    Place 0.4 mg under the tongue every 5 minutes as needed for Chest pain up to max of 3 total doses. If no relief after 1 dose, call 911. ONE TOUCH ULTRA TEST STRIP    USE AS DIRECTED DAILY AS NEEDED    SEVELAMER (RENVELA) 800 MG TABLET    Take 5 tablets by mouth 3 times daily (with meals)     ALLERGIES     is allergic to pcn [penicillins]. FAMILY HISTORY     He indicated that his mother is alive. He indicated that his father is . He indicated that the status of his brother is unknown. SOCIAL HISTORY       Social History     Tobacco Use    Smoking status: Never Smoker    Smokeless tobacco: Never Used   Substance Use Topics    Alcohol use: No    Drug use: No     PHYSICAL EXAM     INITIAL VITALS: /70   Pulse 64   Temp 96.8 °F (36 °C)   Resp 16   SpO2 99%    Physical Exam  Constitutional:       General: He is not in acute distress. Appearance: Normal appearance. He is well-developed. He is not diaphoretic. HENT:      Head: Normocephalic and atraumatic. Right Ear: External ear normal.      Left Ear: External ear normal.      Nose: Nose normal. No congestion. Mouth/Throat:      Mouth: Mucous membranes are moist.      Pharynx: Oropharynx is clear. Eyes:      General:         Right eye: No discharge. Left eye: No discharge. Conjunctiva/sclera: Conjunctivae normal.      Pupils: Pupils are equal, round, and reactive to light. Neck:      Trachea: No tracheal deviation. Cardiovascular:      Rate and Rhythm: Normal rate and regular rhythm. Pulses: Normal pulses. Heart sounds: Normal heart sounds.       Comments: Left arm av fistula thrill  Right foot dp 2+  Pulmonary:      Effort: Pulmonary effort is normal. No respiratory distress. Breath sounds: Normal breath sounds. No stridor. No wheezing or rales. Abdominal:      Palpations: Abdomen is soft. Tenderness: There is no abdominal tenderness. There is no guarding or rebound. Musculoskeletal:         General: No tenderness or deformity. Normal range of motion. Cervical back: Normal range of motion and neck supple. Comments: Left BKA stumb CDI  Right food chronic wounds CDI no erythema or warmth or purulence   Skin:     General: Skin is warm and dry. Capillary Refill: Capillary refill takes less than 2 seconds. Findings: No erythema or rash. Neurological:      General: No focal deficit present. Mental Status: He is alert and oriented to person, place, and time. Cranial Nerves: No cranial nerve deficit. Coordination: Coordination normal.   Psychiatric:         Mood and Affect: Mood normal.         Behavior: Behavior normal.         Thought Content: Thought content normal.         Judgment: Judgment normal.         MEDICAL DECISION MAKING:       ED Course as of Jun 07 1857   Mon Jun 07, 2021   1848 DW Dr Lamin Sanon, he will call [facility] tomorrow, he will see on consult, no need to do stat dialysis tonight    [WM]      ED Course User Index  [WM] Paco Clemons MD       Procedures  Bedside ultrasound performed by myself, I interpreted images, images archived. US guided peripheral IV and venipuncture. RN attempts unsuccessful.   Peripheral iv inserted right brachial vein, sterile technique, 20 gauge angiocatheter, line draws back and flushes easily  Venipuncture blood draw also performed, 20 ml dark blood drawn    6:56 PM EDT  covid negative  Do not suspect pneumonia  Suspect pulmonary edema on cxr  Do not suspect sepsis  Chronic trop elevation, do not suspect AMI, suspect from ESRD  JACKY Boland [facility] with [facility] clinic, admitting to med surg tele    DIAGNOSTIC RESULTS   EKG:All EKG's are interpreted by the Emergency Department Physician who either signs or Co-signs this chart in the absence of a cardiologist.  SR, first degree av block, LBBB, LAD, rate 67 bpm, pr 228, qrs 162, qtc 467      RADIOLOGY:All plain film, CT, MRI, and formal ultrasound images (except ED bedside ultrasound) are read by the radiologist, see reports below, unless otherwisenoted in MDM or here. XR CHEST PORTABLE   Final Result   New or worsening diffuse interstitial and alveolar opacities throughout the   lungs bilaterally, now moderate. This is somewhat asymmetric and more   pronounced on the right. The appearance is nonspecific and may be due to   pulmonary edema or multilobar pneumonia. Cardiomegaly. No significant pleural effusion. LABS: All lab results were reviewed by myself, and all abnormals are listed below.   Labs Reviewed   CBC WITH AUTO DIFFERENTIAL - Abnormal; Notable for the following components:       Result Value    RBC 3.38 (*)     Hemoglobin 9.8 (*)     Hematocrit 31.8 (*)     MCHC 30.8 (*)     RDW 26.3 (*)     Platelets 96 (*)     Seg Neutrophils 75 (*)     Lymphocytes 12 (*)     Eosinophils % 7 (*)     Absolute Lymph # 0.66 (*)     All other components within normal limits   COMPREHENSIVE METABOLIC PANEL - Abnormal; Notable for the following components:    Glucose 122 (*)     BUN 97 (*)     CREATININE 9.00 (*)     Chloride 96 (*)     Anion Gap 20 (*)     Alkaline Phosphatase 132 (*)     GFR Non- 6 (*)     GFR  7 (*)     All other components within normal limits   TROPONIN - Abnormal; Notable for the following components:    Troponin, High Sensitivity 337 (*)     All other components within normal limits   COVID-19, RAPID   C. DIFFICILE TOXIN MOLECULAR   MAGNESIUM   DIGOXIN LEVEL   TROPONIN   BLOOD OCCULT STOOL SCREEN #1   GIARDIA / CRYPTOSPORIDUM ANTIGENS       EMERGENCY DEPARTMENTCOURSE:         Vitals:    Vitals:    06/07/21 1616 06/07/21 1645 06/07/21 1656 06/07/21 1810   BP:  129/70 129/70 Pulse: 69 66 68 64   Resp: 18 24 17 16   Temp:   96.8 °F (36 °C)    SpO2:  100% 99%        The patient was given the following medications while in the emergency department:  No orders of the defined types were placed in this encounter. CONSULTS:  IP CONSULT TO NEPHROLOGY  IP CONSULT TO INTERNAL MEDICINE    FINAL IMPRESSION      1. Acute pulmonary edema (HCC)    2. Diarrhea, unspecified type          DISPOSITION/PLAN   DISPOSITION        PATIENT REFERRED TO:  No follow-up provider specified.   DISCHARGE MEDICATIONS:  New Prescriptions    No medications on file     Tin Pearson MD  Attending Emergency Physician                    Tin Pearson MD  06/07/21 2642

## 2021-06-08 PROBLEM — J96.01 ACUTE RESPIRATORY FAILURE WITH HYPOXIA (HCC): Status: ACTIVE | Noted: 2021-01-01

## 2021-06-08 NOTE — PLAN OF CARE
Problem: Falls - Risk of:  Goal: Will remain free from falls  Description: Will remain free from falls  Outcome: Ongoing  Note: Patient free from falls this shift. Bed alarm on. Call light is within reach. Bed wheels are locked and bed is in the lowest position.    Goal: Absence of physical injury  Description: Absence of physical injury  Outcome: Ongoing     Problem: Pain:  Goal: Pain level will decrease  Description: Pain level will decrease  Outcome: Ongoing  Goal: Control of acute pain  Description: Control of acute pain  Outcome: Ongoing  Goal: Control of chronic pain  Description: Control of chronic pain  Outcome: Ongoing

## 2021-06-08 NOTE — PROGRESS NOTES
HEMODIALYSIS POST TREATMENT NOTE    Treatment time ordered: 3. 25Hrs   Actual treatment time: 3. 25Hrs    UltraFiltration Goal: 2Kgs  UltraFiltration Removed: 2Kgs      Pre Treatment weight: 78.2Kgs  Post Treatment weight: 76.2Kgs  Estimated Dry Weight: 83.5Kgs    Access used:     Central Venous Catheter:          Tunneled or Non-tunneled: N/A           Site: N/A          Access Flow: N/A      Internal Access:       AV Fistula or AV Graft: AVF         Site: L Forearm       Access Flow: Good       Sign and symptoms of infection: No       If YES: N/A    Medications or blood products given: N/A    Chronic outpatient schedule: [facility]    Chronic outpatient unit: [facility]    Summary of response to treatment: Tolerated Fairly well with some confusion, 2Kgs removed    Explain if orders NOT met, was physician notified:N/A      ACES flowsheet faxed to patient unit/ placed in patient chart: yes    Post assessment completed: yes by Tamika Barrett RN    Report given to: Aimee Bartlett documented Safety Checks include the followin) Access and face visible at all times. 2) All connections and blood lines are secure with no kinks. 3) NVL alarm engaged. 4) Hemosafe device applied (if applicable). 5) No collapse of Arterial or Venous blood chambers. 6) All blood lines / pump segments in the air detectors.

## 2021-06-08 NOTE — ED NOTES
Admission Dx: Pulmonary edema    Pts Chief Complaints on Arrival: Diarrhea    ADL's - Partial assistance    Pending Diagnostics:  Due for dialysis today    Residence PTA: long-term care facility    Special Considerations/Circumstances:  n/a    Vitals: Current vital signs:  BP (!) 143/100   Pulse 66   Temp 97.5 °F (36.4 °C) (Axillary)   Resp 16   SpO2 100%                MEWS Score: [id number] Librado Headley RN  06/08/21 6082

## 2021-06-08 NOTE — PROGRESS NOTES
Medication History completed:    New medications: Celena-olga, Veltassa, vancomycin capsules    Medications discontinued: multivitamin    Changes to dosing:   Midodrine changed to 10 mg three times daily    Stated allergies: As listed    Other pertinent information: Medications confirmed with Pharmas[employer].      Thank you,  Valerie Brown, PharmD, BCPS  [phone]

## 2021-06-08 NOTE — ED NOTES
Dr. Lana Roman made aware of Troponin result of 337 and Bun of 97 and Creatinine of 9.00. No new orders received.       Jenifer Leahy RN  06/07/21 2004

## 2021-06-08 NOTE — CONSULTS
Nephrology ESRD Consult Note    Reason for Consult:  End stage renal disease  Requesting Physician:Dr Angus Washington. History of Present Illness: This is a 73 y.o. male with history of CAD [status post CABG], Heart failure with reduced ejection fraction secondary to ischemic cardiomyopathy [LVEF 30 to 35% in June 2019], dyslipidemia and End stage renal disease secondary to nephrosclerosis [on hemodialysis Tuesday,Thursday and Saturday under the care of Dr. Kely Sylvester at [address] [facility] renal care by way of a left arm AV fistula], who presented with chronic , diarrhea, nausea and vomiting and generalized weakness for the last 5 days. Patient had not received dialysis since 5/29/2021. He reports not going to his outpatient treatment due to diarrhea which does not improve with medication. He was admitted early last month for similar symptoms and treated for C. difficile diarrhea. His current diarrhea is nonbloody, loose occurring 3-4 times daily. Upon evaluation he had BUN/creatinine of 97 and 9.12. His potassium this morning is 5.8. Platelet count was 69 on admission. his troponin is 306. He is 5 kg below his dry weight. His chest x-ray shows   Diffuse interstitial and alveolar opacities throughout the lungs bilaterally, now moderate. Somewhat asymmetric and more pronounced on the right.   Possibly related to pulmonary edema or multifocal lobular pneumonia  Past Medical History:        Diagnosis Date    Acute on chronic congestive heart failure (HCC)     Anemia     CAD (coronary artery disease)     Cardiomyopathy ([facility] [address]     Dialysis care     ESRD (end stage renal disease) on dialysis ([facility] [address]     Foot ulcer, left ([address] 2015    GERD (gastroesophageal reflux disease)     Hemodialysis patient ([address] 2011    MOM-WED-FRI-ON DEANNE     History of sleep apnea     none since significant weight loss (was 400#)    Hyperlipidemia     Hyperparathyroidism ([address]     Hypertension     Neuropathy     ROOSEVELT (obstructive sleep apnea) 5/8/2021    Peripheral vascular disease (HCC)     Pneumonia     resolved    S/P CABG (coronary artery bypass graft) 06/2016    Type II or unspecified type diabetes mellitus without mention of complication, not stated as uncontrolled     dx-1980         Past Surgical History:        Procedure Laterality Date    CATARACT REMOVAL      DIALYSIS FISTULA CREATION Left 2014    IR CHEST TUBE INSERTION  5/10/2021    IR CHEST TUBE INSERTION 5/10/2021 [facility] PROCEDURES    LEG AMPUTATION THROUGH LOWER TIBIA AND FIBULA         Current Medications:    sodium chloride flush 0.9 % injection 5-40 mL, 2 times per day  sodium chloride flush 0.9 % injection 10 mL, PRN  0.9 % sodium chloride infusion, PRN  ondansetron (ZOFRAN-ODT) disintegrating tablet 4 mg, Q8H PRN   Or  ondansetron (ZOFRAN) injection 4 mg, Q6H PRN  acetaminophen (TYLENOL) tablet 650 mg, Q6H PRN   Or  acetaminophen (TYLENOL) suppository 650 mg, Q6H PRN  heparin (porcine) injection 5,000 Units, 3 times per day  glucose (GLUTOSE) 40 % oral gel 15 g, PRN  dextrose 50 % IV solution, PRN  glucagon (rDNA) injection 1 mg, PRN  dextrose 5 % solution, PRN  insulin lispro (HUMALOG) injection vial 0-12 Units, TID WC  insulin lispro (HUMALOG) injection vial 0-6 Units, Nightly  amiodarone (CORDARONE) tablet 200 mg, Daily  aspirin EC tablet 81 mg, Daily  atorvastatin (LIPITOR) tablet 40 mg, Nightly  B complex-vitamin C-folic acid (NEPHRO-FERNANDA) tablet 1 tablet, Daily  digoxin (LANOXIN) tablet 125 mcg, Daily  metoprolol tartrate (LOPRESSOR) tablet 25 mg, BID  midodrine (PROAMATINE) tablet 10 mg, TID  sevelamer (RENVELA) tablet 4,000 mg, TID WC  vancomycin (FIRVANQ) 50 MG/ML oral solution 125 mg, 2 times per day        Allergies:  Pcn [penicillins]    Social History:    Social History     Socioeconomic History    Marital status:      Spouse name: Not on file    Number of children: Not on file    Years of education: Not on file    Highest education level: Not 06/08/21 1053        Physical Exam:  GENERAL APPEARANCE: Medically ill looking not in obvious distress. HEAD: normocephalic  EYES: PERRL, EOMI. Not pale, anicteric   NOSE:  No nasal discharge. THROAT:  Oral cavity and pharynx normal. Moist  NECK: Neck supple, non-tender without lymphadenopathy, masses or thyromegaly. JVD-neg  CARDIAC: Normal S1 and S2. No S3, S4 or murmurs. Rhythm is regular. LUNGS: Clear to auscultation and percussion without rales, rhonchi, wheezing or diminished breath sounds. ABD-Soft non distended, BS+ Non tender no organomegally  BACK: Examination of the spine reveals  no spinal deformity, without tenderness,   MUSKULOSKELETAL: Adequately aligned spine. No joint erythema or tenderness. EXTREMITIES: No edema. Peripheral pulses intact. BKA  NEURO:Alert oriented x 3 ,power 5/5 in all extremities      Labs:  PTH:  No results found for: PTH  abs:   CBC:   Recent Labs     06/07/21 1146 06/07/21 1713 06/08/21  0529   WBC 6.1 5.5 5.2   RBC 3.27* 3.38* 2.99*   HGB 9.4* 9.8* 8.6*   HCT 32.8* 31.8* 28.5*   .3 94.0 95.2   MCH 28.7 28.9 28.7   MCHC 28.7 30.8* 30.1*   RDW 23.9* 26.3* 26.1*   PLT 69* 96* 84*   MPV 12.0 10.7 10.0      BMP:   Recent Labs     06/07/21 1146 06/07/21 1713 06/08/21  0529   * 140 144   K 5.2 5.2 5.8*    96* 101   CO2 22 24 24   BUN 97* 97* 103*   CREATININE 9.17* 9.00* 9.19*   GLUCOSE 155* 122* 142*   CALCIUM 9.8 10.3 10.0        Phosphorus:    Recent Labs     06/08/21  0529   PHOS 6.8*     Magnesium:   Recent Labs     06/07/21 1713 06/08/21  0529   MG 2.6 2.5     Albumin:   Recent Labs     06/07/21 1146 06/07/21 1713   LABALBU 3.0* 3.6     Assessment:  1. ESRD - Will maintain TTS hemodialysis schedule. Left radiocephalic AV fistula is functioning well. Patient will receive acute hemodialysis today.   History of noncompliance with dialysis  Renal diet,i.e 2-gram sodium, 2-gram potassium,1500 ml fluid restriction,1-gram phosphorus, 1800 KCal and 1.2 gram/kg protein per day.     2. Anemia of chronic kidney disease - Hemoglobin today is 8.6  g/dL-Retacrit 5000 units subcutaneously 3 times a week,once  iron stores and adequate.     3. History of C. difficile colitis May 2021 treated with oral vancomycin     4. Coronary artery disease - status post CABG 4 years ago.     5.  Mineral and bone disease profile -serum phosphorus 6.8 Continue sevelamer 4000 mg p.o. 3 times daily with meals.     6.   Peripheral artery disease - s/p left BKA and right lower extremity chronic wound.     7.  History of Right large pleural effusion - S/pright thoracentesis [1300 ml] on 5/7/2020 and placement of a pigtail catheter 5/10/2021 which was removed 5/14/2021. X-ray on 6/7/2021 shows no significant pleural effusion     8. Systemic hypertension - blood pressure is adequately controlled           Sumaya Phillips M.D, [facility]  Nephrologist    Thank you for the consultation.

## 2021-06-08 NOTE — CARE COORDINATION
CASE MANAGEMENT NOTE:    Admission Date:  6/7/2021 Cora Ojeda is a 73 y.o.  male    Admitted for : Acute pulmonary edema ([facility] [address] [J81.0]    Met with:  Patient    PCP:  Dr. Ivy List:   Home	[place]      Is patient alert and oriented at time of discussion:  Yes - However in a lot of pain - bedside nurse working on orders    Current Residence/ Living Arrangements:  independently at home             Current Services PTA:  Yes -    Does patient go to outpatient dialysis: Yes  If yes, location and chair time:  renal T-Th-Sa    Is patient agreeable to VNS: Yes    Freedom of choice provided:  Yes    List of [address] provided: Yes    VNS chosen:  No    DME:  N/A    Home Oxygen: NA    Nebulizer: NA    CPAP/BIPAP: NA    Supplier: N/A    Potential Assistance Needed: Yes    SNF needed: Yes    Freedom of choice and list provided: Yes - Wants [address] [facility]:  [facility]       Does Patient want to use MEDS to BEDS? No    Is patient currently receiving oral anticoagulation therapy? No    Is the Patient an MELISSA HIGGINS [facility] with Readmission Risk Score greater than 14%? No  If yes, pt needs a follow up appointment made within 7 days. Family Members/Caregivers that pt would like involved in their care:    Yes    If yes, list name here:  Brother Nacho Douglas and sister Sarah Conti    Transportation Provider:  Allyson Phelan             Discharge Plan:  6/8: [employer] - From home alone. Patients answers vague due to pain level. Goes to  Renal T-T-S. Wants [facility] [address] to verify with patient tomorrow and then send referral due to patients current pain level. Dialysis treatment today.  JACQUELIN NEEDS SIGNED/COMPLETED. Curtis Lange                 Electronically signed by: Cecilia Sinha RN on 6/8/2021 at 4:14 PM

## 2021-06-08 NOTE — PROGRESS NOTES
HEMODIALYSIS PRE-TREATMENT NOTE    Patient Identifiers prior to treatment: Name//MRN    Isolation Required: yes                      Isolation Type: Contact       (please document if patient is being managed as a PUI/COVID-19 patient)        Hepatitis status:                           Date Drawn                             Result  Hepatitis B Surface Antigen 2021     neg                     Hepatitis B Surface Antibody 2021 pos        Hepatitis B Core Antibody 2019 neg          How was Hepatitis Status verified: Outpatient Chart     Was a copy of the labs you documented provided to facility for the patient's chart: yes    Hemodialysis orders verified: yes    Access Within normal limits ( I.e. s/s of infection,...): yes     Pre-Assessment completed: yes by Berry Aldana RN    Pre-dialysis report received from: Shannan                      Time: 10:20

## 2021-06-08 NOTE — PROGRESS NOTES
Discussed CPAP with pt, pt stated he would not wear it.  So will not place in room however is prn if pt decides he will wear it

## 2021-06-09 PROBLEM — E44.0 MODERATE MALNUTRITION (HCC): Status: ACTIVE | Noted: 2021-01-01

## 2021-06-09 NOTE — PROGRESS NOTES
Nephrology Progress Note    Subjective/   73y.o. year old male who we are seeing in consultation for ESRD on hemodialysis      Interval History:  Patient currently denies fever SOB, chest pain. He has no further diarrhea. He received dialysis yesterday with 2 kg removed. History of Present Illness: This is a 73 y. o. male with history of CAD [status post CABG], Heart failure with reduced ejection fraction secondary to ischemic cardiomyopathy [LVEF 30 to 35% in June 2019], dyslipidemia and End stage renal disease secondary to nephrosclerosis [on hemodialysis Tuesday,Thursday and Saturday under the care of Dr. Becerra Travis at [address] [facility] renal care by way of a left arm AV fistula], who presented with chronic , diarrhea, nausea and vomiting and generalized weakness for the last 5 days. Patient had not received dialysis since 5/29/2021. He reports not going to his outpatient treatment due to diarrhea which does not improve with medication. He was admitted early last month for similar symptoms and treated for C. difficile diarrhea. His current diarrhea is nonbloody, loose occurring 3-4 times daily. Upon evaluation he had BUN/creatinine of 97 and 9.12. His potassium this morning is 5.8. Platelet count was 69 on admission. his troponin is 306. He is 5 kg below his dry weight. His chest x-ray shows   Diffuse interstitial and alveolar opacities throughout the lungs bilaterally, now moderate. Somewhat asymmetric and more pronounced on the right.   Possibly related to pulmonary edema or multifocal lobular pneumonia  Objective/     Vitals:    06/09/21 0242 06/09/21 0745 06/09/21 1230 06/09/21 1406   BP:  126/70 124/70    Pulse:  68 68 70   Resp:  18 17    Temp:  97.6 °F (36.4 °C) 98.1 °F (36.7 °C)    TempSrc:  Oral Oral    SpO2:  100% 100%    Weight: 177 lb 11.1 oz (80.6 kg)      Height:         24HR INTAKE/OUTPUT:      Intake/Output Summary (Last 24 hours) at 6/9/2021 1616  Last data filed at 6/9/2021 1244  Gross per 24 hour   Intake 745 ml   Output    Net 745 ml     Patient Vitals for the past 96 hrs (Last 3 readings):   Weight   06/09/21 0242 177 lb 11.1 oz (80.6 kg)   06/08/21 1355 167 lb 15.9 oz (76.2 kg)   06/08/21 1032 172 lb 6.4 oz (78.2 kg)       Constitutional:  Alert, awake, no apparent distress  Cardiovascular:  S1, S2 without m/r/g  Respiratory:  CTA B without w/r/r  Abdomen: +bs, soft, nt  Ext: left knee amputationLE edema    Data/  Recent Labs     06/07/21  1713 06/08/21  0529 06/09/21  0519   WBC 5.5 5.2 4.8   HGB 9.8* 8.6* 9.0*   HCT 31.8* 28.5* 29.8*   MCV 94.0 95.2 93.8   PLT 96* 84* 66*     Recent Labs     06/07/21  1713 06/08/21  0529 06/08/21  1842 06/09/21  0519    144  --  139   K 5.2 5.8* 4.9 5.0   CL 96* 101  --  97*   CO2 24 24  --  25   GLUCOSE 122* 142*  --  110*   PHOS  --  6.8*  --  5.6*   MG 2.6 2.5  --  2.1   BUN 97* 103*  --  48*   CREATININE 9.00* 9.19*  --  5.79*   LABGLOM 6* 6*  --  10*   GFRAA 7* 7*  --  12*         Assessment/   1.  ESRD - Will maintain TTS hemodialysis schedule. Left radiocephalic AV fistula is functioning well. History of noncompliance with dialysis  Renal diet,i.e 2-gram sodium, 2-gram potassium,1500 ml fluid restriction,1-gram phosphorus, 1800 KCal and 1.2 gram/kg protein per day.     2.  Anemia of chronic kidney disease - Hemoglobin today is 9.0  g/dL-Retacrit 5000 units subcutaneously 3 times a week,once  iron stores and adequate.     3. History of C. difficile colitis May 2021 treated with oral vancomycin     4.  Acute pulmonary edema with CHF with systolic dysfunction EF 38-84 %     5.  Mineral and bone disease profile -serum phosphorus 6.8 Continue sevelamer 4000 mg p.o. 3 times daily with meals.     6.   Peripheral artery disease - s/p left BKA and right lower extremity chronic wound.     7.  History of Right large pleural effusion - S/pright thoracentesis [1300 ml] on 5/7/2020 and placement of a pigtail catheter 5/10/2021 which was removed 5/14/2021.   X-ray on 6/7/2021 shows no significant pleural effusion     8.  Systemic hypertension - blood pressure is adequately controlled         Acacia/         Pa Huddleston MD

## 2021-06-09 NOTE — DISCHARGE INSTR - COC
Continuity of Care Form    Patient Name: Vincent Nunez   :  1955  MRN:  [id number]    Admit date:  2021  Discharge date:  21    Code Status Order: Full Code   Advance Directives:   [facility] Documentation       Date/Time Healthcare Directive Type of Healthcare Directive Copy in [facility] [address] Agent's Name Healthcare Agent's Phone Number    21 1605  Unknown, patient unable to respond due to medical condition -- -- -- -- --            Admitting Physician:  Elvia Jorge MD  PCP: Victor M Winchester MD    Discharging Nurse: [facility] Unit/Room#: 2097/2097-01  Discharging Unit Phone Number: [phone]    Emergency Contact:   Extended Emergency Contact Information  Primary Emergency Contact: [address] [place] [address]  Home Phone: [phone]  Relation: Brother/Sister  Secondary Emergency Contact: Linda Vidales  Mobile Phone: [phone]  Relation: Brother/Sister    Past Surgical History:  Past Surgical History:   Procedure Laterality Date    CATARACT REMOVAL      DIALYSIS FISTULA CREATION Left     IR CHEST TUBE INSERTION  5/10/2021    IR CHEST TUBE INSERTION 5/10/2021 [facility] PROCEDURES    LEG AMPUTATION THROUGH LOWER TIBIA AND FIBULA         Immunization History:   Immunization History   Administered Date(s) Administered    Influenza Whole 10/03/2015    Influenza, Quadv, IM, (6 mo and older Fluzone, Flulaval, Fluarix and 3 yrs and older Afluria) 10/17/2018    Pneumococcal Conjugate 13-valent (Elois ) 10/31/2018    Tdap (Boostrix, Adacel) 2013       Active Problems:  Patient Active Problem List   Diagnosis Code    SDH (subdural hematoma) ([facility] S06.5X9A    ESRD on dialysis ([facility] N18.6, Z99.2    DM (diabetes mellitus) ([facility] E11.9    Anemia of chronic disease D63.8    Charcot foot due to diabetes mellitus ([facility] E11.610    Diabetic ulcer of left foot ([facility] E11.621, L97.529    Hypertension, essential I10    Type 2 diabetes mellitus with foot ulcer ([facility] E11.621, L97.509    Noncompliance Z91.19    CHF (congestive heart failure) ([facility]) I50.9    Sepsis ([facility]) L48.8    Metabolic acidosis C43.2    Azotemia R79.89    Hyperkalemia E87.5    Diarrhea R19.7    Nausea and vomiting R11.2    Peripheral vascular disease ([facility]) I73.9    History of left below knee amputation ([facility]) Z89.512    Hemoptysis R04.2    Ulcerated, foot, right, with fat layer exposed ([facility] L97.512    Charcot's joint of foot, right M14.671    Pneumonia J18.9    Sepsis due to pneumonia ([facility]) J18.9, A41.9    Loculated pleural effusion J90    Pleural effusion on right J90    ROOSEVELT (obstructive sleep apnea) G47.33    Parapneumonic effusion J18.9, J91.8    C. difficile colitis A04.72    Acute pulmonary edema ([facility]) J81.0    Volume overload E87.70    Non-compliance with renal dialysis ([facility]) Z91.15    Acute respiratory failure with hypoxia ([facility]) J96.01       Isolation/Infection:   Isolation            C Diff Contact          Patient Infection Status       Infection Onset Added Last Indicated Last Indicated By Review Planned Expiration Resolved Resolved By    C-diff Rule Out 06/07/21 06/07/21 06/09/21 C. difficile toxin Molecular [address] - ONLY (Ordered)        Resolved    COVID-19 Rule Out 06/07/21 06/07/21 06/07/21 COVID-19, Rapid (Ordered)   06/07/21 Rule-Out Test Resulted    C-diff Rule Out 05/24/21 05/24/21 05/24/21 C. difficile toxin Molecular (Ordered)   05/25/21 Rule-Out Test Resulted    COVID-19 Rule Out 04/29/21 04/29/21 04/29/21 COVID-19, Rapid (Ordered)   04/29/21 Rule-Out Test Resulted    C-diff Rule Out 04/28/21 04/28/21 04/29/21 C DIFF TOXIN/ANTIGEN (Ordered)   04/29/21 Rule-Out Test Resulted            Nurse Assessment:  Last Vital Signs: /70   Pulse 68   Temp 97.6 °F (36.4 °C) (Oral)   Resp 18   Ht 6' (1.829 m)   Wt 177 lb 11.1 oz (80.6 kg)   SpO2 100%   BMI 24.10 kg/m²     Last documented pain score (0-10 scale): Pain Level: 8  Last Weight:   Wt Readings from Last 1 Encounters:   06/09/21 177 lb 11.1 oz (80.6 kg)     Mental Status:  oriented, alert and coherent    IV Access:  - None    Nursing Mobility/ADLs:  Walking   Dependent  Transfer  Dependent  Bathing  Assisted  Dressing  Assisted  Toileting  Assisted  Feeding  Independent  Med Admin  Assisted  Med Delivery   whole    Wound Care Documentation and Therapy:  Wound 05/02/21 Foot Right (Active)   Wound Etiology Diabetic 06/09/21 0210   Dressing Status New dressing applied 05/15/21 0800   Wound Cleansed Other (Comment) 05/15/21 1043   Dressing/Treatment Antibacterial ointment;Dry dressing;Petroleum gauze 05/15/21 0800   Dressing Change Due 05/16/21 05/15/21 1043   Wound Length (cm) 10 cm 05/10/21 2000   Wound Width (cm) 6 cm 05/10/21 2000   Wound Depth (cm) 1 cm 05/10/21 2000   Wound Surface Area (cm^2) 60 cm^2 05/10/21 2000   Change in Wound Size % (l*w) 0 05/10/21 2000   Wound Volume (cm^3) 60 cm^3 05/10/21 2000   Wound Healing % 0 05/10/21 2000   Wound Assessment Other (Comment) 05/14/21 1735   Drainage Amount None 05/15/21 0800   Drainage Description Other (Comment) 05/14/21 1735   Odor None 05/15/21 1043   Rivka-wound Assessment Other (Comment) 05/14/21 1735   Number of days: 38        Elimination:  Continence:   · Bowel: Yes  · Bladder: anuric   Urinary Catheter: None   Colostomy/Ileostomy/Ileal Conduit: No       Date of Last BM: 6/10/21    Intake/Output Summary (Last 24 hours) at 6/9/2021 1058  Last data filed at 6/9/2021 0842  Gross per 24 hour   Intake 420 ml   Output    Net 420 ml     No intake/output data recorded. Safety Concerns: At Risk for Falls    Impairments/Disabilities:      Amputation - left BKA    Nutrition Therapy:  Current Nutrition Therapy:   - Oral Diet:  Renal    Routes of Feeding: Oral  Liquids:  Thin Liquids  Daily Fluid Restriction: yes - 1200 mL  Last Modified Barium Swallow with Video (Video Swallowing Test): not done    Treatments at the Time of Hospital Discharge:   Respiratory Treatments: none  Oxygen Therapy:  is not on home oxygen therapy. Ventilator:   Cpap    Rehab Therapies: Physical Therapy and Occupational Therapy  Weight Bearing Status/Restrictions: Non-weight bearing on right leg; LEFT BKA (prosthetic at home)  Other Medical Equipment (for information only, NOT a DME order):  wheelchair  Other Treatments: mepilex to right hip change every 3 days. Patient's personal belongings (please select all that are sent with patient):  None    RN SIGNATURE:  Electronically signed by Que Kaplan RN on 6/11/21 at 1:28 PM EDT    CASE MANAGEMENT/SOCIAL WORK SECTION    Inpatient Status Date: ***    Readmission Risk Assessment Score:  Readmission Risk              Risk of Unplanned Readmission:  29           Discharging to Facility/ Agency   · Name: [facility]  · Address: [address] [place] [address]  · Phone:[phone]  · Fax:[phone]    Dialysis Facility (if applicable)   · Name: [facility]  · Address: [address] [place] [address]  · Dialysis Schedule:Tues, Thurs, Sat. · Phone: [phone]  · QLX:[phone]    / signature: Electronically signed by ESTELLA Dumont LSW on 6/10/21 at 9:25 AM EDT    PHYSICIAN SECTION    Prognosis: {Prognosis:1759923528:::0}    Condition at Discharge: Jared Cruz Patient Condition:[id number]:::0}    Rehab Potential (if transferring to Rehab): {Prognosis:5924084892:::0}    Recommended Labs or Other Treatments After Discharge: continue to follow-up with Dr. Kadi Cueva    Physician Certification: I certify the above information and transfer of Cher Beaver  is necessary for the continuing treatment of the diagnosis listed and that he requires {Admit to Appropriate Level of Care:10096:::0} for {GREATER/LESS:899396893} 30 days.      Update Admission H&P: {CHP DME Changes in HandP:116866216:::0}    PHYSICIAN SIGNATURE:  Electronically signed by Toan Romero MD on 6/9/21 at

## 2021-06-09 NOTE — PLAN OF CARE
Problem: Falls - Risk of:  Goal: Will remain free from falls  Description: Will remain free from falls  6/9/2021 0213 by Triston Gusman RN  Outcome: Ongoing     Problem: Falls - Risk of:  Goal: Absence of physical injury  Description: Absence of physical injury  6/9/2021 0213 by Triston Gusman RN  Outcome: Ongoing     Problem: Pain:  Goal: Pain level will decrease  Description: Pain level will decrease  6/9/2021 0213 by Triston Gusman RN  Outcome: Ongoing     Problem: Pain:  Goal: Control of acute pain  Description: Control of acute pain  6/9/2021 0213 by Triston Gusman RN  Outcome: Ongoing     Problem: Pain:  Goal: Control of chronic pain  Description: Control of chronic pain  6/9/2021 0213 by Triston Gusman RN  Outcome: Ongoing     Problem: Skin Integrity:  Goal: Will show no infection signs and symptoms  Description: Will show no infection signs and symptoms  Outcome: Ongoing     Problem: Skin Integrity:  Goal: Absence of new skin breakdown  Description: Absence of new skin breakdown  Outcome: Ongoing

## 2021-06-09 NOTE — PROGRESS NOTES
Comprehensive Nutrition Assessment    Type and Reason for Visit:  Initial, Positive Nutrition Screen (Diarrhea and wounds)    Nutrition Recommendations/Plan: Continue current diet. Glucerna 2 x a day. Nutrition Assessment:  Pt admitted for acute pulmonary edema. Pt stated upon admit had not been to dialysis for approximately one week. Med hx: CHF, ESRD on dialysis, T2DM, hyperlidemai, GERD, HTN. No diarrhea noted today. Pt states he is not eating much because nervous of diarrhea. Pt agreed to trying Glucerna. Will continue to monitor. Malnutrition Assessment:  Malnutrition Status: Moderate malnutrition    Context:  Acute Illness     Findings of the 6 clinical characteristics of malnutrition:  Energy Intake:  1 - 75% or less of estimated energy requirements for 7 or more days  Weight Loss:  1 - 5% over 1 month     Body Fat Loss:  7 - Moderate body fat loss Orbital, Triceps   Muscle Mass Loss:  7 - Moderate muscle mass loss Temples (temporalis), Hand (interosseous), Thigh (quadraceps), Calf (gastrocnemius), Clavicles (pectoralis & deltoids)  Fluid Accumulation:  No significant fluid accumulation     Strength:  Not Performed    Estimated Daily Nutrient Needs:  Energy (kcal):  5422-2911 kcals/d using 1.2-1.4 factor; Weight Used for Energy Requirements:  Current     Protein (g):   g/kg using 1.2-1.3 g/kg; Weight Used for Protein Requirements:  Current          Nutrition Related Findings:  Edema: RUE, LUE, RLE, Generalized -trace. Labs: POC Glu-127. Other labs and meds reviewed. Wounds:  Diabetic Ulcer       Current Nutrition Therapies:    ADULT DIET; Regular; 4 carb choices (60 gm/meal); Low Sodium (2 gm); Low Potassium (Less than 3000 mg/day);  Low Phosphorus (Less than 1000 mg); 1200 ml  Adult Oral Nutrition Supplement; Diabetic Oral Supplement    Anthropometric Measures:  · Height: 6' (182.9 cm)  · Current Body Weight: 177 lb 11.1 oz (80.6 kg)   · Admission Body Weight: 177 lb 11.1 oz (80.6 kg) · Usual Body Weight: 212 lb (96.2 kg) (210#-214# (4/28))     · Ideal Body Weight: 178 lbs  · BMI: 24.1  · BMI Categories: Normal Weight (BMI 18.5-24. 9)       Nutrition Diagnosis:   · Moderate malnutrition related to altered GI function, other (comment) (Diarrhea) as evidenced by weight loss greater than or equal to 5% in 1 month, moderate muscle loss, moderate loss of subcutaneous fat, poor intake prior to admission      Nutrition Interventions:   Food and/or Nutrient Delivery:  Continue Current Diet, Start Oral Nutrition Supplement  Nutrition Education/Counseling:  No recommendation at this time   Coordination of Nutrition Care:  Continue to monitor while inpatient    Goals:  PO intake %       Nutrition Monitoring and Evaluation:   Behavioral-Environmental Outcomes:  None Identified   Food/Nutrient Intake Outcomes:  Food and Nutrient Intake, Supplement Intake  Physical Signs/Symptoms Outcomes:  Biochemical Data, GI Status, Fluid Status or Edema, Nutrition Focused Physical Findings, Skin, Weight     Discharge Planning: Too soon to determine     Electronically signed by Kymberly Castillo on 6/9/21 at 3:56 PM EDT    Reviewed and approved by: Some areas of assessment may be incomplete due to COVID-19 precautions. Eliu Streeter R.D., L.D.   Clinical Dietitian  Office: [phone]

## 2021-06-09 NOTE — PLAN OF CARE
Nutrition Problem #1: Moderate malnutrition  Intervention: Food and/or Nutrient Delivery: Continue Current Diet, Start Oral Nutrition Supplement  Nutritional Goals: PO intake %

## 2021-06-09 NOTE — PROGRESS NOTES
[facility] Internal Medicine    Progress Note    6/9/2021    10:54 AM    Name:   Leana Willingham  MRN:     [id number]     Acct:      [de-identified]   Room:   2097/2097-01  IP Day:  2  Admit Date:  6/7/2021  2:27 PM    PCP:   Saroj Kaur MD  Code Status:  Full Code    Subjective:     C/C:   Chief Complaint   Patient presents with    Diarrhea         Interval History Status: Improving    HPI:     73 y.o.  male, with a history of CHF, anemia, CAD, ESRD on dialysis, hypertension, left BKA, tonic leg wounds, CABG, ROOSEVELT, diabetes mellitus left BKA. Interval shortness of breath and watery diarrhea from [facility]. Review of Systems:     Denies any shortness of breath or cough  Denies chest pain or palpitations  Denies abdominal pain, diarrhea vomiting. No diarrhea since admission  Denies any new numbness tremors or weakness. Medications: Allergies:     Allergies   Allergen Reactions    Pcn [Penicillins] Other (See Comments)     Trouble walking       Current Meds:   Scheduled Meds:    sodium chloride flush  5-40 mL Intravenous 2 times per day    heparin (porcine)  5,000 Units Subcutaneous 3 times per day    insulin lispro  0-12 Units Subcutaneous TID WC    insulin lispro  0-6 Units Subcutaneous Nightly    amiodarone  200 mg Oral Daily    aspirin  81 mg Oral Daily    atorvastatin  40 mg Oral Nightly    B complex-vitamin C-folic acid  1 tablet Oral Daily    digoxin  125 mcg Oral Daily    metoprolol tartrate  25 mg Oral BID    midodrine  10 mg Oral TID    sevelamer  4,000 mg Oral TID WC    vancomycin  125 mg Oral 2 times per day     Continuous Infusions:    sodium chloride      dextrose       PRN Meds: sodium chloride flush, sodium chloride, ondansetron **OR** ondansetron, glucose, dextrose, glucagon (rDNA), dextrose, oxyCODONE-acetaminophen    Data:     Past Medical History:   has a past medical history of Acute on chronic congestive heart failure ([facility] 05/10/2021 03:10 PM         Radiology:    Recent data reviewed    Physical Examination:        General appearance:  alert, cooperative and no distress  Eyes: Anicteric sclera. Pupils are equally round and reactive to light. Extraocular movements are intact. Lungs:  clear to auscultation bilaterally, normal effort  Heart:  regular rate and rhythm, no murmur  Abdomen:  soft, nontender, nondistended, normal bowel sounds, no masses, hepatomegaly, splenomegaly  Extremities:  no edema, redness, tenderness in the calves, left BKA, right LE chronic wound  Skin:  no gross lesions, rashes, induration  Neuro:  Alert, oriented X 3,Non-focal.  Assessment:        Primary Problem  Acute pulmonary edema [facility])    Active Hospital Problems    Diagnosis Date Noted    Acute respiratory failure with hypoxia ([facility] [J96.01] 06/08/2021    Acute pulmonary edema (HCC) [J81.0] 06/07/2021    Volume overload [E87.70] 06/07/2021    Non-compliance with renal dialysis ([facility] [Z91.15] 06/07/2021    ROOSEVELT (obstructive sleep apnea) [G47.33] 05/08/2021    Diarrhea [R19.7]     CHF (congestive heart failure) (HCC) [I50.9]     Hypertension, essential [I10]     Anemia of chronic disease [D63.8] 07/03/2013    ESRD on dialysis ([facility] [N18.6, Z99.2] 06/27/2013    DM (diabetes mellitus) ([facility] [E11.9] 06/27/2013           DVT prophylaxis: Heparin  Antibiotics: None    Plan:      73 y.o.  male, with a history of CHF, anemia, CAD, ESRD on dialysis, hypertension, left BKA, tonic leg wounds, CABG, ROOSEVELT, diabetes mellitus left BKA. Interval shortness of breath and watery diarrhea from [facility].     1. Acute pulmonary edema secondary to volume overload from missing dialysis  2. Acute hypoxic respiratory failure secondary to pulmonary edema Covid negative  3. Chronic combined systolic diastolic heart failure, last EF 30 to 35% in 2019  4. ESRD on hemodialysis  5. ROOSEVELTcontinue home CPAP  6.  Coronary artery disease post CABG 4 years ago  7. Hypertensioncurrently controlled  8. Type 2 diabetes well controlled last HbA1c 6.6 January 2021  9. Peripheral artery diseasestatus post left BKA, right LE chronic wound. 10. Anemia of CKDHb stable    6/9  Doing well after dialysis yesterday. No further diarrhea. Tolerating diet.   Medically ready for dischargedischarge planning to nursing facility and outpatient dialysis    Dispo: Awaiting placement    Jersey Powell MD  6/9/2021  10:54 AM

## 2021-06-10 NOTE — PLAN OF CARE
Problem: Falls - Risk of:  Goal: Will remain free from falls  Description: Will remain free from falls  Outcome: Met This Shift  Goal: Absence of physical injury  Description: Absence of physical injury  Outcome: Met This Shift  Note: Complete bedrest

## 2021-06-10 NOTE — PROGRESS NOTES
Physical Therapy        Physical Therapy Cancel Note      DATE: 6/10/2021    NAME: John Leslie  MRN: [id number]   : 1955      Patient not seen this date for Physical Therapy due to:    6- at 37- Pt still at dialysis. Therapist notified Fariha Jain that pt was unavailable at this time. Will try back later today.       Electronically signed by German Ivory PT on 6/10/2021 at 1:23 PM

## 2021-06-10 NOTE — PROGRESS NOTES
HEMODIALYSIS PRE-TREATMENT NOTE    Patient Identifiers prior to treatment: Name//MRN    Isolation Required: No                     Isolation Type: N/A       (please document if patient is being managed as a PUI/COVID-19 patient)        Hepatitis status:                           Date Drawn                             Result  Hepatitis B Surface Antigen 2021     neg                     Hepatitis B Surface Antibody 2021 pos        Hepatitis B Core Antibody 2019 neg          How was Hepatitis Status verified: epic chart     Was a copy of the labs you documented provided to facility for the patient's chart: yes    Hemodialysis orders verified: yes    Access Within normal limits ( I.e. s/s of infection,...): yes     Pre-Assessment completed: yes by Cassie Howe RN    Pre-dialysis report received from: Shannan                      Time: 09:45

## 2021-06-10 NOTE — PROGRESS NOTES
[place] [address] Internal Medicine    Progress Note    6/10/2021    11:15 AM    Name:   Amy Eden  MRN:     [id number]     Acct:      [de-identified]   Room:   2097/2097-01   Day:  3  Admit Date:  6/7/2021  2:27 PM    PCP:   Cecilia Llanes MD  Code Status:  Full Code    Subjective:     C/C:   Chief Complaint   Patient presents with    Diarrhea         Interval History Status: Improving    HPI:     73 y.o.  male, with a history of CHF, anemia, CAD, ESRD on dialysis, hypertension, left BKA, tonic leg wounds, CABG, ROOSEVELT, diabetes mellitus left BKA. Interval shortness of breath and watery diarrhea from [facility]. Review of Systems:     Denies any shortness of breath or cough  Denies chest pain or palpitations  Denies abdominal pain, diarrhea vomiting. No diarrhea since admission  Denies any new numbness tremors or weakness. Medications: Allergies:     Allergies   Allergen Reactions    Pcn [Penicillins] Other (See Comments)     Trouble walking       Current Meds:   Scheduled Meds:    sodium chloride flush  5-40 mL Intravenous 2 times per day    heparin (porcine)  5,000 Units Subcutaneous 3 times per day    insulin lispro  0-12 Units Subcutaneous TID WC    insulin lispro  0-6 Units Subcutaneous Nightly    amiodarone  200 mg Oral Daily    aspirin  81 mg Oral Daily    atorvastatin  40 mg Oral Nightly    B complex-vitamin C-folic acid  1 tablet Oral Daily    digoxin  125 mcg Oral Daily    metoprolol tartrate  25 mg Oral BID    midodrine  10 mg Oral TID    sevelamer  4,000 mg Oral TID WC    vancomycin  125 mg Oral 2 times per day     Continuous Infusions:    sodium chloride      dextrose       PRN Meds: sodium chloride flush, sodium chloride, ondansetron **OR** ondansetron, glucose, dextrose, glucagon (rDNA), dextrose    Data:     Past Medical History:   has a past medical history of Acute on chronic congestive heart failure ([address] Anemia, CAD (coronary 4 years ago  7. Hypertensioncurrently controlled  8. Type 2 diabetes well controlled last HbA1c 6.6 January 2021  9. Peripheral artery diseasestatus post left BKA, right LE chronic wound. 10. Anemia of CKDHb stable    6/9  Doing well after dialysis yesterday. No further diarrhea. Tolerating diet. Medically ready for dischargedischarge planning to nursing facility and outpatient dialysis    6/10  C. difficile negative, lab work reviewed and stable.   No new events overnight, getting dialysis today  Awaiting pre-CERT        Dispo: Awaiting placement    Beth Singletary MD  6/10/2021  11:15 AM

## 2021-06-10 NOTE — PROGRESS NOTES
Treatment time: 210 minutes    Net UF: 2500 mL    Pre weight: 76.3 kg  Post weight: n/a  EDW: n/a    Access used: left FA AVF  Access function: good    Medications or blood products given: none    Regular outpatient schedule: TTS    Summary of response to treatment: Patient tolerated treatment well, no signs or symptoms of distress noted during treatment, report given to ZAYRA Sewell. Copy of dialysis treatment record placed in chart, to be scanned into EMR.

## 2021-06-10 NOTE — PROGRESS NOTES
[address] [facility]    Occupational Therapy Evaluation  Date: 6/10/21  Patient Name: Diego Cormier       Room: 4312/5041-67  MRN: [id number]  Account: [de-identified]   : 1955  (73 y.o.) Gender: male     Discharge Recommendations:  Further Occupational Therapy is recommended upon facility discharge. Equipment Needed:  (TBD)    Referring Practitioner: Dr. Cathy Alvarez  Diagnosis: Acute pulmonary edema  Additional Pertinent Hx: L BKA, HD MWF, CABG    Treatment Diagnosis: Impaired self-care status  Past Medical History:  has a past medical history of Acute on chronic congestive heart failure ([address] Anemia, CAD (coronary artery disease), Cardiomyopathy ([address] Dialysis care, ESRD (end stage renal disease) on dialysis ([address] Foot ulcer, left ([address] GERD (gastroesophageal reflux disease), Hemodialysis patient ([address] History of sleep apnea, Hyperlipidemia, Hyperparathyroidism ([address] Hypertension, Neuropathy, ROOSEVELT (obstructive sleep apnea), Peripheral vascular disease ([address] Pneumonia, S/P CABG (coronary artery bypass graft), and Type II or unspecified type diabetes mellitus without mention of complication, not stated as uncontrolled. Past Surgical History:   has a past surgical history that includes Cataract removal; Dialysis fistula creation (Left, ); Leg amputation, lower tibia/fibula; and IR CHEST TUBE INSERTION (5/10/2021). Restrictions  Restrictions/Precautions: Fall Risk (peripheral IV right distal wrist, fistula left forearm, left BK amputation)  Other position/activity restrictions: NO BP, venipunctures or BP from left arm due to AV fistula; up w/ assist; Previous PT evaluation dated 2021 had reported that pt was to be NWB right LE due to open wound/ Charcot foot per Dr. Matt Gomez note dated 2021- pt denies and reported to nurse Shannan that he was pivoting on right LE at home.   Required Braces or Orthoses?: Yes (L BKA prosthesis at home)     Vitals  Temp: 98 °F (36.7 °C)  Pulse: cognition, Decreased endurance, Decreased balance  Treatment Diagnosis: Impaired self-care status  Prognosis: Fair  Decision Making: Low Complexity  REQUIRES OT FOLLOW UP: Yes  Discharge Recommendations: Patient would benefit from continued therapy after discharge  Activity Tolerance: Patient limited by pain, Treatment limited secondary to agitation    Goals  Patient Goals   Patient goals : Pt's plan is to return to SNF. Short term goals  Time Frame for Short term goals: By Discharge  Short term goal 1: Pt will consistently complete bed mobility with Mod-Max A x1. Short term goal 2: Pt will actively participate in self-care routine and complete tasks at Max level of IND. Short term goal 3: Pt will actively participate in 15-20 minutes of therapeutic exercise/activity to promote increased independence and safety with self-care and mobility.     Plan  Safety Devices  Safety Devices in place: Yes  Type of devices: Patient at risk for falls, Left in bed, Bed alarm in place, Nurse notified     Plan  Times per week: 3-5  Times per day: Daily  Current Treatment Recommendations: Strengthening, ROM, Balance Training, Functional Mobility Training, Endurance Training, Pain Management, Safety Education & Training, Patient/Caregiver Education & Training, Equipment Evaluation, Education, & procurement, Positioning, Self-Care / ADL    Equipment Recommendations  Equipment Needed:  (TBD)  OT Individual Minutes  Time In: 0662  Time Out: 5943  Minutes: 19    Electronically signed by Denice Fields on 6/10/21 at 3:39 PM EDT

## 2021-06-10 NOTE — PROGRESS NOTES
Physical Therapy        Physical Therapy Cancel Note      DATE: 6/10/2021    NAME: Alisa Maxwell  MRN: [id number]   : 1955      Patient not seen this date for Physical Therapy due to:    6- at 1045- pt out of room at dialysis.         Electronically signed by Baljinder Sawyer PT on 6/10/2021 at 11:20 AM

## 2021-06-10 NOTE — PROGRESS NOTES
care, ESRD (end stage renal disease) on dialysis ([facility] Foot ulcer, left ([facility] GERD (gastroesophageal reflux disease), Hemodialysis patient ([facility] History of sleep apnea, Hyperlipidemia, Hyperparathyroidism ([facility] Hypertension, Neuropathy, ROOSEVELT (obstructive sleep apnea), Peripheral vascular disease ([facility] Pneumonia, S/P CABG (coronary artery bypass graft), and Type II or unspecified type diabetes mellitus without mention of complication, not stated as uncontrolled. has a past surgical history that includes Cataract removal; Dialysis fistula creation (Left, 2014); Leg amputation, lower tibia/fibula; and IR CHEST TUBE INSERTION (5/10/2021). Restrictions  Restrictions/Precautions  Restrictions/Precautions: Fall Risk (peripheral IV right distal wrist, fistula left forearm, left BK amputation)  Required Braces or Orthoses?: Yes (L BKA prosthesis at home)  Position Activity Restriction  Other position/activity restrictions: NO BP, venipunctures or BP from left arm due to AV fistula; up w/ assist; Previous PT evaluation dated 5-2-2021 had reported that pt was to be NWB right LE due to open wound/ Charcot foot per Dr. Heath Short note dated 4-- pt denies and reported to nurse Shannan that he was pivoting on right LE at home. Vision/Hearing  Vision: Impaired  Vision Exceptions:  (no vision L eye, impaired R eye)  Hearing: Within functional limits     Subjective  General  Patient assessed for rehabilitation services?: Yes  Response To Previous Treatment: Not applicable  Family / Caregiver Present: No  Referring Practitioner: Dr. Art Lawson  Referral Date : 06/09/21  Diagnosis: acute pulmonary edema  Follows Commands: Impaired  Other (Comment): OK per nurse Shannan to proceed w/ PT evaluation  General Comment  Comments: pt just returned from dialysis. Per , pt had missed a week of dialysis treatments due to diarrhea. Subjective  Subjective: Pt has C/O fatigue S/P dialysis.   \"I don't open my eyes for anyone. \"  Pt becomes increasingly agitated w/ questions. Pt initially refusing PT evaluation but agreed to bedside evaluation \" if it's quick. \"  Pain Screening  Patient Currently in Pain: Yes  Pain Assessment  Pain Assessment: 0-10  Pain Level: 10  Pain Type: Chronic pain  Pain Location: Generalized  Pain Onset: On-going  Clinical Progression: Not changed  Non-Pharmaceutical Pain Intervention(s): Repositioned  Response to Pain Intervention:  (agitated)  Vital Signs  Patient Currently in Pain: Yes       Orientation  Orientation  Overall Orientation Status: Impaired (stated he knew he was at [facility])  Orientation Level: Oriented to place;Oriented to person  Social/Functional History  Social/Functional History  Lives With: Alone  Type of Home: Apartment  Home Layout: One level  Home Access: Elevator, Level entry  Bathroom Shower/Tub: Tub/Shower unit  Home Equipment: 4 wheeled walker, Wheelchair-electric, Rolling walker (L BKA prosthesis)  Receives Help From: Family  ADL Assistance: Independent  Homemaking Assistance: Independent ((from w/c)  Homemaking Responsibilities: Yes  Ambulation Assistance:  (primarily uses electric W/C, minimal gait)  Transfer Assistance: Independent (independent pivot bed <> electric W/C; (wears L Prosthesis for transfers))  Occupation: On disability  IADL Comments: Nurse Shannan reports that the patient is capable of feeding himself but needs assist for rolling for bedpan and cleaning post BM (diarrhea) w/ 1-2 assist dependening on patient's level of cooperation. Pt wearing brief, no longer makes urine.   Additional Comments: Pt agitated and would not answer questions; above information taken from PT evaluation dated 5-2-2021  Cognition        Objective     Observation/Palpation  Observation: peripheral IV right distal wrist, fistula left forearm, left BK amputation; dry flaky skin right LE, right Charcot foot; dark scabbed area at the tip of the distal left residual limb    AROM RLE (degrees)  RLE AROM: WFL  RLE General AROM: except Charcot foot right- limited dorsiflexion  AROM LLE (degrees)  LLE AROM : Exceptions  LLE General AROM: left BK amputation  L Hip Flexion 0-125: 0-30  L Hip ABduction 0-45: 0-20  L Hip ADduction 0-10: neutral  L Knee Flexion 0-145: refused to demonstrate  L Knee Extension 0: 0  L Ankle Dorsiflexion 0-20: N/A- left BK amputee  L Ankle Plantar Flexion 0-45: N/A- left BK amputee  AROM RUE (degrees)  RUE General AROM: see OT for UE assessment  AROM LUE (degrees)  LUE General AROM: see OT for UE assessment  Strength RLE  Comment: grossly 3-/5 overall; except Charcot foot right- limited dorsiflexion  Strength LLE  Comment: grossly 3-/5  for hip overall; refused right knee MMT; N/A- left BK amputee  Strength RUE  Comment: see OT for UE assessment  Strength LUE  Comment: see OT for UE assessment     Sensation  Overall Sensation Status: Impaired (decreased hands and R foot)  Bed mobility  Scooting: Dependent/Total;2 Person assistance (to boost up in bed)  Comment: refused rolling or dangling at the EOB; pt boosted up in bed and positioned w/ HOB elevated to allow him to eat; after setup w/ tray- pt refused to eat stating that he \"will choke\"  Transfers  Comment: pt refused- left BK prosthesis is at home  Ambulation  Ambulation?: No (mainly uses electric W/C at home)  Stairs/Curb  Stairs?: No  Wheelchair Activities  Propulsion: No              Plan   Plan  Times per week: 5-7 treatments/ week  Times per day:  (5-7 treatments/ week)  Specific instructions for Next Treatment: advance as tolerated w/ exercise and progress w/ bed mobility and dangling at the EOB  Current Treatment Recommendations: Strengthening, Functional Mobility Training, Safety Education & Training, ROM, Balance Training, Endurance Training, Positioning  Safety Devices  Type of devices:  All fall risk precautions in place, Bed alarm in place, Call light within reach, Patient at risk for falls, Left in bed,

## 2021-06-10 NOTE — CARE COORDINATION
Social work; called [facility] and when kayla is completed need to fax to them. Phone and fax on kayla. Called admissions at [facility] BERNADETTE BARTH is getting precert. Will need to do dialysis here today as no transportation nor any arrangements with dialysis were made ahead of time. Dialysis requested dialysis orders be placed on kayla or faxed to them at discharge.   Faxed Xu thao

## 2021-06-11 NOTE — PROGRESS NOTES
Intake/Output Summary (Last 24 hours) at 6/11/2021 1657  Last data filed at 6/11/2021 1308  Gross per 24 hour   Intake 540 ml   Output    Net 540 ml     Patient Vitals for the past 96 hrs (Last 3 readings):   Weight   06/11/21 0519 169 lb 1.5 oz (76.7 kg)   06/10/21 0957 168 lb 3.4 oz (76.3 kg)   06/10/21 0532 174 lb 6.1 oz (79.1 kg)       Constitutional:  Alert, awake, no apparent distress  Cardiovascular:  S1, S2 without m/r/g  Respiratory:  CTA B without w/r/r  Abdomen: +bs, soft, nt  Ext: left knee amputationLE edema    Data/  Recent Labs     06/09/21  0519 06/10/21  0519   WBC 4.8 5.6   HGB 9.0* 9.0*   HCT 29.8* 30.2*   MCV 93.8 96.7   PLT 66* 94*     Recent Labs     06/09/21  0519 06/10/21  0519 06/11/21  0522    136  --    K 5.0 5.0 4.2   CL 97* 94*  --    CO2 25 25  --    GLUCOSE 110* 110*  --    PHOS 5.6* 6.7* 5.1*   MG 2.1 2.3 2.1   BUN 48* 62*  --    CREATININE 5.79* 6.40*  --    LABGLOM 10* 9*  --    GFRAA 12* 11*  --          Assessment/   1.  ESRD - Will maintain TTS hemodialysis schedule. Left radiocephalic AV fistula is functioning well. History of noncompliance with dialysis  Renal diet,i.e 2-gram sodium, 2-gram potassium,1500 ml fluid restriction,1-gram phosphorus, 1800 KCal and 1.2 gram/kg protein per day.     2.  Anemia of chronic kidney disease - Hemoglobin today is 9.0  g/dL-Retacrit 5000 units subcutaneously 3 times a week,once  iron stores and adequate.     3.  Chronic diarrhea history of C. difficile colitis May 2021 treated with oral vancomycin-recent C. difficile negative.     4. Acute pulmonary edema with CHF with systolic dysfunction EF 80-20 %-consider fluid removal with dialysis today     5.  Mineral and bone disease profile -serum phosphorus 6.8 Continue sevelamer 4000 mg p.o. 3 times daily with meals.     6.   Peripheral artery disease - s/p left BKA and right lower extremity chronic wound.     7.  History of Right large pleural effusion - S/pright thoracentesis [1300 ml] on 5/7/2020 and placement of a pigtail catheter 5/10/2021 which was removed 5/14/2021. X-ray on 6/7/2021 shows no significant pleural effusion     8.  Systemic hypertension - blood pressure is adequately controlled        Plan/   Will switch patient's phosphorus binders from sevelamer to calcium acetate 667  mg 2 tabs with meals to assess if diarrhea improves.     Awaiting precert to Olvin Infante MD

## 2021-06-11 NOTE — DISCHARGE SUMMARY
[place] [address] Internal Medicine    Discharge Summary     Patient ID: Elizabeth Villegas  :  1955   MRN: [id number]     ACCOUNT:  [de-identified]   Patient's PCP: João Berrios MD  Admit Date: 2021   Discharge Date: 2021    Length of Stay: 4  Code Status:  Full Code  Admitting Physician: Rachele Eugene MD  Discharge Physician: Rekha Nolasco MD     Active Discharge Diagnoses:     Primary Problem  Acute pulmonary edema [facility])      [facility]    Diagnosis Date Noted    Moderate malnutrition ([address] [E44.0] 2021    Acute respiratory failure with hypoxia ([address] [J96.01] 2021    Acute pulmonary edema ([address] [J81.0] 2021    Volume overload [E87.70] 2021    Non-compliance with renal dialysis ([address] [Z91.15] 2021    ROOSEVELT (obstructive sleep apnea) [G47.33] 2021    Diarrhea [R19.7]     CHF (congestive heart failure) ([address] [I50.9]     Hypertension, essential [I10]     Anemia of chronic disease [D63.8] 2013    ESRD on dialysis ([address] [N18.6, Z99.2] 2013    DM (diabetes mellitus) ([address] [E11.9] 2013       Admission Condition:  fair     Discharged Condition: fair    Hospital Stay:     Hospital Course:  Elizabeht Villegas is a 73 y.o. male who was admitted for the management of Acute pulmonary edema ([address] , presented to ER with Diarrhea    73 y.o.  male, with a history of CHF, anemia, CAD, ESRD on dialysis, hypertension, left BKA, tonic leg wounds, CABG, ROOSEVELT, diabetes mellitus left BKA.  [facility]  [facility] 1. Acute pulmonary edema secondary to volume overload from missing dialysis  2. Chronic combined systolic diastolic heart failure, last EF 30 to 35% in 2019  3. ESRD on hemodialysis  4. ROOSEVELTcontinue home CPAP  5. Coronary artery disease post CABG 4 years ago  6. Hypertensioncurrently controlled  7.  Type 2 diabetes well controlled last PROVIDED HISTORY: Pleural effusion/chest tube. TECHNOLOGIST PROVIDED HISTORY: Pleural effusion/chest tube. Reason for Exam: right chest tube Acuity: Acute Type of Exam: Initial FINDINGS: There is a right internal jugular central venous catheter, unchanged in appearance. A small bore right chest tube projects over the right lung base. There is no pneumothorax identified. There are persistent but improving bilateral perihilar opacities. There is no enlarging pleural effusion or pneumothorax evident. Cardiomegaly is unchanged. 1. Stable appearance of support lines and tubes. 2. No pneumothorax evident. 3. Cardiomegaly with improving perihilar opacities. XR CHEST PORTABLE    Result Date: 5/13/2021  EXAMINATION: ONE X-RAY VIEW OF THE CHEST 5/13/2021 6:38 am COMPARISON: 05/12/2021 HISTORY: ORDERING SYSTEM PROVIDED HISTORY: Right pleural effusion TECHNOLOGIST PROVIDED HISTORY: Right pleural effusion Reason for Exam: right chest tube Acuity: Acute Type of Exam: Subsequent/Follow-up FINDINGS: Right jugular central venous catheter and right pleural pigtail catheter remain in place. Sternal wires from prior heart surgery. The heart is enlarged with perihilar congestive changes and patchy perihilar opacities which may be due to edema, atelectasis, infection, or a combination. Similar to slight increase in congestive changes and parenchymal opacities. No large pneumothorax is seen. Calcific plaque of the thoracic aorta. There may be a tiny residual right pleural effusion. Similar to slight increase in congestive changes and mild patchy perihilar opacities. Consultations:    Consults:     Final Specialist Recommendations/Findings:   IP CONSULT TO NEPHROLOGY  IP CONSULT TO INTERNAL MEDICINE      The patient was seen and examined on day of discharge and this discharge summary is in conjunction with any daily progress note from day of discharge.     Discharge plan:     Disposition: ECF    Instructions to Patient: Keep follow-up appointments      Requiring Further Evaluation/Follow Up POST HOSPITALIZATION/Incidental Findings:    Physician Follow Up:     No follow-up provider specified. Diet: renal    Activity: As tolerated    Discharge Medications:      Medication List      START taking these medications    calcium acetate 667 MG Tabs  Commonly known as: PHOSLO  Take 2 tablets by mouth 3 times daily (with meals)     midodrine 2.5 MG tablet  Commonly known as: PROAMATINE  Take 1 tablet by mouth 3 times daily        CHANGE how you take these medications    digoxin 125 MCG tablet  Commonly known as: LANOXIN  Take 1 tablet by mouth daily  What changed:   · when to take this  · additional instructions     sevelamer 800 MG tablet  Commonly known as: RENVELA  Take 5 tablets by mouth 3 times daily (with meals)  What changed: how much to take        CONTINUE taking these medications    amiodarone 200 MG tablet  Commonly known as: CORDARONE  Take 1 tablet by mouth daily     aspirin 81 MG EC tablet  Take 1 tablet by mouth daily     atorvastatin 40 MG tablet  Commonly known as: LIPITOR  Take 1 tablet by mouth nightly     * glucose monitoring kit monitoring kit  1 kit by Does not apply route daily as needed     * ONE TOUCH ULTRA 2 w/Device Kit  TEST 3 TIMES DAILY     insulin lispro 100 UNIT/ML injection vial  Commonly known as: HUMALOG     Lancets 30G Misc  1 each by Does not apply route 3 times daily     metoprolol tartrate 25 MG tablet  Commonly known as: LOPRESSOR  Take 1 tablet by mouth 2 times daily     nitroGLYCERIN 0.4 MG SL tablet  Commonly known as: NITROSTAT     ONE TOUCH ULTRA TEST strip  Generic drug: blood glucose test strips  USE AS DIRECTED DAILY AS NEEDED     pantoprazole 40 MG tablet  Commonly known as: PROTONIX     Celena-Mckenna Rx 1 MG Tabs     Walker Duncan Wheels Misc  1 Device by Does not apply route continuous         * This list has 2 medication(s) that are the same as other medications prescribed for you. Read the directions carefully, and ask your doctor or other care provider to review them with you. STOP taking these medications    Bumex 2 MG tablet  Generic drug: bumetanide     metOLazone 10 MG tablet  Commonly known as: ZAROXOLYN     metoprolol succinate 100 MG extended release tablet  Commonly known as: TOPROL XL     vancomycin 125 MG capsule  Commonly known as: VANCOCIN           Where to Get Your Medications      These medications were sent to [address] [place] [address]    Phone: [phone]   · calcium acetate 667 MG Tabs     Information about where to get these medications is not yet available    Ask your nurse or doctor about these medications  · amiodarone 200 MG tablet  · atorvastatin 40 MG tablet  · metoprolol tartrate 25 MG tablet  · midodrine 2.5 MG tablet         Time Spent on discharge is  35 mins in patient examination, evaluation, counseling as well as medication reconciliation, prescriptions for required medications, discharge plan and follow up. Electronically signed by   Pedro Denver, MD  6/11/2021  1:36 PM      Thank you Dr. Aaron Dallas MD for the opportunity to be involved in this patient's care.

## 2021-06-11 NOTE — PROGRESS NOTES
[facility] Internal Medicine    Progress Note    6/11/2021    12:10 PM    Name:   Tirso Warner  MRN:     [id number]     [place]:      [de-identified]   Room:   2097/2097-01  IP Day:  4  Admit Date:  6/7/2021  2:27 PM    PCP:   Dick Alvarez MD  Code Status:  Full Code    Subjective:     C/C:   Chief Complaint   Patient presents with    Diarrhea         Interval History Status: Improving    HPI:     73 y.o.  male, with a history of CHF, anemia, CAD, ESRD on dialysis, hypertension, left BKA, tonic leg wounds, CABG, ROOSEVELT, diabetes mellitus left BKA. Interval shortness of breath and watery diarrhea from [facility] Farfan. Review of Systems:   Denies any shortness of breath or cough  Denies chest pain or palpitations  Denies abdominal pain, diarrhea vomiting  Denies any new numbness tremors or weakness. Medications: Allergies:     Allergies   Allergen Reactions    Pcn [Penicillins] Other (See Comments)     Trouble walking       Current Meds:   Scheduled Meds:    calcium acetate  2 tablet Oral TID WC    sodium chloride flush  5-40 mL Intravenous 2 times per day    heparin (porcine)  5,000 Units Subcutaneous 3 times per day    insulin lispro  0-12 Units Subcutaneous TID WC    insulin lispro  0-6 Units Subcutaneous Nightly    amiodarone  200 mg Oral Daily    aspirin  81 mg Oral Daily    atorvastatin  40 mg Oral Nightly    B complex-vitamin C-folic acid  1 tablet Oral Daily    digoxin  125 mcg Oral Daily    metoprolol tartrate  25 mg Oral BID    midodrine  10 mg Oral TID     Continuous Infusions:    sodium chloride      dextrose       PRN Meds: sodium chloride flush, sodium chloride, ondansetron **OR** ondansetron, glucose, dextrose, glucagon (rDNA), dextrose    Data:     Past Medical History:   has a past medical history of Acute on chronic congestive heart failure ([facility] [address] Anemia, CAD (coronary artery disease), Cardiomyopathy ([facility] [address] Dialysis care, ESRD (end stage renal disease) on dialysis ([facility] [address] Foot ulcer, left ([facility] [place] GERD (gastroesophageal reflux disease), Hemodialysis patient ([facility] [place] [facility] History of sleep apnea, Hyperlipidemia, Hyperparathyroidism ([facility] Hypertension, Neuropathy, ROOSEVELT (obstructive sleep apnea), Peripheral vascular disease ([facility] [place] [facility] Pneumonia, S/P CABG (coronary artery bypass graft), and Type II or unspecified type diabetes mellitus without mention of complication, not stated as uncontrolled. Social History:   reports that he has never smoked. He has never used smokeless tobacco. He reports that he does not drink alcohol and does not use drugs. Family History:   Family History   Problem Relation Age of Onset    Heart Disease Father     Diabetes Father     Diabetes Brother        Vitals:  /65   Pulse 64   Temp 97.6 °F (36.4 °C) (Oral)   Resp 18   Ht 6' (1.829 m)   Wt 169 lb 1.5 oz (76.7 kg)   SpO2 100%   BMI 22.93 kg/m²   Temp (24hrs), Av.9 °F (36.6 °C), Min:97.6 °F (36.4 °C), Max:98.2 °F (36.8 °C)    Recent Labs     06/10/21  1621 06/10/21  2221 21  0651 21  1112   POCGLU 107 148* 129* 135*       I/O (24Hr):     Intake/Output Summary (Last 24 hours) at 2021 1210  Last data filed at 6/10/2021 1801  Gross per 24 hour   Intake 240 ml   Output    Net 240 ml       Labs:    Lab Results   Component Value Date    WBC 5.6 06/10/2021    HGB 9.0 (L) 06/10/2021    HCT 30.2 (L) 06/10/2021    MCV 96.7 06/10/2021    PLT 94 (L) 06/10/2021     Lab Results   Component Value Date     06/10/2021    K 4.2 2021    CL 94 06/10/2021    CO2 25 06/10/2021    BUN 62 06/10/2021    CREATININE 6.40 06/10/2021    GLUCOSE 110 06/10/2021    GLUCOSE 128 2012    CALCIUM 9.7 06/10/2021          Lab Results   Component Value Date/Time    SPECIAL NOT REPORTED 2021 10:00 AM     Lab Results   Component Value Date/Time    CULTURE NO GROWTH 6 DAYS 05/10/2021 03:10 PM         Radiology:    Recent data reviewed    Physical Examination: General appearance:  alert, cooperative and no distress  Eyes: Anicteric sclera. Pupils are equally round and reactive to light. Extraocular movements are intact. Lungs:  clear to auscultation bilaterally, normal effort, no wheeze or crackles  Heart:  regular rate and rhythm, no murmur  Abdomen:  soft, nontender, no masses    Extremities:  no edema, redness, tenderness in the calves, left BKA, right LE chronic wound  Skin:  no gross lesions, rashes, induration  Neuro:  Alert, oriented X 3,Non-focal.  Assessment:        Primary Problem  Acute pulmonary edema [facility])    Active Hospital Problems    Diagnosis Date Noted    Moderate malnutrition ([facility] [address] [E44.0] 06/09/2021    Acute respiratory failure with hypoxia (HCC) [J96.01] 06/08/2021    Acute pulmonary edema (HCC) [J81.0] 06/07/2021    Volume overload [E87.70] 06/07/2021    Non-compliance with renal dialysis ([facility] [address] [Z91.15] 06/07/2021    ROOSEVELT (obstructive sleep apnea) [G47.33] 05/08/2021    Diarrhea [R19.7]     CHF (congestive heart failure) ([facility]) [I50.9]     Hypertension, essential [I10]     Anemia of chronic disease [D63.8] 07/03/2013    ESRD on dialysis ([facility] [address] [N18.6, Z99.2] 06/27/2013    DM (diabetes mellitus) ([facility] [address] [E11.9] 06/27/2013           DVT prophylaxis: Heparin  Antibiotics: None    Plan:      73 y.o.  male, with a history of CHF, anemia, CAD, ESRD on dialysis, hypertension, left BKA, tonic leg wounds, CABG, ROOSEVELT, diabetes mellitus left BKA. Interval shortness of breath and watery diarrhea from [facility].     1. Acute pulmonary edema secondary to volume overload from missing dialysis  2. Acute hypoxic respiratory failure secondary to pulmonary edema Covid negative  3. Chronic combined systolic diastolic heart failure, last EF 30 to 35% in 2019  4. ESRD on hemodialysis  5. ROOSEVELTcontinue home CPAP  6. Coronary artery disease post CABG 4 years ago  7. Hypertensioncurrently controlled  8.  Type 2 diabetes well controlled last HbA1c 6.6 January

## 2021-06-11 NOTE — PLAN OF CARE
Problem: Falls - Risk of:  Goal: Will remain free from falls  Description: Will remain free from falls  Outcome: Met This Shift  Goal: Absence of physical injury  Description: Absence of physical injury  Outcome: Met This Shift  Note: Complete bedrest maintained

## 2021-06-12 NOTE — PROGRESS NOTES
Nephrology Progress Note    Subjective/   73y.o. year old male who we are seeing in consultation for ESRD on hemodialysis      Interval History:  Patient seen on dialysis  Patient currently denies fever SOB, chest pain. He still continues to have loose watery stool. C. difficile was negative. Stool Giardia cryptosporidium antigens negative  History of Present Illness: This is a 73 y. o. male with history of CAD [status post CABG], Heart failure with reduced ejection fraction secondary to ischemic cardiomyopathy [LVEF 30 to 35% in June 2019], dyslipidemia and End stage renal disease secondary to nephrosclerosis [on hemodialysis Tuesday,Thursday and Saturday under the care of Dr. Danilo Bains at [facility] by way of a left arm AV fistula], who presented with chronic , diarrhea, nausea and vomiting and generalized weakness for the last 5 days. Patient had not received dialysis since 5/29/2021. He reports not going to his outpatient treatment due to diarrhea which does not improve with medication. He was admitted early last month for similar symptoms and treated for C. difficile diarrhea. His current diarrhea is nonbloody, loose occurring 3-4 times daily. Upon evaluation he had BUN/creatinine of 97 and 9.12. His potassium this morning is 5.8. Platelet count was 69 on admission. his troponin is 306. He is 5 kg below his dry weight. His chest x-ray shows   Diffuse interstitial and alveolar opacities throughout the lungs bilaterally, now moderate. Somewhat asymmetric and more pronounced on the right.   Possibly related to pulmonary edema or multifocal lobular pneumonia  Objective/     Vitals:    06/10/21 0957 06/10/21 1000 06/10/21 1030 06/10/21 1100   BP: 115/60 117/63 116/62 123/67   Pulse: 61 64 64 67   Resp: 18      Temp: 98 °F (36.7 °C)      TempSrc:       SpO2:       Weight: 168 lb 3.4 oz (76.3 kg)      Height:         24HR INTAKE/OUTPUT:      Intake/Output Summary (Last 24 hours) at 6/10/2021 PHYSICIAN NEXT STEPS:  Review Only    CHIEF COMPLAINT:  Chief Complaint/Protocol Used: Alcohol Abuse and Dependence  Onset: Today      ASSESSMENT:  ? Onset: Today  ? Normal True  ? Normal, no trouble breathing True  ? Do You Drink: Yes, is drinking for every 2-3 weeks every other day.   ? How Often: Wine and dark liquor. Varies.  ? How Much: Varies sometimes a lot;   ? Most: Drinking every other day.  ? Last 24 Hours: Katja clayton had 4 small bottles; hard liquid.  ? Drinking Problem: Never drank this much before. Before was more moderate.  ? Drug Problem: Smoking weed.  ? Symptoms: Heart rate is 76. No other symptoms; did feel sligh fatigue.   ? Detox Program: None  ? Therapist: None  ? Support: Yes.  ? Pregnancy: Not pregnant; lmp birthcontrol; no period x2 years.   -------------------------------------------------------    DISPOSITION:  Disposition Recommendation: See PCP When Office is Open (within 3 days)  Questions that led to disposition:  ? Alcohol or drug abuse, known or suspected  Patient Directed To: Unspecified  Patient Intended Action: Seek care in the doctor's office       CALL NOTES:  06/12/2021 at 4:52 PM by Obdulia Burrell  ? Stated will call back if needed.06/12/2021 at 4:52 PM by Obdulia Burrell  ? 3 day disposition; offered appointment with PCP declines; stated has been drinking heavily in the last three weeks; was calling to see if she was having alcohol intoxication; no symptoms at time; heart rate 76.  Advised to go to ED and call 911 if   symtpoms worsen; stated is fine now has no symptoms was having faitigue earlier today.  Recommended to not drink alcohol due to the adverse effects if could cause.    DISPOSITION OVERRIDE/PROVIDER CONSULT:  Disposition Override: N/A  Override Source: Unspecified  Consulted with PCP: No  Consulted with On-Call Physician: No    CALLER CONTACT INFO:  Name: Luca Waggoner (Self)  Phone 1: [phone] (Work Phone)  Phone 2: [phone] (Home  [facility] filed at 6/10/2021 0850  Gross per 24 hour   Intake 925 ml   Output    Net 925 ml     Patient Vitals for the past 96 hrs (Last 3 readings):   Weight   06/10/21 0957 168 lb 3.4 oz (76.3 kg)   06/10/21 0532 174 lb 6.1 oz (79.1 kg)   06/09/21 0242 177 lb 11.1 oz (80.6 kg)       Constitutional:  Alert, awake, no apparent distress  Cardiovascular:  S1, S2 without m/r/g  Respiratory:  CTA B without w/r/r  Abdomen: +bs, soft, nt  Ext: left knee amputationLE edema    Data/  Recent Labs     06/08/21  0529 06/09/21  0519 06/10/21  0519   WBC 5.2 4.8 5.6   HGB 8.6* 9.0* 9.0*   HCT 28.5* 29.8* 30.2*   MCV 95.2 93.8 96.7   PLT 84* 66* 94*     Recent Labs     06/08/21  0529 06/08/21  1842 06/09/21  0519 06/10/21  0519     --  139 136   K 5.8* 4.9 5.0 5.0     --  97* 94*   CO2 24  --  25 25   GLUCOSE 142*  --  110* 110*   PHOS 6.8*  --  5.6* 6.7*   MG 2.5  --  2.1 2.3   *  --  48* 62*   CREATININE 9.19*  --  5.79* 6.40*   LABGLOM 6*  --  10* 9*   GFRAA 7*  --  12* 11*         Assessment/   1.  ESRD - Will maintain TTS hemodialysis schedule. Left radiocephalic AV fistula is functioning well. History of noncompliance with dialysis  Renal diet,i.e 2-gram sodium, 2-gram potassium,1500 ml fluid restriction,1-gram phosphorus, 1800 KCal and 1.2 gram/kg protein per day.     2.  Anemia of chronic kidney disease - Hemoglobin today is 9.0  g/dL-Retacrit 5000 units subcutaneously 3 times a week,once  iron stores and adequate.     3.  Chronic diarrhea history of C. difficile colitis May 2021 treated with oral vancomycin-recent C. difficile negative.     4. Acute pulmonary edema with CHF with systolic dysfunction EF 58-14 %-consider fluid removal with dialysis today     5.  Mineral and bone disease profile -serum phosphorus 6.8 Continue sevelamer 4000 mg p.o. 3 times daily with meals.     6.   Peripheral artery disease - s/p left BKA and right lower extremity chronic wound.     7.  History of Right large Phone)      ENCOUNTER STARTED:  06/12/21 04:41:26 PM  ENCOUNTER ASSIGNED TO/CLOSED BY:  Obdulia Burrell @ 06/12/21 04:53:05 PM      -------------------------------------------------------    CARE ADVICE given per Alcohol Abuse and Dependence guideline.  SEE PCP WITHIN 3 DAYS:  * You need to be seen within 2 or 3 days. Call your doctor during regular office hours and make an appointment. An urgent care center is often the best source of care if your doctor's office is closed or you can't get an appointment. NOTE: If office will   be open tomorrow, tell caller to call then, not in 3 days.   * IF PATIENT HAS NO PCP: An urgent care center is often the best source of care if you do not have a regular doctor you can see in the next couple days. NOTE: Try to help caller find a doctor. Is there a physician referral line or other resource? Having   a PCP or 'medical home' means better long-term care.?; CALL BACK IF:    * Severe or constant abdominal pain    * Vomiting persists over 4 hours    * Feeling very weak or shaky    * You become worse or have other questions.      UNDERSTANDS CARE ADVICE: Yes    AGREES WITH CARE ADVICE: No    WILL FOLLOW CARE ADVICE: No    -------------------------------------------------------   pleural effusion - S/pright thoracentesis [1300 ml] on 5/7/2020 and placement of a pigtail catheter 5/10/2021 which was removed 5/14/2021. X-ray on 6/7/2021 shows no significant pleural effusion     8.  Systemic hypertension - blood pressure is adequately controlled        Plan/   Will switch patient's phosphorus binders from sevelamer to calcium acetate 667  mg 2 tabs with meals to assess if diarrhea improves.         Sobeida Rodgers MD

## 2021-06-12 NOTE — PROGRESS NOTES
Pt discharged to [facility] via [facility]. All patient belongings were returned. Heart monitor removed. No IV in place. All questions were answered.

## 2021-06-15 NOTE — PROGRESS NOTES
diagnosis  -- Disagree - Not applicable / Not valid  -- Disagree - Clinically unable to determine / Unknown  -- Refer to Clinical Documentation Reviewer    PROVIDER RESPONSE TEXT:    Acute Respiratory Failure has been ruled out after study.     Query created by: Nighat Melendez on 6/11/2021 11:10 AM      Electronically signed by:  Anu Carlton MD 6/15/2021 4:19 PM

## 2021-06-29 PROBLEM — T14.8XXA INFECTED WOUND: Status: ACTIVE | Noted: 2021-01-01

## 2021-06-29 PROBLEM — L08.9 INFECTED WOUND: Status: ACTIVE | Noted: 2021-01-01

## 2021-06-29 NOTE — PROGRESS NOTES
Dr Palma Licona returns call for consult; condition report given;labs , vs reviewed; no dialysis today ; will get dialysis tomorrow

## 2021-06-29 NOTE — ED NOTES
Bed: 05  Expected date:   Expected time:   Means of arrival:   Comments:  Nubia Brian RN  06/29/21 1117

## 2021-06-29 NOTE — PROGRESS NOTES
Pharmacy Note  Vancomycin Consult    Zachery Baker is a 73 y.o. male started on Vancomycin for AMS, wounds, R/O pneumonia; consult received from Dr. Iliana Gamez to manage therapy. Also receiving the following antibiotics: cefepime. Patient Active Problem List   Diagnosis    SDH (subdural hematoma) (HCC)    ESRD on dialysis ([address]    DM (diabetes mellitus) ([address]    Anemia of chronic disease    Charcot foot due to diabetes mellitus ([address]    Diabetic ulcer of left foot ([address]    Hypertension, essential    Type 2 diabetes mellitus with foot ulcer ([address]    Noncompliance    CHF (congestive heart failure) ([facility])    Sepsis ([address]    Metabolic acidosis    Azotemia    Hyperkalemia    Diarrhea    Nausea and vomiting    Peripheral vascular disease ([address]    History of left below knee amputation (HCC)    Hemoptysis    Ulcerated, foot, right, with fat layer exposed ([address]    Charcot's joint of foot, right    Pneumonia    Sepsis due to pneumonia ([address]    Loculated pleural effusion    Pleural effusion on right    ROOSEVELT (obstructive sleep apnea)    Parapneumonic effusion    C. difficile colitis    Acute pulmonary edema (HCC)    Volume overload    Non-compliance with renal dialysis ([facility] [address]    Acute respiratory failure with hypoxia ([facility])    Moderate malnutrition ([facility])     Allergies:  Pcn [penicillins]     Temp max: 98.1 F    Recent Labs     06/29/21  1138   BUN 50*   CREATININE 3.67*   WBC 21.4*     No intake or output data in the 24 hours ending 06/29/21 1235  Culture Date      Source                       Results  See micro    Ht Readings from Last 1 Encounters:   06/29/21 6' (1.829 m)        Wt Readings from Last 1 Encounters:   06/29/21 170 lb (77.1 kg)       Body mass index is 23.06 kg/m². Estimated Creatinine Clearance: 22 mL/min (A) (based on SCr of 3.67 mg/dL (H)).     Goal Trough Level: 15-20 mcg/mL    Assessment/Plan:  Will initiate Vancomycin with a one time loading dose of 1250 mg x 1, followed dosing per dialysis protocol. To give next 1000 mg at time of next dialysis. Timing of trough level will be determined based on culture results, renal function, and clinical response. Thank you for the consult. Will continue to follow. Dexter Davila. Ph.  6/29/2021  12:38 PM

## 2021-06-29 NOTE — H&P
[address]     HISTORY AND PHYSICAL EXAMINATION            Date:   6/29/2021  Patient name:  Samina Herron  Date of admission:  6/29/2021 11:18 AM  MRN:   [id number]  Account:  [de-identified]  YOB: 1955  PCP:    Raul Orellana MD  Room:   [facility]  Code Status:    Full Code    Chief Complaint:     Chief Complaint   Patient presents with    Altered Mental Status       History Obtained From:     patient, non-family caregiver - nurse, electronic medical record    History of Present Illness: The patient is a 73 y.o. / male who presents withAltered Mental Status   and he is admitted to the hospital for the management of sepsis secondary to wounds. Patient comes from a local nursing home. Patient complained about increasing pains in bilateral thighs. In the ED, patient was afebrile (97.8 F), tachypneic, and hypotensive (106/55). Patient was given Cefepime IV and Vancomycin IV in the ED. Patient was transferred to the floor.          Past Medical History:     Past Medical History:   Diagnosis Date    Acute on chronic congestive heart failure (HCC)     Anemia     CAD (coronary artery disease)     Cardiomyopathy ([address]     Dialysis care     ESRD (end stage renal disease) on dialysis ([address]     Foot ulcer, left ([address] 2015    GERD (gastroesophageal reflux disease)     Hemodialysis patient ([address] 2011    MOM-WED-FRI-ON [address] [facility] [address]     History of sleep apnea     none since significant weight loss (was 400#)    Hyperlipidemia     Hyperparathyroidism ([address]     Hypertension     Neuropathy     ROOSEVELT (obstructive sleep apnea) 5/8/2021    Peripheral vascular disease ([address]     Pneumonia     resolved    S/P CABG (coronary artery bypass graft) 06/2016    Type II or unspecified type diabetes mellitus without mention of complication, not stated as uncontrolled     dx-1980        Past SurgicalHistory:     Past Surgical History:   Procedure Laterality Date    CATARACT REMOVAL      DIALYSIS FISTULA CREATION Left 2014    IR CHEST TUBE INSERTION  5/10/2021    IR CHEST TUBE INSERTION 5/10/2021 [facility]    LEG AMPUTATION THROUGH LOWER TIBIA AND FIBULA          Medications Prior to Admission:        Prior to Admission medications    Medication Sig Start Date End Date Taking? Authorizing Provider   acetaminophen (TYLENOL) 500 MG tablet Take 1,000 mg by mouth every 6 hours as needed for Pain   Yes Historical Provider, MD   calcium acetate (PHOSLO) 667 MG TABS Take 2 tablets by mouth 3 times daily (with meals) 6/10/21  Yes She Rasheed MD   amiodarone (CORDARONE) 200 MG tablet Take 1 tablet by mouth daily 6/9/21  Yes She Rasheed MD   atorvastatin (LIPITOR) 40 MG tablet Take 1 tablet by mouth nightly 6/9/21  Yes She Rasheed MD   metoprolol tartrate (LOPRESSOR) 25 MG tablet Take 1 tablet by mouth 2 times daily 6/9/21  Yes She Rasheed MD   midodrine (PROAMATINE) 2.5 MG tablet Take 1 tablet by mouth 3 times daily 6/9/21  Yes She Rasheed MD   pantoprazole (PROTONIX) 40 MG tablet Take 40 mg by mouth daily   Yes Historical Provider, MD   B Complex-C-Folic Acid (ALBERTA-FERNANDA RX) 1 MG TABS Take 1 tablet by mouth daily   Yes Historical Provider, MD   digoxin (LANOXIN) 125 MCG tablet Take 1 tablet by mouth daily 5/16/21  Yes Martha Rosario MD   sevelamer (RENVELA) 800 MG tablet Take 5 tablets by mouth 3 times daily (with meals) 5/15/21  Yes Martha Rosario MD   nitroGLYCERIN (NITROSTAT) 0.4 MG SL tablet Place 0.4 mg under the tongue every 5 minutes as needed for Chest pain up to max of 3 total doses. If no relief after 1 dose, call 911.    Yes Historical Provider, MD   aspirin 81 MG EC tablet Take 1 tablet by mouth daily 3/14/16  Yes Ruperto Harvey, DO   ONE TOUCH ULTRA TEST strip USE AS DIRECTED DAILY AS NEEDED normal. No respiratory distress. Breath sounds: Normal breath sounds. No wheezing or rales. Abdominal:      General: Bowel sounds are normal. There is no distension. Palpations: Abdomen is soft. Tenderness: There is no abdominal tenderness. There is no guarding or rebound. Musculoskeletal:      Right lower leg: No edema. Left lower leg: No edema. Comments: Muscle atrophy   Skin:     Findings: Bruising and erythema present. Comments: See images   Neurological:      Mental Status: He is disoriented.                                 Investigations:     Laboratory Testing:  Recent Results (from the past 24 hour(s))   CBC Auto Differential    Collection Time: 06/29/21 11:38 AM   Result Value Ref Range    WBC 21.4 (H) 3.5 - 11.0 k/uL    RBC 3.14 (L) 4.5 - 5.9 m/uL    Hemoglobin 8.9 (L) 13.5 - 17.5 g/dL    Hematocrit 29.5 (L) 41 - 53 %    MCV 94.1 80 - 100 fL    MCH 28.4 26 - 34 pg    MCHC 30.2 (L) 31 - 37 g/dL    RDW 21.1 (H) 11.5 - 14.9 %    Platelets 300 (L) 575 - 450 k/uL    MPV 9.6 6.0 - 12.0 fL    NRBC Automated NOT REPORTED per 100 WBC    Differential Type NOT REPORTED     Immature Granulocytes NOT REPORTED 0 %    Absolute Immature Granulocyte NOT REPORTED 0.00 - 0.30 k/uL    WBC Morphology NOT REPORTED     RBC Morphology NOT REPORTED     Platelet Estimate NOT REPORTED     Seg Neutrophils 92 (H) 36 - 66 %    Lymphocytes 1 (L) 24 - 44 %    Monocytes 2 1 - 7 %    Eosinophils % 0 0 - 4 %    Basophils 0 0 - 2 %    Bands 5 0 - 10 %    Segs Absolute 19.69 (H) 1.3 - 9.1 k/uL    Absolute Lymph # 0.21 (L) 1.0 - 4.8 k/uL    Absolute Mono # 0.43 0.1 - 1.3 k/uL    Absolute Eos # 0.00 0.0 - 0.4 k/uL    Basophils Absolute 0.00 0.0 - 0.2 k/uL    Absolute Bands # 1.07 (H) 0.0 - 1.0 k/uL    Morphology HYPOCHROMIA PRESENT     Morphology ANISOCYTOSIS PRESENT    Basic Metabolic Panel    Collection Time: 06/29/21 11:38 AM   Result Value Ref Range    Glucose 175 (H) 70 - 99 mg/dL    BUN 50 (H) 8 - 23 mg/dL CREATININE 3.67 (H) 0.70 - 1.20 mg/dL    Bun/Cre Ratio NOT REPORTED 9 - 20    Calcium 10.1 8.6 - 10.4 mg/dL    Sodium 135 135 - 144 mmol/L    Potassium 4.2 3.7 - 5.3 mmol/L    Chloride 93 (L) 98 - 107 mmol/L    CO2 31 20 - 31 mmol/L    Anion Gap 11 9 - 17 mmol/L    GFR Non-African American 17 (L) >60 mL/min    GFR African American 20 (L) >60 mL/min    GFR Comment          GFR Staging NOT REPORTED    C-Reactive Protein    Collection Time: 06/29/21 11:38 AM   Result Value Ref Range    .8 (H) 0.0 - 5.0 mg/L       Imaging/Diagnostics:  XR CHEST PORTABLE    Result Date: 6/29/2021  EXAMINATION: ONE XRAY VIEW OF THE CHEST 6/29/2021 11:23 am COMPARISON: 06/07/2021, 05/14/2021 HISTORY: ORDERING SYSTEM PROVIDED HISTORY: shortness of breath TECHNOLOGIST PROVIDED HISTORY: shortness of breath Reason for Exam: sob, altered mental status Acuity: Unknown Type of Exam: Unknown FINDINGS: Cardiac silhouette enlargement again noted. The patient is status post median sternotomy. Opacities in the right lung base are again demonstrated. The amount of fissural fluid on the right side appears improved. Similar blunting of the right costophrenic angle. No pneumothorax identified. Persistent right basilar opacity and small right effusion with little interval change. XR CHEST PORTABLE    Result Date: 6/7/2021  EXAMINATION: ONE XRAY VIEW OF THE CHEST 6/7/2021 2:15 pm COMPARISON: May 14, 2021. HISTORY: ORDERING SYSTEM PROVIDED HISTORY: ESRD TECHNOLOGIST PROVIDED HISTORY: ESRD Reason for Exam: Pt states right wrist and right elbow pain and numbness 2 weeks. No injury. Acuity: Unknown Type of Exam: Unknown FINDINGS: A portable frontal view chest radiograph was obtained. The heart is enlarged. The mediastinal contour and pleural spaces are within normal limits. Sternal fixation wires are present. Diffuse interstitial and alveolar opacities are present throughout the lungs bilaterally to a moderate degree.  No pneumothorax or significant pleural effusion. No acute thoracic osseous abnormality. New or worsening diffuse interstitial and alveolar opacities throughout the lungs bilaterally, now moderate. This is somewhat asymmetric and more pronounced on the right. The appearance is nonspecific and may be due to pulmonary edema or multilobar pneumonia. Cardiomegaly. No significant pleural effusion. Assessment :      Primary Problem  Infected wound    Active Hospital Problems    Diagnosis Date Noted    Infected wound [T14. 8XXA, L08.9] 06/29/2021    Moderate malnutrition ([facility] [E44.0] 06/09/2021    History of left below knee amputation ([facility]) [Z89.512]     CHF (congestive heart failure) ([facility]) [I50.9]     Type 2 diabetes mellitus with foot ulcer ([facility] [B70.511, L97.509]     Hypertension, essential [I10]     ESRD on dialysis ([facility] [N18.6, Z99.2] 06/27/2013       Plan:     Patient status Admit as inpatient in the  Progressive Unit/Step down    Sepsis secondary to cellulitis  -Leukocytosis   -CXR negative for pneumonia  -Fluids in the ED  -Cefepime 1000 mg IVPB  -Metronidazole 500 mg IV  -Vancomycin 1250 mg IV  -Consult Infectious Disease  -Consult General Surgery   Transfer to [facility] for debridement and medical mgmt    ESRD on dialysis  -3.67 (close to baseline)  -Normal saline at 125 mL/hr  -Continued Midodrine  -Continued Sevelamer  -Consulted Nephrology    T2DM  - 01/2021, Hgb A1C: 6.6  -Discontinued home medications  -Glucose: 175  -Not on Lantus, GRANT  -Adult diet    CHF  -Echo, Previous echo 06/19 estimated LV EF 30-35 %.   -BNP    Consultations:   PHARMACY TO DOSE VANCOMYCIN  IP CONSULT TO PRIMARY CARE PROVIDER  IP CONSULT TO SOCIAL WORK  IP CONSULT TO INFECTIOUS DISEASES  IP CONSULT TO NEPHROLOGY  IP CONSULT TO GENERAL SURGERY    Patient is admitted as inpatient status because of co-morbiditieslisted above, severity of signs and symptoms as outlined, requirement for current medical therapies and most importantly because of direct risk to patient if care not provided in a hospital setting. Oleksandr Marquez MD  6/29/2021  4:40 PM    Copy sent to Dr. Dianne De Jesus MD    Attending Physician Statement  I have discussed the care of Prasad Monae with the resident team. I have examined the patient myself and taken ros and hpi , including pertinent history and exam findings,  with the resident. I have reviewed the key elements of all parts of the encounter with the resident. I agree with the assessment, plan and orders as documented by the resident. Principal Problem:    Infected wound  Active Problems:    ESRD on dialysis ([facility]    Hypertension, essential    Type 2 diabetes mellitus with foot ulcer (HCC)    CHF (congestive heart failure) ([facility]    History of left below knee amputation (HCC)    Moderate malnutrition ([address]  Resolved Problems:    * No resolved hospital problems.  *        Septic shock sec to [place] gangrene  Multiple co-mobidities- ESRd on HD,T2DM, Afib, CHF  Broad spectrum Abx, fluids, pressors  Transfer to [facility] signed by Juwan Patrick MD

## 2021-06-29 NOTE — ED PROVIDER NOTES
[address] ED  eMERGENCY dEPARTMENT eNCOUnter      Pt Name: Rickey Barker  MRN: [id number]  [place] 1955  Date of evaluation: 6/29/21      CHIEF COMPLAINT       Chief Complaint   Patient presents with    Altered Mental Status         HISTORY OF PRESENT ILLNESS    Rickey Barker is a 73 y.o. male who presents complaining of altered mental status. Patient sent in because for the last couple days he is having increasing altered mental status. Nursing home staff relates that last known well was on Sunday. Patient does not state that he feels altered in any way. Patient is complaining of increasing pain in his bilateral thighs where he has wounds. Patient denies any fevers. Patient was stated to be having increased accessory muscle use for breathing but states that he is not feeling short of breath it really just depends on what kind of position he is in. Patient denies a cough. Patient has had no swelling in the legs. Patient is a dialysis patient. REVIEW OF SYSTEMS       Review of Systems   Constitutional: Negative for activity change, appetite change, chills, diaphoresis and fever. HENT: Negative for congestion, ear pain, facial swelling, nosebleeds, rhinorrhea, sinus pressure, sore throat and trouble swallowing. Eyes: Negative for pain, discharge and redness. Respiratory: Negative for cough, chest tightness, shortness of breath and wheezing. Cardiovascular: Negative for chest pain, palpitations and leg swelling. Gastrointestinal: Negative for abdominal pain, blood in stool, constipation, diarrhea, nausea and vomiting. Genitourinary: Negative for difficulty urinating, dysuria, flank pain, frequency, genital sores and hematuria. Musculoskeletal: Negative for arthralgias, back pain, gait problem, joint swelling, myalgias and neck pain. Skin: Positive for wound. Negative for color change, pallor and rash.    Neurological: Negative for dizziness, tremors, seizures, syncope, speech difficulty, weakness, numbness and headaches. Psychiatric/Behavioral: Positive for confusion. Negative for decreased concentration, hallucinations, self-injury, sleep disturbance and suicidal ideas.        PAST MEDICAL HISTORY     Past Medical History:   Diagnosis Date    Acute on chronic congestive heart failure ([facility]     Anemia     CAD (coronary artery disease)     Cardiomyopathy ([facility]     Dialysis care     ESRD (end stage renal disease) on dialysis ([facility]     Foot ulcer, left ([facility] 2015    GERD (gastroesophageal reflux disease)     Hemodialysis patient ([facility] 2011    MOM-WED-FRI-ON [address]     History of sleep apnea     none since significant weight loss (was 400#)    Hyperlipidemia     Hyperparathyroidism ([facility]     Hypertension     Neuropathy     ROOSEVELT (obstructive sleep apnea) 5/8/2021    Peripheral vascular disease ([facility]     Pneumonia     resolved    S/P CABG (coronary artery bypass graft) 06/2016    Type II or unspecified type diabetes mellitus without mention of complication, not stated as uncontrolled     dx-1980       SURGICAL HISTORY       Past Surgical History:   Procedure Laterality Date    CATARACT REMOVAL      DIALYSIS FISTULA CREATION Left 2014    IR CHEST TUBE INSERTION  5/10/2021    IR CHEST TUBE INSERTION 5/10/2021 [facility]    LEG AMPUTATION THROUGH LOWER TIBIA AND FIBULA         CURRENT MEDICATIONS       Previous Medications    AMIODARONE (CORDARONE) 200 MG TABLET    Take 1 tablet by mouth daily    ASPIRIN 81 MG EC TABLET    Take 1 tablet by mouth daily    ATORVASTATIN (LIPITOR) 40 MG TABLET    Take 1 tablet by mouth nightly    B COMPLEX-C-FOLIC ACID (ALBERTA-FERNANDA RX) 1 MG TABS    Take 1 tablet by mouth daily    BLOOD GLUCOSE MONITORING SUPPL (ONE TOUCH ULTRA 2) W/DEVICE KIT    TEST 3 TIMES DAILY    CALCIUM ACETATE (PHOSLO) 667 MG TABS    Take 2 tablets by mouth 3 times daily (with meals)    DIGOXIN (LANOXIN) 125 MCG TABLET    Take 1 tablet by mouth daily GLUCOSE MONITORING KIT (FREESTYLE) MONITORING KIT    1 kit by Does not apply route daily as needed    INSULIN LISPRO (HUMALOG) 100 UNIT/ML INJECTION VIAL    Inject into the skin 3 times daily (before meals)    LANCETS 30G MISC    1 each by Does not apply route 3 times daily    METOPROLOL TARTRATE (LOPRESSOR) 25 MG TABLET    Take 1 tablet by mouth 2 times daily    MIDODRINE (PROAMATINE) 2.5 MG TABLET    Take 1 tablet by mouth 3 times daily    MISC. DEVICES (WALKER GLIDE WHEELS) MISC    1 Device by Does not apply route continuous    NITROGLYCERIN (NITROSTAT) 0.4 MG SL TABLET    Place 0.4 mg under the tongue every 5 minutes as needed for Chest pain up to max of 3 total doses. If no relief after 1 dose, call 911. ONE TOUCH ULTRA TEST STRIP    USE AS DIRECTED DAILY AS NEEDED    PANTOPRAZOLE (PROTONIX) 40 MG TABLET    Take 40 mg by mouth daily    SEVELAMER (RENVELA) 800 MG TABLET    Take 5 tablets by mouth 3 times daily (with meals)       ALLERGIES     is allergic to pcn [penicillins]. SOCIAL HISTORY      reports that he has never smoked. He has never used smokeless tobacco. He reports that he does not drink alcohol and does not use drugs. PHYSICAL EXAM     INITIAL VITALS: BP (!) 117/53   Pulse 60   Temp 98.1 °F (36.7 °C) (Oral)   Resp 25   Ht 6' (1.829 m)   Wt 170 lb (77.1 kg)   SpO2 100%   BMI 23.06 kg/m²      Physical Exam  Vitals and nursing note reviewed. Constitutional:       General: He is not in acute distress. Appearance: He is well-developed. He is not diaphoretic. HENT:      Head: Normocephalic and atraumatic. Eyes:      General: No scleral icterus. Right eye: No discharge. Left eye: No discharge. Conjunctiva/sclera: Conjunctivae normal.      Pupils: Pupils are equal, round, and reactive to light. Cardiovascular:      Rate and Rhythm: Normal rate and regular rhythm. Heart sounds: Normal heart sounds. No murmur heard. No friction rub. No gallop. Pulmonary:      Effort: Pulmonary effort is normal. No respiratory distress. Breath sounds: Normal breath sounds. No wheezing or rales. Chest:      Chest wall: No tenderness. Abdominal:      General: Bowel sounds are normal. There is no distension. Palpations: Abdomen is soft. There is no mass. Tenderness: There is no abdominal tenderness. There is no guarding or rebound. Musculoskeletal:         General: No tenderness. Normal range of motion. Skin:     General: Skin is warm and dry. Coloration: Skin is not pale. Findings: No erythema or rash. Comments: Patient has bilateral wounds about 2 to 3 cm in diameter superficial with central necrosis in the medial proximal thigh. No fluctuance underneath them no discharge from them. Neurological:      Mental Status: He is alert and oriented to person, place, and time. Cranial Nerves: No cranial nerve deficit. Sensory: No sensory deficit. Motor: No abnormal muscle tone. Coordination: Coordination normal.      Deep Tendon Reflexes: Reflexes normal.   Psychiatric:         Behavior: Behavior normal.         Thought Content: Thought content normal.         Judgment: Judgment normal.         DIAGNOSTIC RESULTS     RADIOLOGY:All plain film, CT,MRI, and formal ultrasound images (except ED bedside ultrasound) are read by the radiologist and the interpretations are directly viewed by the emergency physician. XR CHEST PORTABLE    Result Date: 6/29/2021  EXAMINATION: ONE XRAY VIEW OF THE CHEST 6/29/2021 11:23 am COMPARISON: 06/07/2021, 05/14/2021 HISTORY: ORDERING SYSTEM PROVIDED HISTORY: shortness of breath TECHNOLOGIST PROVIDED HISTORY: shortness of breath Reason for Exam: sob, altered mental status Acuity: Unknown Type of Exam: Unknown FINDINGS: Cardiac silhouette enlargement again noted. The patient is status post median sternotomy. Opacities in the right lung base are again demonstrated.  The amount of fissural fluid on the right side appears improved. Similar blunting of the right costophrenic angle. No pneumothorax identified. Persistent right basilar opacity and small right effusion with little interval change. LABS: All lab results were reviewed by myself, and all abnormals are listed below. Labs Reviewed   CBC WITH AUTO DIFFERENTIAL - Abnormal; Notable for the following components:       Result Value    WBC 21.4 (*)     RBC 3.14 (*)     Hemoglobin 8.9 (*)     Hematocrit 29.5 (*)     MCHC 30.2 (*)     RDW 21.1 (*)     Platelets 946 (*)     Seg Neutrophils 92 (*)     Lymphocytes 1 (*)     Segs Absolute 19.69 (*)     Absolute Lymph # 0.21 (*)     Absolute Bands # 1.07 (*)     All other components within normal limits   BASIC METABOLIC PANEL - Abnormal; Notable for the following components:    Glucose 175 (*)     BUN 50 (*)     CREATININE 3.67 (*)     Chloride 93 (*)     GFR Non- 17 (*)     GFR  20 (*)     All other components within normal limits   C-REACTIVE PROTEIN - Abnormal; Notable for the following components:    .8 (*)     All other components within normal limits   URINE RT REFLEX TO CULTURE         MEDICAL DECISION MAKING:     Patient most likely has a infection that is causing of the altered mental status though he is able to answer all my questions without any problems. We will do some basic labs and then potentially admit the patient if any abnormalities are found.       EMERGENCY DEPARTMENT COURSE:   Vitals:    Vitals:    06/29/21 1121 06/29/21 1141 06/29/21 1200   BP: (!) 116/57  (!) 117/53   Pulse: 60 61 60   Resp: 21  25   Temp: 98.1 °F (36.7 °C)     TempSrc: Oral     SpO2: 100%  100%   Weight: 170 lb (77.1 kg)     Height: 6' (1.829 m)         The patient was given the following medications while in the emergency department:  Orders Placed This Encounter   Medications    cefepime (MAXIPIME) 2000 mg IVPB minibag     Order Specific Question: Antimicrobial Indications     Answer:   Pneumonia (HAP)    vancomycin (VANCOCIN) intermittent dosing (placeholder)     Order Specific Question:   Antimicrobial Indications     Answer:   Skin and Soft Tissue Infection     Order Specific Question:   Antimicrobial Indications     Answer:   Pneumonia (CAP)    vancomycin (VANCOCIN) 1,250 mg in dextrose 5 % 250 mL IVPB (ADDAVIAL)     Order Specific Question:   Antimicrobial Indications     Answer:   Skin and Soft Tissue Infection     Order Specific Question:   Antimicrobial Indications     Answer:   Pneumonia (CAP)       -------------------------  12:37 PM EDT  Patient was updated on the findings. Patient actually did have another wound on his right lateral hip that is foul-smelling and erythematous and draining and this I believe is the source of his infection. Patient is receiving antibiotics and is being admitted. I spoke with Dr. Vanessa Bro who agrees to the admission. Residents been notified. CONSULTS:  PHARMACY TO DOSE VANCOMYCIN  IP CONSULT TO PRIMARY CARE PROVIDER    PROCEDURES:  None    FINAL IMPRESSION      1. Wound infection    2. Altered mental status, unspecified altered mental status type          DISPOSITION/PLAN   DISPOSITION Decision To Admit 06/29/2021 12:36:34 PM      PATIENT REFERREDTO:  No follow-up provider specified.     DISCHARGEMEDICATIONS:  New Prescriptions    No medications on file       (Please note that portions of this note were completed with a voice recognition program.  Efforts were made to edit thedictations but occasionally words are mis-transcribed.)    Kenia Tejeda MD  Attending Emergency Physician                        Kenia Tejeda MD  06/29/21 [id number]

## 2021-06-29 NOTE — PLAN OF CARE
[address] [place] [address] PULMONARY, CRITICAL CARE & SLEEP  MD Sienna Cotter MD Marget Heap MD Christeen Saliva MD  [address]   Brief note      Date of Admission: 6/29/2021 11:18 AM    Chief complaint: Bradycardia and hypotension    Referring Physician: * No referring provider recorded for this case *  PCP: Rafiq Olivo MD     Subject: I received a message from the Internal Medicine Resident regarding this patient and notification of the consult. Patient is a 73year old  male presenting with altered mentation. He was noted to be septic presumed secondary to multiple unstageable wounds on the patient's buttocks, groin with concerns for kari's gangrene. The patient was admitted to the step down unit when he became bradycardic and hypotensive. He was then transferred to the ICU and I was notified of the consult. Vitals  T 96.6 HR 51 /48 RR 21 SpO2 93% on 4LNC    I spoke to the resident, Dr. Elder Martinez, on the phone. He updated me on the patient's status and explained that the patient will need a central line. I informed him that I was roughly 30 minutes away. He stated that the surgery team was also notified of the consult and plans to come to bedside to debride the wounds. He informed me that the surgery team may be able to place a central line. The patient currently has a 20 ga IV in the right forearm and an 18 ga IV in the right upper arm. I asked the resident to start dopamine at 5 mcg/kg/min and monitor the sites for extravasation. I also asked him to keep atropine at bedside and inform me immediately if I need to come to bedside. Reviewing the chart the patient was given Atropine 1mg with mild improvement shortly before I was contacted. Assessment:  1. Symptomatic Bradycardia  2. Hypotension with clear evidence of shock  3.  Sepsis, suspect secondary to infected unstageable ulcers +/- bacteremia - Kari's Gangrene? Worsening infiltrate RLL  4. HFrEF 30% - mildly volume overloaded on CXR  5. ESRD       Plan:  1. Start Dopamine 5mcg/kg/min for goal HR >60, safer choice if the vasopressor extravasates   2. Will add on digoxin level to previous draw  3. DC beta blockers for now  4. HOLD amiodarone and digoxin  5. Agree with current antibiotic     Full consult tomorrow    Critical Care Time: 0 minutes    Onel Valdez MD, [address] [place] [address]  [phone] (Cell)  [phone] (office/answering service)  [phone] (fax)      Electronically signed by Senthil Driscoll MD on 06/29/21     This progress note was completed using a voice transcription system. Every effort was made to ensure accuracy. However, inadvertent computerized transcription errors may be present.     [phone] (office/answering service)

## 2021-06-29 NOTE — PROGRESS NOTES
Writer spoke with [address] [facility]al care who stated patient has started receiving his dialysis treatments in house at [facility] at this time, Writer called [facility] who stated patient did receive dialysis at their facility on Friday of last week and Monday (6/28/21) of this week. Will monitor patient for further orders.

## 2021-06-29 NOTE — FLOWSHEET NOTE
RRT called @ 5:34 p.m [address] 73 y.o Male, with low B/P and low HR. Prayers were offered for patient as he was transferred to ICU. No family member available at this time. Patient's brother Saran Torres, was called to informed him of brother transfer to ICU, Room 2011 and to give ICU phone number to family. SC will continued to offer emotional and spiritual support.

## 2021-06-29 NOTE — OP NOTE
open using half-strength Dakin solution for now. This debridement was performed on an emergent basis at the bedside given his overall clinical condition as a lifesaving procedure/measure. Clean dressing was applied after the wounds were packed. -  Recommendations: Case discussed with the internal medicine residents and the nursing staff. At this point the goal is to stabilize the patient. Fluid management per nephrology. IV antibiotics. Keep the patient n.p.o. for now except ice chips. Patient is much more awake after his blood pressure came up. Reevaluate tomorrow morning. Patient will need periodic debridements may need more extensive debridements along with plastic surgery evaluation. He may have to be transferred to [facility] tertiary care center depending on reevaluation tomorrow.

## 2021-06-29 NOTE — PROGRESS NOTES
Dr Ro Silva visits and examines pt wounds; Dr Ro Silva speaks with resident re: transferring pt to [address] made aware of this;   ZAYRA Cristobal, clin lead notified of above

## 2021-06-29 NOTE — CONSULTS
General Surgery Consult      Pt Name: Rickey Barker  MRN: [id number]  YOB: 1955  Date of evaluation: 6/29/2021  Primary Care Physician: Lc Kelsey MD   Patient evaluated at the request of  Dr. Ashley Tejada  Reason for evaluation: Necrotic foul-smelling wounds    SUBJECTIVE:   History of Chief Complaint:    Rickey Barker is a 73 y.o. male who presents with necrotic foul-smelling wounds. I was asked to evaluate the patient on the floor. History was obtained from the nursing staff the chart. Patient is a very poor historian. Difficult IV access. Nurse stated there foul-smelling wounds on both inner medial thigh as well as right flank region extending towards the right buttock. Symptom onset has been gradual for a time period of several day(s). Severity is described as moderate to severe. Course of his symptoms over time is gradual.    Past Medical History   has a past medical history of Acute on chronic congestive heart failure ([address] Anemia, CAD (coronary artery disease), Cardiomyopathy ([address] Dialysis care, ESRD (end stage renal disease) on dialysis ([address] Foot ulcer, left ([address] GERD (gastroesophageal reflux disease), Hemodialysis patient ([address] History of sleep apnea, Hyperlipidemia, Hyperparathyroidism ([address] Hypertension, Neuropathy, ROOSEVELT (obstructive sleep apnea), Peripheral vascular disease ([address] Pneumonia, S/P CABG (coronary artery bypass graft), and Type II or unspecified type diabetes mellitus without mention of complication, not stated as uncontrolled. Past Surgical History   has a past surgical history that includes Cataract removal; Dialysis fistula creation (Left, 2014); Leg amputation, lower tibia/fibula; and IR CHEST TUBE INSERTION (5/10/2021). Medications  Prior to Admission medications    Medication Sig Start Date End Date Taking?  Authorizing Provider   acetaminophen (TYLENOL) 500 MG tablet Take 1,000 mg by mouth every 6 hours as needed for Pain   Yes Historical Provider, MD   calcium acetate (PHOSLO) 667 MG TABS Take 2 tablets by mouth 3 times daily (with meals) 6/10/21  Yes Henok Martinez MD   amiodarone (CORDARONE) 200 MG tablet Take 1 tablet by mouth daily 6/9/21  Yes Henok Martinez MD   atorvastatin (LIPITOR) 40 MG tablet Take 1 tablet by mouth nightly 6/9/21  Yes Henok Martinez MD   metoprolol tartrate (LOPRESSOR) 25 MG tablet Take 1 tablet by mouth 2 times daily 6/9/21  Yes Henok Martinez MD   midodrine (PROAMATINE) 2.5 MG tablet Take 1 tablet by mouth 3 times daily 6/9/21  Yes Henok Martinez MD   pantoprazole (PROTONIX) 40 MG tablet Take 40 mg by mouth daily   Yes Historical Provider, MD   B Complex-C-Folic Acid (ALBERTA-FERNANDA RX) 1 MG TABS Take 1 tablet by mouth daily   Yes Historical Provider, MD   digoxin (LANOXIN) 125 MCG tablet Take 1 tablet by mouth daily 5/16/21  Yes Christal Arora MD   sevelamer (RENVELA) 800 MG tablet Take 5 tablets by mouth 3 times daily (with meals) 5/15/21  Yes Christal Arora MD   nitroGLYCERIN (NITROSTAT) 0.4 MG SL tablet Place 0.4 mg under the tongue every 5 minutes as needed for Chest pain up to max of 3 total doses. If no relief after 1 dose, call 911. Yes Historical Provider, MD   aspirin 81 MG EC tablet Take 1 tablet by mouth daily 3/14/16  Yes Ruperto Harvey, DO   ONE TOUCH ULTRA TEST strip USE AS DIRECTED DAILY AS NEEDED 1/24/17   Daune Persons, DO   Blood Glucose Monitoring Suppl (ONE TOUCH ULTRA 2) W/DEVICE KIT TEST 3 TIMES DAILY 8/26/16   Daune Persons, DO   glucose monitoring kit (FREESTYLE) monitoring kit 1 kit by Does not apply route daily as needed 4/21/16   Daune Persons, DO   Lancets 30G MISC 1 each by Does not apply route 3 times daily 4/21/16   Daune Persons, DO   Misc. Devices ([facility]) [address] 1 Device by Does not apply route continuous 4/21/16   Daune Persons, DO     Allergies  is allergic to pcn [penicillins].     Family History  family history includes Diabetes in his brother and father; Heart Disease in his father. Social History   reports that he has never smoked. He has never used smokeless tobacco. He reports that he does not drink alcohol and does not use drugs. Review of Systems:  Given patient's condition detailed review of system is not feasible. OBJECTIVE:   VITALS:  height is 6' (1.829 m) and weight is 167 lb 3.2 oz (75.8 kg). His oral temperature is 96.6 °F (35.9 °C). His blood pressure is 90/42 (abnormal) and his pulse is 48 (abnormal). His respiration is 20 and oxygen saturation is 93%. CONSTITUTIONAL: Patient is awake in some distress  SKIN: Multiple necrotic wounds on the inner medial thighs and a large wound over the right flank region extending towards the right thigh right buttock region fluctuant. There necrotic skin on top on all these wounds. Unstageable at this time. LYMPH: no cervical nodes, no inguinal nodes  HEENT: Head is normocephalic, atraumatic. EOMI, PERRLA  NECK: Supple, symmetrical, trachea midline, no adenopathy, thyroid symmetric, not enlarged and no tenderness, skin normal  CHEST/LUNGS: Decreased air entry at bases  CARDIOVASCULAR: Heart regular rate and rhythm Normal S1 and S2. . Carotid and femoral pulses 2+/4 and equal bilaterally  ABDOMEN: Soft nondistended nontender  RECTAL: deferred, not clinically indicated  NEUROLOGIC: Deferred  EXTREMITIES: Left below-knee amputee.   2 necrotic wounds on the inner medial thigh fluctuant with dead skin on top mild surrounding erythema    LABS:   CBC with Differential:    Lab Results   Component Value Date    WBC 21.4 06/29/2021    RBC 3.14 06/29/2021    RBC 3.51 01/09/2012    HGB 8.9 06/29/2021    HCT 29.5 06/29/2021     06/29/2021     01/09/2012    MCV 94.1 06/29/2021    MCH 28.4 06/29/2021    MCHC 30.2 06/29/2021    RDW 21.1 06/29/2021    NRBC 1 03/07/2016    METASPCT 2 05/01/2021    LYMPHOPCT 1 06/29/2021    MONOPCT 2 06/29/2021    MYELOPCT 1 03/06/2016    BASOPCT 0 06/29/2021    MONOSABS 0.43 06/29/2021    LYMPHSABS 0.21 06/29/2021    EOSABS 0.00 06/29/2021    BASOSABS 0.00 06/29/2021    DIFFTYPE NOT REPORTED 06/29/2021     BMP:    Lab Results   Component Value Date     06/29/2021    K 4.2 06/29/2021    CL 93 06/29/2021    CO2 31 06/29/2021    BUN 50 06/29/2021    LABALBU 3.6 06/07/2021    LABALBU 3.8 01/09/2012    CREATININE 3.67 06/29/2021    CALCIUM 10.1 06/29/2021    GFRAA 20 06/29/2021    LABGLOM 17 06/29/2021    GLUCOSE 175 06/29/2021    GLUCOSE 128 01/09/2012     Hepatic Function Panel:    Lab Results   Component Value Date    ALKPHOS 132 06/07/2021    ALT 11 06/07/2021    AST 14 06/07/2021    PROT 7.8 06/07/2021    BILITOT 0.59 06/07/2021    BILIDIR 0.14 10/19/2011    IBILI 0.26 10/19/2011    LABALBU 3.6 06/07/2021    LABALBU 3.8 01/09/2012     Calcium:    Lab Results   Component Value Date    CALCIUM 10.1 06/29/2021     Magnesium:    Lab Results   Component Value Date    MG 2.1 06/11/2021     Phosphorus:    Lab Results   Component Value Date    PHOS 5.1 06/11/2021     PT/INR:    Lab Results   Component Value Date    PROTIME 23.8 05/07/2021    PROTIME 10.9 10/25/2011    INR 2.1 05/07/2021     ABG:  No results found for: PHART, PH, LUY3UDJ, PCO2, PO2ART, PO2, PFS7UTR, HCO3, BEART, BE, THGBART, THB, TLX6SYR, M2NVWBTV, O2SAT  Urine Culture:  No components found for: CURINE  Blood Culture:  No components found for: CBLOOD, CFUNGUSBL  Stool Culture:  No components found for: CSTOOL    RADIOLOGY:   I have personally reviewed the following films:  XR CHEST PORTABLE    Result Date: 6/29/2021  EXAMINATION: ONE XRAY VIEW OF THE CHEST 6/29/2021 11:23 am COMPARISON: 06/07/2021, 05/14/2021 HISTORY: ORDERING SYSTEM PROVIDED HISTORY: shortness of breath TECHNOLOGIST PROVIDED HISTORY: shortness of breath Reason for Exam: sob, altered mental status Acuity: Unknown Type of Exam: Unknown FINDINGS: Cardiac silhouette enlargement again noted. The patient is status post median sternotomy.   Opacities in the right lung base are again demonstrated. The amount of fissural fluid on the right side appears improved. Similar blunting of the right costophrenic angle. No pneumothorax identified. Persistent right basilar opacity and small right effusion with little interval change. IMPRESSION:   1. History of chronic renal failure on dialysis  2. Multiple other comorbidities  3. Septic shock  4. Necrotic wounds inner medial thigh and right flank right buttock region with foul-smelling drainage  5. Not stable at this time to go to the operating room  6. Lack of good IV access    does not have any pertinent problems on file. PLAN:   1. I will proceed with central line placement. We will do bedside debridement. Overall prognosis poor given his comorbidities and the extent of these wounds. Discussed with Tami Chaudhari. Once he stabilizes he needs to be transferred to [facility] which was my original recommendation when I spoke with the medical resident. Thank you for this interesting consult and for allowing us to participate in the care of this patient. If you have any questions please don't hesitate to call.           Electronically signed by Julianna Trevino MD  on 6/29/2021 at 5:58 PM

## 2021-06-29 NOTE — PROGRESS NOTES
Medication History completed:    New medications: acetaminophen    Medications discontinued: Humalog    Changes to dosing: none    Stated allergies: As listed    Other pertinent information: Medications confirmed with facility list [facility]).      Thank you,  Loco Cortes PharmD, BCPS  [phone]

## 2021-06-29 NOTE — PROGRESS NOTES
66yr old with ESRD on HD, A-fib on ASA, digoxin, BB, CHF EF 30-35% per prev ECHO, Ty 2 DM  Patient seen urgently at 5:30pm for Alma Gangrene, sepsis from extensive wounds  Started on iv Cefepime, vanc, flagyl  Needs urgent surgery  Needs transfer to [facility]    On my arrival, pt is drowsy but rousable  Noted sudden bradycardia with hypotension, HR down to 40, SBP 70, 1mg atropine given with increase in HR to 52, BP remains low  Sepsis bolus in progress  Pt is ESRD on HD,   Will initiate pressors, transfer to ICU  No resp distress noted, sats 100% on RA    Call is out to [facility] hospitalist for admission with surgical consult. [facility] and subsequently to Dr Mariely Brandt again- emergency bedside debridement to be done. Pt is full code.      Henok Martinez MD

## 2021-06-29 NOTE — PROGRESS NOTES
recv'd pt from ER per cart; pt A&O, in much pain from multiple ulcered dried necrotic wounds of right hip, bilateral inner thighs, left sacral areas;wounds very foul odor; all drsgs removed and Dr Bernard Abad here to examine ; see orders ; left sacral wound zinc paste applied; all other wounds wet to dry NS drsgs until seen by wound care and/or Dr Cas Tay

## 2021-06-30 PROBLEM — R00.1 BRADYCARDIA: Status: ACTIVE | Noted: 2021-01-01

## 2021-06-30 PROBLEM — R65.21 SEPTIC SHOCK (HCC): Status: ACTIVE | Noted: 2021-01-01

## 2021-06-30 PROBLEM — A41.9 SEPTIC SHOCK (HCC): Status: ACTIVE | Noted: 2021-01-01

## 2021-06-30 PROBLEM — R78.89 ELEVATED DIGOXIN LEVEL: Status: ACTIVE | Noted: 2021-01-01

## 2021-06-30 PROBLEM — M72.6 NECROTIZING FASCIITIS (HCC): Status: ACTIVE | Noted: 2021-01-01

## 2021-06-30 NOTE — PROGRESS NOTES
Comprehensive Nutrition Assessment    Type and Reason for Visit:  Initial, Consult, Positive Nutrition Screen (Nutrition assessment and Rome Score. Wt loss, poor appetite/intake.)    Nutrition Recommendations/Plan: Continue current diet and supplements. Nutrition Assessment:  Pt admitted with sepsis and infected wound. Pt on HD, dialysis during visit. Pt states he is hungry and has an appetite; pt states he was able to eat yesterday however has not eaten today. Pt states he was eating okay prior to admit. States he has probably had some wt loss. Malnutrition Assessment:  Malnutrition Status: Moderate malnutrition    Context:  Acute Illness     Findings of the 6 clinical characteristics of malnutrition:  Energy Intake:  No significant decrease in energy intake  Weight Loss:  Unable to assess     Body Fat Loss:  7 - Moderate body fat loss Orbital   Muscle Mass Loss:  7 - Moderate muscle mass loss Calf (gastrocnemius), Temples (temporalis), Clavicles (pectoralis & deltoids), Thigh (quadraceps)  Fluid Accumulation:  1 - Mild Extremities   Strength:  Not Performed    Estimated Daily Nutrient Needs:  Energy (kcal):  0939-1043 kcal (27-30 kcal/kg); Weight Used for Energy Requirements:  Adjusted (76 kg)     Protein (g):  114-152 g protein using 1.5-2.0 g/kg; Weight Used for Protein Requirements:  Adjusted (76 kg)          Nutrition Related Findings:  Edema: LLE Left BKA, generalized- trace. Labs: BUN-54, Cr-3.76, Glu-151. Other labs and meds reviewed. Med hx: ESRD on HD, GERD, hyperlipidemia, HTN, DM      Wounds:  Multiple, Venous Stasis, Diabetic Ulcer, Pressure Injury, Stage III       Current Nutrition Therapies:    ADULT DIET; Regular; 5 carb choices (75 gm/meal); Low Phosphorus (Less than 1000 mg)  Adult Oral Nutrition Supplement;  Renal Oral Supplement    Anthropometric Measures:  · Height: 6' (182.9 cm)  · Current Body Weight: 167 lb 1.7 oz (75.8 kg)   · Admission Body Weight: 167 lb 1.7 oz (75.8 kg) · Usual Body Weight:  ((6/7): 76.7kg, (4/28): 97.2)     Ideal Body Weight: 76 kg adjusted for amputation    Nutrition Diagnosis:   · Moderate malnutrition related to  (medical condition) as evidenced by wounds, weight loss greater than or equal to 5% in 1 month, moderate loss of subcutaneous fat, moderate muscle loss    Nutrition Interventions:   Food and/or Nutrient Delivery:  Continue Current Diet, Continue Oral Nutrition Supplement  Nutrition Education/Counseling:  Education not indicated   Coordination of Nutrition Care:  Continue to monitor while inpatient    Goals:  % of estimated energy needs. Nutrition Monitoring and Evaluation:   Behavioral-Environmental Outcomes:  None Identified   Food/Nutrient Intake Outcomes:  Food and Nutrient Intake, Supplement Intake  Physical Signs/Symptoms Outcomes:  Biochemical Data, GI Status, Fluid Status or Edema, Nutrition Focused Physical Findings, Skin, Weight, Hemodynamic Status     Discharge Planning: Too soon to determine     Electronically signed by Shani Edmonds on 6/30/21 at 2:43 PM TOMERT  NICK Foreman RD, LD

## 2021-06-30 NOTE — FLOWSHEET NOTE
06/30/21 0100   Provider Notification   Reason for Communication Review case   Provider Name Daphine Blade   Provider Notification Advance Practice Clinician (CNS/NP/CNM/CRNA/PA)   Method of Communication Face to face   Response At bedside   Notification Time 0100     CNP notified of EKG results.

## 2021-06-30 NOTE — PROGRESS NOTES
[facility] called to re-start the process for transfer to [facility] restarted per Dr Bridger Doan request after she re-evaluated the patient and spoke to surgery this morning.

## 2021-06-30 NOTE — CONSULTS
NEPHROLOGY PROGRESS NOTE    Patient :  Nick Gorman; 73 y.o. MRN# [id number]  Location:  2004/2004-01  Attending:  She Rasheed MD  Admit Date:  6/29/2021   Hospital Day: 1      Reason for Consult: ESRD/dialysis      Chief Complaint: Confusion, altered mental status, leg pain  History Obtained From: Patient, EMR, nursing staff    History of Present Illness: This is a 73 y.o. male with past medical history of coronary artery disease status post CABG, heart failure with reduced ejection fraction secondary to ischemic cardiomyopathy EF of 30 to 35%, end-stage renal disease secondary to nephrosclerosis on hemodialysis follows up with Dr. Hamida Lindsay, currently in [facility] and [facility]yzing at  [facility]. Patient was sent from nursing home due to altered mental status confusion and pain in the leg. Initial evaluation in the ER patient was found to have confusion altered mental status and wound on the bilateral medial thigh and gluteal region, patient was admitted for sepsis. Patient was last dialyzed on Monday at  [facility]  Patient denies any fever chills nausea vomiting headache dizziness patient is more awake and alert today complains of pain when he moves in his buttock and thigh patient had a debridement done yesterday 6/29/2021.   Patient blood pressure on the lower side not on pressors    Past Medical History:        Diagnosis Date    Acute on chronic congestive heart failure (HCC)     Anemia     CAD (coronary artery disease)     Cardiomyopathy ([address]     Dialysis care     ESRD (end stage renal disease) on dialysis ([address]     Foot ulcer, left ([address] 2015    GERD (gastroesophageal reflux disease)     Hemodialysis patient ([address] 2011    [facility]     History of sleep apnea     none since significant weight loss (was 400#)    Hyperlipidemia     Hyperparathyroidism ([address]     Hypertension     Neuropathy     ROOSEVELT (obstructive sleep apnea) 5/8/2021    Peripheral vascular disease ([address]  Pneumonia     resolved    S/P CABG (coronary artery bypass graft) 06/2016    Type II or unspecified type diabetes mellitus without mention of complication, not stated as uncontrolled     dx-1980       Past Surgical History:        Procedure Laterality Date    CATARACT REMOVAL      DIALYSIS FISTULA CREATION Left 2014    IR CHEST TUBE INSERTION  5/10/2021    IR CHEST TUBE INSERTION 5/10/2021 [facility]ED[facility]    LEG AMPUTATION THROUGH LOWER TIBIA AND FIBULA         Current Medications:    vancomycin (VANCOCIN) 750 mg in dextrose 5 % 250 mL IVPB (ADDAVIAL), Once  perflutren lipid microspheres (DEFINITY) injection 2.2 mg, ONCE PRN  glucagon (rDNA) 10 mg in dextrose 5 % 50 mL IVPB, Once  digoxin immune rick (DIGIFAB) 80 mg in sodium chloride 0.9 % 50 mL IVPB, Once  vancomycin (VANCOCIN) intermittent dosing (placeholder), RX Placeholder  [Held by provider] amiodarone (CORDARONE) tablet 200 mg, Daily  aspirin EC tablet 81 mg, Daily  atorvastatin (LIPITOR) tablet 40 mg, Nightly  midodrine (PROAMATINE) tablet 2.5 mg, TID  pantoprazole (PROTONIX) tablet 40 mg, Daily  sevelamer (RENVELA) tablet 4,000 mg, TID WC  sodium chloride flush 0.9 % injection 5-40 mL, 2 times per day  sodium chloride flush 0.9 % injection 5-40 mL, PRN  0.9 % sodium chloride infusion, PRN  acetaminophen (TYLENOL) tablet 650 mg, Q6H PRN   Or  acetaminophen (TYLENOL) suppository 650 mg, Q6H PRN  heparin (porcine) injection 5,000 Units, 3 times per day  cefepime (MAXIPIME) 1000 mg IVPB minibag, Q24H  morphine sulfate (PF) injection 4 mg, Q4H PRN  metronidazole (FLAGYL) 500 mg in NaCl 100 mL IVPB premix, Q8H  0.9 % sodium chloride infusion, Continuous  insulin lispro (HUMALOG) injection vial 0-6 Units, TID WC  insulin lispro (HUMALOG) injection vial 0-3 Units, Nightly  glucose (GLUTOSE) 40 % oral gel 15 g, PRN  dextrose 50 % IV solution, PRN  glucagon (rDNA) injection 1 mg, PRN  dextrose 5 % solution, PRN  norepinephrine (LEVOPHED) 16 mg in sodium chloride 0.9 % 250 mL infusion, Continuous  sodium hypochlorite (DAKINS) external solution, BID        Allergies:  Pcn [penicillins]    Social History:   Social History     Socioeconomic History    Marital status:      Spouse name: Not on file    Number of children: Not on file    Years of education: Not on file    Highest education level: Not on file   Occupational History    Not on file   Tobacco Use    Smoking status: Never Smoker    Smokeless tobacco: Never Used   Substance and Sexual Activity    Alcohol use: No    Drug use: No    Sexual activity: Not on file   Other Topics Concern    Not on file   Social History Narrative    Not on file     Social Determinants of Health     Financial Resource Strain:     Difficulty of Paying Living Expenses:    Food Insecurity:     Worried About Running Out of Food in the Last Year:     [address] in the Last Year:    Transportation Needs:     Lack of Transportation (Medical):  Lack of Transportation (Non-Medical):    Physical Activity:     Days of Exercise per Week:     Minutes of Exercise per Session:    Stress:     Feeling of Stress :    Social Connections:     Frequency of Communication with Friends and Family:     Frequency of Social Gatherings with Friends and Family:     Attends Holiness Services:     Active Member of Clubs or Organizations:     Attends Club or Organization Meetings:     Marital Status:    Intimate Partner Violence:     Fear of Current or Ex-Partner:     Emotionally Abused:     Physically Abused:     Sexually Abused:        Family History:   Family History   Problem Relation Age of Onset    Heart Disease Father     Diabetes Father     Diabetes Brother        Review of Systems:    Constitutional: No fever, no chills, no night sweats, fatigue, generalized weakness, loss of appetite  HEENT:  No headache, otalgia, itchy eyes, epistaxis, nasal discharge or sore throat.   Cardiac:  No chest pain, dyspnea, orthopnea or PND, palpitations  Chest:  No cough, hemoptysis, pleuritic chest pain, wheezing,SOB  Abdomen:  No abdominal pain, nausea, vomiting, diarrhea, melena, dysphagia hematemesis,constipation, abdominal bloating, flank pain  Neuro:  No CVA, TIA or seizure like activity. Skin:   No rashes, no itching. :   No hematuria, no pyuria, no dysuria, no flank pain. Extremities:  Bilateral thigh pain and pain in gluteal region      Objective:  CURRENT TEMPERATURE:  Temp: 97.7 °F (36.5 °C)  MAXIMUM TEMPERATURE OVER 24HRS:  Temp (24hrs), Av.5 °F (36.4 °C), Min:96.6 °F (35.9 °C), Max:98.1 °F (36.7 °C)    CURRENT RESPIRATORY RATE:  Resp: 21  CURRENT PULSE:  Pulse: (!) 48  CURRENT BLOOD PRESSURE:  BP: (!) 95/51  24HR BLOOD PRESSURE RANGE:  Systolic (71AKK), RFS:635 , Min:50 , UXN:592   ; Diastolic (74QXZ), GEP:63, Min:29, Max:104    24HR INTAKE/OUTPUT:      Intake/Output Summary (Last 24 hours) at 2021 0948  Last data filed at 2021 1245  Gross per 24 hour   Intake 100 ml   Output --   Net 100 ml     Patient Vitals for the past 96 hrs (Last 3 readings):   Weight   21 1630 167 lb 3.2 oz (75.8 kg)   21 1121 170 lb (77.1 kg)       Physical Exam:  GENERAL APPEARANCE: Alert and cooperative, and appears to be in no acute distress. HEAD: normocephalic  EYES:  EOMI. Not pale, anicteric   NOSE:  No nasal discharge. THROAT:  Oral cavity and pharynx normal.  Dry  CARDIAC: Normal S1 and S2. No S3, S4 or murmurs. Rhythm is regular. LUNGS: Clear to auscultation and percussion without rales, rhonchi, wheezing or diminished breath sounds. MUSKULOSKELETAL: Adequately aligned spine. EXTREMITIES: No edema.   Left BKA  NEURO: Nonfocal      Labs:   CBC:  Recent Labs     21  1138 21  0411   WBC 21.4* 20.7*   RBC 3.14* 2.89*   HGB 8.9* 8.2*   HCT 29.5* 27.4*   MCV 94.1 95.0   MCH 28.4 28.4   MCHC 30.2* 29.9*   RDW 21.1* 20.9*   * 114*   MPV 9.6 10.0      BMP:   Recent Labs     21  1138 06/30/21  0411    137   K 4.2 3.7   CL 93* 98   CO2 31 27   BUN 50* 54*   CREATININE 3.67* 3.76*   GLUCOSE 175* 151*   CALCIUM 10.1 9.7          Radiology:  Reviewed as available. Assessment:  Patient admitted with altered mental status and necrotic wounds on bilateral thigh and gluteal region. 1. ESRD on hemodialysis at [facility] 4 times a week, will schedule him Monday Wednesday Friday during this hospitalization, hemodialysis today as per orders  2. Bilateral necrotic wound bilateral thighs gluteal region, surgery is on board status post debridement and on IV antibiotics  3. Anemia of chronic kidney disease  4. History of CHF with reduced EF well compensated  5. Secondary hyperparathyroidism mineral bone disease continue binders check phosphorus       Plan:  1. HD Monday Wednesday Friday, hemodialysis today  2. Antibiotics and infection management per primary team      Thank you for the consultation.       Electronically signed by Pilar Pacheco MD on 6/30/2021 at 9:48 AM

## 2021-06-30 NOTE — FLOWSHEET NOTE
Hemodialysis 3.5 hrs, removed 1.5 liters, cramping towards end of tx, albumin and midodrine given for bp     06/30/21 1645   Vital Signs   BP (!) 90/45   Pulse 51   Resp 15   SpO2 93 %   Weight 170 lb 13.7 oz (77.5 kg)   Weight Method Bed scale   Percent Weight Change -1.9   Post-Hemodialysis Assessment   Post-Treatment Procedures Blood returned; Access bleeding time < 10 minutes   Machine Disinfection Process Acid/Vinegar Clean;Heat Disinfect   Rinseback Volume (ml) 300 ml   Total Liters Processed (l/min) 68 l/min   Dialyzer Clearance Lightly streaked   Duration of Treatment (minutes) 210 minutes   Heparin amount administered during treatment (units) 0 units   Hemodialysis Output (ml) 1800 ml   NET Removed (ml) 1500 ml   Tolerated Treatment Good ADMIT

## 2021-06-30 NOTE — PLAN OF CARE
Problem: Falls - Risk of:  Goal: Will remain free from falls  Description: Will remain free from falls  6/30/2021 1013 by Jean Arreguin RN  Outcome: Met This Shift  6/30/2021 0313 by Kyle Benoit RN  Outcome: Ongoing  Note: Bed remains in lowest position, call light within reach. Patient remains free of falls at this time. RN will continue to monitor. 6/30/2021 0313 by Kyle Benoit RN  Outcome: Ongoing  Note: Bed remains in lowest position, call light within reach. Patient remains free of falls at this time. RN will continue to monitor. Goal: Absence of physical injury  Description: Absence of physical injury  6/30/2021 1013 by Jean Arreguin RN  Outcome: Met This Shift  6/30/2021 0313 by Kyle Benoit RN  Outcome: Ongoing  6/30/2021 0313 by Kyle Benoit RN  Outcome: Ongoing     Problem: Pain:  Goal: Pain level will decrease  Description: Pain level will decrease  6/30/2021 1013 by Jean Arreguin RN  Outcome: Met This Shift  6/30/2021 0313 by Kyle Benoit RN  Outcome: Ongoing  6/30/2021 0313 by Kyle Benoit RN  Outcome: Ongoing  Goal: Control of acute pain  Description: Control of acute pain  6/30/2021 1013 by Jean Arreguin RN  Outcome: Met This Shift  6/30/2021 0313 by Kyle Benoit RN  Outcome: Ongoing  6/30/2021 0313 by Kyle Benoit RN  Outcome: Ongoing  Goal: Control of chronic pain  Description: Control of chronic pain  6/30/2021 1013 by Jean Arreguin RN  Outcome: Met This Shift  6/30/2021 0313 by Kyle Benoit RN  Outcome: Ongoing  6/30/2021 0313 by Kyle Benoit RN  Outcome: Ongoing     Problem: SAFETY  Goal: Free from accidental physical injury  6/30/2021 1013 by Jean Arreguin RN  Outcome: Met This Shift  6/30/2021 0313 by Kyle Benoit RN  Outcome: Ongoing  Note: Fall assessment performed and appropriate measures implemented. Room freed from clutter.  Bed in lowest position with wheels locked. Call light in place. ID band in place. 6/30/2021 0313 by Dale Porter RN  Outcome: Ongoing     Problem: DAILY CARE  Goal: Daily care needs are met  6/30/2021 1013 by Stephen Shaw RN  Outcome: Met This Shift  6/30/2021 0313 by Dale Porter RN  Outcome: Ongoing  6/30/2021 0313 by Dale Porter RN  Outcome: Ongoing     Problem: PAIN  Goal: Patient's pain/discomfort is manageable  6/30/2021 1013 by Stephen Shaw RN  Outcome: Met This Shift  6/30/2021 0313 by Dale Porter RN  Outcome: Ongoing  6/30/2021 0313 by Dale Porter RN  Outcome: Ongoing     Problem: Skin Integrity:  Goal: Will show no infection signs and symptoms  Description: Will show no infection signs and symptoms  6/30/2021 1013 by Stephen Shaw RN  Outcome: Met This Shift  6/30/2021 0313 by Dale Porter RN  Outcome: Ongoing  6/30/2021 0313 by Dale Porter RN  Outcome: Ongoing  Goal: Absence of new skin breakdown  Description: Absence of new skin breakdown  6/30/2021 1013 by Stephen Shaw RN  Outcome: Met This Shift  6/30/2021 0313 by Dale Porter RN  Outcome: Ongoing  Note: Patient turned and repositioned every 2 hours and as needed for comfort. Dr. Danielle Aguilar debrided wounds at bedside, wound care completed and dressings changed as ordered.   6/30/2021 0313 by Dale Porter RN  Outcome: Ongoing     Problem: Gas Exchange - Impaired:  Goal: Levels of oxygenation will improve  Description: Levels of oxygenation will improve  6/30/2021 1013 by Stephen Shaw RN  Outcome: Met This Shift  6/30/2021 0313 by Dale Porter RN  Outcome: Ongoing

## 2021-06-30 NOTE — FLOWSHEET NOTE
06/29/21 2015   Provider Notification   Reason for Communication New orders; Review case   Provider Name Dr. Esteban Paz   Provider Notification Physician   Method of Communication Call   Response See orders   Notification Time 2015     MD notified of new consult and pt condition. Updated that Dr. Romayne Bunk placed central line and debrided multiple wounds at bedside. MD updated on HR and rhythm, BP, and current levophed rate. Orders received to d/c dopamine gtt and order vasopressin if levophed needs increased above 15 mcg/min.

## 2021-06-30 NOTE — PROGRESS NOTES
Review:      XR CHEST PORTABLE [[id number]] Collected: 06/29/21 1933      Order Status: Completed Updated: 06/29/21 2036     Narrative:       EXAMINATION:   ONE XRAY VIEW OF THE CHEST     6/29/2021 4:23 pm     COMPARISON:   06/29/2021, 06/07/2021, 05/14/2021     HISTORY:   ORDERING SYSTEM PROVIDED HISTORY: tlc placement   TECHNOLOGIST PROVIDED HISTORY:   tlc placement   Reason for Exam: tlc placement   Acuity: Acute   Type of Exam: Initial     FINDINGS:   Interval placement of a right internal jugular central venous catheter which   terminates over the expected region of the distal superior vena cava/superior   cavoatrial junction.  No postprocedural pneumothorax.  Pulmonary evaluation   is limited by rotation and lordotic positioning.  Persistent low lung volumes   with similar opacities at the right lung base with evaluation of the left   lung base obscured by the overlying heart.  Unchanged cardiomegaly with prior   CABG.  No acute bony findings.      Impression:       Right internal jugular central venous catheter appears appropriate in   positioning. Limited pulmonary examination that appears grossly stable from most recent   prior. ASSESSMENT     Principal Problem:    Infected wound  Active Problems:    ESRD on dialysis ([address]    Hypertension, essential    Type 2 diabetes mellitus with foot ulcer (HCC)    CHF (congestive heart failure) ([address]    History of left below knee amputation (HCC)    Moderate malnutrition ([address]  Resolved Problems:    * No resolved hospital problems. *      Plan  1. Diet as tolerated  2. Central line functioning well  3. Continue local wound care, packing changes with Kerlix soaked in Dakins  4. Recommend transfer to [facility] due to extent of wounds and comorbidities  5. Continue medical management  6. Discussed with Gemma Barry. Patient is stable. Being transferred to [id number] this evening. Recommend plastic surgery evaluation.       Electronically signed by

## 2021-06-30 NOTE — PROGRESS NOTES
[employer] [employer]    PROGRESS NOTE             6/30/2021    6:04 PM    Name:   J Luis Borges  MRN:     [id number]     Acct:      [de-identified]   Room:   2004/2004-01  IP Day:  1  Admit Date:  6/29/2021 11:18 AM    PCP:  Megan Carter MD  Code Status:  Full Code    Subjective:     C/C:   Chief Complaint   Patient presents with    Altered Mental Status     Interval History Status: improved. Patient started experiencing hypotensive and bradycardic episodes this afternoon. A rapid response team was called and patient was treated with atropine and transferred to the ICU. Surgery came at bedside and a central line was placed with bedside debridement of his wounds. He was started on Levophed which was since discontinued last night. He is scheduled for dialysis today. Patient was examined at bedside this morning. Patient was alert and responsive this morning. He has continued to experience hypotensive episodes. Brief History:     The patient is a 73 y.o. / male who presents with Altered Mental Status   and he is admitted to the hospital for the management of sepsis secondary to wounds. Patient comes from a local nursing home. Patient complained about increasing pains in bilateral thighs.      In the ED, patient was afebrile (97.8 F), tachypneic, and hypotensive (106/55). Patient was transferred to progressive floor. Review of Systems:     Review of Systems   Unable to perform ROS: Acuity of condition         Medications: Allergies:     Allergies   Allergen Reactions    Pcn [Penicillins] Other (See Comments)     Trouble walking       Current Meds:   Scheduled Meds:    vancomycin (VANCOCIN) 1250 mg in D5W 250 mL IVPB (ADDAVIAL)  750 mg Intravenous Once    vancomycin (VANCOCIN) intermittent dosing (placeholder)   Other RX Placeholder    [Held by provider] amiodarone  200 mg Oral Daily    aspirin  81 mg Oral Daily    atorvastatin  40 mg Oral Nightly    midodrine  2.5 mg Oral TID    pantoprazole  40 mg Oral Daily    sevelamer  4,000 mg Oral TID WC    sodium chloride flush  5-40 mL Intravenous 2 times per day    heparin (porcine)  5,000 Units Subcutaneous 3 times per day    cefepime  1,000 mg Intravenous Q24H    metroNIDAZOLE  500 mg Intravenous Q8H    insulin lispro  0-6 Units Subcutaneous TID WC    insulin lispro  0-3 Units Subcutaneous Nightly    sodium hypochlorite   Irrigation BID     Continuous Infusions:    sodium chloride      sodium chloride 125 mL/hr at 06/30/21 1734    dextrose      norepinephrine Stopped (06/29/21 2146)     PRN Meds: perflutren lipid microspheres, albumin human, sodium chloride flush, sodium chloride, acetaminophen **OR** acetaminophen, morphine, glucose, dextrose, glucagon (rDNA), dextrose    Data:     Past Medical History:   has a past medical history of Acute on chronic congestive heart failure ([facility] Anemia, CAD (coronary artery disease), Cardiomyopathy ([facility] Dialysis care, ESRD (end stage renal disease) on dialysis ([facility] Foot ulcer, left ([facility] GERD (gastroesophageal reflux disease), Hemodialysis patient ([facility] History of sleep apnea, Hyperlipidemia, Hyperparathyroidism ([facility] Hypertension, Neuropathy, ROOSEVELT (obstructive sleep apnea), Peripheral vascular disease ([facility] Pneumonia, S/P CABG (coronary artery bypass graft), and Type II or unspecified type diabetes mellitus without mention of complication, not stated as uncontrolled. Social History:   reports that he has never smoked. He has never used smokeless tobacco. He reports that he does not drink alcohol and does not use drugs.      Family History:   Family History   Problem Relation Age of Onset    Heart Disease Father     Diabetes Father     Diabetes Brother        Vitals:  BP (!) 93/46   Pulse (!) 45   Temp 95.9 °F (35.5 °C)   Resp 12   Ht 6' (1.829 m)   Wt 170 lb 13.7 oz (77.5 kg)   SpO2 100%   BMI 23.17 kg/m² Temp (24hrs), Av.4 °F (35.8 °C), Min:95 °F (35 °C), Max:97.7 °F (36.5 °C)    Recent Labs     21  1745 21  1158 21  1618   POCGLU 156* 166* 104       I/O(24Hr): Intake/Output Summary (Last 24 hours) at 2021 1804  Last data filed at 2021 1645  Gross per 24 hour   Intake --   Output 1800 ml   Net -1800 ml       Labs:  [unfilled]    Lab Results   Component Value Date/Time    SPECIAL NOT REPORTED 2021 08:41 PM     Lab Results   Component Value Date/Time    CULTURE NO GROWTH 7 HOURS 2021 08:41 PM       [unfilled]    Radiology:    ECHO Complete 2D W Doppler W Color    Result Date: 2021  [facility] Transthoracic Echocardiography Report (TTE)  Patient Name [facility]     Date of Study                 2021               [address]   Date of      1955  Gender                        Male  Birth   Age          73 year(s)  Race                          Other   Room Number          Height:                       71.65 inch, 182 cm   Corporate ID [id number]    Weight:                       165 pounds, 75 kg  #   Patient Acct [de-identified]   BSA:           1.96 m^2       BMI:      22.64  #                                                                kg/m^2   MR #         [id number]      Sonographer                   Citlalli Boucher   Accession #  [id number]  Interpreting Physician        [address] [place] [address]   Fellow                   Referring Nurse Practitioner   Interpreting             Referring Physician           Kraig Joseph  Fellow  Type of Study   TTE procedure:2D Echocardiogram, M-Mode, Doppler, Color Doppler, Contrast  study. Procedure Date Date: 2021 Start: 07:30 AM Study Location: [facility] Technical Quality: Limited visualization due to lung interference. Indications:Congestive heart failure. History / Tech. Comments: CABG CHF CARDIOMYOPATHY DM ROOSEVELT HLD HTN Patient Status: Inpatient Contrast Medium: Definity.  Amount - 2 ml Height: 71.65 inches Weight: 165.37 pounds BSA: 1.96 m^2 BMI: 22.64 kg/m^2 Rhythm: Sinus bradycardia HR: 49 bpm BP: 101/47 mmHg Allergies   - Penicillin. CONCLUSIONS Summary Echo contrast utilized on this technically difficult study. Left ventricle is normal in size. Estimated LV EF 25 %. Severe global hypokinesis. Asymmetrical septal hypertrophy. Grade III (severe) left ventricular diastolic dysfunction. Right ventricular dilatation with reduced systolic function. Left atrial dilatation. Right atrial dilatation. Mild aortic stenosis, likely underestimated due to decreased LVF. Trivial aortic insufficiency. Aortic root is mildly dilated. (4.0 cm) Thickened mitral valve leaflets. Trivial mitral regurgitation. Mild tricuspid regurgitation. Estimated right ventricular systolic pressure is 53 mmHg. Moderately elevated right ventricular systolic pressure. IVC Increased diameter and impaired or no inspiratory variation. Signature ----------------------------------------------------------------------------  Electronically signed by Citlalli Boucher(Sonographer) on 06/30/2021 08:25  AM ---------------------------------------------------------------------------- ----------------------------------------------------------------------------  Electronically signed by Moo Geller(Interpreting physician) on 06/30/2021  09:12 AM ---------------------------------------------------------------------------- FINDINGS Left Atrium Left atrial dilatation. Left Ventricle Left ventricle is normal in size. Estimated LV EF 25 %. Severe global hypokinesis. Asymmetrical septal hypertrophy. Grade III (severe) left ventricular diastolic dysfunction. Right Atrium Right atrial dilatation. Right Ventricle Right ventricular dilatation with reduced systolic function. Mitral Valve Thickened mitral valve leaflets. Trivial mitral regurgitation. Aortic Valve Aortic valve is trileaflet. Mild aortic stenosis, likely underestimated due to decreased LVF.  Trivial aortic insufficiency. Tricuspid Valve Normal tricuspid valve structure and function. Mild tricuspid regurgitation. Estimated right ventricular systolic pressure is 53 mmHg. Moderately elevated right ventricular systolic pressure. Pulmonic Valve The pulmonic valve is normal in structure. Trivial pulmonic insufficiency. Pericardial Effusion No significant pericardial effusion is seen. Miscellaneous Aortic root is mildly dilated. (4.0 cm) E/e' average 16.5 IVC Increased diameter and impaired or no inspiratory variation.  M-mode / 2D Measurements & Calculations:   LVIDd:5.6 cm(3.7 - 5.6 cm)       Diastolic CWADNJ:590 ml  GIAGU:0.37 cm(2.2 - 4.0 cm)      Systolic CPEKWH:188 ml  QHEA:9.85 cm(0.6 - 1.1 cm)       Aortic Root:4 cm(2.0 - 3.7 cm)  LVPWd:1.16 cm(0.6 - 1.1 cm)      LA Dimension: 6.1 cm(1.9 - 4.0 cm)  Fractional Shortenin.86 %    LA volume/Index: 63.8 ml /33m^2  Calculated LVEF (%): 40.72 %     LVOT:2.8 cm   Mitral:                                Aortic   Peak E-Wave: 1.04 m/s                  Peak Velocity: 1.96 m/s  Peak A-Wave: 0.27 m/s                  Mean Velocity: 1.23 m/s  E/A Ratio: 3.9                         Peak Gradient: 15.37 mmHg  Peak Gradient: 4.33 mmHg               Mean Gradient: 8 mmHg  Deceleration Time: 162 msec                                          Area (continuity): 1.95 cm^2                                         AV VTI: 35.6 cm   Tricuspid:                             Pulmonic:   Estimated RVSP: 53 mmHg  Peak TR Velocity: 3.07 m/s  Peak TR Gradient: 37.6996 mmHg  Estimated RA Pressure: 15 mmHg                                         Estimated PASP: 52.7 mmHg  Septal Wall E' velocity:0.05 m/s Lateral Wall E' velocity:0.10 m/s    XR CHEST PORTABLE    Result Date: 2021  EXAMINATION: ONE XRAY VIEW OF THE CHEST 2021 4:23 pm COMPARISON: 2021, 2021, 2021 HISTORY: ORDERING SYSTEM PROVIDED HISTORY: tlc placement TECHNOLOGIST PROVIDED HISTORY: tlc placement Reason for Exam: tlc placement Acuity: Acute Type of Exam: Initial FINDINGS: Interval placement of a right internal jugular central venous catheter which terminates over the expected region of the distal superior vena cava/superior cavoatrial junction. No postprocedural pneumothorax. Pulmonary evaluation is limited by rotation and lordotic positioning. Persistent low lung volumes with similar opacities at the right lung base with evaluation of the left lung base obscured by the overlying heart. Unchanged cardiomegaly with prior CABG. No acute bony findings. Right internal jugular central venous catheter appears appropriate in positioning. Limited pulmonary examination that appears grossly stable from most recent prior. XR CHEST PORTABLE    Result Date: 6/29/2021  EXAMINATION: ONE XRAY VIEW OF THE CHEST 6/29/2021 11:23 am COMPARISON: 06/07/2021, 05/14/2021 HISTORY: ORDERING SYSTEM PROVIDED HISTORY: shortness of breath TECHNOLOGIST PROVIDED HISTORY: shortness of breath Reason for Exam: sob, altered mental status Acuity: Unknown Type of Exam: Unknown FINDINGS: Cardiac silhouette enlargement again noted. The patient is status post median sternotomy. Opacities in the right lung base are again demonstrated. The amount of fissural fluid on the right side appears improved. Similar blunting of the right costophrenic angle. No pneumothorax identified. Persistent right basilar opacity and small right effusion with little interval change. XR CHEST PORTABLE    Result Date: 6/7/2021  EXAMINATION: ONE XRAY VIEW OF THE CHEST 6/7/2021 2:15 pm COMPARISON: May 14, 2021. HISTORY: ORDERING SYSTEM PROVIDED HISTORY: ESRD TECHNOLOGIST PROVIDED HISTORY: ESRD Reason for Exam: Pt states right wrist and right elbow pain and numbness 2 weeks. No injury. Acuity: Unknown Type of Exam: Unknown FINDINGS: A portable frontal view chest radiograph was obtained. The heart is enlarged.  The mediastinal contour and pleural spaces are within normal limits. Sternal fixation wires are present. Diffuse interstitial and alveolar opacities are present throughout the lungs bilaterally to a moderate degree. No pneumothorax or significant pleural effusion. No acute thoracic osseous abnormality. New or worsening diffuse interstitial and alveolar opacities throughout the lungs bilaterally, now moderate. This is somewhat asymmetric and more pronounced on the right. The appearance is nonspecific and may be due to pulmonary edema or multilobar pneumonia. Cardiomegaly. No significant pleural effusion. Physical Examination:        Physical Exam  Constitutional:       Appearance: Normal appearance. HENT:      Head: Normocephalic and atraumatic. Eyes:      General:         Right eye: No discharge. Left eye: No discharge. Conjunctiva/sclera: Conjunctivae normal.   Cardiovascular:      Rate and Rhythm: Regular rhythm. Bradycardia present. Pulses: Normal pulses. Heart sounds: Normal heart sounds. No murmur heard. Pulmonary:      Effort: Pulmonary effort is normal. No respiratory distress. Breath sounds: Normal breath sounds. No wheezing or rales. Abdominal:      General: Bowel sounds are normal. There is no distension. Palpations: Abdomen is soft. Tenderness: There is no abdominal tenderness. There is no guarding or rebound. Musculoskeletal:      Right lower leg: No edema. Left lower leg: No edema. Skin:     General: Skin is warm. Neurological:      General: No focal deficit present. Mental Status: He is alert and oriented to person, place, and time. Assessment:        Primary Problem  Infected wound    Active Hospital Problems    Diagnosis Date Noted    Infected wound [T14. 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  -Leukocytosis   -Hypotension (40s)   Atropine given, HR increased to 52   Pressors initiated, discontinued levophed as of this morning  -CXR negative for pneumonia  -Fluids in the ED  -Cefepime 1000 mg IVPB  -Metronidazole 500 mg IV  -Vancomycin 1250 mg IV  -Amiodarone, digoxin, beta blockers held per Critical Care and Cardiology  -Critical Care Consult    Start dopamine if bradycardic   Digoxin level of 2.6  -Infectious Disease Consult    Continue antibiotics  -Cardiology Consult    -Glucagon and Digibind given  -General Surgery Consult               Transfer to [facility] for debridement and medical mgmt, accepted to MICU pending bed availability   Central line placement   Bedside debridement of wounds, in need of more excessive debridement   Plastic surgery evaluation     ESRD on dialysis  -3.67 (close to baseline)  -Normal saline at 125 mL/hr  -Continued Midodrine  -Continued Sevelamer  -Consulted Nephrology   Dialysis performed today   Check phosphorous tomorrow morning as patient is on binders     T2DM  - 01/2021, Hgb A1C: 6.6  -Discontinued home medications  -Glucose: 175 => 151  -GRANT, not on Lantus  -Adult diet     CHF  -Echo 6/29/2021: LV EF 25%   Previous echo 06/19 estimated LV EF 30-35%.   -BNP: 362,523 (baseline 758,459)  -Consult Cardiology   Considering life vest due to low EF     Code: Full  Diet: Adult diabetic  DVT PPx: Heparin  GI PPx: Protonix    Consult OT/PT/TALITA Qiu MD   Transitional Resident  6/30/2021  6:04 PM

## 2021-06-30 NOTE — PROGRESS NOTES
[facility] [facility]    OCCUPATIONAL THERAPY MISSED TREATMENT NOTE   INPATIENT   Date: 21  Patient Name: Syed Oshea       Room:   MRN: [id number]   Account #: [de-identified]    : 1955  (73 y.o.)  Gender: male                 REASON FOR MISSED TREATMENT:  Hold treatment per nursing request   -    Transfer to [address] [place] [address],[address] [facility] for surgery - 21 Accepted to [address] [place] [address] waiting for ICU bed      Zamzam Hardin OT

## 2021-06-30 NOTE — PLAN OF CARE
Problem: Falls - Risk of:  Goal: Will remain free from falls  Description: Will remain free from falls  6/30/2021 0313 by Merlin Notice, RN  Outcome: Ongoing  Note: Bed remains in lowest position, call light within reach. Patient remains free of falls at this time. RN will continue to monitor. 6/30/2021 0313 by Merlin Notice, RN  Outcome: Ongoing  Note: Bed remains in lowest position, call light within reach. Patient remains free of falls at this time. RN will continue to monitor. Goal: Absence of physical injury  Description: Absence of physical injury  6/30/2021 0313 by Merlin Notice, RN  Outcome: Ongoing  6/30/2021 0313 by Merlin Notice, RN  Outcome: Ongoing     Problem: Pain:  Goal: Pain level will decrease  Description: Pain level will decrease  6/30/2021 0313 by Merlin Notice, RN  Outcome: Ongoing  6/30/2021 0313 by Merlin Notice, RN  Outcome: Ongoing  Goal: Control of acute pain  Description: Control of acute pain  6/30/2021 0313 by Merlin Notice, RN  Outcome: Ongoing  6/30/2021 0313 by Merlin Notice, RN  Outcome: Ongoing  Goal: Control of chronic pain  Description: Control of chronic pain  6/30/2021 0313 by Merlin Notice, RN  Outcome: Ongoing  6/30/2021 0313 by Merlin Notice, RN  Outcome: Ongoing     Problem: SAFETY  Goal: Free from accidental physical injury  6/30/2021 0313 by Merlin Notice, RN  Outcome: Ongoing  Note: Fall assessment performed and appropriate measures implemented. Room freed from clutter. Bed in lowest position with wheels locked. Call light in place. ID band in place.      6/30/2021 0313 by Merlin Notice, RN  Outcome: Ongoing     Problem: DAILY CARE  Goal: Daily care needs are met  6/30/2021 0313 by Merlin Notice, RN  Outcome: Ongoing  6/30/2021 0313 by Merlin Notice, RN  Outcome: Ongoing     Problem: PAIN  Goal: Patient's pain/discomfort is manageable  6/30/2021 0313 by Lorena Baez RN  Outcome: Ongoing  6/30/2021 0313 by Lorena Baez RN  Outcome: Ongoing     Problem: SKIN INTEGRITY  Goal: Skin integrity is maintained or improved  6/30/2021 0313 by Lorena Baez RN  Outcome: Ongoing  6/30/2021 0313 by Lorena Baez RN  Outcome: Ongoing     Problem: KNOWLEDGE DEFICIT  Goal: Patient/S.O. demonstrates understanding of disease process, treatment plan, medications, and discharge instructions. 6/30/2021 0313 by Lorena Baez RN  Outcome: Ongoing  6/30/2021 0313 by Lorena Baez RN  Outcome: Ongoing     Problem: DISCHARGE BARRIERS  Goal: Patient's continuum of care needs are met  6/30/2021 0313 by Lorena Baez RN  Outcome: Ongoing  6/30/2021 0313 by Lorena Baez RN  Outcome: Ongoing     Problem: Skin Integrity:  Goal: Will show no infection signs and symptoms  Description: Will show no infection signs and symptoms  6/30/2021 0313 by Lorena Baez RN  Outcome: Ongoing  6/30/2021 0313 by Lorena Baez RN  Outcome: Ongoing  Goal: Absence of new skin breakdown  Description: Absence of new skin breakdown  6/30/2021 0313 by Lorena Baez RN  Outcome: Ongoing  Note: Patient turned and repositioned every 2 hours and as needed for comfort. Dr. Ro Silva debrided wounds at bedside, wound care completed and dressings changed as ordered.   6/30/2021 0313 by Lorena Baez RN  Outcome: Ongoing     Problem: Gas Exchange - Impaired:  Goal: Levels of oxygenation will improve  Description: Levels of oxygenation will improve  Outcome: Ongoing     Problem: Infection, Septic Shock:  Goal: Will show no infection signs and symptoms  Description: Will show no infection signs and symptoms  Outcome: Ongoing     Problem: Tissue Perfusion, Altered:  Goal: Circulatory function within specified parameters  Description: Circulatory function within specified parameters  Outcome: Ongoing

## 2021-06-30 NOTE — PROGRESS NOTES
[facility] Contin[facility]  Follow Up      NAME:  Ronnell Hartley RECORD NUMBER:  [id number]  AGE: 73 y.o. GENDER: male  : 1955  TODAY'S DATE:  2021    [facility] nursing consult noted for mgmt of multiple wounds. Dr. Bethany Ruiz, general surgery did some sharps debridement yesterday and it was felt that the patients needs were too extensive and was to be transported to a higher level of care. The patient is currently in dialysis and awaiting bed placement for [facility]. RN deferring wound care visit at this time.       Cathleen MCCALLUMN, RN, [facility] [address]  Wound, Ostomy, and Continence Nursing  [phone]

## 2021-06-30 NOTE — CONSULTS
[facility]  In Patient Cardiology Consult             Date:   6/30/2021  Patient name: Ptera Lin  Date of admission:  6/29/2021 11:18 AM  MRN:   [id number]  YOB: 1955    Reason for Admission:  AMS    CHIEF COMPLAINT:  AMS     History Obtained From:  Pt and chart    HISTORY OF PRESENT ILLNESS:    This is a 73year old male who presents from a NH due to [address]. He was found to have necrotic unstageable wounds on R buttock and bilateral inner thighs. He was noted to be in sepsis with possible septic shock. He underwent I and D on 6/29/21. We were consulted today for bradycardia. His HR was as low as 40s. Was sinus throughout. He was seen today. Not on any pressors. Denies any CP. Known to us from last admission on 5/21:   1. Septic shock - per icu team  2. C diff colitis - er ID  3. RLL Pneumonia - stable  4. Atrial Tachycardia- S/p cardioversion 5/5/21- stable rhythm, optimize meds  5. Was in Sinus tachycardia with intermittent atrial tach and 2:1 AV conduction, appropriate secondary to hypotension and sepsis. Optimize cardiac meds    6. Multivessel CAD s/p CABG and Subsequent DENY to LCX (only patent LIMA-LAD graft in 2019)  7. Ischemic CMP with last LVEF 20-25% in Jan 2021 - optimize meds  8. Acute on chronic systolic CHF due to missed HD   9. PAD with hx of left BKA  10. ESRD on HD   11. Consideration of AICD after infection process has resolved. At this point in time not a candidate due to infectious process. 12. Volume and electrolytes management per nephrology.   15.   Past Medical History:   has a past medical history of Acute on chronic congestive heart failure ([address] Anemia, CAD (coronary artery disease), Cardiomyopathy ([address] Dialysis care, ESRD (end stage renal disease) on dialysis ([address] Foot ulcer, left ([address] GERD (gastroesophageal reflux disease), Hemodialysis patient ([address] History of sleep apnea, Hyperlipidemia, Hyperparathyroidism ([address] Hypertension, Neuropathy, ROOSEVELT (obstructive sleep apnea), Peripheral vascular disease ([facility] Pneumonia, S/P CABG (coronary artery bypass graft), and Type II or unspecified type diabetes mellitus without mention of complication, not stated as uncontrolled. Past Surgical History:   has a past surgical history that includes Cataract removal; Dialysis fistula creation (Left, 2014); Leg amputation, lower tibia/fibula; and IR CHEST TUBE INSERTION (5/10/2021). Home Medications:    Prior to Admission medications    Medication Sig Start Date End Date Taking? Authorizing Provider   acetaminophen (TYLENOL) 500 MG tablet Take 1,000 mg by mouth every 6 hours as needed for Pain   Yes Historical Provider, MD   calcium acetate (PHOSLO) 667 MG TABS Take 2 tablets by mouth 3 times daily (with meals) 6/10/21  Yes Everette Hughes MD   amiodarone (CORDARONE) 200 MG tablet Take 1 tablet by mouth daily 6/9/21  Yes Everette Hughes MD   atorvastatin (LIPITOR) 40 MG tablet Take 1 tablet by mouth nightly 6/9/21  Yes Everette Hughes MD   metoprolol tartrate (LOPRESSOR) 25 MG tablet Take 1 tablet by mouth 2 times daily 6/9/21  Yes Everette Hughes MD   midodrine (PROAMATINE) 2.5 MG tablet Take 1 tablet by mouth 3 times daily 6/9/21  Yes Everette Hughes MD   pantoprazole (PROTONIX) 40 MG tablet Take 40 mg by mouth daily   Yes Historical Provider, MD   B Complex-C-Folic Acid (ALBERTA-FERNANDA RX) 1 MG TABS Take 1 tablet by mouth daily   Yes Historical Provider, MD   digoxin (LANOXIN) 125 MCG tablet Take 1 tablet by mouth daily 5/16/21  Yes Marva Angela MD   sevelamer (RENVELA) 800 MG tablet Take 5 tablets by mouth 3 times daily (with meals) 5/15/21  Yes Marva Angela MD   nitroGLYCERIN (NITROSTAT) 0.4 MG SL tablet Place 0.4 mg under the tongue every 5 minutes as needed for Chest pain up to max of 3 total doses. If no relief after 1 dose, call 911.    Yes Historical Provider, MD   aspirin 81 MG EC tablet Take 1 tablet by mouth daily 3/14/16  Yes Consuelo Magallon, DO   ONE TOUCH ULTRA TEST strip USE AS DIRECTED DAILY AS NEEDED 1/24/17   Brianna Jaz, DO   Blood Glucose Monitoring Suppl (ONE TOUCH ULTRA 2) W/DEVICE KIT TEST 3 TIMES DAILY 8/26/16   Brianna Micro, DO   glucose monitoring kit (FREESTYLE) monitoring kit 1 kit by Does not apply route daily as needed 4/21/16   Brianna Jaz, DO   Lancets 30G MISC 1 each by Does not apply route 3 times daily 4/21/16   Brianna Micro, DO   Misc. Devices ([address] [place] [address]) [facility] 1 Device by Does not apply route continuous 4/21/16   Brianna Micro, DO       Allergies:  Pcn [penicillins]    Social History:   reports that he has never smoked. He has never used smokeless tobacco. He reports that he does not drink alcohol and does not use drugs. Family History:   Family History   Problem Relation Age of Onset    Heart Disease Father     Diabetes Father     Diabetes Brother        REVIEW OF SYSTEMS:    · Constitutional: there has been no unanticipated weight loss. There's been No change in energy level, No change in activity level. · Eyes: No visual changes or diplopia. No scleral icterus. · ENT: No Headaches, hearing loss or vertigo. No mouth sores or sore throat. · Cardiovascular: As HPI  · Respiratory: As HPI  · Gastrointestinal: No abdominal pain, appetite loss, blood in stools. No change in bowel or bladder habits. · Genitourinary: No dysuria, trouble voiding, or hematuria. · Musculoskeletal:  No gait disturbance, No weakness or joint complaints. · Integumentary: No rash or pruritis. · Neurological: No headache, diplopia, change in muscle strength, numbness or tingling. No change in gait, balance, coordination, mood, affect, memory, mentation, behavior. · Psychiatric: No anxiety, or depression. · Endocrine: No temperature intolerance. No excessive thirst, fluid intake, or urination. No tremor.   · Hematologic/Lymphatic: No abnormal bruising or bleeding, blood clots or swollen lymph nodes.  · Allergic/Immunologic: No nasal congestion or hives. PHYSICAL EXAM:    Physical Examination:    BP (!) 95/51   Pulse (!) 48   Temp 97.7 °F (36.5 °C) (Oral)   Resp 21   Ht 6' (1.829 m)   Wt 167 lb 3.2 oz (75.8 kg)   SpO2 100%   BMI 22.68 kg/m²    Constitutional and General Appearance: alert, cooperative, no distress and appears stated age  HEENT: PERRL, no cervical lymphadenopathy. No masses palpable. Normal oral mucosa  Respiratory:  · Normal excursion and expansion without use of accessory muscles  · Resp Auscultation: Good respiratory effort. No for increased work of breathing. On auscultation: Clear  Cardiovascular:  · Heart tones are crisp and normal. regular S1 and S2. Murmurs: none  · Jugular venous pulsation Normal  Abdomen:  · No masses or tenderness  · Bowel sounds present  Extremities:  ·  No Cyanosis or Clubbing  ·  Lower extremity edema: none  ·  Skin: Warm and dry  Neurological:  · Alert and oriented. · Moves all extremities well  · No abnormalities of mood, affect, memory, mentation, or behavior are noted    DATA:    Diagnostics:      EKG:   Results for orders placed or performed during the hospital encounter of 06/07/21   EKG 12 Lead   Result Value Ref Range    Ventricular Rate 67 BPM    Atrial Rate 67 BPM    P-R Interval 228 ms    QRS Duration 162 ms    Q-T Interval 442 ms    QTc Calculation (Bazett) 467 ms    P Axis 24 degrees    R Axis -54 degrees    T Axis 138 degrees    Narrative    Sinus rhythm with 1st degree A-V block  Left axis deviation  Left bundle branch block  Abnormal ECG  When compared with ECG of 04-MAY-2021 15:41,  Sinus rhythm has replaced Wide QRS rhythm  Vent. rate has decreased BY  42 BPM       Echo:  Results for orders placed or performed during the hospital encounter of 06/29/21   ECHO Complete 2D W Doppler W Color  Echo contrast utilized on this technically difficult study. Left ventricle is normal in size. Estimated LV EF 25 %.  Severe global hypokinesis. Asymmetrical septal hypertrophy. Grade III (severe) left ventricular diastolic dysfunction. Right ventricular dilatation with reduced systolic function. Left atrial dilatation. Right atrial dilatation. Mild aortic stenosis, likely underestimated due to decreased LVF. Trivial aortic insufficiency. Aortic root is mildly dilated. (4.0 cm)  Thickened mitral valve leaflets. Trivial mitral regurgitation. Mild tricuspid regurgitation. Estimated right ventricular systolic pressure is 53 mmHg. Moderately  elevated right ventricular systolic pressure. IVC Increased diameter and impaired or no inspiratory variation. LAST CATH:   Results for orders placed or performed during the hospital encounter of 06/02/19   Diagnostic Cardiac Cath Lab Procedure     Severe native vessel disease. Patent LIMA-LAD. The LAD distal to touch down is small in caliber with 80%   diffuse disease. (Not amenable to PCI)   Chronic total occlusion of RCA. LCX had 80% mid stenosis reduced to 0% using 3x38 mm Xience stent, post   dilated using 3.25 mm NC balloon. Labs:     CBC:   Recent Labs     06/29/21  1138 06/30/21  0411   WBC 21.4* 20.7*   HGB 8.9* 8.2*   HCT 29.5* 27.4*   * 114*     BMP:   Recent Labs     06/29/21  1138 06/30/21  0411    137   K 4.2 3.7   CO2 31 27   BUN 50* 54*   CREATININE 3.67* 3.76*   LABGLOM 17* 16*   GLUCOSE 175* 151*     BNP: No results for input(s): BNP in the last 72 hours. PT/INR: No results for input(s): PROTIME, INR in the last 72 hours. APTT:No results for input(s): APTT in the last 72 hours. CARDIAC ENZYMES:No results for input(s): TROPHS in the last 72 hours. FASTING LIPID PANEL:  Lab Results   Component Value Date    HDL 42 06/03/2019    TRIG 136 06/03/2019     LIVER PROFILE:No results for input(s): AST, ALT, LABALBU in the last 72 hours.       IMPRESSION:    - Sinus bradycardia- exacerbated by dig toxicity  - Dig toxicity (2.6 on 6/29/21)- worsened by CAREN/CKD  - Necrotic wounds- s/p I and D on 6/29/21  - Sepsis with possible shock- improved  - Recent Sepsis, C. Diff and PNA on 5/21  - PAT- in NSR- s/p CV on 5/5/21  - MV CAD s/p CABG, then PCI to LCX in 2019 (only patent LIMA-LAD)  - Ischemic CM- EF persistently low 25% on Echo 6/30/21  - PAD h/o L BKA  - ESRD on HD    RECOMMENDATIONS:  - stop dig  - stop amio  - stop BB  - given glucagon x 1 and digibind x 1  - reviewed echo- EF still low, will consider lifevest prior to d/c, and then consider AICD if continues to make improvements as outpatient  - Surgery, Primary, ICU, Gen Surgery following  - will follow with you    Discussed with patient and nursing. Thank you for allowing me to participate in the care of this patient, please do not hesitate to call if you have any questions. Gamal Mchugh DO, [facility] [place], [address], [address] [facility]  [email or web address]  [phone]

## 2021-06-30 NOTE — FLOWSHEET NOTE
06/29/21 2134   Provider Notification   Reason for Communication Review case;Critical Value (comment)  (digoxin level 2.6, lactic 3.1)   Provider Name Dr. Ruthann Gaytan   Provider Notification Physician   Method of Communication Secure Message   Response No new orders   Notification Time 2134     MD notified of digoxin level 2.6, lactic acid 3.1, HR 52, /59.

## 2021-06-30 NOTE — FLOWSHEET NOTE
06/29/21 2125   Provider Notification   Reason for Communication Review case   Provider Name Dr. Dez Elias   Provider Notification Physician   Method of Communication Call   Response No new orders   Notification Time 2125     MD notified of wound on L hip, MD arriaga he will be back to re-evaluate pt tomorrow.

## 2021-06-30 NOTE — PROGRESS NOTES
Attending Physician Statement  I have discussed the care of David Mccurdy with the resident team. I have examined the patient myself and taken ros and hpi , including pertinent history and exam findings,  with the resident. I have reviewed the key elements of all parts of the encounter with the resident. I agree with the assessment, plan and orders as documented by the resident. Principal Problem:    Infected wound  Active Problems:    ESRD on dialysis ([facility]    Hypertension, essential    Type 2 diabetes mellitus with foot ulcer (HCC)    CHF (congestive heart failure) ([facility]    History of left below knee amputation (HCC)    Moderate malnutrition ([facility]  Resolved Problems:    * No resolved hospital problems. *    50-year-old male with ESRD on dialysis, CHF, left BKA, admitted due to septic shock secondary to multiple necrotic wounds, Alma's gangrene, surgery consulted status post bedside debridement yesterday. Symptomatic bradycardia-status post atropine, heart rate persisting in high 40s and low 50s, 1 dose glucagon given, amiodarone and beta-blocker on hold  Elevated digoxin level concern for dig toxicity -will be given Digibind today    Discussed with surgical team-patient will need more extensive debridement, will be transferred to [facility] today-transfer initiated, spoken to Matthew Srivastava, patient has been accepted to medical ICU pending bed availability. Full note to follow.       Electronically signed by Amanda Earl MD

## 2021-06-30 NOTE — FLOWSHEET NOTE
06/29/21 2037   Provider Notification   Reason for Communication Review case;New orders   Provider Name Dr. Cherylene Saunas   Provider Notification Physician   Method of Communication Call   Response See orders   Notification Time 2037     Orders received to hold tonight's dose of heparin.

## 2021-06-30 NOTE — PLAN OF CARE
Nutrition Problem #1: Moderate malnutrition  Intervention: Food and/or Nutrient Delivery: Continue Current Diet, Continue Oral Nutrition Supplement  Nutritional Goals: % of estimated energy needs.

## 2021-06-30 NOTE — PROGRESS NOTES
Physical Therapy        Physical Therapy Cancel Note      DATE: 2021    NAME: Meryle More  MRN: [id number]   : 1955      Patient not seen this date for Physical Therapy due to: Other: Transfer to [facility] today per chart - will hold PT today.       Electronically signed by Philomena Ahumada, PT on 2021 at 10:43 AM

## 2021-06-30 NOTE — CONSULTS
[facility] -   Infectious diseases evaluation  admission date 6/29/2021    reason for consultation:   Infected wounds    Impression :   Current:  · Necrotic wound to the right posterior buttock/flank and bilateral medial thighs status post surgical debridement to 6/29/2021. · Sepsis secondary to above  · Septic shock  · End-stage renal disease on hemodialysis  · Diabetes mellitus  · CHF  · Hypertension  · Peripheral arterial disease  · Coronary artery disease status post CABG  · History of left below-knee amputation  · History of C. difficile colitis was treated with oral vancomycin until 5/16/2021      Recommendations   · IV Flagyl, cefepime and vancomycin. · Follow cultures and adjust antibiotics as needed  · Wound care  · Follow CBC  · Supportive care          History of Present Illness:   Initial history:  John Kerr is a 73y.o.-year-old male was brought to the hospital from a nursing home for mental status changes, found to have necrotic wound to the right buttock and bilateral inner thighs status post surgical debridement 6/29/2021. He was hypotensive required Levophed initially that was weaned off. The patient had multiple medical problems includes end-stage renal disease. He was hospitalized around 2 months ago with right lower lobe pneumonia, C. difficile colitis that was treated. Interval changes  6/30/2021   He was seen at the ICU, alert response to commands, complaining of postoperative pain, occasional cough, off Levophed, no reported diarrhea. No reported fever.   WBC 20.7, lactic acid 1.6  Patient Vitals for the past 8 hrs:   BP Temp Temp src Pulse Resp SpO2 Height   06/30/21 1100 (!) 124/47 -- -- 52 17 100 % --   06/30/21 1014 -- -- -- -- -- -- 6' (1.829 m)   06/30/21 1000 (!) 99/53 -- -- 53 15 100 % --   06/30/21 0900 (!) 111/54 -- -- 53 23 100 % --   06/30/21 0800 (!) 108/50 -- -- 51 21 100 % --   06/30/21 0700 (!) 95/51 -- -- (!) 48 21 100 % --   06/30/21 0600 (!) 110/45 97.7 °F (36.5 °C) Oral 50 21 100 % --   06/30/21 0500 (!) 110/41 -- -- 50 18 100 % --           I have personally reviewed the past medical history, past surgical history, medications, social history, and family history, and I haveupdated the database accordingly. Allergies:   Pcn [penicillins]     Review of Systems:     Review of Systems  Limited due to mental status changes, other than above 14 system reviewed were negative  Physical Examination :       Physical Exam  Constitutional:       General: He is not in acute distress. Appearance: He is ill-appearing. HENT:      Head: Normocephalic and atraumatic. Right Ear: External ear normal.      Left Ear: External ear normal.      Nose: No congestion. Mouth/Throat:      Pharynx: Oropharynx is clear. No posterior oropharyngeal erythema. Eyes:      General: No scleral icterus. Conjunctiva/sclera: Conjunctivae normal.   Cardiovascular:      Rate and Rhythm: Normal rate and regular rhythm. Heart sounds: No murmur heard. Pulmonary:      Effort: Pulmonary effort is normal. No respiratory distress. Breath sounds: No rhonchi. Abdominal:      General: Abdomen is flat. There is no distension. Palpations: Abdomen is soft. Musculoskeletal:      Cervical back: Neck supple. No rigidity. Right lower leg: No edema. Comments: Left below-knee amputation site with no open wound   Skin:     Comments: Right buttock/bilateral inner thighs large wounds. Neurological:      Mental Status: He is alert.      IJ line in place    Past Medical History:     Past Medical History:   Diagnosis Date    Acute on chronic congestive heart failure ([facility]     Anemia     CAD (coronary artery disease)     Cardiomyopathy ([facility]     Dialysis care     ESRD (end stage renal disease) on dialysis ([facility]     Foot ulcer, left ([facility] 2015    GERD (gastroesophageal reflux disease)     Hemodialysis patient ([facility] 2011 MOM-WED-FRI-ON DEANNE     History of sleep apnea     none since significant weight loss (was 400#)    Hyperlipidemia     Hyperpar[facility]roidism ([facility]     Hypertension     Neuropathy     ROOSEVELT (obstructive sleep apnea) 5/8/2021    Peripheral vascular disease ([facility]     Pneumonia     resolved    S/P CABG (coronary artery bypass graft) 06/2016    Type II or unspecified type diabetes mellitus without mention of complication, not stated as uncontrolled     dx-1980       Past Surgical  History:     Past Surgical History:   Procedure Laterality Date    CATARACT REMOVAL      DIALYSIS FISTULA CREATION Left 2014    IR CHEST TUBE INSERTION  5/10/2021    IR CHEST TUBE INSERTION 5/10/2021 [facility]ED[facility]    LEG AMPUTATION THROUGH LOWER TIBIA AND FIBULA         Medications:      vancomycin (VANCOCIN) 1250 mg in D5W 250 mL IVPB (ADDAVIAL)  750 mg Intravenous Once    vancomycin (VANCOCIN) intermittent dosing (placeholder)   Other RX Placeholder    [Held by provider] amiodarone  200 mg Oral Daily    aspirin  81 mg Oral Daily    atorvastatin  40 mg Oral Nightly    midodrine  2.5 mg Oral TID    pantoprazole  40 mg Oral Daily    sevelamer  4,000 mg Oral TID WC    sodium chloride flush  5-40 mL Intravenous 2 times per day    heparin (porcine)  5,000 Units Subcutaneous 3 times per day    cefepime  1,000 mg Intravenous Q24H    metroNIDAZOLE  500 mg Intravenous Q8H    insulin lispro  0-6 Units Subcutaneous TID WC    insulin lispro  0-3 Units Subcutaneous Nightly    sodium hypochlorite   Irrigation BID       Social History:     Social History     Socioeconomic History    Marital status:      Spouse name: Not on file    Number of children: Not on file    Years of education: Not on file    Highest education level: Not on file   Occupational History    Not on file   Tobacco Use    Smoking status: Never Smoker    Smokeless tobacco: Never Used   Substance and Sexual Activity    Alcohol use: No    Drug use: No    Sexual activity: Not on file   Other Topics Concern    Not on file   Social History Narrative    Not on file     Social Determinants of Health     Financial Resource Strain:     Difficulty of Paying Living Expenses:    Food Insecurity:     Worried About Running Out of Food in the Last Year:     [address] in the Last Year:    Transportation Needs:     Lack of Transportation (Medical):  Lack of Transportation (Non-Medical):    Physical Activity:     Days of Exercise per Week:     Minutes of Exercise per Session:    Stress:     Feeling of Stress :    Social Connections:     Frequency of Communication with Friends and Family:     Frequency of Social Gatherings with Friends and Family:     Attends Sikh Services:     Active Member of Clubs or Organizations:     Attends Club or Organization Meetings:     Marital Status:    Intimate Partner Violence:     Fear of Current or Ex-Partner:     Emotionally Abused:     Physically Abused:     Sexually Abused:        Family History:     Family History   Problem Relation Age of Onset    Heart Disease Father     Diabetes Father     Diabetes Brother       Medical Decision Making:   I have independently reviewed/ordered the following labs:    CBC with Differential:   Recent Labs     06/29/21  1138 06/30/21  0411   WBC 21.4* 20.7*   HGB 8.9* 8.2*   HCT 29.5* 27.4*   * 114*   LYMPHOPCT 1* 0*   MONOPCT 2 4     BMP:  Recent Labs     06/29/21  1138 06/30/21  0411    137   K 4.2 3.7   CL 93* 98   CO2 31 27   BUN 50* 54*   CREATININE 3.67* 3.76*     Hepatic Function Panel: No results for input(s): PROT, LABALBU, BILIDIR, IBILI, BILITOT, ALKPHOS, ALT, AST in the last 72 hours. No results for input(s): RPR in the last 72 hours. No results for input(s): HIV in the last 72 hours. No results for input(s): BC in the last 72 hours.   Lab Results   Component Value Date    CREATININE 3.76 06/30/2021    GLUCOSE 151 06/30/2021 GLUCOSE 128 01/09/2012       Detailed results: Thank you for allowing us to participate in the care of this patient. Please call with questions. This note is created with the assistance of a speech recognition program.  While intending to generate adocument that actually reflects the content of the visit, the document can still have some errors including those of syntax and sound a like substitutions which may escape proof reading. It such instances, actual meaningcan be extrapolated by contextual diversion.     Saadia Cabezas MD  Office: [phone]  Perfect serve / office [phone]

## 2021-06-30 NOTE — PROGRESS NOTES
Plan of care and operative outcomes discussed with Dr Synthia Aschoff. Emergent debridement has been completed, however, patient is likely to need further deeper muscle debridement and plastic surgery. Patient is currently on Levophed with downtrending pressor requirement, iv abx and vitals are improving after the procedure. We will stabilize the patient overnight and patient will be reassessed by surgery tomorrow am to consider transfer to [facility] tomorrow. No surgical procedure is recommended or likely to be needed overnight at this time. Discussed above with ICU charge nurse Kely herman patient condition currently improving. [facility] access called and updated to put transfer on hold.      Taylor Collado MD

## 2021-07-01 NOTE — PLAN OF CARE
Problem: OXYGENATION/RESPIRATORY FUNCTION  Goal: Patient will maintain patent airway  7/1/2021 0732 by Zula Spray, RCP  Outcome: Ongoing  7/1/2021 0716 by Wade Alexander  Outcome: Ongoing  7/1/2021 0521 by Abbey Henderson, RCP  Outcome: Ongoing  Goal: Patient will achieve/maintain normal respiratory rate/effort  Description: Respiratory rate and effort will be within normal limits for the patient  7/1/2021 0732 by Zula Spray, RCP  Outcome: Ongoing  7/1/2021 0716 by Wade Alexander  Outcome: Ongoing  7/1/2021 0521 by Abbey Henderson, RCP  Outcome: Ongoing     Problem: MECHANICAL VENTILATION  Goal: Patient will maintain patent airway  7/1/2021 0732 by Zula Spray, RCP  Outcome: Ongoing  7/1/2021 0716 by Wade Alexander  Outcome: Ongoing  7/1/2021 0521 by Abbey Henderson, RCP  Outcome: Ongoing  Goal: Oral health is maintained or improved  7/1/2021 0732 by Zula Spray, RCP  Outcome: Ongoing  7/1/2021 0716 by Wade Alexander  Outcome: Ongoing  7/1/2021 0521 by Abbey Henderson, RCP  Outcome: Ongoing  Goal: ET tube will be managed safely  7/1/2021 0732 by Zula Spray, RCP  Outcome: Ongoing  7/1/2021 0716 by Wade Alexander  Outcome: Ongoing  7/1/2021 0521 by Abbey Henderson, RCP  Outcome: Ongoing  Goal: Ability to express needs and understand communication  7/1/2021 0732 by Zula Spray, RCP  Outcome: Ongoing  7/1/2021 0716 by Wade Alexander  Outcome: Ongoing  7/1/2021 0521 by Abbey Henderson, RCP  Outcome: Ongoing  Goal: Mobility/activity is maintained at optimum level for patient  7/1/2021 0732 by Zula Spray, RCP  Outcome: Ongoing  7/1/2021 0716 by Wade Alexander  Outcome: Ongoing  7/1/2021 0521 by Abbey Henderson, RCP  Outcome: Ongoing

## 2021-07-01 NOTE — PLAN OF CARE
Problem: Non-Violent Restraints  Goal: No harm/injury to patient while restraints in use  Outcome: Met This Shift  Goal: Patient's dignity will be maintained  Outcome: Met This Shift     Problem: Pain:  Goal: Pain level will decrease  Description: Pain level will decrease  Outcome: Ongoing  Goal: Control of acute pain  Description: Control of acute pain  Outcome: Ongoing  Goal: Control of chronic pain  Description: Control of chronic pain  Outcome: Ongoing     Problem: Skin Integrity:  Goal: Will show no infection signs and symptoms  Description: Will show no infection signs and symptoms  Outcome: Ongoing  Goal: Absence of new skin breakdown  Description: Absence of new skin breakdown  Outcome: Ongoing  Goal: Skin integrity will be maintained  Outcome: Ongoing     Problem: Falls - Risk of:  Goal: Will remain free from falls  Description: Will remain free from falls  Outcome: Ongoing  Goal: Absence of physical injury  Description: Absence of physical injury  Outcome: Ongoing     Problem: Cardiac:  Goal: Ability to maintain an adequate cardiac output will improve  Description: Ability to maintain an adequate cardiac output will improve  Outcome: Ongoing  Goal: Hemodynamic stability will improve  Description: Hemodynamic stability will improve  Outcome: Ongoing     Problem: Fluid Volume:  Goal: Ability to achieve and maintain adequate urine output will improve  Description: Ability to achieve and maintain adequate urine output will improve  Outcome: Ongoing  Goal: Will show no signs or symptoms of fluid imbalance  Description: Will show no signs or symptoms of fluid imbalance  Outcome: Ongoing     Problem: Respiratory:  Goal: Respiratory status will improve  Description: Respiratory status will improve  Outcome: Ongoing  Goal: Ability to maintain adequate ventilation will improve  Outcome: Ongoing     Problem:  Bowel/Gastric:  Goal: Ability to prevent future episodes of altered bowel function as condition permits will improve  Description: Ability to prevent future episodes of altered bowel function as condition permits will improve  Outcome: Ongoing     Problem: Coping:  Goal: Level of anxiety will decrease  Description: Level of anxiety will decrease  Outcome: Ongoing     Problem: Physical Regulation:  Goal: Complications related to the disease process, condition or treatment will be avoided or minimized  Description: Complications related to the disease process, condition or treatment will be avoided or minimized  Outcome: Ongoing     Problem: Safety:  Goal: Ability to remain free from injury will improve  Outcome: Ongoing     Problem: Sensory:  Goal: Pain level will decrease  Description: Pain level will decrease  Outcome: Ongoing     Problem: Tissue Perfusion:  Goal: Risk of venous thrombosis will decrease  Outcome: Ongoing     Problem: Urinary Elimination:  Goal: Ability to reestablish a normal urinary elimination pattern will improve - after catheter removal  Outcome: Ongoing     Problem: OXYGENATION/RESPIRATORY FUNCTION  Goal: Patient will maintain patent airway  7/1/2021 0521 by Rainer Davis RCP  Outcome: Ongoing  Goal: Patient will achieve/maintain normal respiratory rate/effort  Description: Respiratory rate and effort will be within normal limits for the patient  7/1/2021 0521 by Rainer Davis RCP  Outcome: Ongoing     Problem: MECHANICAL VENTILATION  Goal: Patient will maintain patent airway  7/1/2021 0521 by Rainer Davis RCP  Outcome: Ongoing  Goal: Oral health is maintained or improved  7/1/2021 0521 by Rainer Davis RCP  Outcome: Ongoing  Goal: ET tube will be managed safely  7/1/2021 0521 by Rainer Davis RCP  Outcome: Ongoing  Goal: Ability to express needs and understand communication  7/1/2021 0521 by Rainer Davis RCP  Outcome: Ongoing  Goal: Mobility/activity is maintained at optimum level for patient  7/1/2021 0521 by Rainer Davis RCP  Outcome: Ongoing     Problem: SKIN INTEGRITY  Goal: Skin integrity is maintained or improved  7/1/2021 0521 by Prosper Domínguez RCP  Outcome: Ongoing   Fili Meeks RN

## 2021-07-01 NOTE — PROGRESS NOTES
Comprehensive Nutrition Assessment    Type and Reason for Visit:  Initial, Consult (TF ordering/management)    Nutrition Recommendations/Plan: Continue/advance Renal TF as tolerated; suggest goal rate of 50 mL/hr to provide 2160 kcal and 97 g pro/day. Will monitor TF tolerance/adequacy. Modify TF as needed. Nutrition Assessment:  Pt admitted with septic shock, necrotizing fasciitis s/p bedside surgical debridement. PMH includes: ESRD/HD, CAD, CHF, CABG, PAD, Left BKA, DM. Pt is currently intubated. Renal TF started at 10 ml/hr, with goal rate of 60 mL/hr ordered. Plan for surgery tomorrow per RN. Meds/labs reviewed. Malnutrition Assessment:  Malnutrition Status:  Insufficient data    Context:  Chronic Illness     Findings of the 6 clinical characteristics of malnutrition:  Energy Intake:  Unable to assess  Weight Loss:  Unable to assess     Body Fat Loss:  Unable to assess     Muscle Mass Loss:  1 - Mild muscle mass loss Clavicles (pectoralis & deltoids), Scapula (trapezius)  Fluid Accumulation:  No significant fluid accumulation     Strength:  Not Performed    Estimated Daily Nutrient Needs:  Energy (kcal):  5423-8214 kcal/day; Weight Used for Energy Requirements:  Current     Protein (g):  120 g pro/day; Weight Used for Protein Requirements:  Current (1.5)        Fluid (ml/day):  2000 mL/day; Method Used for Fluid Requirements:  ml/Kg (25)      Nutrition Related Findings:  meds/labs reviewed      Wounds:  Multiple (including necrotic sacral and bilateral inner thigh wounds)       Current Nutrition Therapies:    Diet NPO  Current Tube Feeding (TF) Orders:  · Feeding Route: Orogastric  · Formula: Renal Formula  · Schedule: Continuous at 10 mL/hr with goal rate of 60 mL/hr ordered  · Current TF & Flush Orders Provides: 10 mL/hr =432 kcal and 19 g pro/day. 60 mL/hr as ordered would provide 2592 kcal and 117 g pro/day.    · Goal TF & Flush Orders Provides:Recommended goal of 50 mL/hr =2160 kcal and 97 g pro/day    Additional Calorie Sources:   none    Anthropometric Measures:  · Height: 6' (182.9 cm)  · Current Body Weight: 174 lb 2.6 oz (79 kg)   · Admission Body Weight: 174 lb (78.9 kg)     · Ideal Body Weight: 178 lbs;  · Adjusted Ideal Body Weight: 168 lb (76.3 kg)  · Adjusted BMI: 25      Nutrition Diagnosis:   · Inadequate oral intake related to impaired respiratory function as evidenced by NPO or clear liquid status due to medical condition, nutrition support - enteral nutrition    · Increased nutrient needs related to healing as evidenced by  multiple wounds    Nutrition Interventions:   Food and/or Nutrient Delivery: Continue/advance Renal TF as tolerated; suggest goal rate of 50 mL/hr to provide 2160 kcal and 97 g pro/day  Nutrition Education/Counseling:  No recommendation at this time   Coordination of Nutrition Care:  Continue to monitor while inpatient    Goals:  meet % of estimated nutrient needs with enteral nutrition support       Nutrition Monitoring and Evaluation:   Behavioral-Environmental Outcomes:  None Identified   Food/Nutrient Intake Outcomes:  Enteral Nutrition Intake/Tolerance  Physical Signs/Symptoms Outcomes:  Biochemical Data, Nutrition Focused Physical Findings, Skin, Weight, Fluid Status or Edema     Discharge Planning:     Too soon to determine     Electronically signed by Lauro López RD, LD on 7/1/21 at 3:41 PM EDT    Contact: [phone]

## 2021-07-01 NOTE — PROGRESS NOTES
INTENSIVE CARE UNIT  Resident Physician Progress Note    Patient - David Treviño  Date of Admission -  6/30/2021 10:51 PM  Date of Evaluation -  7/1/2021  Room and Bed Number -  0101/0101-01   Hospital Day - 1      SUBJECTIVE:     OVERNIGHT EVENTS:  Overnight patient was taken to the OR by general surgery for debridement of subcutaneous tissue and muscle for necrotic bilateral groin and posterior hip wounds. Patient remained bradycardic with heart rate in 40s and map in the low 60s and was eventually started on dopamine drip. Also received transfusion intraoperatively, Hb 7.3    TODAY:   Vitals stable, heart rate in 80s currently on dopamine drip at 5 MCG/kg/min  Sedated with fentanyl at 50 MCG per hour    Lactic acid 2.1, K3.6  CRP pending  WBC trending down 21-> 17.8  Hb 7.6, MCV one 1.1  Platelets 269    Patient is resting comfortably. Will wait on general surgery, cardiology and infectious disease recommendations. Brief history    David Treviño is a 73 y.o. male with history of ESRD on hemodialysis, systolic CHF last echo done on 6/19/2020 showed EF of 25 to 30%, A. fib, multivessel CAD s/p CABG and subsequent DENY to LCx, peripheral artery disease with history of left BKA, type 2 diabetes mellitus initially was admitted at [facility] for worsening mentation and bilateral necrotizing wounds. Patient stays at a nursing home.     In the ER, patient was afebrile, hypotensive with BP 100s was initially started on IV cefepime and vancomycin in the ED and was transferred to the medicine floor. In the ER, vitals heart rate in high 50s, /57  Lactic acid 3.1,   WBC 21.4, Hb 8.9  Patient became hypotensive and bradycardic so rapid response was called and patient was eventually transferred to ICU. General surgery and infectious disease were also consulted.   Patient was started on Levophed for a brief period of time, bradycardic in high 40s, received atropine and 1 dose of glucagon and her beta-blocker and amiodarone were held. Her digoxin levels came back elevated, was also given Digibind today. Patient was started on IV Flagyl, cefepime and vancomycin as per ID recommendations. Repeat echocardiogram suggestive of LVEF 25%, moderate global hypokinesis, grade 2 diastolic dysfunction with RVSP 53.  Cardiology was also following the patient, recommended LifeVest before discharge.     Patient underwent bedside debridement by general surgery however there is need for further deep muscle debridement so the decision was made to transfer the patient to [facility]. Consulted general surgery for further recommendations regarding debridement. Patient currently is in sinus bradycardia with heart rate in high 40s. BP 91/48 with map of 62. Continue with IV cefepime, vancomycin and Flagyl and reconsult ID. Recheck digoxin level in the morning, repeat CBC, BMP and wait on cultures.     AWAKE & FOLLOWING COMMANDS:  [] No   [] Yes    SECRETIONS Amount:  [] Small [] Moderate  [] Large  [] None  Color:     [] White [] Colored  [] Bloody    SEDATION:  RAAS Score:  [] Propofol gtt  [] Versed gtt  [] Ativan gtt   [] No Sedation    PARALYZED:  [] No    [] Yes    VASOPRESSORS:  [] No    [] Yes  [] Levophed [] Dopamine [] Vasopressin  [] Dobutamine [] Phenylephrine [] Epinephrine      OBJECTIVE:     VITAL SIGNS:  /67   Pulse 85   Temp 98.6 °F (37 °C) (Oral)   Resp 16   Ht 6' (1.829 m)   Wt 174 lb 2.6 oz (79 kg)   SpO2 99%   BMI 23.62 kg/m²   Tmax over 24 hours:  Temp (24hrs), Av.2 °F (36.2 °C), Min:95 °F (35 °C), Max:98.6 °F (37 °C)      Patient Vitals for the past 8 hrs:   BP Temp Temp src Pulse Resp SpO2 Height Weight   21 0418     16 99 %     21 0400 116/67 98.6 °F (37 °C) Oral 85 12 99 %     21 0339    91 16 100 %     21 0030 (!) 106/49   51 17 100 %     21 0015    (!) 45 17 100 %     21 0000 (!) 88/47   (!) 44 19      21 2345 (!) 93/50   (!) 45 19 100 %     06/30/21 2330 (!) 86/45   (!) 46 18 99 %     06/30/21 2315 (!) 89/47   (!) 44 19 100 %     06/30/21 2300 (!) 91/48   (!) 47 16 100 %     06/30/21 2230 (!) 94/52 97.5 °F (36.4 °C) Axillary (!) 44 22 100 % 6' (1.829 m) 174 lb 2.6 oz (79 kg)         Intake/Output Summary (Last 24 hours) at 7/1/2021 0511  Last data filed at 7/1/2021 0252  Gross per 24 hour   Intake 500 ml   Output    Net 500 ml     Date 07/01/21 0000 - 07/01/21 2359   Shift 1581-6312 0941-7305 6913-7852 24 Hour Total   INTAKE   I.V.(mL/kg) 500(6.3)   500(6.3)   Shift Total(mL/kg) 500(6.3)   500(6.3)   OUTPUT   Shift Total(mL/kg)       Weight (kg) 79 79 79 79     Wt Readings from Last 3 Encounters:   06/30/21 174 lb 2.6 oz (79 kg)   06/30/21 170 lb 13.7 oz (77.5 kg)   06/11/21 169 lb 1.5 oz (76.7 kg)     Body mass index is 23.62 kg/m².         PHYSICAL EXAM:  GEN:  awake, fatigued and mild distress  EYES:  Left pupil cataract, lids and lashes normal  HEENT:  Normocepalic, without obvious abnormality  Atramatic  LUNGS:  no increased work of breathing and good air exchange  CV:    regular rate and rhythm and bradycardic with regular rhythm  ABDOMEN:   no scars and normal bowel sounds  MSK:    Left BKA  NEURO[de-identified]   awake  alert  SKIN:   No bruising or bleeding  EXTREMITIES:                      MEDICATIONS:  Scheduled Meds:   Sodium Hypochlorite   Irrigation On Call to OR    vancomycin (VANCOCIN) intermittent dosing (placeholder)   Other RX Placeholder    [START ON 7/2/2021] vancomycin  750 mg Intravenous Once per day on Mon Wed Fri    sodium chloride flush  5-40 mL Intravenous 2 times per day    heparin (porcine)  5,000 Units Subcutaneous 3 times per day    insulin lispro  0-6 Units Subcutaneous TID WC    insulin lispro  0-3 Units Subcutaneous Nightly    cefepime  1,000 mg Intravenous Q24H    metroNIDAZOLE  500 mg Intravenous Q8H     Continuous Infusions:   DOPamine      fentaNYL 50 mcg/hr (07/01/21 18)    sodium chloride       PRN Meds:   sodium chloride flush, 5-40 mL, PRN  sodium chloride, 25 mL, PRN  ondansetron, 4 mg, Q8H PRN   Or  ondansetron, 4 mg, Q6H PRN  polyethylene glycol, 17 g, Daily PRN  acetaminophen, 650 mg, Q6H PRN   Or  acetaminophen, 650 mg, Q6H PRN        SUPPORT DEVICES: [] Ventilator [] BIPAP  [] Nasal Cannula [] Room Air    VENT SETTINGS (Comprehensive) (if applicable):    DATA:  Complete Blood Count:   Recent Labs     06/29/21  1138 06/30/21 0411 06/30/21  2330   WBC 21.4* 20.7* 17.8*   RBC 3.14* 2.89* 2.69*   HGB 8.9* 8.2* 7.6*   HCT 29.5* 27.4* 27.2*   MCV 94.1 95.0 101.1   MCH 28.4 28.4 28.3   MCHC 30.2* 29.9* 27.9*   RDW 21.1* 20.9* 19.2*   * 114* 122*   MPV 9.6 10.0 12.2        Last 3 Blood Glucose:   Recent Labs     06/29/21  1138 06/30/21 0411 06/30/21  2330   GLUCOSE 175* 151* 131*        PT/INR:    Lab Results   Component Value Date    PROTIME 23.8 05/07/2021    PROTIME 10.9 10/25/2011    INR 2.1 05/07/2021     PTT:    Lab Results   Component Value Date    APTT 49.1 05/07/2021       Comprehensive Metabolic Profile:   Recent Labs     06/29/21  1138 06/30/21 0411 06/30/21  2330    137 132*   K 4.2 3.7 3.6*   CL 93* 98 96*   CO2 31 27 23   BUN 50* 54* 28*   CREATININE 3.67* 3.76* 2.25*   GLUCOSE 175* 151* 131*   CALCIUM 10.1 9.7 9.4      Magnesium:   Lab Results   Component Value Date    MG 2.1 06/11/2021    MG 2.3 06/10/2021    MG 2.1 06/09/2021     Phosphorus:   Lab Results   Component Value Date    PHOS 3.5 06/30/2021    PHOS 5.1 06/11/2021    PHOS 6.7 06/10/2021     Ionized Calcium:   Lab Results   Component Value Date    CAION 4.43 01/09/2012    CAION 4.49 10/19/2011    CAION 4.05 10/05/2011        Urinalysis:   Lab Results   Component Value Date    NITRU NEGATIVE 06/22/2013    COLORU YELLOW 06/22/2013    PHUR 7.0 06/22/2013    WBCUA 2 TO 5 06/22/2013    RBCUA 0 TO 2 06/22/2013    MUCUS NOT REPORTED 06/22/2013    TRICHOMONAS NOT REPORTED 06/22/2013    YEAST NOT REPORTED 06/22/2013    BACTERIA NOT REPORTED 06/22/2013    SPECGRAV 1.014 06/22/2013    LEUKOCYTESUR NEGATIVE 06/22/2013    UROBILINOGEN Normal 06/22/2013    BILIRUBINUR NEGATIVE 06/22/2013    BILIRUBINUR NEGATIVE  Verified by ictotest. 10/20/2011    GLUCOSEU 1+ 06/22/2013    GLUCOSEU TRACE 10/20/2011    KETUA NEGATIVE 06/22/2013    AMORPHOUS NOT REPORTED 06/22/2013       HgBA1c:    Lab Results   Component Value Date    LABA1C 6.6 01/23/2021     TSH:    Lab Results   Component Value Date    TSH 1.51 05/01/2021     Lactic Acid:   Lab Results   Component Value Date    LACTA 1.6 06/30/2021      Troponin: No results for input(s): TROPONINI in the last 72 hours. Microbiology:  Urine Culture:  No components found for: CURINE  Blood Culture:  No components found for: CBLOOD, CFUNGUSBL  Blood Culture from [place]:  No components found for: CBLOODLN  Wound Culture:         Other Labs:  CBC with Differential:    Lab Results   Component Value Date    WBC 17.8 06/30/2021    RBC 2.69 06/30/2021    RBC 3.51 01/09/2012    HGB 7.6 06/30/2021    HCT 27.2 06/30/2021     06/30/2021     01/09/2012    .1 06/30/2021    MCH 28.3 06/30/2021    MCHC 27.9 06/30/2021    RDW 19.2 06/30/2021    NRBC 1 03/07/2016    METASPCT 2 05/01/2021    LYMPHOPCT 0 06/30/2021    MONOPCT 4 06/30/2021    MYELOPCT 1 03/06/2016    BASOPCT 0 06/30/2021    MONOSABS 0.83 06/30/2021    LYMPHSABS 0.00 06/30/2021    EOSABS 0.00 06/30/2021    BASOSABS 0.00 06/30/2021    DIFFTYPE NOT REPORTED 06/30/2021     Platelets:    Lab Results   Component Value Date     06/30/2021     01/09/2012     Hemoglobin/Hematocrit:    Lab Results   Component Value Date    HGB 7.6 06/30/2021    HCT 27.2 06/30/2021     CMP:    Lab Results   Component Value Date     06/30/2021    K 3.6 06/30/2021    CL 96 06/30/2021    CO2 23 06/30/2021    BUN 28 06/30/2021    CREATININE 2.25 06/30/2021    GFRAA 36 06/30/2021    LABGLOM 29 06/30/2021 GLUCOSE 131 06/30/2021    GLUCOSE 128 01/09/2012    PROT 7.8 06/07/2021    LABALBU 3.6 06/07/2021    LABALBU 3.8 01/09/2012    CALCIUM 9.4 06/30/2021    BILITOT 0.59 06/07/2021    ALKPHOS 132 06/07/2021    AST 14 06/07/2021    ALT 11 06/07/2021       ASSESSMENT:     Patient Active Problem List    Diagnosis Date Noted    Necrotizing fasciitis ([address] 06/30/2021    Septic shock (HCC) 06/30/2021    Elevated digoxin level 06/30/2021    Bradycardia 06/30/2021    Infected wound 06/29/2021    Moderate malnutrition ([address] 06/09/2021    Acute respiratory failure with hypoxia (HCC) 06/08/2021    Acute pulmonary edema (HCC) 06/07/2021    Volume overload 06/07/2021    Non-compliance with renal dialysis ([address] 06/07/2021    Parapneumonic effusion 05/09/2021    C. difficile colitis 05/09/2021    Loculated pleural effusion 05/08/2021    Pleural effusion on right 05/08/2021    ROOSEVELT (obstructive sleep apnea) 05/08/2021    Sepsis ([address] 04/28/2021    Pneumonia 04/28/2021    Sepsis due to pneumonia ([address] 09/42/7620    Metabolic acidosis     Azotemia     Hyperkalemia     Diarrhea     Nausea and vomiting     Peripheral vascular disease ([address]     History of left below knee amputation (HCC)     Hemoptysis     Ulcerated, foot, right, with fat layer exposed ([address]     Charcot's joint of foot, right     CHF (congestive heart failure) ([address]     Noncompliance 06/02/2019    Type 2 diabetes mellitus with foot ulcer ([address]     Hypertension, essential     Diabetic ulcer of left foot ([address] 03/04/2016    Charcot foot due to diabetes mellitus ([address] 12/28/2015    Anemia of chronic disease 07/03/2013    ESRD on dialysis ([address] 06/27/2013    DM (diabetes mellitus) ([address] 06/27/2013    SDH (subdural hematoma) ([facility] 06/22/2013          PLAN:     WEAN PER PROTOCOL:  [] No   [] Yes  [x] N/A    ICU PROPHYLAXIS:  Stress ulcer:  [] PPI Agent  [] [id number] [] Sucralfate  [] Other:  VTE:   [] Enoxaparin  [] Unfract.  Heparin Subcut  [] EPC Cuffs    NUTRITION:  [x] NPO [] Tube Feeding (Specify: ) [] TPN  [] PO    HOME MEDS RECONCILED: [] No  [x] Yes    CONSULTATION NEEDED:  [] No  [x] Yes    FAMILY UPDATED:    [x] No  [] Yes    TRANSFER OUT OF ICU:   [x] No  [] Yes    Hospital Problems         Last Modified POA    * (Principal) Necrotizing fasciitis ([facility] 6/30/2021 Yes    ESRD on dialysis ([facility] 6/30/2021 Yes    DM (diabetes mellitus) ([facility] 6/30/2021 Yes    Anemia of chronic disease 6/30/2021 Yes    Hypertension, essential 6/30/2021 Yes    Noncompliance 6/30/2021 Yes    CHF (congestive heart failure) ([facility] 6/30/2021 Yes    Peripheral vascular disease ([facility] 6/30/2021 Yes    History of left below knee amputation ([facility] 6/30/2021 Yes    ROOSEVELT (obstructive sleep apnea) (Chronic) 6/30/2021 Yes    Overview Signed 5/8/2021  7:49 AM by Padma Singh MD     Clinical diagnosis         Septic shock ([facility] 6/30/2021 Yes    Elevated digoxin level 6/30/2021 Yes    Bradycardia 6/30/2021 Yes          Plan:    1. Septic shock likely secondary to bilateral necrotic wounds: Resolving. Currently on dopamine drip. Pending cultures, continue antibiotics  2. Necrotizing fasciitis s/p bedside surgical debridement. Patient was taken for further debridement of deeper tissue and muscles by the general surgeon overnight. Continue IV Flagyl, cefepime and vancomycin and will reconsult ID. Follow-up cultures. Will consult wound ostomy. 3. Sinus bradycardia:  Currently on dopamine drip. Beta-blockers and amiodarone on hold. Elevated digoxin levels given Digibind today. Cardiology consulted  4. Elevated digoxin levels status post Digibind. Recheck digoxin levels in the morning. 5. Peripheral artery disease s/p left BKA  6. ESRD on hemodialysis, underwent hemodialysis this morning. 7. Systolic CHF with ischemic cardiomyopathy, EF 25% status post CABG and subsequent DENY to LCx.   Will follow up with cardiology recommendations regarding LifeVest before discharge.  Eliu López MD  PGY-2 Internal Medicine Resident  [address] [place] [address]  7/1/2021 5:14 AM

## 2021-07-01 NOTE — CONSULTS
History and Physical - General Surgery    PATIENT NAME: Gabriel Melissa  AGE: 73 y.o. MEDICAL RECORD NO. [id number]  DATE: 6/30/2021  SURGEON: Dr. Thomas Henriquez: Maria Guadalupe Mishra MD    Patient evaluated at the request of  Dr. Anu Carrington  Reason for evaluation: Necrotic sacral and bilateral inner thigh wounds; sepsis     Patient information was obtained from patient and past medical records. History/Exam limitations: mental status. IMPRESSION:     Patient Active Problem List   Diagnosis    SDH (subdural hematoma) (HCC)    ESRD on dialysis ([address]    DM (diabetes mellitus) ([address]    Anemia of chronic disease    Charcot foot due to diabetes mellitus ([address]    Diabetic ulcer of left foot ([address]    Hypertension, essential    Type 2 diabetes mellitus with foot ulcer ([address]    Noncompliance    CHF (congestive heart failure) (HCC)    Sepsis ([address]    Metabolic acidosis    Azotemia    Hyperkalemia    Diarrhea    Nausea and vomiting    Peripheral vascular disease (HCC)    [facility]    Hemoptysis    Ulcerated, foot, right, with fat layer exposed ([address]    Charcot's joint of foot, right    Pneumonia    Sepsis due to pneumonia ([address]    Loculated pleural effusion    Pleural effusion on right    ROOSEVELT (obstructive sleep apnea)    Parapneumonic effusion    C. difficile colitis    Acute pulmonary edema ([facility]    Volume overload    Non-compliance with renal dialysis ([address]    Acute respiratory failure with hypoxia (HCC)    Moderate malnutrition ([address]    Infected wound    Necrotizing fasciitis ([address]   73year old male with necrotic wound, concern for necrotizing soft tissue infection       MEDICAL DECISION MAKING AND PLAN:   Given patient's sepsis and clinical picture, there is concern for necrotizing soft tissue infection. Will proceed to the operating room for emergent debridement and irrigation of necrotic sacral and bilateral inner thigh wounds. NPO  Post operative orders to follow   Medical management per critical care team       HISTORY:   History of Chief Complaint:    David Treviño is a 73 y.o. male who presents as a transfer from [facility] for further management and care of necrotic wounds and sepsis. Patient resides at a SNF and was sent to the hospital for evaluation of his wounds and worsening mental status. Patient reportedly hypotensive and bradycardic during his hospital stay. Bedside debridement of wounds was performed by General Surgery at [facility]. Upon evaluation, patient is alert and oriented to self and time. He reports pain at his wounds. He states that his wounds have been present for \"a while. \" Heart rate noted to be in 40's with MAP in low 60's, not currently on pressor support. Past Medical History   has a past medical history of Acute on chronic congestive heart failure ([address] Anemia, CAD (coronary artery disease), Cardiomyopathy ([address] Dialysis care, ESRD (end stage renal disease) on dialysis ([address] Foot ulcer, left ([address] GERD (gastroesophageal reflux disease), Hemodialysis patient ([address] History of sleep apnea, Hyperlipidemia, Hyperparathyroidism ([address] Hypertension, Neuropathy, ROOSEVELT (obstructive sleep apnea), Peripheral vascular disease ([address] Pneumonia, S/P CABG (coronary artery bypass graft), and Type II or unspecified type diabetes mellitus without mention of complication, not stated as uncontrolled. Past Surgical History   has a past surgical history that includes Cataract removal; Dialysis fistula creation (Left, 2014); Leg amputation, lower tibia/fibula; and IR CHEST TUBE INSERTION (5/10/2021). Medications  Prior to Admission medications    Medication Sig Start Date End Date Taking?  Authorizing Provider   acetaminophen (TYLENOL) 500 MG tablet Take 1,000 mg by mouth every 6 hours as needed for Pain    Historical Provider, MD   calcium acetate (PHOSLO) 667 MG TABS Take 2 tablets by mouth 3 times daily (with meals) 6/10/21   Zo Lang MD   amiodarone (CORDARONE) 200 MG tablet Take 1 tablet by mouth daily 6/9/21   Zo Lang MD   atorvastatin (LIPITOR) 40 MG tablet Take 1 tablet by mouth nightly 6/9/21   Zo Lang MD   metoprolol tartrate (LOPRESSOR) 25 MG tablet Take 1 tablet by mouth 2 times daily 6/9/21   Zo Lang MD   midodrine (PROAMATINE) 2.5 MG tablet Take 1 tablet by mouth 3 times daily 6/9/21   Zo Lang MD   pantoprazole (PROTONIX) 40 MG tablet Take 40 mg by mouth daily    Historical Provider, MD   B Complex-C-Folic Acid (ALBERTA-FERNANDA RX) 1 MG TABS Take 1 tablet by mouth daily    Historical Provider, MD   digoxin (LANOXIN) 125 MCG tablet Take 1 tablet by mouth daily 5/16/21   Carolyn Sena MD   sevelamer (RENVELA) 800 MG tablet Take 5 tablets by mouth 3 times daily (with meals) 5/15/21   Carolyn Sena MD   nitroGLYCERIN (NITROSTAT) 0.4 MG SL tablet Place 0.4 mg under the tongue every 5 minutes as needed for Chest pain up to max of 3 total doses. If no relief after 1 dose, call 911. Historical Provider, MD   ONE TOUCH ULTRA TEST strip USE AS DIRECTED DAILY AS NEEDED 1/24/17   Tami Wallace, DO   Blood Glucose Monitoring Suppl (ONE TOUCH ULTRA 2) W/DEVICE KIT TEST 3 TIMES DAILY 8/26/16   Tami Wallace, DO   glucose monitoring kit (FREESTYLE) monitoring kit 1 kit by Does not apply route daily as needed 4/21/16   Tami Wallace, DO   Lancets 30G MISC 1 each by Does not apply route 3 times daily 4/21/16   Tami Wallace, DO   Misc.  Devices (WALKER GLIDE WHEELS) MISC 1 Device by Does not apply route continuous 4/21/16   Tami Rodrigo, DO   aspirin 81 MG EC tablet Take 1 tablet by mouth daily 3/14/16   Angela Elias DO    Scheduled Meds:   sodium chloride flush  5-40 mL Intravenous 2 times per day    heparin (porcine)  5,000 Units Subcutaneous 3 times per day     Continuous Infusions:   sodium chloride       PRN Meds:.sodium chloride flush, sodium chloride, ondansetron **OR** ondansetron, polyethylene glycol, acetaminophen **OR** acetaminophen  Allergies  is allergic to pcn [penicillins]. Family History  family history includes Diabetes in his brother and father; Heart Disease in his father. Social History   reports that he has never smoked. He has never used smokeless tobacco.   reports no history of alcohol use. reports no history of drug use. Review of Systems  Patient very poor historian. Unable to provide reliable ROS. PHYSICAL:   VITALS:  height is 6' (1.829 m) and weight is 174 lb 2.6 oz (79 kg). His axillary temperature is 97.5 °F (36.4 °C). His blood pressure is 91/48 (abnormal) and his pulse is 44 (abnormal). His respiration is 19 and oxygen saturation is 100%. CONSTITUTIONAL: Awake, alert, no apparent diatress  HEENT: Head is normocephalic, atraumatic. EOMI, PERRLA  NECK: Soft, trachea midline and straight  LUNGS:No acute respiratory distress or accessory muscle use   CARDIOVASCULAR: bradycardia on monitor   ABDOMEN: soft, non-distended, non-tender. Large previously debrided sacral wound with surrounding necrotic tissue and subcutaneous air palpated. Bilateral inner thigh wounds with surrounding necrotic tissue.    NEUROLOGIC: Awake, alert, oriented to self and time  EXTREMITIES: L BKA     LABS:     Recent Labs     06/29/21  1138 06/30/21  0411   WBC 21.4* 20.7*   HGB 8.9* 8.2*   HCT 29.5* 27.4*   * 114*    137   K 4.2 3.7   CL 93* 98   CO2 31 27   BUN 50* 54*   CREATININE 3.67* 3.76*   PHOS  --  3.5   CALCIUM 10.1 9.7         RADIOLOGY:   ECHO Complete 2D W Doppler W Color    Result Date: 6/30/2021  1604 [facility] Transthoracic Echocardiography Report (TTE)  Patient Name THE [facility]     Date of Study                 06/30/2021               [address]   Date of      1955  Gender                        Male  Birth   Age          73 year(s)  Race                          Other   Room Number  2004        Height: 71.65 inch, 182 cm   Corporate ID [id number]    Weight:                       165 pounds, 75 kg  #   Patient Acct [de-identified]   BSA:           1.96 m^2       BMI:      22.64  #                                                                kg/m^2   MR #         [id number]      Sonographer                   Citlalli Boucher   Accession #  [id number]  Interpreting Physician        [address] [place] [address]   Fellow                   Referring Nurse Practitioner   Interpreting             Referring Physician           Keaton Turk  Fellow  Type of Study   TTE procedure:2D Echocardiogram, M-Mode, Doppler, Color Doppler, Contrast  study. Procedure Date Date: 06/30/2021 Start: 07:30 AM Study Location: [address] [place] [address] Technical Quality: Limited visualization due to lung interference. Indications:Congestive heart failure. History / Tech. Comments: CABG CHF CARDIOMYOPATHY DM ROOSEVELT HLD HTN Patient Status: Inpatient Contrast Medium: Definity. Amount - 2 ml Height: 71.65 inches Weight: 165.37 pounds BSA: 1.96 m^2 BMI: 22.64 kg/m^2 Rhythm: Sinus bradycardia HR: 49 bpm BP: 101/47 mmHg Allergies   - Penicillin. CONCLUSIONS Summary Echo contrast utilized on this technically difficult study. Left ventricle is normal in size. Estimated LV EF 25 %. Severe global hypokinesis. Asymmetrical septal hypertrophy. Grade III (severe) left ventricular diastolic dysfunction. Right ventricular dilatation with reduced systolic function. Left atrial dilatation. Right atrial dilatation. Mild aortic stenosis, likely underestimated due to decreased LVF. Trivial aortic insufficiency. Aortic root is mildly dilated. (4.0 cm) Thickened mitral valve leaflets. Trivial mitral regurgitation. Mild tricuspid regurgitation. Estimated right ventricular systolic pressure is 53 mmHg. Moderately elevated right ventricular systolic pressure. IVC Increased diameter and impaired or no inspiratory variation.  Signature ----------------------------------------------------------------------------  Electronically signed by Citlalli Boucher(Sonographer) on 2021 08:25  AM ---------------------------------------------------------------------------- ----------------------------------------------------------------------------  Electronically signed by Moo Geller(Interpreting physician) on 2021  09:12 AM ---------------------------------------------------------------------------- FINDINGS Left Atrium Left atrial dilatation. Left Ventricle Left ventricle is normal in size. Estimated LV EF 25 %. Severe global hypokinesis. Asymmetrical septal hypertrophy. Grade III (severe) left ventricular diastolic dysfunction. Right Atrium Right atrial dilatation. Right Ventricle Right ventricular dilatation with reduced systolic function. Mitral Valve Thickened mitral valve leaflets. Trivial mitral regurgitation. Aortic Valve Aortic valve is trileaflet. Mild aortic stenosis, likely underestimated due to decreased LVF. Trivial aortic insufficiency. Tricuspid Valve Normal tricuspid valve structure and function. Mild tricuspid regurgitation. Estimated right ventricular systolic pressure is 53 mmHg. Moderately elevated right ventricular systolic pressure. Pulmonic Valve The pulmonic valve is normal in structure. Trivial pulmonic insufficiency. Pericardial Effusion No significant pericardial effusion is seen. Miscellaneous Aortic root is mildly dilated. (4.0 cm) E/e' average 16.5 IVC Increased diameter and impaired or no inspiratory variation.  M-mode / 2D Measurements & Calculations:   LVIDd:5.6 cm(3.7 - 5.6 cm)       Diastolic LILKUR:599 ml  FUUNA:7.93 cm(2.2 - 4.0 cm)      Systolic HIHZPN:995 ml  OFOU:8.70 cm(0.6 - 1.1 cm)       Aortic Root:4 cm(2.0 - 3.7 cm)  LVPWd:1.16 cm(0.6 - 1.1 cm)      LA Dimension: 6.1 cm(1.9 - 4.0 cm)  Fractional Shortenin.86 %    LA volume/Index: 63.8 ml /33m^2  Calculated LVEF (%): 40.72 %     LVOT:2.8 cm   Mitral: Aortic   Peak E-Wave: 1.04 m/s                  Peak Velocity: 1.96 m/s  Peak A-Wave: 0.27 m/s                  Mean Velocity: 1.23 m/s  E/A Ratio: 3.9                         Peak Gradient: 15.37 mmHg  Peak Gradient: 4.33 mmHg               Mean Gradient: 8 mmHg  Deceleration Time: 162 msec                                          Area (continuity): 1.95 cm^2                                         AV VTI: 35.6 cm   Tricuspid:                             Pulmonic:   Estimated RVSP: 53 mmHg  Peak TR Velocity: 3.07 m/s  Peak TR Gradient: 37.6996 mmHg  Estimated RA Pressure: 15 mmHg                                         Estimated PASP: 52.7 mmHg  Septal Wall E' velocity:0.05 m/s Lateral Wall E' velocity:0.10 m/s    XR CHEST PORTABLE    Result Date: 6/29/2021  EXAMINATION: ONE XRAY VIEW OF THE CHEST 6/29/2021 4:23 pm COMPARISON: 06/29/2021, 06/07/2021, 05/14/2021 HISTORY: ORDERING SYSTEM PROVIDED HISTORY: tlc placement TECHNOLOGIST PROVIDED HISTORY: tlc placement Reason for Exam: tlc placement Acuity: Acute Type of Exam: Initial FINDINGS: Interval placement of a right internal jugular central venous catheter which terminates over the expected region of the distal superior vena cava/superior cavoatrial junction. No postprocedural pneumothorax. Pulmonary evaluation is limited by rotation and lordotic positioning. Persistent low lung volumes with similar opacities at the right lung base with evaluation of the left lung base obscured by the overlying heart. Unchanged cardiomegaly with prior CABG. No acute bony findings. Right internal jugular central venous catheter appears appropriate in positioning. Limited pulmonary examination that appears grossly stable from most recent prior.      XR CHEST PORTABLE    Result Date: 6/29/2021  EXAMINATION: ONE XRAY VIEW OF THE CHEST 6/29/2021 11:23 am COMPARISON: 06/07/2021, 05/14/2021 HISTORY: ORDERING SYSTEM PROVIDED HISTORY: shortness of breath

## 2021-07-01 NOTE — PROGRESS NOTES
[facility]   Progress Note                   Date:   7/1/2021  Patient name: Allison March  Date of admission:  6/30/2021 10:51 PM  MRN:   [id number]  YOB: 1955  PCP: Batsheva Marroquin MD    Reason for Admission: Septic shock from the necrotizing fasciitis    Subjective:   Briefly this patient has been transferred from [facility] overnight. He was seen by cardiologist over there. Known history of ESRD, systolic CHFLVEF 25 to 58% on 6/19, A. fib, multivessel CAD s/p CABG and subsequent DENY to LCx, left BKA and type 2 diabetes mellitus initially admitted to [facility] with altered mentation found to have septic shock from necrotizing wound at the buttock region. Cardiology was consulted because of persistent bradycardia. He was found to have elevated digoxin level. Digoxin, beta-blocker and amiodarone were stopped. As per nurse, patient initially was bradycardic with lowest heart rate noted to be 37, was given 1 dose of atropine and later started on dopamine drip after surgery which was turned off this morning. Patient also had few runs of V. tach. Upon my evaluation patient is sedated and intubated. Heart rate is in low 90s. Blood pressure is stable.     Medications:   Scheduled Meds:   Sodium Hypochlorite   Irrigation On Call to OR    vancomycin (VANCOCIN) intermittent dosing (placeholder)   Other RX Placeholder    [START ON 7/2/2021] vancomycin  750 mg Intravenous Once per day on Mon Wed Fri    chlorhexidine  15 mL Mouth/Throat BID    potassium chloride  40 mEq Oral Once    sodium chloride flush  5-40 mL Intravenous 2 times per day    heparin (porcine)  5,000 Units Subcutaneous 3 times per day    insulin lispro  0-6 Units Subcutaneous TID     insulin lispro  0-3 Units Subcutaneous Nightly    cefepime  1,000 mg Intravenous Q24H    metroNIDAZOLE  500 mg Intravenous Q8H     Continuous Infusions:   DOPamine 2.5 mcg/kg/min (07/01/21 0565)    fentaNYL 50 mcg/hr (07/01/21 0358)    sodium chloride       CBC:   Recent Labs     06/29/21  1138 06/30/21  0411 06/30/21  2330   WBC 21.4* 20.7* 17.8*   HGB 8.9* 8.2* 7.6*   * 114* 122*     BMP:    Recent Labs     06/29/21  1138 06/30/21  0411 06/30/21  2330    137 132*   K 4.2 3.7 3.6*   CL 93* 98 96*   CO2 31 27 23   BUN 50* 54* 28*   CREATININE 3.67* 3.76* 2.25*   GLUCOSE 175* 151* 131*     Objective:   Vitals: /67   Pulse 85   Temp 98.6 °F (37 °C) (Oral)   Resp 16   Ht 6' (1.829 m)   Wt 174 lb 2.6 oz (79 kg)   SpO2 99%   BMI 23.62 kg/m²   General appearance: Intubated and sedated  HEENT: Head: Normocephalic, no lesions, without obvious abnormality. Neck: no adenopathy, no carotid bruit, no JVD, supple, symmetrical, trachea midline and thyroid not enlarged, symmetric, no tenderness/mass/nodules  Lungs: Clear to auscultation on anterior and posterior chest  Heart: regular rate and rhythm, S1, S2 normal, no murmur, click, rub or gallop  Abdomen: soft, bowel sounds positive. Extremities: extremities normal, atraumatic, no cyanosis or edema  Neurologic: Unable to assess as patient is intubated and sedated. LAST CATH:   Diagnostic Cardiac Cath Lab Procedure      Severe native vessel disease. Patent LIMA-LAD. The LAD distal to touch down is small in caliber with 80%   diffuse disease. (Not amenable to PCI)   Chronic total occlusion of RCA. LCX had 80% mid stenosis reduced to 0% using 3x38 mm Xience stent, post   dilated using 3.25 mm NC balloon. Other Current Problems:   1. Sinus bradycardia likely secondary to septic shock further exacerbated by dig toxicity. Resolved  2. Dig toxicity s/p Digibind. Last value 2.8 (6/30)  3. Sepsis with shock from necrotizing fasciitis. Improved. Currently off of pressors  4. Necrotizing fasciitis s/p I&D  5. H/O PAT s/p CV on 5/5/2021  6. MV CAD s/p CABG with subsequent PCI to LCx in 2019 (only patent LIMALAD)  7.  ICMP, HFrEF with last LEVF 25% on 6/29  8. ESRD on hemodialysis. 9. Severe PAD s/p L BKA    Assessment and Plan:   1. Continue to hold digoxin, amiodarone and beta-blocker. 2. Continue with as needed atropine and dopamine drip  3. Will consider LifeVest prior to discharge and then AICD if continue to make improvement as outpatient. 4. Will follow with you       Plan to be discussed with rounding attending      Felipa Serrano MD.  Internal Medicine Resident PGY [facility]   7/1/2021, 5:40 AM             Attestation signed by      Attending Physician Statement:    I have discussed the care of  Betty Silverman , including pertinent history and exam findings, with the Cardiology fellow/resident. I have seen and examined the patient and the key elements of all parts of the encounter have been performed by me. I agree with the assessment, plan and orders as documented by the fellow/resident, after I modified exam findings and plan of treatments, and the final version is my approved version of the assessment.      Additional Comments:

## 2021-07-01 NOTE — CONSULTS
[facility]  Initial Consult Note  Date: 7/1/2021    Hospital day :1     Impression:   1. Respiratory failure currently ventilator dependent  2. Necrotic wounds to the right posterior buttock/flank and bilateral medial thighs status post surgical debridement 6/29/2021  · The concern given the location of the wounds would be for calciphylaxis  3. Sepsis secondary to above with resolved septic shock no longer on pressors  4. End-stage renal disease on hemodialysis  5. Diabetes mellitus type 2  6. Coronary artery disease with ischemic cardiomyopathy  7. Congestive heart failure  8. Peripheral arterial disease    Recommendations   · The patient did have a culture sent at [facility] that has nonlactose fermenting gram-negative rods, lactose fermenting gram-negative rods, Enterococcus thus far  · Continue intravenous antimicrobial therapy with cefepime, vancomycin, and metronidazole. · I will evaluate the wounds and would wonder if the patient would benefit from skin biopsy looking for calciphylaxis    Chief complaint/reason for consultation:   Sepsis, necrotic wound    History of Present Illness:   Shilpi Fisher is a 73y.o.-year-old male who was initially admitted on 6/30/2021. Aniya Coto has multiple medical problems including coronary artery disease, congestive heart failure, end-stage renal disease for which she is on hemodialysis, hypertension, obstructive sleep apnea, diabetes mellitus, peripheral arterial disease status post left BKA. The patient was admitted to [facility] on 6/29/2021 with altered mental status at the nursing home, increased pain in the bilateral thighs where he has wounds, and the patient was found to be tachypneic using accessory muscles of breathing as well as some mild hypotension. The patient initially required Levophed but was able to wean off the pressors.   The patient was started on cefepime and vancomycin in the emergency department transferred to the floor where he was seen by the general surgery team and the patient had necrotic wounds in the in the medial thigh right flank and right buttock region with foul-smelling drainage. An excisional debridement was done 6/29/2021 with removal of necrotic tissue in the right posterior buttock, flank region and bilateral inner thighs down to the fascia. The patient was seen by Dr. Rajni Healy from infectious disease yesterday and metronidazole was added to the antibiotic regimen. There was a need for further debridement and the patient was transferred here to [place] [address] as there was concern for necrotizing soft tissue infection and the patient was taken to the operating room where he had incision and drainage and necrotic tissue in the posterior hips and groin bilaterally and the patient was found to have necrotic tissue. I was asked to evaluate and help with antibiotic choice and the patient currently remains intubated sedated on fentanyl and is also receiving dopamine. I have personally reviewed the past medical history, past surgical history, medications, social history, and family history, and I have updated the database accordingly.   Past Medical History:     Past Medical History:   Diagnosis Date    Acute on chronic congestive heart failure (HCC)     Anemia     CAD (coronary artery disease)     Cardiomyopathy ([address]     Dialysis care     ESRD (end stage renal disease) on dialysis ([address]     Foot ulcer, left ([address] 2015    GERD (gastroesophageal reflux disease)     Hemodialysis patient ([address] 2011    MOM-WED-FRI-ON [address]     History of sleep apnea     none since significant weight loss (was 400#)    Hyperlipidemia     Hyperparathyroidism ([address]     Hypertension     Neuropathy     ROOSEVELT (obstructive sleep apnea) 5/8/2021    Peripheral vascular disease ([address]     Pneumonia     resolved    S/P CABG (coronary artery bypass graft) 06/2016    Type II or unspecified type diabetes mellitus without mention of complication, not stated as uncontrolled     dx-1980     Past Surgical  History:     Past Surgical History:   Procedure Laterality Date    CATARACT REMOVAL      DIALYSIS FISTULA CREATION Left 2014    IR CHEST TUBE INSERTION  5/10/2021    IR CHEST TUBE INSERTION 5/10/2021 [facility] PROCED[facility]    LEG AMPUTATION THROUGH LOWER TIBIA AND FIBULA       Medications:      Sodium Hypochlorite   Irrigation On Call to OR    vancomycin (VANCOCIN) intermittent dosing (placeholder)   Other RX Placeholder    [START ON 7/2/2021] vancomycin  750 mg Intravenous Once per day on Mon Wed Fri    chlorhexidine  15 mL Mouth/Throat BID    midodrine  5 mg Oral TID WC    sodium chloride flush  5-40 mL Intravenous 2 times per day    heparin (porcine)  5,000 Units Subcutaneous 3 times per day    insulin lispro  0-6 Units Subcutaneous TID WC    insulin lispro  0-3 Units Subcutaneous Nightly    cefepime  1,000 mg Intravenous Q24H    metroNIDAZOLE  500 mg Intravenous Q8H     Social History:     Social History     Socioeconomic History    Marital status:      Spouse name: Not on file    Number of children: Not on file    Years of education: Not on file    Highest education level: Not on file   Occupational History    Not on file   Tobacco Use    Smoking status: Never Smoker    Smokeless tobacco: Never Used   Substance and Sexual Activity    Alcohol use: No    Drug use: No    Sexual activity: Not on file   Other Topics Concern    Not on file   Social History Narrative    Not on file     Social Determinants of Health     Financial Resource Strain:     Difficulty of Paying Living Expenses:    Food Insecurity:     Worried About Running Out of Food in the Last Year:     Ran Out of Food in the Last Year:    Transportation Needs:     Lack of Transportation (Medical):      Lack of Transportation (Non-Medical):    Physical Activity:     Days of Exercise per Week:     Minutes of Exercise per Session:    Stress:     Feeling of Stress :    Social Connections:     Frequency of Communication with Friends and Family:     Frequency of Social Gatherings with Friends and Family:     Attends Synagogue Services:     Active Member of Clubs or Organizations:     Attends Club or Organization Meetings:     Marital Status:    Intimate Partner Violence:     Fear of Current or Ex-Partner:     Emotionally Abused:     Physically Abused:     Sexually Abused:      Family History:     Family History   Problem Relation Age of Onset    Heart Disease Father     Diabetes Father     Diabetes Brother       Allergies:   Pcn [penicillins]     Review of Systems:   Unable to obtain the patient is intubated on the ventilator    Physical Examination :   BP (!) 87/51   Pulse 54   Temp 97.3 °F (36.3 °C) (Axillary)   Resp 16   Ht 6' (1.829 m)   Wt 174 lb 2.6 oz (79 kg)   SpO2 100%   BMI 23.62 kg/m²     Temperature Range: Temp: 97.3 °F (36.3 °C) Temp  Av.2 °F (36.2 °C)  Min: 95 °F (35 °C)  Max: 98.6 °F (37 °C)  General Appearance: He is awake and alert but sedated on the ventilator at 35% FiO2  Head: Normocephalic, without obvious abnormality, atraumatic  Eyes: pupils equal, round, and reactive to light  ENT: Oropharynx could not be well evaluated as the patient is orally intubated  Neck: Supple, without lymphadenopathy. Pulmonary/Chest: Clear to auscultation, without wheezes, rales, or rhonchi  Cardiovascular: Regular rate and rhythm without murmurs, rubs, or gallops. Abdomen: Positive bowel sounds in all 4 quadrants and Soft, nontender, nondistended. Extremities:  There is an AV fistula in the left upper extremity, the left lower extremity status post BKA, there is Charcot arthropathy in the right foot with some superficial dry ulcers and hyperpigmentation in the leg and foot  Neurologic: He is awake and gets agitated with my examination and currently has mitts on both hands  Skin: There are dressings in the bilateral groin/gluteal flank area which were not removed    Medical Decision Making:   I have independently reviewed/ordered the following labs:  CBC with Differential:   Recent Labs     06/30/21  0411 06/30/21  0411 06/30/21  2330 07/01/21  0542   WBC 20.7*   < > 17.8* 21.9*   HGB 8.2*   < > 7.6* 8.2*   HCT 27.4*   < > 27.2* 28.8*   *   < > 122* See Reflexed IPF Result   LYMPHOPCT 0*  --   --  2*   MONOPCT 4  --   --  4    < > = values in this interval not displayed. BMP:   Recent Labs     06/30/21 2330 07/01/21  0542   * 134*   K 3.6* 3.8   CL 96* 98   CO2 23 20   BUN 28* 29*   CREATININE 2.25* 2.54*     Hepatic Function Panel: No results for input(s): PROT, LABALBU, BILIDIR, IBILI, BILITOT, ALKPHOS, ALT, AST in the last 72 hours. Lab Results   Component Value Date    PROCAL 4.55 05/07/2021    PROCAL 25.70 04/30/2021       Lab Results   Component Value Date    .2 07/01/2021    .8 06/29/2021    .7 04/28/2021     Lab Results   Component Value Date    FERRITIN 2,624 05/07/2021    FERRITIN 1,806 06/05/2019    FERRITIN 2,516 03/05/2016    FERRITIN 532 01/09/2012    FERRITIN 1,007 12/07/2011     Lab Results   Component Value Date    FIBRINOGEN 371 05/13/2021     No results found for: DDIMER  Lab Results   Component Value Date     05/13/2021     05/10/2021     05/07/2021     04/29/2021     06/05/2019       Lab Results   Component Value Date    SEDRATE 33 (H) 04/28/2021       Lab Results   Component Value Date    COVID19 Not Detected 06/07/2021    COVID19 Not Detected 04/29/2021     No results found for requested labs within last 30 days.        Imaging Studies:   ECHO Complete 2D W Doppler W Color    Result Date: 6/30/2021  [facility] [address] Transthoracic Echocardiography Report (TTE)  Patient Name THE [facility]     Date of Study                 06/30/2021               [address]   Date of      1955  Gender                        Male Birth   Age          73 year(s)  Race                          Other   Room Number  2004        Height:                       71.65 inch, 182 cm   Corporate ID [id number]    Weight:                       165 pounds, 75 kg  #   Patient Acct [de-identified]   BSA:           1.96 m^2       BMI:      22.64  #                                                                kg/m^2   MR #         [id number]      Sonographer                   Citlalli Boucher   Accession #  [id number]  Interpreting Physician        [address] [place] [address]   Fellow                   Referring Nurse Practitioner   Interpreting             Referring Physician           Priya Sheth  Fellow  Type of Study   TTE procedure:2D Echocardiogram, M-Mode, Doppler, Color Doppler, Contrast  study. Procedure Date Date: 06/30/2021 Start: 07:30 AM Study Location: [facility] Technical Quality: Limited visualization due to lung interference. Indications:Congestive heart failure. History / Tech. Comments: CABG CHF CARDIOMYOPATHY DM ROOSEVELT HLD HTN Patient Status: Inpatient Contrast Medium: Definity. Amount - 2 ml Height: 71.65 inches Weight: 165.37 pounds BSA: 1.96 m^2 BMI: 22.64 kg/m^2 Rhythm: Sinus bradycardia HR: 49 bpm BP: 101/47 mmHg Allergies   - Penicillin. CONCLUSIONS Summary Echo contrast utilized on this technically difficult study. Left ventricle is normal in size. Estimated LV EF 25 %. Severe global hypokinesis. Asymmetrical septal hypertrophy. Grade III (severe) left ventricular diastolic dysfunction. Right ventricular dilatation with reduced systolic function. Left atrial dilatation. Right atrial dilatation. Mild aortic stenosis, likely underestimated due to decreased LVF. Trivial aortic insufficiency. Aortic root is mildly dilated. (4.0 cm) Thickened mitral valve leaflets. Trivial mitral regurgitation. Mild tricuspid regurgitation. Estimated right ventricular systolic pressure is 53 mmHg. Moderately elevated right ventricular systolic pressure.  IVC Increased diameter and impaired or no inspiratory variation. Signature ----------------------------------------------------------------------------  Electronically signed by Citlalli Boucher(Sonographer) on 2021 08:25  AM ---------------------------------------------------------------------------- ----------------------------------------------------------------------------  Electronically signed by Moo Geller(Interpreting physician) on 2021  09:12 AM ---------------------------------------------------------------------------- FINDINGS Left Atrium Left atrial dilatation. Left Ventricle Left ventricle is normal in size. Estimated LV EF 25 %. Severe global hypokinesis. Asymmetrical septal hypertrophy. Grade III (severe) left ventricular diastolic dysfunction. Right Atrium Right atrial dilatation. Right Ventricle Right ventricular dilatation with reduced systolic function. Mitral Valve Thickened mitral valve leaflets. Trivial mitral regurgitation. Aortic Valve Aortic valve is trileaflet. Mild aortic stenosis, likely underestimated due to decreased LVF. Trivial aortic insufficiency. Tricuspid Valve Normal tricuspid valve structure and function. Mild tricuspid regurgitation. Estimated right ventricular systolic pressure is 53 mmHg. Moderately elevated right ventricular systolic pressure. Pulmonic Valve The pulmonic valve is normal in structure. Trivial pulmonic insufficiency. Pericardial Effusion No significant pericardial effusion is seen. Miscellaneous Aortic root is mildly dilated. (4.0 cm) E/e' average 16.5 IVC Increased diameter and impaired or no inspiratory variation.  M-mode / 2D Measurements & Calculations:   LVIDd:5.6 cm(3.7 - 5.6 cm)       Diastolic IHRFED:913 ml  HSKYB:1.86 cm(2.2 - 4.0 cm)      Systolic FVNRHN:272 ml  UAKY:8.43 cm(0.6 - 1.1 cm)       Aortic Root:4 cm(2.0 - 3.7 cm)  LVPWd:1.16 cm(0.6 - 1.1 cm)      LA Dimension: 6.1 cm(1.9 - 4.0 cm)  Fractional Shortenin.86 %    LA volume/Index: 63.8 ml /33m^2  Calculated LVEF (%): 40.72 %     LVOT:2.8 cm   Mitral:                                Aortic   Peak E-Wave: 1.04 m/s                  Peak Velocity: 1.96 m/s  Peak A-Wave: 0.27 m/s                  Mean Velocity: 1.23 m/s  E/A Ratio: 3.9                         Peak Gradient: 15.37 mmHg  Peak Gradient: 4.33 mmHg               Mean Gradient: 8 mmHg  Deceleration Time: 162 msec                                          Area (continuity): 1.95 cm^2                                         AV VTI: 35.6 cm   Tricuspid:                             Pulmonic:   Estimated RVSP: 53 mmHg  Peak TR Velocity: 3.07 m/s  Peak TR Gradient: 37.6996 mmHg  Estimated RA Pressure: 15 mmHg                                         Estimated PASP: 52.7 mmHg  Septal Wall E' velocity:0.05 m/s Lateral Wall E' velocity:0.10 m/s      ONE SUPINE XRAY VIEW(S) OF THE ABDOMEN 7/1/2021 7:14 am   FINDINGS:   Enteric tube in satisfactory position. Right-sided double pigtail ureteral stent, as above. Borderline dilated small bowel loops in the mid abdomen, likely ileus although low-grade/partial SBO a consideration. Additional findings, as above.        Cultures:     BLOOD  GRN 10ML ORG 7ML RH    Special Requests 06/29/2021  8:41  [address] [facility]   NOT REPORTED    Culture 06/29/2021  8:41 PM [facility]   NO GROWTH 1 DAY      BLOOD  GRN 10ML ORG 9 ML RH    Special Requests 06/29/2021  8:27 PM [address]   NOT REPORTED    Culture 06/29/2021  8:27 PM [facility]   NO GROWTH 1 DAY      HIP RIGHT    Special Requests 06/29/2021  8:09 PM [address]   NOT REPORTED    Direct Exam 06/29/2021  8:09 PM [facility]   NO NEUTROPHILS SEEN    Direct Exam Abnormal  06/29/2021  8:09 PM [address] NEGATIVE RODS    Culture Abnormal  06/29/2021  8:09 PM [facility]   NON LACTOSE FERMENTING GRAM NEGATIVE RODS MODERATE GROWTH    Culture Abnormal  06/29/2021  8:09  [address]   LACTOSE FERMENTING GRAM NEGATIVE RODS LIGHT GROWTH    Culture 06/29/2021  8:09 PM [place] ID: GROUP D ENTEROCOCCUS MODERATE GROWTH    Culture 06/29/2021  8:09  [address]    Culture Abnormal  06/29/2021  8:09  [address]   This is a mixed culture of organisms, which may represent colonization or contamination.  Notify the laboratory within 7 days for further workup.  Susceptibilities have not been performed. MRSA DNA Probe, Nasal [[id number]] Collected: 07/01/21 0755   Order Status: Completed Specimen: Nasal Updated: 07/01/21 1123    Specimen Description . NASAL SWAB    MRSA, DNA, Nasal NEGATIVE:  MRSA DNA not detected by nucleic acid amplification. Comment:                                                    Results should be used as an adjunct to nosocomial control efforts to identify patients   needing enhanced precautions.     The test is not intended to identify patients with staphylococcal infections.  Results   should not be used to guide or monitor treatment for MRSA infections. Thank you for allowing us to participate in the care of this patient. Please call with questions. Electronically signed by Clarissa Bansal MD on 7/1/2021 at 1:19 PM      [facility]  Clarissa Bansal MD  [employer] messaging  OFFICE: [phone]      This note is created with the assistance of a speech recognition program.  While intending to generate a document that actually reflects the content of the visit, the document can still have some errors including those of syntax and sound a like substitutions which may escape proof reading. In such instances, actual meaning can be extrapolated by contextual diversion.

## 2021-07-01 NOTE — ANESTHESIA PRE PROCEDURE
Department of Anesthesiology  Preprocedure Note       Name:  Wing Hernandez   Age:  73 y.o.  :  1955                                          MRN:  [id number]         Date:  2021      Surgeon: Carly Etienne):  Hernando Loza MD    Procedure: Procedure(s): I AND D NECROTIZING FASCIITIS BUTTOCK, FLANK, INNER THIGH    Medications prior to admission:   Prior to Admission medications    Medication Sig Start Date End Date Taking? Authorizing Provider   acetaminophen (TYLENOL) 500 MG tablet Take 1,000 mg by mouth every 6 hours as needed for Pain    Historical Provider, MD   calcium acetate (PHOSLO) 667 MG TABS Take 2 tablets by mouth 3 times daily (with meals) 6/10/21   Zaheer Augustine MD   amiodarone (CORDARONE) 200 MG tablet Take 1 tablet by mouth daily 21   Zaheer Augustine MD   atorvastatin (LIPITOR) 40 MG tablet Take 1 tablet by mouth nightly 21   Zaheer Augustine MD   metoprolol tartrate (LOPRESSOR) 25 MG tablet Take 1 tablet by mouth 2 times daily 21   Zaheer Augustine MD   midodrine (PROAMATINE) 2.5 MG tablet Take 1 tablet by mouth 3 times daily 21   Zaheer Augustine MD   pantoprazole (PROTONIX) 40 MG tablet Take 40 mg by mouth daily    Historical Provider, MD   B Complex-C-Folic Acid (ALBERTA-FERNANDA RX) 1 MG TABS Take 1 tablet by mouth daily    Historical Provider, MD   digoxin (LANOXIN) 125 MCG tablet Take 1 tablet by mouth daily 21   Shawn Voss MD   sevelamer (RENVELA) 800 MG tablet Take 5 tablets by mouth 3 times daily (with meals) 5/15/21   Shawn Voss MD   nitroGLYCERIN (NITROSTAT) 0.4 MG SL tablet Place 0.4 mg under the tongue every 5 minutes as needed for Chest pain up to max of 3 total doses. If no relief after 1 dose, call 911.     Historical Provider, MD   ONE TOUCH ULTRA TEST strip USE AS DIRECTED DAILY AS NEEDED 17   Monse Jorge DO   Blood Glucose Monitoring Suppl (ONE TOUCH ULTRA 2) W/DEVICE KIT TEST 3 TIMES DAILY 16   Monse Jorge DO   glucose monitoring kit (FREESTYLE) monitoring kit 1 kit by Does not apply route daily as needed 4/21/16   Leandro Henson DO   Lancets 30G MISC 1 each by Does not apply route 3 times daily 4/21/16   Leandro Henson DO   Misc.  Devices (WALKER GLIDE WHEELS) MISC 1 Device by Does not apply route continuous 4/21/16   Leandro Henson DO   aspirin 81 MG EC tablet Take 1 tablet by mouth daily 3/14/16   Homero Fothergill, DO       Current medications:    Current Facility-Administered Medications   Medication Dose Route Frequency Provider Last Rate Last Admin    Sodium Hypochlorite (DAKINS) 0.25 % external solution   Irrigation On Call to [address] MARK[address] Echeverria DO        DOPamine (INTROPIN) 400 mg in dextrose 5 % 250 mL infusion  2-20 mcg/kg/min Intravenous Continuous Manuel Rm MD        sodium chloride flush 0.9 % injection 5-40 mL  5-40 mL Intravenous 2 times per day Zandra Roger MD   10 mL at 07/01/21 0005    sodium chloride flush 0.9 % injection 5-40 mL  5-40 mL Intravenous PRN Zandra Roger MD        0.9 % sodium chloride infusion  25 mL Intravenous PRN Zandra Roger MD        ondansetron (ZOFRAN-ODT) disintegrating tablet 4 mg  4 mg Oral Q8H PRN Zandra Roger MD        Or    [facility]etron [facility]) injection 4 mg  4 mg Intravenous Q6H PRN Zandra Roger MD        polyethylene glycol (GLYCOLAX) packet 17 g  17 g Oral Daily PRN Zandra Roger MD        acetaminophen (TYLENOL) tablet 650 mg  650 mg Oral Q6H PRN Zandra Roger MD        Or   [facility] acetaminophen (TYLENOL) suppository 650 mg  650 mg Rectal Q6H PRN Zandra Roger MD        heparin (porcine) injection 5,000 Units  5,000 Units Subcutaneous 3 times per day Zandra Roger MD        fentaNYL (SUBLIMAZE) injection 25 mcg  25 mcg Intravenous Q2H PRN Zandra Roger MD   25 mcg at 07/01/21 0004    insulin lispro (HUMALOG) injection vial 0-6 Units  0-6 Units Subcutaneous TID  Zandra Roger MD        insulin lispro (HUMALOG) injection vial 0-3 Units 0-3 Units Subcutaneous Nightly Sonia Cooper MD        cefepime (MAXIPIME) 1000 mg IVPB minibag  1,000 mg Intravenous Q24H Sonia Cooper MD 12.5 mL/hr at 07/01/21 0050 1,000 mg at 07/01/21 0050    metronidazole (FLAGYL) 500 mg in NaCl 100 mL IVPB premix  500 mg Intravenous Charlie Vigil MD         Facility-Administered Medications Ordered in Other Encounters   Medication Dose Route Frequency Provider Last Rate Last Admin    ePHEDrine injection   Intravenous PRN Devonda Dominion, APRN - CRNA   10 mg at 07/01/21 0151    rocuronium (ZEMURON) injection   Intravenous PRN Devonda Dominion, APRN - CRNA   50 mg at 07/01/21 0156       Allergies:     Allergies   Allergen Reactions    Pcn [Penicillins] Other (See Comments)     Trouble walking       Problem List:    Patient Active Problem List   Diagnosis Code    SDH (subdural hematoma) ([facility] S06.5X9A    ESRD on dialysis ([facility] [address] N18.6, Z99.2    DM (diabetes mellitus) ([facility] [address] E11.9    Anemia of chronic disease D63.8    Charcot foot due to diabetes mellitus ([address] E11.610    Diabetic ulcer of left foot ([address] E11.621, L97.529    Hypertension, essential I10    Type 2 diabetes mellitus with foot ulcer ([address] E11.621, L97.509    Noncompliance Z91.19    CHF (congestive heart failure) ([facility]) I50.9    Sepsis ([address] H93.7    Metabolic acidosis F92.2    Azotemia R79.89    Hyperkalemia E87.5    Diarrhea R19.7    Nausea and vomiting R11.2    Peripheral vascular disease (HCC) I73.9    History of left below knee amputation ([facility]) Z89.512    Hemoptysis R04.2    Ulcerated, foot, right, with fat layer exposed ([address] L97.512    Charcot's joint of foot, right M14.671    Pneumonia J18.9    Sepsis due to pneumonia ([facility]) J18.9, A41.9    Loculated pleural effusion J90    Pleural effusion on right J90    ROOSEVELT (obstructive sleep apnea) G47.33    Parapneumonic effusion J18.9, J91.8    C. difficile colitis A04.72    Acute pulmonary edema (HCC) J81.0    Volume overload E87.70    Non-compliance with renal dialysis ([facility] Z91.15    Acute respiratory failure with hypoxia ([facility]) J96.01    Moderate malnutrition (HCC) E44.0    Infected wound T14. 8XXA, L08.9    Necrotizing fasciitis ([facility] [address] M72.6    Septic shock ([facility]) A41.9, R65.21    Elevated digoxin level R78.89    Bradycardia R00.1       Past Medical History:        Diagnosis Date    Acute on chronic congestive heart failure (HCC)     Anemia     CAD (coronary artery disease)     Cardiomyopathy ([facility] [facility]     Dialysis care     ESRD (end stage renal disease) on dialysis ([facility] [facility]     Foot ulcer, left ([facility] [address] 2015    GERD (gastroesophageal reflux disease)     Hemodialysis patient ([facility] [address] 2011    MOM-WED-FRI-ON [address]     History of sleep apnea     none since significant weight loss (was 400#)    Hyperlipidemia     Hyperparathyroidism ([facility] [address]     Hypertension     Neuropathy     ROOSEVELT (obstructive sleep apnea) 5/8/2021    [facility]     Pneumonia     resolved    S/P CABG (coronary artery bypass graft) 06/2016    Type II or unspecified type diabetes mellitus without mention of complication, not stated as uncontrolled     dx-1980       Past Surgical History:        Procedure Laterality Date    CATARACT REMOVAL      DIALYSIS FISTULA CREATION Left 2014    IR CHEST TUBE INSERTION  5/10/2021    IR CHEST TUBE INSERTION 5/10/2021 [facility]ED[facility]    LEG AMPUTATION THROUGH LOWER TIBIA AND FIBULA         Social History:    Social History     Tobacco Use    Smoking status: Never Smoker    Smokeless tobacco: Never Used   Substance Use Topics    Alcohol use:  No                                Counseling given: Not Answered      Vital Signs (Current):   Vitals:    06/30/21 2345 07/01/21 0000 07/01/21 0015 07/01/21 0030   BP: (!) 93/50 (!) 88/47  (!) 106/49   Pulse: (!) 45 (!) 44 (!) 45 51   Resp: 19 19 17 17   Temp:       TempSrc:       SpO2: 100%  100% 100%   Weight:       Height: BP Readings from Last 3 Encounters:   07/01/21 (!) 106/49   07/01/21 (!) 118/59   06/30/21 (!) 96/47       NPO Status:                                                                                 BMI:   Wt Readings from Last 3 Encounters:   06/30/21 174 lb 2.6 oz (79 kg)   06/30/21 170 lb 13.7 oz (77.5 kg)   06/11/21 169 lb 1.5 oz (76.7 kg)     Body mass index is 23.62 kg/m².     CBC:   Lab Results   Component Value Date    WBC 17.8 06/30/2021    RBC 2.69 06/30/2021    RBC 3.51 01/09/2012    HGB 7.6 06/30/2021    HCT 27.2 06/30/2021    .1 06/30/2021    RDW 19.2 06/30/2021     06/30/2021     01/09/2012       CMP:   Lab Results   Component Value Date     06/30/2021    K 3.6 06/30/2021    CL 96 06/30/2021    CO2 23 06/30/2021    BUN 28 06/30/2021    CREATININE 2.25 06/30/2021    GFRAA 36 06/30/2021    LABGLOM 29 06/30/2021    GLUCOSE 131 06/30/2021    GLUCOSE 128 01/09/2012    PROT 7.8 06/07/2021    CALCIUM 9.4 06/30/2021    BILITOT 0.59 06/07/2021    ALKPHOS 132 06/07/2021    AST 14 06/07/2021    ALT 11 06/07/2021       POC Tests:   Recent Labs     07/01/21  0025   POCGLU 114*       Coags:   Lab Results   Component Value Date    PROTIME 23.8 05/07/2021    PROTIME 10.9 10/25/2011    INR 2.1 05/07/2021    APTT 49.1 05/07/2021       HCG (If Applicable): No results found for: PREGTESTUR, PREGSERUM, HCG, HCGQUANT     ABGs: No results found for: PHART, PO2ART, LQT9LPM, FOU3TCG, BEART, P7EXOFVJ     Type & Screen (If Applicable):  No results found for: LABABO, LABRH    Drug/Infectious Status (If Applicable):  No results found for: HIV, HEPCAB    COVID-19 Screening (If Applicable):   Lab Results   Component Value Date    COVID19 Not Detected 06/07/2021           Anesthesia Evaluation  Nursing notes reviewed  Airway: Mallampati: Unable to assess / NA        Dental:    (+) edentulous      Pulmonary: breath sounds clear to auscultation  (+) pneumonia: resolved,  sleep apnea: Cardiovascular:    (+) hypertension: mild, CAD: obstructive, CABG/stent:, CHF: diastolic,         Rhythm: regular  Rate: abnormal                    Neuro/Psych:   (+) neuromuscular disease:,             GI/Hepatic/Renal:   (+) GERD: poorly controlled,           Endo/Other:    (+) DiabetesType II DM, poorly controlled, , blood dyscrasia: anemia:., .                 Abdominal:   (+) obese,           Vascular: Other Findings:             Anesthesia Plan      general     ASA 4 - emergent       Induction: intravenous. MIPS: Postoperative opioids intended and Postoperative ventilation. Anesthetic plan and risks discussed with Unable to obtain due to emergent nature. Plan discussed with CRNA.                   Clint Barraza MD   7/1/2021

## 2021-07-01 NOTE — PROGRESS NOTES
Patient taken on stretcher by transport to [facility]. Report called earlier by day shift nurse. Patient had no belongings to take. Paperwork given to transport.

## 2021-07-01 NOTE — CONSULTS
[facility]    Consult / H&P               Today's Date: 7/1/2021  Patient Name: Deion Ramos  Date of admission: 6/30/2021 10:51 PM  Patient's age: 73 y.o., 1955  Admission Dx: Necrotizing fasciitis ([facility] [address] [M72.6]    Reason for Consult:  Cardiac evaluation    Requesting Physician: Lyn Rios DO     CHIEF COMPLAINT: Septic shock from the necrotizing wound    History Obtained From:  electronic medical record    HISTORY OF PRESENT ILLNESS:    78-year-old male with past medical history significant for ESRD on hemodialysis, ischemic cardiomyopathy with HFrEF 25%, A. fib, multivessel CAD s/p CABG with subsequent PCI with DENY to LCx, PAD s/p left BKA, type 2 diabetes mellitus initially presented to [facility]  for worsening mentation and bilateral necrotizing wound. Cardiology was consulted because of bradycardia. He was found to have elevated digoxin level 2.6 later increased to 2.8. Was given 1 dose of Digibind. Patient had bedside I&D, was started on IV antibiotics and pressor support and was transferred to [facility]. As per nurse when patient was transferred here his lowest heart rate was noted to be 37, was given 1 dose of atropine and later started on dopamine drip. Patient also had another I&D in the OR. Patient also had few runs of V. tach. Upon my evaluation patient is sedated and intubated. His heart rate is in low 90s. Blood pressure is stable. Currently off of pressor support. Known to us from last admission on 5/21:   1. Septic shock - per icu team  2. C diff colitis - er ID  3. RLL Pneumonia - stable  4. Atrial Tachycardia- S/p cardioversion 5/5/21- stable rhythm, optimize meds  5. Was in Sinus tachycardia with intermittent atrial tach and 2:1 AV conduction, appropriate secondary to hypotension and sepsis. Optimize cardiac meds    6. Multivessel CAD s/p CABG and Subsequent DENY to LCX (only patent LIMA-LAD graft in 2019)  7.  Ischemic CMP with last LVEF 20-25% in Jan 2021 - optimize meds  8. Acute on chronic systolic CHF due to missed HD   9. PAD with hx of left BKA  10. ESRD on HD   11. Consideration of AICD after infection process has resolved. At this point in time not a candidate due to infectious process. 12. Volume and electrolytes management per nephrology. Past Medical History:   has a past medical history of Acute on chronic congestive heart failure ([address] Anemia, CAD (coronary artery disease), Cardiomyopathy ([address] Dialysis care, ESRD (end stage renal disease) on dialysis ([address] Foot ulcer, left ([facility] GERD (gastroesophageal reflux disease), Hemodialysis patient ([facility] [address] History of sleep apnea, Hyperlipidemia, Hyperparathyroidism ([facility] [address] Hypertension, Neuropathy, ROOSEVELT (obstructive sleep apnea), Peripheral vascular disease ([facility] [address] Pneumonia, S/P CABG (coronary artery bypass graft), and Type II or unspecified type diabetes mellitus without mention of complication, not stated as uncontrolled. Past Surgical History:   has a past surgical history that includes Cataract removal; Dialysis fistula creation (Left, 2014); Leg amputation, lower tibia/fibula; and IR CHEST TUBE INSERTION (5/10/2021). Home Medications:    Prior to Admission medications    Medication Sig Start Date End Date Taking?  Authorizing Provider   acetaminophen (TYLENOL) 500 MG tablet Take 1,000 mg by mouth every 6 hours as needed for Pain    Historical Provider, MD   calcium acetate (PHOSLO) 667 MG TABS Take 2 tablets by mouth 3 times daily (with meals) 6/10/21   Gilford Meth, MD   amiodarone (CORDARONE) 200 MG tablet Take 1 tablet by mouth daily 6/9/21   Gilford Meth, MD   atorvastatin (LIPITOR) 40 MG tablet Take 1 tablet by mouth nightly 6/9/21   Gilford Meth, MD   metoprolol tartrate (LOPRESSOR) 25 MG tablet Take 1 tablet by mouth 2 times daily 6/9/21   Gilford Meth, MD   midodrine (PROAMATINE) 2.5 MG tablet Take 1 tablet by mouth 3 times daily 6/9/21   Gilford Meth, MD pantoprazole (PROTONIX) 40 MG tablet Take 40 mg by mouth daily    Historical Provider, MD BOCANEGRA Complex-C-Folic Acid (ALBERTA-FERNANDA RX) 1 MG TABS Take 1 tablet by mouth daily    Historical Provider, MD   digoxin (LANOXIN) 125 MCG tablet Take 1 tablet by mouth daily 5/16/21   Jarrod Gillespie MD   sevelamer (RENVELA) 800 MG tablet Take 5 tablets by mouth 3 times daily (with meals) 5/15/21   Jarrod Gillespie MD   nitroGLYCERIN (NITROSTAT) 0.4 MG SL tablet Place 0.4 mg under the tongue every 5 minutes as needed for Chest pain up to max of 3 total doses. If no relief after 1 dose, call 911. Historical Provider, MD   ONE TOUCH ULTRA TEST strip USE AS DIRECTED DAILY AS NEEDED 1/24/17   Leonora Jacobsen DO   Blood Glucose Monitoring Suppl (ONE TOUCH ULTRA 2) W/DEVICE KIT TEST 3 TIMES DAILY 8/26/16   Leonora Jacobsen DO   glucose monitoring kit (FREESTYLE) monitoring kit 1 kit by Does not apply route daily as needed 4/21/16   Leonora Jacobsen DO   Lancets 30G MISC 1 each by Does not apply route 3 times daily 4/21/16   Leonora Jacobsen,    Misc. Devices (WALKER GLIDE WHEELS) MISC 1 Device by Does not apply route continuous 4/21/16   Leonora Jacobsen DO   aspirin 81 MG EC tablet Take 1 tablet by mouth daily 3/14/16   Karin Lr DO     REVIEW OF SYSTEMS:    · Unable to assess as patient is intubated and sedated    PHYSICAL EXAM:      /67   Pulse 85   Temp 98.6 °F (37 °C) (Oral)   Resp 16   Ht 6' (1.829 m)   Wt 174 lb 2.6 oz (79 kg)   SpO2 99%   BMI 23.62 kg/m²    Constitutional and General Appearance: Intubated and sedated  HEENT: PERRL, no cervical lymphadenopathy. No masses palpable. Normal oral mucosa  Respiratory:  · Clear to auscultation on anterior and posterior chest  Cardiovascular:  · Normal S1 and S2 sound. No murmur appreciated. Abdomen:   · Soft, no organomegaly. Bowel sounds positive. Extremities:  · Left BKA  Neurological:  · Unable to assess as patient is intubated and sedated.     Labs: CBC:   Recent Labs     06/30/21  0411 06/30/21  2330   WBC 20.7* 17.8*   HGB 8.2* 7.6*   HCT 27.4* 27.2*   * 122*     BMP:   Recent Labs     06/30/21  0411 06/30/21  2330    132*   K 3.7 3.6*   CO2 27 23   BUN 54* 28*   CREATININE 3.76* 2.25*   LABGLOM 16* 29*   GLUCOSE 151* 131*     FASTING LIPID PANEL:  Lab Results   Component Value Date    HDL 42 06/03/2019    TRIG 136 06/03/2019     IMPRESSION:    1. Sinus bradycardia likely secondary to septic shock further exacerbated by dig toxicity. Resolved  2. Dig toxicity s/p Digibind. Last value 2.8 (6/30)  3. Sepsis with shock from necrotizing fasciitis. Improved. Currently off of pressors  4. Necrotizing fasciitis s/p I&D   5. H/O PAT s/p CV on 5/5/2021  6. MV CAD s/p CABG with subsequent PCI to LCx in 2019 (only patent LIMALAD)  7. ICMP, EF persistently low 25% on Echo 6/30/21  8. ESRD on hemodialysis. 9. Severe PAD s/p L BKA      RECOMMENDATIONS:  1. Stop digoxin, amiodarone and beta-blocker. 2. Continue with as needed atropine and dopamine drip  3. Will consider LifeVest prior to discharge and then AICD if continue to make improvement as outpatient. 4. Will follow with you       Plan to be discussed with rounding attending. Willem Orellana MD.  Internal Medicine Resident PGY-3  [facility]   7/1/2021, [address]        Attending Physician Statement:    I have discussed the care of  Neena Cooks , including pertinent history and exam findings, with the Cardiology fellow/resident. I have seen and examined the patient and the key elements of all parts of the encounter have been performed by me. I agree with the assessment, plan and orders as documented by the fellow/resident, after I modified exam findings and plan of treatments, and the final version is my approved version of the assessment. Additional Comments: The patient was seen and examined, agree with below. Intubated and sedated.  Sepsis and digoxin Toxicity. Received Digi-bind. HR is sinus and 50-60. Will observe. Recheck digoxin levels at 11 am. Hold Amiodarone and BB. Ischemic cardiomyopathy. Volume control via dialysis. Will follow on EF as outpatient.

## 2021-07-01 NOTE — PROGRESS NOTES
PROGRESS NOTE          PATIENT NAME: Ronnell Hartley RECORD NO. [id number]  DATE: 7/1/2021  SURGEON: Barbara Carpio  PRIMARY CARE PHYSICIAN: Rajani Pollack MD    HD: # 1    ASSESSMENT    Patient Active Problem List   Diagnosis    SDH (subdural hematoma) ([address]    ESRD on dialysis ([address]    DM (diabetes mellitus) ([address]    Anemia of chronic disease    Charcot foot due to diabetes mellitus ([address]    Diabetic ulcer of left foot ([address]    Hypertension, essential    Type 2 diabetes mellitus with foot ulcer ([address]    Noncompliance    CHF (congestive heart failure) (HCC)    Sepsis ([address]    Metabolic acidosis    Azotemia    Hyperkalemia    Diarrhea    Nausea and vomiting    Peripheral vascular disease ([address]    History of left below knee amputation (HCC)    Hemoptysis    Ulcerated, foot, right, with fat layer exposed ([address]    Charcot's joint of foot, right    Pneumonia    Sepsis due to pneumonia ([address]    Loculated pleural effusion    Pleural effusion on right    ROOSEVELT (obstructive sleep apnea)    Parapneumonic effusion    C. difficile colitis    Acute pulmonary edema (HCC)    Volume overload    Non-compliance with renal dialysis (HCC)    Acute respiratory failure with hypoxia (HCC)    Moderate malnutrition (HCC)    Infected wound    Necrotizing fasciitis (HCC)    Septic shock (HCC)    Elevated digoxin level    Bradycardia       MEDICAL DECISION MAKING AND PLAN    66M admitted for sepsis suspect 2/2 necrotic sacral and inner thigh wounds  -S/p I&D sacral and inner thigh wounds 7/1    Plan for OR for further debridement tomorrow, 7/2  N.p.o. at midnight  IVF  Hold DVT PPx subcutaneous Heparin prior to surgery  ABx: Cefepime, Flagyl, Vanco  Wound care: Remove Dakin's today and replaced with saline soaked Kerlix  ESRD, on dialysis M/W/F  Continue medical management per primary      Chief Complaint: Necrotic sacral and bilateral inner thigh wounds; sepsis     SUBJECTIVE  Patient seen and examined at bedside.   Overnight, patient went for debridement of necrotic tissue over the sacrum and bilateral inner thighs. Patient remains intubated and sedated. Hemodialysis yesterday. VSS, afebrile, T-max 37.0. OBJECTIVE  VITALS: Temp: Temp: 98.6 °F (37 °C)Temp  Av.2 °F (36.2 °C)  Min: 95 °F (35 °C)  Max: 98.6 °F (37 °C) BP Systolic (49GRO), RAL:89 , Min:77 , LYK:184   Diastolic (21AAW), TKU:37, Min:44, Max:77   Pulse Pulse  Av.5  Min: 44  Max: 96 Resp Resp  Av.8  Min: 0  Max: 26 Pulse ox SpO2  Av.3 %  Min: 90 %  Max: 100 %    CONSTITUTIONAL:  Intubated and sedated, stirs to stimulation  HEENT: Head is normocephalic, atraumatic. EOMI, PERRLA  NECK: Soft, trachea midline and straight  LUNGS: Intubated  CARDIOVASCULAR:  RRR, mild bradycardia off and on throughout the night   ABDOMEN: soft, non-distended, non-tender. Large previously debrided sacral wound with surrounding necrotic tissue and subcutaneous air palpated. Bilateral inner thigh wounds with surrounding necrotic tissue. NEUROLOGIC: Awake, alert, oriented to self and time  EXTREMITIES: L BKA                        I/O last 3 completed shifts: In: 688 [I.V.:688]  Out: -     Drain/tube output: In: 688 [I.V.:688]  Out: -     LAB:  CBC:   Recent Labs     21  0411 21  2330 21  0542   WBC 20.7* 17.8* 21.9*   HGB 8.2* 7.6* 8.2*   HCT 27.4* 27.2* 28.8*   MCV 95.0 101.1 99.7   * 122* See Reflexed IPF Result     BMP:   Recent Labs     21  1138 21  04121  2330    137 132*   K 4.2 3.7 3.6*   CL 93* 98 96*   CO2 31 27 23   BUN 50* 54* 28*   CREATININE 3.67* 3.76* 2.25*   GLUCOSE 175* 151* 131*     COAGS: No results for input(s): APTT, PROT, INR in the last 72 hours. RADIOLOGY:  XR CHEST PORTABLE    Result Date: 2021  1. Mild low lung volumes with bibasilar subsegmental atelectasis and suspected mild right-sided pleural effusion. 2. Scattered bilateral lung airspace disease. 3. Moderate to severe cardiomegaly.      XR CHEST PORTABLE    Result Date: 6/29/2021  Right internal jugular central venous catheter appears appropriate in positioning. Limited pulmonary examination that appears grossly stable from most recent prior. XR CHEST PORTABLE    Result Date: 6/29/2021  Persistent right basilar opacity and small right effusion with little interval change. Daly Smith DO  7/1/21, 7:46 AM       Attending Note      I have reviewed the above [facility] note(s) and I either performed the key elements of the medical history and physical exam or was present with the surgery resident when the key elements of the medical history and physical exam were performed. I have discussed the findings, established the care plan and recommendations with the trauma team.  Wounds debrided. Return to OR tomorrow for re-evaluation.     Koki Elder MD  7/1/2021  2:44 PM

## 2021-07-01 NOTE — PLAN OF CARE
Problem: Sensory:  Goal: Pain level will decrease  Description: Pain level will decrease  7/1/2021 0716 by Sita Lisa  Outcome: Ongoing  7/1/2021 0048 by Rojas Whitley RN  Outcome: Ongoing     Problem: Tissue Perfusion:  Goal: Risk of venous thrombosis will decrease  7/1/2021 0716 by Sita Lisa  Outcome: Ongoing  7/1/2021 0048 by Rojas Whitley RN  Outcome: Ongoing     Problem: Urinary Elimination:  Goal: Ability to reestablish a normal urinary elimination pattern will improve - after catheter removal  7/1/2021 0716 by Sita Lisa  Outcome: Ongoing  7/1/2021 0048 by Rojas Whitley RN  Outcome: Ongoing     Problem: Non-Violent Restraints  Goal: No harm/injury to patient while restraints in use  7/1/2021 0716 by Sita Lisa  Outcome: Ongoing  7/1/2021 0640 by Rojas Whitley RN  Outcome: Met This Shift  Goal: Patient's dignity will be maintained  7/1/2021 0716 by Sita Lisa  Outcome: Ongoing  7/1/2021 0640 by Rojas Whitley RN  Outcome: Met This Shift     Problem: OXYGENATION/RESPIRATORY FUNCTION  Goal: Patient will maintain patent airway  7/1/2021 0716 by Sita Lisa  Outcome: Ongoing  7/1/2021 0521 by Remigio Eng RCP  Outcome: Ongoing  Goal: Patient will achieve/maintain normal respiratory rate/effort  Description: Respiratory rate and effort will be within normal limits for the patient  7/1/2021 0716 by Sita Lisa  Outcome: Ongoing  7/1/2021 0521 by Remigio Eng RCP  Outcome: Ongoing     Problem: MECHANICAL VENTILATION  Goal: Patient will maintain patent airway  7/1/2021 0716 by Sita Lisa  Outcome: Ongoing  7/1/2021 0521 by Remigio Eng RCP  Outcome: Ongoing  Goal: Oral health is maintained or improved  7/1/2021 0716 by Sita Lisa  Outcome: Ongoing  7/1/2021 0521 by Remigio Eng RCP  Outcome: Ongoing  Goal: ET tube will be managed safely  7/1/2021 0716 by Sita Lisa  Outcome: Ongoing  7/1/2021 0521 by Remigio Eng RCP  Outcome: Ongoing  Goal: Ability to express needs and understand communication  7/1/2021 0716 by Loreto Walsh  Outcome: Ongoing  7/1/2021 0521 by Corinna Anthony RCP  Outcome: Ongoing  Goal: Mobility/activity is maintained at optimum level for patient  7/1/2021 0716 by Loreto Walsh  Outcome: Ongoing  7/1/2021 0521 by Corinna Anthony RCP  Outcome: Ongoing     Problem: SKIN INTEGRITY  Goal: Skin integrity is maintained or improved  7/1/2021 0716 by Loreto Walsh  Outcome: Ongoing  7/1/2021 0521 by Corinna Anthony RCP  Outcome: Ongoing     Problem: Non-Violent Restraints  Goal: Removal from restraints as soon as assessed to be safe  7/1/2021 0716 by Loreto Walsh  Outcome: Not Met This Shift  7/1/2021 0640 by Kendall Potter RN  Outcome: Not Met This Shift

## 2021-07-01 NOTE — PLAN OF CARE
Problem: OXYGENATION/RESPIRATORY FUNCTION  Goal: Patient will maintain patent airway  7/1/2021 1919 by Author Mathew Melo RCP  Outcome: Ongoing     Problem: OXYGENATION/RESPIRATORY FUNCTION  Goal: Patient will achieve/maintain normal respiratory rate/effort  Description: Respiratory rate and effort will be within normal limits for the patient  7/1/2021 1919 by Meeta Melo RCP  Outcome: Ongoing     Problem: MECHANICAL VENTILATION  Goal: Patient will maintain patent airway  7/1/2021 1919 by Author Mathew Melo RCP  Outcome: Ongoing     Problem: MECHANICAL VENTILATION  Goal: Oral health is maintained or improved  7/1/2021 1919 by Author Mathew Melo RCP  Outcome: Ongoing     Problem: MECHANICAL VENTILATION  Goal: ET tube will be managed safely  7/1/2021 1919 by Meeta Melo RCP  Outcome: Ongoing     Problem: MECHANICAL VENTILATION  Goal: Ability to express needs and understand communication  7/1/2021 1919 by Meeta Melo RCP  Outcome: Ongoing     Problem: MECHANICAL VENTILATION  Goal: Mobility/activity is maintained at optimum level for patient  7/1/2021 1919 by Author Mathew Melo RCP  Outcome: Ongoing     Problem: SKIN INTEGRITY  Goal: Skin integrity is maintained or improved  7/1/2021 1919 by Author Mathew Melo RCP  Outcome: Ongoing

## 2021-07-01 NOTE — BRIEF OP NOTE
Brief Postoperative Note      Patient: Emilia Tejada  YOB: 1955  MRN: [id number]    Date of Procedure: 7/1/2021    Pre-Op Diagnosis: NECROTIC SOFT TISSUE    Post-Op Diagnosis:         Procedure(s): I AND D NECROTIC TISSUE, POSTERIOR HIPS AND GROIN BILATERALLY    Surgeon(s):  Kamille Rosado MD    Assistant:  Fernandez Espinoza DO PGY2    Anesthesia: General    Estimated Blood Loss (mL): 25    Crystalloid: 747YK     Complications: None    Specimens:   ID Type Source Tests Collected by Time Destination   A : DEBRIDEMENT OF SUBCUTANEOUS TISSUE AND MUSCLE Tissue Buttock SURGICAL PATHOLOGY Kamille Rosado MD 7/1/2021 0235        Implants:  * No implants in log *      Drains:   Hemodialysis Arteriovenous fistula Left Forearm (Active)   Site Assessment Clean 07/01/21 0000   Thrill Present 07/01/21 0000   Bruit Present 07/01/21 0000   Dressing Status Other (Comment) 07/01/21 0000       Findings: Necrotic tissue in bilateral groins and posterior hips  present throughout case.     Electronically signed by Fernandez Espinoza DO on 7/1/2021 at 2:57 AM

## 2021-07-01 NOTE — PLAN OF CARE
Problem: OXYGENATION/RESPIRATORY FUNCTION  Goal: Patient will maintain patent airway  Outcome: Ongoing     Problem: OXYGENATION/RESPIRATORY FUNCTION  Goal: Patient will achieve/maintain normal respiratory rate/effort  Description: Respiratory rate and effort will be within normal limits for the patient  Outcome: Ongoing     Problem: MECHANICAL VENTILATION  Goal: Patient will maintain patent airway  Outcome: Ongoing     Problem: MECHANICAL VENTILATION  Goal: Oral health is maintained or improved  Outcome: Ongoing     Problem: MECHANICAL VENTILATION  Goal: ET tube will be managed safely  Outcome: Ongoing     Problem: MECHANICAL VENTILATION  Goal: Ability to express needs and understand communication  Outcome: Ongoing     Problem: MECHANICAL VENTILATION  Goal: Mobility/activity is maintained at optimum level for patient  Outcome: Ongoing     Problem: SKIN INTEGRITY  Goal: Skin integrity is maintained or improved  Outcome: Ongoing     Ventilator Bronchodilator assessment    Breath sounds: Clear  Inspiratory Pressure: 20  Plateau Pressure: 19    Patient assessed at level 1          [x]    Bronchodilator Assessment    BRONCHODILATOR ASSESSMENT SCORE  Score 0 (Home) 1 2 3 4   Breath Sounds   []  Chronic Ventilator: Patient at baseline [x]  Mild Wheezes/ Clear []  Intermittent wheezes with good air entry []  Bilateral/unilateral wheezing with diminished air entry []  Insp/Exp wheeze and/or poor aeration   Ventilator Pressures   []  Chronic Ventilator [x]  Insp. Pressure less than 25 cm H20 [x]  Insp. Pressure less than 25 cm H20 []  Insp. Pressure exceeds 25 cm H20 []  Insp.  Pressure exceeds 30 cm H20   Plateau Pressure []  NA   [x]  Plateau Pressure less than 4  []  Plateau Pressure less than or equal to 5 []  Plateau Pressure greater than or equal to 6 []  Plateau Pressure greater than or equal to 8       Pippa Whitfield RCP  5:21 AM

## 2021-07-01 NOTE — PROGRESS NOTES
Wound ostomy consult noted. Wound care management is per General Surgery. Will defer to Surgical Service. Please call if further assistance is needed.

## 2021-07-01 NOTE — CONSULTS
Renal Consult Note    Patient :  Gabriel Melissa; 73 y.o. MRN# [id number]  Location:  0101/0101-01  Attending:  Lorena Rubio DO  Admit Date:  6/30/2021   Hospital Day: 1    Reason for Consult:     Asked by Dr Lorena Rubio DO to see for CAREN/Elevated Creatinine. History Obtained From:     patient, electronic medical record, patient's nurse    HD Access:     previous  Left AV fistula    HD Unit:     Currently running at [facility] hemodialysis unit before he was at [address] [facility] renal care    Nephrologist:     Dr. Radha Potts (original), at [facility] he was running under Dr. Nawaf Anguiano group    Dry Weight:     Currently not available    Admission Weight:     79 kg    History of Present Illness:     Gabriel Melissa; 73 y.o. male with past medical history as mentioned below was transferred from [facility] due to worsening mentation and bilateral necrotizing wounds. Patient is currently resident at [facility] and gets dialysis there 4 times a week. He also has history of systolic CHF with last 2D echo done 6/19/2020 with EF of 25 to 30%, A. fib, multivessel coronary disease with history of CABG, peripheral vascular disease with history of left BKA, type 2 diabetes. Upon presenting to ED patient became hypotensive with systolics in 629S rapid response was called as patient also became bradycardic and was transferred to the ICU for further management. Patient did require pressor support for a brief. And later improved, did receive atropine for bradycardia as well. He did have elevated digoxin levels and got Digibind antibody. Infectious diseases was also called in and patient was initiated on IV antibiotics due to groin area necrotizing wounds. General surgery was consulted and patient did receive surgical debridement at bedside. Patient also developed acute respiratory failure requiring mechanical ventilation.   Patient did receive last hemodialysis yesterday at 7049 hours and got about 1.8 L removed per dialysis nurse. KUB 7/1/2021 shows borderline dilated small bowel loops in the midabdomen, likely ileus. His lab work from [facility] showed sodium 134, potassium 3.8, chloride 98, bicarb 20, BUN 29, creatinine 2.54 mg/DL, calcium 8.9. Nephrology is consulted due to history of ESRD on HD. Past History/Allergies? Social History:     Past Medical History:   Diagnosis Date    Acute on chronic congestive heart failure (HCC)     Anemia     CAD (coronary artery disease)     Cardiomyopathy ([address]     Dialysis care     ESRD (end stage renal disease) on dialysis ([address]     Foot ulcer, left ([address] 2015    GERD (gastroesophageal reflux disease)     Hemodialysis patient ([address] 2011    MOM-WED-FRI-ON [address]     History of sleep apnea     none since significant weight loss (was 400#)    Hyperlipidemia     Hyperparathyroidism ([address]     Hypertension     Neuropathy     ROOSEVELT (obstructive sleep apnea) 5/8/2021    Peripheral vascular disease ([address]     Pneumonia     resolved    S/P CABG (coronary artery bypass graft) 06/2016    Type II or unspecified type diabetes mellitus without mention of complication, not stated as uncontrolled     dx-1980       Past Surgical History:   Procedure Laterality Date    CATARACT REMOVAL      DIALYSIS FISTULA CREATION Left 2014    IR CHEST TUBE INSERTION  5/10/2021    IR CHEST TUBE INSERTION 5/10/2021 [facility] SPECIAL PROCEDURES    LEG AMPUTATION THROUGH LOWER TIBIA AND FIBULA         Allergies   Allergen Reactions    Pcn [Penicillins] Other (See Comments)     Trouble walking       Social History     Socioeconomic History    Marital status:      Spouse name: Not on file    Number of children: Not on file    Years of education: Not on file    Highest education level: Not on file   Occupational History    Not on file   Tobacco Use    Smoking status: Never Smoker    Smokeless tobacco: Never Used   Substance and Sexual Activity    Alcohol use: No    Drug use: No    Sexual activity: Not on file   Other Topics Concern    Not on file   Social History Narrative    Not on file     Social Determinants of Health     Financial Resource Strain:     Difficulty of Paying Living Expenses:    Food Insecurity:     Worried About Running Out of Food in the Last Year:     [address] in the Last Year:    Transportation Needs:     Lack of Transportation (Medical):      Lack of Transportation (Non-Medical):    Physical Activity:     Days of Exercise per Week:     Minutes of Exercise per Session:    Stress:     Feeling of Stress :    Social Connections:     Frequency of Communication with Friends and Family:     Frequency of Social Gatherings with Friends and Family:     Attends Spiritism Services:     Active Member of Clubs or Organizations:     Attends Club or Organization Meetings:     Marital Status:    Intimate Partner Violence:     Fear of Current or Ex-Partner:     Emotionally Abused:     Physically Abused:     Sexually Abused:        Family History:        Family History   Problem Relation Age of Onset    Heart Disease Father     Diabetes Father     Diabetes Brother        Outpatient Medications:     Medications Prior to Admission: acetaminophen (TYLENOL) 500 MG tablet, Take 1,000 mg by mouth every 6 hours as needed for Pain  calcium acetate (PHOSLO) 667 MG TABS, Take 2 tablets by mouth 3 times daily (with meals)  amiodarone (CORDARONE) 200 MG tablet, Take 1 tablet by mouth daily  atorvastatin (LIPITOR) 40 MG tablet, Take 1 tablet by mouth nightly  metoprolol tartrate (LOPRESSOR) 25 MG tablet, Take 1 tablet by mouth 2 times daily  midodrine (PROAMATINE) 2.5 MG tablet, Take 1 tablet by mouth 3 times daily  pantoprazole (PROTONIX) 40 MG tablet, Take 40 mg by mouth daily  B Complex-C-Folic Acid (ALBERTA-FERNANDA RX) 1 MG TABS, Take 1 tablet by mouth daily  digoxin (LANOXIN) 125 MCG tablet, Take 1 tablet by mouth daily  sevelamer (RENVELA) 800 MG tablet, Take 5 tablets by mouth 3 times daily (with meals)  nitroGLYCERIN (NITROSTAT) 0.4 MG SL tablet, Place 0.4 mg under the tongue every 5 minutes as needed for Chest pain up to max of 3 total doses. If no relief after 1 dose, call 911. ONE TOUCH ULTRA TEST strip, USE AS DIRECTED DAILY AS NEEDED  Blood Glucose Monitoring Suppl (ONE TOUCH ULTRA 2) W/DEVICE KIT, TEST 3 TIMES DAILY  glucose monitoring kit (FREESTYLE) monitoring kit, 1 kit by Does not apply route daily as needed  Lancets 30G MISC, 1 each by Does not apply route 3 times daily  Misc. Devices ([address] [place] [address]) MISC, 1 Device by Does not apply route continuous  aspirin 81 MG EC tablet, Take 1 tablet by mouth daily    Current Medications:     Scheduled Meds:    Sodium Hypochlorite   Irrigation On Call to OR    vancomycin (VANCOCIN) intermittent dosing (placeholder)   Other RX Placeholder    [START ON 2021] vancomycin  750 mg Intravenous Once per day on     chlorhexidine  15 mL Mouth/Throat BID    sodium chloride flush  5-40 mL Intravenous 2 times per day    heparin (porcine)  5,000 Units Subcutaneous 3 times per day    insulin lispro  0-6 Units Subcutaneous TID WC    insulin lispro  0-3 Units Subcutaneous Nightly    cefepime  1,000 mg Intravenous Q24H    metroNIDAZOLE  500 mg Intravenous Q8H     Continuous Infusions:    fentaNYL 75 mcg/hr (21 0823)    sodium chloride       PRN Meds:  sodium chloride flush, sodium chloride, ondansetron **OR** ondansetron, polyethylene glycol, acetaminophen **OR** acetaminophen    Review of Systems:     Review of systems not possible as patient is vented    Input/Output:     I/O last 3 completed shifts:   In: 688 [I.V.:688]  Out: -       Vital Signs:   Temperature:  Temp: 97.2 °F (36.2 °C)  TMax:   Temp (24hrs), Av.2 °F (36.2 °C), Min:95 °F (35 °C), Max:98.6 °F (37 °C)    Respirations:  Resp: 16  Pulse:   Pulse: 62  BP:    BP: (!) 88/50  BP Range: Systolic (91RMG), GXD:37 , Min:77 , ZWH:540       Diastolic (24hrs), Av, Min:44, Max:77      Physical Examination:     General:  On Mech Vent, Awake and responds. HEENT:  Atraumatic, normocephalic, no throat congestion, moist mucosa. Eyes:   Pupils equal, round and reactive to light, pallor, no icterus. Neck:   Supple. Chest:   Air entry fair bilaterally. Cardiac: S1 S2 RRR  Abdomen:  Soft, no masses or organomegally appreciable, BS sluggish. :   No suprapubic or flank tenderness. Neuro: On Mech Vent, Awake and responds. Skin:   No rashes, good skin turgor. Extremities:  No edema. LLE amputation, RLE foot wound. +ve Groin wounds.      Labs:       Recent Labs     21  0542   WBC 20.7* 17.8* 21.9*   RBC 2.89* 2.69* 2.89*   HGB 8.2* 7.6* 8.2*   HCT 27.4* 27.2* 28.8*   MCV 95.0 101.1 99.7   MCH 28.4 28.3 28.4   MCHC 29.9* 27.9* 28.5   RDW 20.9* 19.2* 19.1*   * 122* See Reflexed IPF Result   MPV 10.0 12.2 NOT REPORTED      BMP:   Recent Labs     210 21  0542    132* 134*   K 3.7 3.6* 3.8   CL 98 96* 98   CO2 27 23 20   BUN 54* 28* 29*   CREATININE 3.76* 2.25* 2.54*   GLUCOSE 151* 131* 149*   CALCIUM 9.7 9.4 9.8      Phosphorus:     Recent Labs     21   PHOS 3.5     PTH:     Lab Results   Component Value Date    IPTH 516 2012     Urinalysis/Chemistries:      Lab Results   Component Value Date    NITRU NEGATIVE 2013    COLORU YELLOW 2013    PHUR 7.0 2013    WBCUA 2 TO 5 2013    RBCUA 0 TO 2 2013    MUCUS NOT REPORTED 2013    TRICHOMONAS NOT REPORTED 2013    YEAST NOT REPORTED 2013    BACTERIA NOT REPORTED 2013    SPECGRAV 1.014 2013    LEUKOCYTESUR NEGATIVE 2013    UROBILINOGEN Normal 2013    BILIRUBINUR NEGATIVE 2013    BILIRUBINUR NEGATIVE  Verified by ictotest. 10/20/2011    GLUCOSEU 1+ 2013    GLUCOSEU TRACE 10/20/2011    KETUA NEGATIVE 2013    AMORPHOUS NOT REPORTED 06/22/2013       Radiology:     Reviewed. Assessment:     1. ESRD on Hemodialysis. His regular HD days are 4 times a week at [facility] hemodialysis facility using left AV fistula under Dr. Chauncey Parekh group. His dry weight is currently not available. Admitted with 79 kg.  2.  Acute hypercapnic respiratory failure requiring mechanical ventilation. 3.  Sepsis. 4.  Necrotic sacral and inner thigh wounds status post bedside I&D 7/1/2021.  5.  A. fib. 6.  Digoxin toxicity. 7.  Ischemic cardiomyopathy with EF of 25% and grade 3 diastolic dysfunction as per 2D echo done on 6/30/2021.  9.  Diabetes type 2.  10.  Hypertension. 11.  Ileus. 12.  Anemia of chronic disease  13. Secondary hyperparathyroidism    Plan:   1. No acute need for dialysis today. Will get dialysis in a.m. and currently running as MWF schedule. 2. Strict Input and Output, Daily weigh and document in the chart. 3. Low Potassium, Low phosphorus and low salt diet. Fluids to be restricted to 1500ml/day. 4. IV Aranesp/Epogen for anemia of chronic disease with HD weekly. 5. IV Zemplar per protocol for secondary hyperparathyroidism with HD thrice a week. 6.  Likely to go to the OR for further debridement per general surgery tomorrow. 7.  Agree with midodrine, increased dose of 10 mg 3 times a day. If continues to be hypotensive, might require initiation of pressor support again. 8.  Antibiotics as per ESRD dosing per infectious diseases. 9.  Digoxin toxic treatment as per cardiology. 10. BMP in AM.  11.  Will follow. Nutrition   Please ensure that patient is on a renal diet/TF. Avoid nephrotoxic drugs/contrast exposure. Thank you for the consultation. Please do not hesitate to contact us for any further questions/concerns. We will continue to follow along with you. Kyle Carlin MD  [facility]       This note is created with the assistance of a speech-recognition program. While intending to generate a document that actually reflects the content of the visit, no guarantees can be provided that every mistake has been identified and corrected by editing.

## 2021-07-01 NOTE — OP NOTE
Operative Note      Patient: Betty Silverman  YOB: 1955  MRN: [id number]     Date of Procedure: 7/1/2021     Pre-Op Diagnosis: NECROTIC SOFT TISSUE     Post-Op Diagnosis:         Procedure(s): I AND D NECROTIC TISSUE, POSTERIOR HIPS AND GROIN BILATERALLY     Surgeon(s):  Eddie Lawrence MD     Assistant:  Evan Cox, DO PGY2     Anesthesia: General     Estimated Blood Loss (mL): 25     Crystalloid: 210FJ      Complications: None     Specimens:   ID Type Source Tests Collected by Time Destination   A : DEBRIDEMENT OF SUBCUTANEOUS TISSUE AND MUSCLE Tissue Buttock SURGICAL PATHOLOGY Eddie Lawrence MD 7/1/2021 0235           Implants:  * No implants in log *      Drains:        Hemodialysis Arteriovenous fistula Left Forearm (Active)   Site Assessment Clean 07/01/21 0000   Thrill Present 07/01/21 0000   Bruit Present 07/01/21 0000   Dressing Status Other (Comment) 07/01/21 0000       Findings: Necrotic tissue in bilateral groins and posterior hips  present throughout case. Detailed Description of Procedure: Indication: Tello Giang is a 28-year-old gentleman who presented as a transfer from [facility] due to need for elevated care. Patient presented in sepsis with necrotic wounds to his bilateral groins and posterior hips. Significant necrotic tissue burden needing emergent surgical intervention. Patient was alert and oriented x2 procedure and risks of the procedure were explained to the patient as best as possible he wished to proceed. Patient's family was attempted to be contacted multiple times during the night and we were unable to contact them. Due to the urgency of the situation the procedure was called emergent and we proceeded to the operating room solely based on the consent of the patient himself. Procedure: Patient was taken to the operative suite.   The anesthesia team placed the patient under anesthesia while on the gurney and then the patient was transferred over to the operating table. Patient was first placed in the supine position and then prepped and draped in the typical standard fashion. Timeout was called to ensure proper patient, proper position, and proper procedure being performed, all in attendance were in agreement. Attention was first turned to the necrotic tissue at the bilateral groin sites. This tissue was dissected sharply with a 10 blade scalpel and Metzenbaum scissors as well as with Bovie electrocautery. Debridement was carried down through the skin, subcutaneous tissue, and muscle. Debridement was continued until much necrotic tissue was removed as possible. Debrided area in the left groin measured 10 x 8 cm, debrided area on the right groin measured 8 x 6 cm. Hemostasis was ensured using Bovie electrocautery. After hemostasis the wounds were packed with quarter percent Dakin's soaked Kerlix. At this time the patient's drapes were removed. Patient was flipped prone. Patient was then reprepped and draped in typical standard fashion. The operative team regowned and gloved. Attention was then turned to the bilateral posterior hip necrotic wounds. This tissue was dissected sharply with a 10 blade scalpel and Metzenbaum scissors as well as with Bovie electrocautery. Debridement was carried down through the skin, subcutaneous tissue, and muscle. Debridement was continued until much necrotic tissue was removed as possible. Debrided area in the left posterior hip measured 15 x 10 cm, debrided area on the right posterior hip measured 30 x 20 cm. Hemostasis was ensured using Bovie electrocautery. After hemostasis the wounds were packed with quarter percent Dakin's soaked Kerlix. Patient was then flipped back over into the supine position onto his gurney. All tissue removed was sent for pathology. This concluded the case. All sponge, instrument, and needle counts were correct at the end of the case.   The patient remained intubated and was taken to the ICU in critical yet stable condition. Dr. Lavern Cloud was present and scrubbed for the duration of the case  Present throughout case.   Electronically signed by Jaja Walker DO on 7/1/2021 at 7:17 AM

## 2021-07-01 NOTE — CONSULTS
[address], [address]                                  CONSULTATION    PATIENT NAME: Conard Spatz                   :        1955  MED REC NO:   [id number]                              ROOM:       [facility]  ACCOUNT NO:   [de-identified]                           ADMIT DATE: 2021  PROVIDER:     Arnulfo Bryan    CONSULT DATE:  2021    REASON FOR CONSULTATION:  Hypotension, bradycardia. HISTORY OF PRESENT ILLNESS:  The patient is a 52-year-old unfortunate  male. The patient presented to the ED because of problems with  management of sepsis, altered mental status. The sepsis was concerning  secondary to the patient's multiple wounds. He comes from local skilled  nursing facility. The patient reportedly had problems with hypotension  and he had been bradycardic. The patient was given cefepime and  vancomycin in the ED. The patient was transferred to the floor but then  transferred to the ICU because of the bradycardia and hypotension. General surgery was kind to place a right IJ central venous access. The  patient reported he never required any dopamine. No Levophed. No requirement for transcutaneous pacemaker. Of note, Dr. Dmitri Nogueira, General Surgery was kind to  review the wounds which were considered extremely extensive. He reports  me that he did a bedside debridement and the patient did not complain of  any pain because of necrosis. PAST MEDICAL HISTORY:  Notable for acute-on-chronic congestive failure,  history of cardiomyopathy, history of end-stage renal disease on  dialysis, diagnosis of ROOSEVELT, history of coronary artery bypass graft  surgery, history of diabetes, history of peripheral vascular disease.     PAST SURGICAL HISTORY:  Includes a chest tube insertion in 2021,  followed by removal history of fistula creation in , history of  cataract removal.  The patient is status post left BKA.    SOCIAL HISTORY:  Nonsmoker past or present. Reportedly no drug use or  alcohol use. CURRENT MEDICATIONS:  At home are multiple. He does not appear to be on  any pulmonary medications per se. ALLERGIES:  PENICILLIN. REVIEW OF SYSTEMS:  Not able to do be performed. PHYSICAL EXAMINATION:  GENERAL:  The patient is a 71-year-old male resting quietly. He looks  chronically ill. VITAL SIGNS:  Blood pressure 94/47, pulse anywhere from 48 to 51,  respirations 20 to 23, O2 saturations 100% on 2 L. Temperature max  98.1. BMI of 23.7. HEENT:  No supraclavicular lymph node enlargement. LUNGS:  Clear posteriorly. No crackles, rales, or wheezes. CARDIOVASCULAR:  Bradycardic. Regular rhythm. ABDOMEN:  Soft. EXTREMITIES:  No signs of cyanosis status post left BKA. The patient's  large wound dressing posterior right hip and femur intact. LABORATORY RESULTS:  BUN and creatinine is 54/3.75, serum bicarb 27. White count 21.4, hemoglobin 8.9, platelet count 840. Of note, the  patient underwent a 2-D echo performed on 06/03/2021 with ejection  fraction 25% with severe global hypokinesis. Estimated RVSP 53. IMPRESSION:  1. Shock, resolved. 2.  Bradycardia. 3.  History of left BKA. 4.  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 5.  Altered mentation and encephalopathy. 6.  End-stage renal disease on hemodialysis. 7.  Severe left ventricular systolic dysfunction. Ejection fraction of  25%. PLAN:  The patient was actually given digabind when the patient had a  digoxin level checked and it was 2.8. The patient was actually placed  on IV glucagon because there was a concern for too much beta-blocker as  well. The patient is undergoing dialysis this week. The patient is on  Maxipime and vancomycin as well. The patient is on DVT prophylaxis  subcu heparin. We will follow the patient in-house.   There was talk  about potential transfer to [facility]. [facility] for plastic  surgery request.  We will follow the patient in-house and thank you Dr. Sebas Javier for the consult.         Gail Haas    D: 06/30/2021 15:22:58       T: 06/30/2021 21:00:30     [email or web address]  Job#: [id number]     Doc#: [id number]    CC:

## 2021-07-02 NOTE — ANESTHESIA POSTPROCEDURE EVALUATION
Department of Anesthesiology  Postprocedure Note    Patient: Quita Gilliam  MRN: [id number]  [place]: 1955  Date of evaluation: 7/2/2021  Time:  7:09 PM     Procedure Summary     Date: 07/02/21 Room / Location: [address]    Anesthesia Start: 1352 Anesthesia Stop: 1559    Procedure: 2ND LOOK DEBRIDEMENT BILATERAL BUTTOCK WOUNDS, DRESSING APPLIED TO JOYCELYN GROIN, JOYCELYN THIGH/HIP WOUNDS (N/A ) Diagnosis: (NECROTIC GROIN/HIP WOUNDS)    Surgeons: Alcon Waldron MD Responsible Provider: Maryellen Longoria MD    Anesthesia Type: general ASA Status: 4 - Emergent          Anesthesia Type: general    Shalonda Phase I:      Shalonda Phase II:      Last vitals: Reviewed and per EMR flowsheets.        Anesthesia Post Evaluation    Patient location during evaluation: PACU  Patient participation: complete - patient participated  Level of consciousness: awake and alert  Pain score: 2  Airway patency: patent  Nausea & Vomiting: no nausea and no vomiting  Complications: no  Cardiovascular status: hemodynamically stable  Respiratory status: acceptable  Hydration status: euvolemic

## 2021-07-02 NOTE — PROGRESS NOTES
PROGRESS NOTE          PATIENT NAME: Ronnell Hartley RECORD NO. [id number]  DATE: 7/2/2021  SURGEON: Ted Or  PRIMARY CARE PHYSICIAN: Merle Philippe MD    HD: # 2    ASSESSMENT    Patient Active Problem List   Diagnosis    SDH (subdural hematoma) ([address]    ESRD on dialysis ([address]    DM (diabetes mellitus) ([address]    Anemia of chronic disease    Charcot foot due to diabetes mellitus ([address]    Diabetic ulcer of left foot ([address]    Hypertension, essential    Type 2 diabetes mellitus with foot ulcer ([address]    Noncompliance    CHF (congestive heart failure) (HCC)    Sepsis ([address]    Metabolic acidosis    Azotemia    Hyperkalemia    Diarrhea    Nausea and vomiting    Peripheral vascular disease ([address]    History of left below knee amputation (HCC)    Hemoptysis    Ulcerated, foot, right, with fat layer exposed ([address]    Charcot's joint of foot, right    Pneumonia    Sepsis due to pneumonia ([address]    Loculated pleural effusion    Pleural effusion on right    ROOSEVELT (obstructive sleep apnea)    Parapneumonic effusion    C. difficile colitis    Acute pulmonary edema (HCC)    Volume overload    Non-compliance with renal dialysis ([address]    Acute respiratory failure with hypoxia (HCC)    Moderate malnutrition (HCC)    Infected wound    Necrotizing fasciitis ([address]    Septic shock (HCC)    Elevated digoxin level    Bradycardia       MEDICAL DECISION MAKING AND PLAN    66M admitted for sepsis suspect 2/2 necrotic sacral and inner thigh wounds  -S/p I&D sacral and inner thigh wounds 7/1    · Plan for OR for further debridement today, 7/2  · N.p.o. at midnight  · IVF  · PPx subcutaneous Heparin held  · ABx: Cefepime, Flagyl, Vanco  · Leukocytosis: 20.1 today(21.9, 17.8)  · ESRD, on dialysis M/W/F  · Continue medical management per primary      Chief Complaint: Necrotic sacral and bilateral inner thigh wounds; sepsis     SUBJECTIVE  Patient seen and examined at bedside. No overnight events. Patient remains intubated and sedated. Hemodialysis today. VSS, afebrile, T-max 37.6      OBJECTIVE  VITALS: Temp: Temp: 99.7 °F (37.6 °C)Temp  Av.1 °F (36.7 °C)  Min: 97 °F (36.1 °C)  Max: 99.7 °F (95.0 °C) BP Systolic (73VKY), HXF:742 , Min:77 , AOI:325   Diastolic (28LIQ), DME:76, Min:42, Max:81   Pulse Pulse  Av.6  Min: 49  Max: 69 Resp Resp  Av.7  Min: 0  Max: 25 Pulse ox SpO2  Av.7 %  Min: 97 %  Max: 100 %    CONSTITUTIONAL:  Intubated and sedated, stirs to stimulation  HEENT: Head is normocephalic, atraumatic. EOMI, PERRLA  NECK: Soft, trachea midline and straight  LUNGS: Intubated  CARDIOVASCULAR:  RRR, mild bradycardia off and on throughout the night   ABDOMEN: soft, non-distended, non-tender. Large previously debrided sacral wound with surrounding necrotic tissue and subcutaneous air palpated. Bilateral inner thigh wounds with surrounding necrotic tissue. NEUROLOGIC: Awake, alert, oriented to self and time  EXTREMITIES: L BKA                        I/O last 3 completed shifts: In: 1473.4 [I.V.:1175.4; NG/GT:182; IV Piggyback:116]  Out: 20 [Emesis/NG output:20]    Drain/tube output: In: 1249.4 [I.V.:805.4; NG/GT:328]  Out: 20     LAB:  CBC:   Recent Labs     21  0542 21  0412   WBC 17.8* 21.9* 20.1*   HGB 7.6* 8.2* 8.4*   HCT 27.2* 28.8* 31.1*   .1 99.7 103.3*   * See Reflexed IPF Result See Reflexed IPF Result     BMP:   Recent Labs     21  0542 21  0412   * 134* 135   K 3.6* 3.8 4.6   CL 96* 98 102   CO2 23 20 20   BUN 28* 29* 37*   CREATININE 2.25* 2.54* 3.38*   GLUCOSE 131* 149* 122*     COAGS: No results for input(s): APTT, PROT, INR in the last 72 hours. RADIOLOGY:  XR ABDOMEN (KUB) (SINGLE AP VIEW)    Result Date: 2021  Enteric tube in satisfactory position. Right-sided double pigtail ureteral stent, as above.  Borderline dilated small bowel loops in the mid abdomen, likely ileus although low-grade/partial SBO a consideration. Additional findings, as above. XR CHEST PORTABLE    Result Date: 7/1/2021  1. Mild low lung volumes with bibasilar subsegmental atelectasis and suspected mild right-sided pleural effusion. 2. Scattered bilateral lung airspace disease. 3. Moderate to severe cardiomegaly.        Iker Valentin,   7/1/21, 6:50 AM

## 2021-07-02 NOTE — PLAN OF CARE
Problem: Non-Violent Restraints  Goal: No harm/injury to patient while restraints in use  Outcome: Met This Shift  Goal: Patient's dignity will be maintained  Outcome: Met This Shift     Problem: Pain:  Goal: Pain level will decrease  Description: Pain level will decrease  Outcome: Ongoing  Goal: Control of acute pain  Description: Control of acute pain  Outcome: Ongoing  Goal: Control of chronic pain  Description: Control of chronic pain  Outcome: Ongoing     Problem: Skin Integrity:  Goal: Will show no infection signs and symptoms  Description: Will show no infection signs and symptoms  Outcome: Ongoing  Goal: Absence of new skin breakdown  Description: Absence of new skin breakdown  Outcome: Ongoing  Goal: Skin integrity will be maintained  Outcome: Ongoing     Problem: Falls - Risk of:  Goal: Will remain free from falls  Description: Will remain free from falls  Outcome: Ongoing  Goal: Absence of physical injury  Description: Absence of physical injury  Outcome: Ongoing     Problem: Cardiac:  Goal: Ability to maintain an adequate cardiac output will improve  Description: Ability to maintain an adequate cardiac output will improve  Outcome: Ongoing  Goal: Hemodynamic stability will improve  Description: Hemodynamic stability will improve  Outcome: Ongoing     Problem: Fluid Volume:  Goal: Ability to achieve and maintain adequate urine output will improve  Description: Ability to achieve and maintain adequate urine output will improve  Outcome: Ongoing  Goal: Will show no signs or symptoms of fluid imbalance  Description: Will show no signs or symptoms of fluid imbalance  Outcome: Ongoing     Problem: Respiratory:  Goal: Respiratory status will improve  Description: Respiratory status will improve  Outcome: Ongoing  Goal: Ability to maintain adequate ventilation will improve  Outcome: Ongoing     Problem:  Bowel/Gastric:  Goal: Ability to prevent future episodes of altered bowel function as condition permits will improve  Description: Ability to prevent future episodes of altered bowel function as condition permits will improve  Outcome: Ongoing     Problem: Coping:  Goal: Level of anxiety will decrease  Description: Level of anxiety will decrease  Outcome: Ongoing     Problem: Physical Regulation:  Goal: Complications related to the disease process, condition or treatment will be avoided or minimized  Description: Complications related to the disease process, condition or treatment will be avoided or minimized  Outcome: Ongoing     Problem: Safety:  Goal: Ability to remain free from injury will improve  Outcome: Ongoing     Problem: Sensory:  Goal: Pain level will decrease  Description: Pain level will decrease  Outcome: Ongoing     Problem: Tissue Perfusion:  Goal: Risk of venous thrombosis will decrease  Outcome: Ongoing     Problem: Urinary Elimination:  Goal: Ability to reestablish a normal urinary elimination pattern will improve - after catheter removal  Outcome: Ongoing     Problem: OXYGENATION/RESPIRATORY FUNCTION  Goal: Patient will maintain patent airway  7/2/2021 0602 by Radha Reyes RN  Outcome: Ongoing  7/1/2021 1919 by Gina Melo RCP  Outcome: Ongoing  Goal: Patient will achieve/maintain normal respiratory rate/effort  Description: Respiratory rate and effort will be within normal limits for the patient  7/2/2021 0602 by Radha Reyes RN  Outcome: Ongoing  7/1/2021 1919 by Gina Melo RCP  Outcome: Ongoing     Problem: MECHANICAL VENTILATION  Goal: Patient will maintain patent airway  7/2/2021 0602 by Radha Reyes RN  Outcome: Ongoing  7/1/2021 1919 by Gina Melo RCP  Outcome: Ongoing  Goal: Oral health is maintained or improved  7/2/2021 0602 by Radha Reyes RN  Outcome: Ongoing  7/1/2021 1919 by Nomi Hunt RCP  Outcome: Ongoing  Goal: ET tube will be managed safely  7/2/2021 0602 by Radha Reyes RN  Outcome: Ongoing  7/1/2021 1919 by Gina Melo RCP  Outcome: Ongoing  Goal: Ability to express needs and understand communication  7/2/2021 0602 by Maddison Mishra RN  Outcome: Ongoing  7/1/2021 1919 by Aki Melo RCP  Outcome: Ongoing  Goal: Mobility/activity is maintained at optimum level for patient  7/2/2021 0602 by Maddison Mishra RN  Outcome: Ongoing  7/1/2021 1919 by Aki Melo RCP  Outcome: Ongoing     Problem: SKIN INTEGRITY  Goal: Skin integrity is maintained or improved  7/2/2021 0602 by Maddison Mishra RN  Outcome: Ongoing  7/1/2021 1919 by Aki Melo RCP  Outcome: Ongoing     Problem: Confusion - Acute:  Goal: Absence of continued neurological deterioration signs and symptoms  Description: Absence of continued neurological deterioration signs and symptoms  Outcome: Ongoing  Goal: Mental status will be restored to baseline  Description: Mental status will be restored to baseline  Outcome: Ongoing     Problem: Discharge Planning:  Goal: Ability to perform activities of daily living will improve  Description: Ability to perform activities of daily living will improve  Outcome: Ongoing  Goal: Participates in care planning  Description: Participates in care planning  Outcome: Ongoing     Problem: Injury - Risk of, Physical Injury:  Goal: Will remain free from falls  Description: Will remain free from falls  Outcome: Ongoing  Goal: Absence of physical injury  Description: Absence of physical injury  Outcome: Ongoing     Problem: Mood - Altered:  Goal: Mood stable  Description: Mood stable  Outcome: Ongoing  Goal: Absence of abusive behavior  Description: Absence of abusive behavior  Outcome: Ongoing  Goal: Verbalizations of feeling emotionally comfortable while being cared for will increase  Description: Verbalizations of feeling emotionally comfortable while being cared for will increase  Outcome: Ongoing     Problem: Psychomotor Activity - Altered:  Goal: Absence of psychomotor disturbance signs and symptoms  Description: Absence of psychomotor disturbance signs and symptoms  Outcome: Ongoing     Problem: Sensory Perception - Impaired:  Goal: Demonstrations of improved sensory functioning will increase  Description: Demonstrations of improved sensory functioning will increase  Outcome: Ongoing  Goal: Decrease in sensory misperception frequency  Description: Decrease in sensory misperception frequency  Outcome: Ongoing  Goal: Able to refrain from responding to false sensory perceptions  Description: Able to refrain from responding to false sensory perceptions  Outcome: Ongoing  Goal: Demonstrates accurate environmental perceptions  Description: Demonstrates accurate environmental perceptions  Outcome: Ongoing  Goal: Able to distinguish between reality-based and nonreality-based thinking  Description: Able to distinguish between reality-based and nonreality-based thinking  Outcome: Ongoing  Goal: Able to interrupt nonreality-based thinking  Description: Able to interrupt nonreality-based thinking  Outcome: Ongoing     Problem: Sleep Pattern Disturbance:  Goal: Appears well-rested  Description: Appears well-rested  Outcome: Ongoing   Preethi Elder, ZAYRA

## 2021-07-02 NOTE — BRIEF OP NOTE
Brief Postoperative Note      Patient: Becky Griffith  YOB: 1955  MRN: [id number]    Date of Procedure: 7/2/2021    Pre-Op Diagnosis: NECROTIC GROIN/HIP WOUNDS    Post-Op Diagnosis: Same       Procedure(s):  2ND LOOK DEBRIDEMENT BILATERAL BUTTOCK WOUNDS, DRESSING APPLIED TO JOYCELYN GROIN, JOYCELYN THIGH/HIP WOUNDS    Surgeon(s):  Raoul Alfonso MD    Assistant:  Resident: Natalie Corea DO    Anesthesia: General    Estimated Blood Loss (mL): Minimal    Complications: None    Specimens:   * No specimens in log *    Implants:  * No implants in log *      Drains:   Negative Pressure Wound Therapy Buttocks (Active)       NG/OG/NJ/NE Tube Orogastric Center mouth (Active)   Surrounding Skin Intact 07/02/21 1200   Securement device Yes 07/02/21 1200   Status Clamped 07/02/21 1200   Placement Verified by Respiratory Status;by External Catheter Length 07/02/21 1200   NG/OG/NJ/NE External Measurement (cm) 75 cm 07/02/21 1200   Tube Feeding Status Stopped 07/02/21 0400   Rate/Schedule 0 mL/hr 07/02/21 0000   Tube Feeding Intake (mL) 146 ml 07/02/21 0000   Residual Volume (ml) 30 ml 07/01/21 2000   Output (mL) 10 ml 07/01/21 1600       Urethral Catheter Straight-tip (Active)   Catheter Indications Need for fluid volume management of the critically ill patient in a critical care setting 07/02/21 1200   Site Assessment [place] 07/02/21 1200   Urine Color Yellow 07/02/21 1200   Urine Appearance Cloudy 07/02/21 1200       Hemodialysis Arteriovenous fistula Left Forearm (Active)   Site Assessment Clean 07/02/21 0400   Thrill Present 07/02/21 0400   Bruit Present 07/02/21 0400   Dressing Status Other (Comment) 07/02/21 0400       Findings: mostly healthy subcutaneous tissue; some debridement    Electronically signed by Natalie Corea DO on 7/2/2021 at 4:15 PM

## 2021-07-02 NOTE — PROGRESS NOTES
Comprehensive Nutrition Assessment    Type and Reason for Visit:  Reassess    Nutrition Recommendations/Plan: Restart TF as able; Renal formula with goal rate of 50 mL/hr. Will continue to follow/monitor care plans. Nutrition Assessment:  Pt remains on vent. Renal TF on hold for surgery today- noted pt was tolerating well at 30 mL/hr prior to hold (goal =50 mL/hr). Meds/Labs reviewed.     Malnutrition Assessment:  Malnutrition Status:  Insufficient data    Context:  Chronic Illness     Findings of the 6 clinical characteristics of malnutrition:  Energy Intake:  Unable to assess  Weight Loss:  Unable to assess     Body Fat Loss:  Unable to assess     Muscle Mass Loss:  1 - Mild muscle mass loss Clavicles (pectoralis & deltoids), Scapula (trapezius)  Fluid Accumulation:  No significant fluid accumulation     Strength:  Not Performed    Estimated Daily Nutrient Needs:  Energy (kcal):  9531-1108 kcal/day; Weight Used for Energy Requirements:  Current     Protein (g):  120 g pro/day; Weight Used for Protein Requirements:  Current (1.5)        Fluid (ml/day):  2000 mL/day; Method Used for Fluid Requirements:  ml/Kg (25)      Nutrition Related Findings:  meds/labs reviewed      Wounds:  Multiple (including necrotic sacral and bilateral inner thigh wounds)       Current Nutrition Therapies:    Diet NPO  Current Tube Feeding (TF) Orders:  · Feeding Route: Orogastric  · Formula: Renal Formula  · Schedule: Continuous currently on hold for surgery   · Current TF & Flush Orders Provides: currently on hold  · Goal TF & Flush Orders Provides: 50 mL/hr =2160 kcal and 97 g pro/day    Additional Calorie Sources:   none    Anthropometric Measures:  · Height: 6' (182.9 cm)  · Current Body Weight: 170 lb 13.7 oz (77.5 kg)   · Admission Body Weight: 174 lb (78.9 kg)    · Ideal Body Weight: 178 lbs  · BMI: 23.2  · Adjusted Ideal Body Weight: 168 lb (76.3 kg)     · Adjusted BMI: 25    · BMI Categories:  Overweight Nutrition Diagnosis:   · Inadequate oral intake related to impaired respiratory function as evidenced by NPO or clear liquid status due to medical condition, nutrition support - enteral nutrition    · Increased nutrient needs related to healing as evidenced by  multiple wounds    Nutrition Interventions:   Food and/or Nutrient Delivery: Restart TF as able; Renal formula with goal rate of 50 mL/hr. Nutrition Education/Counseling:  No recommendation at this time   Coordination of Nutrition Care:  Continue to monitor while inpatient    Goals:  meet % of estimated nutrient needs with enteral nutrition support -progressing towards goal    Nutrition Monitoring and Evaluation:   Behavioral-Environmental Outcomes:  None Identified   Food/Nutrient Intake Outcomes:  Enteral Nutrition Intake/Tolerance  Physical Signs/Symptoms Outcomes:  Biochemical Data, Nutrition Focused Physical Findings, Skin, Weight, Fluid Status or Edema     Discharge Planning:     Too soon to determine     Electronically signed by Sanket Fitzgerald RD, LD on 7/2/21 at 10:12 AM EDT    Contact: [phone]

## 2021-07-02 NOTE — PROGRESS NOTES
Dialysis Time Out  To be done by RN and tech or 2 RNs  Staff Names Tray Olsen RN / [facility] CARL [place], SICU/Floor RN  [x]  Identity of the patient using 2 patient identifiers  [x]  Consent for treatment  [x]  Equipment-proper machine and dialyzer  [x]  B-Hep B status  [x]  Orders- to include bath, blood flow, dialyzer, time and fluid removal  [x]  Access-Correct site and in working order  [x]  Time for patient to ask questions.

## 2021-07-02 NOTE — PLAN OF CARE
Problem: Respiratory:  Goal: Respiratory status will improve  Description: Respiratory status will improve  7/2/2021 0735 by Angelica Bardales RCP  Outcome: Ongoing  7/2/2021 0602 by Basia Penn RN  Outcome: Ongoing  Goal: Ability to maintain adequate ventilation will improve  7/2/2021 0735 by Angelica Bardales RCP  Outcome: Ongoing  7/2/2021 0602 by Basia Penn RN  Outcome: Ongoing     Problem: OXYGENATION/RESPIRATORY FUNCTION  Goal: Patient will maintain patent airway  7/2/2021 0602 by Basia Penn RN  Outcome: Ongoing  7/1/2021 1919 by Munir Melo RCP  Outcome: Ongoing  Goal: Patient will achieve/maintain normal respiratory rate/effort  Description: Respiratory rate and effort will be within normal limits for the patient  7/2/2021 0602 by Basia Penn RN  Outcome: Ongoing  7/1/2021 1919 by Demetrice Martin RCP  Outcome: Ongoing     Problem: MECHANICAL VENTILATION  Goal: Patient will maintain patent airway  7/2/2021 0735 by Angelica Bardales RCP  Outcome: Ongoing  7/2/2021 0602 by Basia Penn RN  Outcome: Ongoing  7/1/2021 1919 by Demetrice Martin RCP  Outcome: Ongoing  Goal: Oral health is maintained or improved  7/2/2021 0735 by Angelica Bardales RCP  Outcome: Ongoing  7/2/2021 0602 by Basia Penn RN  Outcome: Ongoing  7/1/2021 1919 by Demetrice Martin RCP  Outcome: Ongoing  Goal: ET tube will be managed safely  7/2/2021 0735 by Angelica Bardales RCP  Outcome: Ongoing  7/2/2021 0602 by Basia Penn RN  Outcome: Ongoing  7/1/2021 1919 by Demetrice Martin RCP  Outcome: Ongoing  Goal: Ability to express needs and understand communication  7/2/2021 0735 by Angelica Bardales RCP  Outcome: Ongoing  7/2/2021 0602 by Basia Penn RN  Outcome: Ongoing  7/1/2021 1919 by Demetrice Martin RCP  Outcome: Ongoing  Goal: Mobility/activity is maintained at optimum level for patient  7/2/2021 0735 by Angelica Bardales RCP  Outcome: Ongoing  7/2/2021 0602 by Basia Penn RN  Outcome: Ongoing  7/1/2021 1919 by Munir Collins NAS Melo  Outcome: Ongoing

## 2021-07-02 NOTE — PROGRESS NOTES
Dialysis Post Treatment Note  Vitals:    07/02/21 1245   BP: 125/60   Pulse: 64   Resp: 13   Temp:    SpO2: 99%     Pre-Weight = 77.5 kg  Post-weight = Weight: 164 lb 3.9 oz (74.5 kg)  Total Liters Processed = Total Liters Processed (l/min): 81.9 l/min  Rinseback Volume (mL) = Rinseback Volume (ml): 300 ml  Net Removal (mL) = NET Removed (ml): 1000 ml  Patient's dry weight=?   Type of access used= L AVF  Length of treatment=3.5 hours    Pt had HD tx, Albumin x 1 given & Levo adjustments done by Floor RN for bp support, Vancomycin given during tx, see MAR, pt visited by relative

## 2021-07-02 NOTE — ANESTHESIA POSTPROCEDURE EVALUATION
Department of Anesthesiology  Postprocedure Note    Patient: Luisa Lewis  MRN: [id number]  YOB: 1955  Date of evaluation: 7/2/2021  Time:  7:17 AM     Procedure Summary     Date: 07/01/21 Room / Location: [address]    Anesthesia Start: 0128 Anesthesia Stop: 0999    Procedure: EXTENSIVE DEBRIDEMENT OF SUBCUTANEOUS TISSUE AND MUSCLE, BILATERAL HIPS AND BILATERAL GROIN (N/A ) Diagnosis: (Portia Martinez)    Surgeons: Teofilo Reyez MD Responsible Provider: Edu Kong MD    Anesthesia Type: general ASA Status: 4 - Emergent          Anesthesia Type: general    Shalonda Phase I:      Shalonda Phase II:      Last vitals: Reviewed and per EMR flowsheets.        Anesthesia Post Evaluation    Patient location during evaluation: PACU  Patient participation: complete - patient participated  Level of consciousness: awake and alert  Airway patency: patent  Nausea & Vomiting: no nausea and no vomiting  Complications: no  Cardiovascular status: blood pressure returned to baseline  Respiratory status: acceptable  Hydration status: euvolemic

## 2021-07-02 NOTE — PROGRESS NOTES
Renal Progress Note    Patient :  John Kerr; 73 y.o. MRN# [id number]  Location:    Attending:  Eduardo Mccarthy DO  Admit Date:  2021   Hospital Day: 2    Subjective:       Patient was seen and examined on HD. Tolerating the procedure well. No acute events overnight. Intubated and on ventilator support PRVC mode. Sedated on fentanyl drip. On pressor support with 2 of Levophed. Patient is receiving albumin with hemodialysis for hypotension. Dopamine stopped. Heart rate in 50s. Undergoing hemodialysis today. Patient to undergo debridement today per general surgery    Current Medications:     Scheduled Meds:    vancomycin (VANCOCIN) intermittent dosing (placeholder)   Other RX Placeholder    vancomycin  750 mg Intravenous Once per day on     chlorhexidine  15 mL Mouth/Throat BID    midodrine  5 mg Oral TID WC    fentanNYL  50 mcg Intravenous Once    sodium chloride flush  5-40 mL Intravenous 2 times per day    [Held by provider] heparin (porcine)  5,000 Units Subcutaneous 3 times per day    insulin lispro  0-6 Units Subcutaneous TID WC    insulin lispro  0-3 Units Subcutaneous Nightly    cefepime  1,000 mg Intravenous Q24H    metroNIDAZOLE  500 mg Intravenous Q8H     Continuous Infusions:    fentaNYL 125 mcg/hr (21 0803)    norepinephrine 4 mcg/min (21 0849)    sodium chloride       PRN Meds:  midodrine, albumin human, sodium chloride flush, sodium chloride, ondansetron **OR** ondansetron, polyethylene glycol, acetaminophen **OR** acetaminophen     Input/Output:       I/O last 3 completed shifts:   In: 1249.4 [I.V.:805.4; NG/GT:328; IV Piggyback:116]  Out: 20 [Emesis/NG output:20]    Vital Signs:   Temperature:  Temp: 99.7 °F (37.6 °C)  TMax:   Temp (24hrs), Av.2 °F (36.8 °C), Min:97 °F (36.1 °C), Max:99.7 °F (37.6 °C)    Respirations:  Resp: 16  Pulse:   Pulse: 57  BP:    BP: (!) 116/58  BP Range: Systolic (22NAQ), CCS:408 , Min:77 , Max:127 Diastolic (00EEB), YWU:07, Min:42, Max:81      Physical Examination:     General:  Intubated with ventilator support. PRVC mode. Not following commands. HEENT:  Atraumatic, normocephalic, no throat congestion, moist mucosa. Eyes:   Pupils equal, round and reactive to light, pallor, no icterus. Neck:   No JVD, no thyromegaly, no lymphadenopathy. Chest:   Air entry fair bilaterally, basilar crackles present. Cardiac: S1 S2 RRR  Abdomen:  Soft, non-tender, no masses or organomegally appreciable. :   No suprapubic or flank tenderness. Neuro:  Sedated on ventilator. Skin:   No rashes, good skin turgor. Extremities:  No edema left lower extremity amputation, right lower extremity foot wound present. Groin wounds present.     Labs:       Recent Labs     06/30/21 2330 07/01/21 0542 07/02/21 0412   WBC 17.8* 21.9* 20.1*   RBC 2.69* 2.89* 3.01*   HGB 7.6* 8.2* 8.4*   HCT 27.2* 28.8* 31.1*   .1 99.7 103.3*   MCH 28.3 28.4 27.9   MCHC 27.9* 28.5 27.0*   RDW 19.2* 19.1* 19.4*   * See Reflexed IPF Result See Reflexed IPF Result   MPV 12.2 NOT REPORTED NOT REPORTED      BMP:   Recent Labs     06/30/21 2330 07/01/21 0542 07/02/21 0412   * 134* 135   K 3.6* 3.8 4.6   CL 96* 98 102   CO2 23 20 20   BUN 28* 29* 37*   CREATININE 2.25* 2.54* 3.38*   GLUCOSE 131* 149* 122*   CALCIUM 9.4 9.8 9.6      Phosphorus:     Recent Labs     06/30/21 0411   PHOS 3.5     SPEP:  Lab Results   Component Value Date    PROT 7.8 06/07/2021     Uep BsAg:         Lab Results   Component Value Date    HEPBSAG NONREACTIVE 01/09/2012     Urinalysis/Chemistries:      Lab Results   Component Value Date    NITRU NEGATIVE 06/22/2013    COLORU YELLOW 06/22/2013    PHUR 7.0 06/22/2013    WBCUA 2 TO 5 06/22/2013    RBCUA 0 TO 2 06/22/2013    MUCUS NOT REPORTED 06/22/2013    TRICHOMONAS NOT REPORTED 06/22/2013    YEAST NOT REPORTED 06/22/2013    BACTERIA NOT REPORTED 06/22/2013    SPECGRAV 1.014 06/22/2013    LEUKOCYTESUR NEGATIVE 06/22/2013    UROBILINOGEN Normal 06/22/2013    BILIRUBINUR NEGATIVE 06/22/2013    BILIRUBINUR NEGATIVE  Verified by ictotest. 10/20/2011    GLUCOSEU 1+ 06/22/2013    GLUCOSEU TRACE 10/20/2011    [address] NEGATIVE 06/22/2013    AMORPHOUS NOT REPORTED 06/22/2013       Radiology:     CXR:   1. Mild low lung volumes with bibasilar subsegmental atelectasis and   suspected mild right-sided pleural effusion. 2. Scattered bilateral lung airspace disease. 3. Moderate to severe cardiomegaly. Assessment:     1. ESRD on hemodialysis. Regular hemodialysis days 4 times a week are checked below, left AV fistula. Under Dr. Palma Licona group. Weight on admission 79 kg  2. Acute hypercapnic respiratory failure requiring mechanical ventilation  3. Sepsis secondary to necrotic wounds left thigh, sacral region and buttock region. 4.  Atrial fibrillation  5. Bradycardia  6. Digoxin toxicity  7. Ischemic cardiomyopathy with EF 25% and grade 3 diastolic dysfunction (echo 6/30/2021)  8. Type 2 diabetes mellitus  9. Essential hypertension  11. Ileus  12. Anemia of chronic disease  13. Secondary hyperparathyroidism    Plan:   1. Patient was seen and examined on HD at bedside. Orders were confirmed with the HD nurse. 2.  Strict intake and output. Daily weights. 3. Low potassium, low phosphorus and low sodium diet. Fluid restricted 1.5 L/day  4. IV iron for anemia of chronic disease with hemodialysis weekly  5. IV Zemplar per protocol for secondary hyperparathyroidism with hemodialysis thrice a week  6. Plan for wound debridement today per general surgery  7. Midodrine 10 mg 3 times daily. Continue pressor support with Levophed  8. Antibiotics as per ESRD dosing per infectious disease  9. Digoxin toxicity treatment per cardiology  10. BMP in a.m.  11.  We will follow    Nutrition   Please ensure that patient is on a renal diet/TF. Avoid nephrotoxic drugs/contrast exposure.     We will continue to follow along with you. Mira Cogan MD  PGY-3, Internal Medicine Resident  [facility] [place]  7/2/2021 9:11 AM      Attending Physician Statement  I have discussed the care of this patient, including pertinent history and exam findings, with the Resident/CNP. I have reviewed and edited the key elements of all parts of the encounter with the Resident/CNP. I agree with the assessment, plan and orders as documented by the Resident/CNP. Kyle Kern MD   Nephrology [address] [place] [address] Drive    This note is created with the assistance of a speech-recognition program. While intending to generate a document that actually reflects the content of the visit, no guarantees can be provided that every mistake has been identified and corrected by editing.

## 2021-07-02 NOTE — PLAN OF CARE
Problem: OXYGENATION/RESPIRATORY FUNCTION  Goal: Patient will maintain patent airway  7/2/2021 1940 by Higinio Johnson RCP  Outcome: Ongoing     Problem: OXYGENATION/RESPIRATORY FUNCTION  Goal: Patient will achieve/maintain normal respiratory rate/effort  Description: Respiratory rate and effort will be within normal limits for the patient  7/2/2021 1940 by Higinio Johnson RCP  Outcome: Ongoing     Problem: MECHANICAL VENTILATION  Goal: Patient will maintain patent airway  7/2/2021 1940 by Higinio Johnson RCP  Outcome: Ongoing     Problem: MECHANICAL VENTILATION  Goal: Oral health is maintained or improved  7/2/2021 1940 by Higinio Johnson RCP  Outcome: Ongoing     Problem: MECHANICAL VENTILATION  Goal: ET tube will be managed safely  7/2/2021 1940 by Higinio Johnson RCP  Outcome: Ongoing     Problem: MECHANICAL VENTILATION  Goal: Ability to express needs and understand communication  7/2/2021 1940 by Higinio Johnson RCP  Outcome: Ongoing     Problem: MECHANICAL VENTILATION  Goal: Mobility/activity is maintained at optimum level for patient  7/2/2021 1940 by Higinio Johnson RCP  Outcome: Ongoing     Problem: SKIN INTEGRITY  Goal: Skin integrity is maintained or improved  7/2/2021 1940 by Higinio Johnson RCP  Outcome: Ongoing

## 2021-07-02 NOTE — PLAN OF CARE
Nutrition Problem #1: Inadequate oral intake  Intervention: Food and/or Nutrient Delivery:  (Restart TF as able; Renal formula with goal rate of 50 mL/hr.)  Nutritional Goals: meet % of estimated nutrient needs with enteral nutrition support

## 2021-07-02 NOTE — CARE COORDINATION
Transitional planning. LM with pt's daughter to call CM back. Per RN, pt is intubated/ sedated, OR today for debridement of B Buttocks/ Hip wounds. Wound VAC applied today    Sienna with [facility] called, pt came from there, pt will need pre-cert and HD approval prior to returning. She will continue to follow pt and start pre-cert Tuesday if appropriate.

## 2021-07-02 NOTE — PROGRESS NOTES
[facility]   Progress Note                   Date:   7/2/2021  Patient name: Nick Gorman  Date of admission:  6/30/2021 10:51 PM  MRN:   [id number]  YOB: 1955  PCP: Coby Leal MD    Reason for Admission: Septic shock from the necrotizing wound    Subjective:       No issues overnight. On levophed. Vitals. HR in 70s. Medications:   Scheduled Meds:   vancomycin (VANCOCIN) intermittent dosing (placeholder)   Other RX Placeholder    vancomycin  750 mg Intravenous Once per day on Mon Wed Fri    chlorhexidine  15 mL Mouth/Throat BID    midodrine  5 mg Oral TID WC    fentanNYL  50 mcg Intravenous Once    sodium chloride flush  5-40 mL Intravenous 2 times per day    heparin (porcine)  5,000 Units Subcutaneous 3 times per day    insulin lispro  0-6 Units Subcutaneous TID WC    insulin lispro  0-3 Units Subcutaneous Nightly    cefepime  1,000 mg Intravenous Q24H    metroNIDAZOLE  500 mg Intravenous Q8H     Continuous Infusions:   fentaNYL 125 mcg/hr (07/01/21 2716)    norepinephrine 4 mcg/min (07/01/21 2314)    sodium chloride       CBC:   Recent Labs     06/30/21  2330 07/01/21  0542 07/02/21  0412   WBC 17.8* 21.9* 20.1*   HGB 7.6* 8.2* 8.4*   * See Reflexed IPF Result See Reflexed IPF Result     BMP:    Recent Labs     06/30/21  2330 07/01/21  0542 07/02/21  0412   * 134* 135   K 3.6* 3.8 4.6   CL 96* 98 102   CO2 23 20 20   BUN 28* 29* 37*   CREATININE 2.25* 2.54* 3.38*   GLUCOSE 131* 149* 122*     Objective:   Vitals: /62   Pulse 59   Temp 99.7 °F (37.6 °C)   Resp 15   Ht 6' (1.829 m)   Wt 174 lb 2.6 oz (79 kg)   SpO2 100%   BMI 23.62 kg/m²   General appearance: Intubated and sedated  HEENT: Head: Normocephalic, no lesions, without obvious abnormality.   Neck: no adenopathy, no carotid bruit, no JVD, supple, symmetrical, trachea midline and thyroid not enlarged, symmetric, no tenderness/mass/nodules  Lungs: clear to auscultation bilaterally  Heart: regular rate and rhythm, S1, S2 normal, no murmur, click, rub or gallop  Abdomen: soft, bowel sounds positive. No organomegaly. Extremities: Left BKA. Right lower extremity chronic skin changes. Neurologic: Unable to assess as patient is intubated and sedated. Other Current Problems:   Sinus bradycardia likely secondary to septic shock further exacerbated by dig toxicity. Resolved  Dig toxicity s/p Digibind. HR stable. Sepsis with shock from necrotizing fasciitis. Improving  Necrotizing fasciitis s/p I&D   H/O PAT s/p CV on 5/5/2021  MV CAD s/p CABG with subsequent PCI to LCx in 2019 (only patent LIMALAD)  ICMP, EF persistently low 25% on Echo 6/30/21  ESRD on hemodialysis. Severe PAD s/p L BKA    Assessment and Plan:   Follow-up on digoxin level. If going up then will give another dose of Digibind. Continue to hold amio and BB. Stop digoxin upon discharge. Continue with as needed atropine and dopamine drip. Will discuss option of life-Vest as OP. We will follow from far. Call us in case of any question and concern. Plan discussed with Dr Leatha Orellana MD.  Internal Medicine Resident PGY [address]   7/2/2021, 6:00 AM             Attestation signed by      Attending Physician Statement:    I have discussed the care of  Epifanio Shine , including pertinent history and exam findings, with the Cardiology fellow/resident. I have seen and examined the patient and the key elements of all parts of the encounter have been performed by me. I agree with the assessment, plan and orders as documented by the fellow/resident, after I modified exam findings and plan of treatments, and the final version is my approved version of the assessment.      Additional Comments:

## 2021-07-02 NOTE — PROGRESS NOTES
hemodialysis, hypertension, obstructive sleep apnea, diabetes mellitus, peripheral arterial disease status post left BKA.     The patient was admitted to [facility] on 2021 with altered mental status at the nursing home, increased pain in the bilateral thighs where he has wounds, and the patient was found to be tachypneic using accessory muscles of breathing as well as some mild hypotension. The patient initially required Levophed but was able to wean off the pressors. The patient was started on cefepime and vancomycin in the emergency department transferred to the floor where he was seen by the general surgery team and the patient had necrotic wounds in the in the medial thigh right flank and right buttock region with foul-smelling drainage. An excisional debridement was done 2021 with removal of necrotic tissue in the right posterior buttock, flank region and bilateral inner thighs down to the fascia.     The patient was seen by Dr. Cathie Us from infectious disease yesterday and metronidazole was added to the antibiotic regimen.     There was a need for further debridement and the patient was transferred here to [place] [address] as there was concern for necrotizing soft tissue infection and the patient was taken to the operating room where he had incision and drainage and necrotic tissue in the posterior hips and groin bilaterally and the patient was found to have necrotic tissue.     I was asked to evaluate and help with antibiotic choice and the patient currently remains intubated sedated on fentanyl and is also receiving dopamine.     Current evaluation:2021    BP (!) 103/52   Pulse 55   Temp 98.4 °F (36.9 °C)   Resp 16   Ht 6' (1.829 m)   Wt 164 lb 3.9 oz (74.5 kg)   SpO2 100%   BMI 22.28 kg/m²     Temperature Range: Temp: 98.4 °F (36.9 °C) Temp  Av.8 °F (37.1 °C)  Min: 97.7 °F (36.5 °C)  Max: 99.7 °F (37.6 °C)  The patient is seen and evaluated at bedside he is intubated sedated on the ventilator at 35% FiO2 and five of PEEP. The patient is on Levophed for blood pressure support. The patient did get taken to the operating room today and had a second look debridement of bilateral buttock wounds, dressing was applied to the groin, and bilateral hip and thigh wounds. Review of Systems    Physical Examination :     Physical Exam  Constitutional:       Interventions: He is intubated. HENT:      Head: Normocephalic and atraumatic. Cardiovascular:      Rate and Rhythm: Normal rate. Heart sounds: Normal heart sounds. No friction rub. No gallop. Pulmonary:      Effort: Pulmonary effort is normal. He is intubated. Breath sounds: Normal breath sounds. No wheezing. Abdominal:      General: Bowel sounds are normal.      Palpations: Abdomen is soft. There is no mass. Tenderness: There is no abdominal tenderness. Skin:     Comments: The patient has wound vacs in the bilateral gluteal areas. There are dressings in the bilateral groins. There is some hyperpigmented/dry gangrenous changes in the medial thighs bilaterally   Neurological:      Mental Status: He is oriented to person, place, and time. Laboratory data:   I have independently reviewed the followinglabs:  CBC with Differential:   Recent Labs     07/01/21  0542 07/02/21  0412   WBC 21.9* 20.1*   HGB 8.2* 8.4*   HCT 28.8* 31.1*   PLT See Reflexed IPF Result See Reflexed IPF Result   LYMPHOPCT 2* 4*   MONOPCT 4 7     BMP:   Recent Labs     07/01/21  0542 07/02/21  0412   * 135   K 3.8 4.6   CL 98 102   CO2 20 20   BUN 29* 37*   CREATININE 2.54* 3.38*     Hepatic Function Panel: No results for input(s): PROT, LABALBU, BILIDIR, IBILI, BILITOT, ALKPHOS, ALT, AST in the last 72 hours.       Lab Results   Component Value Date    PROCAL 4.55 05/07/2021    PROCAL 25.70 04/30/2021     Lab Results   Component Value Date    .2 07/01/2021    .8 06/29/2021    .7 04/28/2021 Lab Results   Component Value Date    SEDRATE 33 (H) 04/28/2021         No results found for: DDIMER  Lab Results   Component Value Date    FERRITIN 2,624 05/07/2021    FERRITIN 1,806 06/05/2019    FERRITIN 2,516 03/05/2016    FERRITIN 532 01/09/2012    FERRITIN 1,007 12/07/2011     Lab Results   Component Value Date     05/13/2021     05/10/2021     05/07/2021     04/29/2021     06/05/2019     Lab Results   Component Value Date    FIBRINOGEN 371 05/13/2021       Results in Past 30 Days  Result Component Current Result Ref Range Previous Result Ref Range   SARS-CoV-2, Rapid Not Detected (6/7/2021) Not Detected Not in Time Range      Lab Results   Component Value Date    COVID19 Not Detected 06/07/2021    COVID19 Not Detected 04/29/2021       No results for input(s): VANCOTROUGH in the last 72 hours. Imaging Studies:   No new imaging    Cultures:     Procedure Component Value Units Date/Time   MRSA DNA Probe, Nasal [[id number]] Collected: 07/01/21 0755   Order Status: Completed Specimen: Nasal Updated: 07/01/21 1123    Specimen Description . NASAL SWAB    MRSA, DNA, Nasal NEGATIVE:  MRSA DNA not detected by nucleic acid amplification. Comment:                                                    Results should be used as an adjunct to nosocomial control efforts to identify patients   needing enhanced precautions.     The test is not intended to identify patients with staphylococcal infections.  Results   should not be used to guide or monitor treatment for MRSA infections.          BLOOD  GRN 10ML ORG 7ML RH    Special Requests 06/29/2021  8:41  [facility] [address] [facility]   NOT REPORTED    Culture 06/29/2021  8:41  [address]   NO GROWTH 2 DAYS      BLOOD  GRN 10ML ORG 9 ML RH    Special Requests 06/29/2021  8:27  [facility] [facility]   NOT REPORTED    Culture 06/29/2021  8:27  [address]   NO GROWTH 2 DAYS      HIP RIGHT Special Requests 06/29/2021  8:09  [address] [facility]   NOT REPORTED    Direct Exam 06/29/2021  8:09  [address]   NO NEUTROPHILS SEEN    Direct Exam Abnormal  06/29/2021  8:09 PM [address] NEGATIVE RODS    Culture Abnormal  06/29/2021  8:09  [address]   NON LACTOSE FERMENTING GRAM NEGATIVE RODS MODERATE GROWTH    Culture Abnormal  06/29/2021  8:09 PM [address] NEGATIVE RODS LIGHT GROWTH    Culture 06/29/2021  8:09  [address]   PRESUMPTIVE ID: GROUP D ENTEROCOCCUS MODERATE GROWTH    Culture Abnormal  06/29/2021  8:09  [address]   STAPHYLOCOCCUS AUREUS LIGHT GROWTH    Culture 06/29/2021  8:09  [address]    Culture Abnormal  06/29/2021  8:09  [address]   This is a mixed culture of organisms, which may represent colonization or contamination.  Notify the laboratory within 7 days for further workup.  Susceptibilities have not been performed.        Medications:      Sodium Hypochlorite   Irrigation Once    vancomycin (VANCOCIN) intermittent dosing (placeholder)   Other RX Placeholder    vancomycin  750 mg Intravenous Once per day on Mon Wed Fri    chlorhexidine  15 mL Mouth/Throat BID    midodrine  5 mg Oral TID WC    fentanNYL  50 mcg Intravenous Once    sodium chloride flush  5-40 mL Intravenous 2 times per day    [Held by provider] heparin (porcine)  5,000 Units Subcutaneous 3 times per day    insulin lispro  0-6 Units Subcutaneous TID WC    insulin lispro  0-3 Units Subcutaneous Nightly    cefepime  1,000 mg Intravenous Q24H    metroNIDAZOLE  500 mg Intravenous Q8H           [facility]  Ludivina Lezama MD  Perfect Serve messaging  OFFICE: [phone]      Electronically signed by Ludivina Lezama MD on 7/2/2021 at 5:27 PM  Thank you for allowing us to participate in the care of this patient. Please call with questions. This note iscreated with the assistance of a speech recognition program.  While intending to generate a document that actually reflects the content of the visit, the document can still have some errors including those of syntax andsound a like substitutions which may escape proof reading. In such instances, actual meaning can be extrapolated by contextual diversion.

## 2021-07-02 NOTE — PROGRESS NOTES
INTENSIVE CARE UNIT  Resident Physician Progress Note    Patient - Meryle More  Date of Admission -  6/30/2021 10:51 PM  Date of Evaluation -  7/2/2021  Room and Bed Number -  0101/0101-01   Hospital Day - 2      SUBJECTIVE:     OVERNIGHT EVENTS: No acute events overnight. Patient was initially started on dopamine drip because of his bradycardia, was switched to Levophed as per general surgery       TODAY:     Remains intubated, follows commands. Vitals BP 90/48, MAP 62 currently on 2 mics of Levophed. Sedated with fentanyl 125 MCG/hour. Vent settings PRVC RR 16, , PEEP 5, 30% FiO2. Last ABG showed pH 7.390, CO2 43.3, O2 85.6  Patient is ESRD on dialysis, minimal urine output. Pertinent labs blood sugar 120s  Lactic acid 1.6-> 2.3  -> 251  Digoxin level 2.9 this morning  WBC 21.4-> 20.1  Hb 8.4    Plan for further debridement today in OR as per general surgery recommendations. Hemodialysis likely today as per nephrology recommendations. Continue IV cefepime, vancomycin and Flagyl as per ID recommendations. Cultures so far growing nonlactose fermenting gram-negative rods, lactose fermenting gram-negative rods, Enterococcus. Brief history    Meryle More is a 73 y.o. male with history of ESRD on hemodialysis, systolic CHF last echo done on 6/19/2020 showed EF of 25 to 30%, A. fib, multivessel CAD s/p CABG and subsequent DENY to LCx, peripheral artery disease with history of left BKA, type 2 diabetes mellitus initially was admitted at [facility] for worsening mentation and bilateral necrotizing wounds. Patient stays at a nursing home.     In the ER, patient was afebrile, hypotensive with BP 100s was initially started on IV cefepime and vancomycin in the ED and was transferred to the medicine floor.   In the ER, vitals heart rate in high 50s, /57  Lactic acid 3.1,   WBC 21.4, Hb 8.9  Patient became hypotensive and bradycardic so rapid response was called and (37.6 °C)   Resp 20   Ht 6' (1.829 m)   Wt 178 lb 2.1 oz (80.8 kg)   SpO2 100%   BMI 24.16 kg/m²   Tmax over 24 hours:  Temp (24hrs), Av.2 °F (36.8 °C), Min:97 °F (36.1 °C), Max:99.7 °F (37.6 °C)      Patient Vitals for the past 8 hrs:   BP Temp Pulse Resp SpO2 Weight   21 0733   60 20 100 %    21 0700 127/66  60 17 100 %    21 0645 127/60  59 15 100 %    21 0638      178 lb 2.1 oz (80.8 kg)   21 0630 126/61  59 15 100 %    21 0615 124/60  59 18 100 %    21 0600 126/61  59 17 100 %    21 0545 126/62  59 15 100 %    21 0530 123/60  63 16 100 %    21 0515 (!) 127/57  59 12 100 %    21 0501    14 100 %    21 0500 125/60  68 17 100 %    21 0445 (!) 125/58  58 15 100 %    21 0430 (!) 123/59  59 15 100 %    21 0415 117/62  59 18 100 %    21 0400 123/61 99.7 °F (37.6 °C) 60 17 100 %    21 0345 (!) 126/59  57 14 100 %    21 0340   59 14 97 %    21 0330 (!) 118/56  58 11 99 %    21 0315 (!) 113/55  64 13 99 %    21 0300 (!) 106/54  53 17 98 %    21 0245 112/62  58 14 99 %    21 0230 (!) 111/56  57 14 99 %    21 0215 (!) 108/54  56 15 98 %    21 0200 (!) 108/55  56 18 98 %    21 0145 (!) 106/56  56 (!) 0 97 %    21 0130 (!) 100/54  57 (!) 7 98 %    21 0115 (!) 94/50  59 16 99 %    21 0100 (!) 112/56  58 15 100 %          Intake/Output Summary (Last 24 hours) at 2021 0848  Last data filed at 2021 0400  Gross per 24 hour   Intake 1064.21 ml   Output 20 ml   Net 1044.21 ml     Date 21 0000 - 21 2359   Shift 8983-2075 8911-1289 7632-2736 24 Hour Total   INTAKE   I.V.(mL/kg) 318(3.9)   318(3.9)   NG/GT(mL/kg) 146(1.8)   146(1.8)   Shift Total(mL/kg) 464(5.7)   464(5.7)   OUTPUT   Shift Total(mL/kg)       Weight (kg) 80.8 80.8 80.8 80.8     Wt Readings from Last 3 8.4*   HCT 27.2* 28.8* 31.1*   .1 99.7 103.3*   MCH 28.3 28.4 27.9   MCHC 27.9* 28.5 27.0*   RDW 19.2* 19.1* 19.4*   * See Reflexed IPF Result See Reflexed IPF Result   MPV 12.2 NOT REPORTED NOT REPORTED        Last 3 Blood Glucose:   Recent Labs     06/29/21  1138 06/30/21  0411 06/30/21  2330 07/01/21  0542 07/02/21  0412   GLUCOSE 175* 151* 131* 149* 122*        PT/INR:    Lab Results   Component Value Date    PROTIME 23.8 05/07/2021    PROTIME 10.9 10/25/2011    INR 2.1 05/07/2021     PTT:    Lab Results   Component Value Date    APTT 49.1 05/07/2021       Comprehensive Metabolic Profile:   Recent Labs     06/30/21  2330 07/01/21  0542 07/02/21  0412   * 134* 135   K 3.6* 3.8 4.6   CL 96* 98 102   CO2 23 20 20   BUN 28* 29* 37*   CREATININE 2.25* 2.54* 3.38*   GLUCOSE 131* 149* 122*   CALCIUM 9.4 9.8 9.6      Magnesium:   Lab Results   Component Value Date    MG 2.1 06/11/2021    MG 2.3 06/10/2021    MG 2.1 06/09/2021     Phosphorus:   Lab Results   Component Value Date    PHOS 3.5 06/30/2021    PHOS 5.1 06/11/2021    PHOS 6.7 06/10/2021     Ionized Calcium:   Lab Results   Component Value Date    CAION 4.43 01/09/2012    CAION 4.49 10/19/2011    CAION 4.05 10/05/2011        Urinalysis:   Lab Results   Component Value Date    NITRU NEGATIVE 06/22/2013    COLORU YELLOW 06/22/2013    PHUR 7.0 06/22/2013    WBCUA 2 TO 5 06/22/2013    RBCUA 0 TO 2 06/22/2013    MUCUS NOT REPORTED 06/22/2013    TRICHOMONAS NOT REPORTED 06/22/2013    YEAST NOT REPORTED 06/22/2013    BACTERIA NOT REPORTED 06/22/2013    SPECGRAV 1.014 06/22/2013    LEUKOCYTESUR NEGATIVE 06/22/2013    UROBILINOGEN Normal 06/22/2013    BILIRUBINUR NEGATIVE 06/22/2013    BILIRUBINUR NEGATIVE  Verified by ictotest. 10/20/2011    GLUCOSEU 1+ 06/22/2013    GLUCOSEU TRACE 10/20/2011    KETUA NEGATIVE 06/22/2013    AMORPHOUS NOT REPORTED 06/22/2013       HgBA1c:    Lab Results   Component Value Date    LABA1C 6.6 01/23/2021     TSH:    Lab Results   Component Value Date    TSH 1.51 05/01/2021     Lactic Acid:   Lab Results   Component Value Date    LACTA 1.6 06/30/2021      Troponin: No results for input(s): TROPONINI in the last 72 hours. Microbiology:  Urine Culture:  No components found for: CURINE  Blood Culture:  No components found for: CBLOOD, CFUNGUSBL  Blood Culture from [place]:  No components found for: CBLOODLN  Wound Culture:         Other Labs:  CBC with Differential:    Lab Results   Component Value Date    WBC 20.1 07/02/2021    RBC 3.01 07/02/2021    RBC 3.51 01/09/2012    HGB 8.4 07/02/2021    HCT 31.1 07/02/2021    PLT See Reflexed IPF Result 07/02/2021     01/09/2012    .3 07/02/2021    MCH 27.9 07/02/2021    MCHC 27.0 07/02/2021    RDW 19.4 07/02/2021    NRBC 1 03/07/2016    METASPCT 2 05/01/2021    LYMPHOPCT 4 07/02/2021    MONOPCT 7 07/02/2021    MYELOPCT 1 03/06/2016    BASOPCT 0 07/02/2021    MONOSABS 1.41 07/02/2021    LYMPHSABS 0.80 07/02/2021    EOSABS 0.00 07/02/2021    BASOSABS 0.00 07/02/2021    DIFFTYPE NOT REPORTED 07/02/2021     Platelets:    Lab Results   Component Value Date    PLT See Reflexed IPF Result 07/02/2021     01/09/2012     Hemoglobin/Hematocrit:    Lab Results   Component Value Date    HGB 8.4 07/02/2021    HCT 31.1 07/02/2021     CMP:    Lab Results   Component Value Date     07/02/2021    K 4.6 07/02/2021     07/02/2021    CO2 20 07/02/2021    BUN 37 07/02/2021    CREATININE 3.38 07/02/2021    GFRAA 22 07/02/2021    LABGLOM 18 07/02/2021    GLUCOSE 122 07/02/2021    GLUCOSE 128 01/09/2012    PROT 7.8 06/07/2021    LABALBU 3.6 06/07/2021    LABALBU 3.8 01/09/2012    CALCIUM 9.6 07/02/2021    BILITOT 0.59 06/07/2021    ALKPHOS 132 06/07/2021    AST 14 06/07/2021    ALT 11 06/07/2021       ASSESSMENT:     Patient Active Problem List    Diagnosis Date Noted    Necrotizing fasciitis ([address] 06/30/2021    Septic shock (HCC) 06/30/2021    Elevated digoxin level 06/30/2021    Bradycardia 06/30/2021    Infected wound 06/29/2021    Moderate malnutrition ([facility] 06/09/2021    Acute respiratory failure with hypoxia (HCC) 06/08/2021    Acute pulmonary edema (HCC) 06/07/2021    Volume overload 06/07/2021    Non-compliance with renal dialysis ([facility] 06/07/2021    Parapneumonic effusion 05/09/2021    C. difficile colitis 05/09/2021    Loculated pleural effusion 05/08/2021    Pleural effusion on right 05/08/2021    ROOSEVELT (obstructive sleep apnea) 05/08/2021    Sepsis ([facility] 04/28/2021    Pneumonia 04/28/2021    Sepsis due to pneumonia ([facility] 69/78/2395    Metabolic acidosis     Azotemia     Hyperkalemia     Diarrhea     Nausea and vomiting     Peripheral vascular disease ([address]     History of left below knee amputation (HCC)     Hemoptysis     Ulcerated, foot, right, with fat layer exposed ([address]     Charcot's joint of foot, right     CHF (congestive heart failure) ([facility]     Noncompliance 06/02/2019    Type 2 diabetes mellitus with foot ulcer ([facility]     Hypertension, essential     Diabetic ulcer of left foot ([facility] 03/04/2016    Charcot foot due to diabetes mellitus ([facility] 12/28/2015    Anemia of chronic disease 07/03/2013    ESRD on dialysis ([facility] 06/27/2013    DM (diabetes mellitus) ([facility] 06/27/2013    SDH (subdural hematoma) ([facility] 06/22/2013          PLAN:     WEAN PER PROTOCOL:  [] No   [] Yes  [x] N/A    ICU PROPHYLAXIS:  Stress ulcer:  [] PPI Agent  [] K6Lodyr [] Sucralfate  [] Other:  VTE:   [] Enoxaparin  [] Unfract.  Heparin Subcut  [] EPC Cuffs    NUTRITION:  [x] NPO [] Tube Feeding (Specify: ) [] TPN  [] PO    HOME MEDS RECONCILED: [] No  [x] Yes    CONSULTATION NEEDED:  [] No  [x] Yes    FAMILY UPDATED:    [x] No  [] Yes    TRANSFER OUT OF ICU:   [x] No  [] Yes    Hospital Problems         Last Modified POA    * (Principal) Necrotizing fasciitis ([facility] 6/30/2021 Yes    ESRD on dialysis ([facility] 6/30/2021 Yes    DM (diabetes mellitus) ([facility] 6/30/2021 Yes Anemia of chronic disease 6/30/2021 Yes    Hypertension, essential 6/30/2021 Yes    Noncompliance 6/30/2021 Yes    CHF (congestive heart failure) ([address] 6/30/2021 Yes    Peripheral vascular disease ([address] 6/30/2021 Yes    History of left below knee amputation ([address] 6/30/2021 Yes    ROOSEVELT (obstructive sleep apnea) (Chronic) 6/30/2021 Yes    Overview Signed 5/8/2021  7:49 AM by Sunny Clifton MD     Clinical diagnosis         Septic shock ([address] 6/30/2021 Yes    Elevated digoxin level 6/30/2021 Yes    Bradycardia 6/30/2021 Yes          Plan:    1. Septic shock likely secondary to bilateral necrotic wounds: Resolving. Currently on Levophed drip. Cultures from right hip shows gram-negative rods, staph, Enterococcus so far. continue antibiotics  2. Necrotizing fasciitis s/p bedside surgical debridement. Patient was taken for further debridement of deeper tissue and muscles by the general surgeon overnight. Continue IV Flagyl, cefepime and vancomycin and will reconsult ID. Follow-up cultures. Will consult wound ostomy. 3. Sinus bradycardia:  Resolved. Beta-blockers and amiodarone on hold. Elevated digoxin levels given Digibind 1 dose. Cardiology following the patient  4. Elevated digoxin levels status post Digibind. Recheck digoxin levels 2.9  5. Peripheral artery disease s/p left BKA  6. ESRD on hemodialysis, plan for hemodialysis today  7. Systolic CHF with ischemic cardiomyopathy, EF 25% status post CABG and subsequent DENY to LCx. Will follow up with cardiology recommendations regarding LifeVest before discharge.  Miranda Sanchez MD  PGY-2 Internal Medicine Resident  [address] [place] [address]  7/2/2021 8:48 AM    Attending Physician Statement  I have discussed the care of Gabriel Melissa, including pertinent history and exam findings with the resident. I have reviewed the key elements of all parts of the encounter with the resident.  I have seen and examined the patient with the resident. I agree with the assessment and plan and status of the problem list as documented. I seen the patient during my round today, chart reviewed, labs and medications reviewed overnight events noted. She is currently on low-dose of Levophed bradycardia improved. Other digoxin level was checked and elevated but patient did also receive Digibind. Ventilator setting PRVC/16/550/5/30 percent. Patient is on fentanyl drip. He is scheduled for repeat debridement today. He is currently on cefepime Flagyl and vancomycin and will need follow-up wound culture. He is getting hemodialysis today and improving post hemodialysis. We will continue to follow mentation and neurological status. Daily spontaneous breathing trial.        Discussed with nursing staff, treatment and plan discussed. Discussed with respiratory therapist.    Total critical care time caring for this patient with life threatening, unstable organ failure, including direct patient contact, management of life support systems, review of data including imaging and labs, discussions with other team members and physicians at least 28  Min so far today, excluding procedures. Please note that this chart was generated using voice recognition Dragon dictation software. Although every effort was made to ensure the accuracy of this automated transcription, some errors in transcription may have occurred.      He Lam MD  7/2/2021 11:06 AM

## 2021-07-02 NOTE — ANESTHESIA PRE PROCEDURE
Department of Anesthesiology  Preprocedure Note       Name:  Ruby Stewart   Age:  73 y.o.  :  1955                                          MRN:  [id number]         Date:  2021      Surgeon: Charlie Fernandes):  Rika Delgadillo MD    Procedure: Procedure(s):  2ND LOOK DEBRIDEMENT BILATERAL GROIN WOUNDS AND SACRAL WOUNDS    Medications prior to admission:   Prior to Admission medications    Medication Sig Start Date End Date Taking? Authorizing Provider   acetaminophen (TYLENOL) 500 MG tablet Take 1,000 mg by mouth every 6 hours as needed for Pain    Historical Provider, MD   calcium acetate (PHOSLO) 667 MG TABS Take 2 tablets by mouth 3 times daily (with meals) 6/10/21   Shruthi Zavala MD   amiodarone (CORDARONE) 200 MG tablet Take 1 tablet by mouth daily 21   Shruthi Zavala MD   atorvastatin (LIPITOR) 40 MG tablet Take 1 tablet by mouth nightly 21   Shruthi Zavala MD   metoprolol tartrate (LOPRESSOR) 25 MG tablet Take 1 tablet by mouth 2 times daily 21   Shruthi Zavala MD   midodrine (PROAMATINE) 2.5 MG tablet Take 1 tablet by mouth 3 times daily 21   Shruthi Zavala MD   pantoprazole (PROTONIX) 40 MG tablet Take 40 mg by mouth daily    Historical Provider, MD   B Complex-C-Folic Acid (ALBERTA-FERNANDA RX) 1 MG TABS Take 1 tablet by mouth daily    Historical Provider, MD   digoxin (LANOXIN) 125 MCG tablet Take 1 tablet by mouth daily 21   Robyn Villegas MD   sevelamer (RENVELA) 800 MG tablet Take 5 tablets by mouth 3 times daily (with meals) 5/15/21   Robyn Villegas MD   nitroGLYCERIN (NITROSTAT) 0.4 MG SL tablet Place 0.4 mg under the tongue every 5 minutes as needed for Chest pain up to max of 3 total doses. If no relief after 1 dose, call 911.     Historical Provider, MD   ONE TOUCH ULTRA TEST strip USE AS DIRECTED DAILY AS NEEDED 17   Bi Aguilar DO   Blood Glucose Monitoring Suppl (ONE TOUCH ULTRA 2) W/DEVICE KIT TEST 3 TIMES DAILY 16   Bi Aguilar DO glucose monitoring kit (FREESTYLE) monitoring kit 1 kit by Does not apply route daily as needed 4/21/16   Radha Masterson DO   Lancets 30G MISC 1 each by Does not apply route 3 times daily 4/21/16   Radha Masterson DO   Misc. Devices (WALKER GLIDE WHEELS) MISC 1 Device by Does not apply route continuous 4/21/16   Radha Masterson DO   aspirin 81 MG EC tablet Take 1 tablet by mouth daily 3/14/16   Marcella Wagner DO       Current medications:    No current facility-administered medications for this visit. No current outpatient medications on file.      Facility-Administered Medications Ordered in Other Visits   Medication Dose Route Frequency Provider Last Rate Last Admin    fentaNYL 20 mcg/mL Infusion  12.5-200 mcg/hr Intravenous Continuous Tamika Espana DO 6.3 mL/hr at 07/01/21 2356 125 mcg/hr at 07/01/21 2356    vancomycin (VANCOCIN) intermittent dosing (placeholder)   Other RX Placeholder Bianca Arteaga MD        vancomycin (VANCOCIN) 750 mg in dextrose 5 % 250 mL IVPB  750 mg Intravenous Once per day on Mon Wed Fri Bianca Arteaga MD        chlorhexidine (PERIDEX) 0.12 % solution 15 mL  15 mL Mouth/Throat BID Mateo Barraza, DO   15 mL at 07/01/21 2135    midodrine (PROAMATINE) tablet 5 mg  5 mg Oral TID [facility] Rosa Pires MD   5 mg at 07/01/21 1616    fentaNYL (SUBLIMAZE) injection 50 mcg  50 mcg Intravenous Once Christopher Vick DO        norepinephrine (LEVOPHED) 16 mg in sodium chloride 0.9 % 250 mL infusion  2 mcg/min Intravenous Continuous Portia Hubbard MD 3.8 mL/hr at 07/01/21 2314 4 mcg/min at 07/01/21 2314    sodium chloride flush 0.9 % injection 5-40 mL  5-40 mL Intravenous 2 times per day Tamika Espana DO   10 mL at 07/01/21 2037    sodium chloride flush 0.9 % injection 5-40 mL  5-40 mL Intravenous PRN Tamika Espana DO        0.9 % sodium chloride infusion  25 mL Intravenous PRN Tamika Espana DO        ondansetron (ZOFRAN-ODT) disintegrating tablet 4 mg  4 mg Oral Q8H PRN Tamika Espana, DO        Or    ondansetron [facility]) injection 4 mg  4 mg Intravenous Q6H PRN Tamika Espana, DO        polyethylene glycol (GLYCOLAX) packet 17 g  17 g Oral Daily PRN Tamika Espana, DO        acetaminophen (TYLENOL) tablet 650 mg  650 mg Oral Q6H PRN Tamika Espana, DO        Or    acetaminophen (TYLENOL) suppository 650 mg  650 mg Rectal Q6H PRN Tamika Espana, DO        [Held by provider] heparin (porcine) injection 5,000 Units  5,000 Units Subcutaneous 3 times per day Tamika Espana DO   5,000 Units at 07/01/21 2239    insulin lispro (HUMALOG) injection vial 0-6 Units  0-6 Units Subcutaneous TID WC Tamika Espana DO        insulin lispro (HUMALOG) injection vial 0-3 Units  0-3 Units Subcutaneous Nightly Tamika Espana DO        cefepime (MAXIPIME) 1000 mg IVPB minibag  1,000 mg Intravenous Q24H Tamika Espana DO   Stopped at 07/02/21 0523    metronidazole (FLAGYL) 500 mg in NaCl 100 mL IVPB premix  500 mg Intravenous Q8H Tamika Espana, DO   Stopped at 07/02/21 7016       Allergies:     Allergies   Allergen Reactions    Pcn [Penicillins] Other (See Comments)     Trouble walking       Problem List:    Patient Active Problem List   Diagnosis Code    SDH (subdural hematoma) ([facility] S06.5X9A    ESRD on dialysis ([facility] N18.6, Z99.2    DM (diabetes mellitus) ([address] E11.9    Anemia of chronic disease D63.8    Charcot foot due to diabetes mellitus ([address] E11.610    Diabetic ulcer of left foot ([address] E11.621, L97.529    Hypertension, essential I10    Type 2 diabetes mellitus with foot ulcer ([address] E11.621, L97.509    Noncompliance Z91.19    CHF (congestive heart failure) ([facility]) I50.9    Sepsis ([address] W38.6    Metabolic acidosis O21.0    Azotemia R79.89    Hyperkalemia E87.5    Diarrhea R19.7    Nausea and vomiting R11.2    Peripheral vascular disease (HCC) I73.9    History of left below knee amputation (HCC) Z89.512    Hemoptysis R04.2  Ulcerated, foot, right, with fat layer exposed ([address] L97.512    Charcot's joint of foot, right M14.671    Pneumonia J18.9    Sepsis due to pneumonia ([address] J18.9, A41.9    Loculated pleural effusion J90    Pleural effusion on right J90    ROOSEVELT (obstructive sleep apnea) G47.33    Parapneumonic effusion J18.9, J91.8    C. difficile colitis A04.72    Acute pulmonary edema (HCC) J81.0    Volume overload E87.70    Non-compliance with renal dialysis ([address] Z91.15    Acute respiratory failure with hypoxia ([facility]) J96.01    Moderate malnutrition ([facility]) E44.0    Infected wound T14. 8XXA, L08.9    Necrotizing fasciitis ([address] M72.6    Septic shock ([facility]) A41.9, R65.21    Elevated digoxin level R78.89    Bradycardia R00.1       Past Medical History:        Diagnosis Date    Acute on chronic congestive heart failure (HCC)     Anemia     CAD (coronary artery disease)     Cardiomyopathy ([address]     Dialysis care     ESRD (end stage renal disease) on dialysis ([address]     Foot ulcer, left ([address] 2015    GERD (gastroesophageal reflux disease)     Hemodialysis patient ([address] 2011    MOM-WED-FRI-ON [address]     History of sleep apnea     none since significant weight loss (was 400#)    Hyperlipidemia     Hyperparathyroidism ([address]     Hypertension     Neuropathy     ROOSEVELT (obstructive sleep apnea) 5/8/2021    Peripheral vascular disease ([address]     Pneumonia     resolved    S/P CABG (coronary artery bypass graft) 06/2016    Type II or unspecified type diabetes mellitus without mention of complication, not stated as uncontrolled     dx-1980       Past Surgical History:        Procedure Laterality Date    CATARACT REMOVAL      DIALYSIS FISTULA CREATION Left 2014    IR CHEST TUBE INSERTION  5/10/2021    IR CHEST TUBE INSERTION 5/10/2021 [facility] PROCED[facility]    LEG AMPUTATION THROUGH LOWER TIBIA AND FIBULA         Social History:    Social History     Tobacco Use    Smoking status: Never Smoker    Smokeless COVID19 Not Detected 06/07/2021           Anesthesia Evaluation  Nursing notes reviewed  Airway: Mallampati: Unable to assess / NA        Dental:    (+) edentulous      Pulmonary: breath sounds clear to auscultation  (+) pneumonia: resolved,  sleep apnea:                             Cardiovascular:    (+) hypertension: mild, CAD: obstructive, CABG/stent:, CHF: diastolic,         Rhythm: regular  Rate: abnormal                    Neuro/Psych:   (+) neuromuscular disease:,             GI/Hepatic/Renal:   (+) GERD: poorly controlled,           Endo/Other:    (+) DiabetesType II DM, poorly controlled, , blood dyscrasia: anemia:., .                 Abdominal:   (+) obese,           Vascular: Other Findings:               Anesthesia Plan      general     ASA 4 - emergent       Induction: intravenous. MIPS: Postoperative opioids intended and Postoperative ventilation. Anesthetic plan and risks discussed with Unable to obtain due to emergent nature. Plan discussed with CRNA. Mechanical Ventilation Data   SETTINGS (Comprehensive)  Vent Information  $Ventilation: $Initial Day  Skin Assessment: Clean, dry, & intact  Suction Catheter Diameter: 14  Equipment Changed: HME  Vent Type: Servo i  Vent Mode: PRV  Vt Ordered: 550 mL  Rate Set: 16 bmp  FiO2 : 30 %  SpO2: 100 %  SpO2/FiO2 ratio: 333.33  Sensitivity: 5  PEEP/CPAP: 5  I Time/ I Time %: 0.9 s  Humidification Source: HME  Additional Respiratory  Assessments  Pulse: 59  Resp: 15  SpO2: 100 %  End Tidal CO2: 36  Position: Semi-Ogden's  Humidification Source: HME  Oral Care Completed?: Yes  Oral Care: Mouthwash, Lip moisturizer applied, Mouth moisturizer, Mouth suctioned  Subglottic Suction Done?: Yes    ABG   No results found for: PH, PCO2, PO2, HCO3, O2SAT  Lab Results   Component Value Date    MODE [facility] 07/02/2021         Cards  1. Sinus bradycardia likely secondary to septic shock further exacerbated by dig toxicity. Resolved  2.  Dig toxicity s/p Digibind. HR stable. 3. Sepsis with shock from necrotizing fasciitis. Improving  4. Necrotizing fasciitis s/p I&D   5. H/O PAT s/p CV on 5/5/2021  6. MV CAD s/p CABG with subsequent PCI to LCx in 2019 (only patent LIMALAD)  7. ICMP, EF persistently low 25% on Echo 6/30/21  8. ESRD on hemodialysis. 9. Severe PAD s/p L BKA     Assessment and Plan:   1. Follow-up on digoxin level. If going up then will give another dose of Digibind. 2. Stop digoxin, amiodarone and beta-blocker. 3. Continue with as needed atropine and dopamine drip. 4. We will consider LifeVest prior to discharge and then AICD if continue to make improvement as outpatient. 5. We will follow from far. Call us in case of any question and concern.         Fernando Lowery MD   7/2/2021

## 2021-07-02 NOTE — PLAN OF CARE
Problem: Non-Violent Restraints  Goal: No harm/injury to patient while restraints in use  7/2/2021 1251 by Loretta Saleh RN  Outcome: Met This Shift  7/2/2021 0602 by Allan Chau RN  Outcome: Met This Shift  Goal: Patient's dignity will be maintained  7/2/2021 1251 by Loretta Saleh RN  Outcome: Met This Shift  7/2/2021 0602 by Allan Chau RN  Outcome: Met This Shift     Problem: Non-Violent Restraints  Goal: Removal from restraints as soon as assessed to be safe  7/2/2021 1251 by Loretta Saleh RN  Outcome: Not Met This Shift  7/2/2021 0602 by Allan Chau RN  Outcome: Not Met This Shift     Problem: Pain:  Goal: Pain level will decrease  Description: Pain level will decrease  7/2/2021 1251 by Loretta Saleh RN  Outcome: Ongoing  7/2/2021 0602 by Allan Chau RN  Outcome: Ongoing  Goal: Control of acute pain  Description: Control of acute pain  7/2/2021 1251 by Loretta Saleh RN  Outcome: Ongoing  7/2/2021 0602 by Allan Chau RN  Outcome: Ongoing  Goal: Control of chronic pain  Description: Control of chronic pain  7/2/2021 1251 by Loretta Saleh RN  Outcome: Ongoing  7/2/2021 0602 by Allan Chau RN  Outcome: Ongoing     Problem: Skin Integrity:  Goal: Will show no infection signs and symptoms  Description: Will show no infection signs and symptoms  7/2/2021 1251 by Loretta Saleh RN  Outcome: Ongoing  7/2/2021 0602 by Allan Chau RN  Outcome: Ongoing  Goal: Absence of new skin breakdown  Description: Absence of new skin breakdown  7/2/2021 1251 by Loretta Saleh RN  Outcome: Ongoing  7/2/2021 0602 by Allan Chau RN  Outcome: Ongoing  Goal: Skin integrity will be maintained  7/2/2021 1251 by Loretta Saleh RN  Outcome: Ongoing  7/2/2021 0602 by Allan Chau RN  Outcome: Ongoing     Problem: Falls - Risk of:  Goal: Will remain free from falls  Description: Will remain free from falls  7/2/2021 1251 by Loretta Saleh RN  Outcome: Ongoing  7/2/2021 0602 by Allan Chau, RN  Outcome: Ongoing  Goal: Absence of physical injury  Description: Absence of physical injury  7/2/2021 1251 by Marcia Capone RN  Outcome: Ongoing  7/2/2021 0602 by Adamaris Ma RN  Outcome: Ongoing     Problem: Cardiac:  Goal: Ability to maintain an adequate cardiac output will improve  Description: Ability to maintain an adequate cardiac output will improve  7/2/2021 1251 by Marcia Capone RN  Outcome: Ongoing  7/2/2021 0602 by Adamaris Ma RN  Outcome: Ongoing  Goal: Hemodynamic stability will improve  Description: Hemodynamic stability will improve  7/2/2021 1251 by Marcia Capone RN  Outcome: Ongoing  7/2/2021 0602 by Adamaris Ma RN  Outcome: Ongoing     Problem: Fluid Volume:  Goal: Ability to achieve and maintain adequate urine output will improve  Description: Ability to achieve and maintain adequate urine output will improve  7/2/2021 1251 by Marcia Capone RN  Outcome: Ongoing  7/2/2021 0602 by Adamaris Ma RN  Outcome: Ongoing  Goal: Will show no signs or symptoms of fluid imbalance  Description: Will show no signs or symptoms of fluid imbalance  7/2/2021 1251 by Marcia Capone RN  Outcome: Ongoing  7/2/2021 0602 by Adamaris Ma RN  Outcome: Ongoing     Problem: Respiratory:  Goal: Respiratory status will improve  Description: Respiratory status will improve  7/2/2021 1251 by Marcia Capone RN  Outcome: Ongoing  7/2/2021 0735 by Darren Boo RCP  Outcome: Ongoing  7/2/2021 0602 by Adamaris Ma RN  Outcome: Ongoing  Goal: Ability to maintain adequate ventilation will improve  7/2/2021 1251 by Marcia Capone RN  Outcome: Ongoing  7/2/2021 0735 by Darren Boo RCP  Outcome: Ongoing  7/2/2021 0602 by Adamaris Ma RN  Outcome: Ongoing     Problem:  Bowel/Gastric:  Goal: Ability to prevent future episodes of altered bowel function as condition permits will improve  Description: Ability to prevent future episodes of altered bowel function as condition permits will improve  7/2/2021 1251 by Jeovany Raymundo RN  Outcome: Ongoing  7/2/2021 0602 by Gregg Norwood RN  Outcome: Ongoing     Problem: Coping:  Goal: Level of anxiety will decrease  Description: Level of anxiety will decrease  7/2/2021 1251 by Jeovany Raymundo RN  Outcome: Ongoing  7/2/2021 0602 by Gregg Norwood RN  Outcome: Ongoing     Problem: Physical Regulation:  Goal: Complications related to the disease process, condition or treatment will be avoided or minimized  Description: Complications related to the disease process, condition or treatment will be avoided or minimized  7/2/2021 1251 by Jeovany Raymundo RN  Outcome: Ongoing  7/2/2021 0602 by Gregg Norwood RN  Outcome: Ongoing     Problem: Safety:  Goal: Ability to remain free from injury will improve  7/2/2021 1251 by Jeovany Raymundo RN  Outcome: Ongoing  7/2/2021 0602 by Gregg Norwood RN  Outcome: Ongoing     Problem: Sensory:  Goal: Pain level will decrease  Description: Pain level will decrease  7/2/2021 1251 by Jeovany Raymundo RN  Outcome: Ongoing  7/2/2021 0602 by Gregg Norwood RN  Outcome: Ongoing     Problem: Tissue Perfusion:  Goal: Risk of venous thrombosis will decrease  7/2/2021 1251 by Jeovany Raymundo RN  Outcome: Ongoing  7/2/2021 0602 by Gregg Norwood RN  Outcome: Ongoing     Problem: Urinary Elimination:  Goal: Ability to reestablish a normal urinary elimination pattern will improve - after catheter removal  7/2/2021 1251 by Jeovany Raymundo RN  Outcome: Ongoing  7/2/2021 0602 by Gregg Norwood RN  Outcome: Ongoing     Problem: OXYGENATION/RESPIRATORY FUNCTION  Goal: Patient will maintain patent airway  7/2/2021 1251 by Jeovany Ramyundo RN  Outcome: Ongoing  7/2/2021 0602 by Gregg Norwood RN  Outcome: Ongoing  Goal: Patient will achieve/maintain normal respiratory rate/effort  Description: Respiratory rate and effort will be within normal limits for the patient  7/2/2021 1251 by Jeovany Raymundo RN  Outcome: Ongoing  7/2/2021 0602 by Caitlyn Paez Felicita Lanes, RN  Outcome: Ongoing     Problem: MECHANICAL VENTILATION  Goal: Patient will maintain patent airway  7/2/2021 1251 by Hermelindo Crum RN  Outcome: Ongoing  7/2/2021 0735 by Benedict Sagastume RCP  Outcome: Ongoing  7/2/2021 0602 by Preethi Elder RN  Outcome: Ongoing  Goal: Oral health is maintained or improved  7/2/2021 1251 by Hermelindo Crum RN  Outcome: Ongoing  7/2/2021 0735 by Benedict Sagastume RCP  Outcome: Ongoing  7/2/2021 0602 by Preethi Elder RN  Outcome: Ongoing  Goal: ET tube will be managed safely  7/2/2021 1251 by Hermelindo Crum RN  Outcome: Ongoing  7/2/2021 0735 by Benedict Sagastume RCP  Outcome: Ongoing  7/2/2021 0602 by Preethi Elder RN  Outcome: Ongoing  Goal: Ability to express needs and understand communication  7/2/2021 1251 by Hermelindo Crum RN  Outcome: Ongoing  7/2/2021 0735 by Benedict Sagastume RCP  Outcome: Ongoing  7/2/2021 0602 by Preethi Elder RN  Outcome: Ongoing  Goal: Mobility/activity is maintained at optimum level for patient  7/2/2021 1251 by Hermelindo Crum RN  Outcome: Ongoing  7/2/2021 0735 by Benedict Sagastume RCP  Outcome: Ongoing  7/2/2021 0602 by Preethi Elder RN  Outcome: Ongoing     Problem: SKIN INTEGRITY  Goal: Skin integrity is maintained or improved  7/2/2021 1251 by Hermelindo Crum RN  Outcome: Ongoing  7/2/2021 0735 by Benedict Sagastume RCP  Outcome: Ongoing  7/2/2021 0602 by Preethi Elder RN  Outcome: Ongoing     Problem: Confusion - Acute:  Goal: Absence of continued neurological deterioration signs and symptoms  Description: Absence of continued neurological deterioration signs and symptoms  7/2/2021 1251 by Hermelindo Crum RN  Outcome: Ongoing  7/2/2021 0602 by Preethi Elder RN  Outcome: Ongoing  Goal: Mental status will be restored to baseline  Description: Mental status will be restored to baseline  7/2/2021 1251 by Hermelindo Crum RN  Outcome: Ongoing  7/2/2021 0602 by Preethi Elder RN  Outcome: Ongoing     Problem: Discharge Planning:  Goal: improved sensory functioning will increase  7/2/2021 1251 by Kervin Gardner RN  Outcome: Ongoing  7/2/2021 0602 by Didi Spivey RN  Outcome: Ongoing  Goal: Decrease in sensory misperception frequency  Description: Decrease in sensory misperception frequency  7/2/2021 1251 by Kervin Gardner RN  Outcome: Ongoing  7/2/2021 0602 by Didi Spivey RN  Outcome: Ongoing  Goal: Able to refrain from responding to false sensory perceptions  Description: Able to refrain from responding to false sensory perceptions  7/2/2021 1251 by Kervin Gardner RN  Outcome: Ongoing  7/2/2021 0602 by Didi Spivey RN  Outcome: Ongoing  Goal: Demonstrates accurate environmental perceptions  Description: Demonstrates accurate environmental perceptions  7/2/2021 1251 by Kervin Gardner RN  Outcome: Ongoing  7/2/2021 0602 by Didi Spivey RN  Outcome: Ongoing  Goal: Able to distinguish between reality-based and nonreality-based thinking  Description: Able to distinguish between reality-based and nonreality-based thinking  7/2/2021 1251 by Kervin Gardner RN  Outcome: Ongoing  7/2/2021 0602 by Didi Spivey RN  Outcome: Ongoing  Goal: Able to interrupt nonreality-based thinking  Description: Able to interrupt nonreality-based thinking  7/2/2021 1251 by Kervin Gardner RN  Outcome: Ongoing  7/2/2021 0602 by Didi Spivey RN  Outcome: Ongoing     Problem: Sleep Pattern Disturbance:  Goal: Appears well-rested  Description: Appears well-rested  7/2/2021 1251 by Kervin Gardner RN  Outcome: Ongoing  7/2/2021 0602 by Didi Spivey RN  Outcome: Ongoing     Problem: Nutrition  Goal: Optimal nutrition therapy  7/2/2021 1021 by Jefferson Lake RD, LD  Outcome: Ongoing  Note: Nutrition Problem #1: Inadequate oral intake  Intervention: Food and/or Nutrient Delivery:  (Restart TF as able; Renal formula with goal rate of 50 mL/hr.)  Nutritional Goals: meet % of estimated nutrient needs with enteral nutrition support

## 2021-07-03 NOTE — PLAN OF CARE
Problem: Pain:  Goal: Pain level will decrease  Description: Pain level will decrease  7/3/2021 1027 by Onesimo Gonzalez RN  Outcome: Ongoing  7/2/2021 2324 by Hina Cosby RN  Outcome: Ongoing  Goal: Control of acute pain  Description: Control of acute pain  7/3/2021 1027 by Onesimo Gonzalez RN  Outcome: Ongoing  7/2/2021 2324 by Hina Cosby RN  Outcome: Ongoing  Goal: Control of chronic pain  Description: Control of chronic pain  7/3/2021 1027 by Onesimo Gonzalez RN  Outcome: Ongoing  7/2/2021 2324 by Hina Cosby RN  Outcome: Ongoing     Problem: Skin Integrity:  Goal: Will show no infection signs and symptoms  Description: Will show no infection signs and symptoms  7/3/2021 1027 by Onesimo Gonzalez RN  Outcome: Ongoing  7/2/2021 2324 by Hina Cosby RN  Outcome: Ongoing  Goal: Absence of new skin breakdown  Description: Absence of new skin breakdown  7/3/2021 1027 by Onesimo Gonzalez RN  Outcome: Ongoing  7/2/2021 2324 by Hina Cosby RN  Outcome: Ongoing  Goal: Skin integrity will be maintained  7/3/2021 1027 by Onesimo Gonzalez RN  Outcome: Ongoing  7/2/2021 2324 by Hina Cosby RN  Outcome: Ongoing     Problem: Falls - Risk of:  Goal: Will remain free from falls  Description: Will remain free from falls  7/3/2021 1027 by Onesimo Gonzalez RN  Outcome: Ongoing  7/2/2021 2324 by Hina Cosby RN  Outcome: Ongoing  Goal: Absence of physical injury  Description: Absence of physical injury  7/3/2021 1027 by Onesimo Gonzalez RN  Outcome: Ongoing  7/2/2021 2324 by Hina Cosby RN  Outcome: Ongoing     Problem: Cardiac:  Goal: Ability to maintain an adequate cardiac output will improve  Description: Ability to maintain an adequate cardiac output will improve  7/3/2021 1027 by Onesimo Gonzalez RN  Outcome: Ongoing  7/2/2021 2324 by Hina Cosby RN  Outcome: Ongoing  Goal: Hemodynamic stability will improve  Description: Hemodynamic stability will improve  7/3/2021 1027 by Madhu Lozada RN  Outcome: Ongoing  7/2/2021 2324 by Mary Anne Dong RN  Outcome: Ongoing     Problem: Fluid Volume:  Goal: Ability to achieve and maintain adequate urine output will improve  Description: Ability to achieve and maintain adequate urine output will improve  7/3/2021 1027 by Madhu Lozada RN  Outcome: Ongoing  7/2/2021 2324 by Mary Anne Dong RN  Outcome: Ongoing  Goal: Will show no signs or symptoms of fluid imbalance  Description: Will show no signs or symptoms of fluid imbalance  7/3/2021 1027 by Madhu Lozada RN  Outcome: Ongoing  7/2/2021 2324 by Mary Anne Dong RN  Outcome: Ongoing     Problem: Respiratory:  Goal: Respiratory status will improve  Description: Respiratory status will improve  7/3/2021 1027 by Madhu Lozada RN  Outcome: Ongoing  7/3/2021 0828 by Allen Saunders RCP  Outcome: Ongoing  7/2/2021 2324 by Mary Anne Dong RN  Outcome: Ongoing  Goal: Ability to maintain adequate ventilation will improve  7/3/2021 1027 by Madhu Lozada RN  Outcome: Ongoing  7/3/2021 0828 by Allen Saunders RCP  Outcome: Ongoing  7/2/2021 2324 by Mary Anne Dong RN  Outcome: Ongoing     Problem:  Bowel/Gastric:  Goal: Ability to prevent future episodes of altered bowel function as condition permits will improve  Description: Ability to prevent future episodes of altered bowel function as condition permits will improve  7/3/2021 1027 by Madhu Lozada RN  Outcome: Ongoing  7/2/2021 2324 by Mary Anne Dong RN  Outcome: Ongoing     Problem: Coping:  Goal: Level of anxiety will decrease  Description: Level of anxiety will decrease  7/3/2021 1027 by Madhu Lozada RN  Outcome: Ongoing  7/2/2021 2324 by Mary Anne Dong RN  Outcome: Ongoing     Problem: Physical Regulation:  Goal: Complications related to the disease process, condition or treatment will be avoided or minimized  Description: Complications related to the disease process, condition or treatment will be avoided or minimized  7/3/2021 1027 by Kervin Gardner RN  Outcome: Ongoing  7/2/2021 2324 by Joo Gomez RN  Outcome: Ongoing     Problem: Safety:  Goal: Ability to remain free from injury will improve  7/3/2021 1027 by Kervin Gardner RN  Outcome: Ongoing  7/2/2021 2324 by Joo Gomez RN  Outcome: Ongoing     Problem: Sensory:  Goal: Pain level will decrease  Description: Pain level will decrease  7/3/2021 1027 by Krevin Gardner RN  Outcome: Ongoing  7/2/2021 2324 by Joo Goemz RN  Outcome: Ongoing     Problem: Tissue Perfusion:  Goal: Risk of venous thrombosis will decrease  7/3/2021 1027 by Kervin Gardner RN  Outcome: Ongoing  7/2/2021 2324 by Joo Gomez RN  Outcome: Ongoing     Problem: Urinary Elimination:  Goal: Ability to reestablish a normal urinary elimination pattern will improve - after catheter removal  7/3/2021 1027 by Kervin Gardner RN  Outcome: Ongoing  7/2/2021 2324 by Joo Gomez RN  Outcome: Ongoing     Problem: Non-Violent Restraints  Goal: No harm/injury to patient while restraints in use  7/3/2021 1027 by Kervin Gardner RN  Outcome: Ongoing  7/2/2021 2324 by Joo Gomez RN  Outcome: Ongoing  Goal: Patient's dignity will be maintained  7/3/2021 1027 by Kervin Gardner RN  Outcome: Ongoing  7/2/2021 2324 by Joo Gomez RN  Outcome: Ongoing     Problem: OXYGENATION/RESPIRATORY FUNCTION  Goal: Patient will maintain patent airway  7/3/2021 1027 by Kervin Gardner RN  Outcome: Ongoing  7/3/2021 0828 by Elsa Turner RCP  Outcome: Ongoing  7/2/2021 2324 by Joo Gomez RN  Outcome: Ongoing  Goal: Patient will achieve/maintain normal respiratory rate/effort  Description: Respiratory rate and effort will be within normal limits for the patient  7/3/2021 1027 by Kervin Gardner RN  Outcome: Ongoing  7/3/2021 0828 by Elsa Turner RCP  Outcome: Ongoing  7/2/2021 2324 by Joo Gomez RN  Outcome: Ongoing     Problem: MECHANICAL VENTILATION  Goal: Patient will maintain patent airway  7/3/2021 1027 by Janel Avitia RN  Outcome: Ongoing  7/3/2021 0828 by Chris Dinero RCP  Outcome: Ongoing  7/2/2021 2324 by Marissa Winter RN  Outcome: Ongoing  Goal: Oral health is maintained or improved  7/3/2021 1027 by Janel Avitia RN  Outcome: Ongoing  7/3/2021 0828 by Chris Dinero RCP  Outcome: Ongoing  7/2/2021 2324 by Marissa Winter RN  Outcome: Ongoing  Goal: ET tube will be managed safely  7/3/2021 1027 by Janel Avitia RN  Outcome: Ongoing  7/3/2021 0828 by Chris Dinero RCP  Outcome: Ongoing  7/2/2021 2324 by Marissa Winter RN  Outcome: Ongoing  Goal: Ability to express needs and understand communication  7/3/2021 1027 by Janel Avitia RN  Outcome: Ongoing  7/3/2021 0828 by Chris Dinero RCP  Outcome: Ongoing  7/2/2021 2324 by Marissa Winter RN  Outcome: Ongoing  Goal: Mobility/activity is maintained at optimum level for patient  7/3/2021 1027 by Janel Avitia RN  Outcome: Ongoing  7/3/2021 0828 by Chris Dinero RCP  Outcome: Ongoing  7/2/2021 2324 by Marissa Winter RN  Outcome: Ongoing     Problem: SKIN INTEGRITY  Goal: Skin integrity is maintained or improved  7/3/2021 1027 by Janel Avitia RN  Outcome: Ongoing  7/3/2021 0828 by Chris Dinero RCP  Outcome: Ongoing  7/2/2021 2324 by Marissa Winter RN  Outcome: Ongoing     Problem: Confusion - Acute:  Goal: Absence of continued neurological deterioration signs and symptoms  Description: Absence of continued neurological deterioration signs and symptoms  7/3/2021 1027 by Janel Avitia RN  Outcome: Ongoing  7/2/2021 2324 by Marissa Winter RN  Outcome: Ongoing  Goal: Mental status will be restored to baseline  Description: Mental status will be restored to baseline  7/3/2021 1027 by Janel Avitia RN  Outcome: Ongoing  7/2/2021 2324 by Marissa Winter RN  Outcome: Ongoing     Problem: Discharge Planning:  Goal: Ability increase  Description: Demonstrations of improved sensory functioning will increase  7/3/2021 1027 by Floresita Mitcehll RN  Outcome: Ongoing  7/2/2021 2324 by Veldon Aase, RN  Outcome: Ongoing  Goal: Decrease in sensory misperception frequency  Description: Decrease in sensory misperception frequency  7/3/2021 1027 by Floresita Mitchell RN  Outcome: Ongoing  7/2/2021 2324 by Veldon Aase, RN  Outcome: Ongoing  Goal: Able to refrain from responding to false sensory perceptions  Description: Able to refrain from responding to false sensory perceptions  7/3/2021 1027 by Floresita Mitchell RN  Outcome: Ongoing  7/2/2021 2324 by Veldon Aase, RN  Outcome: Ongoing  Goal: Demonstrates accurate environmental perceptions  Description: Demonstrates accurate environmental perceptions  7/3/2021 1027 by Floresita Mitchell RN  Outcome: Ongoing  7/2/2021 2324 by Veldon Aase, RN  Outcome: Ongoing  Goal: Able to distinguish between reality-based and nonreality-based thinking  Description: Able to distinguish between reality-based and nonreality-based thinking  7/3/2021 1027 by Floresita Mitchell RN  Outcome: Ongoing  7/2/2021 2324 by Veldon Aase, RN  Outcome: Ongoing  Goal: Able to interrupt nonreality-based thinking  Description: Able to interrupt nonreality-based thinking  7/3/2021 1027 by Floresita Mitchell RN  Outcome: Ongoing  7/2/2021 2324 by Veldon Aase, RN  Outcome: Ongoing     Problem: Sleep Pattern Disturbance:  Goal: Appears well-rested  Description: Appears well-rested  7/3/2021 1027 by Floresita Mitchell RN  Outcome: Ongoing  7/2/2021 2324 by Veldon Aase, RN  Outcome: Ongoing     Problem: Non-Violent Restraints  Goal: Removal from restraints as soon as assessed to be safe  7/3/2021 1027 by Floresita Mitchell RN  Outcome: Not Met This Shift  7/2/2021 2324 by Veldon Aase, RN  Outcome: Ongoing

## 2021-07-03 NOTE — PROGRESS NOTES
PROGRESS NOTE          PATIENT NAME: Ronnell Hartley RECORD NO. [id number]  DATE: 7/3/2021  SURGEON: Vasyl English  PRIMARY CARE PHYSICIAN: Elda Cardona MD    HD: # 3    ASSESSMENT    Patient Active Problem List   Diagnosis    SDH (subdural hematoma) ([address]    ESRD on dialysis ([address]    DM (diabetes mellitus) ([address]    Anemia of chronic disease    Charcot foot due to diabetes mellitus ([address]    Diabetic ulcer of left foot ([address]    Hypertension, essential    Type 2 diabetes mellitus with foot ulcer ([address]    Noncompliance    CHF (congestive heart failure) (HCC)    Sepsis ([address]    Metabolic acidosis    Azotemia    Hyperkalemia    Diarrhea    Nausea and vomiting    Peripheral vascular disease ([address]    History of left below knee amputation (HCC)    Hemoptysis    Ulcerated, foot, right, with fat layer exposed ([address]    Charcot's joint of foot, right    Pneumonia    Sepsis due to pneumonia ([address]    Loculated pleural effusion    Pleural effusion on right    ROOSEVELT (obstructive sleep apnea)    Parapneumonic effusion    C. difficile colitis    Acute pulmonary edema (HCC)    Volume overload    Non-compliance with renal dialysis (HCC)    Acute respiratory failure with hypoxia (HCC)    Moderate malnutrition (HCC)    Infected wound    Necrotizing fasciitis (HCC)    Septic shock (HCC)    Elevated digoxin level    Bradycardia       MEDICAL DECISION MAKING AND PLAN    66M admitted for sepsis suspect 2/2 necrotic sacral and inner thigh wounds  -S/p I&D b/l ischial and inner thigh wounds 7/1  -S/p 2nd look, debridement of b/l ischial wounds, veraflo wound vac placmeent    Continue medical mgmt and supportive care per primar  No further debridements planned  Wound care: veraflo wound vac change on 7/5 - will plan with wound care to be at bedside.  WTD BID dressing change to thigh wounds  ABX per ID: Cefepime, Flagyl, Vanco  Leukocytosis: 18 today from 20  ESRD, on dialysis M/W/F  Will continue to follow until first vac found.      Grant Galaviz DO  7/1/21, 10:06 AM       Attending Note      I have reviewed the above [facility] note(s) and I either performed the key elements of the medical history and physical exam or was present with the surgery resident when the key elements of the medical history and physical exam were performed. I have discussed the findings, established the care plan and recommendations with the surgical team.  Audelia Ebbing in place. Will change and likely sign off. Continue groin dressing changes.     Jm Montalvo MD  7/3/2021  10:44 AM

## 2021-07-03 NOTE — PROGRESS NOTES
INTENSIVE CARE UNIT  Resident Physician Progress Note    Patient - Cy Doll  Date of Admission -  6/30/2021 10:51 PM  Date of Evaluation -  7/3/2021  Room and Bed Number -  0101/0101-01   Hospital Day - 3      SUBJECTIVE:     OVERNIGHT EVENTS: No acute events overnight. TODAY:     Remains intubated, follows commands. Patient was started tube feeds yesterday night, however he had a lot of residual so tube feeds were stopped and restarted as trickle feeds, continues to have high residuals. We will start on Reglan. Patient underwent for second debridement of bilateral buttock wounds and bilateral thigh wounds yesterday. Wound VAC connected to bilateral hips placed. Vitals /94,  on 3 mics of Levophed. Sedated with fentanyl 125 MCG/hour. Vent settings PRVC RR 16, , PEEP 5, 30% FiO2. Last ABG showed pH 7.450, CO2 40.3, 02 116.9  Patient is ESRD on dialysis, minimal urine output. Underwent hemodialysis yesterday. Pertinent labs blood sugar 120s  Lactic acid 1.6-> 2.3  -> 251  Last digoxin level 2.9, patient also received Digibind  WBC 21.4-> 20.1-> 18  Hb 8.4-> 7.2    Continue IV cefepime, vancomycin and Flagyl as per ID recommendations. Cultures so far growing nonlactose fermenting gram-negative rods, lactose fermenting gram-negative rods, Enterococcus. Brief history  Cy Doll is a 73 y.o. male with history of ESRD on hemodialysis, systolic CHF last echo done on 6/19/2020 showed EF of 25 to 30%, A. fib, multivessel CAD s/p CABG and subsequent DENY to LCx, peripheral artery disease with history of left BKA, type 2 diabetes mellitus initially was admitted at [facility] for worsening mentation and bilateral necrotizing wounds. Patient stays at a nursing home.     In the ER, patient was afebrile, hypotensive with BP 100s was initially started on IV cefepime and vancomycin in the ED and was transferred to the medicine floor.   In the ER, vitals heart rate in high 50s, /57  Lactic acid 3.1,   WBC 21.4, Hb 8.9  Patient became hypotensive and bradycardic so rapid response was called and patient was eventually transferred to ICU. General surgery and infectious disease were also consulted. Patient was started on Levophed for a brief period of time, bradycardic in high 40s, received atropine and 1 dose of glucagon and her beta-blocker and amiodarone were held. Her digoxin levels came back elevated, was also given Digibind today. Patient was started on IV Flagyl, cefepime and vancomycin as per ID recommendations. Repeat echocardiogram suggestive of LVEF 25%, moderate global hypokinesis, grade 2 diastolic dysfunction with RVSP 53.  Cardiology was also following the patient, recommended LifeVest before discharge.     Patient underwent bedside debridement by general surgery however there is need for further deep muscle debridement so the decision was made to transfer the patient to [facility]. Consulted general surgery for further recommendations regarding debridement. Patient currently is in sinus bradycardia with heart rate in high 40s. BP 91/48 with map of 62. Continue with IV cefepime, vancomycin and Flagyl and reconsult ID. Recheck digoxin level in the morning, repeat CBC, BMP and wait on cultures. Overnight patient was taken to the OR by general surgery for debridement of subcutaneous tissue and muscle for necrotic bilateral groin and posterior hip wounds. Patient remained bradycardic with heart rate in 40s and map in the low 60s and was eventually started on dopamine drip.   Also received transfusion intraoperatively, Hb 7.3    AWAKE & FOLLOWING COMMANDS:  [] No   [] Yes    SECRETIONS Amount:  [] Small [] Moderate  [] Large  [] None  Color:     [] White [] Colored  [] Bloody    SEDATION:  RAAS Score:  [] Propofol gtt  [] Versed gtt  [] Ativan gtt   [] No Sedation    PARALYZED:  [] No    [] Yes    VASOPRESSORS:  [] No    [] Yes  [] Levophed [] Dopamine [] Vasopressin  [] Dobutamine [] Phenylephrine [] Epinephrine      OBJECTIVE:     VITAL SIGNS:  BP (!) 110/94   Pulse 62   Temp 99.9 °F (37.7 °C) (Oral)   Resp 16   Ht 6' (1.829 m)   Wt 165 lb (74.8 kg)   SpO2 100%   BMI 22.38 kg/m²   Tmax over 24 hours:  Temp (24hrs), Av.6 °F (37 °C), Min:97.7 °F (36.5 °C), Max:99.9 °F (37.7 °C)      Patient Vitals for the past 8 hrs:   BP Temp Temp src Pulse Resp SpO2 Weight   21 0700 (!) 110/94   62 16     21 0600 (!) 116/53 99.9 °F (37.7 °C) Oral 62 16 100 % 165 lb (74.8 kg)   21 0500 (!) 116/58   60 16 100 %    21 0410     16 100 %    21 0400 133/61 99.4 °F (37.4 °C) Oral 67 15 100 %    21 0345      100 %    21 0330      100 %    21 0315      100 %    21 0300 134/61   68 16 100 %    21 0256    69 16 100 %    21 0245      100 %    21 0230      100 %    21 0215      100 %    21 0200 (!) 134/59 99 °F (37.2 °C) Oral 68 17 100 %    21 0145      100 %    21 0130      100 %    21 0115      100 %    21 0100 132/60   67 16 100 %    21 0045      100 %    21 0030      100 %    21 0015      100 %    21 0000 136/62 97.8 °F (36.6 °C) Oral 69 23 100 %    21 2345      100 %    21 2330      100 %          Intake/Output Summary (Last 24 hours) at 7/3/2021 0718  Last data filed at 7/3/2021 9321  Gross per 24 hour   Intake 2533.5 ml   Output 2410 ml   Net 123.5 ml     Date 21 0000 - 21 2359   Shift 9819-9570 8644-2305 0738-9807 24 Hour Total   INTAKE   I.V.(mL/kg) 337(4.5)   337(4.5)   NG/GT(mL/kg) 490(6.5)   490(6.5)   IV Piggyback(mL/kg) 200(2.7)   200(2.7)   Shift Total(mL/kg) 9538(77.3)   1027(13.7)   OUTPUT   Shift Total(mL/kg)       Weight (kg) 74.8 74.8 74.8 74.8     Wt Readings from Last 3 Encounters: 07/03/21 165 lb (74.8 kg)   06/30/21 170 lb 13.7 oz (77.5 kg)   06/11/21 169 lb 1.5 oz (76.7 kg)     Body mass index is 22.38 kg/m².         PHYSICAL EXAM:  GEN:  awake, fatigued and mild distress  EYES:  Left pupil cataract, lids and lashes normal  HEENT:  Normocepalic, without obvious abnormality  Atramatic  LUNGS:  no increased work of breathing and good air exchange  CV:    regular rate and rhythm and bradycardic with regular rhythm  ABDOMEN:   no scars and normal bowel sounds  MSK:    Left BKA  NEURO[de-identified]   awake  alert  SKIN:   No bruising or bleeding  EXTREMITIES:                      MEDICATIONS:  Scheduled Meds:   Sodium Hypochlorite   Irrigation Once    vancomycin (VANCOCIN) intermittent dosing (placeholder)   Other RX Placeholder    vancomycin  750 mg Intravenous Once per day on Mon Wed Fri    chlorhexidine  15 mL Mouth/Throat BID    midodrine  5 mg Oral TID WC    fentanNYL  50 mcg Intravenous Once    sodium chloride flush  5-40 mL Intravenous 2 times per day    [Held by provider] heparin (porcine)  5,000 Units Subcutaneous 3 times per day    insulin lispro  0-6 Units Subcutaneous TID WC    insulin lispro  0-3 Units Subcutaneous Nightly    cefepime  1,000 mg Intravenous Q24H    metroNIDAZOLE  500 mg Intravenous Q8H     Continuous Infusions:   fentaNYL 125 mcg/hr (07/03/21 0024)    norepinephrine 3 mcg/min (07/03/21 0437)    sodium chloride       PRN Meds:   midodrine, 10 mg, PRN  albumin human, 25 g, PRN  sodium chloride flush, 5-40 mL, PRN  sodium chloride, 25 mL, PRN  ondansetron, 4 mg, Q8H PRN   Or  ondansetron, 4 mg, Q6H PRN  polyethylene glycol, 17 g, Daily PRN  acetaminophen, 650 mg, Q6H PRN   Or  acetaminophen, 650 mg, Q6H PRN        SUPPORT DEVICES: [] Ventilator [] BIPAP  [] Nasal Cannula [] Room Air    VENT SETTINGS (Comprehensive) (if applicable):    DATA:  Complete Blood Count:   Recent Labs     07/01/21  0542 07/02/21  0412 07/03/21  0627   WBC 21.9* 20.1* 18.0*   RBC 2.89* 3.01* 2.55*   HGB 8.2* 8.4* 7.2*   HCT 28.8* 31.1* 24.8*   MCV 99.7 103.3* 97.3   MCH 28.4 27.9 28.2   MCHC 28.5 27.0* 29.0   RDW 19.1* 19.4* 18.9*   PLT See Reflexed IPF Result See Reflexed IPF Result 158   MPV NOT REPORTED NOT REPORTED 11.4        Last 3 Blood Glucose:   Recent Labs     06/30/21 2330 07/01/21  0542 07/02/21  0412   GLUCOSE 131* 149* 122*        PT/INR:    Lab Results   Component Value Date    PROTIME 23.8 05/07/2021    PROTIME 10.9 10/25/2011    INR 2.1 05/07/2021     PTT:    Lab Results   Component Value Date    APTT 49.1 05/07/2021       Comprehensive Metabolic Profile:   Recent Labs     06/30/21 2330 07/01/21  0542 07/02/21  0412   * 134* 135   K 3.6* 3.8 4.6   CL 96* 98 102   CO2 23 20 20   BUN 28* 29* 37*   CREATININE 2.25* 2.54* 3.38*   GLUCOSE 131* 149* 122*   CALCIUM 9.4 9.8 9.6      Magnesium:   Lab Results   Component Value Date    MG 2.1 06/11/2021    MG 2.3 06/10/2021    MG 2.1 06/09/2021     Phosphorus:   Lab Results   Component Value Date    PHOS 3.5 06/30/2021    PHOS 5.1 06/11/2021    PHOS 6.7 06/10/2021     Ionized Calcium:   Lab Results   Component Value Date    CAION 4.43 01/09/2012    CAION 4.49 10/19/2011    CAION 4.05 10/05/2011        Urinalysis:   Lab Results   Component Value Date    NITRU NEGATIVE 06/22/2013    COLORU YELLOW 06/22/2013    PHUR 7.0 06/22/2013    WBCUA 2 TO 5 06/22/2013    RBCUA 0 TO 2 06/22/2013    MUCUS NOT REPORTED 06/22/2013    TRICHOMONAS NOT REPORTED 06/22/2013    YEAST NOT REPORTED 06/22/2013    BACTERIA NOT REPORTED 06/22/2013    SPECGRAV 1.014 06/22/2013    LEUKOCYTESUR NEGATIVE 06/22/2013    UROBILINOGEN Normal 06/22/2013    BILIRUBINUR NEGATIVE 06/22/2013    BILIRUBINUR NEGATIVE  Verified by ictotest. 10/20/2011    GLUCOSEU 1+ 06/22/2013    GLUCOSEU TRACE 10/20/2011    KETUA NEGATIVE 06/22/2013    AMORPHOUS NOT REPORTED 06/22/2013       HgBA1c:    Lab Results   Component Value Date    LABA1C 6.6 01/23/2021     TSH:    Lab Results   Component  Infected wound 06/29/2021    Moderate malnutrition ([facility] 06/09/2021    Acute respiratory failure with hypoxia ([facility] 06/08/2021    Acute pulmonary edema (HCC) 06/07/2021    Volume overload 06/07/2021    Non-compliance with renal dialysis ([facility] 06/07/2021    Parapneumonic effusion 05/09/2021    C. difficile colitis 05/09/2021    Loculated pleural effusion 05/08/2021    Pleural effusion on right 05/08/2021    ROOSEVELT (obstructive sleep apnea) 05/08/2021    Sepsis ([facility] 04/28/2021    Pneumonia 04/28/2021    Sepsis due to pneumonia ([facility] 02/57/2751    Metabolic acidosis     Azotemia     Hyperkalemia     Diarrhea     Nausea and vomiting     Peripheral vascular disease ([facility]     History of left below knee amputation (HCC)     Hemoptysis     Ulcerated, foot, right, with fat layer exposed ([facility]     Charcot's joint of foot, right     CHF (congestive heart failure) ([facility]     Noncompliance 06/02/2019    Type 2 diabetes mellitus with foot ulcer ([facility]     Hypertension, essential     Diabetic ulcer of left foot ([facility] 03/04/2016    Charcot foot due to diabetes mellitus ([facility] 12/28/2015    Anemia of chronic disease 07/03/2013    ESRD on dialysis ([facility] 06/27/2013    DM (diabetes mellitus) ([facility] 06/27/2013    SDH (subdural hematoma) ([facility] 06/22/2013          PLAN:     WEAN PER PROTOCOL:  [] No   [] Yes  [x] N/A    ICU PROPHYLAXIS:  Stress ulcer:  [] PPI Agent  [] M4Dvfru [] Sucralfate  [] Other:  VTE:   [] Enoxaparin  [] Unfract.  Heparin Subcut  [] EPC Cuffs    NUTRITION:  [x] NPO [] Tube Feeding (Specify: ) [] TPN  [] PO    HOME MEDS RECONCILED: [] No  [x] Yes    CONSULTATION NEEDED:  [] No  [x] Yes    FAMILY UPDATED:    [x] No  [] Yes    TRANSFER OUT OF ICU:   [x] No  [] Yes    Hospital Problems         Last Modified POA    * (Principal) Necrotizing fasciitis ([facility] 6/30/2021 Yes    ESRD on dialysis ([facility] 6/30/2021 Yes    DM (diabetes mellitus) ([facility] 6/30/2021 Yes    Anemia of chronic disease 6/30/2021 Yes    Hypertension, essential 6/30/2021 Yes    Noncompliance 6/30/2021 Yes    CHF (congestive heart failure) ([facility] 6/30/2021 Yes    Peripheral vascular disease ([facility] 6/30/2021 Yes    History of left below knee amputation ([facility] 6/30/2021 Yes    ROOSEVELT (obstructive sleep apnea) (Chronic) 6/30/2021 Yes    Overview Signed 5/8/2021  7:49 AM by Walt Habermann, MD     Clinical diagnosis         Septic shock ([facility] 6/30/2021 Yes    Elevated digoxin level 6/30/2021 Yes    Bradycardia 6/30/2021 Yes          Plan:    1. Septic shock likely secondary to bilateral necrotic wounds: Resolving. Currently on Levophed drip. Cultures from right hip shows gram-negative and positive rods, staph, Enterococcus so far. continue antibiotics  2. Necrotizing fasciitis s/p second surgical debridement. Currently has two wound VACs in place. Continue IV Flagyl, cefepime and vancomycin and will reconsult ID. Follow-up cultures. Wound ostomy following. 3. Sinus bradycardia:  Resolved. Beta-blockers and amiodarone on hold. Elevated digoxin levels given Digibind 1 dose. Cardiology following the patient  4. Elevated digoxin levels status post Digibind: digoxin levels 2.9  5. Peripheral artery disease s/p left BKA  6. ESRD on hemodialysis, nephrology following, hemodialysis yesterday. 7. Systolic CHF with ischemic cardiomyopathy, EF 25% status post CABG and subsequent DENY to LCx. Will follow up with cardiology recommendations regarding LifeVest before discharge.  Jonathan Leslie MD  PGY-3 Internal Medicine Resident  [address] [place] [address]   7/3/2021 7:18 AM      Attending Physician Statement  I have discussed the care of Jhon Kerr, including pertinent history and exam findings with the resident. I have reviewed the key elements of all parts of the encounter with the resident. I have seen and examined the patient with the resident.   I agree with the assessment and plan and status of the problem list as

## 2021-07-03 NOTE — PROGRESS NOTES
[facility]  Progress Note    John Kerr  MRN: [id number]  Date: 7/3/2021  LOS: 3     Reason for F/U :   Necrotic wounds and septic shock    Impression :   1. Respiratory failure currently ventilator dependent  2. 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 Sepsis secondary to above with resolved septic shock no longer on pressors  4. End-stage renal disease on hemodialysis  5. Diabetes mellitus type 2  6. Coronary artery disease with ischemic cardiomyopathy  7. Congestive heart failure  8. Peripheral arterial disease    Recommendations:   · Continue intravenous antimicrobial therapy with cefepime, vancomycin, and metronidazole. · The wound cultures done at [facility] continue to have multiple organisms isolated including a nonlactose fermenting gram-negative ayaan, lactose fermenting gram-negative ayaan, presumed Enterococcus, and Staph aureus and I have asked the lab to work these up. · Continue local wound care and the patient has irrigating wound vacs in place    Infection Control Recommendations:   Contact precautions    Discharge Planning:   Estimated Length of IV antimicrobials:  To be determined  Patient will need Midline Catheter Insertion/ PICC line Insertion: No  Patient will need: Home IV , [place],  SNF,  LTAC: Undetermined  Patient willneed outpatient wound care: No    Medical Decision making / Summary of Stay:   Breann Hernandez a 73y.o.-year-old male who was initially admitted on 6/30/2021. Carson has multiple medical problems including coronary artery disease, congestive heart failure, end-stage renal disease for which she is on hemodialysis, hypertension, obstructive sleep apnea, diabetes mellitus, peripheral arterial disease status post left BKA.     The patient was admitted to [facility] on 6/29/2021 with Examination :     Physical Exam  Constitutional:       Interventions: He is sedated and intubated. HENT:      Head: Normocephalic and atraumatic. Cardiovascular:      Rate and Rhythm: Regular rhythm. Heart sounds: No murmur heard. Pulmonary:      Effort: He is intubated. Breath sounds: Normal breath sounds. Abdominal:      General: Bowel sounds are normal.      Palpations: Abdomen is soft. Musculoskeletal:      Comments: There is a left below the knee amputation   Skin:     Comments: There are dressings in the medial thighs bilaterally and irrigating wound vacs of the hips bilaterally         Laboratory data:   I have independently reviewed the followinglabs:  CBC with Differential:   Recent Labs     07/02/21  0412 07/03/21  0627   WBC 20.1* 18.0*   HGB 8.4* 7.2*   HCT 31.1* 24.8*   PLT See Reflexed IPF Result 158   LYMPHOPCT 4* 2*   MONOPCT 7 5     BMP:   Recent Labs     07/02/21  0412 07/03/21 0627    137   K 4.6 3.5*    100   CO2 20 25   BUN 37* 20   CREATININE 3.38* 2.10*   MG  --  1.5*     Hepatic Function Panel: No results for input(s): PROT, LABALBU, BILIDIR, IBILI, BILITOT, ALKPHOS, ALT, AST in the last 72 hours.       Lab Results   Component Value Date    PROCAL 4.55 05/07/2021    PROCAL 25.70 04/30/2021     Lab Results   Component Value Date    .2 07/01/2021    .8 06/29/2021    .7 04/28/2021     Lab Results   Component Value Date    SEDRATE 33 (H) 04/28/2021         No results found for: [facility]  Lab Results   Component Value Date    FERRITIN 2,624 05/07/2021    FERRITIN 1,806 06/05/2019    FERRITIN 2,516 03/05/2016    FERRITIN 532 01/09/2012    FERRITIN 1,007 12/07/2011     Lab Results   Component Value Date     05/13/2021     05/10/2021     05/07/2021     04/29/2021     06/05/2019     Lab Results   Component Value Date    FIBRINOGEN 371 05/13/2021       Results in Past 30 Days  Result Component Current Result Ref Range Previous Result Ref Range   SARS-CoV-2, Rapid Not Detected (6/7/2021) Not Detected Not in Time Range      Lab Results   Component Value Date    COVID19 Not Detected 06/07/2021    COVID19 Not Detected 04/29/2021       No results for input(s): [place] in the last 72 hours. Imaging Studies:   No new imaging    Cultures:     MRSA DNA Probe, Nasal [[id number]] Collected: 07/01/21 0755   Order Status: Completed Specimen: Nasal Updated: 07/01/21 1123    Specimen Description . NASAL SWAB    MRSA, DNA, Nasal NEGATIVE:  MRSA DNA not detected by nucleic acid amplification. Comment:                                                    Results should be used as an adjunct to nosocomial control efforts to identify patients   needing enhanced precautions.     The test is not intended to identify patients with staphylococcal infections.  Results   should not be used to guide or monitor treatment for MRSA infections.          BLOOD  GRN 10ML ORG 7ML RH    Special Requests 06/29/2021  8:41  [address] [facility]   NOT REPORTED    Culture 06/29/2021  8:41  [address]   NO GROWTH 3 DAYS      BLOOD  GRN 10ML ORG 9 ML RH    Special Requests 06/29/2021  8:27 PM [facility] [address] [facility]   NOT REPORTED    Culture 06/29/2021  8:27  [address]   NO GROWTH 3 DAYS      HIP RIGHT    Special Requests 06/29/2021  8:09  [address] [facility]   NOT REPORTED    Direct Exam 06/29/2021  8:09  [address]   NO NEUTROPHILS SEEN    Direct Exam Abnormal  06/29/2021  8:09 PM [address] NEGATIVE RODS    Culture Abnormal  06/29/2021  8:09  [address]   NON LACTOSE FERMENTING GRAM NEGATIVE RODS MODERATE GROWTH    Culture Abnormal  06/29/2021  8:09  [address]   LACTOSE FERMENTING GRAM NEGATIVE RODS LIGHT GROWTH    Culture 06/29/2021  8:09  [address]   PRESUMPTIVE ID: GROUP D ENTEROCOCCUS MODERATE GROWTH    Culture Abnormal  06/29/2021  8:09  [address]   STAPHYLOCOCCUS AUREUS LIGHT GROWTH    Culture 06/29/2021  8:09  [address]    Culture Abnormal  06/29/2021  8:09  [address]   This is a mixed culture of organisms, which may represent colonization or contamination.  Notify the laboratory within 7 days for further workup.  Susceptibilities have not been performed. Culture 06/29/2021  8:09  [address]   Work up all organisms per physician request.                Medications:      midodrine  10 mg Oral TID WC    [Held by provider] potassium chloride  40 mEq Oral Once    metoclopramide  5 mg Intravenous Q6H    amiodarone  200 mg Oral Daily    Sodium Hypochlorite   Irrigation Once    vancomycin (VANCOCIN) intermittent dosing (placeholder)   Other RX Placeholder    vancomycin  750 mg Intravenous Once per day on Mon Wed Fri    chlorhexidine  15 mL Mouth/Throat BID    fentanNYL  50 mcg Intravenous Once    sodium chloride flush  5-40 mL Intravenous 2 times per day    [Held by provider] heparin (porcine)  5,000 Units Subcutaneous 3 times per day    insulin lispro  0-6 Units Subcutaneous TID WC    insulin lispro  0-3 Units Subcutaneous Nightly    cefepime  1,000 mg Intravenous Q24H    metroNIDAZOLE  500 mg Intravenous Q8H           [facility]  Renee Carrillo MD  [phone]  OFFICE: [phone]      Electronically signed by Renee Carrillo MD on 7/3/2021 at 6:45 PM  Thank you for allowing us to participate in the care of this patient. Please call with questions. This note iscreated with the assistance of a speech recognition program.  While intending to generate a document that actually reflects the content of the visit, the document can still have some errors including those of syntax andsound a like substitutions which may escape proof reading.   In such instances, actual meaning can be extrapolated by contextual diversion.

## 2021-07-03 NOTE — PROGRESS NOTES
POST-OPERATIVE PROGRESS NOTE    Patient: Prasad Monae    DATE: 7/3/2021    Surgery: 2nd look debridement bilateral buttock wounds and bilateral groin, bilateral thigh/hip wounds     Subjective:   Pt states that he is doing well. Pt's pain at the surgical site has improved. Objective:  Vital signs and Nurse's note reviewed  Post-op vital signs: stable    /61   Pulse 64   Temp 98.2 °F (36.8 °C) (Oral)   Resp 19   Ht 6' (1.829 m)   Wt 164 lb 3.9 oz (74.5 kg)   SpO2 100%   BMI 22.28 kg/m²    Gen:  Follow simple commands, intubated   CV: regular rate and rhythm, no murmur or additional heart sounds heart   Resp: clear to auscultation bilateraly, no wheeze or rhonchi  Abd:  Soft, nondistended and nontender to palpation  - dressing to left thight and groin are intact, dressing to buttock dry and intact attached to wound vac   Ext:  Warm, no cyanosis or edema, left BKA    Assessment:   POD # 2 S/P 1st look and debridement buttock and thigh, POD #1 2nd look and debridement buttock and thigh   Recovering well post-op     Plan:    Continue current care  Pain control  Diet NPO  Increase activity as tolerated.   Wound care wound vac to buttock     Electronically signed by Lidia Zendejas MD  on 7/3/2021 at 12:22 AM

## 2021-07-03 NOTE — PLAN OF CARE
Problem: Respiratory:  Goal: Respiratory status will improve  Description: Respiratory status will improve  7/3/2021 0828 by Hang Jean-Baptiste RCP  Outcome: Ongoing  7/2/2021 2324 by Marisa Kumar RN  Outcome: Ongoing  Goal: Ability to maintain adequate ventilation will improve  7/3/2021 0828 by Hang Jean-Baptiste RCP  Outcome: Ongoing  7/2/2021 2324 by Marisa Kumar RN  Outcome: Ongoing     Problem: OXYGENATION/RESPIRATORY FUNCTION  Goal: Patient will maintain patent airway  7/3/2021 0828 by Hang Jean-Baptiste RCP  Outcome: Ongoing  7/2/2021 2324 by Marisa Kumar RN  Outcome: Ongoing  7/2/2021 1940 by Tapan Baer RCP  Outcome: Ongoing  Goal: Patient will achieve/maintain normal respiratory rate/effort  Description: Respiratory rate and effort will be within normal limits for the patient  7/3/2021 0828 by Hang Jean-Baptiste RCP  Outcome: Ongoing  7/2/2021 2324 by Marisa Kumar RN  Outcome: Ongoing  7/2/2021 1940 by Tapan Baer RCP  Outcome: Ongoing     Problem: MECHANICAL VENTILATION  Goal: Patient will maintain patent airway  7/3/2021 0828 by Hang Jean-Baptiste RCP  Outcome: Ongoing  7/2/2021 2324 by Marisa Kumar RN  Outcome: Ongoing  7/2/2021 1940 by Tapan Baer RCP  Outcome: Ongoing  Goal: Oral health is maintained or improved  7/3/2021 0828 by Hang Jean-Baptiste RCP  Outcome: Ongoing  7/2/2021 2324 by Marisa Kumar RN  Outcome: Ongoing  7/2/2021 1940 by Tapan Baer RCP  Outcome: Ongoing  Goal: ET tube will be managed safely  7/3/2021 0828 by Hang Jean-Baptiste RCP  Outcome: Ongoing  7/2/2021 2324 by Marisa Kumar RN  Outcome: Ongoing  7/2/2021 1940 by Tapan Baer RCP  Outcome: Ongoing  Goal: Ability to express needs and understand communication  7/3/2021 0828 by Hang Jean-Baptiste RCP  Outcome: Ongoing  7/2/2021 2324 by Marisa Kumar RN  Outcome: Ongoing  7/2/2021 1940 by Tapan Baer RCP  Outcome: Ongoing  Goal: Mobility/activity is maintained at

## 2021-07-03 NOTE — PROGRESS NOTES
[facility]   Progress Note                   Date:   7/3/2021  Patient name: Zachery Baker  Date of admission:  6/30/2021 10:51 PM  MRN:   [id number]  YOB: 1955  PCP: Zaheer Pollack MD    Reason for Admission: Septic shock from the necrotizing wound    Subjective:       No issues overnight. On levophed. Vitals. HR in 70s.      Medications:   Scheduled Meds:   midodrine  10 mg Oral TID WC    [Held by provider] potassium chloride  40 mEq Oral Once    metoclopramide  5 mg Intravenous Q6H    amiodarone  200 mg Oral Daily    Sodium Hypochlorite   Irrigation Once    vancomycin (VANCOCIN) intermittent dosing (placeholder)   Other RX Placeholder    vancomycin  750 mg Intravenous Once per day on Mon Wed Fri    chlorhexidine  15 mL Mouth/Throat BID    fentanNYL  50 mcg Intravenous Once    sodium chloride flush  5-40 mL Intravenous 2 times per day    [Held by provider] heparin (porcine)  5,000 Units Subcutaneous 3 times per day    insulin lispro  0-6 Units Subcutaneous TID WC    insulin lispro  0-3 Units Subcutaneous Nightly    cefepime  1,000 mg Intravenous Q24H    metroNIDAZOLE  500 mg Intravenous Q8H     Continuous Infusions:   fentaNYL 50 mcg/hr (07/03/21 1103)    norepinephrine 4 mcg/min (07/03/21 1138)    sodium chloride       CBC:   Recent Labs     07/01/21  0542 07/02/21  0412 07/03/21  0627   WBC 21.9* 20.1* 18.0*   HGB 8.2* 8.4* 7.2*   PLT See Reflexed IPF Result See Reflexed IPF Result 158     BMP:    Recent Labs     07/01/21  0542 07/02/21 0412 07/03/21  0627   * 135 137   K 3.8 4.6 3.5*   CL 98 102 100   CO2 20 20 25   BUN 29* 37* 20   CREATININE 2.54* 3.38* 2.10*   GLUCOSE 149* 122* 105*     Objective:   Vitals: BP (!) 113/55   Pulse 60   Temp 98.6 °F (37 °C) (Oral)   Resp 17   Ht 6' (1.829 m)   Wt 165 lb (74.8 kg)   SpO2 100%   BMI 22.38 kg/m²   General appearance: Intubated and sedated  HEENT: Head: Normocephalic, no lesions, without obvious abnormality. Neck: no adenopathy, no carotid bruit, no JVD, supple, symmetrical, trachea midline and thyroid not enlarged, symmetric, no tenderness/mass/nodules  Lungs: clear to auscultation bilaterally  Heart: regular rate and rhythm, S1, S2 normal, no murmur, click, rub or gallop  Abdomen: soft, bowel sounds positive. No organomegaly. Extremities: Left BKA. Right lower extremity chronic skin changes. Neurologic: Unable to assess as patient is intubated and sedated. Other Current Problems:   Sinus bradycardia likely secondary to septic shock further exacerbated by dig toxicity. Resolved  Dig toxicity s/p Digibind. HR stable. Sepsis with shock from necrotizing fasciitis. Improving  Necrotizing fasciitis s/p I&D   H/O PAT s/p CV on 5/5/2021  MV CAD s/p CABG with subsequent PCI to LCx in 2019 (only patent LIMALAD)  ICMP, EF persistently low 25% on Echo 6/30/21  ESRD on hemodialysis. Severe PAD s/p L BKA    Assessment and Plan: Will resume amiodarone 100 mg daily again. Can start low dose BB if BP allows. Stop digoxin on Discharge. Given current situation of sepsis and shock, not a candidate for life vest or AICD. Will discuss these options with him as outpatient. Will sign off. Please call in case of any Q and concern. Plan discussed with Dr Veronika Madrigal MD.  Internal Medicine Resident PGY 1421 [facility]   7/3/2021, 11:46 AM             Attestation signed by      Attending Physician Statement:    I have discussed the care of  Ruby Stewart , including pertinent history and exam findings, with the Cardiology fellow/resident. I have seen and examined the patient and the key elements of all parts of the encounter have been performed by me. I agree with the assessment, plan and orders as documented by the fellow/resident, after I modified exam findings and plan of treatments, and the final version is my approved version of the assessment. Additional Comments:

## 2021-07-03 NOTE — PROGRESS NOTES
Renal Progress Note    Patient :  J Luis Borges; 73 y.o. MRN# [id number]  Location:  0101/0101-01  Attending:  Harish Carias DO  Admit Date:  6/30/2021   Hospital Day: 3    Subjective:       Patient was seen and examined at bedside. Had hemodialysis yesterday. Removed 1 L over 3 and half hours was given albumin x1 Levophed adjusted. Vancomycin given during treatment. Access left AV fistula. No acute events overnight. Intubated and on ventilator support PRVC mode. FiO2 30% PEEP of 5. Sedated on fentanyl drip as previous. On pressor support with 3mcg/min of Levophed. Chart reviewed. Labs reviewed. Potassium 3.5. We will not replace. Glucose 105. Hemoglobin 7.2 was 8.4 yesterday. Blood gas reviewed pH 7.450 PCO2 40.3 PO2 116.9 bicarb 28 O2 sat 99. Patient underwent second debridement bilateral buttocks wounds bilateral groin yesterday. Wound VAC continues. Continues on IV cefepime vancomycin and Flagyl per ID recommendations.   Cultures so far growing nonlactose fermenting gram-negative rods lactose fermenting gram-negative rods Enterococcus    Current Medications:     Scheduled Meds:    midodrine  10 mg Oral TID WC    potassium chloride  40 mEq Oral Once    Sodium Hypochlorite   Irrigation Once    vancomycin (VANCOCIN) intermittent dosing (placeholder)   Other RX Placeholder    vancomycin  750 mg Intravenous Once per day on Mon Wed Fri    chlorhexidine  15 mL Mouth/Throat BID    fentanNYL  50 mcg Intravenous Once    sodium chloride flush  5-40 mL Intravenous 2 times per day    [Held by provider] heparin (porcine)  5,000 Units Subcutaneous 3 times per day    insulin lispro  0-6 Units Subcutaneous TID WC    insulin lispro  0-3 Units Subcutaneous Nightly    cefepime  1,000 mg Intravenous Q24H    metroNIDAZOLE  500 mg Intravenous Q8H     Continuous Infusions:    fentaNYL 125 mcg/hr (07/03/21 0024)    norepinephrine 3 mcg/min (07/03/21 0437)    sodium chloride       PRN Meds:  midodrine, albumin human, sodium chloride flush, sodium chloride, ondansetron **OR** ondansetron, polyethylene glycol, acetaminophen **OR** acetaminophen     Input/Output:       I/O last 3 completed shifts: In: 2533.5 [I.V.:1063.5; NG/GT:520; IV Piggyback:300]  Out: 1 [Urine:10; Emesis/NG output:750]    Vital Signs:   Temperature:  Temp: 99.9 °F (37.7 °C)  TMax:   Temp (24hrs), Av.6 °F (37 °C), Min:97.7 °F (36.5 °C), Max:99.9 °F (37.7 °C)    Respirations:  Resp: 16  Pulse:   Pulse: 62  BP:    BP: (!) 110/94  BP Range: Systolic (75XYL), EUV:772 , Min:78 , VIGNESH:288       Diastolic (26OBL), MEM:49, Min:48, Max:94      Physical Examination:     General:  Intubated with ventilator support. HEENT:  Atraumatic, normocephalic, no throat congestion, moist mucosa. Eyes:   Pupils equal, round and reactive to light, pallor, no icterus. Neck:   Supple  Chest:   Air entry fair bilaterally, basilar crackles present. Cardiac: S1 S2 RRR  Abdomen:  Soft, non-tender, no masses or organomegally appreciable. :   No suprapubic or flank tenderness. Neuro:  Sedated on ventilator. Skin:   No rashes  Extremities:  No edema left lower extremity amputation, right lower extremity foot wound present. Groin wounds present. Wound vacs continue.      Labs:       Recent Labs     21  0542 21  0412 21  0627   WBC 21.9* 20.1* 18.0*   RBC 2.89* 3.01* 2.55*   HGB 8.2* 8.4* 7.2*   HCT 28.8* 31.1* 24.8*   MCV 99.7 103.3* 97.3   MCH 28.4 27.9 28.2   MCHC 28.5 27.0* 29.0   RDW 19.1* 19.4* 18.9*   PLT See Reflexed IPF Result See Reflexed IPF Result 158   MPV NOT REPORTED NOT REPORTED 11.4      BMP:   Recent Labs     21  0542 21  0412 21  0627   * 135 137   K 3.8 4.6 3.5*   CL 98 102 100   CO2 20 20 25   BUN 29* 37* 20   CREATININE 2.54* 3.38* 2.10*   GLUCOSE 149* 122* 105*   CALCIUM 9.8 9.6 9.0      SPEP:  Lab Results   Component Value Date    PROT 7.8 2021     Uep BsAg:         Lab Results Component Value Date    HEPBSAG NONREACTIVE 01/09/2012     Urinalysis/Chemistries:      Lab Results   Component Value Date    NITRU NEGATIVE 06/22/2013    COLORU YELLOW 06/22/2013    PHUR 7.0 06/22/2013    WBCUA 2 TO 5 06/22/2013    RBCUA 0 TO 2 06/22/2013    MUCUS NOT REPORTED 06/22/2013    TRICHOMONAS NOT REPORTED 06/22/2013    YEAST NOT REPORTED 06/22/2013    BACTERIA NOT REPORTED 06/22/2013    SPECGRAV 1.014 06/22/2013    LEUKOCYTESUR NEGATIVE 06/22/2013    UROBILINOGEN Normal 06/22/2013    BILIRUBINUR NEGATIVE 06/22/2013    BILIRUBINUR NEGATIVE  Verified by ictotest. 10/20/2011    GLUCOSEU 1+ 06/22/2013    GLUCOSEU TRACE 10/20/2011    [address] NEGATIVE 06/22/2013    AMORPHOUS NOT REPORTED 06/22/2013       Radiology:     CXR: 7/1/21:  Enteric tube in satisfactory position.  Right-sided double pigtail ureteral   stent, as above. Borderline dilated small bowel loops in the mid abdomen, likely ileus   although low-grade/partial SBO a consideration    Assessment:     1. ESRD on hemodialysis. Regular hemodialysis days 4 times a week are checked below, left AV fistula. Under Dr. Femi Potter group. Weight on admission 79 kg, weight this morning 74.8 kg.  2.  Acute hypercapnic respiratory failure requiring mechanical ventilation -continues  3. Sepsis secondary to necrotic wounds left thigh, sacral region and buttock region -IV antibiotics per infectious disease and general surgery debriding wounds wound vacs present  4. Atrial fibrillation  5. Bradycardia  6. Digoxin toxicity  7. Ischemic cardiomyopathy with EF 25% and grade 3 diastolic dysfunction (echo 6/30/2021)  8. Type 2 diabetes mellitus  9. Essential hypertension continues on Levophed -   11. Ileus  12. Anemia of chronic disease -hemoglobin 7.2 this morning patient had previous transfusion. 13.  Secondary hyperparathyroidism    Plan:   1. No acute need for dialysis today. Will continue as MWF schedule. 2.  Strict intake and output.   Daily weights. 3.  Midodrine 10 mg 3 times daily. Continue pressor support with Levophed wean as tolerated  4. Antibiotics as per ESRD dosing per infectious disease  5. Digoxin toxicity treatment per cardiology  6. BMP in a.m.  7.  Will follow    Nutrition   Please ensure that patient is on a renal diet/TF. Avoid nephrotoxic drugs/contrast exposure. We will continue to follow along with you. Electronically signed by Yeimy Causey CNP, ABEBA - CNP on 7/3/2021 at 8:17 AM   Nephrology [address] [place] [address]    Attending Physician Statement  I have discussed the care of this patient, including pertinent history and exam findings, with the Resident/CNP. I have reviewed and edited the key elements of all parts of the encounter with the Resident/CNP. I agree with the assessment, plan and orders as documented by the Resident/CNP. Kyle Soto MD   Nephrology [address] [place] [address]    This note is created with the assistance of a speech-recognition program. While intending to generate a document that actually reflects the content of the visit, no guarantees can be provided that every mistake has been identified and corrected by editing.

## 2021-07-03 NOTE — PLAN OF CARE
Problem: OXYGENATION/RESPIRATORY FUNCTION  Goal: Patient will maintain patent airway  7/3/2021 1932 by Katy Eldridge RCP  Outcome: Ongoing     Problem: OXYGENATION/RESPIRATORY FUNCTION  Goal: Patient will achieve/maintain normal respiratory rate/effort  Description: Respiratory rate and effort will be within normal limits for the patient  7/3/2021 1932 by Katy Eldridge RCP  Outcome: Ongoing     Problem: MECHANICAL VENTILATION  Goal: Patient will maintain patent airway  7/3/2021 1932 by Katy Eldridge RCP  Outcome: Ongoing     Problem: MECHANICAL VENTILATION  Goal: Oral health is maintained or improved  7/3/2021 1932 by Katy Eldridge RCP  Outcome: Ongoing     Problem: MECHANICAL VENTILATION  Goal: ET tube will be managed safely  7/3/2021 1932 by Katy Eldridge RCP  Outcome: Ongoing     Problem: MECHANICAL VENTILATION  Goal: Ability to express needs and understand communication  7/3/2021 1932 by Katy Eldridge RCP  Outcome: Ongoing     Problem: MECHANICAL VENTILATION  Goal: Mobility/activity is maintained at optimum level for patient  7/3/2021 1932 by Katy Eldridge RCP  Outcome: Ongoing     Problem: SKIN INTEGRITY  Goal: Skin integrity is maintained or improved  7/3/2021 1932 by Katy Eldridge RCP  Outcome: Ongoing

## 2021-07-03 NOTE — CARE COORDINATION
Case Management Initial Discharge Plan  Gabriel Melissa,             Met with:patient's dtr, Erlinda Han  to discuss discharge plans. Patient is intubated and sedated  Information verified: address, contacts, phone number, , insurance Yes  Insurance Provider: [employer]    Emergency Contact/Next of Kin name & number: Harman Sandhu, brother [phone], Erlinda Han, dtr [phone]  Who are involved in patient's support system? unknown    PCP: Maria Guadalupe Mishra MD  Date of last visit: unknown      Discharge Planning    Living Arrangements:        Home has unknown stories  unknown stairs to climb to get into front door, unknownstairs to climb to reach second floor  Location of bedroom/bathroom in home unknown    Patient able to perform ADL's:Assisted, came from [address] [place] [address] (outpatient & in home)    DME equipment: unknown  DME provider: unknown    Is patient receiving oral anticoagulation therapy? No    If indicated:   Physician managing anticoagulation treatment: na  Where does patient obtain lab work for ATC treatment? na      Potential Assistance Needed:       Patient agreeable to home care: No  [place] of choice provided:  n/a    Prior SNF/Rehab Placement and Facility: came from [address] [place] [address] to SNF/Rehab: No  [place] of choice provided: n/a     Evaluation: no    Expected Discharge date:       Patient expects to be discharged to: If home: is the family and/or caregiver wiling & able to provide support at home? no  Who will be providing this support? unknown    Follow Up Appointment: Best Day/ Time:      Transportation provider: no  Transportation arrangements needed for discharge: Yes     Readmission Risk              Risk of Unplanned Readmission:  34             Does patient have a readmission risk score greater than 14?: Yes  If yes, follow-up appointment must be made within 7 days of discharge.      Goals of Care:       Educated  NA

## 2021-07-04 NOTE — OP NOTE
Operative Note      Patient: Daryl Rapp  YOB: 1955  MRN: [id number]    Date of Procedure: 7/2/2021    Pre-Op Diagnosis: NECROTIC GROIN/HIP WOUNDS    Post-Op Diagnosis: Same       Procedure(s):  2ND LOOK DEBRIDEMENT BILATERAL BUTTOCK WOUNDS, DRESSING APPLIED TO JOYCELYN GROIN, JOYCELYN THIGH/HIP WOUNDS    Surgeon(s):  Destin Rodriguez MD    Assistant:   Resident: Wiliam Mora DO    Anesthesia: General    Estimated Blood Loss (mL): Minimal    Complications: None    Specimens:   * No specimens in log *    Implants:  * No implants in log *      Drains:   Negative Pressure Wound Therapy Buttocks Left (Active)   Wound Type Surgical 07/04/21 0800   Dressing Type Black foam 07/04/21 0400   Cycle Continuous; On 07/04/21 0800   Target Pressure (mmHg) 125 07/04/21 0800   Dressing Status Clean;Dry; Intact 07/04/21 0800   Drainage Amount Scant 07/04/21 0400   Drainage Description Serosanguinous 07/04/21 0400   Output (ml) 200 ml 07/04/21 0400   Wound Assessment Other (Comment) 07/04/21 0800   Rivka-wound Assessment Other (Comment) 07/04/21 0800       Negative Pressure Wound Therapy Buttocks Right (Active)   Wound Type Surgical 07/04/21 0800   Dressing Type Black foam 07/04/21 0400   Cycle Continuous; On 07/04/21 0800   Target Pressure (mmHg) 150 07/04/21 0800   Dressing Status Clean;Dry; Intact 07/04/21 0800   Drainage Amount Scant 07/04/21 0400   Drainage Description Serosanguinous 07/04/21 0400   Output (ml) 200 ml 07/04/21 0400   Wound Assessment Other (Comment) 07/04/21 0800   Rivka-wound Assessment Other (Comment) 07/04/21 0800       NG/OG/NJ/NE Tube Nasogastric Right nostril (Active)   Surrounding Skin Intact 07/04/21 0400   Securement device Yes 07/04/21 0400   Status Other (Comment) 07/04/21 0400   Placement Verified by External Catheter Length;by Gastric Contents 07/04/21 0400   NG/OG/NJ/NE External Measurement (cm) 75 cm 07/04/21 0400   Tube Feeding Renal Formula 07/04/21 0400   Tube Feeding Status Continuous 07/04/21 0400   Rate/Schedule 10 mL/hr 07/04/21 0400   Tube Feeding Intake (mL) 81 ml 07/04/21 0400   Free Water Flush (mL) 30 mL 07/03/21 0600   Residual Volume (ml) 30 ml 07/04/21 0400   Output (mL) 100 ml 07/03/21 1200       [REMOVED] Urethral Catheter Straight-tip (Removed)   Catheter Indications Need for fluid volume management of the critically ill patient in a critical care setting 07/03/21 1200   Site Assessment Pinhook 07/03/21 1200   Urine Color Yellow 07/03/21 1200   Urine Appearance Cloudy 07/03/21 1200   Output (mL) 10 mL 07/03/21 1400       [REMOVED] Hemodialysis Arteriovenous fistula Left Forearm (Removed)   Site Assessment Clean 07/04/21 0400   Thrill Present 07/04/21 0400   Bruit Present 07/04/21 0400   Dressing Status Other (Comment) 07/02/21 0400       Findings: mostly healthy subcutaneous tissue; some debridement    History: Patient is a 61-year-old male who initially presented to the emergency department on 7/1/2021 with concerns for necrotic wounds and sepsis and ultimately went to the operating room for incision and drainage with local debridement due to concerns for necrotizing soft tissue infection. Patient had bilateral ischial and bilateral medial thigh wounds debrided and packed. The patient therefore was decided to go to the operating room for a second look and further department with possibility of a wound VAC placement. This procedure, including risks, benefits, alternatives and complications have been fully reviewed with the patient's family and informed consent was obtained. All questions were answered appropriately. Detailed Description of Procedure: The patient was brought to the operating room and remained in a supine position for endotracheal intubation with anesthesia. The patient was then proned on the operating table for access to the ischial wounds. Patient was already receiving antibiotics.   A timeout was performed to confirm the patient, procedure, allergies and all the concerns. The bilateral buttocks, ischium and posterior thighs were prepped using Betadine and draped in the usual sterile fashion. On the left ischium, the medial superior and inferior aspects were clean in appearance with healthy subcutaneous tissue; the more lateral aspects were debrided carefully using a combination of electrocautery and sharp dissection. We then turned our attention to the right ischium where the anterior aspect of the wound were sharply debrided off until healthy subcutaneous tissue was evident on the wound bed. At this point, the gray honeycomb was placed directly onto the wound bed followed by the solid gray foam which was then secured in place using acrylic tape. In a similar fashion, the irrigating wound VAC was placed on the right ischium. This concluded the procedure. All sponge, needle and instrument counts were correct in the case. The patient was directly transferred to ICU in stable condition. Dr. Shari Lion was present for the entire procedure.      Electronically signed by Hipolito Herndon DO on 7/4/2021 at 12:04 PM

## 2021-07-04 NOTE — PLAN OF CARE
Problem: MECHANICAL VENTILATION  Goal: Patient will maintain patent airway  7/4/2021 1328 by Zainab Sawyer RCP  Outcome: Completed     Problem: MECHANICAL VENTILATION  Goal: Oral health is maintained or improved  7/4/2021 1328 by Zainab Sawyer RCP  Outcome: Completed     Problem: MECHANICAL VENTILATION  Goal: ET tube will be managed safely  7/4/2021 1328 by Zainab Sawyer RCP  Outcome: Completed     Problem: MECHANICAL VENTILATION  Goal: Ability to express needs and understand communication  7/4/2021 1328 by Zainab Sawyer RCP  Outcome: Completed     Problem: MECHANICAL VENTILATION  Goal: Mobility/activity is maintained at optimum level for patient  7/4/2021 1328 by Zainab Sawyer RCP  Outcome: Completed

## 2021-07-04 NOTE — CONSULTS
Consultation Note  Podiatric Medicine and Surgery     Subjective     Chief Complaint: Nonhealing right foot wound    HPI:  J Luis Borges is a 73 y.o. male seen at [facility] ICU after being consulted for right foot wounds. Patient has a history of left-sided BKA, with multiple wounds to the right lower extremity. Patient is currently admitted to the MICU with sepsis secondary to necrotizing fasciitis. Patient is currently sedated and intubated PCP is Megan Carter MD    ROS:   Unable to perform due to patient being intubated and sedated. Past Medical History   has a past medical history of Acute on chronic congestive heart failure ([address] Anemia, CAD (coronary artery disease), Cardiomyopathy ([address] Dialysis care, ESRD (end stage renal disease) on dialysis ([address] Foot ulcer, left ([address] GERD (gastroesophageal reflux disease), Hemodialysis patient ([address] History of sleep apnea, Hyperlipidemia, Hyperparathyroidism ([facility] Hypertension, Neuropathy, ROOSEVELT (obstructive sleep apnea), Peripheral vascular disease ([address] Pneumonia, S/P CABG (coronary artery bypass graft), and Type II or unspecified type diabetes mellitus without mention of complication, not stated as uncontrolled. Past Surgical History   has a past surgical history that includes Cataract removal; Dialysis fistula creation (Left, 2014); Leg amputation, lower tibia/fibula; IR CHEST TUBE INSERTION (5/10/2021); and laparotomy (N/A, 7/1/2021). Medications  Prior to Admission medications    Medication Sig Start Date End Date Taking?  Authorizing Provider   acetaminophen (TYLENOL) 500 MG tablet Take 1,000 mg by mouth every 6 hours as needed for Pain    Historical Provider, MD   calcium acetate (PHOSLO) 667 MG TABS Take 2 tablets by mouth 3 times daily (with meals) 6/10/21   General Makeda MD   amiodarone (CORDARONE) 200 MG tablet Take 1 tablet by mouth daily 6/9/21   General Makeda MD   atorvastatin (LIPITOR) 40 MG tablet Take 1 tablet by mouth nightly 6/9/21   Juwan Patirck MD   metoprolol tartrate (LOPRESSOR) 25 MG tablet Take 1 tablet by mouth 2 times daily 6/9/21   Juwan Patrick MD   midodrine (PROAMATINE) 2.5 MG tablet Take 1 tablet by mouth 3 times daily 6/9/21   Juwan Patrick MD   pantoprazole (PROTONIX) 40 MG tablet Take 40 mg by mouth daily    Historical Provider, MD   B Complex-C-Folic Acid (ALBERTA-FERNANDA RX) 1 MG TABS Take 1 tablet by mouth daily    Historical Provider, MD   sevelamer (RENVELA) 800 MG tablet Take 5 tablets by mouth 3 times daily (with meals) 5/15/21   Silvia Nelson MD   nitroGLYCERIN (NITROSTAT) 0.4 MG SL tablet Place 0.4 mg under the tongue every 5 minutes as needed for Chest pain up to max of 3 total doses. If no relief after 1 dose, call 911. Historical Provider, MD   ONE TOUCH ULTRA TEST strip USE AS DIRECTED DAILY AS NEEDED 1/24/17   Leonora Jacobsen DO   Blood Glucose Monitoring Suppl (ONE TOUCH ULTRA 2) W/DEVICE KIT TEST 3 TIMES DAILY 8/26/16   Leonora Jacobsen DO   glucose monitoring kit (FREESTYLE) monitoring kit 1 kit by Does not apply route daily as needed 4/21/16   Leonora Jacobsen DO   Lancets 30G MISC 1 each by Does not apply route 3 times daily 4/21/16   Leonora Jacobsen DO   Misc.  Devices (WALKER GLIDE WHEELS) MISC 1 Device by Does not apply route continuous 4/21/16   Leonora Jacobsen DO   aspirin 81 MG EC tablet Take 1 tablet by mouth daily 3/14/16   Karin Lr DO    Scheduled Meds:   midodrine  10 mg Oral TID WC    [Held by provider] potassium chloride  40 mEq Oral Once    amiodarone  200 mg Oral Daily    Sodium Hypochlorite   Irrigation Once    vancomycin (VANCOCIN) intermittent dosing (placeholder)   Other RX Placeholder    vancomycin  750 mg Intravenous Once per day on Mon Wed Fri    chlorhexidine  15 mL Mouth/Throat BID    fentanNYL  50 mcg Intravenous Once    sodium chloride flush  5-40 mL Intravenous 2 times per day    [Held by provider] heparin (porcine)  5,000 Units Subcutaneous 3 times per day    insulin lispro  0-6 Units Subcutaneous TID WC    insulin lispro  0-3 Units Subcutaneous Nightly    cefepime  1,000 mg Intravenous Q24H    metroNIDAZOLE  500 mg Intravenous Q8H     Continuous Infusions:   fentaNYL 50 mcg/hr (21 0530)    norepinephrine 2 mcg/min (21 0820)    sodium chloride       PRN Meds:.midodrine, albumin human, sodium chloride flush, sodium chloride, ondansetron **OR** ondansetron, polyethylene glycol, acetaminophen **OR** acetaminophen    Allergies  is allergic to pcn [penicillins]. Family History  family history includes Diabetes in his brother and father; Heart Disease in his father. Social History   reports that he has never smoked. He has never used smokeless tobacco.   reports no history of alcohol use. reports no history of drug use.     Objective     Vitals:  Patient Vitals for the past 8 hrs:   BP Temp Temp src Pulse Resp SpO2 Weight   21 0900    70 15 100 %    21 0800 (!) 111/54 99.9 °F (37.7 °C) Oral 75 16 99 %    21 0753    76 21 100 %    21 0700    56  100 %    21 0615    56 (!) 6 100 %    21 0600  98.6 °F (37 °C) Oral 58 11 100 %    21 0545    57 (!) 3 100 %    21 0530    56 (!) 0 100 %    21 0515    56 (!) 0 100 %    21 0500    56 (!) 2 100 %    21 0445    57 (!) 4 100 %    21 0430    56 (!) 2 100 %    21 0415    59 17 100 %    21 0410     (!) 5 100 %    21 0400 (!) 120/55 99.3 °F (37.4 °C) Oral 58 16 100 % 167 lb (75.8 kg)   21 0352     16 100 %    21 0345    56 13 100 %    21 0330    57 10 100 %    21 0315    57 9 100 %    21 0300    57 12 100 %    21 0245    57 11 100 %    21 0230    57 11 100 %      Average, Min, and Max for last 24 hours Vitals:  TEMPERATURE:  Temp  Av.9 °F (37.2 °C)  Min: 97.7 °F (36.5 °C)  Max: 100.4 °F (38 °C)    RESPIRATIONS RANGE: Resp  Av.3  Min: 0  Max: 24    PULSE RANGE: Pulse  Av  Min: 56  Max: 76    BLOOD PRESSURE RANGE:  Systolic (79JSP), SBH:635 , Min:104 , OEL:464   ; Diastolic (33SXM), MOA:20, Min:43, Max:58      PULSE OXIMETRY RANGE: SpO2  Av %  Min: 99 %  Max: 100 %  I&O:  I/O last 3 completed shifts: In: 958 [I.V.:763; NG/GT:195]  Out: 2410 [Urine:10; Emesis/NG output:1300; Drains:1100]    CBC:  Recent Labs     21   WBC 20.1* 18.0* 17.9*   HGB 8.4* 7.2* 7.2*   HCT 31.1* 24.8* 24.9*   PLT See Reflexed IPF Result 158 146        BMP:  Recent Labs     21    137 136   K 4.6 3.5* 3.8    100 99   CO2 20 25 26   BUN 37* 20 32*   CREATININE 3.38* 2.10* 3.16*   GLUCOSE 122* 105* 119*   CALCIUM 9.6 9.0 9.1        Coags:  No results for input(s): APTT, PROT, INR in the last 72 hours. Lab Results   Component Value Date    LABA1C 6.6 2021     Lab Results   Component Value Date    SEDRATE 33 (H) 2021     Lab Results   Component Value Date    .2 (H) 2021         Right Lower Extremity Physical Exam:  Vascular: DP and PT pulses are non-palpable, right. CFT <5seconds to all digits, right. Hair growth is absent to the level of the digits. Mild edema to the right hallux. Temperature is warm to cold. Neuro: Unable to perform due to the patient's mental status. Musculoskeletal: Muscle strength is decreased ROM, decreased strength to all lower extremity muscle groups. Gross de testing not performed the patient being sedated. Patient does appear to react to painful stimuli. Dermatologic:     Partial thickness ulcer  located medial hallux and measures approximately 0.5cm x 0.5cm unstageable. Some mild periwound erythema Base is necrotic. Periwound skin is hyperkeratotic. No drainage noted no odor appreciated. No erythema, no increased warmth.   Does not probe to bone, sinus track, or undermine. No fluctuance, crepitus, or induration. Interdigital maceration absent. Partial-thickness ulcer located lateral plantar heel and measures approximately 0.2cm x 0.2 cm x 0.1 cm. Base is fibronecrotic. Periwound skin is hyperkeratotic. Does not probe to bone, sinus tract, or undermine. No fluctuance, crepitus, or induration. Partial-thickness ulcer located dorsal midfoot and measures approximately 1 cm x 1 cm  x0.1 cm. Base is fibrogranular. Does not probe to bone, sinus tract, or undermine. No fluctuance, crepitus, or induration. Clinical Images:              Imaging:   XR ABDOMEN (KUB) (SINGLE AP VIEW)   Final Result   Enteric tube in satisfactory position. Right-sided double pigtail ureteral   stent, as above. Borderline dilated small bowel loops in the mid abdomen, likely ileus   although low-grade/partial SBO a consideration. Additional findings, as above. XR CHEST PORTABLE   Final Result   1. Mild low lung volumes with bibasilar subsegmental atelectasis and   suspected mild right-sided pleural effusion. 2. Scattered bilateral lung airspace disease. 3. Moderate to severe cardiomegaly. Cultures: none    Assessment     J Luis Borges is a 73 y.o. male with   Partial thickness nonhealing wound, right foot  Status post BKA, left foot  Type 2 diabetes    Principal Problem:    Necrotizing fasciitis ([address]  Active Problems:    ESRD on dialysis ([address]    DM (diabetes mellitus) ([address]    Anemia of chronic disease    Hypertension, essential    Noncompliance    CHF (congestive heart failure) ([address]    Peripheral vascular disease ([address]    History of left below knee amputation (HCC)    ROOSEVELT (obstructive sleep apnea)    Septic shock (HCC)    Elevated digoxin level    Bradycardia  Resolved Problems:    * No resolved hospital problems.  *        Plan     Patient examined and evaluated at bedside   Given the patient's overall clinical picture, no intervention is warranted from podiatry standpoint at this time. Patient has poor pedal blood flow contributing to patient's nonhealing wound. At this time we recommend continuation of conservative care including Betadine and DSD to the wound of the right foot. Recommend vascular consult if patient's overall medical condition improves. Recommend PRAFO boot to the right lower extremity while nonambulatory. Dressing applied to Right foot: Betadine, DSD. Podiatry will continue to follow at this time. Will discuss with Dr. Lisandro Sanchez. Paul Caballero DPM   Podiatric Medicine & Surgery   7/4/2021 at 10:18 AM     Senior Resident Statement:  I have discussed the case, including pertinent history and exam findings with the resident. I agree with the assessment, plan and orders as documented by the intern. Electronically signed by Edwin Miller DPM on 7/4/2021 at 11:06 AM    I performed a history and physical examination of the patient and discussed management with the resident. I reviewed the residents note and agree with the documented findings and plan of care. Any areas of disagreement are noted on the chart. I was personally present for the key portions of any procedures. I have documented in the chart those procedures where I was not present during the key portions. I have reviewed the Podiatry Resident progress note. I agree with the chief complaint, past medical history, past surgical history, allergies, medications, social and family history as documented unless otherwise noted below. Documentation of the HPI, Physical Exam and Medical Decision Making performed by medical students or scribes is based on my personal performance of the HPI, PE and MDM. I have personally evaluated this patient and have completed at least one if not all key elements of the E/M (history, physical exam, and MDM). Additional findings are as noted.      Gilmar Barbosa DPM on 7/6/2021 at 7:35 AM  Board Certified, American Board of Podiatric Surgery  Fellow, [employer] and [facility]

## 2021-07-04 NOTE — PROGRESS NOTES
Renal Progress Note    Patient :  John Kerr; 73 y.o. MRN# [id number]  Location:  0101/0101-01  Attending:  Eduardo Mccarthy DO  Admit Date:  6/30/2021   Hospital Day: 4    Subjective:       Patient seen and examined in his room. No acute events overnight. Intubated and is on weaning trial.  Follows commands. Sedated on fentanyl. Blood pressure 120/55 on 2 mics of Levophed and 10 mg of midodrine 3 times daily. Last hemodialysis was on 7-21. Net removal 1 L over 3.5 hours. Last digoxin level 2.5, s/p Digibind    Labs reviewed, potassium 3.8, bicarb 26. S/p second debridement bilateral buttock wound and bilateral thigh wound 7-21. Wound VAC in place. General surgery planning for wound VAC change on 7/5/2021. Current Medications:     Scheduled Meds:    midodrine  10 mg Oral TID WC    [Held by provider] potassium chloride  40 mEq Oral Once    amiodarone  200 mg Oral Daily    Sodium Hypochlorite   Irrigation Once    vancomycin (VANCOCIN) intermittent dosing (placeholder)   Other RX Placeholder    vancomycin  750 mg Intravenous Once per day on Mon Wed Fri    chlorhexidine  15 mL Mouth/Throat BID    fentanNYL  50 mcg Intravenous Once    sodium chloride flush  5-40 mL Intravenous 2 times per day    [Held by provider] heparin (porcine)  5,000 Units Subcutaneous 3 times per day    insulin lispro  0-6 Units Subcutaneous TID WC    insulin lispro  0-3 Units Subcutaneous Nightly    cefepime  1,000 mg Intravenous Q24H    metroNIDAZOLE  500 mg Intravenous Q8H     Continuous Infusions:    fentaNYL 50 mcg/hr (07/04/21 0530)    norepinephrine 2 mcg/min (07/04/21 0820)    sodium chloride       PRN Meds:  midodrine, albumin human, sodium chloride flush, sodium chloride, ondansetron **OR** ondansetron, polyethylene glycol, acetaminophen **OR** acetaminophen     Input/Output:       I/O last 3 completed shifts:   In: 944 [I.V.:763; NG/GT:195]  Out: 2410 [Urine:10; Emesis/NG output:1300; Drains:1100]    Vital Signs:   Temperature:  Temp: 99.9 °F (37.7 °C)  TMax:   Temp (24hrs), Av.8 °F (37.1 °C), Min:97.7 °F (36.5 °C), Max:100.4 °F (38 °C)    Respirations:  Resp: 15  Pulse:   Pulse: 70  BP:    BP: (!) 111/54  BP Range: Systolic (50SWU), ZOC:837 , Min:104 , INH:176       Diastolic (28RQK), YDS:54, Min:43, Max:58      Physical Examination:     General:  Intubated with ventilator support. HEENT:  Atraumatic, normocephalic, no throat congestion, moist mucosa. Eyes:   Pupils equal, round and reactive to light, pallor, no icterus. Neck:   Supple  Chest:   Air entry fair bilaterally, basilar crackles present. Cardiac: S1 S2 RRR  Abdomen:  Soft, non-tender, no masses or organomegally appreciable. :   No suprapubic or flank tenderness. Neuro:  Sedated on ventilator. Skin:   No rashes  Extremities:  No edema left lower extremity amputation, right lower extremity foot edema and wound present. Groin wounds present. Wound vacs continue.      Labs:       Recent Labs     21   WBC 20.1* 18.0* 17.9*   RBC 3.01* 2.55* 2.57*   HGB 8.4* 7.2* 7.2*   HCT 31.1* 24.8* 24.9*   .3* 97.3 96.9   MCH 27.9 28.2 28.0   MCHC 27.0* 29.0 28.9   RDW 19.4* 18.9* 18.8*   PLT See Reflexed IPF Result 158 146   MPV NOT REPORTED 11.4 11.2      BMP:   Recent Labs     21    137 136   K 4.6 3.5* 3.8    100 99   CO2 20 25 26   BUN 37* 20 32*   CREATININE 3.38* 2.10* 3.16*   GLUCOSE 122* 105* 119*   CALCIUM 9.6 9.0 9.1      SPEP:  Lab Results   Component Value Date    PROT 7.8 2021     Uep BsAg:         Lab Results   Component Value Date    HEPBSAG NONREACTIVE 2012     Urinalysis/Chemistries:      Lab Results   Component Value Date    NITRU NEGATIVE 2013    COLORU YELLOW 2013    PHUR 7.0 2013    WBCUA 2 TO 5 2013    RBCUA 0 TO 2 2013    MUCUS NOT REPORTED 2013    TRICHOMONAS NOT REPORTED 06/22/2013    YEAST NOT REPORTED 06/22/2013    BACTERIA NOT REPORTED 06/22/2013    SPECGRAV 1.014 06/22/2013    LEUKOCYTESUR NEGATIVE 06/22/2013    UROBILINOGEN Normal 06/22/2013    BILIRUBINUR NEGATIVE 06/22/2013    BILIRUBINUR NEGATIVE  Verified by ictotest. 10/20/2011    GLUCOSEU 1+ 06/22/2013    GLUCOSEU TRACE 10/20/2011    [address] NEGATIVE 06/22/2013    AMORPHOUS NOT REPORTED 06/22/2013       Radiology:     CXR: 7/1/21:  Enteric tube in satisfactory position.  Right-sided double pigtail ureteral   stent, as above. Borderline dilated small bowel loops in the mid abdomen, likely ileus   although low-grade/partial SBO a consideration    Assessment:     1. ESRD on hemodialysis. Regular hemodialysis days 4 times a week at [facility], left AV fistula. Under Dr. Fabricio Hirsch group. Weight on admission 79 kg, weight this morning 74.8 kg. Continue to follow-up with Dr. Alonzo Holguin. 2.  Acute hypercapnic respiratory failure requiring mechanical ventilation -continues  3. Sepsis secondary to necrotic wounds left thigh, sacral region and buttock region -s/p wound debridement x2, IV antibiotics per infectious disease and general surgery debriding wounds wound vacs present  4. Atrial fibrillation  5. Bradycardia  6. Digoxin toxicity  7. Ischemic cardiomyopathy with EF 25% and grade 3 diastolic dysfunction (echo 6/30/2021)  8. Type 2 diabetes mellitus  9. Essential hypertension continues on Levophed -   11. Ileus  12. Anemia of chronic disease -hemoglobin 7.2 this morning patient had previous transfusion. 13.  Secondary hyperparathyroidism    Plan:   1. No acute need for dialysis today. Will continue as MWF schedule. 2.  Strict intake and output. Daily weights. 3.  Midodrine 10 mg 3 times daily. Continue pressor support with Levophed wean as tolerated  4. Antibiotics as per ESRD dosing per infectious disease  5. Digoxin toxicity treatment per cardiology  6.   BMP in a.m.  7.  Will follow    Nutrition Please ensure that patient is on a renal diet/TF. Avoid nephrotoxic drugs/contrast exposure. We will continue to follow along with you. Electronically signed by Bina Garg MD on 7/4/2021 at 9:16 AM     Bina Mcmillan, Internal Medicine Resident  [facility], [place]  7/4/2021 9:21 AM      Attending Physician Statement  I have discussed the care of this patient, including pertinent history and exam findings, with the Resident/CNP. I have reviewed and edited the key elements of all parts of the encounter with the Resident/CNP. I agree with the assessment, plan and orders as documented by the Resident/CNP. Kyle Taylor MD   Nephrology [address] [place] [address] Drive    This note is created with the assistance of a speech-recognition program. While intending to generate a document that actually reflects the content of the visit, no guarantees can be provided that every mistake has been identified and corrected by editing.

## 2021-07-04 NOTE — PROGRESS NOTES
Order obtained for extubation. SpO2 of 98 on 30% FiO2. Patient extubated and placed on 4 liters/min via nasal cannula. Post extubation SpO2 is 98% with HR  78 bpm and RR 26 breaths/min. Patient had strong cough that was productive of clear  and white sputum. Extubation Well tolerated by patient. Janice PAGE RCP   1:30 PM

## 2021-07-04 NOTE — PROGRESS NOTES
INTENSIVE CARE UNIT  Resident Physician Progress Note    Patient - Betty Silverman  Date of Admission -  6/30/2021 10:51 PM  Date of Evaluation -  7/4/2021  Room and Bed Number -  0101/0101-01   Hospital Day - 4      SUBJECTIVE:     OVERNIGHT EVENTS: No acute events overnight. TODAY:     Currently weaning this morning, if he he tolerates it well we will plan to extubate him this morning. Patient is following all commands. Tube feeds held since 3 AM    S/p second debridement of bilateral buttock wounds and bilateral thigh wounds yesterday. Wound VAC in place. Vitals /55, heart rate in 70s. Currently on Levophed running at 2 mics and midodrine 10 mg 3 times daily. Sedated with fentanyl 50 MCG/hour, will he wean him off. Vent settings CPAP. Last ABG showed pH 7.433, CO2 40.9, O2 100.9  Patient is ESRD on dialysis, minimal urine output. Pertinent labs blood sugar 120s  Lactic acid 1.6-> 2.3  -> 251  Last digoxin level 2.9, patient also received Digibind  WBC 21.4-> 20.1-> 18->17.9  Hb 8.4-> 7.2    Continue IV cefepime, vancomycin and Flagyl as per ID recommendations. Cultures so far growing nonlactose fermenting gram-negative rods, lactose fermenting gram-negative rods, Enterococcus. We will consult palliative care for goals of care  On consult podiatry for right foot wounds. Brief history  Betty Silverman is a 73 y.o. male with history of ESRD on hemodialysis, systolic CHF last echo done on 6/19/2020 showed EF of 25 to 30%, A. fib, multivessel CAD s/p CABG and subsequent DENY to LCx, peripheral artery disease with history of left BKA, type 2 diabetes mellitus initially was admitted at [facility] for worsening mentation and bilateral necrotizing wounds. Patient stays at a nursing home.     In the ER, patient was afebrile, hypotensive with BP 100s was initially started on IV cefepime and vancomycin in the ED and was transferred to the medicine floor.   In the ER, vitals heart rate in high 50s, /57  Lactic acid 3.1,   WBC 21.4, Hb 8.9  Patient became hypotensive and bradycardic so rapid response was called and patient was eventually transferred to ICU. General surgery and infectious disease were also consulted. Patient was started on Levophed for a brief period of time, bradycardic in high 40s, received atropine and 1 dose of glucagon and her beta-blocker and amiodarone were held. Her digoxin levels came back elevated, was also given Digibind today. Patient was started on IV Flagyl, cefepime and vancomycin as per ID recommendations. Repeat echocardiogram suggestive of LVEF 25%, moderate global hypokinesis, grade 2 diastolic dysfunction with RVSP 53.  Cardiology was also following the patient, recommended LifeVest before discharge.     Patient underwent bedside debridement by general surgery however there is need for further deep muscle debridement so the decision was made to transfer the patient to [address]. Consulted general surgery for further recommendations regarding debridement. Patient currently is in sinus bradycardia with heart rate in high 40s. BP 91/48 with map of 62. Continue with IV cefepime, vancomycin and Flagyl and reconsult ID. Recheck digoxin level in the morning, repeat CBC, BMP and wait on cultures. Overnight patient was taken to the OR by general surgery for debridement of subcutaneous tissue and muscle for necrotic bilateral groin and posterior hip wounds. Patient remained bradycardic with heart rate in 40s and map in the low 60s and was eventually started on dopamine drip.   Also received transfusion intraoperatively, Hb 7.3    AWAKE & FOLLOWING COMMANDS:  [] No   [] Yes    SECRETIONS Amount:  [] Small [] Moderate  [] Large  [] None  Color:     [] White [] Colored  [] Bloody    SEDATION:  RAAS Score:  [] Propofol gtt  [] Versed gtt  [] Ativan gtt   [] No Sedation    PARALYZED:  [] No    [] Yes    VASOPRESSORS:  [] No    [] Yes  [] Levophed [] Dopamine [] Vasopressin  [] Dobutamine [] Phenylephrine [] Epinephrine      OBJECTIVE:     VITAL SIGNS:  BP (!) 120/55   Pulse 56   Temp 98.6 °F (37 °C) (Oral)   Resp (!) 6   Ht 6' (1.829 m)   Wt 167 lb (75.8 kg)   SpO2 100%   BMI 22.65 kg/m²   Tmax over 24 hours:  Temp (24hrs), Av.7 °F (37.1 °C), Min:97.7 °F (36.5 °C), Max:100.4 °F (38 °C)      Patient Vitals for the past 8 hrs:   BP Temp Temp src Pulse Resp SpO2 Weight   21 0615    56 (!) 6 100 %    21 0600  98.6 °F (37 °C) Oral 58 11 100 %    21 0545    57 (!) 3 100 %    21 0530    56 (!) 0 100 %    21 0515    56 (!) 0 100 %    21 0500    56 (!) 2 100 %    21 0445    57 (!) 4 100 %    21 0430    56 (!) 2 100 %    21 0415    59 17 100 %    21 0410     (!) 5 100 %    21 0400 (!) 120/55 99.3 °F (37.4 °C) Oral 58 16 100 % 167 lb (75.8 kg)   21 0352     16 100 %    21 0345    56 13 100 %    21 0330    57 10 100 %    21 0315    57 9 100 %    21 0300    57 12 100 %    21 0245    57 11 100 %    21 0230    57 11 100 %    21 0215    56 12 100 %    21 0200    57 12 100 %    21 0145    58 15 100 %    21 0130    58 16 100 %    21 0115    61 14 100 %    21 0045    58 16 100 %    21 0030    60 21 100 %    21 0015    60 18 100 %    21 0000 (!) 122/56 98.6 °F (37 °C)  58 10 100 %          Intake/Output Summary (Last 24 hours) at 2021 0755  Last data filed at 2021 0400  Gross per 24 hour   Intake 958 ml   Output 2410 ml   Net -1452 ml     Date 21 0000 - 21 2359   Shift 6841-0059 9528-8532 5417-4143 24 Hour Total   INTAKE   I.V.(mL/kg) 297(3.9)   297(3.9)   NG/GT(mL/kg) 81(1.1)   81(1.1)   Shift Total(mL/kg) 378(5)   378(5)   OUTPUT   Drains(mL/kg) 400(5.3)   400(5.3)   Shift Total(mL/kg) 400(5.3)   400(5.3)   Weight (kg) 75.7 75.7 75.7 75.7     Wt Readings from Last 3 Encounters:   07/04/21 167 lb (75.8 kg)   06/30/21 170 lb 13.7 oz (77.5 kg)   06/11/21 169 lb 1.5 oz (76.7 kg)     Body mass index is 22.65 kg/m².         PHYSICAL EXAM:  GEN:  awake, fatigued and mild distress  EYES:  Left pupil cataract, lids and lashes normal  HEENT:  Normocepalic, without obvious abnormality  Atramatic  LUNGS:  no increased work of breathing and good air exchange  CV:    regular rate and rhythm and bradycardic with regular rhythm  ABDOMEN:   no scars and normal bowel sounds  MSK:    Left BKA  NEURO[de-identified]   awake  alert  SKIN:   No bruising or bleeding  EXTREMITIES:                      MEDICATIONS:  Scheduled Meds:   midodrine  10 mg Oral TID WC    [Held by provider] potassium chloride  40 mEq Oral Once    amiodarone  200 mg Oral Daily    Sodium Hypochlorite   Irrigation Once    vancomycin (VANCOCIN) intermittent dosing (placeholder)   Other RX Placeholder    vancomycin  750 mg Intravenous Once per day on Mon Wed Fri    chlorhexidine  15 mL Mouth/Throat BID    fentanNYL  50 mcg Intravenous Once    sodium chloride flush  5-40 mL Intravenous 2 times per day    [Held by provider] heparin (porcine)  5,000 Units Subcutaneous 3 times per day    insulin lispro  0-6 Units Subcutaneous TID WC    insulin lispro  0-3 Units Subcutaneous Nightly    cefepime  1,000 mg Intravenous Q24H    metroNIDAZOLE  500 mg Intravenous Q8H     Continuous Infusions:   fentaNYL 50 mcg/hr (07/04/21 0530)    norepinephrine 2 mcg/min (07/03/21 1351)    sodium chloride       PRN Meds:   midodrine, 10 mg, PRN  albumin human, 25 g, PRN  sodium chloride flush, 5-40 mL, PRN  sodium chloride, 25 mL, PRN  ondansetron, 4 mg, Q8H PRN   Or  ondansetron, 4 mg, Q6H PRN  polyethylene glycol, 17 g, Daily PRN  acetaminophen, 650 mg, Q6H PRN   Or  acetaminophen, 650 mg, Q6H PRN        SUPPORT DEVICES: [] Ventilator [] BIPAP  [] Nasal Cannula [] Room Air    VENT SETTINGS (Comprehensive) (if applicable):    DATA:  Complete Blood Count:   Recent Labs     07/02/21 0412 07/03/21 0627 07/04/21 0410   WBC 20.1* 18.0* 17.9*   RBC 3.01* 2.55* 2.57*   HGB 8.4* 7.2* 7.2*   HCT 31.1* 24.8* 24.9*   .3* 97.3 96.9   MCH 27.9 28.2 28.0   MCHC 27.0* 29.0 28.9   RDW 19.4* 18.9* 18.8*   PLT See Reflexed IPF Result 158 146   MPV NOT REPORTED 11.4 11.2        Last 3 Blood Glucose:   Recent Labs     07/02/21 0412 07/03/21 0627 07/04/21 0410   GLUCOSE 122* 105* 119*        PT/INR:    Lab Results   Component Value Date    PROTIME 23.8 05/07/2021    PROTIME 10.9 10/25/2011    INR 2.1 05/07/2021     PTT:    Lab Results   Component Value Date    APTT 49.1 05/07/2021       Comprehensive Metabolic Profile:   Recent Labs     07/02/21 0412 07/03/21 0627 07/04/21 0410    137 136   K 4.6 3.5* 3.8    100 99   CO2 20 25 26   BUN 37* 20 32*   CREATININE 3.38* 2.10* 3.16*   GLUCOSE 122* 105* 119*   CALCIUM 9.6 9.0 9.1      Magnesium:   Lab Results   Component Value Date    MG 1.5 07/03/2021    MG 2.1 06/11/2021    MG 2.3 06/10/2021     Phosphorus:   Lab Results   Component Value Date    PHOS 3.5 06/30/2021    PHOS 5.1 06/11/2021    PHOS 6.7 06/10/2021     Ionized Calcium:   Lab Results   Component Value Date    CAION 4.43 01/09/2012    CAION 4.49 10/19/2011    CAION 4.05 10/05/2011        Urinalysis:   Lab Results   Component Value Date    NITRU NEGATIVE 06/22/2013    COLORU YELLOW 06/22/2013    PHUR 7.0 06/22/2013    WBCUA 2 TO 5 06/22/2013    RBCUA 0 TO 2 06/22/2013    MUCUS NOT REPORTED 06/22/2013    TRICHOMONAS NOT REPORTED 06/22/2013    YEAST NOT REPORTED 06/22/2013    BACTERIA NOT REPORTED 06/22/2013    SPECGRAV 1.014 06/22/2013    LEUKOCYTESUR NEGATIVE 06/22/2013    UROBILINOGEN Normal 06/22/2013    BILIRUBINUR NEGATIVE 06/22/2013    BILIRUBINUR NEGATIVE  Verified by ictotest. 10/20/2011    GLUCOSEU 1+ 06/22/2013    GLUCOSEU TRACE 10/20/2011 KETUA NEGATIVE 06/22/2013    AMORPHOUS NOT REPORTED 06/22/2013       HgBA1c:    Lab Results   Component Value Date    LABA1C 6.6 01/23/2021     TSH:    Lab Results   Component Value Date    TSH 1.51 05/01/2021     Lactic Acid:   Lab Results   Component Value Date    LACTA 1.6 06/30/2021      Troponin: No results for input(s): TROPONINI in the last 72 hours. Microbiology:  Urine Culture:  No components found for: CURINE  Blood Culture:  No components found for: CBLOOD, CFUNGUSBL  Blood Culture from Central Line:  No components found for: CBLOODLN  Wound Culture:         Other Labs:  CBC with Differential:    Lab Results   Component Value Date    WBC 17.9 07/04/2021    RBC 2.57 07/04/2021    RBC 3.51 01/09/2012    HGB 7.2 07/04/2021    HCT 24.9 07/04/2021     07/04/2021     01/09/2012    MCV 96.9 07/04/2021    MCH 28.0 07/04/2021    MCHC 28.9 07/04/2021    RDW 18.8 07/04/2021    NRBC 1 03/07/2016    METASPCT 2 05/01/2021    LYMPHOPCT 4 07/04/2021    MONOPCT 5 07/04/2021    MYELOPCT 1 03/06/2016    BASOPCT 0 07/04/2021    MONOSABS 0.96 07/04/2021    LYMPHSABS 0.79 07/04/2021    EOSABS 0.06 07/04/2021    BASOSABS 0.04 07/04/2021    DIFFTYPE NOT REPORTED 07/04/2021     Platelets:    Lab Results   Component Value Date     07/04/2021     01/09/2012     Hemoglobin/Hematocrit:    Lab Results   Component Value Date    HGB 7.2 07/04/2021    HCT 24.9 07/04/2021     CMP:    Lab Results   Component Value Date     07/04/2021    K 3.8 07/04/2021    CL 99 07/04/2021    CO2 26 07/04/2021    BUN 32 07/04/2021    CREATININE 3.16 07/04/2021    GFRAA 24 07/04/2021    LABGLOM 20 07/04/2021    GLUCOSE 119 07/04/2021    GLUCOSE 128 01/09/2012    PROT 7.8 06/07/2021    LABALBU 3.6 06/07/2021    LABALBU 3.8 01/09/2012    CALCIUM 9.1 07/04/2021    BILITOT 0.59 06/07/2021    ALKPHOS 132 06/07/2021    AST 14 06/07/2021    ALT 11 06/07/2021       ASSESSMENT:     Patient Active Problem List    Diagnosis Date Noted    Necrotizing fasciitis ([address] 06/30/2021    Septic shock (HCC) 06/30/2021    Elevated digoxin level 06/30/2021    Bradycardia 06/30/2021    Infected wound 06/29/2021    Moderate malnutrition ([address] 06/09/2021    Acute respiratory failure with hypoxia ([address] 06/08/2021    Acute pulmonary edema (HCC) 06/07/2021    Volume overload 06/07/2021    Non-compliance with renal dialysis ([address] 06/07/2021    Parapneumonic effusion 05/09/2021    C. difficile colitis 05/09/2021    Loculated pleural effusion 05/08/2021    Pleural effusion on right 05/08/2021    ROOSEVELT (obstructive sleep apnea) 05/08/2021    Sepsis ([address] 04/28/2021    Pneumonia 04/28/2021    Sepsis due to pneumonia ([address] 25/83/8799    Metabolic acidosis     Azotemia     Hyperkalemia     Diarrhea     Nausea and vomiting     Peripheral vascular disease ([address]     History of left below knee amputation (HCC)     Hemoptysis     Ulcerated, foot, right, with fat layer exposed ([address]     Charcot's joint of foot, right     CHF (congestive heart failure) ([address]     Noncompliance 06/02/2019    Type 2 diabetes mellitus with foot ulcer ([address]     Hypertension, essential     Diabetic ulcer of left foot ([address] 03/04/2016    Charcot foot due to diabetes mellitus ([address] 12/28/2015    Anemia of chronic disease 07/03/2013    ESRD on dialysis ([address] 06/27/2013    DM (diabetes mellitus) ([address] 06/27/2013    SDH (subdural hematoma) ([address] 06/22/2013          PLAN:     WEAN PER PROTOCOL:  [] No   [] Yes  [x] N/A    ICU PROPHYLAXIS:  Stress ulcer:  [] PPI Agent  [] O6Degui [] Sucralfate  [] Other:  VTE:   [] Enoxaparin  [] Unfract.  Heparin Subcut  [] EPC Cuffs    NUTRITION:  [x] NPO [] Tube Feeding (Specify: ) [] TPN  [] PO    HOME MEDS RECONCILED: [] No  [x] Yes    CONSULTATION NEEDED:  [] No  [x] Yes    FAMILY UPDATED:    [x] No  [] Yes    TRANSFER OUT OF ICU:   [x] No  [] Yes    Hospital Problems         Last Modified POA    * (Principal) Necrotizing fasciitis ([facility] 6/30/2021 Yes    ESRD on dialysis ([facility] 6/30/2021 Yes    DM (diabetes mellitus) ([facility] 6/30/2021 Yes    Anemia of chronic disease 6/30/2021 Yes    Hypertension, essential 6/30/2021 Yes    Noncompliance 6/30/2021 Yes    CHF (congestive heart failure) ([facility] 6/30/2021 Yes    Peripheral vascular disease ([facility] 6/30/2021 Yes    History of left below knee amputation ([facility] 6/30/2021 Yes    ROOSEVELT (obstructive sleep apnea) (Chronic) 6/30/2021 Yes    Overview Signed 5/8/2021  7:49 AM by Jannet Avalos MD     Clinical diagnosis         Septic shock ([facility] 6/30/2021 Yes    Elevated digoxin level 6/30/2021 Yes    Bradycardia 6/30/2021 Yes          Plan:    1. Septic shock likely secondary to bilateral necrotic wounds: Resolved. Patient has been off pressors. Cultures from right hip shows gram-negative and positive rods, staph, Enterococcus so far. continue antibiotics  2. Necrotizing fasciitis s/p second surgical debridement. Currently has two wound VACs in place. Continue IV Flagyl, cefepime and vancomycin and will reconsult ID. Follow-up cultures. Wound ostomy following. 3. Sinus bradycardia:  Resolved. Beta-blockers and amiodarone on hold. Elevated digoxin levels given Digibind 1 dose. Cardiology following the patient  4. Elevated digoxin levels status post Digibind: digoxin levels 2.9  5. Peripheral artery disease s/p left BKA  6. ESRD on hemodialysis, nephrology following, hemodialysis yesterday. 7. Systolic CHF with ischemic cardiomyopathy, EF 25% status post CABG and subsequent DENY to LCx. Will follow up with cardiology recommendations regarding LifeVest before discharge.  Kalia Bruce MD  PGY-3 Internal Medicine Resident  [address] [place] [address]  7/4/2021 7:55 AM      Attending Physician Statement  I have discussed the care of Cy Doll, including pertinent history and exam findings with the resident.  I have reviewed the key elements of all parts of the encounter with the resident. I have seen and examined the patient with the resident. I agree with the assessment and plan and status of the problem list as documented. I seen the patient during my round today, chart reviewed, labs and medications reviewed overnight events noted. He is on low-dose Levophed and he is on fentanyl drip although he is more arousable currently on 25 mcg of fentanyl. He is also on midodrine to maintain systolic blood pressure of 100. Wound VAC is in place and he is getting wet-to-dry dressing to groin area. His hemoglobin is 7.2 today and WBC count is 17.9 he is on cefepime, Flagyl and vancomycin per infectious disease. Ventilator setting reviewed arterial blood gases seen 7.4 3/41/109/27. Ventilator setting PRVC/16/550/5/30 percent. Currently is on CPAP/pressure support tolerating spontaneous breathing trial and he is arousable. We will extubate the patient after discontinuing fentanyl drip. Resume tube feeding after extubation. Wound care to continue. Continue with antibiotics per infectious disease. Discussed with nursing staff, treatment and plan discussed. Discussed with respiratory therapist.    Total critical care time caring for this patient with life threatening, unstable organ failure, including direct patient contact, management of life support systems, review of data including imaging and labs, discussions with other team members and physicians at least 28  Min so far today, excluding procedures. Please note that this chart was generated using voice recognition Dragon dictation software. Although every effort was made to ensure the accuracy of this automated transcription, some errors in transcription may have occurred.      Sharon Elias MD  7/4/2021 11:06 AM

## 2021-07-04 NOTE — PROGRESS NOTES
PROGRESS NOTE          PATIENT NAME: Ronnell Hartley RECORD NO. [id number]  DATE: 7/4/2021  SURGEON: Davida Luo  PRIMARY CARE PHYSICIAN: Augustine Wall MD    HD: # 4    ASSESSMENT    Patient Active Problem List   Diagnosis    SDH (subdural hematoma) ([address]    ESRD on dialysis ([address]    DM (diabetes mellitus) ([address]    Anemia of chronic disease    Charcot foot due to diabetes mellitus ([address]    Diabetic ulcer of left foot ([address]    Hypertension, essential    Type 2 diabetes mellitus with foot ulcer ([address]    Noncompliance    CHF (congestive heart failure) (HCC)    Sepsis ([address]    Metabolic acidosis    Azotemia    Hyperkalemia    Diarrhea    Nausea and vomiting    Peripheral vascular disease ([address]    History of left below knee amputation (HCC)    Hemoptysis    Ulcerated, foot, right, with fat layer exposed ([address]    Charcot's joint of foot, right    Pneumonia    Sepsis due to pneumonia ([address]    Loculated pleural effusion    Pleural effusion on right    ROOSEVELT (obstructive sleep apnea)    Parapneumonic effusion    C. difficile colitis    Acute pulmonary edema (HCC)    Volume overload    Non-compliance with renal dialysis (HCC)    Acute respiratory failure with hypoxia (HCC)    Moderate malnutrition (HCC)    Infected wound    Necrotizing fasciitis (HCC)    Septic shock (HCC)    Elevated digoxin level    Bradycardia       MEDICAL DECISION MAKING AND PLAN    66M admitted for sepsis suspect 2/2 necrotic sacral and inner thigh wounds  -S/p I&D b/l ischial and inner thigh wounds 7/1  -S/p 2nd look, debridement of b/l ischial wounds, veraflo wound vac placmeent    Continue medical mgmt and supportive care per primar  No further debridements planned  Wound care: veraflo wound vac change on 7/5 - will plan with wound care to be at bedside.  WTD BID dressing change to thigh wounds  ABX per ID: Cefepime, Flagyl, Vanco  Leukocytosis: 17.9  ESRD, on dialysis M/W/F  Will continue to follow until first vac change  Recommend palliative care consult for goals of care  Recommend podiatry for right foot wounds      Chief Complaint: Intubated, sedated Necrotic sacral and bilateral inner thigh wounds; sepsis     SUBJECTIVE  Patient seen and examined at bedside. No overnight events. Patient remains intubated and sedated. VSS on 2mcg levophed, tmax 38. Veraflo wound vac working well. OBJECTIVE  VITALS: Temp: Temp: 98.6 °F (37 °C)Temp  Av.7 °F (37.1 °C)  Min: 97.7 °F (36.5 °C)  Max: 100.4 °F (38 °C) BP Systolic (75PMF), EKV:898 , Min:104 , FYE:778   Diastolic (76TCL), EQJ:54, Min:53, Max:58   Pulse Pulse  Av.6  Min: 56  Max: 62 Resp Resp  Av.4  Min: 0  Max: 24 Pulse ox SpO2  Av %  Min: 100 %  Max: 100 %    CONSTITUTIONAL:  Intubated and sedated  HEENT: Head is normocephalic, atraumati  NECK: Soft, trachea midline and straight  LUNGS: Intubated  CARDIOVASCULAR:  regular rate and rhythm  ABDOMEN: soft, non-distended, non-tender. NEUROLOGIC: moving all extremities equaly  EXTREMITIES: L BKA; right foot edema, several ulcers, noninfected  SKIN: bilateral thigh wounds clean without necrosis, some fibrinous exudate on base - surrounding denuded skin blistering and necrotic on thigh extending posteriorly; veraflo wound vac to ischium bilaterally    I/O last 3 completed shifts: In: 086 [I.V.:763; NG/GT:195]  Out: 2410 [Urine:10; Emesis/NG output:1300; Drains:1100]    Drain/tube output:   In: 751 [I.V.:604; NG/GT:147]  Out: 1100 [Drains:1100]    LAB:  CBC:   Recent Labs     21  0412 21  0627 21  0410   WBC 20.1* 18.0* 17.9*   HGB 8.4* 7.2* 7.2*   HCT 31.1* 24.8* 24.9*   .3* 97.3 96.9   PLT See Reflexed IPF Result 158 146     BMP:   Recent Labs     21  0412 21  0627 21  0410    137 136   K 4.6 3.5* 3.8    100 99   CO2 20 25 26   BUN 37* 20 32*   CREATININE 3.38* 2.10* 3.16*   GLUCOSE 122* 105* 119*     COAGS: No results for input(s): APTT, PROT, INR in the last 72 hours.    RADIOLOGY:  No results found. Hernando Lawson DO  7/4/2021  7:58 AM     Attending Note      I have reviewed the above [facility] note(s) and I either performed the key elements of the medical history and physical exam or was present with the surgery resident when the key elements of the medical history and physical exam were performed. I have discussed the findings, established the care plan and recommendations with the surgical team.  Monitor wounds. VAC change.     Ryan Tran MD  7/4/2021  8:58 AM

## 2021-07-04 NOTE — PLAN OF CARE
Problem: Non-Violent Restraints  Goal: No harm/injury to patient while restraints in use  Outcome: Met This Shift  Goal: Patient's dignity will be maintained  Outcome: Met This Shift     Problem: Pain:  Goal: Pain level will decrease  Description: Pain level will decrease  Outcome: Ongoing  Goal: Control of acute pain  Description: Control of acute pain  Outcome: Ongoing  Goal: Control of chronic pain  Description: Control of chronic pain  Outcome: Ongoing     Problem: Skin Integrity:  Goal: Will show no infection signs and symptoms  Description: Will show no infection signs and symptoms  Outcome: Ongoing  Goal: Absence of new skin breakdown  Description: Absence of new skin breakdown  Outcome: Ongoing  Goal: Skin integrity will be maintained  Outcome: Ongoing     Problem: Falls - Risk of:  Goal: Will remain free from falls  Description: Will remain free from falls  Outcome: Ongoing  Goal: Absence of physical injury  Description: Absence of physical injury  Outcome: Ongoing     Problem: Cardiac:  Goal: Ability to maintain an adequate cardiac output will improve  Description: Ability to maintain an adequate cardiac output will improve  Outcome: Ongoing  Goal: Hemodynamic stability will improve  Description: Hemodynamic stability will improve  Outcome: Ongoing     Problem: Fluid Volume:  Goal: Ability to achieve and maintain adequate urine output will improve  Description: Ability to achieve and maintain adequate urine output will improve  Outcome: Ongoing  Goal: Will show no signs or symptoms of fluid imbalance  Description: Will show no signs or symptoms of fluid imbalance  Outcome: Ongoing     Problem: Respiratory:  Goal: Respiratory status will improve  Description: Respiratory status will improve  Outcome: Ongoing  Goal: Ability to maintain adequate ventilation will improve  Outcome: Ongoing     Problem:  Bowel/Gastric:  Goal: Ability to prevent future episodes of altered bowel function as condition permits will improve  Description: Ability to prevent future episodes of altered bowel function as condition permits will improve  Outcome: Ongoing     Problem: Coping:  Goal: Level of anxiety will decrease  Description: Level of anxiety will decrease  Outcome: Ongoing     Problem: Physical Regulation:  Goal: Complications related to the disease process, condition or treatment will be avoided or minimized  Description: Complications related to the disease process, condition or treatment will be avoided or minimized  Outcome: Ongoing     Problem: Safety:  Goal: Ability to remain free from injury will improve  Outcome: Ongoing     Problem: Sensory:  Goal: Pain level will decrease  Description: Pain level will decrease  Outcome: Ongoing     Problem: Tissue Perfusion:  Goal: Risk of venous thrombosis will decrease  Outcome: Ongoing     Problem: Urinary Elimination:  Goal: Ability to reestablish a normal urinary elimination pattern will improve - after catheter removal  Outcome: Ongoing     Problem: OXYGENATION/RESPIRATORY FUNCTION  Goal: Patient will maintain patent airway  7/4/2021 0437 by Radha Reyes RN  Outcome: Ongoing  7/3/2021 1932 by Nidhi Mark RCP  Outcome: Ongoing  Goal: Patient will achieve/maintain normal respiratory rate/effort  Description: Respiratory rate and effort will be within normal limits for the patient  7/4/2021 0437 by Radha Reyes RN  Outcome: Ongoing  7/3/2021 1932 by Nidhi Mark RCP  Outcome: Ongoing     Problem: MECHANICAL VENTILATION  Goal: Patient will maintain patent airway  7/4/2021 0437 by Radha Reyes RN  Outcome: Ongoing  7/3/2021 1932 by Nidhi Mark RCP  Outcome: Ongoing  Goal: Oral health is maintained or improved  7/4/2021 0437 by Radha Reyes RN  Outcome: Ongoing  7/3/2021 1932 by Nidhi Mark RCP  Outcome: Ongoing  Goal: ET tube will be managed safely  7/4/2021 0437 by Radha Reyes RN  Outcome: Ongoing  7/3/2021 1932 by Nidhi Mark RCP  Outcome: Ongoing  Goal: Ability to express needs and understand communication  7/4/2021 0437 by Melvin Kaplan RN  Outcome: Ongoing  7/3/2021 1932 by Seven Croft RCP  Outcome: Ongoing  Goal: Mobility/activity is maintained at optimum level for patient  7/4/2021 0437 by Melvin Kaplan RN  Outcome: Ongoing  7/3/2021 1932 by Seven Croft RCP  Outcome: Ongoing     Problem: SKIN INTEGRITY  Goal: Skin integrity is maintained or improved  7/4/2021 0437 by Melvin Kaplan RN  Outcome: Ongoing  7/3/2021 1932 by Seven Croft RCP  Outcome: Ongoing     Problem: Confusion - Acute:  Goal: Absence of continued neurological deterioration signs and symptoms  Description: Absence of continued neurological deterioration signs and symptoms  Outcome: Ongoing  Goal: Mental status will be restored to baseline  Description: Mental status will be restored to baseline  Outcome: Ongoing     Problem: Discharge Planning:  Goal: Ability to perform activities of daily living will improve  Description: Ability to perform activities of daily living will improve  Outcome: Ongoing  Goal: Participates in care planning  Description: Participates in care planning  Outcome: Ongoing     Problem: Injury - Risk of, Physical Injury:  Goal: Will remain free from falls  Description: Will remain free from falls  Outcome: Ongoing  Goal: Absence of physical injury  Description: Absence of physical injury  Outcome: Ongoing     Problem: Mood - Altered:  Goal: Mood stable  Description: Mood stable  Outcome: Ongoing  Goal: Absence of abusive behavior  Description: Absence of abusive behavior  Outcome: Ongoing  Goal: Verbalizations of feeling emotionally comfortable while being cared for will increase  Description: Verbalizations of feeling emotionally comfortable while being cared for will increase  Outcome: Ongoing     Problem: Psychomotor Activity - Altered:  Goal: Absence of psychomotor disturbance signs and symptoms  Description: Absence of psychomotor disturbance signs and symptoms  Outcome: Ongoing     Problem: Sensory Perception - Impaired:  Goal: Demonstrations of improved sensory functioning will increase  Description: Demonstrations of improved sensory functioning will increase  Outcome: Ongoing  Goal: Decrease in sensory misperception frequency  Description: Decrease in sensory misperception frequency  Outcome: Ongoing  Goal: Able to refrain from responding to false sensory perceptions  Description: Able to refrain from responding to false sensory perceptions  Outcome: Ongoing  Goal: Demonstrates accurate environmental perceptions  Description: Demonstrates accurate environmental perceptions  Outcome: Ongoing  Goal: Able to distinguish between reality-based and nonreality-based thinking  Description: Able to distinguish between reality-based and nonreality-based thinking  Outcome: Ongoing  Goal: Able to interrupt nonreality-based thinking  Description: Able to interrupt nonreality-based thinking  Outcome: Ongoing     Problem: Sleep Pattern Disturbance:  Goal: Appears well-rested  Description: Appears well-rested  Outcome: Ongoing   Dianne Membreno RN

## 2021-07-04 NOTE — PLAN OF CARE
Problem: Respiratory:  Goal: Respiratory status will improve  Description: Respiratory status will improve  7/4/2021 0712 by Deborah Villaseñor RCP  Outcome: Ongoing  7/4/2021 0437 by Lizzette Woods RN  Outcome: Ongoing  Goal: Ability to maintain adequate ventilation will improve  7/4/2021 0712 by Deborah Villaseñor RCP  Outcome: Ongoing  7/4/2021 0437 by Lizzette Woods RN  Outcome: Ongoing     Problem: OXYGENATION/RESPIRATORY FUNCTION  Goal: Patient will maintain patent airway  7/4/2021 0712 by Deborah Villaseñor RCP  Outcome: Ongoing  7/4/2021 0437 by Lizzette Woods RN  Outcome: Ongoing  7/3/2021 1932 by Obdulia Villarreal RCP  Outcome: Ongoing  Goal: Patient will achieve/maintain normal respiratory rate/effort  Description: Respiratory rate and effort will be within normal limits for the patient  7/4/2021 2042 by Deborah Villaseñor RCP  Outcome: Ongoing  7/4/2021 0437 by Lizzette Woods RN  Outcome: Ongoing  7/3/2021 1932 by Obdulia Villarreal RCP  Outcome: Ongoing     Problem: MECHANICAL VENTILATION  Goal: Patient will maintain patent airway  7/4/2021 0712 by Deborah Villaseñor RCP  Outcome: Ongoing  7/4/2021 0437 by Lizzette Woods RN  Outcome: Ongoing  7/3/2021 1932 by Obdulia Villarreal RCP  Outcome: Ongoing  Goal: Oral health is maintained or improved  7/4/2021 0712 by Deborah Villaseñor RCP  Outcome: Ongoing  7/4/2021 0437 by Lizzette Woods RN  Outcome: Ongoing  7/3/2021 1932 by Obdulia Villarreal RCP  Outcome: Ongoing  Goal: ET tube will be managed safely  7/4/2021 0712 by Deborah Villaseñor RCP  Outcome: Ongoing  7/4/2021 0437 by Lizzette Woods RN  Outcome: Ongoing  7/3/2021 1932 by Obdulia Villarreal RCP  Outcome: Ongoing  Goal: Ability to express needs and understand communication  7/4/2021 0712 by Deborah Villaseñor RCP  Outcome: Ongoing  7/4/2021 0437 by Lizzette Woods, RN  Outcome: Ongoing  7/3/2021 1932 by Obdulia Villarreal RCP  Outcome: Ongoing  Goal: Mobility/activity is maintained at optimum level for patient  7/4/2021 0712 by Dileep [facility]  Outcome: Ongoing  7/4/2021 0437 by Jesika Mayberry RN  Outcome: Ongoing  7/3/2021 1932 by David Ibarra RCP  Outcome: Ongoing     Problem: SKIN INTEGRITY  Goal: Skin integrity is maintained or improved  7/4/2021 0712 by Dileep [facility]  Outcome: Ongoing  7/4/2021 0437 by Jesika Mayberry RN  Outcome: Ongoing  7/3/2021 1932 by David Ibarra RCP  Outcome: Ongoing

## 2021-07-04 NOTE — PLAN OF CARE
Problem: Pain:  Goal: Pain level will decrease  Description: Pain level will decrease  7/4/2021 1806 by Darya Hester RN  Outcome: Ongoing     Problem: Pain:  Goal: Control of acute pain  Description: Control of acute pain  7/4/2021 1806 by Darya Hester RN  Outcome: Ongoing     Problem: Pain:  Goal: Control of chronic pain  Description: Control of chronic pain  7/4/2021 1806 by Darya Hester RN  Outcome: Ongoing     Problem: Skin Integrity:  Goal: Will show no infection signs and symptoms  Description: Will show no infection signs and symptoms  7/4/2021 1806 by Darya Hester RN  Outcome: Ongoing     Problem: Skin Integrity:  Goal: Absence of new skin breakdown  Description: Absence of new skin breakdown  7/4/2021 1806 by Darya Hester RN  Outcome: Ongoing     Problem: Skin Integrity:  Goal: Skin integrity will be maintained  7/4/2021 1806 by Darya Hester RN  Outcome: Ongoing     Problem: Falls - Risk of:  Goal: Will remain free from falls  Description: Will remain free from falls  7/4/2021 1806 by Darya Hester RN  Outcome: Met This Shift     Problem: Falls - Risk of:  Goal: Absence of physical injury  Description: Absence of physical injury  7/4/2021 1806 by Darya Hester RN  Outcome: Met This Shift     Problem: Cardiac:  Goal: Ability to maintain an adequate cardiac output will improve  Description: Ability to maintain an adequate cardiac output will improve  7/4/2021 1806 by Darya Hester RN  Outcome: Ongoing     Problem: Cardiac:  Goal: Hemodynamic stability will improve  Description: Hemodynamic stability will improve  7/4/2021 1806 by Darya Hester RN  Outcome: Ongoing     Problem: Fluid Volume:  Goal: Ability to achieve and maintain adequate urine output will improve  Description: Ability to achieve and maintain adequate urine output will improve  7/4/2021 1806 by Darya Hester RN  Outcome: Ongoing     Problem: Fluid Volume:  Goal: Will show no signs or symptoms of fluid imbalance  Description: Will show no signs or symptoms of fluid imbalance  7/4/2021 1806 by Jenelle Blunt RN  Outcome: Ongoing     Problem: Respiratory:  Goal: Respiratory status will improve  Description: Respiratory status will improve  7/4/2021 1806 by Jenelle Blunt RN  Outcome: Ongoing     Problem: Respiratory:  Goal: Ability to maintain adequate ventilation will improve  7/4/2021 1806 by Jenelle Blunt RN  Outcome: Ongoing     Problem:  Bowel/Gastric:  Goal: Ability to prevent future episodes of altered bowel function as condition permits will improve  Description: Ability to prevent future episodes of altered bowel function as condition permits will improve  7/4/2021 1806 by Jenelle Blunt RN  Outcome: Ongoing     Problem: Coping:  Goal: Level of anxiety will decrease  Description: Level of anxiety will decrease  7/4/2021 1806 by Jenelle Blunt RN  Outcome: Ongoing     Problem: Physical Regulation:  Goal: Complications related to the disease process, condition or treatment will be avoided or minimized  Description: Complications related to the disease process, condition or treatment will be avoided or minimized  7/4/2021 1806 by Jenelle Blunt RN  Outcome: Ongoing     Problem: Safety:  Goal: Ability to remain free from injury will improve  7/4/2021 1806 by Jenelle Blunt RN  Outcome: Ongoing     Problem: Sensory:  Goal: Pain level will decrease  Description: Pain level will decrease  7/4/2021 1806 by Jenelle Blunt RN  Outcome: Ongoing     Problem: Tissue Perfusion:  Goal: Risk of venous thrombosis will decrease  7/4/2021 1806 by Jenelle Blunt RN  Outcome: Ongoing     Problem: Urinary Elimination:  Goal: Ability to reestablish a normal urinary elimination pattern will improve - after catheter removal  7/4/2021 1806 by Jenelle Blunt RN  Outcome: Not Met This Shift     Problem: Non-Violent Restraints  Goal: Removal from restraints as soon as assessed to be safe  7/4/2021 1806 by Missy Esposito Candice Hook RN  Outcome: Not Met This Shift     Problem: Non-Violent Restraints  Goal: No harm/injury to patient while restraints in use  7/4/2021 1806 by Eloy Hebert RN  Outcome: Ongoing     Problem: Non-Violent Restraints  Goal: Patient's dignity will be maintained  7/4/2021 1806 by Eloy Hebert RN  Outcome: Ongoing     Problem: OXYGENATION/RESPIRATORY FUNCTION  Goal: Patient will maintain patent airway  7/4/2021 1806 by Eloy Hebert RN  Outcome: Ongoing     Problem: OXYGENATION/RESPIRATORY FUNCTION  Goal: Patient will achieve/maintain normal respiratory rate/effort  Description: Respiratory rate and effort will be within normal limits for the patient  7/4/2021 1806 by Eloy Hebert RN  Outcome: Ongoing     Problem: Confusion - Acute:  Goal: Absence of continued neurological deterioration signs and symptoms  Description: Absence of continued neurological deterioration signs and symptoms  7/4/2021 1806 by Eloy Hebert RN  Outcome: Ongoing     Problem: Confusion - Acute:  Goal: Mental status will be restored to baseline  Description: Mental status will be restored to baseline  7/4/2021 1806 by Eloy Hebert RN  Outcome: Ongoing     Problem: Discharge Planning:  Goal: Ability to perform activities of daily living will improve  Description: Ability to perform activities of daily living will improve  7/4/2021 1806 by Eloy Hebert RN  Outcome: Ongoing     Problem: Discharge Planning:  Goal: Participates in care planning  Description: Participates in care planning  7/4/2021 1806 by Eloy Hebert RN  Outcome: Ongoing     Problem: Injury - Risk of, Physical Injury:  Goal: Will remain free from falls  Description: Will remain free from falls  7/4/2021 1806 by Eloy Hebert RN  Outcome: Met This Shift     Problem: Injury - Risk of, Physical Injury:  Goal: Absence of physical injury  Description: Absence of physical injury  7/4/2021 1806 by Eloy Hebert RN  Outcome: Met This Shift     Problem: Mood - Altered:  Goal: Mood stable  Description: Mood stable  7/4/2021 1806 by Jen Richter RN  Outcome: Ongoing     Problem: Mood - Altered:  Goal: Absence of abusive behavior  Description: Absence of abusive behavior  7/4/2021 1806 by Jen Richter RN  Outcome: Ongoing     Problem: Mood - Altered:  Goal: Verbalizations of feeling emotionally comfortable while being cared for will increase  Description: Verbalizations of feeling emotionally comfortable while being cared for will increase  7/4/2021 1806 by Jen Richter RN  Outcome: Ongoing     Problem: Psychomotor Activity - Altered:  Goal: Absence of psychomotor disturbance signs and symptoms  Description: Absence of psychomotor disturbance signs and symptoms  7/4/2021 1806 by Jen Richter RN  Outcome: Ongoing     Problem: Sensory Perception - Impaired:  Goal: Demonstrations of improved sensory functioning will increase  Description: Demonstrations of improved sensory functioning will increase  7/4/2021 1806 by Jen Richter RN  Outcome: Ongoing     Problem: Sensory Perception - Impaired:  Goal: Decrease in sensory misperception frequency  Description: Decrease in sensory misperception frequency  7/4/2021 1806 by Jen Richter RN  Outcome: Ongoing     Problem: Sensory Perception - Impaired:  Goal: Able to refrain from responding to false sensory perceptions  Description: Able to refrain from responding to false sensory perceptions  7/4/2021 1806 by Jen Richter RN  Outcome: Ongoing     Problem: Sensory Perception - Impaired:  Goal: Demonstrates accurate environmental perceptions  Description: Demonstrates accurate environmental perceptions  7/4/2021 1806 by Jen Richter RN  Outcome: Ongoing     Problem: Sensory Perception - Impaired:  Goal: Able to interrupt nonreality-based thinking  Description: Able to interrupt nonreality-based thinking  7/4/2021 1806 by Jen Richter RN  Outcome: Ongoing     Problem: Sleep Pattern Disturbance:  Goal: Appears well-rested  Description: Appears well-rested  7/4/2021 1806 by Eloy Hebert RN  Outcome: Ongoing     Problem: Nutrition  Goal: Optimal nutrition therapy  Outcome: Ongoing

## 2021-07-05 NOTE — PLAN OF CARE
Problem: Non-Violent Restraints  Goal: No harm/injury to patient while restraints in use  7/5/2021 0639 by Jethro Forbes RN  Outcome: Met This Shift  7/4/2021 1806 by Aftab Chua RN  Outcome: Ongoing  Goal: Patient's dignity will be maintained  7/5/2021 0639 by Jethro Forbes RN  Outcome: Met This Shift  7/4/2021 1806 by Aftab Chua RN  Outcome: Ongoing     Problem: Pain:  Goal: Pain level will decrease  Description: Pain level will decrease  7/5/2021 0639 by Jethro Forbes RN  Outcome: Ongoing  7/4/2021 1806 by Aftab Chua RN  Outcome: Ongoing  Goal: Control of acute pain  Description: Control of acute pain  7/5/2021 0639 by Jethro Forbes RN  Outcome: Ongoing  7/4/2021 1806 by Aftab Chua RN  Outcome: Ongoing  Goal: Control of chronic pain  Description: Control of chronic pain  7/5/2021 0639 by Jethro Forbes RN  Outcome: Ongoing  7/4/2021 1806 by Aftab Chua RN  Outcome: Ongoing     Problem: Skin Integrity:  Goal: Will show no infection signs and symptoms  Description: Will show no infection signs and symptoms  7/5/2021 0639 by Jethro Forbes RN  Outcome: Ongoing  7/4/2021 1806 by Aftab Chua RN  Outcome: Ongoing  Goal: Absence of new skin breakdown  Description: Absence of new skin breakdown  7/5/2021 0639 by Jethro Forbes RN  Outcome: Ongoing  7/4/2021 1806 by Aftab Chua RN  Outcome: Ongoing  Goal: Skin integrity will be maintained  7/5/2021 0639 by Jethro Forbes RN  Outcome: Ongoing  7/4/2021 1806 by Aftab Chua RN  Outcome: Ongoing     Problem: Falls - Risk of:  Goal: Will remain free from falls  Description: Will remain free from falls  7/5/2021 0639 by Jethro Forbes RN  Outcome: Ongoing  7/4/2021 1806 by Aftab Chua RN  Outcome: Met This Shift  Goal: Absence of physical injury  Description: Absence of physical injury  7/5/2021 0639 by Darlys Andrei, RN  Outcome: Ongoing  7/4/2021 1806 by Aftab Chua, RN  Outcome: Met This Shift     Problem: Cardiac:  Goal: Ability to maintain an adequate cardiac output will improve  Description: Ability to maintain an adequate cardiac output will improve  7/5/2021 0639 by Ashley Mishra RN  Outcome: Ongoing  7/4/2021 1806 by Marco Dalal RN  Outcome: Ongoing  Goal: Hemodynamic stability will improve  Description: Hemodynamic stability will improve  7/5/2021 0639 by Ashley Mishra RN  Outcome: Ongoing  7/4/2021 1806 by Marco Dalal RN  Outcome: Ongoing     Problem: Fluid Volume:  Goal: Ability to achieve and maintain adequate urine output will improve  Description: Ability to achieve and maintain adequate urine output will improve  7/5/2021 0639 by Ashley Mishra RN  Outcome: Ongoing  7/4/2021 1806 by Marco Dalal RN  Outcome: Ongoing  Goal: Will show no signs or symptoms of fluid imbalance  Description: Will show no signs or symptoms of fluid imbalance  7/5/2021 0639 by Ashley Mishra RN  Outcome: Ongoing  7/4/2021 1806 by Marco Dalal RN  Outcome: Ongoing     Problem: Respiratory:  Goal: Respiratory status will improve  Description: Respiratory status will improve  7/5/2021 0639 by Ashley Mishra RN  Outcome: Ongoing  7/4/2021 1806 by Marco Dalal RN  Outcome: Ongoing  Goal: Ability to maintain adequate ventilation will improve  7/5/2021 0639 by Ashley Mishra RN  Outcome: Ongoing  7/4/2021 1806 by Marco Dalal RN  Outcome: Ongoing     Problem:  Bowel/Gastric:  Goal: Ability to prevent future episodes of altered bowel function as condition permits will improve  Description: Ability to prevent future episodes of altered bowel function as condition permits will improve  7/5/2021 0639 by Ashley Mishra RN  Outcome: Ongoing  7/4/2021 1806 by Marco Dalal RN  Outcome: Ongoing     Problem: Coping:  Goal: Level of anxiety will decrease  Description: Level of anxiety will decrease  7/5/2021 0639 by Ashley Mishra RN  Outcome: Ongoing  7/4/2021 1806 by Marco Dalal RN  Outcome: Ongoing Problem: Physical Regulation:  Goal: Complications related to the disease process, condition or treatment will be avoided or minimized  Description: Complications related to the disease process, condition or treatment will be avoided or minimized  7/5/2021 0639 by Garry Huertas RN  Outcome: Ongoing  7/4/2021 1806 by Manuel Caldwell RN  Outcome: Ongoing     Problem: Safety:  Goal: Ability to remain free from injury will improve  7/5/2021 0639 by Garry Huertas RN  Outcome: Ongoing  7/4/2021 1806 by Manuel Caldwell RN  Outcome: Ongoing     Problem: Sensory:  Goal: Pain level will decrease  Description: Pain level will decrease  7/5/2021 0639 by Garry Huertas RN  Outcome: Ongoing  7/4/2021 1806 by Manuel Caldwell RN  Outcome: Ongoing     Problem: Tissue Perfusion:  Goal: Risk of venous thrombosis will decrease  7/5/2021 0639 by Garry Huertas RN  Outcome: Ongoing  7/4/2021 1806 by Manuel Caldwell RN  Outcome: Ongoing     Problem: Urinary Elimination:  Goal: Ability to reestablish a normal urinary elimination pattern will improve - after catheter removal  7/5/2021 0639 by Garry Huertas RN  Outcome: Ongoing  7/4/2021 1806 by aMnuel Caldwell RN  Outcome: Not Met This Shift     Problem: Non-Violent Restraints  Goal: Removal from restraints as soon as assessed to be safe  7/5/2021 0639 by Garry Huertas RN  Outcome: Ongoing  7/4/2021 1806 by Manuel Caldwell RN  Outcome: Not Met This Shift     Problem: OXYGENATION/RESPIRATORY FUNCTION  Goal: Patient will maintain patent airway  7/5/2021 0639 by Garyr Huertas RN  Outcome: Ongoing  7/4/2021 1806 by Manuel Caldwell RN  Outcome: Ongoing  Goal: Patient will achieve/maintain normal respiratory rate/effort  Description: Respiratory rate and effort will be within normal limits for the patient  7/5/2021 0171 by Garry Huertas RN  Outcome: Ongoing  7/4/2021 1806 by Manuel Caldwell RN  Outcome: Ongoing     Problem: SKIN INTEGRITY  Goal: Skin integrity is maintained or improved  Outcome: Ongoing     Problem: Confusion - Acute:  Goal: Absence of continued neurological deterioration signs and symptoms  Description: Absence of continued neurological deterioration signs and symptoms  7/5/2021 0639 by Leticia Glez RN  Outcome: Ongoing  7/4/2021 1806 by Jessy Elder RN  Outcome: Ongoing  Goal: Mental status will be restored to baseline  Description: Mental status will be restored to baseline  7/5/2021 0639 by Leticia Glez RN  Outcome: Ongoing  7/4/2021 1806 by Jessy Elder RN  Outcome: Ongoing     Problem: Discharge Planning:  Goal: Ability to perform activities of daily living will improve  Description: Ability to perform activities of daily living will improve  7/5/2021 0639 by Leticia Glez RN  Outcome: Ongoing  7/4/2021 1806 by Jessy Elder RN  Outcome: Ongoing  Goal: Participates in care planning  Description: Participates in care planning  7/5/2021 0639 by Leticia Glez RN  Outcome: Ongoing  7/4/2021 1806 by Jessy Elder RN  Outcome: Ongoing     Problem: Injury - Risk of, Physical Injury:  Goal: Will remain free from falls  Description: Will remain free from falls  7/5/2021 0639 by Leticia Glez RN  Outcome: Ongoing  7/4/2021 1806 by Jessy Elder RN  Outcome: Met This Shift  Goal: Absence of physical injury  Description: Absence of physical injury  7/5/2021 0639 by Leticia Glez RN  Outcome: Ongoing  7/4/2021 1806 by Jessy Elder RN  Outcome: Met This Shift     Problem: Mood - Altered:  Goal: Mood stable  Description: Mood stable  7/5/2021 0639 by Leticia Glez RN  Outcome: Ongoing  7/4/2021 1806 by Jessy Elder RN  Outcome: Ongoing  Goal: Absence of abusive behavior  Description: Absence of abusive behavior  7/5/2021 0639 by Leticia Glez RN  Outcome: Ongoing  7/4/2021 1806 by Jessy Elder RN  Outcome: Ongoing  Goal: Verbalizations of feeling emotionally comfortable while being cared for will increase  Description: Verbalizations of feeling emotionally comfortable while being cared for will increase  7/5/2021 0639 by Lori Barrios RN  Outcome: Ongoing  7/4/2021 1806 by Yoav Montgomery RN  Outcome: Ongoing     Problem: Psychomotor Activity - Altered:  Goal: Absence of psychomotor disturbance signs and symptoms  Description: Absence of psychomotor disturbance signs and symptoms  7/5/2021 0639 by Lori Barrios, RN  Outcome: Ongoing  7/4/2021 1806 by Yoav Montgomery RN  Outcome: Ongoing     Problem: Sensory Perception - Impaired:  Goal: Demonstrations of improved sensory functioning will increase  Description: Demonstrations of improved sensory functioning will increase  7/5/2021 0639 by Lori Barrios RN  Outcome: Ongoing  7/4/2021 1806 by Yoav Montgomery RN  Outcome: Ongoing  Goal: Decrease in sensory misperception frequency  Description: Decrease in sensory misperception frequency  7/5/2021 0639 by Lori Barrios RN  Outcome: Ongoing  7/4/2021 1806 by Yoav Montgomery RN  Outcome: Ongoing  Goal: Able to refrain from responding to false sensory perceptions  Description: Able to refrain from responding to false sensory perceptions  7/5/2021 0639 by Lori Barrios RN  Outcome: Ongoing  7/4/2021 1806 by Yoav Montgomery RN  Outcome: Ongoing  Goal: Demonstrates accurate environmental perceptions  Description: Demonstrates accurate environmental perceptions  7/5/2021 [id number] by Lori Barrios RN  Outcome: Ongoing  7/4/2021 1806 by Yoav Montgomery RN  Outcome: Ongoing  Goal: Able to distinguish between reality-based and nonreality-based thinking  Description: Able to distinguish between reality-based and nonreality-based thinking  7/5/2021 0639 by Lori Barrios RN  Outcome: Ongoing  7/4/2021 1806 by Yoav Montgomery RN  Outcome: Ongoing  Goal: Able to interrupt nonreality-based thinking  Description: Able to interrupt nonreality-based thinking  7/5/2021 0639 by Lori Barrios RN  Outcome: Ongoing  7/4/2021 1806 by Jaspal Stauffer Orestes Polo RN  Outcome: Ongoing     Problem: Sleep Pattern Disturbance:  Goal: Appears well-rested  Description: Appears well-rested  7/5/2021 0639 by Ewell Gosselin, RN  Outcome: Ongoing  7/4/2021 1806 by Olivia Wheat RN  Outcome: Ongoing     Problem: Nutrition  Goal: Optimal nutrition therapy  7/4/2021 1806 by Olivia Wheat RN  Outcome: Ongoing

## 2021-07-05 NOTE — PROGRESS NOTES
INTENSIVE CARE UNIT  Resident Physician Progress Note    Patient - Petra Lin  Date of Admission -  6/30/2021 10:51 PM  Date of Evaluation -  7/5/2021  Room and Bed Number -  0101/0101-01   Hospital Day - 5      SUBJECTIVE:     OVERNIGHT EVENTS: Patient was successfully extubated yesterday afternoon. TODAY:     Today, patient was lying comfortably in bed is alert only to self. Complains of pain. Tube feeding running at goal.    S/p second debridement of bilateral buttock wounds and bilateral thigh wounds yesterday. Wound VAC in place. Plan for wound VAC removal by general surgery today. Wet-to-dry dressing twice daily. Vitals /63, heart rate in 60s in atrial fibrillation. Currently on Levophed running at 5 mics and midodrine 10 mg 3 times daily. Off the fentanyl drip. 50 mcg of fentanyl and Roxicodone 5 mg as needed for pain    Patient is ESRD on dialysis, minimal urine output. Plan for hemodialysis today    Pertinent labs blood sugar 140s  -> 251  Last digoxin level 2.9, patient also received Digibind  WBC 21.4-> 20.1-> 18->17.9->19.7  Hb 8.4-> 7.2-> 7.3    Continue IV cefepime, vancomycin and Flagyl as per ID recommendations. Cultures from 6/29 so far growing nonlactose fermenting gram-negative rods, E. coli, Enterococcus faecalis, staph aureus, Clostridium sporogenes and veillonella species     Cardiology resumed amiodarone 100 mg and as per them he is not a candidate for LifeVest or AICD given his acute issues, will follow patient. We will consult palliative care for goals of care    Brief history  Petra Lin is a 73 y.o. male with history of ESRD on hemodialysis, systolic CHF last echo done on 6/19/2020 showed EF of 25 to 30%, A. fib, multivessel CAD s/p CABG and subsequent DENY to LCx, peripheral artery disease with history of left BKA, type 2 diabetes mellitus initially was admitted at [facility] for worsening mentation and bilateral necrotizing wounds.   Patient stays at a nursing home.     In the ER, patient was afebrile, hypotensive with BP 100s was initially started on IV cefepime and vancomycin in the ED and was transferred to the medicine floor. In the ER, vitals heart rate in high 50s, /57  Lactic acid 3.1,   WBC 21.4, Hb 8.9  Patient became hypotensive and bradycardic so rapid response was called and patient was eventually transferred to ICU. General surgery and infectious disease were also consulted. Patient was started on Levophed for a brief period of time, bradycardic in high 40s, received atropine and 1 dose of glucagon and her beta-blocker and amiodarone were held. Her digoxin levels came back elevated, was also given Digibind today. Patient was started on IV Flagyl, cefepime and vancomycin as per ID recommendations. Repeat echocardiogram suggestive of LVEF 25%, moderate global hypokinesis, grade 2 diastolic dysfunction with RVSP 53.  Cardiology was also following the patient, recommended LifeVest before discharge.     Patient underwent bedside debridement by general surgery however there is need for further deep muscle debridement so the decision was made to [facility]. Consulted general surgery for further recommendations regarding debridement. Patient currently is in sinus bradycardia with heart rate in high 40s. BP 91/48 with map of 62. Continue with IV cefepime, vancomycin and Flagyl and reconsult ID. Recheck digoxin level in the morning, repeat CBC, BMP and wait on cultures. Overnight patient was taken to the OR by general surgery for debridement of subcutaneous tissue and muscle for necrotic bilateral groin and posterior hip wounds. Patient remained bradycardic with heart rate in 40s and map in the low 60s and was eventually started on dopamine drip.   Also received transfusion intraoperatively, Hb 7.3    AWAKE & FOLLOWING COMMANDS:  [] No   [] Yes    SECRETIONS Amount:  [] Small [] Moderate  [] Large  [] None  Color:     [] White [] Colored  [] Bloody    SEDATION:  RAAS Score:  [] Propofol gtt  [] Versed gtt  [] Ativan gtt   [] No Sedation    PARALYZED:  [] No    [] Yes    VASOPRESSORS:  [] No    [] Yes  [] Levophed [] Dopamine [] Vasopressin  [] Dobutamine [] Phenylephrine [] Epinephrine      OBJECTIVE:     VITAL SIGNS:  /63   Pulse 66   Temp 98.2 °F (36.8 °C) (Oral)   Resp 20   Ht 6' (1.829 m)   Wt 179 lb 0.2 oz (81.2 kg)   SpO2 96%   BMI 24.28 kg/m²   Tmax over 24 hours:  Temp (24hrs), Av.4 °F (36.9 °C), Min:97.5 °F (36.4 °C), Max:99.9 °F (37.7 °C)      Patient Vitals for the past 8 hrs:   BP Temp Temp src Pulse Resp SpO2 Weight   21 0630 123/63   66 20 96 %    21 0615    64 20 95 %    21 0600 111/78   64 17 92 % 179 lb 0.2 oz (81.2 kg)   21 0545    61 20 93 %    21 0530    64 18 98 %    21 0515    63 19 96 %    21 0500 121/60   63 19 94 %    21 0445    62 22 93 %    21 0430 (!) 106/57   60 22 93 %    21 0415    64 21 96 %    21 0400 (!) 140/64 98.2 °F (36.8 °C) Oral 63 19 95 %    21 0345    60 20 96 %    21 0330 (!) 148/72   62 19 96 %    21 0315    64 18 97 %    21 0300 (!) 123/59   64 23 93 %    21 0245    72 24 97 %    21 0230 (!) 142/65   67 23 97 %    21 0215    64 19 95 %    21 0200 88/63 97.5 °F (36.4 °C) Oral 72 16 95 %    21 0145    74 20 98 %    21 0130    69 18 99 %    21 0115    71 18 97 %    21 0100    66 21 97 %    21 0045    68 15 95 %    21 0030    65 22 96 %    21 0015    68 18 97 %    21 0000 (!) 114/55 98.1 °F (36.7 °C) Oral 67 13 91 %    21 2345    67 17 96 %    21 2330    66 18 97 %          Intake/Output Summary (Last 24 hours) at 2021 0727  Last data filed at 2021 0400  Gross per 24 hour Intake 1228.58 ml   Output 1250 ml   Net -21.42 ml     Date 07/05/21 0000 - 07/05/21 2359   Shift 9447-4113 7996-4069 5157-9686 24 Hour Total   INTAKE   I.V.(mL/kg) 443.6(5.5)   443.6(5.5)   NG/GT(mL/kg) 18(0.2)   18(0.2)   Shift Total(mL/kg) 461.6(5.7)   461.6(5.7)   OUTPUT   Drains(mL/kg) 650(8)   650(8)   Shift Total(mL/kg) 650(8)   650(8)   Weight (kg) 81.2 81.2 81.2 81.2     Wt Readings from Last 3 Encounters:   07/05/21 179 lb 0.2 oz (81.2 kg)   06/30/21 170 lb 13.7 oz (77.5 kg)   06/11/21 169 lb 1.5 oz (76.7 kg)     Body mass index is 24.28 kg/m².         PHYSICAL EXAM:  GEN:  awake, fatigued and mild distress  EYES:  Left pupil cataract, lids and lashes normal  HEENT:  Normocepalic, without obvious abnormality  Atramatic  LUNGS:  no increased work of breathing and good air exchange  CV:    regular rate and rhythm and bradycardic with regular rhythm  ABDOMEN:   no scars and normal bowel sounds  MSK:    Left BKA  NEURO[de-identified]   awake  alert  SKIN:   No bruising or bleeding  EXTREMITIES:                      MEDICATIONS:  Scheduled Meds:   insulin lispro  0-6 Units Subcutaneous Q6H    midodrine  10 mg Oral TID WC    [Held by provider] potassium chloride  40 mEq Oral Once    amiodarone  200 mg Oral Daily    Sodium Hypochlorite   Irrigation Once    vancomycin (VANCOCIN) intermittent dosing (placeholder)   Other RX Placeholder    vancomycin  750 mg Intravenous Once per day on Mon Wed Fri    fentanNYL  50 mcg Intravenous Once    sodium chloride flush  5-40 mL Intravenous 2 times per day    [Held by provider] heparin (porcine)  5,000 Units Subcutaneous 3 times per day    cefepime  1,000 mg Intravenous Q24H    metroNIDAZOLE  500 mg Intravenous Q8H     Continuous Infusions:   norepinephrine 5 mcg/min (07/05/21 0516)    dextrose      sodium chloride       PRN Meds:   fentanNYL, 25 mcg, Q1H PRN  oxyCODONE, 5 mg, Q3H PRN  glucose, 15 g, PRN  dextrose, 12.5 g, PRN  glucagon (rDNA), 1 mg, PRN  dextrose, 100 mL/hr, PRN  midodrine, 10 mg, PRN  albumin human, 25 g, PRN  sodium chloride flush, 5-40 mL, PRN  sodium chloride, 25 mL, PRN  ondansetron, 4 mg, Q8H PRN   Or  ondansetron, 4 mg, Q6H PRN  polyethylene glycol, 17 g, Daily PRN  acetaminophen, 650 mg, Q6H PRN   Or  acetaminophen, 650 mg, Q6H PRN        SUPPORT DEVICES: [] Ventilator [] BIPAP  [] Nasal Cannula [] Room Air    VENT SETTINGS (Comprehensive) (if applicable):    DATA:  Complete Blood Count:   Recent Labs     07/03/21 0627 07/04/21 0410 07/05/21 0612   WBC 18.0* 17.9* 19.7*   RBC 2.55* 2.57* 2.60*   HGB 7.2* 7.2* 7.3*   HCT 24.8* 24.9* 25.1*   MCV 97.3 96.9 96.5   MCH 28.2 28.0 28.1   MCHC 29.0 28.9 29.1   RDW 18.9* 18.8* 18.7*    146 167   MPV 11.4 11.2 11.3        Last 3 Blood Glucose:   Recent Labs     07/03/21 0627 07/04/21 0410 07/05/21 0612   GLUCOSE 105* 119* 145*        PT/INR:    Lab Results   Component Value Date    PROTIME 23.8 05/07/2021    PROTIME 10.9 10/25/2011    INR 2.1 05/07/2021     PTT:    Lab Results   Component Value Date    APTT 49.1 05/07/2021       Comprehensive Metabolic Profile:   Recent Labs     07/03/21 0627 07/04/21 0410 07/05/21 0612    136 134*   K 3.5* 3.8 4.2    99 98   CO2 25 26 22   BUN 20 32* 45*   CREATININE 2.10* 3.16* 3.88*   GLUCOSE 105* 119* 145*   CALCIUM 9.0 9.1 9.1      Magnesium:   Lab Results   Component Value Date    MG 1.5 07/03/2021    MG 2.1 06/11/2021    MG 2.3 06/10/2021     Phosphorus:   Lab Results   Component Value Date    PHOS 3.5 06/30/2021    PHOS 5.1 06/11/2021    PHOS 6.7 06/10/2021     Ionized Calcium:   Lab Results   Component Value Date    CAION 4.43 01/09/2012    CAION 4.49 10/19/2011    CAION 4.05 10/05/2011        Urinalysis:   Lab Results   Component Value Date    NITRU NEGATIVE 06/22/2013    COLORU YELLOW 06/22/2013    PHUR 7.0 06/22/2013    WBCUA 2 TO 5 06/22/2013    RBCUA 0 TO 2 06/22/2013    MUCUS NOT REPORTED 06/22/2013    TRICHOMONAS NOT REPORTED 06/22/2013 YEAST NOT REPORTED 06/22/2013    BACTERIA NOT REPORTED 06/22/2013    SPECGRAV 1.014 06/22/2013    LEUKOCYTESUR NEGATIVE 06/22/2013    UROBILINOGEN Normal 06/22/2013    BILIRUBINUR NEGATIVE 06/22/2013    BILIRUBINUR NEGATIVE  Verified by ictotest. 10/20/2011    GLUCOSEU 1+ 06/22/2013    GLUCOSEU TRACE 10/20/2011    KETUA NEGATIVE 06/22/2013    AMORPHOUS NOT REPORTED 06/22/2013       HgBA1c:    Lab Results   Component Value Date    LABA1C 6.6 01/23/2021     TSH:    Lab Results   Component Value Date    TSH 1.51 05/01/2021     Lactic Acid:   Lab Results   Component Value Date    LACTA 1.6 06/30/2021      Troponin: No results for input(s): TROPONINI in the last 72 hours. Microbiology:  Urine Culture:  No components found for: CURINE  Blood Culture:  No components found for: CBLOOD, CFUNGUSBL  Blood Culture from Central Line:  No components found for: CBLOODLN  Wound Culture:         Other Labs:  CBC with Differential:    Lab Results   Component Value Date    WBC 19.7 07/05/2021    RBC 2.60 07/05/2021    RBC 3.51 01/09/2012    HGB 7.3 07/05/2021    HCT 25.1 07/05/2021     07/05/2021     01/09/2012    MCV 96.5 07/05/2021    MCH 28.1 07/05/2021    MCHC 29.1 07/05/2021    RDW 18.7 07/05/2021    NRBC 1 03/07/2016    METASPCT 2 05/01/2021    LYMPHOPCT 6 07/05/2021    MONOPCT 7 07/05/2021    MYELOPCT 1 03/06/2016    BASOPCT 0 07/05/2021    MONOSABS 1.31 07/05/2021    LYMPHSABS 1.08 07/05/2021    EOSABS 0.11 07/05/2021    BASOSABS 0.04 07/05/2021    DIFFTYPE NOT REPORTED 07/05/2021     Platelets:    Lab Results   Component Value Date     07/05/2021     01/09/2012     Hemoglobin/Hematocrit:    Lab Results   Component Value Date    HGB 7.3 07/05/2021    HCT 25.1 07/05/2021     CMP:    Lab Results   Component Value Date     07/05/2021    K 4.2 07/05/2021    CL 98 07/05/2021    CO2 22 07/05/2021    BUN 45 07/05/2021    CREATININE 3.88 07/05/2021    GFRAA 19 07/05/2021    LABGLOM 16 07/05/2021 GLUCOSE 145 07/05/2021    GLUCOSE 128 01/09/2012    PROT 7.8 06/07/2021    LABALBU 3.6 06/07/2021    LABALBU 3.8 01/09/2012    CALCIUM 9.1 07/05/2021    BILITOT 0.59 06/07/2021    ALKPHOS 132 06/07/2021    AST 14 06/07/2021    ALT 11 06/07/2021       ASSESSMENT:     Patient Active Problem List    Diagnosis Date Noted    Necrotizing fasciitis ([address] 06/30/2021    Septic shock (HCC) 06/30/2021    Elevated digoxin level 06/30/2021    Bradycardia 06/30/2021    Infected wound 06/29/2021    Moderate malnutrition ([address] 06/09/2021    Acute respiratory failure with hypoxia (HCC) 06/08/2021    Acute pulmonary edema (HCC) 06/07/2021    Volume overload 06/07/2021    Non-compliance with renal dialysis ([address] 06/07/2021    Parapneumonic effusion 05/09/2021    C. difficile colitis 05/09/2021    Loculated pleural effusion 05/08/2021    Pleural effusion on right 05/08/2021    ROOSEVELT (obstructive sleep apnea) 05/08/2021    Sepsis ([address] 04/28/2021    Pneumonia 04/28/2021    Sepsis due to pneumonia ([address] 10/69/3914    Metabolic acidosis     Azotemia     Hyperkalemia     Diarrhea     Nausea and vomiting     Peripheral vascular disease ([address]     History of left below knee amputation (HCC)     Hemoptysis     Ulcerated, foot, right, with fat layer exposed ([address]     Charcot's joint of foot, right     CHF (congestive heart failure) ([address]     Noncompliance 06/02/2019    Type 2 diabetes mellitus with foot ulcer ([address]     Hypertension, essential     Diabetic ulcer of left foot ([address] 03/04/2016    Charcot foot due to diabetes mellitus ([address] 12/28/2015    Anemia of chronic disease 07/03/2013    ESRD on dialysis ([address] 06/27/2013    DM (diabetes mellitus) ([address] 06/27/2013    SDH (subdural hematoma) ([facility] 06/22/2013          PLAN:     WEAN PER PROTOCOL:  [] No   [] Yes  [x] N/A    ICU PROPHYLAXIS:  Stress ulcer:  [] PPI Agent  [] [id number] [] Sucralfate  [] Other:  VTE:   [] Enoxaparin  [] Unfract.  Heparin Subcut  [] EPC Cuffs    NUTRITION:  [x] NPO [] Tube Feeding (Specify: ) [] TPN  [] PO    HOME MEDS RECONCILED: [] No  [x] Yes    CONSULTATION NEEDED:  [] No  [x] Yes    FAMILY UPDATED:    [x] No  [] Yes    TRANSFER OUT OF ICU:   [x] No  [] Yes    Hospital Problems         Last Modified POA    * (Principal) Necrotizing fasciitis ([facility] 6/30/2021 Yes    ESRD on dialysis ([facility] 6/30/2021 Yes    DM (diabetes mellitus) ([facility] 6/30/2021 Yes    Anemia of chronic disease 6/30/2021 Yes    Hypertension, essential 6/30/2021 Yes    Noncompliance 6/30/2021 Yes    CHF (congestive heart failure) ([facility] 6/30/2021 Yes    Peripheral vascular disease ([facility] 6/30/2021 Yes    History of left below knee amputation ([facility] 6/30/2021 Yes    ROOSEVELT (obstructive sleep apnea) (Chronic) 6/30/2021 Yes    Overview Signed 5/8/2021  7:49 AM by Angelica Morales MD     Clinical diagnosis         Septic shock ([facility] 6/30/2021 Yes    Elevated digoxin level 6/30/2021 Yes    Bradycardia 6/30/2021 Yes          Plan:    1. Septic shock likely secondary to bilateral necrotic wounds: Resolving. Patient continues to require low-dose of pressors. Cultures from right hip shows gram-negative and positive rods, staph, Enterococcus so far. continue antibiotics  2. Necrotizing fasciitis s/p second surgical debridement. Currently has two wound VACs in place. Continue IV Flagyl, cefepime and vancomycin and will reconsult ID. Follow-up cultures. Wound ostomy and general surgery following. 3. Sinus bradycardia:  Resolved. Beta-blockers still held, amiodarone restarted yesterday by cardiology. Elevated digoxin levels given Digibind 1 dose. Cardiology signed off  4. Elevated digoxin levels status post Digibind: digoxin levels 2.9  5. Peripheral artery disease s/p left BKA  6. ESRD on hemodialysis, nephrology following, hemodialysis yesterday. 7. Systolic CHF with ischemic cardiomyopathy, EF 25% status post CABG and subsequent DENY to LCx.     Plan for outpatient follow-up accuracy of this automated transcription, some errors in transcription may have occurred.      Lieutenant Ignacio MD  7/5/2021 11:31 AM

## 2021-07-05 NOTE — PLAN OF CARE
Patient had DNR paperwork from other facility. Currently patient is oriented to self only. I called the daughter to figure out his wishes. As per daughter, she wants us to follow the paper he signed, so will change the code status to DNR CCA for now. We will also consult palliative care for further goals of care.      Jasmin Escobar MD  PGY-3 Internal Medicine Resident  [address] [place] [address]  7/5/2021 3:13 PM

## 2021-07-05 NOTE — PROGRESS NOTES
Progress Note  Podiatric Medicine and Surgery     Subjective     CC: Nonhealing right foot wound       Patient seen and examined at bedside. No acute events overnight  Patient stable, nonverbal.  Patient is DNR-CCA    HPI :  Robinson Wyman is a 73 y.o. male seen at [facility] ICU after being consulted for right foot wounds. Patient has a history of left-sided BKA, with multiple wounds to the right lower extremity. Patient is currently admitted to the MICU with sepsis secondary to necrotizing fasciitis. Patient is currently sedated and intubated PCP is Kranthi King MD    ROS: Denies N/V/F/C/SOB/CP. Otherwise negative except at stated in the HPI.      Medications:  Scheduled Meds:   insulin lispro  0-6 Units Subcutaneous Q6H    midodrine  10 mg Oral TID WC    [Held by provider] potassium chloride  40 mEq Oral Once    amiodarone  200 mg Oral Daily    Sodium Hypochlorite   Irrigation Once    vancomycin (VANCOCIN) intermittent dosing (placeholder)   Other RX Placeholder    vancomycin  750 mg Intravenous Once per day on Mon Wed Fri    fentanNYL  50 mcg Intravenous Once    sodium chloride flush  5-40 mL Intravenous 2 times per day    [Held by provider] heparin (porcine)  5,000 Units Subcutaneous 3 times per day    cefepime  1,000 mg Intravenous Q24H       Continuous Infusions:   norepinephrine Stopped (07/05/21 1137)    dextrose      sodium chloride         PRN Meds:fentanNYL, oxyCODONE, glucose, dextrose, glucagon (rDNA), dextrose, midodrine, albumin human, sodium chloride flush, sodium chloride, ondansetron **OR** ondansetron, polyethylene glycol, acetaminophen **OR** acetaminophen    Objective     Vitals:  Patient Vitals for the past 8 hrs:   BP Temp Temp src Pulse Resp SpO2 Weight   07/05/21 1215    64 21 97 %    07/05/21 1200 (!) 107/59 98.1 °F (36.7 °C) Oral 67 22 97 %    07/05/21 1145    65 20 94 %    07/05/21 1130    67 21 97 %    07/05/21 1115    68 16 95 %    07/05/21 1100    69 20 95 %    07/05/21 1045    68 22 94 %    21 1030    68 20 94 %    21 1015    67 22 94 %    21 1000 (!) 130/42   68 19 93 %    21 0945    69 17 98 %    21 0930    64 20 95 %    21 0915    67 19 92 %    21 0900    67 20 96 %    21 0845    69 23 94 %    21 0830 (!) 119/56   (!) 42 14 95 %    21 0815    68 23 93 %    21 0800 (!) 132/47 98.1 °F (36.7 °C) Oral 67 20 92 %    21 0745    69 16 94 %    21 0730    71 16 (!) 89 %    21 0715    71 21 94 %    21 0700 108/85   72 18 98 %    21 0630 123/63   66 20 96 %    21 0615    64 20 95 %    21 0600 111/78   64 17 92 % 179 lb 0.2 oz (81.2 kg)   21 0545    61 20 93 %    21 0530    64 18 98 %    21 0515    63 19 96 %    21 0500 121/60   63 19 94 %    21 0445    62 22 93 %    21 0430 (!) 106/57   60 22 93 %      Average, Min, and Max for last 24 hours Vitals:  TEMPERATURE:  Temp  Av.1 °F (36.7 °C)  Min: 97.5 °F (36.4 °C)  Max: 98.6 °F (37 °C)    RESPIRATIONS RANGE: Resp  Av.7  Min: 12  Max: 28    PULSE RANGE: Pulse  Av.6  Min: 42  Max: 85    BLOOD PRESSURE RANGE:  Systolic (69SAP), POS:423 , Min:88 , HCN:516   ; Diastolic (81RHI), UCW:42, Min:42, Max:85      PULSE OXIMETRY RANGE: SpO2  Av.2 %  Min: 89 %  Max: 100 %    I/O last 3 completed shifts: In: 1228.6 [I.V.:1010.6; NG/GT:218]  Out: 1250 [Drains:1250]    CBC:  Recent Labs     21  0621  04121  06   WBC 18.0* 17.9* 19.7*   HGB 7.2* 7.2* 7.3*   HCT 24.8* 24.9* 25.1*    146 167        BMP:  Recent Labs     21  0621  04121  06    136 134*   K 3.5* 3.8 4.2    99 98   CO2 25 26 22   BUN 20 32* 45*   CREATININE 2.10* 3.16* 3.88*   GLUCOSE 105* 119* 145*   CALCIUM 9.0 9.1 9.1        Coags:  No results for input(s): APTT, PROT, INR in the last 72 hours.     Lab Results   Component Value Date    SEDRATE 33 (H) 2021 No results for input(s): CRP in the last 72 hours. Lower Extremity Physical Exam:  Vascular: DP and PT pulses are non-palpable, right. CFT <5seconds to all digits, right. Hair growth is absent to the level of the digits. Mild edema to the right hallux. Temperature is warm to cold. Neuro: Unable to perform due to the patient's mental status. Musculoskeletal: Muscle strength testing not performed. Patient does appear to react to painful stimuli. Dermatologic:      Partial thickness ulcer  located medial hallux and measures approximately 0.5cm x 0.5cm unstageable. Some mild periwound erythema Base is necrotic. Periwound skin is hyperkeratotic. No drainage noted no odor appreciated. No erythema, no increased warmth. Does not probe to bone, sinus track, or undermine. No fluctuance, crepitus, or induration. Interdigital maceration absent. Partial-thickness ulcer located lateral plantar heel and measures approximately 0.2cm x 0.2 cm x 0.1 cm. Base is fibronecrotic. Periwound skin is hyperkeratotic. Does not probe to bone, sinus tract, or undermine. No fluctuance, crepitus, or induration. Partial-thickness ulcer located dorsal midfoot and measures approximately 1 cm x 1 cm  x0.1 cm. Base is fibrogranular. Does not probe to bone, sinus tract, or undermine. No fluctuance, crepitus, or induration. Abrasion type partial-thickness ulcer located posterior right leg and measures approximately 0.5 cm x 0.5cm x 0.1cm. Base is granular. Does not probe to bone, sinus tract, or undermine. No fluctuance, crepitus, or induration. Clinical Images:                  Imaging:   XR ABDOMEN (KUB) (SINGLE AP VIEW)   Final Result   Enteric tube in satisfactory position. Right-sided double pigtail ureteral   stent, as above. Borderline dilated small bowel loops in the mid abdomen, likely ileus   although low-grade/partial SBO a consideration. Additional findings, as above.          XR CHEST PORTABLE   Final Result   1. Mild low lung volumes with bibasilar subsegmental atelectasis and   suspected mild right-sided pleural effusion. 2. Scattered bilateral lung airspace disease. 3. Moderate to severe cardiomegaly. Cultures: None    Assessment   Carole Truong is a 73 y.o. male with   Partial thickness nonhealing wound, right foot  Status post BKA, left foot  Type 2 diabetes    Principal Problem:    Necrotizing fasciitis ([facility]  Active Problems:    ESRD on dialysis ([facility] [address]    DM (diabetes mellitus) ([facility] [address]    Anemia of chronic disease    Hypertension, essential    Noncompliance    CHF (congestive heart failure) ([facility] [address]    Peripheral vascular disease ([facility] [address]    History of left below knee amputation (HCC)    ROOSEVELT (obstructive sleep apnea)    Septic shock (HCC)    Elevated digoxin level    Bradycardia  Resolved Problems:    * No resolved hospital problems. *       Plan     Patient examined and evaluated at bedside    Recommend vascular consult if patient's overall medical condition improves. Recommend application of PRAFO boot to the right lower extremity while nonambulatory  Dressing applied to Right foot: Betadine, DSD. Mepilex is applied to the posterior calf wound to the right. Patient is overall stable. There is no acute sign of infection in the lower extremity. Given patient's overall health condition and DNR-CCA, no podiatric surgical intervention indicated this time. Nonhealing wound is contributed to poor blood flow to the lower extremity. Nursing to perform daily dressing changes consisted of betadine to the wounds of the foot and wrap with kerlix. Mepilex border to the wound of posterior right calf. Podiatry signing off at this time. Please do not hesitate to contact podiatry with any concerns. Discussed with Dr. Johann Ray.     Keisha Knapp DPM   Podiatric Medicine & Surgery   7/5/2021 at 12:29 PM     Senior Resident Statement:  I have discussed the case, including pertinent history and exam findings with the resident. I agree with the assessment, plan and orders as documented by the intern. Electronically signed by Bridget Ramires DPM on 7/5/2021 at 4:52 PM  I performed a history and physical examination of the patient and discussed management with the resident. I reviewed the residents note and agree with the documented findings and plan of care. Any areas of disagreement are noted on the chart. I was personally present for the key portions of any procedures. I have documented in the chart those procedures where I was not present during the key portions. I have reviewed the Podiatry Resident progress note. I agree with the chief complaint, past medical history, past surgical history, allergies, medications, social and family history as documented unless otherwise noted below. Documentation of the HPI, Physical Exam and Medical Decision Making performed by medical students or scribes is based on my personal performance of the HPI, PE and MDM. I have personally evaluated this patient and have completed at least one if not all key elements of the E/M (history, physical exam, and MDM). Additional findings are as noted.      Miriam Albright DPM on 7/6/2021 at 7:91 AM  Board Certified, American Board of Podiatric Surgery  Fellow, [employer] [facility] [employer] [facility]

## 2021-07-05 NOTE — PROGRESS NOTES
PROGRESS NOTE          PATIENT NAME: Ronnell Hartley RECORD NO. [id number]  DATE: 7/5/2021  SURGEON: Meche Ugalde  PRIMARY CARE PHYSICIAN: Ruthann Escobar MD    HD: # 5    ASSESSMENT    Patient Active Problem List   Diagnosis    SDH (subdural hematoma) ([address]    ESRD on dialysis ([address]    DM (diabetes mellitus) ([address]    Anemia of chronic disease    Charcot foot due to diabetes mellitus ([address]    Diabetic ulcer of left foot ([address]    Hypertension, essential    Type 2 diabetes mellitus with foot ulcer ([address]    Noncompliance    CHF (congestive heart failure) (HCC)    Sepsis ([address]    Metabolic acidosis    Azotemia    Hyperkalemia    Diarrhea    Nausea and vomiting    Peripheral vascular disease (HCC)    History of left below knee amputation (HCC)    Hemoptysis    Ulcerated, foot, right, with fat layer exposed ([address]    Charcot's joint of foot, right    Pneumonia    Sepsis due to pneumonia ([address]    Loculated pleural effusion    Pleural effusion on right    ROOSEVELT (obstructive sleep apnea)    Parapneumonic effusion    C. difficile colitis    Acute pulmonary edema (HCC)    Volume overload    Non-compliance with renal dialysis ([address]    Acute respiratory failure with hypoxia (HCC)    Moderate malnutrition (HCC)    Infected wound    Necrotizing fasciitis ([address]    Septic shock (HCC)    Elevated digoxin level    Bradycardia       MEDICAL DECISION MAKING AND PLAN    66M admitted for sepsis suspect 2/2 necrotic sacral and inner thigh wounds  -S/p I&D b/l ischial and inner thigh wounds 7/1  -S/p 2nd look, debridement of b/l ischial wounds, veraflo wound vac placmeent    · Continue medical mgmt and supportive care per primary  · No further debridements planned  · Wound care: veraflo wound vac change today - will plan with wound care to be at bedside.  WTD BID dressing change to thigh wounds  · ABX per ID: Cefepime, Flagyl, Vanco  · Leukocytosis: 19.7 (17.9, 18.0, 20.1, 21.9)  · ESRD, on dialysis M/W/F  · Will continue to follow until first vac change  · Recommend palliative care consult for goals of care  · Recommend podiatry for right foot wounds      Chief Complaint: Intubated, sedated Necrotic sacral and bilateral inner thigh wounds; sepsis     SUBJECTIVE  Patient seen and examined at bedside. No overnight events. Patient was extubated yesterday, oriented to self only, answers simple questions. On tube feeds at goal.  RN concern for additional skin breakdown at the right hip wound VAC. Wound VAC changed plan for today. Remains on 5 of levo. Atrial fibrillation. T-max 37.2. OBJECTIVE  VITALS: Temp: Temp: 98.2 °F (36.8 °C)Temp  Av.3 °F (36.8 °C)  Min: 97.5 °F (36.4 °C)  Max: 99 °F (49.4 °C) BP Systolic (34OCR), ZJR:457 , Min:88 , PHJ:739   Diastolic (26JZO), CHS:08, Min:47, Max:78   Pulse Pulse  Av.3  Min: 60  Max: 85 Resp Resp  Av.1  Min: 11  Max: 28 Pulse ox SpO2  Av.2 %  Min: 91 %  Max: 100 %    CONSTITUTIONAL:  Alert, oriented to self, responding to several questions  HEENT: Head is normocephalic, atraumati  NECK: Soft, trachea midline and straight  LUNGS: Unlabored breathing on 2 LNC  CARDIOVASCULAR:  Irregularly irregular, rate controlled  ABDOMEN: soft, non-distended, non-tender. NEUROLOGIC: moving all extremities equaly  EXTREMITIES: L BKA; right foot edema, several ulcers, noninfected  SKIN: bilateral thigh wounds clean without necrosis, some fibrinous exudate on base - surrounding denuded skin blistering and necrotic on thigh extending posteriorly; veraflo wound vac to ischium bilaterally    I/O last 3 completed shifts: In: 1228.6 [I.V.:1010.6; NG/GT:218]  Out: 1250 [Drains:1250]    Drain/tube output:   In: 831.1 [I.V.:713.1; NG/GT:118]  Out: 1250 [Drains:1250]    LAB:  CBC:   Recent Labs     21  0627 21  0410 21  0612   WBC 18.0* 17.9* 19.7*   HGB 7.2* 7.2* 7.3*   HCT 24.8* 24.9* 25.1*   MCV 97.3 96.9 96.5    146 167     BMP:   Recent Labs 07/03/21  0627 07/04/21  0410 07/05/21  0612    136 134*   K 3.5* 3.8 4.2    99 98   CO2 25 26 22   BUN 20 32* 45*   CREATININE 2.10* 3.16* 3.88*   GLUCOSE 105* 119* 145*     COAGS: No results for input(s): APTT, PROT, INR in the last 72 hours. RADIOLOGY:  No results found.       Daly Smith DO  7/5/2021  8:15 AM

## 2021-07-05 NOTE — PLAN OF CARE
Problem: Pain:  Goal: Pain level will decrease  Description: Pain level will decrease  7/5/2021 1235 by Juve Burnette RN  Outcome: Ongoing  7/5/2021 0639 by Essence Thorne RN  Outcome: Ongoing  Goal: Control of acute pain  Description: Control of acute pain  7/5/2021 1235 by Juve Burnette RN  Outcome: Ongoing  7/5/2021 0639 by Essence Thorne RN  Outcome: Ongoing  Goal: Control of chronic pain  Description: Control of chronic pain  7/5/2021 0639 by Essence Thorne RN  Outcome: Ongoing     Problem: Skin Integrity:  Goal: Will show no infection signs and symptoms  Description: Will show no infection signs and symptoms  7/5/2021 1235 by Juve Burnette RN  Outcome: Ongoing  7/5/2021 0639 by Essence Thorne RN  Outcome: Ongoing  Goal: Absence of new skin breakdown  Description: Absence of new skin breakdown  7/5/2021 1235 by Juve Burnette RN  Outcome: Ongoing  7/5/2021 0639 by Essence Thorne RN  Outcome: Ongoing  Goal: Skin integrity will be maintained  7/5/2021 1235 by Juve Burnette RN  Outcome: Ongoing  7/5/2021 0639 by Essence Thorne RN  Outcome: Ongoing     Problem: Falls - Risk of:  Goal: Will remain free from falls  Description: Will remain free from falls  7/5/2021 1235 by Juve Burnette RN  Outcome: Ongoing  7/5/2021 0639 by Essence Thorne RN  Outcome: Ongoing  Goal: Absence of physical injury  Description: Absence of physical injury  7/5/2021 0639 by Essence Thorne RN  Outcome: Ongoing     Problem: Cardiac:  Goal: Ability to maintain an adequate cardiac output will improve  Description: Ability to maintain an adequate cardiac output will improve  7/5/2021 1235 by Juve Burnette RN  Outcome: Ongoing  7/5/2021 0639 by Essence Thorne RN  Outcome: Ongoing  Goal: Hemodynamic stability will improve  Description: Hemodynamic stability will improve  7/5/2021 1235 by Juve Burnette RN  Outcome: Ongoing  7/5/2021 0639 by Essence Thorne RN  Outcome: Ongoing Problem: Fluid Volume:  Goal: Ability to achieve and maintain adequate urine output will improve  Description: Ability to achieve and maintain adequate urine output will improve  7/5/2021 1235 by Chris Woodard RN  Outcome: Ongoing  7/5/2021 0639 by Robin Denton RN  Outcome: Ongoing  Goal: Will show no signs or symptoms of fluid imbalance  Description: Will show no signs or symptoms of fluid imbalance  7/5/2021 0639 by Robin Denton RN  Outcome: Ongoing     Problem: Respiratory:  Goal: Respiratory status will improve  Description: Respiratory status will improve  7/5/2021 1235 by Chris Woodard RN  Outcome: Ongoing  7/5/2021 0639 by Robin Denton RN  Outcome: Ongoing  Goal: Ability to maintain adequate ventilation will improve  7/5/2021 0639 by Robin Denton RN  Outcome: Ongoing     Problem:  Bowel/Gastric:  Goal: Ability to prevent future episodes of altered bowel function as condition permits will improve  Description: Ability to prevent future episodes of altered bowel function as condition permits will improve  7/5/2021 0639 by Robin Denton RN  Outcome: Ongoing     Problem: Coping:  Goal: Level of anxiety will decrease  Description: Level of anxiety will decrease  7/5/2021 1235 by Chris Woodard RN  Outcome: Ongoing  7/5/2021 0639 by Robin Denton RN  Outcome: Ongoing     Problem: Physical Regulation:  Goal: Complications related to the disease process, condition or treatment will be avoided or minimized  Description: Complications related to the disease process, condition or treatment will be avoided or minimized  7/5/2021 1235 by Chris Woodard RN  Outcome: Ongoing  7/5/2021 0639 by Robin Denton RN  Outcome: Ongoing     Problem: Safety:  Goal: Ability to remain free from injury will improve  7/5/2021 0639 by Robin Denton RN  Outcome: Ongoing     Problem: Sensory:  Goal: Pain level will decrease  Description: Pain level will decrease  7/5/2021 1235 by Sharon Gamboa Katherine Estevez RN  Outcome: Ongoing  7/5/2021 0639 by Mark Azevedo RN  Outcome: Ongoing     Problem: Tissue Perfusion:  Goal: Risk of venous thrombosis will decrease  7/5/2021 0639 by Mark Azevedo RN  Outcome: Ongoing     Problem: Urinary Elimination:  Goal: Ability to reestablish a normal urinary elimination pattern will improve - after catheter removal  7/5/2021 0639 by Mark Azevedo RN  Outcome: Ongoing     Problem: Non-Violent Restraints  Goal: No harm/injury to patient while restraints in use  7/5/2021 1235 by Victoria Trevino RN  Outcome: Ongoing  7/5/2021 0639 by Mark Azevedo RN  Outcome: Met This Shift  Goal: Patient's dignity will be maintained  7/5/2021 1235 by Victoria Trevino RN  Outcome: Ongoing  7/5/2021 0639 by Mark Azevedo RN  Outcome: Met This Shift     Problem: OXYGENATION/RESPIRATORY FUNCTION  Goal: Patient will maintain patent airway  7/5/2021 1235 by Victoria Trevino RN  Outcome: Ongoing  7/5/2021 0639 by Mark Azevedo RN  Outcome: Ongoing  Goal: Patient will achieve/maintain normal respiratory rate/effort  Description: Respiratory rate and effort will be within normal limits for the patient  7/5/2021 1235 by Victoria Trevino RN  Outcome: Ongoing  7/5/2021 0639 by Mark Azevedo RN  Outcome: Ongoing     Problem: SKIN INTEGRITY  Goal: Skin integrity is maintained or improved  7/5/2021 1235 by Victoria Trevino RN  Outcome: Ongoing  7/5/2021 0639 by Mark Azevedo RN  Outcome: Ongoing     Problem: Confusion - Acute:  Goal: Absence of continued neurological deterioration signs and symptoms  Description: Absence of continued neurological deterioration signs and symptoms  7/5/2021 1235 by Victoria Trevino RN  Outcome: Ongoing  7/5/2021 0639 by Mark Azevedo RN  Outcome: Ongoing  Goal: Mental status will be restored to baseline  Description: Mental status will be restored to baseline  7/5/2021 0639 by Mark Azevedo RN  Outcome: Ongoing     Problem: Discharge Planning:  Goal: Ability to perform activities of daily living will improve  Description: Ability to perform activities of daily living will improve  7/5/2021 1235 by Nicky Macias RN  Outcome: Ongoing  7/5/2021 0639 by Sariah Cat RN  Outcome: Ongoing  Goal: Participates in care planning  Description: Participates in care planning  7/5/2021 0639 by Sariah Cat RN  Outcome: Ongoing     Problem: Injury - Risk of, Physical Injury:  Goal: Will remain free from falls  Description: Will remain free from falls  7/5/2021 1235 by Nicky Macias RN  Outcome: Ongoing  7/5/2021 0639 by Sariah Cat RN  Outcome: Ongoing  Goal: Absence of physical injury  Description: Absence of physical injury  7/5/2021 0639 by Sariah Cat RN  Outcome: Ongoing     Problem: Mood - Altered:  Goal: Mood stable  Description: Mood stable  7/5/2021 0639 by Sariah Cat RN  Outcome: Ongoing  Goal: Absence of abusive behavior  Description: Absence of abusive behavior  7/5/2021 0639 by Sariah Cat RN  Outcome: Ongoing  Goal: Verbalizations of feeling emotionally comfortable while being cared for will increase  Description: Verbalizations of feeling emotionally comfortable while being cared for will increase  7/5/2021 0639 by Sariah Cat RN  Outcome: Ongoing     Problem: Psychomotor Activity - Altered:  Goal: Absence of psychomotor disturbance signs and symptoms  Description: Absence of psychomotor disturbance signs and symptoms  7/5/2021 0639 by Sariah Cat RN  Outcome: Ongoing     Problem: Sensory Perception - Impaired:  Goal: Demonstrations of improved sensory functioning will increase  Description: Demonstrations of improved sensory functioning will increase  7/5/2021 0639 by Sariah Cat RN  Outcome: Ongoing  Goal: Decrease in sensory misperception frequency  Description: Decrease in sensory misperception frequency  7/5/2021 0639 by Sariah Cat RN  Outcome: Ongoing  Goal: Able to refrain from responding to false sensory perceptions  Description: Able to refrain from responding to false sensory perceptions  7/5/2021 0639 by Jerzy Wooten RN  Outcome: Ongoing  Goal: Demonstrates accurate environmental perceptions  Description: Demonstrates accurate environmental perceptions  7/5/2021 0639 by Jerzy Wooten RN  Outcome: Ongoing  Goal: Able to distinguish between reality-based and nonreality-based thinking  Description: Able to distinguish between reality-based and nonreality-based thinking  7/5/2021 0639 by Jerzy Wooten RN  Outcome: Ongoing  Goal: Able to interrupt nonreality-based thinking  Description: Able to interrupt nonreality-based thinking  7/5/2021 0639 by Jerzy Wooten RN  Outcome: Ongoing     Problem: Sleep Pattern Disturbance:  Goal: Appears well-rested  Description: Appears well-rested  7/5/2021 0639 by Jerzy Wooten RN  Outcome: Ongoing     Problem: Non-Violent Restraints  Goal: Removal from restraints as soon as assessed to be safe  7/5/2021 1235 by Fatuma Mckeon RN  Outcome: Not Met This Shift  Note: Continue with manny marin d/t pt reaching at lines.   7/5/2021 1748 by Jerzy Wooten RN  Outcome: Ongoing

## 2021-07-05 NOTE — PROGRESS NOTES
[facility]  Progress Note    Robinson Wyman  MRN: [id number]  Date: 7/5/2021  LOS: 5     Reason for F/U :   Necrotic wounds and septic shock    Impression :   1. Respiratory failure   · Status post extubation 7/4/2021  2. 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 Sepsis secondary to above with resolved septic shock   4. End-stage renal disease on hemodialysis  5. Diabetes mellitus type 2  6. Coronary artery disease with ischemic cardiomyopathy  7. Congestive heart failure  8. Peripheral arterial disease    Recommendations:   · Continue intravenous antimicrobial therapy with cefepime, vancomycin, and metronidazole. · The wound cultures from [facility] have MSSA, E. coli, and Enterococcus isolated from the culture  · Given the ease of these antibiotics with dialysis I would continue the cefepime and vancomycin and stop the metronidazole at this point in time  · The plan is for wound VAC changes later today and have asked that the surgical team take pictures of the wounds today  · I will follow his progress and adjust therapy accordingly    Infection Control Recommendations:   Contact precautions    Discharge Planning:   Estimated Length of IV antimicrobials:  To be determined  Patient will need Midline Catheter Insertion/ PICC line Insertion: No  Patient will need: Home IV , [place],  SNF,  LTAC: Undetermined  Patient willneed outpatient wound care: No    Medical Decision making / Summary of Stay:   Pura Krabbe a 73y.o.-year-old male who was initially admitted on 6/30/2021. Carson has multiple medical problems including coronary artery disease, congestive heart failure, end-stage renal disease for which she is on hemodialysis, hypertension, obstructive sleep apnea, diabetes mellitus, peripheral arterial disease status post left BKA.     [place] patient [place] [facility] with altered mental status at the nursing home, increased pain in the bilateral thighs where he has wounds, and the patient was found to be tachypneic using accessory muscles of breathing as well as some mild hypotension.  The patient initially required Levophed but was able to wean off the pressors. The patient was started on cefepime and vancomycin in the emergency department transferred to the floor where he was seen by the general surgery team and the patient had necrotic wounds in the in the medial thigh right flank and right buttock region with foul-smelling drainage. An excisional debridement was done 2021 with removal of necrotic tissue in the right posterior buttock, flank region and bilateral inner thighs down to the fascia.     The patient was seen by Dr. Rupa Aldana infectious disease yesterday and metronidazole was added to the antibiotic regimen.     There was a need for further debridement and the patient was transferred here to [address] [place] [address] as there was concern for necrotizing soft tissue infection and the patient was taken to the operating room where he had incision and drainage and necrotic tissue in the posterior hips and groin bilaterally and the patient was found to have necrotic tissue.     I was asked to evaluate and help with antibiotic choice and the patient currently remains intubated sedated on fentanyl and is also receiving dopamine. Current evaluation:2021    BP (!) 130/42   Pulse 69   Temp 98.1 °F (36.7 °C) (Oral)   Resp 20   Ht 6' (1.829 m)   Wt 179 lb 0.2 oz (81.2 kg)   SpO2 95%   BMI 24.28 kg/m²     Temperature Range: Temp: 98.1 °F (36.7 °C) Temp  Av.2 °F (36.8 °C)  Min: 97.5 °F (36.4 °C)  Max: 98.6 °F (37 °C)  The patient is seen and evaluated at bedside he is awake and alert he was extubated 2021.   The patient does follow commands and does have pain in his wounds otherwise he does not report any other complaints. Review of Systems   Constitutional: Negative. Respiratory: Negative. Cardiovascular: Negative. Gastrointestinal: Negative. Genitourinary: Negative. Musculoskeletal: Negative. Skin: Positive for wound. Neurological: Negative. Physical Examination :     Physical Exam  HENT:      Head: Normocephalic and atraumatic. Mouth/Throat:      Mouth: Mucous membranes are dry. Comments: Poor dentition  Cardiovascular:      Rate and Rhythm: Regular rhythm. Heart sounds: No murmur heard. Pulmonary:      Breath sounds: Normal breath sounds. Abdominal:      General: Bowel sounds are normal.      Palpations: Abdomen is soft. Musculoskeletal:      Cervical back: Normal range of motion. Comments: There is a left below the knee amputation   Skin:     Comments: There are dressings in the medial thighs bilaterally and irrigating wound vacs of the hips bilaterally  There is a dressing in the right foot   Neurological:      Mental Status: He is alert. Laboratory data:   I have independently reviewed the followinglabs:  CBC with Differential:   Recent Labs     07/04/21  0410 07/05/21  0612   WBC 17.9* 19.7*   HGB 7.2* 7.3*   HCT 24.9* 25.1*    167   LYMPHOPCT 4* 6*   MONOPCT 5 7     BMP:   Recent Labs     07/03/21  0627 07/03/21  0627 07/04/21  0410 07/05/21  0612      < > 136 134*   K 3.5*   < > 3.8 4.2      < > 99 98   CO2 25   < > 26 22   BUN 20   < > 32* 45*   CREATININE 2.10*   < > 3.16* 3.88*   MG 1.5*  --   --   --     < > = values in this interval not displayed. Hepatic Function Panel: No results for input(s): PROT, LABALBU, BILIDIR, IBILI, BILITOT, ALKPHOS, ALT, AST in the last 72 hours.       Lab Results   Component Value Date    PROCAL 4.55 05/07/2021    PROCAL 25.70 04/30/2021     Lab Results   Component Value Date    .2 07/01/2021    .8 06/29/2021    .7 04/28/2021 [address]   ESCHERICHIA COLI LIGHT GROWTH    Culture Abnormal  06/29/2021  8:09  [address]   ENTEROCOCCUS FAECALIS MODERATE GROWTH    Culture Abnormal  06/29/2021  8:09  [address]   STAPHYLOCOCCUS AUREUS LIGHT GROWTH This isolate is methicillin susceptible. Culture 06/29/2021  8:09  [address]    Culture Abnormal  06/29/2021  8:09  [address]   This is a mixed culture of organisms, which may represent colonization or contamination.  Notify the laboratory within 7 days for further workup.  Susceptibilities have not been performed.    Culture 06/29/2021  8:09  [address]   Work up all organisms per physician request.    Culture Abnormal  06/29/2021  8:09  [facility]    Culture Abnormal  06/29/2021  8:09  [address]   VEILLONELLA SPECIES MODERATE GROWTH    Escherichia coli (2)    Antibiotic Interpretation KENDELL Status    amikacin   Preliminary     NOT REPORTED    ampicillin Sensitive  Preliminary     <=2   SUSCEPTIBLE   ampicillin-sulbactam   Preliminary     NOT REPORTED    aztreonam Sensitive  Preliminary     <=1   SUSCEPTIBLE   ceFAZolin Sensitive  Preliminary     <=4   SUSCEPTIBLE   cefepime   Preliminary     NOT REPORTED    cefTRIAXone Sensitive  Preliminary     <=1   SUSCEPTIBLE   ciprofloxacin Sensitive  Preliminary     <=0.25   SUSCEPTIBLE   ertapenem   Preliminary     NOT REPORTED    Confirmatory Extended Spectrum Beta-Lactamase Negative NEGATIVE Preliminary    gentamicin Sensitive  Preliminary     <=1   SUSCEPTIBLE   meropenem   Preliminary     NOT REPORTED    nitrofurantoin   Preliminary     NOT REPORTED    tigecycline   Preliminary     NOT REPORTED    tobramycin Sensitive  Preliminary     <=1   SUSCEPTIBLE   trimethoprim-sulfamethoxazole Sensitive  Preliminary     <=20   SUSCEPTIBLE piperacillin-tazobactam Sensitive  Preliminary     <=4   SUSCEPTIBLE   Enterococcus  faecalis (3)    Antibiotic Interpretation KENDELL Status    ampicillin Sensitive  Preliminary     <=2   SUSCEPTIBLE   penicillin   Preliminary     NOT REPORTED    ciprofloxacin   Preliminary     NOT REPORTED    erythromycin   Preliminary     NOT REPORTED    Gentamicin, High Level  NOT REPORTED Preliminary    levofloxacin   Preliminary     NOT REPORTED    linezolid   Preliminary     NOT REPORTED    nitrofurantoin   Preliminary     NOT REPORTED    Synercid   Preliminary     NOT REPORTED    Streptomycin, Hi Level  NOT REPORTED Preliminary    tetracycline   Preliminary     NOT REPORTED    tigecycline   Preliminary     NOT REPORTED    vancomycin Sensitive  Preliminary     1   SUSCEPTIBLE   Staphylococcus aureus (4)    Antibiotic Interpretation KENDELL Status    penicillin Resistant  Preliminary     >=0.5   RESISTANT   cefoxitin screen  NOT REPORTED Preliminary    ciprofloxacin   Preliminary     NOT REPORTED    clindamycin Sensitive  Preliminary     <=0.25   SUSCEPTIBLE   erythromycin Resistant  Preliminary     >=8   RESISTANT   gentamicin Sensitive  Preliminary     <=0.5   SUSCEPTIBLE   gentamicin Sensitive Gentamicin is used only in combination with other active agents that test susceptible.  Preliminary    Induced Clind Resist Negative NEGATIVE Preliminary    levofloxacin Sensitive  Preliminary     <=0.12   SUSCEPTIBLE   linezolid   Preliminary     NOT REPORTED    moxifloxacin   Preliminary     NOT REPORTED    nitrofurantoin   Preliminary     NOT REPORTED    oxacillin Sensitive  Preliminary     <=0.25   SUSCEPTIBLE   Synercid   Preliminary     NOT REPORTED    rifampin   Preliminary     NOT REPORTED    tetracycline Sensitive  Preliminary     <=1   SUSCEPTIBLE   tigecycline   Preliminary     NOT REPORTED    trimethoprim-sulfamethoxazole Sensitive  Preliminary     <=10   SUSCEPTIBLE   vancomycin   Preliminary     NOT REPORTED      BLOOD  GRN

## 2021-07-05 NOTE — PROGRESS NOTES
Renal Progress Note    Patient :  Wing Hernandez; 73 y.o. MRN# [id number]  Location:  0101/0101-01  Attending:  Merline Goss DO  Admit Date:  6/30/2021   Hospital Day: 5    Subjective:       Patient seen and examined in his room. No acute events overnight. Extubated yesterday. Saturating well in room air. Follows commands. Blood pressure 123/63 on 3 mics of Levophed and 10 mg midodrine 3 times daily. Patient was hypotensive overnight with blood pressure dropping to 86/35. Last hemodialysis was on 7/2/21. Net removal 1 L over 3.5 hours. Last digoxin level 2.9 on 7/2/2021, s/p Digibind    Labs reviewed, potassium 4.2. Bicarb 22  WBC 20-18-17.9-19.7  Hemoglobin stable around 7.3    S/p second debridement bilateral buttock wound and bilateral thigh wound 7/2/2021. Wound VAC in place. General surgery planning for wound VAC change today    ESRD on hemodialysis.   Hemodialysis today  Current Medications:     Scheduled Meds:    insulin lispro  0-6 Units Subcutaneous Q6H    midodrine  10 mg Oral TID WC    [Held by provider] potassium chloride  40 mEq Oral Once    amiodarone  200 mg Oral Daily    Sodium Hypochlorite   Irrigation Once    vancomycin (VANCOCIN) intermittent dosing (placeholder)   Other RX Placeholder    vancomycin  750 mg Intravenous Once per day on Mon Wed Fri    fentanNYL  50 mcg Intravenous Once    sodium chloride flush  5-40 mL Intravenous 2 times per day    [Held by provider] heparin (porcine)  5,000 Units Subcutaneous 3 times per day    cefepime  1,000 mg Intravenous Q24H    metroNIDAZOLE  500 mg Intravenous Q8H     Continuous Infusions:    norepinephrine 4 mcg/min (07/05/21 0826)    dextrose      sodium chloride       PRN Meds:  fentanNYL, oxyCODONE, glucose, dextrose, glucagon (rDNA), dextrose, midodrine, albumin human, sodium chloride flush, sodium chloride, ondansetron **OR** ondansetron, polyethylene glycol, acetaminophen **OR** acetaminophen     Input/Output:       I/O last 3 completed shifts: In: 1228.6 [I.V.:1010.6; NG/GT:218]  Out: 1250 [Drains:1250]    Vital Signs:   Temperature:  Temp: 98.2 °F (36.8 °C)  TMax:   Temp (24hrs), Av.3 °F (36.8 °C), Min:97.5 °F (36.4 °C), Max:99 °F (37.2 °C)    Respirations:  Resp: 20  Pulse:   Pulse: 66  BP:    BP: 123/63  BP Range: Systolic (14DAH), VKK:561 , Min:88 , ABT:810       Diastolic (38GMO), DCZ:49, Min:47, Max:78      Physical Examination:     General:  Awake, arousable and following commands. HEENT:  Atraumatic, normocephalic, no throat congestion, moist mucosa. Eyes:   Pupils equal, round and reactive to light, pallor, no icterus. Neck:   Supple  Chest:   Air entry fair bilaterally, basilar crackles present. Cardiac: S1 S2 RRR  Abdomen:  Soft, non-tender, no masses or organomegally appreciable. :   No suprapubic or flank tenderness. Neuro:  Arousable and following commands. Responds appropriately  Skin:   No rashes  Extremities:  No edema left lower extremity amputation, right lower extremity foot edema and wound present. Groin wounds present. Wound vacs continue.      Labs:       Recent Labs     21   WBC 18.0* 17.9* 19.7*   RBC 2.55* 2.57* 2.60*   HGB 7.2* 7.2* 7.3*   HCT 24.8* 24.9* 25.1*   MCV 97.3 96.9 96.5   MCH 28.2 28.0 28.1   MCHC 29.0 28.9 29.1   RDW 18.9* 18.8* 18.7*    146 167   MPV 11.4 11.2 11.3      BMP:   Recent Labs     21  06    136 134*   K 3.5* 3.8 4.2    99 98   CO2 25 26 22   BUN 20 32* 45*   CREATININE 2.10* 3.16* 3.88*   GLUCOSE 105* 119* 145*   CALCIUM 9.0 9.1 9.1      SPEP:  Lab Results   Component Value Date    PROT 7.8 2021     Uep BsAg:         Lab Results   Component Value Date    HEPBSAG NONREACTIVE 2012     Urinalysis/Chemistries:      Lab Results   Component Value Date    NITRU NEGATIVE 2013    COLORU YELLOW 2013    PHUR 7.0 2013    WBCUA 2 TO 5 2013 RBCUA 0 TO 2 06/22/2013    MUCUS NOT REPORTED 06/22/2013    TRICHOMONAS NOT REPORTED 06/22/2013    YEAST NOT REPORTED 06/22/2013    BACTERIA NOT REPORTED 06/22/2013    SPECGRAV 1.014 06/22/2013    LEUKOCYTESUR NEGATIVE 06/22/2013    UROBILINOGEN Normal 06/22/2013    BILIRUBINUR NEGATIVE 06/22/2013    BILIRUBINUR NEGATIVE  Verified by ictotest. 10/20/2011    GLUCOSEU 1+ 06/22/2013    GLUCOSEU TRACE 10/20/2011    [address] NEGATIVE 06/22/2013    AMORPHOUS NOT REPORTED 06/22/2013       Radiology:     CXR: 7/1/21:  Enteric tube in satisfactory position.  Right-sided double pigtail ureteral   stent, as above. Borderline dilated small bowel loops in the mid abdomen, likely ileus   although low-grade/partial SBO a consideration    Assessment:     1. ESRD on hemodialysis. Regular hemodialysis days 4 times a week at [facility], left AV fistula. Under Dr. Shante Kerns group. Weight on admission 79 kg, weight this morning 74.8 kg. Continue to follow-up with Dr. Robyn Narayan. 2.  Acute hypercapnic respiratory failure requiring mechanical ventilation -continues  3. Sepsis secondary to necrotic wounds left thigh, sacral region and buttock region -s/p wound debridement x2, IV antibiotics per infectious disease and general surgery debriding wounds wound vacs present  4. Atrial fibrillation  5. Bradycardia  6. Digoxin toxicity  7. Ischemic cardiomyopathy with EF 25% and grade 3 diastolic dysfunction (echo 6/30/2021)  8. Type 2 diabetes mellitus  9. Essential hypertension continues on Levophed -   11. Ileus  12. Anemia of chronic disease -hemoglobin 7.2 this morning patient had previous transfusion. 13.  Secondary hyperparathyroidism    Plan:   1. Hemodialysis today. May need albumin and midodrine with dialysis as needed. Will continue as MWF schedule. 2.  Strict intake and output. Daily weights. 3.  Midodrine 10 mg 3 times daily. Continue pressor support with Levophed wean as tolerated  4.   Antibiotics as per ESRD dosing per infectious disease  5. Digoxin toxicity treatment per cardiology  6. BMP in a.m.  7.  Will follow    Nutrition   Please ensure that patient is on a renal diet/TF. Avoid nephrotoxic drugs/contrast exposure. We will continue to follow along with you. Electronically signed by Sunny Wing MD on 7/5/2021 at [address] [place] [address] MD  [id number], Internal Medicine Resident  [address] [place] [address]  7/5/2021 9:22 AM  Attending Physician Statement  I have discussed the care of Becky Griffith, including pertinent history and exam findings with the Nephrology resident I have reviewed the key elements of all parts of the encounter with the This patient was seen by the resident.  I have seen and examined the patient, agree with the workup, evaluation, management and diagnosis. Care plan has been discussed. .  Note was updated and recorded changes were made I have seen and examined the patient with the nephrology resident. I agree with the assessment and plan and status of the problem list as documented. Addiitionally I recommend hemodialysis today. Hemodialysis orders were reviewed with dialysis nurses and will dialyze him today with removal of 1 to 1.5 kg.   Kary Jimenez MD

## 2021-07-06 NOTE — PROGRESS NOTES
[facility]  Progress Note    Deion Ramos  MRN: [id number]  Date: 7/6/2021  LOS: 6     Reason for F/U :   Necrotic wounds    Impression :   1. Respiratory failure  · s/p extubation 7/4/2021  2. 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 Status post surgical debridement 6/29/2021  4. S/p repeat debridement 7/2/2021  5. Sepsis, secondary to above, resolved septic shock  6. End-stage renal disease on hemodialysis  7. Diabetes mellitus type 2  8. Coronary artery disease with ischemic cardiomyopathy  9. Congestive heart failure  10. Peripheral arterial disease    Recommendations:   · Continue IV antimicrobial therapy with cefepime, vancomycin with hemodialysis  · Wound cultures from [facility] grew MSSA, E. coli, Enterococcus  · Continue local wound care per surgical team with wound VAC  · Continue supportive care    Infection Control Recommendations:   Contact precautions    Discharge Planning:   Estimated Length of IV antimicrobials:  To be determined  Patient will need Midline Catheter Insertion/ PICC line Insertion: No  [facility]  [facility],  LTAC: Undetermined  Patient willneed outpatient wound care: No    Medical Decision making / Summary of Stay:   Kelly Garcia a 73y.o.-year-old male who was initially admitted on 6/30/2021. Carson has multiple medical problems including coronary artery disease, congestive heart failure, end-stage renal disease for which she is on hemodialysis, hypertension, obstructive sleep apnea, diabetes mellitus, peripheral arterial disease status post left BKA.     The patient was admitted to [facility] on 6/29/2021 with altered mental status at the nursing home, increased pain in the bilateral thighs where he has wounds, and the patient was found to be tachypneic using accessory muscles of breathing as well as some mild hypotension.  The patient initially required Levophed but was able to wean off the pressors. The patient was started on cefepime and vancomycin in the emergency department transferred to the floor where he was seen by the general surgery team and the patient had necrotic wounds in the in the medial thigh right flank and right buttock region with foul-smelling drainage. An excisional debridement was done 2021 with removal of necrotic tissue in the right posterior buttock, flank region and bilateral inner thighs down to the fascia.     The patient was seen by Dr. Yaw Abbasi infectious disease yesterday and metronidazole was added to the antibiotic regimen.     There was a need for further debridement and the patient was transferred here to [place] [address] as there was concern for necrotizing soft tissue infection and the patient was taken to the operating room where he had incision and drainage and necrotic tissue in the posterior hips and groin bilaterally and the patient was found to have necrotic tissue.     I was asked to evaluate and help with antibiotic choice and the patient currently remains intubated sedated on fentanyl and is also receiving dopamine. Current evaluation:2021    BP (!) 110/51   Pulse 69   Temp 98.2 °F (36.8 °C) (Oral)   Resp 22   Ht 6' (1.829 m)   Wt 175 lb 11.3 oz (79.7 kg)   SpO2 96%   BMI 23.83 kg/m²     Temperature Range: Temp: 98.2 °F (36.8 °C) Temp  Av.2 °F (36.8 °C)  Min: 97.5 °F (36.4 °C)  Max: 98.8 °F (37.1 °C)    Patient has been experiencing some low blood pressures, to remain in the ICU  He is currently receiving 1 unit PRBC  Denies needs  Wound VAC in place    Review of Systems   Constitutional: Negative. HENT: Negative. Respiratory: Negative. Cardiovascular: Negative. Gastrointestinal: Negative. Genitourinary: Negative. Skin: Negative. Neurological: Negative. Psychiatric/Behavioral: Negative.           Physical Examination :     Physical Exam  Constitutional:       General: He is not in acute distress. Appearance: He is normal weight. HENT:      Head: Normocephalic and atraumatic. Cardiovascular:      Rate and Rhythm: Normal rate and regular rhythm. Pulmonary:      Effort: Pulmonary effort is normal. No respiratory distress. Breath sounds: Normal breath sounds. Abdominal:      General: Abdomen is flat. Bowel sounds are normal.      Palpations: Abdomen is soft. Musculoskeletal:      Comments: Left lower extremity BKA  Wound VAC in place  Right foot dry necrotic wounds, dressing in place   Neurological:      Mental Status: He is alert. Psychiatric:         Mood and Affect: Mood normal.                   Laboratory data:   I have independently reviewed the followinglabs:  CBC with Differential:   Recent Labs     07/05/21  0612 07/06/21  0613   WBC 19.7* 16.7*   HGB 7.3* 6.8*   HCT 25.1* 23.7*    128*   LYMPHOPCT 6* 6*   MONOPCT 7 6     BMP:   Recent Labs     07/05/21  0612 07/06/21  0613   * 139   K 4.2 3.9   CL 98 100   CO2 22 22   BUN 45* 28*   CREATININE 3.88* 2.24*     Hepatic Function Panel: No results for input(s): PROT, LABALBU, BILIDIR, IBILI, BILITOT, ALKPHOS, ALT, AST in the last 72 hours.       Lab Results   Component Value Date    PROCAL 4.55 05/07/2021    PROCAL 25.70 04/30/2021     Lab Results   Component Value Date    .2 07/01/2021    .8 06/29/2021    .7 04/28/2021     Lab Results   Component Value Date    SEDRATE 33 (H) 04/28/2021         No results found for: DDIMER  Lab Results   Component Value Date    FERRITIN 2,624 05/07/2021    FERRITIN 1,806 06/05/2019    FERRITIN 2,516 03/05/2016    FERRITIN 532 01/09/2012    FERRITIN 1,007 12/07/2011     Lab Results   Component Value Date     05/13/2021     05/10/2021     05/07/2021     04/29/2021     06/05/2019     Lab Results   Component Value Date    FIBRINOGEN 371 05/13/2021       Results in Past 30 Days  Result Component Current Result Ref Range Previous Result Ref Range   SARS-CoV-2, Rapid Not Detected (6/7/2021) Not Detected Not in Time Range      Lab Results   Component Value Date    COVID19 Not Detected 06/07/2021    COVID19 Not Detected 04/29/2021       No results for input(s): VANCOTROUGH in the last 72 hours. Imaging Studies:   No new imaging    Cultures:   No new culture data    Medications:      midodrine  15 mg Oral TID WC    hydrocortisone sodium succinate PF  50 mg Intravenous Q12H    insulin lispro  0-6 Units Subcutaneous Q6H    amiodarone  200 mg Oral Daily    vancomycin (VANCOCIN) intermittent dosing (placeholder)   Other RX Placeholder    vancomycin  750 mg Intravenous Once per day on Mon Wed Fri    sodium chloride flush  5-40 mL Intravenous 2 times per day    heparin (porcine)  5,000 Units Subcutaneous 3 times per day    cefepime  1,000 mg Intravenous Q24H           [facility]  ABEBA Pineda CNP  Perfect Serve messaging  OFFICE: [phone]      Electronically signed by ABEBA Pineda CNP on 7/6/2021 at 10:42 AM  Thank you for allowing us to participate in the care of this patient. Please call with questions. This note iscreated with the assistance of a speech recognition program.  While intending to generate a document that actually reflects the content of the visit, the document can still have some errors including those of syntax andsound a like substitutions which may escape proof reading. In such instances, actual meaning can be extrapolated by contextual diversion.

## 2021-07-06 NOTE — CARE COORDINATION
Transitional planning. Per Melodie Palliative RN, pt is extubated, alert to person only, [address] will visit pt tooday at 5:30pm and try to arrange meeting with pt's daughter, Audelia Wharton. Pt came from [place] [id number] Pt's RN Mariama informed CM that [address] evaluated pt today and plans are for [address] to meet with 2/3 pt's children tomorrow at 5:30p.

## 2021-07-06 NOTE — PROGRESS NOTES
Renal Progress Note    Patient :  J Luis Borges; 73 y.o. MRN# [id number]  Location:    Attending:  Harish Carias DO  Admit Date:  2021   Hospital Day: 6    Subjective:     Patient was seen and examined. He was at 45 degree. He was alert and awake. He was complaining of pain in his leg as well as in his back. He was dialyzed yesterday and tolerated well. Patient continues to remain hypotensive and requiring ProAmatine currently he is on 10 mg of ProAmatine 3 times daily  His most recent BMP shows sodium of 139, potassium of 3.9, bicarbonate of 22 and creatinine of 2.2  Microbiology: No recent culture positive. Current Medications:     Scheduled Meds:    midodrine  15 mg Oral TID WC    insulin lispro  0-6 Units Subcutaneous Q6H    [Held by provider] potassium chloride  40 mEq Oral Once    amiodarone  200 mg Oral Daily    Sodium Hypochlorite   Irrigation Once    vancomycin (VANCOCIN) intermittent dosing (placeholder)   Other RX Placeholder    vancomycin  750 mg Intravenous Once per day on     fentanNYL  50 mcg Intravenous Once    sodium chloride flush  5-40 mL Intravenous 2 times per day    [Held by provider] heparin (porcine)  5,000 Units Subcutaneous 3 times per day    cefepime  1,000 mg Intravenous Q24H     Input/Output:       I/O last 3 completed shifts: In: 1827 [I.V.:665; NG/GT:1062]  Out: 2950 [Drains:1050]    Vital Signs:   Temperature:  Temp: 97.7 °F (36.5 °C)  TMax:   Temp (24hrs), Av.1 °F (36.7 °C), Min:97.5 °F (36.4 °C), Max:98.8 °F (37.1 °C)    Respirations:  Resp: 16  Pulse:   Pulse: 59  BP:    BP: (!) 91/53  BP Range: Systolic (46QXL), YKV:688 , Min:91 , NJN:487       Diastolic (77OMI), MHA:41, Min:42, Max:59      Physical Examination:     General:  Awake and alert x3. HEENT:  Normocephalic   eyes:   Pupils reactive to light. Neck:   Supple  Chest:   Air entry fair bilaterally, basilar crackles present.   Cardiac: S1 S2 RRR  Abdomen:  Soft, non-tender, no masses or organomegally appreciable. :   No suprapubic or flank tenderness. Neuro:  Arousable and following commands. Responds appropriately  Skin:   No rashes  Extremities:  No edema left lower extremity amputation, right lower extremity foot edema and wound present. Groin wounds present. Wound vacs continue. Good morning good morning    Labs:       Recent Labs     07/04/21  0410 07/05/21  0612 07/06/21  0613   WBC 17.9* 19.7* 16.7*   RBC 2.57* 2.60* 2.43*   HGB 7.2* 7.3* 6.8*   HCT 24.9* 25.1* 23.7*   MCV 96.9 96.5 97.5   MCH 28.0 28.1 28.0   MCHC 28.9 29.1 28.7   RDW 18.8* 18.7* 18.9*    167 128*   MPV 11.2 11.3 11.8      BMP:   Recent Labs     07/04/21 0410 07/05/21  0612 07/06/21  0613    134* 139   K 3.8 4.2 3.9   CL 99 98 100   CO2 26 22 22   BUN 32* 45* 28*   CREATININE 3.16* 3.88* 2.24*   GLUCOSE 119* 145* 176*   CALCIUM 9.1 9.1 9.2      SPEP:  Lab Results   Component Value Date    PROT 7.8 06/07/2021     Uep BsAg:         Lab Results   Component Value Date    HEPBSAG NONREACTIVE 01/09/2012     Radiology:     CXR: 7/1/21:  Enteric tube in satisfactory position.  Right-sided double pigtail ureteral   stent, as above. Borderline dilated small bowel loops in the mid abdomen, likely ileus   although low-grade/partial SBO a consideration    Assessment:     1. ESRD on hemodialysis. Patient was dialyzed yesterday. He tolerated dialysis fairly well. There is no need for dialysis today  2. Acute hypercapnic respiratory failure: Now patient is extubated and maintaining oxygen saturation  3. Sepsis secondary to necrotic wounds left thigh, sacral region and buttock region -s/p wound debridement x2, IV antibiotics per infectious disease and general surgery debriding wounds wound vacs present  4. Anemia and further drop in hemoglobin most recent hemoglobin is 6.8.  5..  Secondary hyperparathyroidism  6. CODE STATUS change in CODE STATUS noted  Plan:   1.   Consider blood transfusion, it can be done without dialysis  2. CBC and BMP in a.m.  3.  Dialysis in a.m. Nutrition   Please ensure that patient is on a renal diet/TF. Avoid nephrotoxic drugs/contrast exposure. We will continue to follow along with you.      Electronically signed by Tigist Obregon MD on 7/6/2021 at 9:12 AM

## 2021-07-06 NOTE — PROGRESS NOTES
INTENSIVE CARE UNIT  Resident Physician Progress Note    Patient - John Amaro  Date of Admission -  6/30/2021 10:51 PM  Date of Evaluation -  7/6/2021  Room and Bed Number -  0101/0101-01   Hospital Day - 6      SUBJECTIVE:     OVERNIGHT EVENTS:   No acute events overnight    Had hemodialysis yesterday with net removal of 1.5 L    CODE STATUS changed to [facility] yesterday. Patient already has DNR CCA paper signed with no intubation back in April 2021. Palliative care following    TODAY:     Today patient is lying comfortably in the bed  Appears bit drowsy, but seems oriented grossly. Complaining of pelvic and thigh pain at the site of surgery  Wound VAC changed yesterday by general surgery  Was evaluated today by podiatry for right lower extremity wound and there are no recommending any intervention  Saturating well on room air  NG tube feeds running  Hemodynamics; hypotensive with map of 51 but maintaining systolic >28. Off Levophed. Receiving midodrine 10 mg 3 times daily  Nontachycardic, has history of atrial fibrill+ation and was restarted on amiodarone by cardiology  On vancomycin, cefepime per infectious disease    Labs reviewed; Leukocytosis improving  Hemoglobin 7.3> 6.8  Platelet 194> 842    50 mcg of fentanyl and Roxicodone 5 mg as needed for pain    Patient is ESRD on dialysis, minimal urine output. Cultures from 6/29 so far growing nonlactose fermenting gram-negative rods, E. coli, Enterococcus faecalis, staph aureus, Clostridium sporogenes and veillonella species     Cardiology resumed amiodarone 100 mg and as per them he is not a candidate for LifeVest or AICD given his acute issues, will follow patient.     Brief history  John Amaro is a 73 y.o. male with history of ESRD on hemodialysis, systolic CHF last echo done on 6/19/2020 showed EF of 25 to 30%, A. fib, multivessel CAD s/p CABG and subsequent DENY to LCx, peripheral artery disease with history of left BKA, type 2 diabetes mellitus initially was admitted at [facility] for worsening mentation and bilateral necrotizing wounds. Patient stays at a nursing home.     In the ER, patient was afebrile, hypotensive with BP 100s was initially started on IV cefepime and vancomycin in the ED and was transferred to the medicine floor. In the ER, vitals heart rate in high 50s, /57  Lactic acid 3.1,   WBC 21.4, Hb 8.9  Patient became hypotensive and bradycardic so rapid response was called and patient was eventually transferred to ICU. General surgery and infectious disease were also consulted. Patient was started on Levophed for a brief period of time, bradycardic in high 40s, received atropine and 1 dose of glucagon and her beta-blocker and amiodarone were held. Her digoxin levels came back elevated, was also given Digibind today. Patient was started on IV Flagyl, cefepime and vancomycin as per ID recommendations. Repeat echocardiogram suggestive of LVEF 25%, moderate global hypokinesis, grade 2 diastolic dysfunction with RVSP 53.  Cardiology was also following the patient, recommended LifeVest before discharge.     Patient underwent bedside debridement by general surgery however there is need for further deep muscle debridement so the decision was made to transfer the patient to [facility] . Consulted general surgery for further recommendations regarding debridement. Patient currently is in sinus bradycardia with heart rate in high 40s. BP 91/48 with map of 62. Continue with IV cefepime, vancomycin and Flagyl and reconsult ID. Recheck digoxin level in the morning, repeat CBC, BMP and wait on cultures. Overnight patient was taken to the OR by general surgery for debridement of subcutaneous tissue and muscle for necrotic bilateral groin and posterior hip wounds. Patient remained bradycardic with heart rate in 40s and map in the low 60s and was eventually started on dopamine drip.   Also received transfusion Shift [id number] 24 Hour Total   INTAKE   I.V.(mL/kg) 127(1.6)   127(1.6)   NG/GT(mL/kg) 381(4.8)   381(4.8)   Shift Total(mL/kg) 508(6.4)   508(6.4)   OUTPUT   Drains(mL/kg) 250(3.1)   250(3.1)   Shift Total(mL/kg) 250(3.1)   250(3.1)   Weight (kg) 79.7 79.7 79.7 79.7     Wt Readings from Last 3 Encounters:   07/05/21 175 lb 11.3 oz (79.7 kg)   06/30/21 170 lb 13.7 oz (77.5 kg)   06/11/21 169 lb 1.5 oz (76.7 kg)     Body mass index is 23.83 kg/m². PHYSICAL EXAM:  GEN:  awake, fatigued and mild pain/distress  EYES:  Left pupil cataract, lids and lashes normal  HEENT:  Normocepalic, without obvious abnormality  Atramatic  LUNGS:  no increased work of breathing and good air exchange  CV:    regular rate and rhythm and bradycardic with regular rhythm  ABDOMEN:   no scars and normal bowel sounds  MSK:    S/p Left BKA, thigh and pelvic region necrotizing fasciitis s/p I&D. Right lower extremity wounds.   NEURO[de-identified]   awake  alert  SKIN:   thigh and pelvic region necrotizing fasciitis s/p I&D  EXTREMITIES:                      MEDICATIONS:  Scheduled Meds:   insulin lispro  0-6 Units Subcutaneous Q6H    midodrine  10 mg Oral TID WC    [Held by provider] potassium chloride  40 mEq Oral Once    amiodarone  200 mg Oral Daily    Sodium Hypochlorite   Irrigation Once    vancomycin (VANCOCIN) intermittent dosing (placeholder)   Other RX Placeholder    vancomycin  750 mg Intravenous Once per day on Mon Wed Fri    fentanNYL  50 mcg Intravenous Once    sodium chloride flush  5-40 mL Intravenous 2 times per day    [Held by provider] heparin (porcine)  5,000 Units Subcutaneous 3 times per day    cefepime  1,000 mg Intravenous Q24H     Continuous Infusions:   norepinephrine Stopped (07/06/21 0129)    dextrose      sodium chloride       PRN Meds:   fentanNYL, 50 mcg, Q1H PRN  oxyCODONE, 5 mg, Q3H PRN  glucose, 15 g, PRN  dextrose, 12.5 g, PRN  glucagon (rDNA), 1 mg, PRN  dextrose, 100 mL/hr, PRN  midodrine, 10 mg, PRN  albumin human, 25 g, PRN  sodium chloride flush, 5-40 mL, PRN  sodium chloride, 25 mL, PRN  ondansetron, 4 mg, Q8H PRN   Or  ondansetron, 4 mg, Q6H PRN  polyethylene glycol, 17 g, Daily PRN  acetaminophen, 650 mg, Q6H PRN   Or  acetaminophen, 650 mg, Q6H PRN        SUPPORT DEVICES: [] Ventilator [] BIPAP  [] Nasal Cannula [x] Room Air    VENT SETTINGS (Comprehensive) (if applicable):    DATA:  Complete Blood Count:   Recent Labs     07/04/21 0410 07/05/21  0612 07/06/21  0613   WBC 17.9* 19.7* 16.7*   RBC 2.57* 2.60* 2.43*   HGB 7.2* 7.3* 6.8*   HCT 24.9* 25.1* 23.7*   MCV 96.9 96.5 97.5   MCH 28.0 28.1 28.0   MCHC 28.9 29.1 28.7   RDW 18.8* 18.7* 18.9*    167 128*   MPV 11.2 11.3 11.8        Last 3 Blood Glucose:   Recent Labs     07/04/21  0410 07/05/21  0612 07/06/21  0613   GLUCOSE 119* 145* 176*        PT/INR:    Lab Results   Component Value Date    PROTIME 23.8 05/07/2021    PROTIME 10.9 10/25/2011    INR 2.1 05/07/2021     PTT:    Lab Results   Component Value Date    APTT 49.1 05/07/2021       Comprehensive Metabolic Profile:   Recent Labs     07/04/21  0410 07/05/21  0612 07/06/21  0613    134* 139   K 3.8 4.2 3.9   CL 99 98 100   CO2 26 22 22   BUN 32* 45* 28*   CREATININE 3.16* 3.88* 2.24*   GLUCOSE 119* 145* 176*   CALCIUM 9.1 9.1 9.2      Magnesium:   Lab Results   Component Value Date    MG 1.5 07/03/2021    MG 2.1 06/11/2021    MG 2.3 06/10/2021     Phosphorus:   Lab Results   Component Value Date    PHOS 3.5 06/30/2021    PHOS 5.1 06/11/2021    PHOS 6.7 06/10/2021     Ionized Calcium:   Lab Results   Component Value Date    CAION 4.43 01/09/2012    CAION 4.49 10/19/2011    CAION 4.05 10/05/2011        Urinalysis:   Lab Results   Component Value Date    NITRU NEGATIVE 06/22/2013    COLORU YELLOW 06/22/2013    PHUR 7.0 06/22/2013    WBCUA 2 TO 5 06/22/2013    RBCUA 0 TO 2 06/22/2013    MUCUS NOT REPORTED 06/22/2013    TRICHOMONAS NOT REPORTED 06/22/2013 YEAST NOT REPORTED 06/22/2013    BACTERIA NOT REPORTED 06/22/2013    SPECGRAV 1.014 06/22/2013    LEUKOCYTESUR NEGATIVE 06/22/2013    UROBILINOGEN Normal 06/22/2013    BILIRUBINUR NEGATIVE 06/22/2013    BILIRUBINUR NEGATIVE  Verified by ictotest. 10/20/2011    GLUCOSEU 1+ 06/22/2013    GLUCOSEU TRACE 10/20/2011    KETUA NEGATIVE 06/22/2013    AMORPHOUS NOT REPORTED 06/22/2013       HgBA1c:    Lab Results   Component Value Date    LABA1C 6.6 01/23/2021     TSH:    Lab Results   Component Value Date    TSH 1.51 05/01/2021     Lactic Acid:   Lab Results   Component Value Date    LACTA 1.6 06/30/2021      Troponin: No results for input(s): TROPONINI in the last 72 hours. Microbiology:  Urine Culture:  No components found for: CURINE  Blood Culture:  No components found for: CBLOOD, CFUNGUSBL  Blood Culture from Central Line:  No components found for: CBLOODLN  Wound Culture:         Other Labs:  CBC with Differential:    Lab Results   Component Value Date    WBC 16.7 07/06/2021    RBC 2.43 07/06/2021    RBC 3.51 01/09/2012    HGB 6.8 07/06/2021    HCT 23.7 07/06/2021     07/06/2021     01/09/2012    MCV 97.5 07/06/2021    MCH 28.0 07/06/2021    MCHC 28.7 07/06/2021    RDW 18.9 07/06/2021    NRBC 1 03/07/2016    METASPCT 2 05/01/2021    LYMPHOPCT 6 07/06/2021    MONOPCT 6 07/06/2021    MYELOPCT 1 03/06/2016    BASOPCT 0 07/06/2021    MONOSABS 1.05 07/06/2021    LYMPHSABS 0.94 07/06/2021    EOSABS 0.10 07/06/2021    BASOSABS 0.03 07/06/2021    DIFFTYPE NOT REPORTED 07/06/2021     Platelets:    Lab Results   Component Value Date     07/06/2021     01/09/2012     Hemoglobin/Hematocrit:    Lab Results   Component Value Date    HGB 6.8 07/06/2021    HCT 23.7 07/06/2021     CMP:    Lab Results   Component Value Date     07/06/2021    K 3.9 07/06/2021     07/06/2021    CO2 22 07/06/2021    BUN 28 07/06/2021    CREATININE 2.24 07/06/2021    GFRAA 36 07/06/2021    LABGLOM 30 07/06/2021 GLUCOSE 176 07/06/2021    GLUCOSE 128 01/09/2012    PROT 7.8 06/07/2021    LABALBU 3.6 06/07/2021    LABALBU 3.8 01/09/2012    CALCIUM 9.2 07/06/2021    BILITOT 0.59 06/07/2021    ALKPHOS 132 06/07/2021    AST 14 06/07/2021    ALT 11 06/07/2021       ASSESSMENT:     Patient Active Problem List    Diagnosis Date Noted    Necrotizing fasciitis ([address] 06/30/2021    Septic shock (HCC) 06/30/2021    Elevated digoxin level 06/30/2021    Bradycardia 06/30/2021    Infected wound 06/29/2021    Moderate malnutrition ([address] 06/09/2021    Acute respiratory failure with hypoxia (HCC) 06/08/2021    Acute pulmonary edema (HCC) 06/07/2021    Volume overload 06/07/2021    Non-compliance with renal dialysis ([address] 06/07/2021    Parapneumonic effusion 05/09/2021    C. difficile colitis 05/09/2021    Loculated pleural effusion 05/08/2021    Pleural effusion on right 05/08/2021    ROOSEVELT (obstructive sleep apnea) 05/08/2021    Sepsis ([address] 04/28/2021    Pneumonia 04/28/2021    Sepsis due to pneumonia ([address] 40/19/9225    Metabolic acidosis     Azotemia     Hyperkalemia     Diarrhea     Nausea and vomiting     Peripheral vascular disease ([address]     History of left below knee amputation (HCC)     Hemoptysis     Ulcerated, foot, right, with fat layer exposed ([address]     Charcot's joint of foot, right     CHF (congestive heart failure) ([address]     Noncompliance 06/02/2019    Type 2 diabetes mellitus with foot ulcer ([address]     Hypertension, essential     Diabetic ulcer of left foot ([address] 03/04/2016    Charcot foot due to diabetes mellitus ([address] 12/28/2015    Anemia of chronic disease 07/03/2013    ESRD on dialysis ([address] 06/27/2013    DM (diabetes mellitus) ([address] 06/27/2013    SDH (subdural hematoma) ([facility] 06/22/2013          PLAN:     WEAN PER PROTOCOL:  [] No   [] Yes  [x] N/A    ICU PROPHYLAXIS:  Stress ulcer:  [] PPI Agent  [] [id number] [] Sucralfate  [] Other:  VTE:   [] Enoxaparin  [] Unfract.  Heparin Subcut  [] EPC Cuffs    NUTRITION:  [] NPO [x] Tube Feeding (Specify: NG) [] TPN  [] PO    HOME MEDS RECONCILED: [] No  [x] Yes    CONSULTATION NEEDED:  [] No  [x] Yes    FAMILY UPDATED:    [x] No  [] Yes    TRANSFER OUT OF ICU:   [x] No  [] Yes    Hospital Problems         Last Modified POA    * (Principal) Necrotizing fasciitis ([facility] 6/30/2021 Yes    ESRD on dialysis ([facility] 6/30/2021 Yes    DM (diabetes mellitus) ([facility] 6/30/2021 Yes    Anemia of chronic disease 6/30/2021 Yes    Hypertension, essential 6/30/2021 Yes    Noncompliance 6/30/2021 Yes    CHF (congestive heart failure) ([facility] 6/30/2021 Yes    Peripheral vascular disease ([facility] 6/30/2021 Yes    History of left below knee amputation ([facility] 6/30/2021 Yes    ROOSEVELT (obstructive sleep apnea) (Chronic) 6/30/2021 Yes    Overview Signed 5/8/2021  7:49 AM by Danielito Nair MD     Clinical diagnosis         Septic shock ([facility] 6/30/2021 Yes    Elevated digoxin level 6/30/2021 Yes    Bradycardia 6/30/2021 Yes          Plan:    1. Septic shock likely secondary to bilateral necrotic wounds: Resolving. Off pressors. Map around 50, barely maintaining systolic>90. Continue midodrine 10 mg 3 times daily. Add solucortef to help with pressure. Continue antibiotics as below for necrotizing fasciitis. 2. Necrotizing fasciitis s/p second surgical debridement. Currently has two wound VACs in place. Wound cultures growing Enterococcus, Pseudomonas, staph aureus. Continue cefepime and vancomycin as per infectious disease recommendations. Follow-up with final cultures. Wound ostomy and general surgery following. 3. Anemia of chronic disease: Hemoglobin dropped 7.2> 6.8. Transfuse 1 unit PRBC  4. Right lower extremity wounds: Evaluated by podiatry. Not recommending any surgical intervention as wounds are clean and patient is DNR CCA. 5. Sinus bradycardia with history of atrial fibrillation:  Resolved. Beta-blockers still held, amiodarone restarted by cardiology.  Elevated digoxin levels given Digibind 1 dose on admission. Cardiology signed off  6. Elevated digoxin levels status post Digibind: digoxin levels 2.9  7. Peripheral artery disease s/p left BKA  8. ESRD on hemodialysis: Nephrology following, had a session of hemodialysis yesterday with removal of 1.5 L.  9. Systolic CHF with ischemic cardiomyopathy, EF 25% status post CABG and subsequent DENY to LCx. Plan for outpatient follow-up with cardiology. Not a candidate for LifeVest or AICD. DVT prophylaxis: Subcu Heparin on hold. Will resume  GI prophylaxis: None  Keep in ICU for today due to low pressures    CODE STATUS: DNR CCA without intubation     Estevan Espinoza MD  Internal Medicine Resident, PGY-2  [facility];  [place], [place]  7/6/2021,8:06 AM

## 2021-07-06 NOTE — PLAN OF CARE
Nutrition Problem #1: Inadequate oral intake  Intervention: Food and/or Nutrient Delivery: Continue Current Tube Feeding  Nutritional Goals: meet % of estimated nutrient needs with enteral nutrition support

## 2021-07-06 NOTE — CONSULTS
..    Palliative Care Inpatient Consult    NAME:  Ronnell Hartley RECORD NUMBER:  [id number]  AGE: 73 y.o. GENDER: male  : 1955  TODAY'S DATE:  2021    Reasons for Consultation:    Provision of information regarding PC and/or hospice philosophies  Complex, time-intensive communication and interdisciplinary psychosocial support  Clarification of goals of care and/or assistance with difficult decision-making  Guidance in regards to resources and transition(s)    Code Status:     Members of PC team contributing to this consultation are :Melodie Lepe R.N. History of Present Illness     The patient is a 73 y.o. / male who presents with No chief complaint on file. Referred to Palliative Care by   [x] Physician   [] Nursing  [] Family Request   [] Other:       He was admitted to the pulmonary critical care service for Necrotizing fasciitis ([facility] [M72.6]. His hospital course has been associated with Necrotizing fasciitis ([facility] The patient has a complicated medical history and has been hospitalized since 2021 10:51 PM.    Active Hospital Problems    Diagnosis Date Noted    Necrotizing fasciitis ([facility] [M72.6] 2021    Septic shock ([facility] [A41.9, R65.21] 2021    Elevated digoxin level [R78.89] 2021    Bradycardia [R00.1] 2021    ROOSEVELT (obstructive sleep apnea) [G47.33] 2021    Peripheral vascular disease ([facility] [I73.9]     History of left below knee amputation ([facility] [Z89.512]     CHF (congestive heart failure) ([facility] [I50.9]     Noncompliance [Z91.19] 2019    Hypertension, essential [I10]     Anemia of chronic disease [D63.8] 2013    ESRD on dialysis ([facility] [N18.6, Z99.2] 2013    DM (diabetes mellitus) ([facility] [E11.9] 2013       Data        Code Status: DNR-CCA     ADVANCED CARE PLANNING:  Patient has capacity for medical decisions: He is alert and oriented to person only today.    Health Care Power of : no  Living Will: no     Personal, Social, and Family History  Marital Status:   Living situation:nursing home  Clarissa/Spiritual History: not asked  Psychological Distress: Not known at this time R/T orientation. Does patient understand diagnosis/treatment? Not sure  Does family/caregiver understand diagnosis/treatment? yes    Assessment        Palliative Performance Scale:    ___100% Full ambulation; normal activity and work; no evidence of disease; able to do own self care; normal intake; fully conscious  ___90% Full ambulation; normal activity and work; some evidence of disease; able to do own self care; normal intake; fully conscious  ___80% Full ambulation; normal activity with effort; some evidence of disease; able to do own self care; normal or reduced intake; fully conscious  ___70% Ambulation reduced; unable to perform normal job/work; significant disease; able to do own self care; normal or reduced intake; fully conscious  ___60%  Ambulation reduced; cannot do hobbies/housework; significant disease; occasional assist; intake normal or reduced; fully conscious/some confusion  ___50%  Mainly sit/lie; can't do any work; extensive disease; considerable assist; intake normal or reduced; fully conscious/some confusion  _X__40%  Mainly in bed; extensive disease; mainly assist; intake normal or reduced; fully conscious/ some confusion   ___30%  Bed bound; extensive disease; total care; intake reduced; fully conscious/some confusion  ___20%  Bed bound; extensive disease; total care; intake minimal; drowsy/coma  ___10%  Bed bound; extensive disease; total care; mouth care only; drowsy/coma  ___0       Death       Risk Assessments:    Heather Risk Score: [unfilled]    Readmission Risk Score: 41        1 Year Mortality Risk Score: @1YEARMORTALITYRISK@     Plan        Palliative Interaction:Patient is alert to person. He has an NG to right nares with tube feeds as tolerated.    I get an update from the bedside nurse Mariama about his extensive wounds and surgical interventions. I reach out to daughter Bals Lynn at [phone]. I introduce my palliative care support role to her. Khadar Campbell tells me of the dynamics and relationship with her Dad. She states that there are 3 biological, herself as she is oldest and 2 brothers. The middle brother's name is Dianne Baeza and she did not share the youngest bother's name. Khadar Campbell states that their Dad abandoned them when her youngest brother was just 3year old, and went and lived in [place]. Khadar Campbell states that her youngest brother does not want anything to do with her Dad. I explain the [facility] of the [facility], and she states that it will be her and her brother Trini Rose. Khadar Campbell and I talk about her Dad and his many chronic conditions, and his quality of life. Khadar Campbell states that her Dad had all these wounds at home evidently and did not share with anyone, until they were extensive and he needed help to care for himself and the wounds. I mentioned the thought of Hospice care as a way for him for comfort for him. Khadar Campbell states\" yes another family member was at the [facility], and I would be interested in talking to Hospice for my Dad, as long as his insurance covers it. I reach out to case management and ask about his insurance and she states \" yes, he is covered. \"  I reach out to Khadar Campbell and talk about a meeting time and that , yes her Dad's insurance covers hospice care. I do update her that if he qualifies for Hospice and all are agreeable, that he would not continue his dialysis treatments, and Khadar Campbell is in good understanding. I reach out to Hospice of [address] [place] [address] and they have a 3:30pm meeting today for the hospice nurse to assess the patient, and they will then be in touch with daughter Khadar Campbell to sign consents if needed. I offer Khadar Campbell much emotional support and she states\" thank you, I appreciate all the support and help.  \"     Will follow for goals of care and family support. Education/support to family  Providing support for coping/adaptation/distress of family  Discussing meaning/purpose   Continue with current plan of care  Code status clarified: [facility]  Palliative care orders introduced  Provided information about hospice  Validating patient/family distress  Continued communication updates  Patient is alert to person, I reach out to the oldest child daughter Amy Stewart and we talk at length. I set up a hospice meeting as she is interested in hospice care for her Dad,. She and her brother Yue Ramos are the patient's decision makers. Principle Problem/Diagnosis:  Necrotizing fasciitis ([facility] [M72.6]    Goals of care evaluation:  The patient goals of care are provide comfort care/support/palliation/relieve suffering   Goals of care discussed with:    [] Patient independently    [] Patient and Family    [x] Family or Healthcare DPOA independently    [] Unable to discuss with patient, family/DPOA not present    Code Status  DNR-CCA    Other recommendations:  Please call with any palliative questions or concerns. Palliative Care Team is available via perfect serve or via phone - [phone]. Palliative Care will continue to follow Mr. Pietro Ghosh care as needed. Thank you for allowing Palliative Care to participate in the care of Mr. Ge Pacheco .     Electronically signed by   Hugo Briceno RN  Palliative Care Team  on 7/6/2021 at 1:34 PM    Palliative care office: [phone]

## 2021-07-06 NOTE — PROGRESS NOTES
Comprehensive Nutrition Assessment    Type and Reason for Visit:  Reassess    Nutrition Recommendations/Plan: Continue current tube feeding. Will continue to monitor TF tolerance/adequacy. Nutrition Assessment:  Chart reviewed. Pt was extubated on 7/4/21. Pt remains NPO and on tube feeding at this time. Renal Formula currently at 50 mL/hr and tolerating well per RN. Meds/labs reviewed.     Malnutrition Assessment:  Malnutrition Status:  Insufficient data    Context:  Chronic Illness     Findings of the 6 clinical characteristics of malnutrition:  Energy Intake:  Unable to assess  Weight Loss:  Unable to assess     Body Fat Loss:  Unable to assess     Muscle Mass Loss:  1 - Mild muscle mass loss Clavicles (pectoralis & deltoids), Scapula (trapezius)  Fluid Accumulation:  No significant fluid accumulation     Strength:  Not Performed    Estimated Daily Nutrient Needs:  Energy (kcal):  7161-1359 kcal/day; Weight Used for Energy Requirements:  Admission     Protein (g):  120 g pro/day; Weight Used for Protein Requirements:  Admission (1.5)        Fluid (ml/day):  2000 mL/day; Method Used for Fluid Requirements:  ml/Kg (25)      Nutrition Related Findings:  meds/labs reviewed      Wounds:  Multiple (including necrotic sacral and bilateral inner thigh wounds)       Current Nutrition Therapies:    No diet orders on file  Current Tube Feeding (TF) Orders:  · Feeding Route: Nasogastric  · Formula: Renal Formula  · Schedule: Continuous at 50 mL/hr   · Current TF & Flush Orders Provides: 2160 kcal, 97 g pro/day  · Goal TF & Flush Orders Provides: 50 mL/hr =2160 kcal and 97 g pro/day    Additional Calorie Sources:   none    Anthropometric Measures:  · Height: 6' (182.9 cm)  · Current Body Weight: 175 lb 11.3 oz (79.7 kg)   · Admission Body Weight: 174 lb (78.9 kg)    · Ideal Body Weight: 178 lbs  · Adjusted Ideal Body Weight: 168 lb     · Adjusted BMI: 25         Nutrition Diagnosis:   · Inadequate oral intake

## 2021-07-06 NOTE — PLAN OF CARE
Problem: Non-Violent Restraints  Goal: No harm/injury to patient while restraints in use  7/5/2021 2342 by Sabrina Pierce RN  Outcome: Met This Shift  7/5/2021 1235 by Berton Osler, RN  Outcome: Ongoing  Goal: Patient's dignity will be maintained  7/5/2021 2342 by Sabrina Pierce RN  Outcome: Met This Shift  7/5/2021 1235 by Berton Osler, RN  Outcome: Ongoing     Problem: Pain:  Goal: Pain level will decrease  Description: Pain level will decrease  7/5/2021 2342 by Sabrina Pierce RN  Outcome: Ongoing  7/5/2021 1235 by Berton Osler, RN  Outcome: Ongoing  Goal: Control of acute pain  Description: Control of acute pain  7/5/2021 2342 by Sabrina Pierce RN  Outcome: Ongoing  7/5/2021 1235 by Berton Osler, RN  Outcome: Ongoing  Goal: Control of chronic pain  Description: Control of chronic pain  Outcome: Ongoing     Problem: Skin Integrity:  Goal: Will show no infection signs and symptoms  Description: Will show no infection signs and symptoms  7/5/2021 2342 by Sabrina Pierce RN  Outcome: Ongoing  7/5/2021 1235 by Berton Osler, RN  Outcome: Ongoing  Goal: Absence of new skin breakdown  Description: Absence of new skin breakdown  7/5/2021 2342 by Sabrina Pierce RN  Outcome: Ongoing  7/5/2021 1235 by Berton Osler, RN  Outcome: Ongoing  Goal: Skin integrity will be maintained  7/5/2021 2342 by Sabrina Pierce RN  Outcome: Ongoing  7/5/2021 1235 by Berton Osler, RN  Outcome: Ongoing     Problem: Falls - Risk of:  Goal: Will remain free from falls  Description: Will remain free from falls  7/5/2021 2342 by Sabrina Pierce RN  Outcome: Ongoing  7/5/2021 1235 by Berton Osler, RN  Outcome: Ongoing  Goal: Absence of physical injury  Description: Absence of physical injury  Outcome: Ongoing     Problem: Cardiac:  Goal: Ability to maintain an adequate cardiac output will improve  Description: Ability to maintain an adequate cardiac output will improve  7/5/2021 2342 by Elsi Roa RN  Outcome: Ongoing  7/5/2021 1235 by Jose Leal RN  Outcome: Ongoing  Goal: Hemodynamic stability will improve  Description: Hemodynamic stability will improve  7/5/2021 2342 by Elsi Roa RN  Outcome: Ongoing  7/5/2021 1235 by Jose Leal RN  Outcome: Ongoing     Problem: Fluid Volume:  Goal: Ability to achieve and maintain adequate urine output will improve  Description: Ability to achieve and maintain adequate urine output will improve  7/5/2021 2342 by Elsi Roa RN  Outcome: Ongoing  7/5/2021 1235 by Jose Leal RN  Outcome: Ongoing  Goal: Will show no signs or symptoms of fluid imbalance  Description: Will show no signs or symptoms of fluid imbalance  Outcome: Ongoing     Problem: Respiratory:  Goal: Respiratory status will improve  Description: Respiratory status will improve  7/5/2021 2342 by Elsi Roa RN  Outcome: Ongoing  7/5/2021 1235 by Jose Leal RN  Outcome: Ongoing  Goal: Ability to maintain adequate ventilation will improve  Outcome: Ongoing     Problem:  Bowel/Gastric:  Goal: Ability to prevent future episodes of altered bowel function as condition permits will improve  Description: Ability to prevent future episodes of altered bowel function as condition permits will improve  Outcome: Ongoing     Problem: Coping:  Goal: Level of anxiety will decrease  Description: Level of anxiety will decrease  7/5/2021 2342 by Elsi Roa RN  Outcome: Ongoing  7/5/2021 1235 by Jose Leal RN  Outcome: Ongoing     Problem: Physical Regulation:  Goal: Complications related to the disease process, condition or treatment will be avoided or minimized  Description: Complications related to the disease process, condition or treatment will be avoided or minimized  7/5/2021 2342 by Elsi Roa RN  Outcome: Ongoing  7/5/2021 1235 by Jose Leal RN  Outcome: Ongoing     Problem: Safety:  Goal: Ability to remain free from injury will improve  Outcome: Ongoing     Problem: Sensory:  Goal: Pain level will decrease  Description: Pain level will decrease  7/5/2021 2342 by Rosalia Ovalle RN  Outcome: Ongoing  7/5/2021 1235 by Niraj Lovett RN  Outcome: Ongoing     Problem: Tissue Perfusion:  Goal: Risk of venous thrombosis will decrease  Outcome: Ongoing     Problem: Urinary Elimination:  Goal: Ability to reestablish a normal urinary elimination pattern will improve - after catheter removal  Outcome: Ongoing     Problem: Non-Violent Restraints  Goal: Removal from restraints as soon as assessed to be safe  7/5/2021 2342 by Rosalia Ovalle RN  Outcome: Ongoing  7/5/2021 1235 by Niraj Lovett RN  Outcome: Not Met This Shift  Note: Continue with bilat mittens d/t pt reaching at lines.      Problem: OXYGENATION/RESPIRATORY FUNCTION  Goal: Patient will maintain patent airway  7/5/2021 2342 by Rosalia Ovalle RN  Outcome: Ongoing  7/5/2021 1235 by Niraj Lovett RN  Outcome: Ongoing  Goal: Patient will achieve/maintain normal respiratory rate/effort  Description: Respiratory rate and effort will be within normal limits for the patient  7/5/2021 2342 by Rosalia Ovalle RN  Outcome: Ongoing  7/5/2021 1235 by Niraj Lovett RN  Outcome: Ongoing     Problem: SKIN INTEGRITY  Goal: Skin integrity is maintained or improved  7/5/2021 2342 by Rosalia Ovalle RN  Outcome: Ongoing  7/5/2021 1235 by Niraj Lovett RN  Outcome: Ongoing     Problem: Confusion - Acute:  Goal: Absence of continued neurological deterioration signs and symptoms  Description: Absence of continued neurological deterioration signs and symptoms  7/5/2021 2342 by Rosalia Ovalle RN  Outcome: Ongoing  7/5/2021 1235 by Niraj Lovett RN  Outcome: Ongoing  Goal: Mental status will be restored to baseline  Description: Mental status will be restored to baseline  Outcome: Ongoing     Problem: Discharge Planning:  Goal: Ability to perform activities of daily living will improve  Description: Ability to perform activities of daily living will improve  7/5/2021 2342 by Jethro Forbes RN  Outcome: Ongoing  7/5/2021 1235 by Julio César Munguia RN  Outcome: Ongoing  Goal: Participates in care planning  Description: Participates in care planning  Outcome: Ongoing     Problem: Injury - Risk of, Physical Injury:  Goal: Will remain free from falls  Description: Will remain free from falls  7/5/2021 2342 by Jethro Forbes RN  Outcome: Ongoing  7/5/2021 1235 by Julio César Munguia RN  Outcome: Ongoing  Goal: Absence of physical injury  Description: Absence of physical injury  Outcome: Ongoing     Problem: Mood - Altered:  Goal: Mood stable  Description: Mood stable  Outcome: Ongoing  Goal: Absence of abusive behavior  Description: Absence of abusive behavior  Outcome: Ongoing  Goal: Verbalizations of feeling emotionally comfortable while being cared for will increase  Description: Verbalizations of feeling emotionally comfortable while being cared for will increase  Outcome: Ongoing     Problem: Psychomotor Activity - Altered:  Goal: Absence of psychomotor disturbance signs and symptoms  Description: Absence of psychomotor disturbance signs and symptoms  Outcome: Ongoing     Problem: Sensory Perception - Impaired:  Goal: Demonstrations of improved sensory functioning will increase  Description: Demonstrations of improved sensory functioning will increase  Outcome: Ongoing  Goal: Decrease in sensory misperception frequency  Description: Decrease in sensory misperception frequency  Outcome: Ongoing  Goal: Able to refrain from responding to false sensory perceptions  Description: Able to refrain from responding to false sensory perceptions  Outcome: Ongoing  Goal: Demonstrates accurate environmental perceptions  Description: Demonstrates accurate environmental perceptions  Outcome: Ongoing  Goal: Able to distinguish between reality-based and nonreality-based thinking  Description: Able to distinguish between reality-based and nonreality-based thinking  Outcome: Ongoing  Goal: Able to interrupt nonreality-based thinking  Description: Able to interrupt nonreality-based thinking  Outcome: Ongoing     Problem: Sleep Pattern Disturbance:  Goal: Appears well-rested  Description: Appears well-rested  Outcome: Ongoing

## 2021-07-06 NOTE — PLAN OF CARE
Problem: Pain:  Goal: Pain level will decrease  Description: Pain level will decrease  7/6/2021 1206 by Omid Carter RN  Outcome: Ongoing  7/5/2021 2342 by Leticia Glez RN  Outcome: Ongoing  Goal: Control of acute pain  Description: Control of acute pain  7/6/2021 1206 by Omid Carter RN  Outcome: Ongoing  7/5/2021 2342 by Leticia Glez RN  Outcome: Ongoing  Goal: Control of chronic pain  Description: Control of chronic pain  7/6/2021 1206 by Omid Carter RN  Outcome: Ongoing  7/5/2021 2342 by Leticia Glez RN  Outcome: Ongoing     Problem: Skin Integrity:  Goal: Will show no infection signs and symptoms  Description: Will show no infection signs and symptoms  7/6/2021 1206 by Omid Carter RN  Outcome: Ongoing  7/5/2021 2342 by Leticia Glez RN  Outcome: Ongoing  Goal: Absence of new skin breakdown  Description: Absence of new skin breakdown  7/6/2021 1206 by Omid Carter RN  Outcome: Ongoing  7/5/2021 2342 by Leticia Glez RN  Outcome: Ongoing  Goal: Skin integrity will be maintained  7/6/2021 1206 by Omid Carter RN  Outcome: Ongoing  7/5/2021 2342 by Leticia Glez RN  Outcome: Ongoing     Problem: Falls - Risk of:  Goal: Will remain free from falls  Description: Will remain free from falls  7/5/2021 2342 by Leticia Glez RN  Outcome: Ongoing  Goal: Absence of physical injury  Description: Absence of physical injury  7/5/2021 2342 by Leticia Glez RN  Outcome: Ongoing     Problem: Cardiac:  Goal: Ability to maintain an adequate cardiac output will improve  Description: Ability to maintain an adequate cardiac output will improve  7/6/2021 1206 by Omid Carter RN  Outcome: Ongoing  7/5/2021 2342 by Leticia Glez RN  Outcome: Ongoing  Goal: Hemodynamic stability will improve  Description: Hemodynamic stability will improve  7/5/2021 2342 by Leticia Glez RN  Outcome: Ongoing     Problem: Fluid Volume:  Goal: Ability to achieve and maintain adequate urine output will improve  Description: Ability to achieve and maintain adequate urine output will improve  7/5/2021 2342 by Lenora Castillo RN  Outcome: Ongoing  Goal: Will show no signs or symptoms of fluid imbalance  Description: Will show no signs or symptoms of fluid imbalance  7/5/2021 2342 by Lenora Castillo RN  Outcome: Ongoing     Problem: Respiratory:  Goal: Respiratory status will improve  Description: Respiratory status will improve  7/6/2021 1206 by Carolina Lezama RN  Outcome: Ongoing  7/5/2021 2342 by Lenora Castillo RN  Outcome: Ongoing  Goal: Ability to maintain adequate ventilation will improve  7/6/2021 1206 by Carolina Lezama RN  Outcome: Ongoing  7/5/2021 2342 by Lenora Castillo RN  Outcome: Ongoing     Problem:  Bowel/Gastric:  Goal: Ability to prevent future episodes of altered bowel function as condition permits will improve  Description: Ability to prevent future episodes of altered bowel function as condition permits will improve  7/5/2021 2342 by Lenora Castillo RN  Outcome: Ongoing     Problem: Coping:  Goal: Level of anxiety will decrease  Description: Level of anxiety will decrease  7/5/2021 2342 by Lenora Castillo RN  Outcome: Ongoing     Problem: Physical Regulation:  Goal: Complications related to the disease process, condition or treatment will be avoided or minimized  Description: Complications related to the disease process, condition or treatment will be avoided or minimized  7/6/2021 1206 by Carolina Lezama RN  Outcome: Ongoing  7/5/2021 2342 by Lenora Castillo RN  Outcome: Ongoing     Problem: Safety:  Goal: Ability to remain free from injury will improve  7/6/2021 1206 by Carolina Lezama RN  Outcome: Ongoing  7/5/2021 2342 by Lenora Castillo RN  Outcome: Ongoing     Problem: Sensory:  Goal: Pain level will decrease  Description: Pain level will decrease  7/6/2021 1206 by Carolina Lezama RN  Outcome: Ongoing  7/5/2021 2342 by Lenora Castillo Absence of physical injury  7/5/2021 2342 by Jerzy Wooten RN  Outcome: Ongoing     Problem: Mood - Altered:  Goal: Mood stable  Description: Mood stable  7/5/2021 2342 by Jerzy Wooten RN  Outcome: Ongoing  Goal: Absence of abusive behavior  Description: Absence of abusive behavior  7/5/2021 2342 by Jerzy Wooten RN  Outcome: Ongoing  Goal: Verbalizations of feeling emotionally comfortable while being cared for will increase  Description: Verbalizations of feeling emotionally comfortable while being cared for will increase  7/5/2021 2342 by Jerzy Wooten RN  Outcome: Ongoing     Problem: Psychomotor Activity - Altered:  Goal: Absence of psychomotor disturbance signs and symptoms  Description: Absence of psychomotor disturbance signs and symptoms  7/5/2021 2342 by Jerzy Wooten RN  Outcome: Ongoing     Problem: Sensory Perception - Impaired:  Goal: Demonstrations of improved sensory functioning will increase  Description: Demonstrations of improved sensory functioning will increase  7/5/2021 2342 by Jerzy Wooten RN  Outcome: Ongoing  Goal: Decrease in sensory misperception frequency  Description: Decrease in sensory misperception frequency  7/5/2021 2342 by Jerzy Wooten RN  Outcome: Ongoing  Goal: Able to refrain from responding to false sensory perceptions  Description: Able to refrain from responding to false sensory perceptions  7/5/2021 2342 by Jerzy Wooten RN  Outcome: Ongoing  Goal: Demonstrates accurate environmental perceptions  Description: Demonstrates accurate environmental perceptions  7/5/2021 2342 by Jerzy Wooten RN  Outcome: Ongoing  Goal: Able to distinguish between reality-based and nonreality-based thinking  Description: Able to distinguish between reality-based and nonreality-based thinking  7/5/2021 2342 by Jerzy Wooten RN  Outcome: Ongoing  Goal: Able to interrupt nonreality-based thinking  Description: Able to interrupt nonreality-based thinking  7/5/2021 2342 by

## 2021-07-06 NOTE — PROGRESS NOTES
Dialysis Post Treatment Note  Vitals:    07/05/21 1915   BP:    Pulse: 56   Resp:    Temp:    SpO2:      Pre-Weight = 81.2kg  Post-weight = Weight: 175 lb 11.3 oz (79.7 kg)  Total Liters Processed = Total Liters Processed (l/min): 79.8 l/min  Rinseback Volume (mL) = Rinseback Volume (ml): 300 ml  Net Removal (mL) = NET Removed (ml): 1500 ml  Patient's dry weight=tbd  Type of access used=fistula Left  Length of treatment=3.5hrs

## 2021-07-06 NOTE — PROGRESS NOTES
PROGRESS NOTE          PATIENT NAME: Ronnell Hartley RECORD NO. [id number]  DATE: 7/6/2021  SURGEON: Davida Luo  PRIMARY CARE PHYSICIAN: Augustine Wall MD    HD: # 6    ASSESSMENT    Patient Active Problem List   Diagnosis    SDH (subdural hematoma) ([address]    ESRD on dialysis ([address]    DM (diabetes mellitus) ([address]    Anemia of chronic disease    Charcot foot due to diabetes mellitus ([address]    Diabetic ulcer of left foot ([address]    Hypertension, essential    Type 2 diabetes mellitus with foot ulcer ([address]    Noncompliance    CHF (congestive heart failure) (HCC)    Sepsis ([address]    Metabolic acidosis    Azotemia    Hyperkalemia    Diarrhea    Nausea and vomiting    Peripheral vascular disease (HCC)    History of left below knee amputation (HCC)    Hemoptysis    Ulcerated, foot, right, with fat layer exposed ([address]    Charcot's joint of foot, right    Pneumonia    Sepsis due to pneumonia ([address]    Loculated pleural effusion    Pleural effusion on right    ROOSEVELT (obstructive sleep apnea)    Parapneumonic effusion    C. difficile colitis    Acute pulmonary edema (HCC)    Volume overload    Non-compliance with renal dialysis ([address]    Acute respiratory failure with hypoxia (HCC)    Moderate malnutrition (HCC)    Infected wound    Necrotizing fasciitis ([address]    Septic shock (HCC)    Elevated digoxin level    Bradycardia       MEDICAL DECISION MAKING AND PLAN    66M admitted for sepsis suspect 2/2 necrotic sacral and inner thigh wounds  -S/p I&D b/l ischial and inner thigh wounds 7/1  -S/p 2nd look, debridement of b/l ischial wounds, veraflo wound vac placmeent    · Continue medical mgmt and supportive care per primary  · No further debridements planned  · Wound care: veraflo wound vac changed 7/5. Wound ostomy to continue vac changes.  RN to continue dressing changes medial thigh wounds  · ABX per ID: Cefepime, Flagyl, Vanco  · Leukocytosis: 16.7, (19.7, 17.9, 18.0, 20.1, 21.9)  · ESRD, on dialysis M/W/F  · Recommend palliative care consult for goals of care  · Recommend podiatry for right foot wounds  · General surgery to sign off at this time. Call with any questions      Chief Complaint: Intubated, sedated Necrotic sacral and bilateral inner thigh wounds; sepsis     SUBJECTIVE  Patient seen and examined at bedside. No overnight events. Patient remains extubated, oriented to self only, answers simple questions. On tube feeds at goal. Wound VAC changed yesterday. Now off Pressers. Atrial fibrillation. T-max 37.1    OBJECTIVE  VITALS: Temp: Temp: 97.7 °F (36.5 °C)Temp  Av.1 °F (36.7 °C)  Min: 97.5 °F (36.4 °C)  Max: 98.8 °F (96.0 °C) BP Systolic (78HOX), WMW:533 , Min:91 , YFA:239   Diastolic (60QIU), LWA:79, Min:42, Max:59   Pulse Pulse  Av.1  Min: 42  Max: 69 Resp Resp  Av.7  Min: 1  Max: 28 Pulse ox SpO2  Av.5 %  Min: 91 %  Max: 100 %    CONSTITUTIONAL:  Alert, oriented to self, responding to several questions  HEENT: Head is normocephalic, atraumati  NECK: Soft, trachea midline and straight  LUNGS: Unlabored breathing on 2 LNC  CARDIOVASCULAR:  Irregularly irregular, rate controlled  ABDOMEN: soft, non-distended, non-tender. NEUROLOGIC: moving all extremities equaly  EXTREMITIES: L BKA; right foot edema, several ulcers, noninfected  SKIN: bilateral thigh wounds clean without necrosis, some fibrinous exudate on base - surrounding denuded skin blistering and necrotic on thigh extending posteriorly; veraflo wound vac to ischium bilaterally    I/O last 3 completed shifts: In: 9891 [I.V.:665; NG/GT:1062]  Out: 2950 [Drains:1050]    Drain/tube output:   In: 1056 [I.V.:224; NG/GT:732]  Out: 2650 [Drains:750]    LAB:  CBC:   Recent Labs     21  0410 21  0612 21  0613   WBC 17.9* 19.7* 16.7*   HGB 7.2* 7.3* 6.8*   HCT 24.9* 25.1* 23.7*   MCV 96.9 96.5 97.5    167 128*     BMP:   Recent Labs     21  0410 21  0612 21  0613    134* 139   K 3.8 4.2 3.9   CL 99 98 100   CO2 26 22 22   BUN 32* 45* 28*   CREATININE 3.16* 3.88* 2.24*   GLUCOSE 119* 145* 176*     COAGS: No results for input(s): APTT, PROT, INR in the last 72 hours. RADIOLOGY:  No results found.         Joleen Hurd DO  7/6/2021  8:02 AM

## 2021-07-06 NOTE — PROGRESS NOTES
[facility] will now be here at 3:30 pm today to assess patient. They will then communicate the outcome of their assessment. and contact the patient's daughter Amy Stewart and consult with her by phone. [facility] states that they will fully update daughter Amy Stewart with how their hospice work, and offer her all their support,and answer all questions as well. Will follow for the outcome of the Hospice meeting, and follow closely for support. Lewis Mckeon [address] [place] [address], RN  [facility]: [phone]  [address]: [phone]  [address]: [phone]

## 2021-07-06 NOTE — PLAN OF CARE
Patient has DNR CCA papers signed back in April-2021. I talked to patient about goals of care. Patient is grossly oriented x3 at this point. He reconfirmed that he does not want any CPR or intubation in case of cardiac/respiratory arrest.  He states that he does not want any further surgeries to be done. I also talked to patient's daughter on phone. She agreed that they do not want any further surgeries to be done. We will coordinate with the general surgery team and treat medically. Patient's CODE STATUS has already been changed to DNR CCA.

## 2021-07-06 NOTE — PLAN OF CARE
Problem: Non-Violent Restraints  Goal: Removal from restraints as soon as assessed to be safe  7/6/2021 0932 by Destini Truong RN  Outcome: Completed  7/5/2021 2342 by Abhijeet Fernandes RN  Outcome: Ongoing  Goal: No harm/injury to patient while restraints in use  7/6/2021 0932 by Destini Truong RN  Outcome: Completed  7/5/2021 2342 by Abhijeet Fernandes RN  Outcome: Met This Shift  Goal: Patient's dignity will be maintained  7/6/2021 0932 by Destini Truong RN  Outcome: Completed  7/5/2021 2342 by Abhijeet Fernandes RN  Outcome: Met This Shift

## 2021-07-07 NOTE — PLAN OF CARE
Problem: Pain:  Goal: Pain level will decrease  Description: Pain level will decrease  7/6/2021 2145 by Sheyla Keith RN  Outcome: Ongoing  7/6/2021 1206 by Felipe Little RN  Outcome: Ongoing  Goal: Control of acute pain  Description: Control of acute pain  7/6/2021 2145 by Sheyla Keith RN  Outcome: Ongoing  7/6/2021 1206 by Felipe Little RN  Outcome: Ongoing  Goal: Control of chronic pain  Description: Control of chronic pain  7/6/2021 2145 by Sheyla Keith RN  Outcome: Ongoing  7/6/2021 1206 by Felipe Little RN  Outcome: Ongoing     Problem: Skin Integrity:  Goal: Will show no infection signs and symptoms  Description: Will show no infection signs and symptoms  7/6/2021 2145 by Sheyla Keith RN  Outcome: Ongoing  7/6/2021 1206 by Felipe Little RN  Outcome: Ongoing  Goal: Absence of new skin breakdown  Description: Absence of new skin breakdown  7/6/2021 2145 by Sheyla Keith RN  Outcome: Ongoing  7/6/2021 1206 by Felipe Little RN  Outcome: Ongoing  Goal: Skin integrity will be maintained  7/6/2021 2145 by Sheyla Keith RN  Outcome: Ongoing  7/6/2021 1206 by Felipe Little RN  Outcome: Ongoing     Problem: Falls - Risk of:  Goal: Will remain free from falls  Description: Will remain free from falls  Outcome: Ongoing  Goal: Absence of physical injury  Description: Absence of physical injury  Outcome: Ongoing     Problem: Cardiac:  Goal: Ability to maintain an adequate cardiac output will improve  Description: Ability to maintain an adequate cardiac output will improve  7/6/2021 2145 by Sheyla Keith RN  Outcome: Ongoing  7/6/2021 1206 by Felipe Little RN  Outcome: Ongoing  Goal: Hemodynamic stability will improve  Description: Hemodynamic stability will improve  Outcome: Ongoing     Problem: Fluid Volume:  Goal: Ability to achieve and maintain adequate urine output will improve  Description: Ability to achieve and maintain adequate urine output will removal  Outcome: Ongoing     Problem: OXYGENATION/RESPIRATORY FUNCTION  Goal: Patient will maintain patent airway  7/6/2021 2145 by Leia Che RN  Outcome: Ongoing  7/6/2021 1206 by Zana Meade RN  Outcome: Ongoing  Goal: Patient will achieve/maintain normal respiratory rate/effort  Description: Respiratory rate and effort will be within normal limits for the patient  7/6/2021 2145 by Leia Che RN  Outcome: Ongoing  7/6/2021 1206 by Zana Meade RN  Outcome: Ongoing     Problem: SKIN INTEGRITY  Goal: Skin integrity is maintained or improved  Outcome: Ongoing     Problem: Confusion - Acute:  Goal: Absence of continued neurological deterioration signs and symptoms  Description: Absence of continued neurological deterioration signs and symptoms  Outcome: Ongoing  Goal: Mental status will be restored to baseline  Description: Mental status will be restored to baseline  Outcome: Ongoing     Problem: Discharge Planning:  Goal: Ability to perform activities of daily living will improve  Description: Ability to perform activities of daily living will improve  Outcome: Ongoing  Goal: Participates in care planning  Description: Participates in care planning  Outcome: Ongoing     Problem: Injury - Risk of, Physical Injury:  Goal: Will remain free from falls  Description: Will remain free from falls  Outcome: Ongoing  Goal: Absence of physical injury  Description: Absence of physical injury  Outcome: Ongoing     Problem: Mood - Altered:  Goal: Mood stable  Description: Mood stable  Outcome: Ongoing  Goal: Absence of abusive behavior  Description: Absence of abusive behavior  Outcome: Ongoing  Goal: Verbalizations of feeling emotionally comfortable while being cared for will increase  Description: Verbalizations of feeling emotionally comfortable while being cared for will increase  Outcome: Ongoing     Problem: Psychomotor Activity - Altered:  Goal: Absence of psychomotor disturbance signs and symptoms  Description: Absence of psychomotor disturbance signs and symptoms  Outcome: Ongoing     Problem: Sensory Perception - Impaired:  Goal: Demonstrations of improved sensory functioning will increase  Description: Demonstrations of improved sensory functioning will increase  Outcome: Ongoing  Goal: Decrease in sensory misperception frequency  Description: Decrease in sensory misperception frequency  Outcome: Ongoing  Goal: Able to refrain from responding to false sensory perceptions  Description: Able to refrain from responding to false sensory perceptions  Outcome: Ongoing  Goal: Demonstrates accurate environmental perceptions  Description: Demonstrates accurate environmental perceptions  Outcome: Ongoing  Goal: Able to distinguish between reality-based and nonreality-based thinking  Description: Able to distinguish between reality-based and nonreality-based thinking  Outcome: Ongoing  Goal: Able to interrupt nonreality-based thinking  Description: Able to interrupt nonreality-based thinking  Outcome: Ongoing     Problem: Sleep Pattern Disturbance:  Goal: Appears well-rested  Description: Appears well-rested  Outcome: Ongoing     Problem: Nutrition  Goal: Optimal nutrition therapy  7/6/2021 1337 by Vanessa Mike RD, LD  Outcome: Ongoing  Note: Nutrition Problem #1: Inadequate oral intake  Intervention: Food and/or Nutrient Delivery: Continue Current Tube Feeding  Nutritional Goals: meet % of estimated nutrient needs with enteral nutrition support

## 2021-07-07 NOTE — DEATH NOTES
Death Pronouncement Note  Patient's Name: Prasad Monae   Patient's YOB: 1955  MRN Number: [id number]    Admitting Provider: Lion Amaro DO  Attending Provider: Lion Amaro DO    Patient was examined and the following were absent: Pulses, Blood Pressure and Respiratory effort    I declared the patient dead on  7/7/2021 at 6:31 AM    Preliminary Cause of Death:   Patient went into V. tach, became hypotensive and lost pulses.     Electronically signed by Jasmin Escobar MD on 7/7/21 at 6:46 AM EDT

## 2021-07-07 NOTE — FLOWSHEET NOTE
[facility] [address] [facility]   Patient Death Note  DEATH   Shift date: 2021    Shift day: Wednesday  Shift #: 1                 Room # 0101/0101-01   Name: Prasad Monae            Age: 73 y.o. Gender: male          Adventism: [facility] [employer]:   Admit Date & Time: 2021 10:51 PM     Referral: Yes   Actual date of death: 2021   TOD: 6:31am       SITUATION AT DEATH:  Patient death. Daughter not immediately available for notification. Subsequent phone conversations with daughter. She sounded tearful on the phone as she shared about her relationship to her dad and current loss. IS THIS A 'S CASE? No    SPIRITUAL/EMOTIONAL INTERVENTION:   provided active listening and grief support. Helped facilitate  home selection and made referral to  to contact patient's daughter with a question about medicare/insurance. Family Received Grief Packet? No       NAME AND PHONE NUMBER OF DOCTOR SIGNING DEATH CERTIFICATE:  Dr. Yina Solis, [facility] made follow-up phone calls to family and selected [facility], providing all needed information    Copy of COMPLETED Release of Body Form Received? Yes    Patient's belongings: None noted     HOME:  Name: [place]: [place]  Phone Number: [phone]    NEXT OF KIN:  Name: Wing Mirza  Relationship: Daughter  Street Address: [address] [place] [address] St: [address] [facility]: [address]  Zip code: [address]   Phone Number: [facility]? No    IF SO, WHAT? N/A       21 0830   Encounter Summary   Services provided to: Family   Referral/Consult From: Nurse   Continue Visiting   (2021)   Complexity of Encounter High   Length of Encounter 1 hour   Spiritual Assessment Completed Yes   Grief and Life Adjustment   Type End of life;Death   Assessment Grieving;Coping   Intervention Explored coping resources;Sustaining presence/ Ministry of presence; Active listening;Explored feelings, thoughts, concerns   Outcome Grieving         Electronically signed by Telly Antoine, on 7/7/2021 at 12:21 PM.  [address] [facility]  [phone]

## 2021-07-07 NOTE — SIGNIFICANT EVENT
Pt becoming more hypotensive overnight and required Levophed gtt to be restarted and continue going up on it. Pt went into v tach and lost pulses. TOD [id number]. Dr. Jovany Hall at bedside to pronounce pt. All gtts and other devices shut off. Attempted to call spiritual care but no answer, will try back.      Electronically signed by Robin Denton RN on 7/7/2021 at 6:43 AM

## 2021-07-07 NOTE — FLOWSHEET NOTE
[address] [facility] [address]  PROGRESS NOTE      Room # 0101/0101-01   Name: Samina Herron            Age: 73 y.o. Gender: male          [address]: [address]       Reason for Visit: Referral      Overview and Assessment  Samina Herron  this morning. Multiple attempts have been made to contact family but they have not yet been notified of patient's death. Chart notes indicate complicated family dynamics. Daughter Александр Fuentes is primary contact. Interventions   hepled provide coordination of care and outreach to family. Outcome and Plan   to follow-up and assist providing support to family in this time of loss. Made referral to follow-up later this morning. Follow as needed/requested. 21 0730   Encounter Summary   Services provided to: Patient; Family   Referral/Consult From: Nurse   Continue Visiting   (2021)   Complexity of Encounter High   Length of Encounter 30 minutes   Spiritual Assessment Completed Yes   Grief and Life Adjustment   Type End of life;Grief and loss;Death   Assessment Grieving   Intervention Sustaining presence/ Ministry of presence   Outcome Grieving       Electronically signed by Antonia Hoskins, on 2021 at 12:16 PM.  [facility]  [phone]

## 2021-07-08 ENCOUNTER — TELEPHONE (OUTPATIENT)
Dept: PULMONOLOGY | Age: 66
End: 2021-07-08

## 2021-07-08 LAB
ABO/RH: NORMAL
ANTIBODY IDENTIFICATION: NORMAL
ANTIBODY SCREEN: NORMAL
ARM BAND NUMBER: NORMAL
BLD PROD TYP BPU: NORMAL
BLD PROD TYP BPU: NORMAL
CROSSMATCH RESULT: NORMAL
CROSSMATCH RESULT: NORMAL
DAT IGG: POSITIVE
DISPENSE STATUS BLOOD BANK: NORMAL
DISPENSE STATUS BLOOD BANK: NORMAL
EXPIRATION DATE: NORMAL
TRANSFUSION STATUS: NORMAL
TRANSFUSION STATUS: NORMAL
UNIT DIVISION: 0
UNIT DIVISION: 0
UNIT NUMBER: NORMAL
UNIT NUMBER: NORMAL

## 2021-07-09 ENCOUNTER — TELEPHONE (OUTPATIENT)
Dept: PULMONOLOGY | Age: 66
End: 2021-07-09

## 2021-07-09 NOTE — DISCHARGE SUMMARY
[address]     Department of Internal Medicine - Staff Internal Medicine Teaching Service    INPATIENT DISCHARGE SUMMARY      Patient Identification:  Lizett Salazar is a 73 y.o. male. :  1955  MRN: [id number]     Acct: [de-identified]   PCP: Yves Gaona MD  Admit Date:  2021  Discharge date and time: 2021  9:23 AM   Attending Provider: No att. providers found                                     [address] Problem Lists:  Principal Problem:    Necrotizing fasciitis ([address]  Active Problems:    ESRD on dialysis ([address]    DM (diabetes mellitus) ([address]    Anemia of chronic disease    Hypertension, essential    Noncompliance    CHF (congestive heart failure) ([address]    Peripheral vascular disease ([address]    History of left below knee amputation ([address]    ROOSEVELT (obstructive sleep apnea)    Septic shock (HCC)    Elevated digoxin level    Bradycardia  Resolved Problems:    * No resolved hospital problems. *      HOSPITAL STAY     Brief Inpatient course:   Lizett Salazar is a 73 y.o. male with history of ESRD on hemodialysis, systolic CHF last echo done on 2020 showed EF of 25 to 30%, A. fib, multivessel CAD s/p CABG and subsequent DENY to LCx, peripheral artery disease with history of left BKA, type 2 diabetes mellitus initially was admitted at [facility] for worsening mentation and bilateral necrotizing wounds. Patient had debridement by general surgery for necrotizing fasciitis and was then transferred to [facility]. Patient was admitted to ICU, requiring Levophed for hypotension. Patient was extubated and tolerated it well. Patient was continued on antibiotics. Patient was DNR CCA, him and family decided not to pursue further debridements for necrotizing fasciitis due to extensive medical history.   Patient went into [facility], became hypotensive and lost pulses on  and was pronounced dead at 6:31 AM.    Procedures/ Significant Interventions:    Debridement for necrotizing fasciitis    Consults:     Consults:     Final Specialist Recommendations/Findings:   IP CONSULT TO Matty 2 TO DOSE VANCOMYCIN  IP CONSULT TO CARDIOLOGY  IP CONSULT TO NEPHROLOGY  IP CONSULT TO DIETITIAN  IP CONSULT TO INFECTIOUS DISEASES  IP CONSULT TO Prem Adames MD  Internal Medicine Resident, PGY-2  [address], [place]  7/9/2021, 12:08 PM

## 2021-07-13 LAB
EKG ATRIAL RATE: 35 BPM
EKG Q-T INTERVAL: 432 MS
EKG QRS DURATION: 128 MS
EKG QTC CALCULATION (BAZETT): 319 MS
EKG R AXIS: -58 DEGREES
EKG T AXIS: -160 DEGREES
EKG VENTRICULAR RATE: 33 BPM

## 2022-12-18 NOTE — PLAN OF CARE
Problem: Falls - Risk of:  Goal: Will remain free from falls  5/8/2021 0647 by Jeanne Bryan RN  Outcome: Ongoing  5/7/2021 1821 by Jakob Garcia RN  Outcome: Met This Shift  Goal: Absence of physical injury  5/8/2021 0647 by Jeanne Bryan RN  Outcome: Ongoing  5/7/2021 1821 by Jakob Garcia RN  Outcome: Met This Shift     Problem: Skin Integrity:  Goal: Will show no infection signs and symptoms  5/8/2021 0647 by Jeanne Bryan RN  Outcome: Ongoing  5/7/2021 1821 by Jakob Garcia RN  Outcome: Ongoing  Goal: Absence of new skin breakdown  5/8/2021 0647 by Jeanne Bryan RN  Outcome: Ongoing  5/7/2021 1821 by Jakob Garcia RN  Outcome: Ongoing     Problem:  Activity:  Goal: Risk for activity intolerance will decrease  5/8/2021 0647 by Jeanne Bryan RN  Outcome: Ongoing  5/7/2021 1821 by Jakob Garcia RN  Outcome: Ongoing     Problem: Fluid Volume:  Goal: Will show no signs or symptoms of fluid imbalance  5/8/2021 0647 by Jeanne Bryan RN  Outcome: Ongoing  5/7/2021 1821 by Jakob Garcia RN  Outcome: Ongoing     Problem: Nutritional:  Goal: Maintenance of adequate nutrition will be supported  5/8/2021 0647 by Jeanne Bryan RN  Outcome: Ongoing  5/7/2021 1821 by Jakob Garcia RN  Outcome: Ongoing     Problem: Physical Regulation:  Goal: Complications related to the disease process, condition or treatment will be avoided or minimized  5/8/2021 0647 by Jeanne Bryan RN  Outcome: Ongoing  5/7/2021 1821 by Jakob Garcia RN  Outcome: Ongoing     Problem: Urinary Elimination:  Goal: Ability to achieve and maintain adequate urine output will be supported  5/8/2021 0647 by Jeanne Bryan RN  Outcome: Ongoing  5/7/2021 1821 by Jakob Garcia RN  Outcome: Ongoing     Problem: Airway Clearance - Ineffective:  Goal: Clear lung sounds  5/8/2021 0647 by Jeanne Bryan RN  Outcome: Ongoing  5/7/2021 1821 by Jakob Garcia RN Outcome: Ongoing  Goal: Ability to maintain a clear airway will improve  5/8/2021 0647 by Vanessa Schulz RN  Outcome: Ongoing  5/7/2021 1821 by Puja Saleh RN  Outcome: Ongoing     Problem: Fluid Volume - Deficit:  Goal: Achieves intake and output within specified parameters  5/8/2021 0647 by Vanessa Schulz RN  Outcome: Ongoing  5/7/2021 1821 by Puja Saleh RN  Outcome: Ongoing     Problem: Gas Exchange - Impaired:  Goal: Levels of oxygenation will improve  5/8/2021 0647 by Vanessa Schulz RN  Outcome: Ongoing  5/7/2021 1821 by Puja Saleh RN  Outcome: Ongoing     Problem: Confusion - Acute:  Goal: Absence of continued neurological deterioration signs and symptoms  5/8/2021 0647 by Vanessa Schulz RN  Outcome: Ongoing  5/7/2021 1821 by Puja Saleh RN  Outcome: Ongoing  Goal: Mental status will be restored to baseline  5/8/2021 0647 by Vanessa Schulz RN  Outcome: Ongoing  5/7/2021 1821 by Puja Saleh RN  Outcome: Ongoing     Problem: Discharge Planning:  Goal: Ability to perform activities of daily living will improve  5/8/2021 0647 by Vanessa Schulz RN  Outcome: Ongoing  5/7/2021 1821 by Puja Saleh RN  Outcome: Ongoing  Goal: Participates in care planning  5/8/2021 0647 by Vanessa Schulz RN  Outcome: Ongoing  5/7/2021 1821 by Puja Saleh RN  Outcome: Ongoing     Problem: Injury - Risk of, Physical Injury:  Goal: Will remain free from falls  5/8/2021 0647 by Vanessa Schulz RN  Outcome: Ongoing  5/7/2021 1821 by Puja Salhe RN  Outcome: Met This Shift  Goal: Absence of physical injury  5/8/2021 0647 by Vanessa Schulz RN  Outcome: Ongoing  5/7/2021 1821 by uPja Saleh RN  Outcome: Met This Shift     Problem: Mood - Altered:  Goal: Mood stable  5/8/2021 0647 by Vanessa Schulz RN  Outcome: Ongoing  5/7/2021 1821 by Puja Saleh RN  Outcome: Ongoing  Goal: Absence of abusive behavior  5/8/2021 0647 by 5/7/2021 1821 by Adonis Lewis RN  Outcome: Ongoing  Goal: Control of acute pain  5/8/2021 0647 by Orlando Rowell RN  Outcome: Ongoing  5/7/2021 1821 by Adonis Lewis RN  Outcome: Ongoing  Goal: Control of chronic pain  5/8/2021 0647 by Orlando Rowell RN  Outcome: Ongoing  5/7/2021 1821 by Adonis Lewis RN  Outcome: Ongoing     Problem: Nutrition  Goal: Optimal nutrition therapy  5/8/2021 0647 by Orlando Rowell RN  Outcome: Ongoing  5/7/2021 1821 by Adonis Lewis RN  Outcome: Ongoing     Problem: Musculor/Skeletal Functional Status  Goal: Highest potential functional level  5/8/2021 0647 by Orlando Rowell RN  Outcome: Ongoing  5/7/2021 1821 by Adonis Lewis RN  Outcome: Ongoing  Goal: Absence of falls  5/8/2021 0647 by Orlando Rowell RN  Outcome: Ongoing  5/7/2021 1821 by Adoins Lewis RN  Outcome: Ongoing no history of blood product transfusion

## 2025-04-02 NOTE — TELEPHONE ENCOUNTER
home called and asked if you could sign the death certificate.  Please advise KF The form was sent to the Physician's Nurse MSG Pool via In-Basket for review and signature.    Completed form will be mailed to patient's address on file if no authorization is received.   The Auth on file is for a different Provider.

## (undated) DEVICE — Z DISCONTINUED BY MEDLINE USE 2711682 TRAY SKIN PREP DRY W/ PREM GLV

## (undated) DEVICE — CASSETTE THER 38 MM W/ INSTILLATION TBNG VAC VERALINK

## (undated) DEVICE — LEGGINGS, PAIR, 31X48, STERILE: Brand: MEDLINE

## (undated) DEVICE — GLOVE SURG SZ 6 THK91MIL LTX FREE SYN POLYISOPRENE ANTI

## (undated) DEVICE — INTENDED FOR TISSUE SEPARATION, AND OTHER PROCEDURES THAT REQUIRE A SHARP SURGICAL BLADE TO PUNCTURE OR CUT.: Brand: BARD-PARKER ® CARBON RIB-BACK BLADES

## (undated) DEVICE — DRAPE,REIN 53X77,STERILE: Brand: MEDLINE

## (undated) DEVICE — PAD,ABDOMINAL,5"X9",ST,LF,25/BX: Brand: MEDLINE INDUSTRIES, INC.

## (undated) DEVICE — CANISTER NEG PRSS 1000ML W/ GEL INFOVAC

## (undated) DEVICE — SUTURE PROL SZ 2-0 L18IN NONABSORBABLE BLU FS L26MM 3/8 CIR 8685H

## (undated) DEVICE — SOLUTION SCRB 4OZ 10% POVIDONE IOD ANTIMIC BTL

## (undated) DEVICE — GLOVE ORANGE PI 8   MSG9080

## (undated) DEVICE — SYRINGE IRRIG 60ML SFT PLIABLE BLB EZ TO GRP 1 HND USE W/

## (undated) DEVICE — Z DISCONTINUED USE 2272114 DRAPE SURG UTIL 26X15 IN W/ TAPE N INVASIVE MULTLYR DISP

## (undated) DEVICE — Device

## (undated) DEVICE — GOWN,SIRUS,POLYRNF,BRTHSLV,LG,30/CS: Brand: MEDLINE

## (undated) DEVICE — DRAPE,UNDRBUT,WHT GRAD PCH,CAPPORT,20/CS: Brand: MEDLINE

## (undated) DEVICE — SOLUTION PREP POVIDONE IOD FOR SKIN MUCOUS MEM PRIOR TO

## (undated) DEVICE — BANDAGE,GAUZE,BULKEE II,4.5"X4.1YD,STRL: Brand: MEDLINE

## (undated) DEVICE — BRAVA STRIP PASTE. OSTOMY CARE.: Brand: BRAVA

## (undated) DEVICE — GOWN,SIRUS,NONRNF,SETINSLV,XL,20/CS: Brand: MEDLINE

## (undated) DEVICE — GLOVE ORANGE PI 7 1/2   MSG9075

## (undated) DEVICE — COVER OR TBL W40XL90IN ABSRB STD AND GRIPPY BK SAHARA

## (undated) DEVICE — DRESSING NEG PRESSURE WND VAC

## (undated) DEVICE — PACK SURG ABD SVMMC

## (undated) DEVICE — V.A.C. VERAFLO CLEANSE CHOICE™ DRESSING LARGE: Brand: V.A.C. VERAFLO CLEANSE CHOICE™

## (undated) DEVICE — GAUZE,SPONGE,FLUFF,6"X6.75",STRL,5/TRAY: Brand: MEDLINE

## (undated) DEVICE — GOWN,SIRUS,POLYRNF,BRTHSLV,XL,30/CS: Brand: MEDLINE

## (undated) DEVICE — ULTRASONIC SET TUBE SONIC 1 SONICVAC DISP

## (undated) DEVICE — GLOVE SURG BIOGEL PI ULTRATCH PF 8

## (undated) DEVICE — GARMENT,MEDLINE,DVT,INT,CALF,MED, GEN2: Brand: MEDLINE

## (undated) DEVICE — SUTURE VCRL SZ 3-0 L18IN ABSRB UD L26MM SH 1/2 CIR J864D

## (undated) DEVICE — GLOVE ORANGE PI 7   MSG9070

## (undated) DEVICE — GLOVE SURG SZ 65 THK91MIL LTX FREE SYN POLYISOPRENE